# Patient Record
Sex: FEMALE | Race: WHITE | Employment: UNEMPLOYED | URBAN - METROPOLITAN AREA
[De-identification: names, ages, dates, MRNs, and addresses within clinical notes are randomized per-mention and may not be internally consistent; named-entity substitution may affect disease eponyms.]

---

## 2017-05-12 ENCOUNTER — TELEPHONE (OUTPATIENT)
Dept: CARDIOLOGY CLINIC | Age: 67
End: 2017-05-12

## 2017-05-22 RX ORDER — FUROSEMIDE 40 MG/1
TABLET ORAL
Qty: 90 TABLET | Refills: 0 | Status: SHIPPED | OUTPATIENT
Start: 2017-05-22 | End: 2017-10-02

## 2018-07-20 ENCOUNTER — HOSPITAL ENCOUNTER (OUTPATIENT)
Dept: CT IMAGING | Age: 68
Discharge: HOME | End: 2018-07-20
Payer: MEDICARE

## 2018-07-20 DIAGNOSIS — R51.9 INTRACTABLE HEADACHE, UNSPECIFIED CHRONICITY PATTERN, UNSPECIFIED HEADACHE TYPE: ICD-10-CM

## 2018-07-20 PROCEDURE — 70450 CT HEAD/BRAIN W/O DYE: CPT

## 2019-02-27 ENCOUNTER — APPOINTMENT (OUTPATIENT)
Dept: GENERAL RADIOLOGY | Age: 69
End: 2019-02-27
Payer: MEDICARE

## 2019-02-27 ENCOUNTER — HOSPITAL ENCOUNTER (EMERGENCY)
Age: 69
Discharge: NURSING, REHAB OR LONG-TERM CARE | End: 2019-02-27
Attending: EMERGENCY MEDICINE
Payer: MEDICARE

## 2019-02-27 VITALS
HEIGHT: 66 IN | SYSTOLIC BLOOD PRESSURE: 106 MMHG | RESPIRATION RATE: 17 BRPM | BODY MASS INDEX: 38.57 KG/M2 | TEMPERATURE: 98.1 F | DIASTOLIC BLOOD PRESSURE: 79 MMHG | OXYGEN SATURATION: 98 % | HEART RATE: 105 BPM | WEIGHT: 240 LBS

## 2019-02-27 DIAGNOSIS — E16.2 HYPOGLYCEMIA: ICD-10-CM

## 2019-02-27 DIAGNOSIS — N39.0 URINARY TRACT INFECTION WITHOUT HEMATURIA, SITE UNSPECIFIED: Primary | ICD-10-CM

## 2019-02-27 DIAGNOSIS — R40.4 TRANSIENT ALTERATION OF AWARENESS: ICD-10-CM

## 2019-02-27 LAB
A/G RATIO: 0.9 (ref 1.1–2.2)
ALBUMIN SERPL-MCNC: 3.5 G/DL (ref 3.4–5)
ALP BLD-CCNC: 63 U/L (ref 40–129)
ALT SERPL-CCNC: 11 U/L (ref 10–40)
AMORPHOUS: ABNORMAL /HPF
ANION GAP SERPL CALCULATED.3IONS-SCNC: 11 MMOL/L (ref 3–16)
AST SERPL-CCNC: 25 U/L (ref 15–37)
BACTERIA: ABNORMAL /HPF
BASOPHILS ABSOLUTE: 0.1 K/UL (ref 0–0.2)
BASOPHILS RELATIVE PERCENT: 0.6 %
BILIRUB SERPL-MCNC: 0.7 MG/DL (ref 0–1)
BILIRUBIN URINE: NEGATIVE
BLOOD, URINE: ABNORMAL
BUN BLDV-MCNC: 12 MG/DL (ref 7–20)
CALCIUM SERPL-MCNC: 10 MG/DL (ref 8.3–10.6)
CASTS: ABNORMAL /LPF
CHLORIDE BLD-SCNC: 96 MMOL/L (ref 99–110)
CHP ED QC CHECK: YES
CLARITY: ABNORMAL
CO2: 35 MMOL/L (ref 21–32)
COLOR: YELLOW
CREAT SERPL-MCNC: 0.6 MG/DL (ref 0.6–1.2)
EKG ATRIAL RATE: 131 BPM
EKG DIAGNOSIS: NORMAL
EKG Q-T INTERVAL: 424 MS
EKG QRS DURATION: 78 MS
EKG QTC CALCULATION (BAZETT): 566 MS
EKG R AXIS: 82 DEGREES
EKG T AXIS: -75 DEGREES
EKG VENTRICULAR RATE: 107 BPM
EOSINOPHILS ABSOLUTE: 0.1 K/UL (ref 0–0.6)
EOSINOPHILS RELATIVE PERCENT: 0.8 %
GFR AFRICAN AMERICAN: >60
GFR NON-AFRICAN AMERICAN: >60
GLOBULIN: 4.1 G/DL
GLUCOSE BLD-MCNC: 106 MG/DL (ref 70–99)
GLUCOSE BLD-MCNC: 31 MG/DL
GLUCOSE BLD-MCNC: 31 MG/DL (ref 70–99)
GLUCOSE BLD-MCNC: 35 MG/DL
GLUCOSE BLD-MCNC: 35 MG/DL
GLUCOSE BLD-MCNC: 35 MG/DL (ref 70–99)
GLUCOSE BLD-MCNC: 36 MG/DL (ref 70–99)
GLUCOSE BLD-MCNC: 36 MG/DL (ref 70–99)
GLUCOSE BLD-MCNC: 85 MG/DL (ref 70–99)
GLUCOSE BLD-MCNC: 89 MG/DL (ref 70–99)
GLUCOSE BLD-MCNC: 92 MG/DL
GLUCOSE BLD-MCNC: 92 MG/DL (ref 70–99)
GLUCOSE URINE: NEGATIVE MG/DL
HCT VFR BLD CALC: 46.8 % (ref 36–48)
HEMOGLOBIN: 15.7 G/DL (ref 12–16)
KETONES, URINE: NEGATIVE MG/DL
LEUKOCYTE ESTERASE, URINE: ABNORMAL
LYMPHOCYTES ABSOLUTE: 2.1 K/UL (ref 1–5.1)
LYMPHOCYTES RELATIVE PERCENT: 20.1 %
MCH RBC QN AUTO: 31.2 PG (ref 26–34)
MCHC RBC AUTO-ENTMCNC: 33.6 G/DL (ref 31–36)
MCV RBC AUTO: 92.7 FL (ref 80–100)
MICROSCOPIC EXAMINATION: YES
MONOCYTES ABSOLUTE: 0.8 K/UL (ref 0–1.3)
MONOCYTES RELATIVE PERCENT: 7.3 %
MUCUS: ABNORMAL /LPF
NEUTROPHILS ABSOLUTE: 7.4 K/UL (ref 1.7–7.7)
NEUTROPHILS RELATIVE PERCENT: 71.2 %
NITRITE, URINE: POSITIVE
PDW BLD-RTO: 14.4 % (ref 12.4–15.4)
PERFORMED ON: ABNORMAL
PERFORMED ON: NORMAL
PH UA: 5.5
PLATELET # BLD: 207 K/UL (ref 135–450)
PMV BLD AUTO: 9.5 FL (ref 5–10.5)
POTASSIUM REFLEX MAGNESIUM: 3.6 MMOL/L (ref 3.5–5.1)
PROTEIN UA: NEGATIVE MG/DL
RBC # BLD: 5.04 M/UL (ref 4–5.2)
RBC UA: ABNORMAL /HPF (ref 0–2)
RENAL EPITHELIAL, UA: ABNORMAL /HPF
SODIUM BLD-SCNC: 142 MMOL/L (ref 136–145)
SPECIFIC GRAVITY UA: 1.02
TOTAL PROTEIN: 7.6 G/DL (ref 6.4–8.2)
URINE TYPE: ABNORMAL
UROBILINOGEN, URINE: 0.2 E.U./DL
WBC # BLD: 10.3 K/UL (ref 4–11)
WBC UA: >100 /HPF (ref 0–5)

## 2019-02-27 PROCEDURE — 73600 X-RAY EXAM OF ANKLE: CPT

## 2019-02-27 PROCEDURE — 6370000000 HC RX 637 (ALT 250 FOR IP): Performed by: EMERGENCY MEDICINE

## 2019-02-27 PROCEDURE — 81001 URINALYSIS AUTO W/SCOPE: CPT

## 2019-02-27 PROCEDURE — 87186 SC STD MICRODIL/AGAR DIL: CPT

## 2019-02-27 PROCEDURE — 99285 EMERGENCY DEPT VISIT HI MDM: CPT

## 2019-02-27 PROCEDURE — 2580000003 HC RX 258: Performed by: EMERGENCY MEDICINE

## 2019-02-27 PROCEDURE — 73562 X-RAY EXAM OF KNEE 3: CPT

## 2019-02-27 PROCEDURE — 93005 ELECTROCARDIOGRAM TRACING: CPT | Performed by: EMERGENCY MEDICINE

## 2019-02-27 PROCEDURE — 51701 INSERT BLADDER CATHETER: CPT

## 2019-02-27 PROCEDURE — 71045 X-RAY EXAM CHEST 1 VIEW: CPT

## 2019-02-27 PROCEDURE — 87086 URINE CULTURE/COLONY COUNT: CPT

## 2019-02-27 PROCEDURE — 96374 THER/PROPH/DIAG INJ IV PUSH: CPT

## 2019-02-27 PROCEDURE — 96361 HYDRATE IV INFUSION ADD-ON: CPT

## 2019-02-27 PROCEDURE — 87077 CULTURE AEROBIC IDENTIFY: CPT

## 2019-02-27 PROCEDURE — 85025 COMPLETE CBC W/AUTO DIFF WBC: CPT

## 2019-02-27 PROCEDURE — 80053 COMPREHEN METABOLIC PANEL: CPT

## 2019-02-27 PROCEDURE — 93010 ELECTROCARDIOGRAM REPORT: CPT | Performed by: INTERNAL MEDICINE

## 2019-02-27 RX ORDER — RISPERIDONE 2 MG/1
1 TABLET, ORALLY DISINTEGRATING ORAL 2 TIMES DAILY
COMMUNITY
End: 2020-04-14

## 2019-02-27 RX ORDER — CEPHALEXIN 500 MG/1
500 CAPSULE ORAL ONCE
Status: COMPLETED | OUTPATIENT
Start: 2019-02-27 | End: 2019-02-27

## 2019-02-27 RX ORDER — DIVALPROEX SODIUM 500 MG/1
500 TABLET, DELAYED RELEASE ORAL ONCE
COMMUNITY
End: 2020-01-11

## 2019-02-27 RX ORDER — HYDROXYZINE HYDROCHLORIDE 25 MG/1
25 TABLET, FILM COATED ORAL EVERY 8 HOURS PRN
COMMUNITY
End: 2020-01-11

## 2019-02-27 RX ORDER — OXYCODONE HYDROCHLORIDE AND ACETAMINOPHEN 5; 325 MG/1; MG/1
1 TABLET ORAL EVERY 6 HOURS PRN
Status: ON HOLD | COMMUNITY
End: 2020-01-13 | Stop reason: SDUPTHER

## 2019-02-27 RX ORDER — DEXTROSE MONOHYDRATE 25 G/50ML
25 INJECTION, SOLUTION INTRAVENOUS ONCE
Status: COMPLETED | OUTPATIENT
Start: 2019-02-27 | End: 2019-02-27

## 2019-02-27 RX ORDER — DIVALPROEX SODIUM 250 MG/1
250 TABLET, DELAYED RELEASE ORAL ONCE
COMMUNITY
End: 2020-01-11

## 2019-02-27 RX ORDER — TORSEMIDE 20 MG/1
40 TABLET ORAL DAILY
Status: ON HOLD | COMMUNITY
End: 2020-04-18 | Stop reason: HOSPADM

## 2019-02-27 RX ORDER — BISACODYL 10 MG
10 SUPPOSITORY, RECTAL RECTAL DAILY PRN
Status: ON HOLD | COMMUNITY

## 2019-02-27 RX ORDER — POTASSIUM CITRATE 10 MEQ/1
10 TABLET, EXTENDED RELEASE ORAL ONCE
Status: ON HOLD | COMMUNITY
End: 2020-01-13 | Stop reason: HOSPADM

## 2019-02-27 RX ORDER — DEXTROSE MONOHYDRATE 25 G/50ML
25 INJECTION, SOLUTION INTRAVENOUS ONCE
Status: DISCONTINUED | OUTPATIENT
Start: 2019-02-27 | End: 2019-02-27 | Stop reason: HOSPADM

## 2019-02-27 RX ORDER — 0.9 % SODIUM CHLORIDE 0.9 %
1000 INTRAVENOUS SOLUTION INTRAVENOUS ONCE
Status: COMPLETED | OUTPATIENT
Start: 2019-02-27 | End: 2019-02-27

## 2019-02-27 RX ORDER — CEPHALEXIN 500 MG/1
500 CAPSULE ORAL 4 TIMES DAILY
Qty: 40 CAPSULE | Refills: 0 | Status: SHIPPED | OUTPATIENT
Start: 2019-02-27 | End: 2019-03-09

## 2019-02-27 RX ADMIN — DEXTROSE MONOHYDRATE 25 G: 25 INJECTION, SOLUTION INTRAVENOUS at 01:55

## 2019-02-27 RX ADMIN — SODIUM CHLORIDE 1000 ML: 9 INJECTION, SOLUTION INTRAVENOUS at 02:44

## 2019-02-27 RX ADMIN — CEPHALEXIN 500 MG: 500 CAPSULE ORAL at 04:35

## 2019-02-27 RX ADMIN — SODIUM CHLORIDE 1000 ML: 9 INJECTION, SOLUTION INTRAVENOUS at 02:42

## 2019-03-01 LAB
ORGANISM: ABNORMAL
URINE CULTURE, ROUTINE: ABNORMAL
URINE CULTURE, ROUTINE: ABNORMAL

## 2020-01-02 ENCOUNTER — HOSPITAL ENCOUNTER (OUTPATIENT)
Dept: MRI IMAGING | Age: 70
Discharge: HOME | End: 2020-01-02
Payer: MEDICARE

## 2020-01-02 PROCEDURE — 72148 MRI LUMBAR SPINE W/O DYE: CPT

## 2020-01-11 ENCOUNTER — HOSPITAL ENCOUNTER (INPATIENT)
Age: 70
LOS: 2 days | Discharge: HOME | DRG: 690 | End: 2020-01-13
Attending: EMERGENCY MEDICINE | Admitting: INTERNAL MEDICINE
Payer: MEDICARE

## 2020-01-11 ENCOUNTER — APPOINTMENT (OUTPATIENT)
Dept: GENERAL RADIOLOGY | Age: 70
DRG: 690 | End: 2020-01-11
Payer: MEDICARE

## 2020-01-11 PROBLEM — N39.0 UTI (URINARY TRACT INFECTION): Status: ACTIVE | Noted: 2020-01-11

## 2020-01-11 PROBLEM — N30.00 ACUTE CYSTITIS WITHOUT HEMATURIA: Status: ACTIVE | Noted: 2020-01-11

## 2020-01-11 LAB
A/G RATIO: 0.9 (ref 1.1–2.2)
ALBUMIN SERPL-MCNC: 3.4 G/DL (ref 3.4–5)
ALP BLD-CCNC: 68 U/L (ref 40–129)
ALT SERPL-CCNC: 7 U/L (ref 10–40)
ANION GAP SERPL CALCULATED.3IONS-SCNC: 14 MMOL/L (ref 3–16)
AST SERPL-CCNC: 13 U/L (ref 15–37)
BACTERIA: ABNORMAL /HPF
BASOPHILS ABSOLUTE: 0.1 K/UL (ref 0–0.2)
BASOPHILS RELATIVE PERCENT: 0.7 %
BILIRUB SERPL-MCNC: 0.6 MG/DL (ref 0–1)
BILIRUBIN URINE: NEGATIVE
BLOOD, URINE: ABNORMAL
BUN BLDV-MCNC: 9 MG/DL (ref 7–20)
CALCIUM SERPL-MCNC: 9.3 MG/DL (ref 8.3–10.6)
CHLORIDE BLD-SCNC: 92 MMOL/L (ref 99–110)
CLARITY: ABNORMAL
CO2: 29 MMOL/L (ref 21–32)
COLOR: YELLOW
CREAT SERPL-MCNC: <0.5 MG/DL (ref 0.6–1.2)
CRYSTALS, UA: ABNORMAL /HPF
EKG ATRIAL RATE: 91 BPM
EKG DIAGNOSIS: NORMAL
EKG Q-T INTERVAL: 338 MS
EKG QRS DURATION: 82 MS
EKG QTC CALCULATION (BAZETT): 442 MS
EKG R AXIS: 76 DEGREES
EKG T AXIS: 265 DEGREES
EKG VENTRICULAR RATE: 103 BPM
EOSINOPHILS ABSOLUTE: 0.1 K/UL (ref 0–0.6)
EOSINOPHILS RELATIVE PERCENT: 0.9 %
GFR AFRICAN AMERICAN: >60
GFR NON-AFRICAN AMERICAN: >60
GLOBULIN: 3.9 G/DL
GLUCOSE BLD-MCNC: 113 MG/DL (ref 70–99)
GLUCOSE BLD-MCNC: 133 MG/DL (ref 70–99)
GLUCOSE BLD-MCNC: 83 MG/DL (ref 70–99)
GLUCOSE BLD-MCNC: 84 MG/DL (ref 70–99)
GLUCOSE BLD-MCNC: 95 MG/DL (ref 70–99)
GLUCOSE URINE: NEGATIVE MG/DL
HCT VFR BLD CALC: 41.4 % (ref 36–48)
HEMOGLOBIN: 13.6 G/DL (ref 12–16)
KETONES, URINE: NEGATIVE MG/DL
LACTIC ACID: 1.8 MMOL/L (ref 0.4–2)
LEUKOCYTE ESTERASE, URINE: ABNORMAL
LYMPHOCYTES ABSOLUTE: 2.3 K/UL (ref 1–5.1)
LYMPHOCYTES RELATIVE PERCENT: 16.1 %
MAGNESIUM: 1.6 MG/DL (ref 1.8–2.4)
MCH RBC QN AUTO: 29.5 PG (ref 26–34)
MCHC RBC AUTO-ENTMCNC: 32.7 G/DL (ref 31–36)
MCV RBC AUTO: 90.2 FL (ref 80–100)
MICROSCOPIC EXAMINATION: YES
MONOCYTES ABSOLUTE: 1.1 K/UL (ref 0–1.3)
MONOCYTES RELATIVE PERCENT: 7.8 %
NEUTROPHILS ABSOLUTE: 10.5 K/UL (ref 1.7–7.7)
NEUTROPHILS RELATIVE PERCENT: 74.5 %
NITRITE, URINE: POSITIVE
PDW BLD-RTO: 13.8 % (ref 12.4–15.4)
PERFORMED ON: ABNORMAL
PERFORMED ON: ABNORMAL
PERFORMED ON: NORMAL
PERFORMED ON: NORMAL
PH UA: 6 (ref 5–8)
PLATELET # BLD: 252 K/UL (ref 135–450)
PMV BLD AUTO: 8.8 FL (ref 5–10.5)
POTASSIUM REFLEX MAGNESIUM: 3.1 MMOL/L (ref 3.5–5.1)
PRO-BNP: 2030 PG/ML (ref 0–124)
PROTEIN UA: ABNORMAL MG/DL
RBC # BLD: 4.6 M/UL (ref 4–5.2)
RBC UA: ABNORMAL /HPF (ref 0–2)
SODIUM BLD-SCNC: 135 MMOL/L (ref 136–145)
SPECIFIC GRAVITY UA: 1.01 (ref 1–1.03)
TOTAL CK: 57 U/L (ref 26–192)
TOTAL PROTEIN: 7.3 G/DL (ref 6.4–8.2)
TROPONIN: <0.01 NG/ML
TSH REFLEX: 1.12 UIU/ML (ref 0.27–4.2)
URINE REFLEX TO CULTURE: YES
URINE TYPE: ABNORMAL
UROBILINOGEN, URINE: 0.2 E.U./DL
WBC # BLD: 14.1 K/UL (ref 4–11)
WBC UA: >100 /HPF (ref 0–5)

## 2020-01-11 PROCEDURE — G0378 HOSPITAL OBSERVATION PER HR: HCPCS

## 2020-01-11 PROCEDURE — 6370000000 HC RX 637 (ALT 250 FOR IP): Performed by: INTERNAL MEDICINE

## 2020-01-11 PROCEDURE — 83880 ASSAY OF NATRIURETIC PEPTIDE: CPT

## 2020-01-11 PROCEDURE — 2580000003 HC RX 258: Performed by: INTERNAL MEDICINE

## 2020-01-11 PROCEDURE — 99284 EMERGENCY DEPT VISIT MOD MDM: CPT

## 2020-01-11 PROCEDURE — 96367 TX/PROPH/DG ADDL SEQ IV INF: CPT

## 2020-01-11 PROCEDURE — 73030 X-RAY EXAM OF SHOULDER: CPT

## 2020-01-11 PROCEDURE — 2700000000 HC OXYGEN THERAPY PER DAY

## 2020-01-11 PROCEDURE — 6360000002 HC RX W HCPCS: Performed by: EMERGENCY MEDICINE

## 2020-01-11 PROCEDURE — 71045 X-RAY EXAM CHEST 1 VIEW: CPT

## 2020-01-11 PROCEDURE — 2060000000 HC ICU INTERMEDIATE R&B

## 2020-01-11 PROCEDURE — 96376 TX/PRO/DX INJ SAME DRUG ADON: CPT

## 2020-01-11 PROCEDURE — 36415 COLL VENOUS BLD VENIPUNCTURE: CPT

## 2020-01-11 PROCEDURE — 87077 CULTURE AEROBIC IDENTIFY: CPT

## 2020-01-11 PROCEDURE — 84484 ASSAY OF TROPONIN QUANT: CPT

## 2020-01-11 PROCEDURE — 84443 ASSAY THYROID STIM HORMONE: CPT

## 2020-01-11 PROCEDURE — 6370000000 HC RX 637 (ALT 250 FOR IP): Performed by: EMERGENCY MEDICINE

## 2020-01-11 PROCEDURE — 83735 ASSAY OF MAGNESIUM: CPT

## 2020-01-11 PROCEDURE — 93010 ELECTROCARDIOGRAM REPORT: CPT | Performed by: INTERNAL MEDICINE

## 2020-01-11 PROCEDURE — 87086 URINE CULTURE/COLONY COUNT: CPT

## 2020-01-11 PROCEDURE — 6360000002 HC RX W HCPCS: Performed by: INTERNAL MEDICINE

## 2020-01-11 PROCEDURE — 83605 ASSAY OF LACTIC ACID: CPT

## 2020-01-11 PROCEDURE — 87040 BLOOD CULTURE FOR BACTERIA: CPT

## 2020-01-11 PROCEDURE — 94761 N-INVAS EAR/PLS OXIMETRY MLT: CPT

## 2020-01-11 PROCEDURE — 96365 THER/PROPH/DIAG IV INF INIT: CPT

## 2020-01-11 PROCEDURE — 87186 SC STD MICRODIL/AGAR DIL: CPT

## 2020-01-11 PROCEDURE — 85025 COMPLETE CBC W/AUTO DIFF WBC: CPT

## 2020-01-11 PROCEDURE — 96361 HYDRATE IV INFUSION ADD-ON: CPT

## 2020-01-11 PROCEDURE — 93005 ELECTROCARDIOGRAM TRACING: CPT | Performed by: EMERGENCY MEDICINE

## 2020-01-11 PROCEDURE — 81001 URINALYSIS AUTO W/SCOPE: CPT

## 2020-01-11 PROCEDURE — 94640 AIRWAY INHALATION TREATMENT: CPT

## 2020-01-11 PROCEDURE — 2580000003 HC RX 258: Performed by: EMERGENCY MEDICINE

## 2020-01-11 PROCEDURE — 80053 COMPREHEN METABOLIC PANEL: CPT

## 2020-01-11 PROCEDURE — 82550 ASSAY OF CK (CPK): CPT

## 2020-01-11 PROCEDURE — 96366 THER/PROPH/DIAG IV INF ADDON: CPT

## 2020-01-11 RX ORDER — OXYCODONE HYDROCHLORIDE AND ACETAMINOPHEN 5; 325 MG/1; MG/1
1 TABLET ORAL EVERY 6 HOURS PRN
Status: DISCONTINUED | OUTPATIENT
Start: 2020-01-11 | End: 2020-01-13 | Stop reason: HOSPADM

## 2020-01-11 RX ORDER — SODIUM PHOSPHATE, DIBASIC AND SODIUM PHOSPHATE, MONOBASIC 7; 19 G/133ML; G/133ML
1 ENEMA RECTAL DAILY PRN
Status: ON HOLD | COMMUNITY

## 2020-01-11 RX ORDER — 0.9 % SODIUM CHLORIDE 0.9 %
500 INTRAVENOUS SOLUTION INTRAVENOUS ONCE
Status: COMPLETED | OUTPATIENT
Start: 2020-01-11 | End: 2020-01-11

## 2020-01-11 RX ORDER — MAGNESIUM SULFATE 1 G/100ML
1 INJECTION INTRAVENOUS ONCE
Status: COMPLETED | OUTPATIENT
Start: 2020-01-11 | End: 2020-01-11

## 2020-01-11 RX ORDER — SOLIFENACIN SUCCINATE 5 MG/1
5 TABLET, FILM COATED ORAL DAILY
Status: ON HOLD | COMMUNITY
End: 2022-10-06

## 2020-01-11 RX ORDER — RISPERIDONE 0.5 MG/1
0.5 TABLET, ORALLY DISINTEGRATING ORAL 2 TIMES DAILY
Status: DISCONTINUED | OUTPATIENT
Start: 2020-01-11 | End: 2020-01-13 | Stop reason: HOSPADM

## 2020-01-11 RX ORDER — INSULIN GLARGINE 100 [IU]/ML
40 INJECTION, SOLUTION SUBCUTANEOUS EVERY MORNING
Status: DISCONTINUED | OUTPATIENT
Start: 2020-01-11 | End: 2020-01-13 | Stop reason: HOSPADM

## 2020-01-11 RX ORDER — ACETAMINOPHEN 325 MG/1
650 TABLET ORAL EVERY 6 HOURS PRN
COMMUNITY
End: 2020-04-14

## 2020-01-11 RX ORDER — TORSEMIDE 20 MG/1
40 TABLET ORAL DAILY
Status: DISCONTINUED | OUTPATIENT
Start: 2020-01-11 | End: 2020-01-13 | Stop reason: HOSPADM

## 2020-01-11 RX ORDER — POTASSIUM CHLORIDE 20 MEQ/1
40 TABLET, EXTENDED RELEASE ORAL ONCE
Status: COMPLETED | OUTPATIENT
Start: 2020-01-11 | End: 2020-01-11

## 2020-01-11 RX ORDER — GABAPENTIN 100 MG/1
100 CAPSULE ORAL DAILY
COMMUNITY
End: 2020-04-14

## 2020-01-11 RX ORDER — TIZANIDINE 4 MG/1
4 TABLET ORAL 3 TIMES DAILY
Status: ON HOLD | COMMUNITY
End: 2020-01-13 | Stop reason: HOSPADM

## 2020-01-11 RX ORDER — MAGNESIUM SULFATE 1 G/100ML
1 INJECTION INTRAVENOUS ONCE
Status: DISCONTINUED | OUTPATIENT
Start: 2020-01-11 | End: 2020-01-11

## 2020-01-11 RX ORDER — TIZANIDINE 4 MG/1
4 TABLET ORAL
Status: DISCONTINUED | OUTPATIENT
Start: 2020-01-11 | End: 2020-01-11

## 2020-01-11 RX ORDER — GABAPENTIN 300 MG/1
300 CAPSULE ORAL NIGHTLY
Status: ON HOLD | COMMUNITY
End: 2020-04-18 | Stop reason: SDUPTHER

## 2020-01-11 RX ORDER — OXYCODONE HYDROCHLORIDE AND ACETAMINOPHEN 5; 325 MG/1; MG/1
1 TABLET ORAL ONCE
Status: COMPLETED | OUTPATIENT
Start: 2020-01-11 | End: 2020-01-11

## 2020-01-11 RX ORDER — SENNA PLUS 8.6 MG/1
1 TABLET ORAL DAILY
Status: DISCONTINUED | OUTPATIENT
Start: 2020-01-11 | End: 2020-01-13 | Stop reason: HOSPADM

## 2020-01-11 RX ORDER — IPRATROPIUM BROMIDE AND ALBUTEROL SULFATE 2.5; .5 MG/3ML; MG/3ML
1 SOLUTION RESPIRATORY (INHALATION) EVERY 6 HOURS
Status: ON HOLD | COMMUNITY
End: 2022-10-06

## 2020-01-11 RX ORDER — BISACODYL 10 MG
10 SUPPOSITORY, RECTAL RECTAL DAILY PRN
Status: DISCONTINUED | OUTPATIENT
Start: 2020-01-11 | End: 2020-01-13 | Stop reason: HOSPADM

## 2020-01-11 RX ORDER — ONDANSETRON 2 MG/ML
4 INJECTION INTRAMUSCULAR; INTRAVENOUS EVERY 6 HOURS PRN
Status: DISCONTINUED | OUTPATIENT
Start: 2020-01-11 | End: 2020-01-13 | Stop reason: HOSPADM

## 2020-01-11 RX ORDER — SENNA PLUS 8.6 MG/1
1 TABLET ORAL DAILY
COMMUNITY
End: 2020-03-24

## 2020-01-11 RX ORDER — CITALOPRAM 20 MG/1
10 TABLET ORAL DAILY
Status: DISCONTINUED | OUTPATIENT
Start: 2020-01-11 | End: 2020-01-13 | Stop reason: HOSPADM

## 2020-01-11 RX ORDER — DILTIAZEM HYDROCHLORIDE 240 MG/1
240 CAPSULE, COATED, EXTENDED RELEASE ORAL DAILY
Status: DISCONTINUED | OUTPATIENT
Start: 2020-01-11 | End: 2020-01-13 | Stop reason: HOSPADM

## 2020-01-11 RX ORDER — IPRATROPIUM BROMIDE AND ALBUTEROL SULFATE 2.5; .5 MG/3ML; MG/3ML
1 SOLUTION RESPIRATORY (INHALATION) EVERY 6 HOURS
Status: DISCONTINUED | OUTPATIENT
Start: 2020-01-11 | End: 2020-01-13 | Stop reason: HOSPADM

## 2020-01-11 RX ORDER — SODIUM CHLORIDE 1000 MG
1 TABLET, SOLUBLE MISCELLANEOUS DAILY
Status: DISCONTINUED | OUTPATIENT
Start: 2020-01-11 | End: 2020-01-13 | Stop reason: HOSPADM

## 2020-01-11 RX ORDER — SODIUM CHLORIDE 0.9 % (FLUSH) 0.9 %
10 SYRINGE (ML) INJECTION PRN
Status: DISCONTINUED | OUTPATIENT
Start: 2020-01-11 | End: 2020-01-13 | Stop reason: HOSPADM

## 2020-01-11 RX ORDER — POTASSIUM CHLORIDE 750 MG/1
20 TABLET, EXTENDED RELEASE ORAL DAILY
Status: DISCONTINUED | OUTPATIENT
Start: 2020-01-11 | End: 2020-01-13 | Stop reason: HOSPADM

## 2020-01-11 RX ORDER — TIZANIDINE 4 MG/1
4 TABLET ORAL NIGHTLY
Status: DISCONTINUED | OUTPATIENT
Start: 2020-01-11 | End: 2020-01-13 | Stop reason: HOSPADM

## 2020-01-11 RX ORDER — CETIRIZINE HYDROCHLORIDE 10 MG/1
10 TABLET ORAL DAILY
Status: DISCONTINUED | OUTPATIENT
Start: 2020-01-11 | End: 2020-01-13 | Stop reason: HOSPADM

## 2020-01-11 RX ORDER — SODIUM CHLORIDE 9 MG/ML
INJECTION, SOLUTION INTRAVENOUS CONTINUOUS
Status: DISCONTINUED | OUTPATIENT
Start: 2020-01-11 | End: 2020-01-13 | Stop reason: HOSPADM

## 2020-01-11 RX ORDER — TRIAMCINOLONE ACETONIDE 1 MG/G
CREAM TOPICAL 2 TIMES DAILY
Status: DISCONTINUED | OUTPATIENT
Start: 2020-01-11 | End: 2020-01-13 | Stop reason: HOSPADM

## 2020-01-11 RX ORDER — AMMONIUM LACTATE 12 G/100G
LOTION TOPICAL PRN
COMMUNITY
End: 2020-04-14

## 2020-01-11 RX ORDER — SODIUM CHLORIDE 0.9 % (FLUSH) 0.9 %
10 SYRINGE (ML) INJECTION EVERY 12 HOURS SCHEDULED
Status: DISCONTINUED | OUTPATIENT
Start: 2020-01-11 | End: 2020-01-13 | Stop reason: HOSPADM

## 2020-01-11 RX ORDER — ONDANSETRON 4 MG/1
4 TABLET, FILM COATED ORAL EVERY 6 HOURS
COMMUNITY
End: 2020-04-14

## 2020-01-11 RX ORDER — GABAPENTIN 100 MG/1
100 CAPSULE ORAL DAILY
Status: DISCONTINUED | OUTPATIENT
Start: 2020-01-11 | End: 2020-01-13 | Stop reason: HOSPADM

## 2020-01-11 RX ORDER — GABAPENTIN 300 MG/1
300 CAPSULE ORAL NIGHTLY
Status: DISCONTINUED | OUTPATIENT
Start: 2020-01-11 | End: 2020-01-13 | Stop reason: HOSPADM

## 2020-01-11 RX ADMIN — IPRATROPIUM BROMIDE AND ALBUTEROL SULFATE 3 ML: .5; 3 SOLUTION RESPIRATORY (INHALATION) at 18:56

## 2020-01-11 RX ADMIN — TIZANIDINE 4 MG: 4 TABLET ORAL at 20:30

## 2020-01-11 RX ADMIN — TRIAMCINOLONE ACETONIDE: 1 CREAM TOPICAL at 20:30

## 2020-01-11 RX ADMIN — GABAPENTIN 300 MG: 300 CAPSULE ORAL at 20:30

## 2020-01-11 RX ADMIN — CITALOPRAM HYDROBROMIDE 10 MG: 20 TABLET ORAL at 10:48

## 2020-01-11 RX ADMIN — OXYCODONE HYDROCHLORIDE AND ACETAMINOPHEN 1 TABLET: 5; 325 TABLET ORAL at 13:49

## 2020-01-11 RX ADMIN — TRIAMCINOLONE ACETONIDE: 1 CREAM TOPICAL at 10:49

## 2020-01-11 RX ADMIN — RISPERIDONE 0.5 MG: 0.5 TABLET, ORALLY DISINTEGRATING ORAL at 10:49

## 2020-01-11 RX ADMIN — RISPERIDONE 0.5 MG: 0.5 TABLET, ORALLY DISINTEGRATING ORAL at 20:30

## 2020-01-11 RX ADMIN — DILTIAZEM HYDROCHLORIDE 240 MG: 240 CAPSULE, COATED, EXTENDED RELEASE ORAL at 10:48

## 2020-01-11 RX ADMIN — METOPROLOL TARTRATE 25 MG: 25 TABLET ORAL at 10:47

## 2020-01-11 RX ADMIN — SODIUM CHLORIDE 500 ML: 9 INJECTION, SOLUTION INTRAVENOUS at 07:01

## 2020-01-11 RX ADMIN — INSULIN GLARGINE 40 UNITS: 100 INJECTION, SOLUTION SUBCUTANEOUS at 11:04

## 2020-01-11 RX ADMIN — SODIUM CHLORIDE: 9 INJECTION, SOLUTION INTRAVENOUS at 12:13

## 2020-01-11 RX ADMIN — TORSEMIDE 40 MG: 20 TABLET ORAL at 10:48

## 2020-01-11 RX ADMIN — POTASSIUM CHLORIDE 20 MEQ: 750 TABLET, EXTENDED RELEASE ORAL at 10:48

## 2020-01-11 RX ADMIN — POTASSIUM CHLORIDE 40 MEQ: 1500 TABLET, EXTENDED RELEASE ORAL at 04:59

## 2020-01-11 RX ADMIN — RIVAROXABAN 20 MG: 20 TABLET, FILM COATED ORAL at 18:43

## 2020-01-11 RX ADMIN — SODIUM CHLORIDE 500 ML: 9 INJECTION, SOLUTION INTRAVENOUS at 04:59

## 2020-01-11 RX ADMIN — Medication 10 ML: at 20:30

## 2020-01-11 RX ADMIN — MAGNESIUM SULFATE HEPTAHYDRATE 1 G: 1 INJECTION, SOLUTION INTRAVENOUS at 07:55

## 2020-01-11 RX ADMIN — CETIRIZINE HYDROCHLORIDE 10 MG: 10 TABLET ORAL at 10:48

## 2020-01-11 RX ADMIN — OXYCODONE HYDROCHLORIDE AND ACETAMINOPHEN 1 TABLET: 5; 325 TABLET ORAL at 04:14

## 2020-01-11 RX ADMIN — SENNOSIDES 8.6 MG: 8.6 TABLET, FILM COATED ORAL at 10:48

## 2020-01-11 RX ADMIN — IPRATROPIUM BROMIDE AND ALBUTEROL SULFATE 3 ML: .5; 3 SOLUTION RESPIRATORY (INHALATION) at 13:15

## 2020-01-11 RX ADMIN — Medication 10 ML: at 10:48

## 2020-01-11 RX ADMIN — MAGNESIUM GLUCONATE 500 MG ORAL TABLET 400 MG: 500 TABLET ORAL at 04:59

## 2020-01-11 RX ADMIN — SODIUM CHLORIDE TAB 1 GM 1 G: 1 TAB at 10:47

## 2020-01-11 RX ADMIN — MAGNESIUM SULFATE HEPTAHYDRATE 1 G: 1 INJECTION, SOLUTION INTRAVENOUS at 08:59

## 2020-01-11 RX ADMIN — MEROPENEM 1 G: 1 INJECTION, POWDER, FOR SOLUTION INTRAVENOUS at 16:35

## 2020-01-11 RX ADMIN — GABAPENTIN 100 MG: 300 CAPSULE ORAL at 10:48

## 2020-01-11 RX ADMIN — MEROPENEM 1 G: 1 INJECTION, POWDER, FOR SOLUTION INTRAVENOUS at 08:24

## 2020-01-11 ASSESSMENT — PAIN SCALES - GENERAL
PAINLEVEL_OUTOF10: 9
PAINLEVEL_OUTOF10: 5
PAINLEVEL_OUTOF10: 8
PAINLEVEL_OUTOF10: 9

## 2020-01-11 ASSESSMENT — PAIN DESCRIPTION - LOCATION: LOCATION: SHOULDER

## 2020-01-11 ASSESSMENT — PAIN DESCRIPTION - ORIENTATION: ORIENTATION: LEFT

## 2020-01-11 NOTE — ED PROVIDER NOTES
[address] [facility]      CHIEF COMPLAINT  Shoulder Pain (left shoulder pain for 2 weeks, ems was called to [facility] for patient yelling out and being disruptive)       [address] [place] [address] Drive is a 68 y.o. female with a past medical history of CHF, dementia, atrial fibrillation on anticoagulation, COPD, diabetes, and hypertension who presents to the ED complaining of left shoulder pain. EMS initially was called for the patient being disruptive. However when they arrived, the patient was stating that she had left shoulder pain and wanted to be transported for this reason. She states that her shoulder has been hurting for the past 2 weeks. She denies any specific injury. She states that movement worsens the pain and she cannot extend the shoulder past a certain point. She denies numbness or weakness of her hand. She denies chest pain or shortness of breath. Denies fevers. Nursing home was concerned she may have a UTI due to her behavior. No other complaints, modifying factors or associated symptoms. I have reviewed the following from the nursing documentation.     Past Medical History:   Diagnosis Date    Acute diastolic heart failure (HCC)     Acute respiratory failure (HCC)     Adjustment disorder with mixed anxiety and depressed mood     Allergic rhinitis     Alzheimer's dementia ([address]     Atrial fibrillation (HCC)     Back pain     CHF (congestive heart failure) (HCC)     Chronic pain     Constipation     COPD (chronic obstructive pulmonary disease) (HCC)     Depression     Dermatitis     Diabetes mellitus ([address]     Disseminated superficial actinic porokeratosis     Disseminated superficial actinic porokeratosis (DSAP)     Edema     ESBL (extended spectrum beta-lactamase) producing bacteria infection 02/27/2019    urine    Hypertension     Hypo-osmolality and hyponatremia     Morbid obesity (HCC)     Overactive bladder     Schizoaffective disorder ([facility]     Seizures ([facility]     Xerosis cutis     Xerosis cutis      Past Surgical History:   Procedure Laterality Date    KNEE SURGERY      TONSILLECTOMY      TUBAL LIGATION       Family History   Problem Relation Age of Onset    Diabetes Mother     High Blood Pressure Mother     Cancer Mother     Heart Disease Mother     High Blood Pressure Father     Heart Disease Father      Social History     Socioeconomic History    Marital status:      Spouse name: Not on file    Number of children: Not on file    Years of education: Not on file    Highest education level: Not on file   Occupational History    Not on file   Social Needs    Financial resource strain: Not on file    Food insecurity:     Worry: Not on file     Inability: Not on file    Transportation needs:     Medical: Not on file     Non-medical: Not on file   Tobacco Use    Smoking status: Former Smoker     Last attempt to quit: 2004     Years since quittin.0    Smokeless tobacco: Never Used   Substance and Sexual Activity    Alcohol use: No     Alcohol/week: 0.0 standard drinks    Drug use: No    Sexual activity: Not on file   Lifestyle    Physical activity:     Days per week: Not on file     Minutes per session: Not on file    Stress: Not on file   Relationships    Social connections:     Talks on phone: Not on file     Gets together: Not on file     Attends Mu-ism service: Not on file     Active member of club or organization: Not on file     Attends meetings of clubs or organizations: Not on file     Relationship status: Not on file    Intimate partner violence:     Fear of current or ex partner: Not on file     Emotionally abused: Not on file     Physically abused: Not on file     Forced sexual activity: Not on file   Other Topics Concern    Not on file   Social History Narrative    Not on file     Current Facility-Administered Medications   Medication Dose Route Frequency Provider Last Rate Last Dose    MG per tablet Take 1 tablet by mouth every 6 hours as needed for Pain.  bisacodyl (DULCOLAX) 10 MG suppository Place 10 mg rectally daily as needed for Constipation      torsemide (DEMADEX) 20 MG tablet Take 40 mg by mouth daily       loratadine (CLARITIN) 10 MG tablet Take 10 mg by mouth daily      citalopram (CELEXA) 10 MG tablet Take 10 mg by mouth daily      insulin glargine (LANTUS SOLOSTAR) 100 UNIT/ML injection pen Inject 40 Units into the skin every morning      rivaroxaban (XARELTO) 20 MG TABS tablet Take 1 tablet by mouth daily (Patient taking differently: Take 20 mg by mouth nightly ) 30 tablet 0    metoprolol tartrate (LOPRESSOR) 25 MG tablet Take 1 tablet by mouth 2 times daily (Patient taking differently: Take 12.5 mg by mouth daily ) 60 tablet 0    INSULIN SYRINGE .5CC/29G ([employer] INS SYR .5CC/29G) 29G X 1/2\" 0.5 ML MISC 1 each by Does not apply route daily 100 each 3    potassium citrate (UROCIT-K) 10 MEQ (1080 MG) extended release tablet Take 10 mEq by mouth once      triamcinolone (KENALOG) 0.1 % cream Apply topically 2 times daily Apply topically 2 times daily.  diltiazem (CARDIZEM CD) 240 MG extended release capsule Take 1 capsule by mouth daily 30 capsule 0    Blood Glucose Monitoring Suppl (BLOOD GLUCOSE SYSTEM RICHARD) KIT Check fingerstick blood sugars before meals and at bedtime 1 kit 0     Allergies   Allergen Reactions    Fish-Derived Products      Food allergy    Iodine     Mushroom Extract Complex     Onion     Other      seafood       REVIEW OF SYSTEMS  10 systems reviewed, pertinent positives per HPI otherwise noted to be negative. PHYSICAL EXAM  /60   Pulse 122   Temp 98.1 °F (36.7 °C) (Oral)   Resp 22   Ht 5' 6\" (1.676 m)   Wt 260 lb (117.9 kg)   SpO2 96%   BMI 41.97 kg/m²    Physical exam:  General appearance: awake and cooperative. No distress. Non toxic appearing. HENT: Normocephalic. Atraumatic.  Mucus membranes are moist.   Neck: supple  Eyes: KYRA. EOM intact. Heart: tachycardic rate. Irregularly irregular rhythm. No murmurs. Lungs: Respirations unlabored. CTAB. No wheezes, rales, or rhonchi. Good air exchange  Abdomen: No tenderness. Soft. Non distended. No masses. No organomegaly. No peritoneal signs. Musculoskeletal: Left shoulder: Tenderness to palpation diffusely around left shoulder, left trapezius, and left upper extremity. No reproducible pain with passive range of motion. Patient able to actively range the shoulder with some discomfort. No joint swelling, erythema, or effusion. No extremity edema. Compartments soft. No deformity. All extremities neurovascularly intact. Radial, Dp, and PT pulses +2/4 bilaterally. Radial, median, and ulnar nerves intact bilaterally. Neurological: Alert and oriented. No facial droop. Strength 5/5 in UE and LE bilaterally. Sensation intact x 4. No aphasia or dysarthria. Psychiatric: Irritable mood and affect. LABS  I have reviewed all labs for this visit.    Results for orders placed or performed during the hospital encounter of 01/11/20   Urinalysis Reflex to Culture   Result Value Ref Range    Color, UA Yellow Straw/Yellow    Clarity, UA CLOUDY (A) Clear    Glucose, Ur Negative Negative mg/dL    Bilirubin Urine Negative Negative    Ketones, Urine Negative Negative mg/dL    Specific Gravity, UA 1.015 1.005 - 1.030    Blood, Urine MODERATE (A) Negative    pH, UA 6.0 5.0 - 8.0    Protein, UA TRACE (A) Negative mg/dL    Urobilinogen, Urine 0.2 <2.0 E.U./dL    Nitrite, Urine POSITIVE (A) Negative    Leukocyte Esterase, Urine LARGE (A) Negative    Microscopic Examination YES     Urine Type NotGiven     Urine Reflex to Culture Yes    CBC Auto Differential   Result Value Ref Range    WBC 14.1 (H) 4.0 - 11.0 K/uL    RBC 4.60 4.00 - 5.20 M/uL    Hemoglobin 13.6 12.0 - 16.0 g/dL    Hematocrit 41.4 36.0 - 48.0 %    MCV 90.2 80.0 - 100.0 fL    MCH 29.5 26.0 - 34.0 pg    MCHC 32.7 31.0 - 36.0 g/dL    RDW 13.8 12.4 - 15.4 %    Platelets 893 935 - 196 K/uL    MPV 8.8 5.0 - 10.5 fL    Neutrophils % 74.5 %    Lymphocytes % 16.1 %    Monocytes % 7.8 %    Eosinophils % 0.9 %    Basophils % 0.7 %    Neutrophils Absolute 10.5 (H) 1.7 - 7.7 K/uL    Lymphocytes Absolute 2.3 1.0 - 5.1 K/uL    Monocytes Absolute 1.1 0.0 - 1.3 K/uL    Eosinophils Absolute 0.1 0.0 - 0.6 K/uL    Basophils Absolute 0.1 0.0 - 0.2 K/uL   Comprehensive Metabolic Panel w/ Reflex to MG   Result Value Ref Range    Sodium 135 (L) 136 - 145 mmol/L    Potassium reflex Magnesium 3.1 (L) 3.5 - 5.1 mmol/L    Chloride 92 (L) 99 - 110 mmol/L    CO2 29 21 - 32 mmol/L    Anion Gap 14 3 - 16    Glucose 95 70 - 99 mg/dL    BUN 9 7 - 20 mg/dL    CREATININE <0.5 (L) 0.6 - 1.2 mg/dL    GFR Non-African American >60 >60    GFR African American >60 >60    Calcium 9.3 8.3 - 10.6 mg/dL    Total Protein 7.3 6.4 - 8.2 g/dL    Alb 3.4 3.4 - 5.0 g/dL    Albumin/Globulin Ratio 0.9 (L) 1.1 - 2.2    Total Bilirubin 0.6 0.0 - 1.0 mg/dL    Alkaline Phosphatase 68 40 - 129 U/L    ALT 7 (L) 10 - 40 U/L    AST 13 (L) 15 - 37 U/L    Globulin 3.9 g/dL   Troponin   Result Value Ref Range    Troponin <0.01 <0.01 ng/mL   Lactic Acid, Plasma   Result Value Ref Range    Lactic Acid 1.8 0.4 - 2.0 mmol/L   Magnesium   Result Value Ref Range    Magnesium 1.60 (L) 1.80 - 2.40 mg/dL   Microscopic Urinalysis   Result Value Ref Range    WBC, UA >100 (A) 0 - 5 /HPF    RBC, UA 5-10 (A) 0 - 2 /HPF    Bacteria, UA 3+ (A) /HPF    Crystals Few Ca. Oxalate (A) /HPF   Brain Natriuretic Peptide   Result Value Ref Range    Pro-BNP 2,030 (H) 0 - 124 pg/mL       ECG  The Ekg interpreted by me shows  atrial fibrillation with a rate of 103  Axis is   Normal  QTc is  within an acceptable range  Intervals and Durations are unremarkable.       ST Segments: no acute change  No significant change from prior EKG dated 2/27/19    RADIOLOGY  Mri Lumbar Spine Wo Contrast    Result Date: 1/2/2020  EXAMINATION: MRI OF THE LUMBAR SPINE WITHOUT CONTRAST, 1/2/2020 10:58 am TECHNIQUE: Multiplanar multisequence MRI of the lumbar spine was performed without the administration of intravenous contrast. COMPARISON: None. HISTORY: ORDERING SYSTEM PROVIDED HISTORY: Acute right-sided low back pain with sciatica, sciatica laterality unspecified TECHNOLOGIST PROVIDED HISTORY: Reason for Exam: Back pain x 3 years. Acuity: Chronic Type of Exam: Initial FINDINGS: BONES/ALIGNMENT: There appears to be subacute to chronic superior endplate depression involving the L5 vertebral body with approximately 45% loss of height. No bulging of the posterior cortex. The remaining vertebral body heights appear maintained. Straightening of the normal lumbar lordosis. No significant spondylolisthesis. Scattered Modic type degenerative endplate changes of the lumbar spine noted. Otherwise, the bone marrow signal demonstrates no gross abnormality. SPINAL CORD: The conus terminates normally. SOFT TISSUES: No paraspinal mass identified. Atrophy is seen involving the posterior paraspinal musculature as well as the right psoas muscle. L1-L2: There is a posterior disc osteophyte complex flattening the ventral thecal sac. No significant spinal canal stenosis or neural foraminal narrowing. L2-L3: There is a posterior disc osteophyte complex flattening the ventral thecal sac along with buckling of the ligamentum flavum. No significant spinal canal stenosis. No neural foraminal narrowing. L3-L4: There is a disc bulge along with buckling of the ligamentum flavum contribute to minimal spinal canal stenosis. Facet arthrosis without significant neural foraminal narrowing. L4-L5: There is a disc bulge with buckling of the ligamentum flavum contribute to mild spinal canal stenosis. Minimal right neural foraminal narrowing. The left neural foramen appears patent.  L5-S1: There is a disc bulge with bilateral facet arthrosis contributing to minimal right neural foraminal narrowing. No spinal canal stenosis or left neural foraminal narrowing. 1. Likely subacute to chronic superior endplate depression involving the L5 vertebral body with approximately 45% loss of height. No bulging of the posterior cortex. 2. Degenerative changes contribute to minimal spinal canal stenosis at L3-L4 with mild stenosis at L4-L5. 3. Scattered neural foraminal narrowing of the lumbar spine as above. 4. No significant spondylolisthesis. Xr Shoulder Left (min 2 Views)    Result Date: 1/11/2020  EXAMINATION: THREE XRAY VIEWS OF THE LEFT SHOULDER 1/11/2020 4:08 am COMPARISON: None. HISTORY: ORDERING SYSTEM PROVIDED HISTORY: shoulder pain TECHNOLOGIST PROVIDED HISTORY: Reason for exam:->shoulder pain Reason for Exam: left shoulder pain Acuity: Acute Type of Exam: Ongoing Additional signs and symptoms: pt c/o left shoulder pain for wks w/o inj, pt is rolled up to right side: limited range of motion, cannot remain supine at this time FINDINGS: No acute fracture or dislocation of the left shoulder. Visualized lungs appear clear. No acute radiographic abnormality. Xr Chest Portable    Result Date: 1/11/2020  EXAMINATION: ONE XRAY VIEW OF THE CHEST 1/11/2020 4:08 am COMPARISON: Chest radiograph 02/27/2019 HISTORY: ORDERING SYSTEM PROVIDED HISTORY: shoulder pain TECHNOLOGIST PROVIDED HISTORY: Reason for exam:->shoulder pain Reason for Exam: chest pain/left shoulder pain Acuity: Acute Type of Exam: Ongoing Additional signs and symptoms: pt c/o chest and left shoulder pain for weeks, no known inj Relevant Medical/Surgical History: pt unable to remain on back: rolls up to right side, obl images on left shoulder and chest FINDINGS: Stable cardiomegaly. Normal pulmonary vascularity. No focal consolidation. No pleural effusion or pneumothorax. No acute cardiopulmonary process. ED COURSE/MDM  Patient seen and evaluated. Old records reviewed.  Labs and imaging reviewed and results cystitis with hematuria    3. Hypokalemia    4. Hypomagnesemia    5. Septicemia ([facility]        Blood pressure 118/60, pulse 122, temperature 98.1 °F (36.7 °C), temperature source Oral, resp. rate 22, height 5' 6\" (1.676 m), weight 260 lb (117.9 kg), SpO2 96 %. Patient was given scripts for the following medications. I counseled patient how to take these medications. New Prescriptions    No medications on file       Follow-up with:  No follow-up provider specified. DISCLAIMER: This chart was created using Dragon dictation software. Efforts were made by me to ensure accuracy, however some errors may be present due to limitations of this technology and occasionally words are not transcribed correctly.        Sandhya AcuñaSt[facility]  01/11/20 1 Claudia Clemons DO  01/11/20 Summer Harley, DO  01/11/20 [id number]

## 2020-01-11 NOTE — H&P
History and Physical        HISTORY OF PRESENT ILLNESS:     Patient is a 49-year-old female nursing home resident of [address] [place] [address] with history of  chronic CHF, chronic atrial fibrillation on anticoagulation with Xarelto, history of dementia, COPD, diabetes mellitus and hypertension. Presented to the emergency department with complaints of arm pain. Patient actually had been yelling and disruptive in the nursing home hence was sent to the ED. currently she is very calm, cooperative and is pleasant and oriented to place and person. Patient states that she has been having bilateral shoulder pain and arm pain ongoing for 2 weeks. she did have some fevers but no chills. Work-up in the ER included x-rays of the shoulder which was negative, work-up did reveal patient to have a UTI. Patient now admitted. she is awake and alert, very pleasant and cooperative. complains of pains and aches in her arms. no pains in her legs. no abdominal pain, no nausea or vomiting, no fevers or chills now. .      Past Medical History:   Diagnosis Date    Acute diastolic heart failure (HCC)     Acute respiratory failure (HCC)     Adjustment disorder with mixed anxiety and depressed mood     Allergic rhinitis     [facility]'s [facility]     Atrial fibrillation (HCC)     Back pain     CHF (congestive heart failure) (HCC)     Chronic pain     Constipation     COPD (chronic obstructive pulmonary disease) (HCC)     Depression     Dermatitis     Diabetes mellitus ([facility]     Disseminated superficial actinic porokeratosis     Disseminated superficial actinic porokeratosis (DSAP)     Edema     ESBL (extended spectrum beta-lactamase) producing bacteria infection 02/27/2019    urine    Hypertension     Hypo-osmolality and hyponatremia     Morbid obesity (HCC)     Overactive bladder     Schizoaffective disorder (HCC)     Seizures (HCC)     Xerosis cutis     Xerosis cutis        Past Surgical History:   Procedure Laterality Date    KNEE SURGERY      TONSILLECTOMY      TUBAL LIGATION         Patient is allergic to fish-derived products; iodine; mushroom extract complex; onion; and other. Prior to Admission medications    Medication Sig Start Date End Date Taking? Authorizing Provider   CHOLECALCIFEROL PO Take 1,000 Units by mouth daily   Yes Historical Provider, MD   Sodium Phosphates (FLEET) 7-19 GM/118ML Place 1 enema rectally daily as needed   Yes Historical Provider, MD   gabapentin (NEURONTIN) 300 MG capsule Take 300 mg by mouth nightly. Yes Historical Provider, MD   gabapentin (NEURONTIN) 100 MG capsule Take 100 mg by mouth daily. Yes Historical Provider, MD   ammonium lactate (LAC-HYDRIN) 12 % lotion Apply topically as needed for Dry Skin Apply topically as needed.    Yes Historical Provider, MD   ipratropium-albuterol (DUONEB) 0.5-2.5 (3) MG/3ML SOLN nebulizer solution Inhale 1 vial into the lungs every 6 hours   Yes Historical Provider, MD   magnesium hydroxide (MILK OF MAGNESIA) 400 MG/5ML suspension Take 30 mLs by mouth daily as needed for Constipation   Yes Historical Provider, MD   ondansetron (ZOFRAN) 4 MG tablet Take 4 mg by mouth every 6 hours   Yes Historical Provider, MD   POTASSIUM CHLORIDE ER PO Take 20 mEq by mouth daily   Yes Historical Provider, MD   senna (SENOKOT) 8.6 MG tablet Take 1 tablet by mouth daily   Yes Historical Provider, MD   SODIUM CHLORIDE PO Take 1 tablet by mouth daily Every MWF   Yes Historical Provider, MD   tiZANidine (ZANAFLEX) 4 MG tablet Take 4 mg by mouth three times daily    Yes Historical Provider, MD   B Complex-C-E-Zn (ZINC-VITES) TABS Take 1 tablet by mouth daily   Yes Historical Provider, MD   Hypromellose (NATURES TEARS OP) Apply 1 drop to eye 2 times daily as needed Both eyes every   Yes Historical Provider, MD   acetaminophen (TYLENOL) 325 MG tablet Take 650 mg by mouth every 6 hours as needed for Pain   Yes Historical Provider, MD   solifenacin (VESICARE) 5 MG tablet Neurological: Negative for syncope   Hematological: Negative for adenopathy   Psychiatric/Behavorial: + for anxiety         On Physical Examination    Vitals:    01/11/20 0940   BP: 117/65   Pulse: 95   Resp: 20   Temp: 98 °F (36.7 °C)   SpO2: 91%       Gen: No distress. Alert. Obese,  awake and oriented  Eyes: PERRL. No sclera icterus. No conjunctival injection. ENT: No discharge. Pharynx clear. Neck: Trachea midline. Normal thyroid. Resp: No accessory muscle use. No crackles. No wheezes. No rhonchi. No dullness on percussion. CV: Irregularly irregular. No murmur or rub. No edema. GI: Non-tender. Non-distended. No masses. No organomegaly. Normal bowel sounds. No hernia. Skin: Warm and dry. No nodule on exposed extremities. No rash on exposed extremities. Lymph: No cervical LAD. No supraclavicular LAD. M/S: No cyanosis. No joint deformity. No clubbing. Intact peripheral pulses. Brisk cap refill, < 2 secs . she is tender on both shoulders arms  Neuro: Awake. Reflexes 2+ symmetric bilaterally   Psych: Oriented x 3. No anxiety or agitation. CBC:   Recent Labs     01/11/20  0358   WBC 14.1*   HGB 13.6   HCT 41.4   MCV 90.2        BMP:   Recent Labs     01/11/20  0358   *   K 3.1*   CL 92*   CO2 29   BUN 9   CREATININE <0.5*     LIVER PROFILE:   Recent Labs     01/11/20  0358   AST 13*   ALT 7*   BILITOT 0.6   ALKPHOS 68     PT/INR: No results for input(s): PROTIME, INR in the last 72 hours. APTT: No results for input(s): APTT in the last 72 hours. UA:  Recent Labs     01/11/20  0531   COLORU Yellow   PHUR 6.0   WBCUA >100*   RBCUA 5-10*   BACTERIA 3+*   CLARITYU CLOUDY*   SPECGRAV 1.015   LEUKOCYTESUR LARGE*   UROBILINOGEN 0.2   BILIRUBINUR Negative   BLOODU MODERATE*   GLUCOSEU Negative        imaging was reviewed by me and showed  XR SHOULDER LEFT (MIN 2 VIEWS)   Final Result   No acute radiographic abnormality.          XR CHEST PORTABLE   Final Result   No acute cardiopulmonary process. EKG   showed A. Fib, Mild RVR            ASSESSMENT:    Principal Problem:    Acute cystitis without hematuria  Active Problems:    Leukocytosis    DM (diabetes mellitus) (HCC)    Obesity    Atrial fibrillation with RVR (HCC)    Essential hypertension    UTI (urinary tract infection)  Resolved Problems:    * No resolved hospital problems. *      PLAN:    Urinary tract infection  -  She has previous history of ESBL UTI, started on IV Merrem. - continue gentle hydration.  -Sepsis ruled out. follow-up on blood cultures . Bilateral shoulder pain/ OA  - she has  aches and arm pain. Rule out rhabdo, check a CK level, check TSH level. - she has underlying chronic pain history  - Gentle hydration  - x-ray of shoulder was negative  - will use Percocet as needed for pain    Atrial fibrillation   -patient has chronic A. Fib, with mild RVR in ER,  heart rate is controlled now.  -continue anticoagulation with Xarelto. - Continue Cardizem    History of dementia   per chart . - currently she is oriented to place and person. - apparently did have some behavioral changes but she is calm now,.     Hypokalemia   -replete. History of COPD  - on IBD    History  chronic diastolic CHF  - echo EF 89%  -Monitor closely with IV hydration    Diabetes mellitus type 2  - on Lantus. Add low SSI    Hypertension  - BP stable     Chronic Back pain   -percocet prn continued          DIET CARB CONTROL;    Full Code        Chang Plascencia 1/11/2020 10:53 AM

## 2020-01-11 NOTE — PROGRESS NOTES
RESPIRATORY THERAPY ASSESSMENT    Name:  Josselin Beverly  Medical Record Number:  [id number]  Age: 68 y.o. Gender: female  : 1950  Today's Date:  2020  Room:  /0312-01    Assessment     Is the patient being admitted for a COPD or Asthma exacerbation? No   (If yes the patient will be seen every 4 hours for the first 24 hours and then reassessed)    Patient Admission Diagnosis      Allergies  Allergies   Allergen Reactions    Fish-Derived Products      Food allergy    Iodine     Mushroom Extract Complex     Onion     Other      seafood       Minimum Predicted Vital Capacity:     na          Actual Vital Capacity:      na              Pulmonary History:COPD  Home Oxygen Therapy:  room air  Home Respiratory Therapy:Albuterol/Ipratropium Bromide HHN   Current Respiratory Therapy:  duoneb q6  Treatment Type: HHN  Medications: Albuterol/Ipratropium    Respiratory Severity Index(RSI)   Patients with orders for inhalation medications, oxygen, or any therapeutic treatment modality will be placed on Respiratory Protocol. They will be assessed with the first treatment and at least every 72 hours thereafter. The following severity scale will be used to determine frequency of treatment intervention.     Smoking History: Smoking History Less than 1ppd or less than 15 pack year = 1    Social History  Social History     Tobacco Use    Smoking status: Former Smoker     Last attempt to quit: 2004     Years since quittin.0    Smokeless tobacco: Never Used   Substance Use Topics    Alcohol use: No     Alcohol/week: 0.0 standard drinks    Drug use: No       Recent Surgical History: None = 0  Past Surgical History  Past Surgical History:   Procedure Laterality Date    KNEE SURGERY      TONSILLECTOMY      TUBAL LIGATION         Level of Consciousness: Alert, Oriented, and Cooperative = 0    Level of Activity: Mostly sedentary, minimal walking = 2    Respiratory Pattern: Regular Pattern; RR 8-20 = 0    Breath Sounds: Diminished unilaterally = 1    Sputum   ,  ,    Cough: Strong, spontaneous, non-productive = 0    Vital Signs   /65   Pulse 95   Temp 98 °F (36.7 °C) (Oral)   Resp 20   Ht 5' 6\" (1.676 m)   Wt 260 lb (117.9 kg)   SpO2 91%   BMI 41.97 kg/m²   SPO2 (COPD values may differ): 90-91% on room air or greater than 92% on FiO2 24- 28% = 1    Peak Flow (asthma only): not applicable = 0    RSI: 0-4 = See once and convert to home regimen or discontinue        Plan       Goals: medication delivery, mobilize retained secretions, volume expansion and improve oxygenation    Patient/caregiver was educated on the proper method of use for Respiratory Care Devices:  Yes      Level of patient/caregiver understanding able to:   ? Verbalize understanding   ? Demonstrate understanding       ? Teach back        ? Needs reinforcement       ? No available caregiver               ? Other:     Response to education:  Very Good     Is patient being placed on Home Treatment Regimen? Yes     Does the patient have everything they need prior to discharge? Yes     Comments: patient assessed and chart reviewed    Plan of Care: Duoneb q6    Electronically signed by Tyler Tabor RCP on 1/11/2020 at 1:29 PM    Respiratory Protocol Guidelines     1. Assessment and treatment by Respiratory Therapy will be initiated for medication and therapeutic interventions upon initiation of aerosolized medication. 2. Physician will be contacted for respiratory rate (RR) greater than 35 breaths per minute. Therapy will be held for heart rate (HR) greater than 140 beats per minute, pending direction from physician. 3. Bronchodilators will be administered via Metered Dose Inhaler (MDI) with spacer when the following criteria are met:  a. Alert and cooperative     b. HR < 140 bpm  c. RR < 30 bpm                d. Can demonstrate a 2-3 second inspiratory hold  4.  Bronchodilators will be administered via Hand Held Nebulizer [facility]) to patients when ANY of the following criteria are met  a. Incognizant or uncooperative          b. Patients treated with HHN at Home        c. Unable to demonstrate proper use of MDI with spacer     d. RR > 30 bpm   5. Bronchodilators will be delivered via Metered Dose Inhaler (MDI), HHN, Aerogen to intubated patients on mechanical ventilation. 6. Inhalation medication orders will be delivered and/or substituted as outlined below. Aerosolized Medications Ordering and Administration Guidelines:    1. All Medications will be ordered by a physician, and their frequency and/or modality will be adjusted as defined by the patients Respiratory Severity Index (RSI) score. 2. If the patient does not have documented COPD, consider discontinuing anticholinergics when RSI is less than 9.  3. If the bronchospasm worsens (increased RSI), then the bronchodilator frequency can be increased to a maximum of every 4 hours. If greater than every 4 hours is required, the physician will be contacted. 4. If the bronchospasm improves, the frequency of the bronchodilator can be decreased, based on the patient's RSI, but not less than home treatment regimen frequency. 5. Bronchodilator(s) will be discontinued if patient has a RSI less than 9 and has received no scheduled or as needed treatment for 72  Hrs. Patients Ordered on a Mucolytic Agent:    1. Must always be administered with a bronchodilator. 2. Discontinue if patient experiences worsened bronchospasm, or secretions have lessened to the point that the patient is able to clear them with a cough. Anti-inflammatory and Combination Medications:    1. If the patient lacks prior history of lung disease, is not using inhaled anti-inflammatory medication at home, and lacks wheezing by examination or by history for at least 24 hours, contact physician for possible discontinuation.

## 2020-01-12 LAB
GLUCOSE BLD-MCNC: 183 MG/DL (ref 70–99)
GLUCOSE BLD-MCNC: 248 MG/DL (ref 70–99)
GLUCOSE BLD-MCNC: 71 MG/DL (ref 70–99)
GLUCOSE BLD-MCNC: 76 MG/DL (ref 70–99)
GLUCOSE BLD-MCNC: 84 MG/DL (ref 70–99)
PERFORMED ON: ABNORMAL
PERFORMED ON: ABNORMAL
PERFORMED ON: NORMAL

## 2020-01-12 PROCEDURE — G0378 HOSPITAL OBSERVATION PER HR: HCPCS

## 2020-01-12 PROCEDURE — 94640 AIRWAY INHALATION TREATMENT: CPT

## 2020-01-12 PROCEDURE — 6360000002 HC RX W HCPCS: Performed by: INTERNAL MEDICINE

## 2020-01-12 PROCEDURE — 6370000000 HC RX 637 (ALT 250 FOR IP): Performed by: INTERNAL MEDICINE

## 2020-01-12 PROCEDURE — 2580000003 HC RX 258: Performed by: INTERNAL MEDICINE

## 2020-01-12 PROCEDURE — 96366 THER/PROPH/DIAG IV INF ADDON: CPT

## 2020-01-12 PROCEDURE — 2060000000 HC ICU INTERMEDIATE R&B

## 2020-01-12 PROCEDURE — 94761 N-INVAS EAR/PLS OXIMETRY MLT: CPT

## 2020-01-12 PROCEDURE — 2700000000 HC OXYGEN THERAPY PER DAY

## 2020-01-12 RX ORDER — DEXTROSE MONOHYDRATE 50 MG/ML
100 INJECTION, SOLUTION INTRAVENOUS PRN
Status: DISCONTINUED | OUTPATIENT
Start: 2020-01-12 | End: 2020-01-13 | Stop reason: HOSPADM

## 2020-01-12 RX ORDER — NICOTINE POLACRILEX 4 MG
15 LOZENGE BUCCAL PRN
Status: DISCONTINUED | OUTPATIENT
Start: 2020-01-12 | End: 2020-01-13 | Stop reason: HOSPADM

## 2020-01-12 RX ORDER — DEXTROSE MONOHYDRATE 25 G/50ML
12.5 INJECTION, SOLUTION INTRAVENOUS PRN
Status: DISCONTINUED | OUTPATIENT
Start: 2020-01-12 | End: 2020-01-13 | Stop reason: HOSPADM

## 2020-01-12 RX ADMIN — METOPROLOL TARTRATE 25 MG: 25 TABLET ORAL at 22:26

## 2020-01-12 RX ADMIN — MEROPENEM 1 G: 1 INJECTION, POWDER, FOR SOLUTION INTRAVENOUS at 00:10

## 2020-01-12 RX ADMIN — IPRATROPIUM BROMIDE AND ALBUTEROL SULFATE 3 ML: .5; 3 SOLUTION RESPIRATORY (INHALATION) at 07:49

## 2020-01-12 RX ADMIN — RISPERIDONE 0.5 MG: 0.5 TABLET, ORALLY DISINTEGRATING ORAL at 10:11

## 2020-01-12 RX ADMIN — RIVAROXABAN 20 MG: 20 TABLET, FILM COATED ORAL at 18:09

## 2020-01-12 RX ADMIN — IPRATROPIUM BROMIDE AND ALBUTEROL SULFATE 3 ML: .5; 3 SOLUTION RESPIRATORY (INHALATION) at 13:18

## 2020-01-12 RX ADMIN — IPRATROPIUM BROMIDE AND ALBUTEROL SULFATE 3 ML: .5; 3 SOLUTION RESPIRATORY (INHALATION) at 19:35

## 2020-01-12 RX ADMIN — TRIAMCINOLONE ACETONIDE: 1 CREAM TOPICAL at 22:35

## 2020-01-12 RX ADMIN — SODIUM CHLORIDE: 9 INJECTION, SOLUTION INTRAVENOUS at 00:10

## 2020-01-12 RX ADMIN — DILTIAZEM HYDROCHLORIDE 240 MG: 240 CAPSULE, COATED, EXTENDED RELEASE ORAL at 10:04

## 2020-01-12 RX ADMIN — POTASSIUM CHLORIDE 20 MEQ: 750 TABLET, EXTENDED RELEASE ORAL at 10:05

## 2020-01-12 RX ADMIN — SODIUM CHLORIDE TAB 1 GM 1 G: 1 TAB at 10:04

## 2020-01-12 RX ADMIN — TORSEMIDE 40 MG: 20 TABLET ORAL at 10:04

## 2020-01-12 RX ADMIN — INSULIN GLARGINE 40 UNITS: 100 INJECTION, SOLUTION SUBCUTANEOUS at 10:13

## 2020-01-12 RX ADMIN — OXYCODONE HYDROCHLORIDE AND ACETAMINOPHEN 1 TABLET: 5; 325 TABLET ORAL at 11:26

## 2020-01-12 RX ADMIN — GABAPENTIN 100 MG: 300 CAPSULE ORAL at 10:05

## 2020-01-12 RX ADMIN — INSULIN LISPRO 1 UNITS: 100 INJECTION, SOLUTION INTRAVENOUS; SUBCUTANEOUS at 18:12

## 2020-01-12 RX ADMIN — METOPROLOL TARTRATE 25 MG: 25 TABLET ORAL at 10:05

## 2020-01-12 RX ADMIN — SENNOSIDES 8.6 MG: 8.6 TABLET, FILM COATED ORAL at 10:04

## 2020-01-12 RX ADMIN — MEROPENEM 1 G: 1 INJECTION, POWDER, FOR SOLUTION INTRAVENOUS at 08:11

## 2020-01-12 RX ADMIN — GABAPENTIN 300 MG: 300 CAPSULE ORAL at 22:25

## 2020-01-12 RX ADMIN — TIZANIDINE 4 MG: 4 TABLET ORAL at 22:26

## 2020-01-12 RX ADMIN — CETIRIZINE HYDROCHLORIDE 10 MG: 10 TABLET ORAL at 10:05

## 2020-01-12 RX ADMIN — TRIAMCINOLONE ACETONIDE: 1 CREAM TOPICAL at 11:26

## 2020-01-12 RX ADMIN — CITALOPRAM HYDROBROMIDE 10 MG: 20 TABLET ORAL at 10:05

## 2020-01-12 RX ADMIN — OXYCODONE HYDROCHLORIDE AND ACETAMINOPHEN 1 TABLET: 5; 325 TABLET ORAL at 03:46

## 2020-01-12 ASSESSMENT — PAIN SCALES - GENERAL
PAINLEVEL_OUTOF10: 0
PAINLEVEL_OUTOF10: 6
PAINLEVEL_OUTOF10: 7

## 2020-01-12 ASSESSMENT — PAIN DESCRIPTION - ORIENTATION: ORIENTATION: RIGHT

## 2020-01-12 ASSESSMENT — PAIN DESCRIPTION - PAIN TYPE: TYPE: ACUTE PAIN

## 2020-01-12 ASSESSMENT — PAIN DESCRIPTION - LOCATION: LOCATION: SHOULDER

## 2020-01-12 NOTE — PROGRESS NOTES
4 Eyes Skin Assessment     The patient is being assess for   Shift Handoff    I agree that 2 RN's have performed a thorough Head to Toe Skin Assessment on the patient. ALL assessment sites listed below have been assessed. Areas assessed by both nurses:   [x]   Head, Face, and Ears   [x]   Shoulders, Back, and Chest, Abdomen  [x]   Arms, Elbows, and Hands   [x]   Coccyx, Sacrum, and Ischium  [x]   Legs, Feet, and Heels        Heels soft with blanchable redness, abrasion to right buttocks, scattered bruises and abrasions, faint redness to groin area. .     **SHARE this note so that the co-signing nurse is able to place an eSignature**    Co-signer eSignature: Electronically signed by Donnie Dwyer RN on 1/11/20 at 9:49 PM    Does the Patient have Skin Breakdown?   No          Jarod Prevention initiated:  Yes   Wound Care Orders initiated:  NA      WO nurse consulted for Pressure Injury (Stage 3,4, Unstageable, DTI, NWPT, Complex wounds)and New or Established Ostomies:  NA      Primary Nurse eSignature: Electronically signed by Severiano Porter, RN on 1/11/20 at 7:56 PM

## 2020-01-12 NOTE — FLOWSHEET NOTE
01/11/20 2024   Vital Signs   Temp 98.7 °F (37.1 °C)   Temp Source Oral   Pulse 99   Heart Rate Source Monitor   Resp 16   /68   MAP (mmHg) 83   Level of Consciousness 0   MEWS Score 1   Oxygen Therapy   SpO2 92 %   O2 Device Nasal cannula   Vital signs stable. BP improved from previous. Pt is alert and oriented X2 and complains of generalized pain at the moment. Nothing new noted on head to toe assessment. Pt is Afib on the monitor. Evening medication administration completed. Normal saline infusing at 75 ml/hr. Family at bedside. Pt refusing repositioning at this time. Pt and family educated on the importance of regular repositioning in maintaining skin integrity Pt denies any further assistance at the moment. Will continue to monitor.

## 2020-01-12 NOTE — PROGRESS NOTES
Occupational Therapy/Physical Therapy Attempt    Unit: PCU   Date:  1/12/2020  Patient Name:    Irish Cruz  Admitting diagnosis:  UTI (urinary tract infection) [N39.0]  Admit Date:  1/11/2020    Attempt @ 740: Decline. Upon entry pt stated \"leave me alone\" repeatedly. Pt wishes respected. Will re-attempt as time allows and pt is agreeable/appropriate. Thank you.      Eliazar Davison, MOT, OTR/L   [id number]  [facility], [address] [facility]       No Charge

## 2020-01-13 VITALS
HEART RATE: 93 BPM | RESPIRATION RATE: 18 BRPM | TEMPERATURE: 97.6 F | OXYGEN SATURATION: 90 % | WEIGHT: 261.1 LBS | DIASTOLIC BLOOD PRESSURE: 73 MMHG | SYSTOLIC BLOOD PRESSURE: 102 MMHG | HEIGHT: 66 IN | BODY MASS INDEX: 41.96 KG/M2

## 2020-01-13 LAB
GLUCOSE BLD-MCNC: 149 MG/DL (ref 70–99)
GLUCOSE BLD-MCNC: 85 MG/DL (ref 70–99)
GLUCOSE BLD-MCNC: 91 MG/DL (ref 70–99)
ORGANISM: ABNORMAL
PERFORMED ON: ABNORMAL
PERFORMED ON: NORMAL
PERFORMED ON: NORMAL
URINE CULTURE, ROUTINE: ABNORMAL

## 2020-01-13 PROCEDURE — 99239 HOSP IP/OBS DSCHRG MGMT >30: CPT | Performed by: INTERNAL MEDICINE

## 2020-01-13 PROCEDURE — G0378 HOSPITAL OBSERVATION PER HR: HCPCS

## 2020-01-13 PROCEDURE — 6370000000 HC RX 637 (ALT 250 FOR IP): Performed by: INTERNAL MEDICINE

## 2020-01-13 PROCEDURE — 6360000002 HC RX W HCPCS: Performed by: INTERNAL MEDICINE

## 2020-01-13 PROCEDURE — 94640 AIRWAY INHALATION TREATMENT: CPT

## 2020-01-13 PROCEDURE — 96366 THER/PROPH/DIAG IV INF ADDON: CPT

## 2020-01-13 PROCEDURE — 2580000003 HC RX 258: Performed by: INTERNAL MEDICINE

## 2020-01-13 RX ORDER — OXYCODONE HYDROCHLORIDE AND ACETAMINOPHEN 5; 325 MG/1; MG/1
1 TABLET ORAL EVERY 6 HOURS PRN
Qty: 12 TABLET | Refills: 0 | Status: SHIPPED | OUTPATIENT
Start: 2020-01-13 | End: 2020-01-16

## 2020-01-13 RX ORDER — CIPROFLOXACIN 500 MG/1
500 TABLET, FILM COATED ORAL 2 TIMES DAILY
Qty: 14 TABLET | Refills: 0
Start: 2020-01-13 | End: 2020-01-20

## 2020-01-13 RX ADMIN — GABAPENTIN 100 MG: 300 CAPSULE ORAL at 10:16

## 2020-01-13 RX ADMIN — CITALOPRAM HYDROBROMIDE 10 MG: 20 TABLET ORAL at 10:16

## 2020-01-13 RX ADMIN — INSULIN LISPRO 1 UNITS: 100 INJECTION, SOLUTION INTRAVENOUS; SUBCUTANEOUS at 12:12

## 2020-01-13 RX ADMIN — SODIUM CHLORIDE: 9 INJECTION, SOLUTION INTRAVENOUS at 10:21

## 2020-01-13 RX ADMIN — CETIRIZINE HYDROCHLORIDE 10 MG: 10 TABLET ORAL at 10:16

## 2020-01-13 RX ADMIN — MEROPENEM 1 G: 1 INJECTION, POWDER, FOR SOLUTION INTRAVENOUS at 04:20

## 2020-01-13 RX ADMIN — SENNOSIDES 8.6 MG: 8.6 TABLET, FILM COATED ORAL at 10:16

## 2020-01-13 RX ADMIN — TORSEMIDE 40 MG: 20 TABLET ORAL at 10:15

## 2020-01-13 RX ADMIN — SODIUM CHLORIDE TAB 1 GM 1 G: 1 TAB at 10:16

## 2020-01-13 RX ADMIN — RISPERIDONE 0.5 MG: 0.5 TABLET, ORALLY DISINTEGRATING ORAL at 10:18

## 2020-01-13 RX ADMIN — OXYCODONE HYDROCHLORIDE AND ACETAMINOPHEN 1 TABLET: 5; 325 TABLET ORAL at 10:16

## 2020-01-13 RX ADMIN — INSULIN GLARGINE 40 UNITS: 100 INJECTION, SOLUTION SUBCUTANEOUS at 10:39

## 2020-01-13 RX ADMIN — POTASSIUM CHLORIDE 20 MEQ: 750 TABLET, EXTENDED RELEASE ORAL at 10:22

## 2020-01-13 RX ADMIN — MEROPENEM 1 G: 1 INJECTION, POWDER, FOR SOLUTION INTRAVENOUS at 12:11

## 2020-01-13 RX ADMIN — IPRATROPIUM BROMIDE AND ALBUTEROL SULFATE 3 ML: .5; 3 SOLUTION RESPIRATORY (INHALATION) at 06:54

## 2020-01-13 RX ADMIN — DILTIAZEM HYDROCHLORIDE 240 MG: 240 CAPSULE, COATED, EXTENDED RELEASE ORAL at 10:16

## 2020-01-13 ASSESSMENT — PAIN SCALES - GENERAL: PAINLEVEL_OUTOF10: 9

## 2020-01-13 NOTE — PROGRESS NOTES
Report called to [place] at [facility]. Transport to be here to pick patient up at 1430.

## 2020-01-13 NOTE — DISCHARGE SUMMARY
Name:  Arturo Bernardo  Room:  /5621-40  MRN:    [id number]    Discharge Summary      This discharge summary is in conjunction with a complete physical exam done on the day of discharge. Discharging Physician: Dr. Sinclair El: 1/11/2020  Discharge: 1/13/2020     HPI taken from admission H&P:    Patient is a 64-year-old female nursing home resident of [address] [place] [address] with history of  chronic CHF, chronic atrial fibrillation on anticoagulation with Xarelto, history of dementia, COPD, diabetes mellitus and hypertension. Presented to the emergency department with complaints of arm pain. Patient actually had been yelling and disruptive in the nursing home hence was sent to the ED. currently she is very calm, cooperative and is pleasant and oriented to place and person. Patient states that she has been having bilateral shoulder pain and arm pain ongoing for 2 weeks. she did have some fevers but no chills. Work-up in the ER included x-rays of the shoulder which was negative, work-up did reveal patient to have a UTI.     Diagnoses this Admission and Hospital Course   Urinary tract infection  -  She has previous history of ESBL UTI, started on IV Merrem. - continue gentle hydration.  -  Sepsis ruled out.   - Urine Cx: Pseudomonas-->d/c to SNF on Cipro    - Blood cultures: NGTD     Bilateral shoulder pain/ OA/ arm pain   - she had aches and arm pain on admit. No pain today . pain sensitivity appears to relate to mood fluctuation   - CK level,  TSH level. , WML  - she has underlying chronic pain history  - Gentle hydration  - x-ray of shoulder was negative  - will use Percocet as needed for pain     Atrial fibrillation   -patient has chronic A. Fib, with mild RVR in ER,  heart rate is controlled now.  -continue anticoagulation with Xarelto. - Continue Cardizem     History of dementia   per chart .  - currently she is oriented to place and person.   - apparently did have some behavioral changes but she is calm now,.     Hypokalemia   -replete.     History of COPD  - on IBD     History  chronic diastolic CHF  - echo EF 92%  -Monitor closely with IV hydration     Diabetes mellitus type 2  - on Lantus. Add low SSI     Hypertension  - BP is low normal today   - Continue to monitor closely      Chronic Back pain   -percocet prn continued  - PT, OT consult    Procedures (Please Review Full Report for Details)  None     Consults    None     Physical Exam at Discharge:    /73   Pulse 93   Temp 97.6 °F (36.4 °C) (Oral)   Resp 18   Ht 5' 6\" (1.676 m)   Wt 261 lb 1.6 oz (118.4 kg)   SpO2 90%   BMI 42.14 kg/m²     Gen: No distress. Alert. Obese, Awake and oriented  Eyes: PERRL. No sclera icterus. No conjunctival injection. ENT: No discharge. Pharynx clear. Neck: Trachea midline. Normal thyroid. Resp: No accessory muscle use. No crackles. No wheezes. No rhonchi. No dullness on percussion. CV: Irregularly irregular. No murmur or rub. No edema. GI: Non-tender. Non-distended. No masses. No organomegaly. Normal bowel sounds. No hernia. Skin: Warm and dry. No nodule on exposed extremities. No rash on exposed extremities. Lymph: No cervical LAD. No supraclavicular LAD. M/S: No cyanosis. No joint deformity. No clubbing. Intact peripheral pulses. Brisk cap refill, < 2 secs .   no arm tenderness today  Neuro: Awake. Reflexes 2+ symmetric bilaterally   Psych: Oriented x 2. No anxiety or agitation.      CBC:   Recent Labs     01/11/20 0358   WBC 14.1*   HGB 13.6   HCT 41.4   MCV 90.2        BMP:   Recent Labs     01/11/20  0358   *   K 3.1*   CL 92*   CO2 29   BUN 9   CREATININE <0.5*     LIVER PROFILE:   Recent Labs     01/11/20 0358   AST 13*   ALT 7*   BILITOT 0.6   ALKPHOS 68     UA:  Recent Labs     01/11/20  0531   COLORU Yellow   PHUR 6.0   WBCUA >100*   RBCUA 5-10*   BACTERIA 3+*   CLARITYU CLOUDY*   SPECGRAV 1.015   LEUKOCYTESUR LARGE*   UROBILINOGEN 0.2   BILIRUBINUR Negative   BLOODU MODERATE*   GLUCOSEU Negative     CULTURES  Blood Cx: NGTD  Urine Cx: Pseudomonas Aeruginosa     Pseudomonas aeruginosa (1)     Antibiotic Interpretation CLAYTON Status    cefepime Sensitive 4 mcg/mL     ciprofloxacin Sensitive <=1 mcg/mL     gentamicin Intermediate 8 mcg/mL     meropenem Sensitive <=1 mcg/mL     piperacillin-tazobactam Sensitive <=16 mcg/mL     tobramycin Sensitive <=4 mcg/mL         RADIOLOGY  XR SHOULDER LEFT (MIN 2 VIEWS)   Final Result   No acute radiographic abnormality. XR CHEST PORTABLE   Final Result   No acute cardiopulmonary process.              Discharge Medications     Medication List      START taking these medications    ciprofloxacin 500 MG tablet  Commonly known as:  CIPRO  Take 1 tablet by mouth 2 times daily for 7 days        CHANGE how you take these medications    metoprolol tartrate 25 MG tablet  Commonly known as:  LOPRESSOR  Take 1 tablet by mouth 2 times daily  What changed:    · how much to take  · when to take this     rivaroxaban 20 MG Tabs tablet  Commonly known as:  XARELTO  Take 1 tablet by mouth daily  What changed:  when to take this        CONTINUE taking these medications    acetaminophen 325 MG tablet  Commonly known as:  TYLENOL     ammonium lactate 12 % lotion  Commonly known as:  LAC-HYDRIN     bisacodyl 10 MG suppository  Commonly known as:  DULCOLAX     Blood Glucose System Bubba Kit  Check fingerstick blood sugars before meals and at bedtime     CHOLECALCIFEROL PO     citalopram 10 MG tablet  Commonly known as:  CELEXA     diclofenac sodium 1 % Gel     diltiazem 240 MG extended release capsule  Commonly known as:  CARDIZEM CD  Take 1 capsule by mouth daily     fleet 7-19 GM/118ML     * gabapentin 300 MG capsule  Commonly known as:  NEURONTIN     * gabapentin 100 MG capsule  Commonly known as:  NEURONTIN     INSULIN SYRINGE .5CC/29G 29G X 1/2\" 0.5 ML Misc  Commonly known as:  [employer] INS SYR .5CC/29G  1 each by Does not apply route daily

## 2020-01-13 NOTE — DISCHARGE INSTR - COC
transfer of Janina Orta  is necessary for the continuing treatment of the diagnosis listed and that she requires [place] for greater 30 days.      Update Admission H&P: No change in H&P    PHYSICIAN SIGNATURE:  Electronically signed by Alcide Aase, MD on 1/13/20 at 11:55 AM

## 2020-01-13 NOTE — PROGRESS NOTES
Occupational Therapy and Physical Therapy  8:30 Attempted treatment this morning. Patient declined therapy services. OT/PT will follow up later this date as schedule allows. Patricia Manrique, OTR/L #[id number]  Radha Quigley, PT, DPT #[id number]    9:00 RN requesting for therapists to assist patient with sitting up to eat breakfast safely. Patient refusing, stating \"I eat laying down all the time. I'm just not going to eat if you make me do that\". Therapist provided alternate solution by raising the head of the bed but patient continued to refuse. Patient requesting no therapy services while inpatient. OT/PT will discontinue orders at this time. Please re-order if new issues arise.        Patricia Manrique, OTR/L #[id number]  Radha Quigley, [facility], DPT #[id number]

## 2020-01-13 NOTE — PROGRESS NOTES
IV and purewick removed at this time. Transport here to take patient out per stretcher. Patient left in stable condition.

## 2020-01-15 LAB
BLOOD CULTURE, ROUTINE: NORMAL
CULTURE, BLOOD 2: NORMAL

## 2020-02-10 PROBLEM — N39.0 UTI (URINARY TRACT INFECTION): Status: RESOLVED | Noted: 2020-01-11 | Resolved: 2020-02-10

## 2020-02-24 ENCOUNTER — OFFICE VISIT (OUTPATIENT)
Dept: ORTHOPEDIC SURGERY | Age: 70
End: 2020-02-24
Payer: MEDICARE

## 2020-02-24 VITALS — HEIGHT: 66 IN | WEIGHT: 261.02 LBS | BODY MASS INDEX: 41.95 KG/M2

## 2020-02-24 PROBLEM — G89.29 CHRONIC PAIN OF BOTH KNEES: Status: ACTIVE | Noted: 2020-02-24

## 2020-02-24 PROBLEM — M17.0 BILATERAL PRIMARY OSTEOARTHRITIS OF KNEE: Status: ACTIVE | Noted: 2020-02-24

## 2020-02-24 PROBLEM — M25.562 CHRONIC PAIN OF BOTH KNEES: Status: ACTIVE | Noted: 2020-02-24

## 2020-02-24 PROBLEM — M25.561 CHRONIC PAIN OF BOTH KNEES: Status: ACTIVE | Noted: 2020-02-24

## 2020-02-24 PROCEDURE — 20610 DRAIN/INJ JOINT/BURSA W/O US: CPT | Performed by: FAMILY MEDICINE

## 2020-02-24 PROCEDURE — 99203 OFFICE O/P NEW LOW 30 MIN: CPT | Performed by: FAMILY MEDICINE

## 2020-02-24 RX ORDER — BETAMETHASONE SODIUM PHOSPHATE AND BETAMETHASONE ACETATE 3; 3 MG/ML; MG/ML
24 INJECTION, SUSPENSION INTRA-ARTICULAR; INTRALESIONAL; INTRAMUSCULAR; SOFT TISSUE ONCE
Status: COMPLETED | OUTPATIENT
Start: 2020-02-24 | End: 2020-02-24

## 2020-02-24 RX ORDER — BUPIVACAINE HYDROCHLORIDE 2.5 MG/ML
4 INJECTION, SOLUTION INFILTRATION; PERINEURAL ONCE
Status: COMPLETED | OUTPATIENT
Start: 2020-02-24 | End: 2020-02-24

## 2020-02-24 RX ORDER — LIDOCAINE HYDROCHLORIDE 10 MG/ML
2 INJECTION, SOLUTION INFILTRATION; PERINEURAL ONCE
Status: DISCONTINUED | OUTPATIENT
Start: 2020-02-24 | End: 2020-02-24

## 2020-02-24 RX ORDER — LIDOCAINE HYDROCHLORIDE 10 MG/ML
2 INJECTION, SOLUTION INFILTRATION; PERINEURAL ONCE
Status: COMPLETED | OUTPATIENT
Start: 2020-02-24 | End: 2020-02-24

## 2020-02-24 RX ADMIN — LIDOCAINE HYDROCHLORIDE 2 ML: 10 INJECTION, SOLUTION INFILTRATION; PERINEURAL at 11:58

## 2020-02-24 RX ADMIN — BETAMETHASONE SODIUM PHOSPHATE AND BETAMETHASONE ACETATE 24 MG: 3; 3 INJECTION, SUSPENSION INTRA-ARTICULAR; INTRALESIONAL; INTRAMUSCULAR; SOFT TISSUE at 11:57

## 2020-02-24 RX ADMIN — BUPIVACAINE HYDROCHLORIDE 4 MG: 2.5 INJECTION, SOLUTION INFILTRATION; PERINEURAL at 11:59

## 2020-02-24 NOTE — PROGRESS NOTES
Chief Complaint  Knee Pain (BIALTERAL KNEE PAIN:)      Initial consultation progressively worsening bilateral anterior medial knee pain with chronic weakness and motion loss in a nursing home patient who has been nonambulatory for the past 6 months    History of Present Illness:  Basia Otero is a 68 y.o. female who is a very pleasant white female with history of chronic CHF as well as atrial fibrillation on anticoagulation with Xarelto with a history of dementia, schizoaffective disorder COPD recently controlled diabetes and hypertension who also does have a history of chronic knee arthritis and chronic mechanical back pain and has been an established patient for many years with Dr. Jacinta Mixon who is being seen today in kind consultation from Dr. Darby Vo for worsening of left greater than right knee pain for the past month. There is no history of fall or trauma is once again she has been nonambulatory primarily in bed and wheelchair for the past 6-month secondary to disuse. There is no history of actual fall or trauma that she can recall and does not have a family member here today. She has noticed some mild swelling in the knee but does not really complain of active locking or catching. He does reside at [address] [place] [address]. She has not been undergoing any type of therapy at the nursing home given her comorbidities. She has never had Visco supplementation and is not interested in any type of surgical intervention given her comorbidities. She was recently evaluated by Dr. Darby Vo and Dr. Sammie Irizarry who recommended today's orthopedic consultation. Medical History     Patient's medications, allergies, past medical, surgical, social and family histories were reviewed and updated as appropriate. Review of Systems  Pertinent items are noted in HPI  Review of systems reviewed from Patient History Form dated on 2/24/2020 and available in the patient's chart under the Media tab.        Vital Signs  There Visco supplementation. They will contact us with questions or concerns. CC: Dr. Chrissy Tucker and Dr. Amos Pathak      This dictation was performed with a verbal recognition program [facility]) and it was checked for errors. It is possible that there are still dictated errors within this office note. If so, please bring any errors to my attention for an addendum. All efforts were made to ensure that this office note is accurate.

## 2020-03-24 ENCOUNTER — APPOINTMENT (OUTPATIENT)
Dept: GENERAL RADIOLOGY | Age: 70
End: 2020-03-24
Payer: MEDICARE

## 2020-03-24 ENCOUNTER — HOSPITAL ENCOUNTER (EMERGENCY)
Age: 70
Discharge: HOME | End: 2020-03-24
Attending: EMERGENCY MEDICINE
Payer: MEDICARE

## 2020-03-24 VITALS
BODY MASS INDEX: 42.15 KG/M2 | WEIGHT: 261 LBS | HEART RATE: 116 BPM | RESPIRATION RATE: 28 BRPM | TEMPERATURE: 98.6 F | OXYGEN SATURATION: 96 % | SYSTOLIC BLOOD PRESSURE: 108 MMHG | DIASTOLIC BLOOD PRESSURE: 85 MMHG

## 2020-03-24 LAB
A/G RATIO: 0.8 (ref 1.1–2.2)
ALBUMIN SERPL-MCNC: 3.2 G/DL (ref 3.4–5)
ALP BLD-CCNC: 61 U/L (ref 40–129)
ALT SERPL-CCNC: 22 U/L (ref 10–40)
ANION GAP SERPL CALCULATED.3IONS-SCNC: 13 MMOL/L (ref 3–16)
ANION GAP SERPL CALCULATED.3IONS-SCNC: 18 MMOL/L (ref 3–16)
AST SERPL-CCNC: 33 U/L (ref 15–37)
BACTERIA: ABNORMAL /HPF
BASOPHILS ABSOLUTE: 0.1 K/UL (ref 0–0.2)
BASOPHILS RELATIVE PERCENT: 0.7 %
BILIRUB SERPL-MCNC: 1.1 MG/DL (ref 0–1)
BILIRUBIN URINE: NEGATIVE
BLOOD, URINE: ABNORMAL
BUN BLDV-MCNC: 16 MG/DL (ref 7–20)
BUN BLDV-MCNC: 17 MG/DL (ref 7–20)
CALCIUM SERPL-MCNC: 8.6 MG/DL (ref 8.3–10.6)
CALCIUM SERPL-MCNC: 9.3 MG/DL (ref 8.3–10.6)
CHLORIDE BLD-SCNC: 86 MMOL/L (ref 99–110)
CHLORIDE BLD-SCNC: 86 MMOL/L (ref 99–110)
CLARITY: CLEAR
CO2: 32 MMOL/L (ref 21–32)
CO2: 38 MMOL/L (ref 21–32)
COLOR: YELLOW
CREAT SERPL-MCNC: 0.6 MG/DL (ref 0.6–1.2)
CREAT SERPL-MCNC: 0.6 MG/DL (ref 0.6–1.2)
EKG ATRIAL RATE: 119 BPM
EKG DIAGNOSIS: NORMAL
EKG Q-T INTERVAL: 278 MS
EKG QRS DURATION: 76 MS
EKG QTC CALCULATION (BAZETT): 389 MS
EKG R AXIS: 94 DEGREES
EKG T AXIS: 252 DEGREES
EKG VENTRICULAR RATE: 118 BPM
EOSINOPHILS ABSOLUTE: 0.1 K/UL (ref 0–0.6)
EOSINOPHILS RELATIVE PERCENT: 0.8 %
EPITHELIAL CELLS, UA: ABNORMAL /HPF (ref 0–5)
GFR AFRICAN AMERICAN: >60
GFR AFRICAN AMERICAN: >60
GFR NON-AFRICAN AMERICAN: >60
GFR NON-AFRICAN AMERICAN: >60
GLOBULIN: 4 G/DL
GLUCOSE BLD-MCNC: 127 MG/DL (ref 70–99)
GLUCOSE BLD-MCNC: 162 MG/DL (ref 70–99)
GLUCOSE URINE: NEGATIVE MG/DL
HCT VFR BLD CALC: 50.1 % (ref 36–48)
HEMOGLOBIN: 16.3 G/DL (ref 12–16)
HYALINE CASTS: ABNORMAL /LPF (ref 0–2)
KETONES, URINE: NEGATIVE MG/DL
LEUKOCYTE ESTERASE, URINE: NEGATIVE
LYMPHOCYTES ABSOLUTE: 2.7 K/UL (ref 1–5.1)
LYMPHOCYTES RELATIVE PERCENT: 23.5 %
MCH RBC QN AUTO: 28.3 PG (ref 26–34)
MCHC RBC AUTO-ENTMCNC: 32.5 G/DL (ref 31–36)
MCV RBC AUTO: 86.9 FL (ref 80–100)
MICROSCOPIC EXAMINATION: YES
MONOCYTES ABSOLUTE: 0.9 K/UL (ref 0–1.3)
MONOCYTES RELATIVE PERCENT: 8.3 %
NEUTROPHILS ABSOLUTE: 7.6 K/UL (ref 1.7–7.7)
NEUTROPHILS RELATIVE PERCENT: 66.7 %
NITRITE, URINE: NEGATIVE
PDW BLD-RTO: 15.9 % (ref 12.4–15.4)
PH UA: 6.5 (ref 5–8)
PLATELET # BLD: 257 K/UL (ref 135–450)
PMV BLD AUTO: 9.7 FL (ref 5–10.5)
POTASSIUM SERPL-SCNC: 2.5 MMOL/L (ref 3.5–5.1)
POTASSIUM SERPL-SCNC: 4.2 MMOL/L (ref 3.5–5.1)
PROTEIN UA: NEGATIVE MG/DL
RBC # BLD: 5.77 M/UL (ref 4–5.2)
RBC UA: ABNORMAL /HPF (ref 0–4)
SODIUM BLD-SCNC: 136 MMOL/L (ref 136–145)
SODIUM BLD-SCNC: 137 MMOL/L (ref 136–145)
SPECIFIC GRAVITY UA: 1.01 (ref 1–1.03)
TOTAL PROTEIN: 7.2 G/DL (ref 6.4–8.2)
TROPONIN: 0.01 NG/ML
URINE REFLEX TO CULTURE: ABNORMAL
URINE TYPE: ABNORMAL
UROBILINOGEN, URINE: 0.2 E.U./DL
WBC # BLD: 11.4 K/UL (ref 4–11)
WBC UA: ABNORMAL /HPF (ref 0–5)

## 2020-03-24 PROCEDURE — 71045 X-RAY EXAM CHEST 1 VIEW: CPT

## 2020-03-24 PROCEDURE — 93005 ELECTROCARDIOGRAM TRACING: CPT | Performed by: EMERGENCY MEDICINE

## 2020-03-24 PROCEDURE — 96365 THER/PROPH/DIAG IV INF INIT: CPT

## 2020-03-24 PROCEDURE — 6370000000 HC RX 637 (ALT 250 FOR IP): Performed by: EMERGENCY MEDICINE

## 2020-03-24 PROCEDURE — 81001 URINALYSIS AUTO W/SCOPE: CPT

## 2020-03-24 PROCEDURE — 84484 ASSAY OF TROPONIN QUANT: CPT

## 2020-03-24 PROCEDURE — 99284 EMERGENCY DEPT VISIT MOD MDM: CPT

## 2020-03-24 PROCEDURE — 6360000002 HC RX W HCPCS: Performed by: EMERGENCY MEDICINE

## 2020-03-24 PROCEDURE — 96366 THER/PROPH/DIAG IV INF ADDON: CPT

## 2020-03-24 PROCEDURE — 93010 ELECTROCARDIOGRAM REPORT: CPT | Performed by: INTERNAL MEDICINE

## 2020-03-24 PROCEDURE — 80053 COMPREHEN METABOLIC PANEL: CPT

## 2020-03-24 PROCEDURE — 85025 COMPLETE CBC W/AUTO DIFF WBC: CPT

## 2020-03-24 RX ORDER — POTASSIUM CHLORIDE 20 MEQ/1
20 TABLET, EXTENDED RELEASE ORAL DAILY
Qty: 3 TABLET | Refills: 0 | Status: ON HOLD | OUTPATIENT
Start: 2020-03-24 | End: 2020-04-18 | Stop reason: HOSPADM

## 2020-03-24 RX ORDER — POTASSIUM CHLORIDE 20 MEQ/1
TABLET, EXTENDED RELEASE ORAL
Status: DISCONTINUED
Start: 2020-03-24 | End: 2020-03-24 | Stop reason: HOSPADM

## 2020-03-24 RX ORDER — OXYCODONE HYDROCHLORIDE AND ACETAMINOPHEN 5; 325 MG/1; MG/1
1 TABLET ORAL EVERY 6 HOURS PRN
Status: ON HOLD | COMMUNITY
End: 2020-04-18 | Stop reason: HOSPADM

## 2020-03-24 RX ORDER — DILTIAZEM HYDROCHLORIDE 120 MG/1
240 CAPSULE, COATED, EXTENDED RELEASE ORAL ONCE
Status: COMPLETED | OUTPATIENT
Start: 2020-03-24 | End: 2020-03-24

## 2020-03-24 RX ORDER — LORAZEPAM 0.5 MG/1
0.5 TABLET ORAL DAILY
COMMUNITY
End: 2020-04-14

## 2020-03-24 RX ORDER — POTASSIUM CHLORIDE 20 MEQ/1
40 TABLET, EXTENDED RELEASE ORAL ONCE
Status: COMPLETED | OUTPATIENT
Start: 2020-03-24 | End: 2020-03-24

## 2020-03-24 RX ORDER — POTASSIUM CHLORIDE 7.45 MG/ML
10 INJECTION INTRAVENOUS
Status: COMPLETED | OUTPATIENT
Start: 2020-03-24 | End: 2020-03-24

## 2020-03-24 RX ADMIN — POTASSIUM CHLORIDE 10 MEQ: 7.46 INJECTION, SOLUTION INTRAVENOUS at 08:12

## 2020-03-24 RX ADMIN — POTASSIUM CHLORIDE 10 MEQ: 7.46 INJECTION, SOLUTION INTRAVENOUS at 09:16

## 2020-03-24 RX ADMIN — METOPROLOL TARTRATE 12.5 MG: 25 TABLET, FILM COATED ORAL at 10:32

## 2020-03-24 RX ADMIN — DILTIAZEM HYDROCHLORIDE 240 MG: 120 CAPSULE, COATED, EXTENDED RELEASE ORAL at 10:32

## 2020-03-24 RX ADMIN — POTASSIUM CHLORIDE 40 MEQ: 1500 TABLET, EXTENDED RELEASE ORAL at 08:12

## 2020-03-24 NOTE — ED NOTES
Lab called to report PANIC lab      Potassium 2.5  Dr. Gerson Bejarano made aware.       Aman Byrd RN  03/24/20 [id number]

## 2020-03-24 NOTE — ED PROVIDER NOTES
7:00 AM: I discussed the history, physical examination, laboratory and imaging studies, and treatment plan with Dr. Stevan Mack. Janina Orta was signed out to me in stable condition. Please see Dr. Thomas Draft documentation for details of their history, physical, and laboratory studies. Upon re-examination, a summary of Nikos Farris's history, physical examination, and studies are as follows: Patient had presented due to concern for unresponsive episode. Per report, EMS found the patient to be responsive upon their arrival and patient has been appropriate since arriving here. She did receive glucagon prior to arrival and initial blood sugar was in the 60s. She denies any complaints at this time other than feeling tired. No headache. No chest pain. No abdominal pain. No vomiting. No recent fever. She denies any needs at this time. On exam, patient was resting comfortably in bed. She was aroused very easily. Heart is noted to be borderline tachycardic and irregular. Lungs grossly clear without any respiratory distress or increased work of breathing. Abdomen soft and nontender. Patient is answering questions appropriately without slurred speech or any confusion noted.     Results for orders placed or performed during the hospital encounter of 03/24/20   CBC Auto Differential   Result Value Ref Range    WBC 11.4 (H) 4.0 - 11.0 K/uL    RBC 5.77 (H) 4.00 - 5.20 M/uL    Hemoglobin 16.3 (H) 12.0 - 16.0 g/dL    Hematocrit 50.1 (H) 36.0 - 48.0 %    MCV 86.9 80.0 - 100.0 fL    MCH 28.3 26.0 - 34.0 pg    MCHC 32.5 31.0 - 36.0 g/dL    RDW 15.9 (H) 12.4 - 15.4 %    Platelets 683 468 - 951 K/uL    MPV 9.7 5.0 - 10.5 fL    Neutrophils % 66.7 %    Lymphocytes % 23.5 %    Monocytes % 8.3 %    Eosinophils % 0.8 %    Basophils % 0.7 %    Neutrophils Absolute 7.6 1.7 - 7.7 K/uL    Lymphocytes Absolute 2.7 1.0 - 5.1 K/uL    Monocytes Absolute 0.9 0.0 - 1.3 K/uL    Eosinophils Absolute 0.1 0.0 - 0.6 K/uL Calcium 8.6 8.3 - 10.6 mg/dL   EKG 12 Lead   Result Value Ref Range    Ventricular Rate 118 BPM    Atrial Rate 119 BPM    QRS Duration 76 ms    Q-T Interval 278 ms    QTc Calculation (Bazett) 389 ms    R Axis 94 degrees    T Axis 252 degrees    Diagnosis       Atrial fibrillation with rapid ventricular responseRightward axisMarked ST abnormality, possible inferior subendocardial injuryMarked ST abnormality, possible anterolateral subendocardial injuryAbnormal ECGWhen compared with ECG of1.11.20 there is worsening of ST segment depression in inferolateral leadsConfirmed by Elisa Cruz MD, [address] ([id number]) on 3/24/2020 7:46:35 AM     Imaging:  Xr Chest Portable    Result Date: 3/24/2020  EXAMINATION: ONE XRAY VIEW OF THE CHEST 3/24/2020 5:45 am COMPARISON: 01/11/2020 HISTORY: ORDERING SYSTEM PROVIDED HISTORY: Altered level of consciousness TECHNOLOGIST PROVIDED HISTORY: Reason for exam:->Altered level of consciousness Reason for Exam: aAltered level of consciousness Acuity: Acute Type of Exam: Initial FINDINGS: There is mild pulmonary edema without focal infiltrate. The heart size is at the upper limits of normal.  There may be a tiny left pleural effusion. There is no discernible pneumothorax. Pulmonary edema. MDM:   Patient presenting due to concerns for episode of unresponsiveness. She has been awake and alert throughout my evaluation and has not had any altered mental status or focal neurologic symptoms. She was noted to be significantly hypokalemic and this was repleted both orally and intravenously. She does have a history of A. fib and is currently in atrial fibrillation with intermittent heart rates in the 1 teens to 120s but not had not yet had her daily medications. She was given diltiazem and metoprolol as she typically does take and on recheck HR now hovering right around 100 while I am in the room reassessing.   Patient will be discharged with a 3-day course of potassium as my repeat level was within normal limits but I suspect it may still be borderline hypokalemic as it was hemolyzed. Patient is anxious to be discharged and felt that this was reasonable with outpatient follow-up within the next 2 to 3 days and recheck of her chemistries at that time. All questions answered at time of discharge. I estimate there is LOW risk for ACUTE CORONARY SYNDROME, INTRACRANIAL HEMORRHAGE, MALIGNANT DYSRHYTHMIA or HYPERTENSION, PULMONARY EMBOLISM, SEPSIS, SUBARACHNOID HEMORRHAGE, SUBDURAL HEMATOMA, STROKE, or THORACIC AORTIC DISSECTION, thus I consider the discharge disposition reasonable. Shobha Montenegro and I have discussed the diagnosis and risks, and we agree with discharging home to follow-up with their primary doctor. We also discussed returning to the Emergency Department immediately if new or worsening symptoms occur. We have discussed the symptoms which are most concerning (e.g., bloody sputum, fever, worsening pain or shortness of breath, vomiting, weakness) that necessitate immediate return. Final Impression    1. Episode of unresponsiveness    2. Chronic atrial fibrillation    3. Hypokalemia        Blood pressure 110/76, pulse 114, temperature 98.6 °F (37 °C), temperature source Oral, resp. rate 25, weight 261 lb (118.4 kg), SpO2 94 %. Final Disposition:  Sujata Souza was discharged to her nursing facility in stable condition. The total Critical Care time is 25 minutes which excludes separately billable procedures. This included evaluation and treatment of critically low potassium.          Timothy Walker MD  03/24/20 119 [facility] Dayna Whitfield MD  03/24/20 [id number]

## 2020-03-24 NOTE — ED PROVIDER NOTES
Triage Chief Complaint:   Other (Per pt facility pt is unresponsive, upon arrival pt is responsive )      Dot LakeConrad Solitario is a 68 y.o. female that presents reportedly unresponsive. Patient was very much responsive on arrival in the emergency department. She is a nursing home patient with Alzheimer's disease diabetes hypertension hypercholesterolemia and atrial fibrillation. She is on Xarelto. Apparently on waking the patient this morning at 4 AM her blood sugar was 67 she was felt to be unresponsive. She was given glucagon at that time and medic was called. On medics arrival she was easily aroused. She wants to be left alone and go back to sleep. She denied headache chest pain shortness of breath or abdominal pain.   Patient was not in acute distress at time of exam    ROS:  · Unable to obtain an accurate review of systems because of the patient's dementia    Past Medical History:   Diagnosis Date    Acute diastolic heart failure ([address]     Acute respiratory failure (HCC)     Adjustment disorder with mixed anxiety and depressed mood     Allergic rhinitis     Alzheimer's dementia ([address]     Atrial fibrillation ([facility])     Back pain     CHF (congestive heart failure) ([facility])     Chronic pain     Constipation     COPD (chronic obstructive pulmonary disease) ([facility])     Depression     Dermatitis     Diabetes mellitus ([address]     Disseminated superficial actinic porokeratosis     Disseminated superficial actinic porokeratosis (DSAP)     Edema     ESBL (extended spectrum beta-lactamase) producing bacteria infection 02/27/2019    urine    Hypertension     Hypo-osmolality and hyponatremia     Major depressive disorder     Morbid obesity ([address]     Muscle weakness     Overactive bladder     Schizoaffective disorder (HCC)     Seizures ([facility])     Xerosis cutis     Xerosis cutis      Past Surgical History:   Procedure Laterality Date    KNEE SURGERY      TONSILLECTOMY      TUBAL LIGATION 0.5 mg by mouth daily.  potassium chloride (KLOR-CON M) 20 MEQ extended release tablet Take 1 tablet by mouth daily for 3 days 3 tablet 0    CHOLECALCIFEROL PO Take 1,000 Units by mouth daily      Sodium Phosphates (FLEET) 7-19 GM/118ML Place 1 enema rectally daily as needed      gabapentin (NEURONTIN) 300 MG capsule Take 300 mg by mouth nightly.  gabapentin (NEURONTIN) 100 MG capsule Take 100 mg by mouth daily.  ammonium lactate (LAC-HYDRIN) 12 % lotion Apply topically as needed for Dry Skin Apply topically as needed.       ipratropium-albuterol (DUONEB) 0.5-2.5 (3) MG/3ML SOLN nebulizer solution Inhale 1 vial into the lungs every 6 hours      magnesium hydroxide (MILK OF MAGNESIA) 400 MG/5ML suspension Take 30 mLs by mouth daily as needed for Constipation      ondansetron (ZOFRAN) 4 MG tablet Take 4 mg by mouth every 6 hours      SODIUM CHLORIDE PO Take 1 tablet by mouth daily Every MWF      B Complex-C-E-Zn (ZINC-VITES) TABS Take 1 tablet by mouth daily      Hypromellose (NATURES TEARS OP) Apply 1 drop to eye 2 times daily as needed Both eyes every      acetaminophen (TYLENOL) 325 MG tablet Take 650 mg by mouth every 6 hours as needed for Pain      solifenacin (VESICARE) 5 MG tablet Take 5 mg by mouth daily      risperiDONE (RISPERDAL M-TABS) 2 MG disintegrating tablet Take 1 mg by mouth 2 times daily       bisacodyl (DULCOLAX) 10 MG suppository Place 10 mg rectally daily as needed for Constipation      torsemide (DEMADEX) 20 MG tablet Take 40 mg by mouth daily       loratadine (CLARITIN) 10 MG tablet Take 10 mg by mouth daily      citalopram (CELEXA) 10 MG tablet Take 20 mg by mouth daily       insulin glargine (LANTUS SOLOSTAR) 100 UNIT/ML injection pen Inject 40 Units into the skin every morning      rivaroxaban (XARELTO) 20 MG TABS tablet Take 1 tablet by mouth daily (Patient taking differently: Take 20 mg by mouth nightly ) 30 tablet 0    metoprolol tartrate (LOPRESSOR) 25 MG BUN 16 7 - 20 mg/dL    CREATININE 0.6 0.6 - 1.2 mg/dL    GFR Non-African American >60 >60    GFR African American >60 >60    Calcium 9.3 8.3 - 10.6 mg/dL    Total Protein 7.2 6.4 - 8.2 g/dL    Alb 3.2 (L) 3.4 - 5.0 g/dL    Albumin/Globulin Ratio 0.8 (L) 1.1 - 2.2    Total Bilirubin 1.1 (H) 0.0 - 1.0 mg/dL    Alkaline Phosphatase 61 40 - 129 U/L    ALT 22 10 - 40 U/L    AST 33 15 - 37 U/L    Globulin 4.0 g/dL   Troponin   Result Value Ref Range    Troponin 0.01 <0.01 ng/mL   Basic metabolic panel   Result Value Ref Range    Sodium 136 136 - 145 mmol/L    Potassium 4.2 3.5 - 5.1 mmol/L    Chloride 86 (L) 99 - 110 mmol/L    CO2 32 21 - 32 mmol/L    Anion Gap 18 (H) 3 - 16    Glucose 127 (H) 70 - 99 mg/dL    BUN 17 7 - 20 mg/dL    CREATININE 0.6 0.6 - 1.2 mg/dL    GFR Non-African American >60 >60    GFR African American >60 >60    Calcium 8.6 8.3 - 10.6 mg/dL   EKG 12 Lead   Result Value Ref Range    Ventricular Rate 118 BPM    Atrial Rate 119 BPM    QRS Duration 76 ms    Q-T Interval 278 ms    QTc Calculation (Bazett) 389 ms    R Axis 94 degrees    T Axis 252 degrees    Diagnosis       Atrial fibrillation with rapid ventricular responseRightward axisMarked ST abnormality, possible inferior subendocardial injuryMarked ST abnormality, possible anterolateral subendocardial injuryAbnormal ECGWhen compared with ECG of1.11.20 there is worsening of ST segment depression in inferolateral leadsConfirmed by Nelia Rubio MD, [address] ([id number]) on 3/24/2020 7:46:35 AM        Radiographs (if obtained):  [] Radiologist's Report Reviewed:  XR CHEST PORTABLE   Final Result   Pulmonary edema. [] Discussed with Radiologist:     [] The following radiograph was interpreted by myself in the absence of a radiologist:     EKG (if obtained): (All EKG's are interpreted by myself in the absence of a cardiologist)  EKG demonstrated atrial fibrillation with a rapid ventricular response of 118.   She had a rightward axis with ST-T wave changes

## 2020-04-14 ENCOUNTER — APPOINTMENT (OUTPATIENT)
Dept: GENERAL RADIOLOGY | Age: 70
DRG: 871 | End: 2020-04-14
Payer: MEDICARE

## 2020-04-14 ENCOUNTER — HOSPITAL ENCOUNTER (INPATIENT)
Age: 70
LOS: 4 days | Discharge: NURSING, REHAB OR LONG-TERM CARE | DRG: 871 | End: 2020-04-18
Attending: EMERGENCY MEDICINE | Admitting: INTERNAL MEDICINE
Payer: MEDICARE

## 2020-04-14 PROBLEM — A41.9 SEPSIS (HCC): Status: ACTIVE | Noted: 2020-04-14

## 2020-04-14 LAB
A/G RATIO: 0.8 (ref 1.1–2.2)
ALBUMIN SERPL-MCNC: 2.7 G/DL (ref 3.4–5)
ALP BLD-CCNC: 64 U/L (ref 40–129)
ALT SERPL-CCNC: 10 U/L (ref 10–40)
ANION GAP SERPL CALCULATED.3IONS-SCNC: 14 MMOL/L (ref 3–16)
ANION GAP SERPL CALCULATED.3IONS-SCNC: 14 MMOL/L (ref 3–16)
AST SERPL-CCNC: 20 U/L (ref 15–37)
BACTERIA: ABNORMAL /HPF
BASOPHILS ABSOLUTE: 0 K/UL (ref 0–0.2)
BASOPHILS RELATIVE PERCENT: 0.1 %
BILIRUB SERPL-MCNC: 0.8 MG/DL (ref 0–1)
BILIRUBIN URINE: NEGATIVE
BLOOD, URINE: ABNORMAL
BUN BLDV-MCNC: 14 MG/DL (ref 7–20)
BUN BLDV-MCNC: 15 MG/DL (ref 7–20)
CALCIUM SERPL-MCNC: 8.7 MG/DL (ref 8.3–10.6)
CALCIUM SERPL-MCNC: 9.1 MG/DL (ref 8.3–10.6)
CHLORIDE BLD-SCNC: 85 MMOL/L (ref 99–110)
CHLORIDE BLD-SCNC: 86 MMOL/L (ref 99–110)
CLARITY: ABNORMAL
CO2: 31 MMOL/L (ref 21–32)
CO2: 34 MMOL/L (ref 21–32)
COLOR: ABNORMAL
CREAT SERPL-MCNC: <0.5 MG/DL (ref 0.6–1.2)
CREAT SERPL-MCNC: <0.5 MG/DL (ref 0.6–1.2)
EKG ATRIAL RATE: 107 BPM
EKG DIAGNOSIS: NORMAL
EKG Q-T INTERVAL: 290 MS
EKG QRS DURATION: 68 MS
EKG QTC CALCULATION (BAZETT): 426 MS
EKG R AXIS: 101 DEGREES
EKG T AXIS: 225 DEGREES
EKG VENTRICULAR RATE: 130 BPM
EOSINOPHILS ABSOLUTE: 0.1 K/UL (ref 0–0.6)
EOSINOPHILS RELATIVE PERCENT: 0.7 %
GFR AFRICAN AMERICAN: >60
GFR AFRICAN AMERICAN: >60
GFR NON-AFRICAN AMERICAN: >60
GFR NON-AFRICAN AMERICAN: >60
GLOBULIN: 3.6 G/DL
GLUCOSE BLD-MCNC: 111 MG/DL (ref 70–99)
GLUCOSE BLD-MCNC: 127 MG/DL (ref 70–99)
GLUCOSE BLD-MCNC: 134 MG/DL (ref 70–99)
GLUCOSE URINE: NEGATIVE MG/DL
HCT VFR BLD CALC: 44.6 % (ref 36–48)
HEMOGLOBIN: 14.7 G/DL (ref 12–16)
KETONES, URINE: ABNORMAL MG/DL
LACTIC ACID: 1.3 MMOL/L (ref 0.4–2)
LACTIC ACID: 1.5 MMOL/L (ref 0.4–2)
LEUKOCYTE ESTERASE, URINE: ABNORMAL
LYMPHOCYTES ABSOLUTE: 2.7 K/UL (ref 1–5.1)
LYMPHOCYTES RELATIVE PERCENT: 19.6 %
MAGNESIUM: 1.6 MG/DL (ref 1.8–2.4)
MCH RBC QN AUTO: 28.9 PG (ref 26–34)
MCHC RBC AUTO-ENTMCNC: 33 G/DL (ref 31–36)
MCV RBC AUTO: 87.6 FL (ref 80–100)
MICROSCOPIC EXAMINATION: YES
MONOCYTES ABSOLUTE: 1.3 K/UL (ref 0–1.3)
MONOCYTES RELATIVE PERCENT: 9.3 %
NEUTROPHILS ABSOLUTE: 9.6 K/UL (ref 1.7–7.7)
NEUTROPHILS RELATIVE PERCENT: 70.3 %
NITRITE, URINE: NEGATIVE
PDW BLD-RTO: 16.7 % (ref 12.4–15.4)
PERFORMED ON: ABNORMAL
PH UA: 7 (ref 5–8)
PLATELET # BLD: 268 K/UL (ref 135–450)
PMV BLD AUTO: 8.9 FL (ref 5–10.5)
POTASSIUM SERPL-SCNC: 2.7 MMOL/L (ref 3.5–5.1)
POTASSIUM SERPL-SCNC: 3.5 MMOL/L (ref 3.5–5.1)
PRO-BNP: 1152 PG/ML (ref 0–124)
PROCALCITONIN: 0.07 NG/ML (ref 0–0.15)
PROTEIN UA: 30 MG/DL
RBC # BLD: 5.1 M/UL (ref 4–5.2)
RBC UA: ABNORMAL /HPF (ref 0–4)
S PYO AG THROAT QL: NEGATIVE
SODIUM BLD-SCNC: 131 MMOL/L (ref 136–145)
SODIUM BLD-SCNC: 133 MMOL/L (ref 136–145)
SPECIFIC GRAVITY UA: 1.02 (ref 1–1.03)
TOTAL PROTEIN: 6.3 G/DL (ref 6.4–8.2)
TROPONIN: 0.02 NG/ML
TROPONIN: 0.04 NG/ML
URINE REFLEX TO CULTURE: YES
URINE TYPE: ABNORMAL
UROBILINOGEN, URINE: 0.2 E.U./DL
WBC # BLD: 13.7 K/UL (ref 4–11)
WBC UA: >100 /HPF (ref 0–5)

## 2020-04-14 PROCEDURE — 6370000000 HC RX 637 (ALT 250 FOR IP): Performed by: EMERGENCY MEDICINE

## 2020-04-14 PROCEDURE — 87086 URINE CULTURE/COLONY COUNT: CPT

## 2020-04-14 PROCEDURE — 87077 CULTURE AEROBIC IDENTIFY: CPT

## 2020-04-14 PROCEDURE — 83880 ASSAY OF NATRIURETIC PEPTIDE: CPT

## 2020-04-14 PROCEDURE — 2500000003 HC RX 250 WO HCPCS: Performed by: EMERGENCY MEDICINE

## 2020-04-14 PROCEDURE — 2700000000 HC OXYGEN THERAPY PER DAY

## 2020-04-14 PROCEDURE — 87081 CULTURE SCREEN ONLY: CPT

## 2020-04-14 PROCEDURE — 96375 TX/PRO/DX INJ NEW DRUG ADDON: CPT

## 2020-04-14 PROCEDURE — 87880 STREP A ASSAY W/OPTIC: CPT

## 2020-04-14 PROCEDURE — 84145 PROCALCITONIN (PCT): CPT

## 2020-04-14 PROCEDURE — 94761 N-INVAS EAR/PLS OXIMETRY MLT: CPT

## 2020-04-14 PROCEDURE — 99223 1ST HOSP IP/OBS HIGH 75: CPT | Performed by: INTERNAL MEDICINE

## 2020-04-14 PROCEDURE — 6360000002 HC RX W HCPCS: Performed by: EMERGENCY MEDICINE

## 2020-04-14 PROCEDURE — 84484 ASSAY OF TROPONIN QUANT: CPT

## 2020-04-14 PROCEDURE — 2580000003 HC RX 258: Performed by: EMERGENCY MEDICINE

## 2020-04-14 PROCEDURE — 85025 COMPLETE CBC W/AUTO DIFF WBC: CPT

## 2020-04-14 PROCEDURE — 6360000002 HC RX W HCPCS: Performed by: NURSE PRACTITIONER

## 2020-04-14 PROCEDURE — 36415 COLL VENOUS BLD VENIPUNCTURE: CPT

## 2020-04-14 PROCEDURE — 81001 URINALYSIS AUTO W/SCOPE: CPT

## 2020-04-14 PROCEDURE — 2580000003 HC RX 258: Performed by: INTERNAL MEDICINE

## 2020-04-14 PROCEDURE — 83605 ASSAY OF LACTIC ACID: CPT

## 2020-04-14 PROCEDURE — 93010 ELECTROCARDIOGRAM REPORT: CPT | Performed by: INTERNAL MEDICINE

## 2020-04-14 PROCEDURE — U0002 COVID-19 LAB TEST NON-CDC: HCPCS

## 2020-04-14 PROCEDURE — 6360000002 HC RX W HCPCS: Performed by: INTERNAL MEDICINE

## 2020-04-14 PROCEDURE — 83735 ASSAY OF MAGNESIUM: CPT

## 2020-04-14 PROCEDURE — 2000000000 HC ICU R&B

## 2020-04-14 PROCEDURE — 93005 ELECTROCARDIOGRAM TRACING: CPT | Performed by: EMERGENCY MEDICINE

## 2020-04-14 PROCEDURE — 96365 THER/PROPH/DIAG IV INF INIT: CPT

## 2020-04-14 PROCEDURE — 2580000003 HC RX 258: Performed by: NURSE PRACTITIONER

## 2020-04-14 PROCEDURE — 71045 X-RAY EXAM CHEST 1 VIEW: CPT

## 2020-04-14 PROCEDURE — 6370000000 HC RX 637 (ALT 250 FOR IP): Performed by: NURSE PRACTITIONER

## 2020-04-14 PROCEDURE — 80053 COMPREHEN METABOLIC PANEL: CPT

## 2020-04-14 PROCEDURE — 87040 BLOOD CULTURE FOR BACTERIA: CPT

## 2020-04-14 PROCEDURE — 87186 SC STD MICRODIL/AGAR DIL: CPT

## 2020-04-14 PROCEDURE — 99285 EMERGENCY DEPT VISIT HI MDM: CPT

## 2020-04-14 RX ORDER — POTASSIUM CHLORIDE 20 MEQ/1
40 TABLET, EXTENDED RELEASE ORAL ONCE
Status: COMPLETED | OUTPATIENT
Start: 2020-04-14 | End: 2020-04-14

## 2020-04-14 RX ORDER — 0.9 % SODIUM CHLORIDE 0.9 %
500 INTRAVENOUS SOLUTION INTRAVENOUS ONCE
Status: DISCONTINUED | OUTPATIENT
Start: 2020-04-14 | End: 2020-04-14

## 2020-04-14 RX ORDER — SODIUM CHLORIDE 9 MG/ML
INJECTION, SOLUTION INTRAVENOUS CONTINUOUS
Status: DISCONTINUED | OUTPATIENT
Start: 2020-04-14 | End: 2020-04-16

## 2020-04-14 RX ORDER — 0.9 % SODIUM CHLORIDE 0.9 %
500 INTRAVENOUS SOLUTION INTRAVENOUS ONCE
Status: COMPLETED | OUTPATIENT
Start: 2020-04-14 | End: 2020-04-14

## 2020-04-14 RX ORDER — 0.9 % SODIUM CHLORIDE 0.9 %
1000 INTRAVENOUS SOLUTION INTRAVENOUS ONCE
Status: COMPLETED | OUTPATIENT
Start: 2020-04-14 | End: 2020-04-14

## 2020-04-14 RX ORDER — AZITHROMYCIN 250 MG/1
500 TABLET, FILM COATED ORAL ONCE
Status: COMPLETED | OUTPATIENT
Start: 2020-04-14 | End: 2020-04-14

## 2020-04-14 RX ORDER — POTASSIUM CHLORIDE 750 MG/1
10 TABLET, EXTENDED RELEASE ORAL ONCE
Status: COMPLETED | OUTPATIENT
Start: 2020-04-14 | End: 2020-04-14

## 2020-04-14 RX ORDER — DILTIAZEM HYDROCHLORIDE 5 MG/ML
10 INJECTION INTRAVENOUS ONCE
Status: COMPLETED | OUTPATIENT
Start: 2020-04-14 | End: 2020-04-14

## 2020-04-14 RX ORDER — 0.9 % SODIUM CHLORIDE 0.9 %
500 INTRAVENOUS SOLUTION INTRAVENOUS
Status: DISCONTINUED | OUTPATIENT
Start: 2020-04-14 | End: 2020-04-18 | Stop reason: HOSPADM

## 2020-04-14 RX ORDER — ACETAMINOPHEN 325 MG/1
650 TABLET ORAL EVERY 6 HOURS PRN
Status: DISCONTINUED | OUTPATIENT
Start: 2020-04-14 | End: 2020-04-18 | Stop reason: HOSPADM

## 2020-04-14 RX ORDER — PROMETHAZINE HYDROCHLORIDE 25 MG/1
12.5 TABLET ORAL EVERY 6 HOURS PRN
Status: DISCONTINUED | OUTPATIENT
Start: 2020-04-14 | End: 2020-04-18 | Stop reason: HOSPADM

## 2020-04-14 RX ORDER — SODIUM CHLORIDE 0.9 % (FLUSH) 0.9 %
10 SYRINGE (ML) INJECTION EVERY 12 HOURS SCHEDULED
Status: DISCONTINUED | OUTPATIENT
Start: 2020-04-14 | End: 2020-04-18 | Stop reason: HOSPADM

## 2020-04-14 RX ORDER — DILTIAZEM HYDROCHLORIDE 240 MG/1
240 CAPSULE, COATED, EXTENDED RELEASE ORAL DAILY
Status: DISCONTINUED | OUTPATIENT
Start: 2020-04-14 | End: 2020-04-16

## 2020-04-14 RX ORDER — ONDANSETRON 2 MG/ML
4 INJECTION INTRAMUSCULAR; INTRAVENOUS EVERY 6 HOURS PRN
Status: DISCONTINUED | OUTPATIENT
Start: 2020-04-14 | End: 2020-04-18 | Stop reason: HOSPADM

## 2020-04-14 RX ORDER — POLYETHYLENE GLYCOL 3350 17 G/17G
17 POWDER, FOR SOLUTION ORAL DAILY PRN
Status: DISCONTINUED | OUTPATIENT
Start: 2020-04-14 | End: 2020-04-18 | Stop reason: HOSPADM

## 2020-04-14 RX ORDER — GABAPENTIN 300 MG/1
300 CAPSULE ORAL NIGHTLY
Status: DISCONTINUED | OUTPATIENT
Start: 2020-04-14 | End: 2020-04-18 | Stop reason: HOSPADM

## 2020-04-14 RX ORDER — SODIUM CHLORIDE 0.9 % (FLUSH) 0.9 %
10 SYRINGE (ML) INJECTION PRN
Status: DISCONTINUED | OUTPATIENT
Start: 2020-04-14 | End: 2020-04-18 | Stop reason: HOSPADM

## 2020-04-14 RX ORDER — ACETAMINOPHEN 650 MG/1
650 SUPPOSITORY RECTAL EVERY 6 HOURS PRN
Status: DISCONTINUED | OUTPATIENT
Start: 2020-04-14 | End: 2020-04-18 | Stop reason: HOSPADM

## 2020-04-14 RX ORDER — CEFUROXIME AXETIL 500 MG/1
500 TABLET ORAL 2 TIMES DAILY
Status: ON HOLD | COMMUNITY
End: 2020-04-18 | Stop reason: HOSPADM

## 2020-04-14 RX ORDER — INSULIN GLARGINE 100 [IU]/ML
40 INJECTION, SOLUTION SUBCUTANEOUS EVERY MORNING
Status: DISCONTINUED | OUTPATIENT
Start: 2020-04-15 | End: 2020-04-18

## 2020-04-14 RX ADMIN — SODIUM CHLORIDE 500 ML: 9 INJECTION, SOLUTION INTRAVENOUS at 11:27

## 2020-04-14 RX ADMIN — SODIUM CHLORIDE: 9 INJECTION, SOLUTION INTRAVENOUS at 20:53

## 2020-04-14 RX ADMIN — POTASSIUM CHLORIDE 40 MEQ: 1500 TABLET, EXTENDED RELEASE ORAL at 12:47

## 2020-04-14 RX ADMIN — Medication 10 ML: at 20:39

## 2020-04-14 RX ADMIN — CEFTRIAXONE SODIUM 1 G: 1 INJECTION, POWDER, FOR SOLUTION INTRAMUSCULAR; INTRAVENOUS at 12:58

## 2020-04-14 RX ADMIN — AZITHROMYCIN MONOHYDRATE 500 MG: 250 TABLET ORAL at 14:25

## 2020-04-14 RX ADMIN — POTASSIUM CHLORIDE 10 MEQ: 750 TABLET, EXTENDED RELEASE ORAL at 12:47

## 2020-04-14 RX ADMIN — SODIUM CHLORIDE 500 ML: 9 INJECTION, SOLUTION INTRAVENOUS at 23:24

## 2020-04-14 RX ADMIN — SODIUM CHLORIDE: 9 INJECTION, SOLUTION INTRAVENOUS at 18:35

## 2020-04-14 RX ADMIN — VANCOMYCIN HYDROCHLORIDE 1000 MG: 1 INJECTION, POWDER, LYOPHILIZED, FOR SOLUTION INTRAVENOUS at 20:33

## 2020-04-14 RX ADMIN — GABAPENTIN 300 MG: 300 CAPSULE ORAL at 20:38

## 2020-04-14 RX ADMIN — SODIUM CHLORIDE 1000 ML: 9 INJECTION, SOLUTION INTRAVENOUS at 14:58

## 2020-04-14 RX ADMIN — DILTIAZEM HYDROCHLORIDE 10 MG: 5 INJECTION INTRAVENOUS at 12:08

## 2020-04-14 RX ADMIN — CEFEPIME 2 G: 2 INJECTION, POWDER, FOR SOLUTION INTRAMUSCULAR; INTRAVENOUS at 20:37

## 2020-04-14 RX ADMIN — DILTIAZEM HYDROCHLORIDE 240 MG: 240 CAPSULE, COATED, EXTENDED RELEASE ORAL at 20:37

## 2020-04-14 ASSESSMENT — ENCOUNTER SYMPTOMS
DIARRHEA: 0
CHEST TIGHTNESS: 1
NAUSEA: 0
ABDOMINAL PAIN: 0
SHORTNESS OF BREATH: 1
COUGH: 1
VOMITING: 0

## 2020-04-14 ASSESSMENT — PAIN SCALES - GENERAL
PAINLEVEL_OUTOF10: 7
PAINLEVEL_OUTOF10: 0

## 2020-04-14 ASSESSMENT — PAIN DESCRIPTION - LOCATION: LOCATION: TOE (COMMENT WHICH ONE)

## 2020-04-14 ASSESSMENT — PAIN DESCRIPTION - PAIN TYPE: TYPE: ACUTE PAIN

## 2020-04-14 NOTE — H&P
bladder     Schizoaffective disorder (HCC)     Seizures (HCC)     Xerosis cutis     Xerosis cutis        Past Surgical History:        Procedure Laterality Date    KNEE SURGERY      TONSILLECTOMY      TUBAL LIGATION         Medications Prior to Admission:    Prior to Admission medications    Medication Sig Start Date End Date Taking? Authorizing Provider   cefUROXime (CEFTIN) 500 MG tablet Take 500 mg by mouth 2 times daily Indications: Urinary Tract Infection   Yes Historical Provider, MD   oxyCODONE-acetaminophen (PERCOCET) 5-325 MG per tablet Take 1 tablet by mouth every 6 hours as needed for Pain. Yes Historical Provider, MD   CHOLECALCIFEROL PO Take 1,000 Units by mouth daily   Yes Historical Provider, MD   Sodium Phosphates (FLEET) 7-19 GM/118ML Place 1 enema rectally daily as needed   Yes Historical Provider, MD   gabapentin (NEURONTIN) 300 MG capsule Take 300 mg by mouth nightly.    Yes Historical Provider, MD   ipratropium-albuterol (DUONEB) 0.5-2.5 (3) MG/3ML SOLN nebulizer solution Inhale 1 vial into the lungs every 6 hours   Yes Historical Provider, MD   bisacodyl (DULCOLAX) 10 MG suppository Place 10 mg rectally daily as needed for Constipation   Yes Historical Provider, MD   torsemide (DEMADEX) 20 MG tablet Take 40 mg by mouth daily    Yes Historical Provider, MD   loratadine (CLARITIN) 10 MG tablet Take 10 mg by mouth daily   Yes Historical Provider, MD   citalopram (CELEXA) 10 MG tablet Take 20 mg by mouth daily    Yes Historical Provider, MD   insulin glargine (LANTUS SOLOSTAR) 100 UNIT/ML injection pen Inject 40 Units into the skin every morning   Yes Historical Provider, MD   diltiazem (CARDIZEM CD) 240 MG extended release capsule Take 1 capsule by mouth daily 11/14/16 4/14/20 Yes Abimbola Ace MD   Blood Glucose Monitoring Suppl (BLOOD GLUCOSE SYSTEM RICHARD) KIT Check fingerstick blood sugars before meals and at bedtime 3/21/16  Yes Jessy Salazar MD   INSULIN SYRINGE .5CC/29G Pharynx clear. Neck: No JVD. No Carotid Bruit. Trachea midline. Resp: No accessory muscle use. Diminished breath sounds . no crackles. No wheezes. No rhonchi. CV: Irregular. No murmur. No rub. No edema. Capillary Refill: Brisk,< 3 seconds   Peripheral Pulses: +2 palpable, equal bilaterally   GI: Non-tender. Non-distended. No masses. No organomegaly. Normal bowel sounds. No hernia. Skin: Excoriations in lower abdomen groin and alexander-area  M/S: No cyanosis. No joint deformity. No clubbing. Neuro: Awake. Grossly nonfocal    Psych: No anxiety or agitation. Cannot assess orientation    CBC:   Recent Labs     04/14/20  1100   WBC 13.7*   HGB 14.7   HCT 44.6   MCV 87.6        BMP:   Recent Labs     04/14/20  1100   *   K 2.7*   CL 85*   CO2 34*   BUN 15   CREATININE <0.5*     LIVER PROFILE:   Recent Labs     04/14/20  1100   AST 20   ALT 10   BILITOT 0.8   ALKPHOS 64     UA:  Recent Labs     04/14/20  1220   COLORU DK YELLOW   PHUR 7.0   WBCUA >100*   RBCUA 21-50*   BACTERIA 4+*   CLARITYU CLOUDY*   SPECGRAV 1.020   LEUKOCYTESUR LARGE*   UROBILINOGEN 0.2   BILIRUBINUR Negative   BLOODU LARGE*   GLUCOSEU Negative     CARDIAC ENZYMES  Recent Labs     04/14/20  1100   TROPONINI 0.04*     Pro-BNP 1,152High        CULTURES  Rapid strep: Negative    EKG:  I have reviewed the EKG with the following interpretation:   Atrial fibrillation with RVR (130), Right axis, TY wave inversions in anterolateral leads, No acute ST elevation     Prior EKG on 3/24 wth similar T wave changes, but not present on EKG from 1/11/20       RADIOLOGY  XR CHEST PORTABLE   Final Result   Borderline cardiomegaly and mild central vascular congestion. Left basilar   opacity. Examination is limited by patient positioning. Repeat study suggested when   clinically feasible. Pertinent previous results reviewed   Echo 9/13/16   Conclusions      Summary   Technical difficult study due to body habitus. Atrial fibrillations

## 2020-04-14 NOTE — CONSULTS
Pharmacy to dose IV Vanco:  Please give 1g IV Vanco Q12hrs to provide Gram+ organism coverage to include MRSA. Trough 4/16 at 1930.   Contreras Jean PharmD 4/14/2020 7:03 PM

## 2020-04-14 NOTE — ED NOTES
1242-Perfect Serve sent to Dr. Titus Carranza  04/14/20 1242    PerfectServe completed with call back from NIKKY Ruffin at [facility]  04/14/20 1258    Consult sent to Pulmonology at [address]  04/14/20 3661    Consult completed with call back from Dr. Margarette Longoria at [address]  04/14/20 [phone]
IV attempt x4. IV to right wrist unsuccessful able to get 1st BC  IV to to right hand successful able to get 2nd [facility]  2 other IV attempts unsuccessful.    Straight stick to right finger basic labs and lactic drawn     Maria R Sanchez RN  04/14/20 6144
K+ critical result from lab of 2.7.      Paris Olvera RN  04/14/20 1200
no

## 2020-04-14 NOTE — ED PROVIDER NOTES
labs were within normal range ornot returned as of this dictation. EMERGENCY DEPARTMENT COURSE and DIFFERENTIAL DIAGNOSIS/MDM:   Vitals:    Vitals:    04/14/20 1417 04/14/20 1426 04/14/20 1427 04/14/20 1432   BP: (!) 77/55 108/89 108/89 (!) 116/98   Pulse: 105  114 111   Resp: 16 19 22   Temp:       TempSrc:       SpO2:   99% 100%   Weight:       Height:             [facility]    ED COURSE/MDM    -Jaxon Benito is a 68 y.o. female with a history of chronic A. fib, diabetes, COPD presents to ED for possible to mental status though when I called nursing facility they denied this. States the patient had complained of overall not feeling well, chest x-ray had been negative patient was afebrile and is currently being treated for UTI. -Arrival patient with blood pressure of 92/72 afebrile. Alert and oriented x3, initially told nurse that her toes hurt told myself that nothing hurt however when I asked specific review of systems, said yes to everything. Says she has had some chest pressure, cough and shortness of breath.  -Lab work-up, mildly hyponatremia 133, hypokalemia of 2.7, elevated proBNP of 1150 (improved from 2030 on 1/11/20). troponin elevated to 0.04 which is above patient's normal baseline of 0.01. Leukocytosis without bandemia. Negative strep. UA positive for infection, with negative nitrite, large cassette esterase greater than 100 WBC, 21-50 RBC. Chest x-ray shows borderline cardiomegaly and mild central vascular congestion. Left basilar opacity. Examination is limited by patient positioning. Repeat study suggested when clinically feasible.  -Patient was given 500 ml IV fluid bolus for hypotension and appearing clinically dry, potassium repletion with 40 mEq p.o. and 20 mEq IV. Diltiazem 10 mg IV was given for A. fib with RVR, with improvement of heart rate between 80s-110s, seems to have decrease her blood pressure as well with systolic in 15J.  Will give another fluid bolus of 500ml, suspect

## 2020-04-14 NOTE — CONSULTS
Intravenous Once    sodium chloride  1,000 mL Intravenous Once     Continuous Infusions:    PRN Meds:      ALLERGIES:  Patient is allergic to fish-derived products; iodine; mushroom extract complex; onion; and other. REVIEW OF SYSTEMS: Poor historian with altered mental status unable to obtain      PHYSICAL EXAM:  Blood pressure (!) 112/95, pulse 127, temperature 98.8 °F (37.1 °C), temperature source Axillary, resp. rate 18, height 5' 6\" (1.676 m), weight 280 lb (127 kg), SpO2 100 %.' on RA  Gen: + Distress. Eyes: PERRL. No sclera icterus. No conjunctival injection. ENT: No discharge. Pharynx clear. Neck: Trachea midline. No obvious mass. Resp: No accessory muscle use. Minimal crackles. No wheezes. No rhonchi. No dullness on percussion. CV: Tachycardia. irregular rhythm. No murmur or rub. No edema. Peripheral pulses are 2+. Capillary refill is less than 3 seconds. GI: Non-tender. Non-distended. No hernia. Skin: Warm and dry. No nodule on exposed extremities. Lymph: No cervical LAD. No supraclavicular LAD. M/S: No cyanosis. No joint deformity. No clubbing. Neuro: Awake. Alert. Followed commands  Psych: Disoriented. No anxiety. LABS:  CBC:   Recent Labs     04/14/20  1100   WBC 13.7*   HGB 14.7   HCT 44.6   MCV 87.6        BMP:   Recent Labs     04/14/20  1100   *   K 2.7*   CL 85*   CO2 34*   BUN 15   CREATININE <0.5*     LIVER PROFILE:   Recent Labs     04/14/20  1100   AST 20   ALT 10   BILITOT 0.8   ALKPHOS 64     PT/INR: No results for input(s): PROTIME, INR in the last 72 hours. APTT: No results for input(s): APTT in the last 72 hours. UA:  Recent Labs     04/14/20  1220   COLORU DK YELLOW   PHUR 7.0   WBCUA >100*   RBCUA 21-50*   BACTERIA 4+*   CLARITYU CLOUDY*   SPECGRAV 1.020   LEUKOCYTESUR LARGE*   UROBILINOGEN 0.2   BILIRUBINUR Negative   BLOODU LARGE*   GLUCOSEU Negative     No results for input(s): PHART, LXM3ENI, PO2ART in the last 72 hours.     Chest x-ray 4/14 imaging was reviewed by me and showed   Borderline cardiomegaly and mild central vascular congestion.    Left basilar opacity. Examination is limited by patient positioning.          ASSESSMENT:  · Severe sepsis  · Possible UTI  · Abnormal chest x-ray-favor atelectasis atelectasis  · COPD -does not appear to be in exacerbation  · Hypokalemia  · Chronic A. fib with RVR on Xarelto  · Diastolic CHF  · Dementia    PLAN:  Supplemental oxygen to maintain SaO2 >92%; wean as tolerated  Closely monitory airways, clinical status, cardiac rhythm, vital signs, urine output and NG output   Established venous access  IV fluid resuscitation with crystalloid targeting SBP > 90   Maintenance IVF NS at 100 cc/hr  Broad spectrum antibiotics to include cefepime for now  BC, UC and sputum GS&C  Procalcitonin level  Stool cdiff if diarrhea  Lactic acid every 6 hours to monitor clearance  Droplet plus isolation (surgical mask, eye protection, gown, glove)  Emergent Covid 19 testing  Moved to negative pressure room in case needs aerosolized procedure  Avoid NEBs as able-albuterol MDI strongly preferred   Avoid steroids if possible  Electrolytes replacement  Blood sugar control, with goal 140-180  GI prophylaxis: Pepcid  DVT prophylaxis: Lovenox  MRSA prophylaxis: Bactroban  Code status: Full code

## 2020-04-14 NOTE — FLOWSHEET NOTE
04/14/20 1825   Vital Signs   Temp 98.9 °F (37.2 °C)   Temp Source Axillary   Pulse 128   Heart Rate Source Monitor   Resp 26   BP 98/60   BP Location Right upper arm   BP Upper/Lower Upper   MAP (mmHg) 74   Patient Position Left side   Level of Consciousness 0   MEWS Score 5   Patient Currently in Pain No   Oxygen Therapy   SpO2 100 %   Pulse Oximeter Device Mode Continuous   Pulse Oximeter Device Location Finger   O2 Device Nasal cannula   Skin Protection for O2 Device No   O2 Flow Rate (L/min) 5 L/min   Patient resting quietly in bed. No s/s of distress noted. Admission assessment complete, see flow sheet. Denies needs at this time. Call light within reach. Will continue to monitor.

## 2020-04-14 NOTE — CARE COORDINATION
Case Management Assessment  Initial Evaluation    Date/Time of Evaluation: 4/14/2020 3:04 PM  Assessment Completed by: Rakel Camejoyal    Patient Name: Laura Lynne  YOB: 1950  Diagnosis: Sepsis [facility]) [A41.9]  Date / Time: 4/14/2020 10:35 AM  Admission status/Date: Inpatient 04/14/2020  Chart Reviewed: Yes      Patient Interviewed: Yes   Family Interviewed:  Noris Blackburn (daughter)    Hospitalization in the last 30 days:     Contacts  :     Relationship to Patient:   Phone Number:    Alternate Contact:     Relationship to Patient:     Phone Number:    Met with:     Current PCP: Daniel Barger MD    Financial  BC[employer] Medicare  Precert required for SNF :       3 night stay required -     ADLS  Support Systems:    Transportation: EMS transportation    Meal Preparation: per facility    Housing  Home Environment: LTC at [facility] Steps:   Plans to Return to Present Housing: Yes  Other Identified Issues: NA      Has Home O2 in place on admit:  No      Community Service Affiliation  Dialysis:  No    · Name:  · Location  · Dialysis Schedule:  · Phone:   · Fax: Outpatient PT/OT: No    Cancer Center: No     CHF Clinic: No     Pulmonary Rehab: No  Pain Clinic: No  Community Mental Health: No    Wound Clinic: No     Other: NA    The Plan for Transition of Care is related to the following treatment goals: Return to [facility]/LTC. Await return call from [facility] to confirm bed status. HIPPA appropriate VM left w/ Noris Blackburn (dtr) to confirm DCP, await call back. +CM following    15:35 Call back from Claudene Sallies (daughter) who has been in contact with [facility] for assistance with finding another dementia unit. She expressed ongoing care concerns. They are looking at [facility] dementia unit. She was told that [facility] does have a bed but cannot transfer until pt/family can tour the facility once COVID restrictions have lifted. Call placed to [facility] admitting to confirm and face sheet faxed.

## 2020-04-15 LAB
A/G RATIO: 0.7 (ref 1.1–2.2)
ALBUMIN SERPL-MCNC: 2.4 G/DL (ref 3.4–5)
ALP BLD-CCNC: 58 U/L (ref 40–129)
ALT SERPL-CCNC: 11 U/L (ref 10–40)
ANION GAP SERPL CALCULATED.3IONS-SCNC: 13 MMOL/L (ref 3–16)
AST SERPL-CCNC: 18 U/L (ref 15–37)
BASOPHILS ABSOLUTE: 0.1 K/UL (ref 0–0.2)
BASOPHILS RELATIVE PERCENT: 0.8 %
BILIRUB SERPL-MCNC: 0.7 MG/DL (ref 0–1)
BUN BLDV-MCNC: 10 MG/DL (ref 7–20)
CALCIUM SERPL-MCNC: 8.3 MG/DL (ref 8.3–10.6)
CHLORIDE BLD-SCNC: 89 MMOL/L (ref 99–110)
CO2: 27 MMOL/L (ref 21–32)
CREAT SERPL-MCNC: <0.5 MG/DL (ref 0.6–1.2)
EOSINOPHILS ABSOLUTE: 0.1 K/UL (ref 0–0.6)
EOSINOPHILS RELATIVE PERCENT: 0.7 %
GFR AFRICAN AMERICAN: >60
GFR NON-AFRICAN AMERICAN: >60
GLOBULIN: 3.5 G/DL
GLUCOSE BLD-MCNC: 103 MG/DL (ref 70–99)
GLUCOSE BLD-MCNC: 135 MG/DL (ref 70–99)
GLUCOSE BLD-MCNC: 150 MG/DL (ref 70–99)
GLUCOSE BLD-MCNC: 90 MG/DL (ref 70–99)
HCT VFR BLD CALC: 41.9 % (ref 36–48)
HEMOGLOBIN: 13.6 G/DL (ref 12–16)
LYMPHOCYTES ABSOLUTE: 1.9 K/UL (ref 1–5.1)
LYMPHOCYTES RELATIVE PERCENT: 16.7 %
MAGNESIUM: 1.5 MG/DL (ref 1.8–2.4)
MCH RBC QN AUTO: 28.8 PG (ref 26–34)
MCHC RBC AUTO-ENTMCNC: 32.5 G/DL (ref 31–36)
MCV RBC AUTO: 88.6 FL (ref 80–100)
MONOCYTES ABSOLUTE: 1 K/UL (ref 0–1.3)
MONOCYTES RELATIVE PERCENT: 8.6 %
NEUTROPHILS ABSOLUTE: 8.4 K/UL (ref 1.7–7.7)
NEUTROPHILS RELATIVE PERCENT: 73.2 %
PDW BLD-RTO: 16.3 % (ref 12.4–15.4)
PERFORMED ON: ABNORMAL
PERFORMED ON: ABNORMAL
PERFORMED ON: NORMAL
PHOSPHORUS: 2.3 MG/DL (ref 2.5–4.9)
PLATELET # BLD: 239 K/UL (ref 135–450)
PMV BLD AUTO: 8.7 FL (ref 5–10.5)
POTASSIUM REFLEX MAGNESIUM: 3.1 MMOL/L (ref 3.5–5.1)
POTASSIUM SERPL-SCNC: 3.1 MMOL/L (ref 3.5–5.1)
RBC # BLD: 4.73 M/UL (ref 4–5.2)
SARS-COV-2, PCR: NOT DETECTED
SODIUM BLD-SCNC: 129 MMOL/L (ref 136–145)
TOTAL PROTEIN: 5.9 G/DL (ref 6.4–8.2)
TROPONIN: 0.02 NG/ML
TSH SERPL DL<=0.05 MIU/L-ACNC: 1.24 UIU/ML (ref 0.27–4.2)
WBC # BLD: 11.5 K/UL (ref 4–11)

## 2020-04-15 PROCEDURE — 99291 CRITICAL CARE FIRST HOUR: CPT | Performed by: INTERNAL MEDICINE

## 2020-04-15 PROCEDURE — 84443 ASSAY THYROID STIM HORMONE: CPT

## 2020-04-15 PROCEDURE — 84484 ASSAY OF TROPONIN QUANT: CPT

## 2020-04-15 PROCEDURE — 6360000002 HC RX W HCPCS: Performed by: INTERNAL MEDICINE

## 2020-04-15 PROCEDURE — 2580000003 HC RX 258: Performed by: INTERNAL MEDICINE

## 2020-04-15 PROCEDURE — 2580000003 HC RX 258: Performed by: NURSE PRACTITIONER

## 2020-04-15 PROCEDURE — 6370000000 HC RX 637 (ALT 250 FOR IP): Performed by: NURSE PRACTITIONER

## 2020-04-15 PROCEDURE — 84100 ASSAY OF PHOSPHORUS: CPT

## 2020-04-15 PROCEDURE — 83735 ASSAY OF MAGNESIUM: CPT

## 2020-04-15 PROCEDURE — 99232 SBSQ HOSP IP/OBS MODERATE 35: CPT | Performed by: INTERNAL MEDICINE

## 2020-04-15 PROCEDURE — 2500000003 HC RX 250 WO HCPCS: Performed by: INTERNAL MEDICINE

## 2020-04-15 PROCEDURE — 6360000002 HC RX W HCPCS

## 2020-04-15 PROCEDURE — 6360000002 HC RX W HCPCS: Performed by: NURSE PRACTITIONER

## 2020-04-15 PROCEDURE — 51702 INSERT TEMP BLADDER CATH: CPT

## 2020-04-15 PROCEDURE — 6370000000 HC RX 637 (ALT 250 FOR IP): Performed by: INTERNAL MEDICINE

## 2020-04-15 PROCEDURE — 80053 COMPREHEN METABOLIC PANEL: CPT

## 2020-04-15 PROCEDURE — 99223 1ST HOSP IP/OBS HIGH 75: CPT | Performed by: INTERNAL MEDICINE

## 2020-04-15 PROCEDURE — 2060000000 HC ICU INTERMEDIATE R&B

## 2020-04-15 PROCEDURE — 85025 COMPLETE CBC W/AUTO DIFF WBC: CPT

## 2020-04-15 PROCEDURE — 92610 EVALUATE SWALLOWING FUNCTION: CPT

## 2020-04-15 RX ORDER — DEXTROSE MONOHYDRATE 25 G/50ML
12.5 INJECTION, SOLUTION INTRAVENOUS PRN
Status: DISCONTINUED | OUTPATIENT
Start: 2020-04-15 | End: 2020-04-18 | Stop reason: SDUPTHER

## 2020-04-15 RX ORDER — DEXTROSE MONOHYDRATE 50 MG/ML
100 INJECTION, SOLUTION INTRAVENOUS PRN
Status: DISCONTINUED | OUTPATIENT
Start: 2020-04-15 | End: 2020-04-18 | Stop reason: SDUPTHER

## 2020-04-15 RX ORDER — 0.9 % SODIUM CHLORIDE 0.9 %
1000 INTRAVENOUS SOLUTION INTRAVENOUS ONCE
Status: COMPLETED | OUTPATIENT
Start: 2020-04-15 | End: 2020-04-15

## 2020-04-15 RX ORDER — NICOTINE POLACRILEX 4 MG
15 LOZENGE BUCCAL PRN
Status: DISCONTINUED | OUTPATIENT
Start: 2020-04-15 | End: 2020-04-18 | Stop reason: SDUPTHER

## 2020-04-15 RX ORDER — DIGOXIN 0.25 MG/ML
INJECTION INTRAMUSCULAR; INTRAVENOUS
Status: COMPLETED
Start: 2020-04-15 | End: 2020-04-15

## 2020-04-15 RX ORDER — DIGOXIN 0.25 MG/ML
125 INJECTION INTRAMUSCULAR; INTRAVENOUS ONCE
Status: COMPLETED | OUTPATIENT
Start: 2020-04-15 | End: 2020-04-15

## 2020-04-15 RX ORDER — DIGOXIN 0.25 MG/ML
375 INJECTION INTRAMUSCULAR; INTRAVENOUS ONCE
Status: COMPLETED | OUTPATIENT
Start: 2020-04-15 | End: 2020-04-15

## 2020-04-15 RX ORDER — MAGNESIUM SULFATE IN WATER 40 MG/ML
2 INJECTION, SOLUTION INTRAVENOUS ONCE
Status: COMPLETED | OUTPATIENT
Start: 2020-04-15 | End: 2020-04-15

## 2020-04-15 RX ORDER — DIGOXIN 0.25 MG/ML
250 INJECTION INTRAMUSCULAR; INTRAVENOUS EVERY 6 HOURS
Status: COMPLETED | OUTPATIENT
Start: 2020-04-15 | End: 2020-04-16

## 2020-04-15 RX ORDER — POTASSIUM CHLORIDE 750 MG/1
40 TABLET, EXTENDED RELEASE ORAL ONCE
Status: COMPLETED | OUTPATIENT
Start: 2020-04-15 | End: 2020-04-15

## 2020-04-15 RX ORDER — POTASSIUM CHLORIDE 7.45 MG/ML
10 INJECTION INTRAVENOUS
Status: COMPLETED | OUTPATIENT
Start: 2020-04-15 | End: 2020-04-15

## 2020-04-15 RX ADMIN — Medication 10 ML: at 09:23

## 2020-04-15 RX ADMIN — SODIUM CHLORIDE 500 ML: 9 INJECTION, SOLUTION INTRAVENOUS at 02:45

## 2020-04-15 RX ADMIN — VANCOMYCIN HYDROCHLORIDE 1000 MG: 1 INJECTION, POWDER, LYOPHILIZED, FOR SOLUTION INTRAVENOUS at 09:20

## 2020-04-15 RX ADMIN — DIGOXIN 375 MCG: 0.25 INJECTION INTRAMUSCULAR; INTRAVENOUS at 12:28

## 2020-04-15 RX ADMIN — MUPIROCIN: 20 OINTMENT TOPICAL at 21:54

## 2020-04-15 RX ADMIN — SODIUM CHLORIDE: 9 INJECTION, SOLUTION INTRAVENOUS at 21:50

## 2020-04-15 RX ADMIN — SODIUM CHLORIDE: 9 INJECTION, SOLUTION INTRAVENOUS at 09:20

## 2020-04-15 RX ADMIN — DIGOXIN 250 MCG: 0.25 INJECTION INTRAMUSCULAR; INTRAVENOUS at 18:03

## 2020-04-15 RX ADMIN — POTASSIUM CHLORIDE 40 MEQ: 750 TABLET, EXTENDED RELEASE ORAL at 12:40

## 2020-04-15 RX ADMIN — MAGNESIUM SULFATE 2 G: 2 INJECTION INTRAVENOUS at 12:35

## 2020-04-15 RX ADMIN — POTASSIUM CHLORIDE 10 MEQ: 7.46 INJECTION, SOLUTION INTRAVENOUS at 12:52

## 2020-04-15 RX ADMIN — Medication 10 ML: at 21:54

## 2020-04-15 RX ADMIN — CEFEPIME 2 G: 2 INJECTION, POWDER, FOR SOLUTION INTRAMUSCULAR; INTRAVENOUS at 09:45

## 2020-04-15 RX ADMIN — POTASSIUM PHOSPHATE, MONOBASIC AND POTASSIUM PHOSPHATE, DIBASIC 15 MMOL: 224; 236 INJECTION, SOLUTION INTRAVENOUS at 12:55

## 2020-04-15 RX ADMIN — DILTIAZEM HYDROCHLORIDE 240 MG: 240 CAPSULE, COATED, EXTENDED RELEASE ORAL at 09:19

## 2020-04-15 RX ADMIN — POTASSIUM CHLORIDE 10 MEQ: 7.46 INJECTION, SOLUTION INTRAVENOUS at 13:48

## 2020-04-15 RX ADMIN — DIGOXIN 125 MCG: 0.25 INJECTION INTRAMUSCULAR; INTRAVENOUS at 06:25

## 2020-04-15 RX ADMIN — ENOXAPARIN SODIUM 40 MG: 40 INJECTION SUBCUTANEOUS at 09:21

## 2020-04-15 RX ADMIN — MICONAZOLE NITRATE: 20 POWDER TOPICAL at 21:53

## 2020-04-15 RX ADMIN — MUPIROCIN: 20 OINTMENT TOPICAL at 12:12

## 2020-04-15 RX ADMIN — CEFEPIME 2 G: 2 INJECTION, POWDER, FOR SOLUTION INTRAVENOUS at 21:49

## 2020-04-15 RX ADMIN — SODIUM CHLORIDE 1000 ML: 9 INJECTION, SOLUTION INTRAVENOUS at 06:28

## 2020-04-15 RX ADMIN — MICONAZOLE NITRATE: 20 POWDER TOPICAL at 09:23

## 2020-04-15 RX ADMIN — INSULIN GLARGINE 40 UNITS: 100 INJECTION, SOLUTION SUBCUTANEOUS at 09:46

## 2020-04-15 RX ADMIN — MICONAZOLE NITRATE: 20 POWDER TOPICAL at 02:27

## 2020-04-15 RX ADMIN — GABAPENTIN 300 MG: 300 CAPSULE ORAL at 21:49

## 2020-04-15 ASSESSMENT — PAIN SCALES - GENERAL
PAINLEVEL_OUTOF10: 0

## 2020-04-15 NOTE — CONSULTS
[facility]  [phone]        Reason for Consultation/Chief Complaint: \"I have shoulder pain. \"     Consulted for AFIB with RVR    History of Present Illness:  Kristy Smith is a 68 y.o. patient who presented to [facility] 4/14/20 with mental status changes and hypotension. Formerly saw cards Dr. Di Reyez 11/21/16. Currently resides at [facility]. She has PMH AFIB reportedly on xarelto (not on AC per med list from SNF?), diastolic CHF, HTN, COPD, DM, schizoaffective d/o, and Alzheimer's dementia. Most recent ECHO 09/13/16 showed EF 55%; mild MR; moderate LAE; mild LVH; impaired LV relaxation. Most recent Lexiscan stress test 04/18/16 with no myocardial ischemia or myocardial infarction; LVEF slightly below normal limits, calculated at 48%   Now presents with reported mental status change and hypotension from [facility]. She reports left shoulder pain, mild SOB, and vague intermittent chest pain but cannot characterize. She answers questions but not certain she understands totally. Admit EKG revealed afib with RVR 130bpm; right axis; ST change anterolateral leads consider ischemia (compared to 3/24/20 EKG ST change improved mildly). Note K+ 2.7; WBC=13.7; BNP=1,152; Palomo 0.04, 0.02 x 2. Note hypotensive in 60-54OWNL systolic range and +UTI with severe sepsis presumed. Received IV fluid boluses and IV abx. Given IV digoxin 0.125mg and 0.375mg. Patient with no c/o palpitations, dizziness, edema, or orthopnea/PND. have been asked to provide consultation regarding further management and testing.       Past Medical History:   has a past medical history of Acute diastolic heart failure ([address] Acute respiratory failure ([address] Adjustment disorder with mixed anxiety and depressed mood, Allergic rhinitis, Alzheimer's dementia ([address] Atrial fibrillation ([address] Back pain, CHF (congestive heart failure) ([address] Chronic pain, Constipation, COPD (chronic obstructive pulmonary disease) ([address] Depression, Dermatitis, tablet Take 40 mg by mouth daily    Yes Historical Provider, MD   loratadine (CLARITIN) 10 MG tablet Take 10 mg by mouth daily   Yes Historical Provider, MD   citalopram (CELEXA) 10 MG tablet Take 20 mg by mouth daily    Yes Historical Provider, MD   insulin glargine (LANTUS SOLOSTAR) 100 UNIT/ML injection pen Inject 40 Units into the skin every morning   Yes Historical Provider, MD   diltiazem (CARDIZEM CD) 240 MG extended release capsule Take 1 capsule by mouth daily 11/14/16 4/14/20 Yes Kuldeep Canales MD   Blood Glucose Monitoring Suppl (BLOOD GLUCOSE SYSTEM RICHARD) KIT Check fingerstick blood sugars before meals and at bedtime 3/21/16  Yes Yanni Daly MD   INSULIN SYRINGE .5CC/29G (Kary Seals INS SYR .5CC/29G) 29G X 1/2\" 0.5 ML MISC 1 each by Does not apply route daily 3/21/16  Yes Yanni Daly MD   potassium chloride (KLOR-CON M) 20 MEQ extended release tablet Take 1 tablet by mouth daily for 3 days 3/24/20 3/27/20  Chary Beyer MD   B Complex-C-E-Zn (ZINC-VITES) TABS Take 1 tablet by mouth daily    Historical Provider, MD   Hypromellose (NATURES TEARS OP) Apply 1 drop to eye 2 times daily as needed Both eyes every    Historical Provider, MD   solifenacin (VESICARE) 5 MG tablet Take 5 mg by mouth daily    Historical Provider, MD        Allergies:  Fish-derived products; Iodine; Mushroom extract complex; Onion; and Other     Review of Systems:   All 14 point review of symptoms completed. Pertinent positives identified in the HPI, all other review of symptoms negative as below.       Physical Examination:    Vitals:    04/15/20 1100   BP: 95/68   Pulse: 135   Resp: 22   Temp:    SpO2: 94%    Weight: 231 lb 4.2 oz (104.9 kg)         General Appearance:  Alert, cooperative, no distress, appears stated age   Head:  Normocephalic, without obvious abnormality, atraumatic   Eyes:  PERRL, conjunctiva/corneas clear       Nose: Nares normal, no drainage or sinus tenderness   Throat: Lips, mucosa, and tongue normal   Neck: Supple, symmetrical, trachea midline, no adenopathy, thyroid: not enlarged, symmetric, no tenderness/mass/nodules, no carotid bruit or JVD       Lungs:   Soft, clear breath sounds   Chest Wall:  No tenderness or deformity   Heart:  Regular rate and rhythm, S1, S2 normal, no murmur, rub or gallop   Abdomen:   Soft, non-tender, bowel sounds active all four quadrants,  no masses, no organomegaly           Extremities: Extremities normal, atraumatic, no cyanosis or edema   Pulses: 2+ and symmetric   Skin: Skin color, texture, turgor normal, no rashes or lesions   Pysch: Normal mood and affect   Neurologic: Normal gross motor and sensory exam.         Labs  CBC:   Lab Results   Component Value Date    WBC 11.5 04/15/2020    RBC 4.73 04/15/2020    HGB 13.6 04/15/2020    HCT 41.9 04/15/2020    MCV 88.6 04/15/2020    RDW 16.3 04/15/2020     04/15/2020     CMP:    Lab Results   Component Value Date     04/15/2020    K 3.1 04/15/2020    K 3.1 04/15/2020    CL 89 04/15/2020    CO2 27 04/15/2020    BUN 10 04/15/2020    CREATININE <0.5 04/15/2020    GFRAA >60 04/15/2020    GFRAA >60 2013    AGRATIO 0.7 04/15/2020    LABGLOM >60 04/15/2020    GLUCOSE 103 04/15/2020    PROT 5.9 04/15/2020    PROT 7.3 2012    CALCIUM 8.3 04/15/2020    BILITOT 0.7 04/15/2020    ALKPHOS 58 04/15/2020    AST 18 04/15/2020    ALT 11 04/15/2020     PT/INR:  No results found for: PTINR  Lab Results   Component Value Date    CKTOTAL 57 2020    TROPONINI 0.02 (H) 04/15/2020       EK20  I have reviewed EKG with the following interpretation:  Impression:  See HPI Atrial fibrillation with rapid ventricular responseBaseline artifactRightward axisST abnormality anterolateral leads consider ischemia or digitalis effectAbnormal ECGNo previous ECGs availableConfirmed by DEXTER MERRILL MD ([id number]) on 2020 12:08:41 PM    CXR 20  Borderline cardiomegaly and mild central vascular congestion.   Left

## 2020-04-15 NOTE — CARE COORDINATION
INTERDISCIPLINARY PLAN OF CARE CONFERENCE    Date/Time: 4/15/2020 3:21 PM  Completed by: Dani Oliveira, Case Management      Patient Name:  Mikey Salmeron  YOB: 1950  Admitting Diagnosis: Sepsis [facility]) [A41.9]     Admit Date/Time:  4/14/2020 10:35 AM    Chart reviewed. Interdisciplinary team met to discuss patient progress and discharge plans. Disciplines included Case Management, Nursing, and Dietitian. Current Status: ICU, stable     PT/OT recommendation: n/a at this time    Anticipated Discharge Date: TBD  Expected D/C Disposition:  Nursing Home, back to [facility]  Confirmed plan with patient and/or family Yes, previous CM spoke to daughter. Discharge Plan Comments: return to [address] The Plan for Transition of Care is related to the following treatment goals: LTC @ [facility]. This RN CM confirmed this plan with Pt's Daughter, Howie Baldwin via telephone.      Home O2 in place on admit: No  Pt informed of need to bring portable home O2 tank on day of discharge for nursing to connect prior to leaving:  Not Indicated  Verbalized agreement/Understanding:  Not Indicated

## 2020-04-15 NOTE — PROGRESS NOTES
Shift report given to Kristina Bajwa and Erick Tubbs. Patient care handed off in stable condition at this time.  Enoch Moses RN

## 2020-04-15 NOTE — PROGRESS NOTES
Speech Language Pathology  Facility/Department: [facility] ICU   CLINICAL BEDSIDE SWALLOW EVALUATION    NAME: Francisco Wilcox  : 1950  MRN: [id number]    Recommended Diet and Intervention  Diet Solids Recommendation: Dysphagia Soft and Bite-Sized (Dysphagia III)  Liquid Consistency Recommendation: Thin  Recommended Form of Meds: Whole with puree  *Check oral cavity for pocketing, both sides and palatal region    ADMISSION DATE: 2020  ADMITTING DIAGNOSIS: has Osteomyelitis of spine ([address] Diskitis; Seizure disorder, grand mal ([address] Leukocytosis; DM (diabetes mellitus) ([address] Lumbar facet arthropathy; Disc degeneration, lumbar; Thoracic back pain; Neck pain; Obesity; Hypokalemia; Atrial fibrillation with RVR ([address] Essential hypertension; Chronic obstructive pulmonary disease ([address] Shortness of breath; Precordial pain; Morbid obesity ([address] Acute diastolic congestive heart failure ([address] Acute respiratory failure with hypoxia ([address] Persistent atrial fibrillation; Acute cystitis without hematuria; Acute pain of left shoulder; Hypomagnesemia; Chronic pain of both knees; Bilateral primary osteoarthritis of knee; Sepsis ([address] Severe sepsis ([address] Urinary tract infection without hematuria; Abnormal CXR;  Hypoxia; Elevated troponin; and Hypotension on their problem list.  ONSET DATE: 20    Recent Chest Xray/CT of Chest: 20  FINDINGS:   Cardiac silhouette is mildly enlarged.  Central vascular congestion and   suspected left basilar opacity versus pleural effusion.  Evaluation is   limited by patient positioning.  Osseous structures and soft tissues are   grossly intact.           Impression   Borderline cardiomegaly and mild central vascular congestion.  Left basilar   opacity.       Examination is limited by patient positioning.  Repeat study suggested when   clinically feasible.             Date of Eval: 4/15/2020  Evaluating Therapist: Marisol Moore    Current Diet level:  Current Diet : Cough: Weak  Prior Dysphagia History: No hx of dysphagia serivces. Admitted from SNF on regular/thin liquids. Consistencies Administered: Reg solid; Dysphagia Soft and Bite-Sized (Dysphagia III); Dysphagia Pureed (Dysphagia I); Thin - straw           Vision/Hearing  Hearing  Hearing: Within functional limits    Oral Motor Deficits  Oral/Motor  Oral Motor: Exceptions to WFL(poss reduced d/t lack of participation/flat affect)  Labial ROM: Reduced left; Reduced right  Labial Strength: Reduced  Labial Coordination: Reduced  Lingual ROM: Reduced left; Reduced right  Lingual Strength: Reduced  Lingual Coordination: Reduced    Oral Phase Dysfunction  Oral Phase  Oral Phase: Exceptions  Oral Phase Dysfunction  Impaired Mastication: Reg Solid  Suspected Premature Bolus Loss: All(d/t unchangable positioning)  Lingual/Palatal Residue: Reg solid(Significant, mod palatal residue )  Oral Phase  Oral Phase - Comment: Patient oral stage limited by poor posture and moderate oral residue on palatal surface with regular braden cracker. During visual of oral cavity, residue began to fall posteriorly and required two liquid wash to clear completely. Indicators of Pharyngeal Phase Dysfunction   Pharyngeal Phase  Pharyngeal Phase: WNL  Pharyngeal Phase   Pharyngeal: Grossly functional pharyngeal swallow. Still impacted by posture. Timing 1-2 seconds. no overt s/s of penetration or aspiration this date with thin liquids via straw consecutive and singel sips.      Prognosis  Prognosis  Prognosis for safe diet advancement: good  Barriers to reach goals: other (comment)(Posture)  Individuals consulted  Consulted and agree with results and recommendations: Patient;RN    Education  Patient Education: Role of SLP, results of assessment, diet recommendations, compensatory strategies listed above, and poc  Patient Education Response: Verbalizes understanding;Needs reinforcement  Safety Devices in place: Yes  Type of devices: Bed alarm in place; Left in bed;Call light within reach;Nurse notified       Therapy Time  SLP Individual Minutes  Time In: 9825  Time Out: [address]  Minutes: Farida CALDWELL  CCC-SLP  Speech-Language Pathologist [id number]      SOLOMON Sheikh  4/15/2020 2:52 PM

## 2020-04-15 NOTE — PROGRESS NOTES
Patient taken out of precautions for a negative resulting COVID test.  Afternoon assessment complete, afebrile, patient's current BP 85/52. Replacement magnesium and potassium running in IV, patient denies pain.  Leonardo Muse RN

## 2020-04-15 NOTE — PROGRESS NOTES
Shift report given to Edith at bedside. Patient care handed off in stable condition at this time.  Eboni Gallego RN

## 2020-04-15 NOTE — PROGRESS NOTES
Pulmonary & Critical Care Medicine ICU Progress Note    CC: Hypotension    Events of Last 24 hours:    cc/hr   5LPM O2   Afib in 130s  Receiving IV boluses     Invasive Lines: IV: PIVs         / / /   No results for input(s): PHART, ZTJ1QOP, PO2ART in the last 72 hours. IV:   sodium chloride 125 mL/hr at 20       Vitals:  Blood pressure 97/60, pulse 141, temperature 98.8 °F (37.1 °C), temperature source Axillary, resp. rate 21, height 5' 7\" (1.702 m), weight 231 lb 4.2 oz (104.9 kg), SpO2 95 %. on 5 LPM   Temp  Av.7 °F (37.1 °C)  Min: 98.3 °F (36.8 °C)  Max: 98.9 °F (37.2 °C)    Intake/Output Summary (Last 24 hours) at 4/15/2020 0748  Last data filed at 4/15/2020 6635  Gross per 24 hour   Intake 2346 ml   Output 1225 ml   Net 1121 ml     EXAM:  Gen: + Distress. Eyes: PERRL. No sclera icterus. No conjunctival injection. ENT: No discharge. Pharynx clear. Neck: Trachea midline. No obvious mass. Resp: No accessory muscle use. Minimal crackles. No wheezes. No rhonchi. No dullness on percussion. CV: Tachycardia. irregular rhythm. No murmur or rub. No edema. Peripheral pulses are 2+. Capillary refill is less than 3 seconds. GI: Non-tender. Non-distended. No hernia. Skin: Warm and dry. No nodule on exposed extremities. Lymph: No cervical LAD. No supraclavicular LAD. M/S: No cyanosis. No joint deformity. No clubbing. Neuro: Awake. Alert. Followed commands  Psych: AAO. No anxiety.      Scheduled Meds:   miconazole   Topical BID    dilTIAZem  240 mg Oral Daily    gabapentin  300 mg Oral Nightly    insulin glargine  40 Units Subcutaneous QAM    sodium chloride flush  10 mL Intravenous 2 times per day    enoxaparin  40 mg Subcutaneous Daily    cefepime  2 g Intravenous Q12H    vancomycin  1,000 mg Intravenous Q12H     PRN Meds:  sodium chloride flush, acetaminophen **OR** acetaminophen, polyethylene glycol, promethazine **OR** ondansetron, sodium

## 2020-04-15 NOTE — PLAN OF CARE
Problem: Falls - Risk of:  Goal: Will remain free from falls  Description: Will remain free from falls  Outcome: Ongoing  Goal: Absence of physical injury  Description: Absence of physical injury  Outcome: Ongoing     Problem: Urinary Elimination:  Goal: Signs and symptoms of infection will decrease  Description: Signs and symptoms of infection will decrease  Outcome: Ongoing  Goal: Complications related to the disease process, condition or treatment will be avoided or minimized  Description: Complications related to the disease process, condition or treatment will be avoided or minimized  Outcome: Ongoing     Problem: OXYGENATION/RESPIRATORY FUNCTION  Goal: Patient will achieve/maintain normal respiratory rate/effort  Description: Respiratory rate and effort will be within normal limits for the patient  Outcome: Ongoing     Problem: Safety:  Goal: Free from accidental physical injury  Description: Free from accidental physical injury  Outcome: Ongoing  Goal: Free from intentional harm  Description: Free from intentional harm  Outcome: Ongoing     Problem: Daily Care:  Goal: Daily care needs are met  Description: Daily care needs are met  Outcome: Ongoing     Problem: Skin Integrity:  Goal: Skin integrity will stabilize  Description: Skin integrity will stabilize  Outcome: Ongoing

## 2020-04-16 LAB
ANION GAP SERPL CALCULATED.3IONS-SCNC: 11 MMOL/L (ref 3–16)
BASOPHILS ABSOLUTE: 0.1 K/UL (ref 0–0.2)
BASOPHILS RELATIVE PERCENT: 0.6 %
BUN BLDV-MCNC: 5 MG/DL (ref 7–20)
CALCIUM SERPL-MCNC: 8.4 MG/DL (ref 8.3–10.6)
CHLORIDE BLD-SCNC: 95 MMOL/L (ref 99–110)
CO2: 24 MMOL/L (ref 21–32)
CREAT SERPL-MCNC: <0.5 MG/DL (ref 0.6–1.2)
EOSINOPHILS ABSOLUTE: 0.1 K/UL (ref 0–0.6)
EOSINOPHILS RELATIVE PERCENT: 0.8 %
GFR AFRICAN AMERICAN: >60
GFR NON-AFRICAN AMERICAN: >60
GLUCOSE BLD-MCNC: 105 MG/DL (ref 70–99)
GLUCOSE BLD-MCNC: 115 MG/DL (ref 70–99)
GLUCOSE BLD-MCNC: 138 MG/DL (ref 70–99)
GLUCOSE BLD-MCNC: 95 MG/DL (ref 70–99)
GLUCOSE BLD-MCNC: 98 MG/DL (ref 70–99)
HCT VFR BLD CALC: 40.8 % (ref 36–48)
HEMOGLOBIN: 13.1 G/DL (ref 12–16)
LV EF: 55 %
LVEF MODALITY: NORMAL
LYMPHOCYTES ABSOLUTE: 1.3 K/UL (ref 1–5.1)
LYMPHOCYTES RELATIVE PERCENT: 11.7 %
MAGNESIUM: 1.9 MG/DL (ref 1.8–2.4)
MCH RBC QN AUTO: 28.7 PG (ref 26–34)
MCHC RBC AUTO-ENTMCNC: 32.2 G/DL (ref 31–36)
MCV RBC AUTO: 89.1 FL (ref 80–100)
MONOCYTES ABSOLUTE: 1 K/UL (ref 0–1.3)
MONOCYTES RELATIVE PERCENT: 9.3 %
NEUTROPHILS ABSOLUTE: 8.6 K/UL (ref 1.7–7.7)
NEUTROPHILS RELATIVE PERCENT: 77.6 %
PDW BLD-RTO: 17 % (ref 12.4–15.4)
PERFORMED ON: ABNORMAL
PERFORMED ON: ABNORMAL
PERFORMED ON: NORMAL
PERFORMED ON: NORMAL
PHOSPHORUS: 2 MG/DL (ref 2.5–4.9)
PLATELET # BLD: 252 K/UL (ref 135–450)
PMV BLD AUTO: 8.9 FL (ref 5–10.5)
POTASSIUM SERPL-SCNC: 4 MMOL/L (ref 3.5–5.1)
RBC # BLD: 4.57 M/UL (ref 4–5.2)
S PYO THROAT QL CULT: NORMAL
SODIUM BLD-SCNC: 130 MMOL/L (ref 136–145)
WBC # BLD: 11 K/UL (ref 4–11)

## 2020-04-16 PROCEDURE — 80048 BASIC METABOLIC PNL TOTAL CA: CPT

## 2020-04-16 PROCEDURE — 99291 CRITICAL CARE FIRST HOUR: CPT | Performed by: INTERNAL MEDICINE

## 2020-04-16 PROCEDURE — 2500000003 HC RX 250 WO HCPCS: Performed by: INTERNAL MEDICINE

## 2020-04-16 PROCEDURE — 85025 COMPLETE CBC W/AUTO DIFF WBC: CPT

## 2020-04-16 PROCEDURE — 6360000002 HC RX W HCPCS: Performed by: NURSE PRACTITIONER

## 2020-04-16 PROCEDURE — 6360000002 HC RX W HCPCS: Performed by: INTERNAL MEDICINE

## 2020-04-16 PROCEDURE — 94761 N-INVAS EAR/PLS OXIMETRY MLT: CPT

## 2020-04-16 PROCEDURE — 6370000000 HC RX 637 (ALT 250 FOR IP): Performed by: INTERNAL MEDICINE

## 2020-04-16 PROCEDURE — 99232 SBSQ HOSP IP/OBS MODERATE 35: CPT | Performed by: INTERNAL MEDICINE

## 2020-04-16 PROCEDURE — 99232 SBSQ HOSP IP/OBS MODERATE 35: CPT | Performed by: NURSE PRACTITIONER

## 2020-04-16 PROCEDURE — 36415 COLL VENOUS BLD VENIPUNCTURE: CPT

## 2020-04-16 PROCEDURE — 2580000003 HC RX 258: Performed by: NURSE PRACTITIONER

## 2020-04-16 PROCEDURE — 6370000000 HC RX 637 (ALT 250 FOR IP): Performed by: NURSE PRACTITIONER

## 2020-04-16 PROCEDURE — 84100 ASSAY OF PHOSPHORUS: CPT

## 2020-04-16 PROCEDURE — 2580000003 HC RX 258: Performed by: INTERNAL MEDICINE

## 2020-04-16 PROCEDURE — 83735 ASSAY OF MAGNESIUM: CPT

## 2020-04-16 PROCEDURE — 2060000000 HC ICU INTERMEDIATE R&B

## 2020-04-16 PROCEDURE — 2700000000 HC OXYGEN THERAPY PER DAY

## 2020-04-16 PROCEDURE — 93306 TTE W/DOPPLER COMPLETE: CPT

## 2020-04-16 RX ORDER — DIGOXIN 125 MCG
125 TABLET ORAL DAILY
Status: DISCONTINUED | OUTPATIENT
Start: 2020-04-16 | End: 2020-04-18 | Stop reason: HOSPADM

## 2020-04-16 RX ADMIN — ENOXAPARIN SODIUM 40 MG: 40 INJECTION SUBCUTANEOUS at 08:30

## 2020-04-16 RX ADMIN — DILTIAZEM HYDROCHLORIDE 240 MG: 240 CAPSULE, COATED, EXTENDED RELEASE ORAL at 08:30

## 2020-04-16 RX ADMIN — CEFEPIME 2 G: 2 INJECTION, POWDER, FOR SOLUTION INTRAVENOUS at 21:20

## 2020-04-16 RX ADMIN — GABAPENTIN 300 MG: 300 CAPSULE ORAL at 21:21

## 2020-04-16 RX ADMIN — DIGOXIN 250 MCG: 0.25 INJECTION INTRAMUSCULAR; INTRAVENOUS at 00:30

## 2020-04-16 RX ADMIN — ENOXAPARIN SODIUM 60 MG: 60 INJECTION SUBCUTANEOUS at 10:25

## 2020-04-16 RX ADMIN — MUPIROCIN: 20 OINTMENT TOPICAL at 08:53

## 2020-04-16 RX ADMIN — DIGOXIN 125 MCG: 125 TABLET ORAL at 10:25

## 2020-04-16 RX ADMIN — MUPIROCIN: 20 OINTMENT TOPICAL at 21:21

## 2020-04-16 RX ADMIN — SODIUM CHLORIDE: 9 INJECTION, SOLUTION INTRAVENOUS at 08:35

## 2020-04-16 RX ADMIN — MICONAZOLE NITRATE: 20 POWDER TOPICAL at 08:53

## 2020-04-16 RX ADMIN — Medication 10 ML: at 08:32

## 2020-04-16 RX ADMIN — ACETAMINOPHEN 650 MG: 325 TABLET ORAL at 10:26

## 2020-04-16 RX ADMIN — INSULIN GLARGINE 40 UNITS: 100 INJECTION, SOLUTION SUBCUTANEOUS at 08:31

## 2020-04-16 RX ADMIN — CEFEPIME 2 G: 2 INJECTION, POWDER, FOR SOLUTION INTRAVENOUS at 08:30

## 2020-04-16 RX ADMIN — SODIUM PHOSPHATE, MONOBASIC, MONOHYDRATE 15 MMOL: 276; 142 INJECTION, SOLUTION INTRAVENOUS at 12:25

## 2020-04-16 RX ADMIN — ENOXAPARIN SODIUM 100 MG: 100 INJECTION SUBCUTANEOUS at 21:21

## 2020-04-16 RX ADMIN — MICONAZOLE NITRATE: 20 POWDER TOPICAL at 21:21

## 2020-04-16 ASSESSMENT — PAIN SCALES - GENERAL
PAINLEVEL_OUTOF10: 0
PAINLEVEL_OUTOF10: 7

## 2020-04-16 NOTE — PROGRESS NOTES
4 Eyes Skin Assessment     The patient is being assess for   Shift Handoff    I agree that 2 RN's have performed a thorough Head to Toe Skin Assessment on the patient. ALL assessment sites listed below have been assessed. Areas assessed by both nurses:   [x]   Head, Face, and Ears   [x]   Shoulders, Back, and Chest, Abdomen  [x]   Arms, Elbows, and Hands   [x]   Coccyx, Sacrum, and Ischium  [x]   Legs, Feet, and Heels        DTI to Right outer heel, right buttocks, right ear. Stage II to coccyx and left hip. Abrasion to LLE. MASD to abdominal folds and groin. **SHARE this note so that the co-signing nurse is able to place an eSignature**    Co-signer eSignature: Electronically signed by Deena Alexandra RN on 4/16/20 at 7:23 PM EDT    Does the Patient have Skin Breakdown?   Yes LDA WOUND CARE was Initiated documentation include the Kassandra-wound, Wound Assessment, Measurements, Dressing Treatment, Drainage, and Color\",          Jarod Prevention initiated:  Yes   Wound Care Orders initiated:  Yes      [address]  nurse consulted for Pressure Injury (Stage 3,4, Unstageable, DTI, NWPT, Complex wounds)and New or Established Ostomies:  Yes      Primary Nurse eSignature: Electronically signed by Laurie Jacinto RN on 4/16/20 at 6:47 PM EDT

## 2020-04-16 NOTE — PROGRESS NOTES
Progress Note    Admit Date:  4/14/2020     Admitted with hypoxia, pneumonia and sepsis. COVID negative. Subjective:  Ms. Holden Martinez is alert and responding well today. BP improved. Has not needed pressors. Still hypoxic on supplemental oxygen 3 L. No fevers or chills. Objective:   Patient Vitals for the past 4 hrs:   BP Temp Temp src Pulse Resp SpO2   04/16/20 1400 104/64 -- -- 83 16 97 %   04/16/20 1300 (!) 99/58 -- -- 80 16 97 %   04/16/20 1202 102/66 97.7 °F (36.5 °C) Oral 81 16 94 %   04/16/20 1100 109/64 -- -- 90 16 96 %          Intake/Output Summary (Last 24 hours) at 4/16/2020 1426  Last data filed at 4/16/2020 1030  Gross per 24 hour   Intake 3575 ml   Output 1300 ml   Net 2275 ml       Physical Exam:  General:  Awake, alert, NAD. Well-oriented today. Obese  Skin:  Warm and dry  Neck:  JVD absent. Neck supple  Chest:  Clear to auscultation, respiration easy. No wheezes, rales or rhonchi. Cardiovascular:  RRR, S1S2 normal  Abdomen:  Soft, non tender, non distended, BS +  Extremities:  No edema. Intact peripheral pulses. Brisk cap refill, < 2 secs  Neuro: non focal    Data:  CBC:   Recent Labs     04/14/20  1100 04/15/20  0532 04/16/20  0513   WBC 13.7* 11.5* 11.0   HGB 14.7 13.6 13.1   HCT 44.6 41.9 40.8   MCV 87.6 88.6 89.1    239 252     BMP:   Recent Labs     04/14/20  1845 04/15/20  0404 04/16/20  0513   * 129* 130*   K 3.5 3.1*  3.1* 4.0   CL 86* 89* 95*   CO2 31 27 24   PHOS  --  2.3* 2.0*   BUN 14 10 5*   CREATININE <0.5* <0.5* <0.5*     LIVER PROFILE:   Recent Labs     04/14/20  1100 04/15/20  0404   AST 20 18   ALT 10 11   BILITOT 0.8 0.7   ALKPHOS 64 58     PT/INR: No results for input(s): PROTIME, INR in the last 72 hours. CULTURES  Covid- negative  Blood- NGTD  Urine- Proteus mirabilis, Klebsiella  Strep- pending  Rapid strep negative    RADIOLOGY  XR CHEST PORTABLE   Final Result   Borderline cardiomegaly and mild central vascular congestion.   Left basilar opacity. Examination is limited by patient positioning. Repeat study suggested when   clinically feasible. Pertinent previous results reviewed   Echo 9/13/16   Conclusions   Summary   Technical difficult study due to body habitus. Atrial fibrillations with   controlled ventricular response is present.   Normal left ventricle size with mild concentric left ventricular   hypertrophy.   Normal systolic function with EF 55%. No regional wall motion abnormality   present.   Compared to previous study from 1- ejection fraction remains   approximately the same.   Impaired left ventricular relaxation.   Mitral valve is structurally normal.   Mild mitral regurgitation or stenosis.   Moderately dilated left atrium with increased left atrial volume.   Individual aortic valve leaflets are not clearly visualized,but appears to   be grossly normal in structure.   No evidence of aortic valve stenosis or regurgitation.     Echo 4/16/2020  pending      Assessment/Plan:  Sepsis (POA-- tachycardia, hypotension, leukocytosis, source)   -suspect 2/2 UTI/PNA  - BP consistently low, IVF given in the ED  - Continue  IV ABx - > Vancomycin and cefepime D#3  - Blood Cx NGTD, Urine cx pending, Resp Cx pending   -Continue IV fluids, no need for pressors     Acute hypoxic respiratory failure   - Likely secondary to pneumonia  - Could NOT RULE OUT COVID-19 with cough, SOB and CXR changes, also resides in SNF -> COVID Testing is negative  - Droplet + precautions discontinued  - Wean supplemental O2 as tolerated   - Pulmonology consulted   - 3 L O2.      UTI  -UA with large leukocyte esterase, greater than 100 white blood cells  - Hx of ESBL, most recent admission in Jan 2020 with Pseudomonas and d/c'd on Cipro   -Patient altered mental status as well  -Continue Cefepime D#3 (last urine cx was sensitive to cefepime)   -Urine culture- Klebsiella, proteus     HCAP   - CXR with LLL opacity   - Concerning for gram negative organism and possible MRSA  - PCT pending , + leukocytosis   - Hypoxia on arrival, requiring supplemental O2   - Continue Cefepime/Vanc D#3  - Pulmonology consult   - SLP evaluation- diet per recommendations.      Hypokalemia  -Potassium on admission 2.7  -Sliding-scale replacement  -Continue to monitor  -4 today     Hypomagnesemia   - repleted    Hypophosphatemia  - replaced. Monitor.      Atrial Fibrillation with RVR   - HRs up to the 130's in the ER   - Given IV diltizem, but BP dropped   - HR still elevated on admission   - Continue Xarelto, Diltiazem; IV Digoxin every 6 hours for rate control.   - Replace electrolytes   - seen by cardiology  - Echo ordered. - cardiology recommends full dose Lovenox.      Elevated Troponin   - No prior hx of elevated troponin   - in the setting of sepsis with hypotension and atrial fibrillation with RVR   - EKG with T wave changes in anterolateral leads   - Continue to trend troponin, continue tele monitor      Possible acute on chronic dCHF   - Last echo with EF 55%, last Echo in Sept 2016    - Now with CXR showing vascular congestion   - BNP lower than baseline   - Hypotensive, no diuretics      Dementia   - Continue supportive care      Hypotension   Hx of HTN   - Hold all home HTN medications  - Suspect hypotension 2/2 sepsis   - Monitor to resume home medications when appropriate      DM2  - Continue home Lantus and SSI   - Carb control diet      Morbid Obesity  - Body mass index is 45.19 kg/m². - Complicating assessment and treatment. Placing patient at risk for multiple co-morbidities as well as early death and contributing to the patient's presentation.   - Counseled on weight loss.     DVT Prophylaxis: Lovenox  Diet: DIET CARB CONTROL;  Dysphagia Soft and Bite-Sized  Code Status: Full Code    Dispo: PCU    Plan of care discussed with Dr. Leana Rodriguez FNP-C  4/16/2020

## 2020-04-16 NOTE — PLAN OF CARE
Problem: Falls - Risk of:  Goal: Will remain free from falls  Description: Will remain free from falls  Outcome: Ongoing  Goal: Absence of physical injury  Description: Absence of physical injury  Outcome: Ongoing     Problem: Urinary Elimination:  Goal: Signs and symptoms of infection will decrease  Description: Signs and symptoms of infection will decrease  4/15/2020 2356 by Michel Pretty RN  Outcome: Ongoing  4/15/2020 1627 by Gasper Rod RN  Outcome: Ongoing  Goal: Complications related to the disease process, condition or treatment will be avoided or minimized  Description: Complications related to the disease process, condition or treatment will be avoided or minimized  Outcome: Ongoing     Problem: OXYGENATION/RESPIRATORY FUNCTION  Goal: Patient will achieve/maintain normal respiratory rate/effort  Description: Respiratory rate and effort will be within normal limits for the patient  Outcome: Ongoing     Problem: Safety:  Goal: Free from accidental physical injury  Description: Free from accidental physical injury  Outcome: Ongoing  Goal: Free from intentional harm  Description: Free from intentional harm  Outcome: Ongoing     Problem: Daily Care:  Goal: Daily care needs are met  Description: Daily care needs are met  Outcome: Ongoing     Problem: Skin Integrity:  Goal: Skin integrity will stabilize  Description: Skin integrity will stabilize  Outcome: Ongoing     Problem: Nutrition  Intervention: Swallowing evaluation  4/15/2020 1501 by SOLOMNO Smith  Note: SLP evaluation completed. All further notes may be found in EMR. Betty CALDWELL [address] [id number]    Intervention: Aspiration precautions  4/15/2020 1501 by SOLOMON Smith  Note: SLP evaluation completed. All further notes may be found in EMR. Betty RODRIGUES.  [id number] [facility]  Speech-Language Pathologist [id number]

## 2020-04-16 NOTE — PROGRESS NOTES
Alzheimer's dementia. Most recent ECHO 09/13/16 showed EF 55%; mild MR; moderate LAE; mild LVH; impaired LV relaxation. Most recent Lexiscan stress test 04/18/16 with no myocardial ischemia or myocardial infarction; LVEF slightly below normal limits, calculated at 48%              Now presents with reported mental status change and hypotension from [facility]. She reports left shoulder pain, mild SOB, and vague intermittent chest pain but cannot characterize. She answers questions but not certain she understands totally. Admit EKG revealed afib with RVR 130bpm; right axis; ST change anterolateral leads consider ischemia (compared to 3/24/20 EKG ST change improved mildly). Note K+ 2.7; WBC=13.7; BNP=1,152; Palomo 0.04, 0.02 x 2; UA with large LE and >100 WBC. Note hypotensive in 40-42QPNN systolic range and +UTI with severe sepsis presumed. Received IV fluid boluses and IV abx. Given IV digoxin 0.125mg and 0.375mg. Diagnosis of atrial fibrillation with RVR, elevated Palomo, and hypotension in elderly female with UTI and severe sepsis and hx chronic atrial fibrillation and diastolic CHF.     Recs:  1. Likely hypotension, tachycardia, and UTI have caused increased myocardial oxygen demand causing mildly elevated troponin. I have low suspicion for ACS. 2. Loaded with IV digoxin on 4/15/20. Continue digoxin 0.125mg po daily. 3. D/C diltiazem CD 240mg given hypotension issues. BP improved today  5. Tele currently 90's bpm and improved. 7. Abx azithromycin and cefepime per ICU team.  8. ECHO pending today  9. Increase lovenox 1mg/kg SQ BID for AC. ER note reports she takes xarelto but per nursing the [facility] med list did not include it. I do not see obvious contraindication to [facility] and CHADs-vasc=5.        Patient Active Problem List   Diagnosis    Osteomyelitis of spine ([address]    Diskitis    [facility]ure disorder, grand mal ([facility]    Leukocytosis    DM (diabetes mellitus) ([address]    Lumbar facet arthropathy    Disc degeneration, lumbar    Thoracic back pain    Neck pain    Obesity    Hypokalemia    Atrial fibrillation with RVR (HCC)    Essential hypertension    Chronic obstructive pulmonary disease (HCC)    Shortness of breath    Precordial pain    Morbid obesity (HCC)    Acute diastolic congestive heart failure (HCC)    Acute respiratory failure with hypoxia (HCC)    Persistent atrial fibrillation    Acute cystitis without hematuria    Acute pain of left shoulder    Hypomagnesemia    Chronic pain of both knees    Bilateral primary osteoarthritis of knee    Sepsis (HCC)    Severe sepsis (HCC)    Urinary tract infection without hematuria    Abnormal CXR    Hypoxia    Elevated troponin    Hypotension

## 2020-04-17 ENCOUNTER — APPOINTMENT (OUTPATIENT)
Dept: INTERVENTIONAL RADIOLOGY/VASCULAR | Age: 70
DRG: 871 | End: 2020-04-17
Payer: MEDICARE

## 2020-04-17 LAB
ANION GAP SERPL CALCULATED.3IONS-SCNC: 15 MMOL/L (ref 3–16)
BASE EXCESS ARTERIAL: 1.2 MMOL/L (ref -3–3)
BASOPHILS ABSOLUTE: 0.1 K/UL (ref 0–0.2)
BASOPHILS RELATIVE PERCENT: 0.5 %
BUN BLDV-MCNC: 6 MG/DL (ref 7–20)
CALCIUM SERPL-MCNC: 8.9 MG/DL (ref 8.3–10.6)
CARBOXYHEMOGLOBIN ARTERIAL: 0.3 % (ref 0–1.5)
CHLORIDE BLD-SCNC: 97 MMOL/L (ref 99–110)
CO2: 22 MMOL/L (ref 21–32)
CREAT SERPL-MCNC: <0.5 MG/DL (ref 0.6–1.2)
EOSINOPHILS ABSOLUTE: 0.2 K/UL (ref 0–0.6)
EOSINOPHILS RELATIVE PERCENT: 1.4 %
GFR AFRICAN AMERICAN: >60
GFR NON-AFRICAN AMERICAN: >60
GLUCOSE BLD-MCNC: 136 MG/DL (ref 70–99)
GLUCOSE BLD-MCNC: 46 MG/DL (ref 70–99)
GLUCOSE BLD-MCNC: 70 MG/DL (ref 70–99)
GLUCOSE BLD-MCNC: 94 MG/DL (ref 70–99)
GLUCOSE BLD-MCNC: 95 MG/DL (ref 70–99)
GLUCOSE BLD-MCNC: 98 MG/DL (ref 70–99)
HCO3 ARTERIAL: 25 MMOL/L (ref 21–29)
HCT VFR BLD CALC: 40.7 % (ref 36–48)
HEMOGLOBIN, ART, EXTENDED: 14.2 G/DL (ref 12–16)
HEMOGLOBIN: 13.1 G/DL (ref 12–16)
INR BLD: 1.32 (ref 0.86–1.14)
LYMPHOCYTES ABSOLUTE: 1.7 K/UL (ref 1–5.1)
LYMPHOCYTES RELATIVE PERCENT: 14.1 %
MAGNESIUM: 1.8 MG/DL (ref 1.8–2.4)
MCH RBC QN AUTO: 28.6 PG (ref 26–34)
MCHC RBC AUTO-ENTMCNC: 32.1 G/DL (ref 31–36)
MCV RBC AUTO: 89.1 FL (ref 80–100)
METHEMOGLOBIN ARTERIAL: 0.3 %
MONOCYTES ABSOLUTE: 1.1 K/UL (ref 0–1.3)
MONOCYTES RELATIVE PERCENT: 8.6 %
NEUTROPHILS ABSOLUTE: 9.3 K/UL (ref 1.7–7.7)
NEUTROPHILS RELATIVE PERCENT: 75.4 %
O2 CONTENT ARTERIAL: 18 ML/DL
O2 SAT, ARTERIAL: 93.3 %
O2 THERAPY: ABNORMAL
ORGANISM: ABNORMAL
ORGANISM: ABNORMAL
PCO2 ARTERIAL: 36.9 MMHG (ref 35–45)
PDW BLD-RTO: 16.6 % (ref 12.4–15.4)
PERFORMED ON: ABNORMAL
PERFORMED ON: ABNORMAL
PERFORMED ON: NORMAL
PH ARTERIAL: 7.45 (ref 7.35–7.45)
PHOSPHORUS: 2.5 MG/DL (ref 2.5–4.9)
PLATELET # BLD: 252 K/UL (ref 135–450)
PMV BLD AUTO: 9 FL (ref 5–10.5)
PO2 ARTERIAL: 63.6 MMHG (ref 75–108)
POTASSIUM SERPL-SCNC: 3.4 MMOL/L (ref 3.5–5.1)
PROTHROMBIN TIME: 15.4 SEC (ref 10–13.2)
RBC # BLD: 4.57 M/UL (ref 4–5.2)
SODIUM BLD-SCNC: 134 MMOL/L (ref 136–145)
TCO2 ARTERIAL: 26.1 MMOL/L
URINE CULTURE, ROUTINE: ABNORMAL
URINE CULTURE, ROUTINE: ABNORMAL
WBC # BLD: 12.4 K/UL (ref 4–11)

## 2020-04-17 PROCEDURE — 85610 PROTHROMBIN TIME: CPT

## 2020-04-17 PROCEDURE — 2580000003 HC RX 258: Performed by: NURSE PRACTITIONER

## 2020-04-17 PROCEDURE — 99232 SBSQ HOSP IP/OBS MODERATE 35: CPT | Performed by: INTERNAL MEDICINE

## 2020-04-17 PROCEDURE — 2580000003 HC RX 258

## 2020-04-17 PROCEDURE — 02HV33Z INSERTION OF INFUSION DEVICE INTO SUPERIOR VENA CAVA, PERCUTANEOUS APPROACH: ICD-10-PCS | Performed by: RADIOLOGY

## 2020-04-17 PROCEDURE — 6370000000 HC RX 637 (ALT 250 FOR IP): Performed by: NURSE PRACTITIONER

## 2020-04-17 PROCEDURE — 83735 ASSAY OF MAGNESIUM: CPT

## 2020-04-17 PROCEDURE — 36573 INSJ PICC RS&I 5 YR+: CPT

## 2020-04-17 PROCEDURE — 99232 SBSQ HOSP IP/OBS MODERATE 35: CPT | Performed by: NURSE PRACTITIONER

## 2020-04-17 PROCEDURE — C1751 CATH, INF, PER/CENT/MIDLINE: HCPCS

## 2020-04-17 PROCEDURE — 2060000000 HC ICU INTERMEDIATE R&B

## 2020-04-17 PROCEDURE — 2580000003 HC RX 258: Performed by: INTERNAL MEDICINE

## 2020-04-17 PROCEDURE — 80048 BASIC METABOLIC PNL TOTAL CA: CPT

## 2020-04-17 PROCEDURE — 99233 SBSQ HOSP IP/OBS HIGH 50: CPT | Performed by: INTERNAL MEDICINE

## 2020-04-17 PROCEDURE — 6360000002 HC RX W HCPCS: Performed by: NURSE PRACTITIONER

## 2020-04-17 PROCEDURE — 36415 COLL VENOUS BLD VENIPUNCTURE: CPT

## 2020-04-17 PROCEDURE — 6360000002 HC RX W HCPCS: Performed by: INTERNAL MEDICINE

## 2020-04-17 PROCEDURE — 84100 ASSAY OF PHOSPHORUS: CPT

## 2020-04-17 PROCEDURE — 85025 COMPLETE CBC W/AUTO DIFF WBC: CPT

## 2020-04-17 PROCEDURE — 82803 BLOOD GASES ANY COMBINATION: CPT

## 2020-04-17 RX ORDER — SODIUM CHLORIDE 9 MG/ML
INJECTION, SOLUTION INTRAVENOUS
Status: COMPLETED
Start: 2020-04-17 | End: 2020-04-17

## 2020-04-17 RX ORDER — LIDOCAINE HYDROCHLORIDE 10 MG/ML
5 INJECTION, SOLUTION INFILTRATION; PERINEURAL ONCE
Status: DISCONTINUED | OUTPATIENT
Start: 2020-04-17 | End: 2020-04-18 | Stop reason: SDUPTHER

## 2020-04-17 RX ORDER — SODIUM CHLORIDE 0.9 % (FLUSH) 0.9 %
10 SYRINGE (ML) INJECTION PRN
Status: DISCONTINUED | OUTPATIENT
Start: 2020-04-17 | End: 2020-04-17

## 2020-04-17 RX ORDER — SODIUM CHLORIDE 0.9 % (FLUSH) 0.9 %
10 SYRINGE (ML) INJECTION EVERY 12 HOURS SCHEDULED
Status: DISCONTINUED | OUTPATIENT
Start: 2020-04-17 | End: 2020-04-17

## 2020-04-17 RX ORDER — POTASSIUM CHLORIDE 20 MEQ/1
20 TABLET, EXTENDED RELEASE ORAL ONCE
Status: COMPLETED | OUTPATIENT
Start: 2020-04-17 | End: 2020-04-17

## 2020-04-17 RX ORDER — DILTIAZEM HYDROCHLORIDE 120 MG/1
120 CAPSULE, COATED, EXTENDED RELEASE ORAL DAILY
Status: DISCONTINUED | OUTPATIENT
Start: 2020-04-17 | End: 2020-04-18 | Stop reason: HOSPADM

## 2020-04-17 RX ADMIN — DILTIAZEM HYDROCHLORIDE 120 MG: 120 CAPSULE, COATED, EXTENDED RELEASE ORAL at 14:43

## 2020-04-17 RX ADMIN — DIGOXIN 125 MCG: 125 TABLET ORAL at 08:37

## 2020-04-17 RX ADMIN — DEXTROSE 50 % IN WATER (D50W) INTRAVENOUS SYRINGE 12.5 G: at 11:43

## 2020-04-17 RX ADMIN — RIVAROXABAN 20 MG: 20 TABLET, FILM COATED ORAL at 17:24

## 2020-04-17 RX ADMIN — Medication 10 ML: at 22:00

## 2020-04-17 RX ADMIN — INSULIN GLARGINE 40 UNITS: 100 INJECTION, SOLUTION SUBCUTANEOUS at 08:38

## 2020-04-17 RX ADMIN — Medication 10 ML: at 22:03

## 2020-04-17 RX ADMIN — MICONAZOLE NITRATE: 20 POWDER TOPICAL at 08:38

## 2020-04-17 RX ADMIN — ACETAMINOPHEN 650 MG: 325 TABLET ORAL at 17:24

## 2020-04-17 RX ADMIN — MUPIROCIN: 20 OINTMENT TOPICAL at 08:38

## 2020-04-17 RX ADMIN — SODIUM CHLORIDE 250 ML: 9 INJECTION, SOLUTION INTRAVENOUS at 17:27

## 2020-04-17 RX ADMIN — GABAPENTIN 300 MG: 300 CAPSULE ORAL at 22:00

## 2020-04-17 RX ADMIN — POTASSIUM CHLORIDE 20 MEQ: 1500 TABLET, EXTENDED RELEASE ORAL at 14:43

## 2020-04-17 RX ADMIN — ENOXAPARIN SODIUM 100 MG: 100 INJECTION SUBCUTANEOUS at 08:37

## 2020-04-17 RX ADMIN — MUPIROCIN: 20 OINTMENT TOPICAL at 22:00

## 2020-04-17 RX ADMIN — MICONAZOLE NITRATE: 20 POWDER TOPICAL at 22:02

## 2020-04-17 RX ADMIN — MEROPENEM 1 G: 1 INJECTION, POWDER, FOR SOLUTION INTRAVENOUS at 08:41

## 2020-04-17 RX ADMIN — MEROPENEM 1 G: 1 INJECTION, POWDER, FOR SOLUTION INTRAVENOUS at 17:25

## 2020-04-17 ASSESSMENT — PAIN SCALES - GENERAL
PAINLEVEL_OUTOF10: 6
PAINLEVEL_OUTOF10: 0

## 2020-04-17 NOTE — PROGRESS NOTES
Pt resting quietly in bed. PCA at bedside feeding pt. Pt noted to be pocketing food. Dr. Michelle Hernandez at bedside. ABG ordered. Pt lethargic but alert and oriented x4. Currently on 1L O2. Shift assessment completed. Will continue to monitor.

## 2020-04-17 NOTE — FLOWSHEET NOTE
04/16/20 2100   Vital Signs   Temp 96.7 °F (35.9 °C)   Temp Source Oral   Pulse 88   Heart Rate Source Monitor   Resp 18   /69   BP Location Left upper arm   BP Upper/Lower Upper   Patient Position Semi fowlers   Level of Consciousness 0   MEWS Score 1   Patient Currently in Pain Denies   Oxygen Therapy   SpO2 99 %   O2 Device Nasal cannula   O2 Flow Rate (L/min) 3 L/min       Shift assessment completed. Bilateral lung sounds clear. BS in all 4 quadrants. Skin assessment completed see flow chart. Pt A+OX4. Had loose BM, linens changed/alexander care given. Call light within reach, will continue to monitor.

## 2020-04-17 NOTE — PLAN OF CARE
Problem: Falls - Risk of:  Goal: Will remain free from falls  Description: Will remain free from falls  Outcome: Ongoing  Goal: Absence of physical injury  Description: Absence of physical injury  Outcome: Ongoing     Problem: Urinary Elimination:  Goal: Signs and symptoms of infection will decrease  Description: Signs and symptoms of infection will decrease  Outcome: Ongoing  Goal: Complications related to the disease process, condition or treatment will be avoided or minimized  Description: Complications related to the disease process, condition or treatment will be avoided or minimized  Outcome: Ongoing     Problem: OXYGENATION/RESPIRATORY FUNCTION  Goal: Patient will achieve/maintain normal respiratory rate/effort  Description: Respiratory rate and effort will be within normal limits for the patient  Outcome: Ongoing

## 2020-04-17 NOTE — PROGRESS NOTES
4 Eyes Skin Assessment     The patient is being assess for   Shift Handoff    I agree that 2 RN's have performed a thorough Head to Toe Skin Assessment on the patient. ALL assessment sites listed below have been assessed. Areas assessed by both nurses:   [x]   Head, Face, and Ears   [x]   Shoulders, Back, and Chest, Abdomen  [x]   Arms, Elbows, and Hands   [x]   Coccyx, Sacrum, and Ischium  [x]   Legs, Feet, and Heels        DTI to Right outer heel, right buttocks, right ear. Stage II to coccyx and left hip. Abrasion to LLE. MASD to abdominal folds and groin. **SHARE this note so that the co-signing nurse is able to place an eSignature**    Co-signer eSignature: {Esignature:[id number]}    Does the Patient have Skin Breakdown?   Yes LDA WOUND CARE was Initiated documentation include the Kassandra-wound, Wound Assessment, Measurements, Dressing Treatment, Drainage, and Color\",          Jarod Prevention initiated:  Yes   Wound Care Orders initiated:  No      WOC nurse consulted for Pressure Injury (Stage 3,4, Unstageable, DTI, NWPT, Complex wounds)and New or Established Ostomies:  No      Primary Nurse eSignature: Electronically signed by Jorge Araujo RN on 4/17/20 at 5:50 AM EDT

## 2020-04-17 NOTE — PROGRESS NOTES
by patient positioning. Repeat study suggested when   clinically feasible. Pertinent previous results reviewed   Echo 9/13/16   Conclusions   Summary   Technical difficult study due to body habitus. Atrial fibrillations with   controlled ventricular response is present.   Normal left ventricle size with mild concentric left ventricular   hypertrophy.   Normal systolic function with EF 55%. No regional wall motion abnormality   present.   Compared to previous study from 1- ejection fraction remains   approximately the same.   Impaired left ventricular relaxation.   Mitral valve is structurally normal.   Mild mitral regurgitation or stenosis.   Moderately dilated left atrium with increased left atrial volume.   Individual aortic valve leaflets are not clearly visualized,but appears to   be grossly normal in structure.   No evidence of aortic valve stenosis or regurgitation. Echo 4/16/2020   Summary   Technically difficult examination due to body habitus, patient immobility. Normal LV systolic function with a visually estimated EF of 55%. No obvious segmental wall motion abnormalities. Normal left ventricular diastolic filling pressure. No obvious valvular abnormalities noted. Systolic pulmonary artery pressure (SPAP) is normal and estimated at 30mmHg   (right atrial pressure 3mmHg).         Cultures  Urine cultures - Klebsiella  ESBL, proteus   Blood cx neg  Strep screen neg  COVID neg       Klebsiella pneumoniae (esbl) (1)     Antibiotic Interpretation CLAYTON Status    amoxicillin-clavulanate Intermediate 16/8 mcg/mL     ampicillin Resistant >16 mcg/mL     ceFAZolin Resistant >16 mcg/mL     cefepime Resistant >16 mcg/mL     cefotaxime Resistant >32 mcg/mL     cefTRIAXone Resistant >32 mcg/mL     cefuroxime Resistant >16 mcg/mL     ciprofloxacin Resistant >2 mcg/mL     ertapenem Sensitive <=0.5 mcg/mL     gentamicin Resistant >8 mcg/mL     meropenem Sensitive <=1 mcg/mL     nitrofurantoin

## 2020-04-17 NOTE — PROGRESS NOTES
 mupirocin (BACTROBAN) 2 % ointment   Nasal BID Ewelina Mojgan, APRN - CNP        glucose (GLUTOSE) 40 % oral gel 15 g  15 g Oral PRN Ewelina Mojgan, APRN - CNP        dextrose 50 % IV solution  12.5 g Intravenous PRN Ewelina Mojgan, APRN - CNP   12.5 g at 04/17/20 1143    glucagon (rDNA) injection 1 mg  1 mg Intramuscular PRN Ewelina Mojgan, APRN - CNP        dextrose 5 % solution  100 mL/hr Intravenous PRN Ewelina Mojgan, APRN - CNP        gabapentin (NEURONTIN) capsule 300 mg  300 mg Oral Nightly Ewelina Mojgan, APRN - CNP   300 mg at 04/16/20 2121    insulin glargine (LANTUS) injection vial 40 Units  40 Units Subcutaneous QAM Ewelina Mojgan, APRN - CNP   40 Units at 04/17/20 9375    sodium chloride flush 0.9 % injection 10 mL  10 mL Intravenous 2 times per day Ewelina Mojgan, APRN - CNP   10 mL at 04/16/20 6892    sodium chloride flush 0.9 % injection 10 mL  10 mL Intravenous PRN Ewelina Mojgan, APRN - CNP        acetaminophen (TYLENOL) tablet 650 mg  650 mg Oral Q6H PRN Ewelina Mojgan, APRN - CNP   650 mg at 04/16/20 1026    Or    acetaminophen (TYLENOL) suppository 650 mg  650 mg Rectal Q6H PRN Ewelina Mojgan, APRN - CNP        polyethylene glycol (GLYCOLAX) packet 17 g  17 g Oral Daily PRN Ewelina Mojgan, APRN - CNP        promethazine (PHENERGAN) tablet 12.5 mg  12.5 mg Oral Q6H PRN Ewelina Mojgan, APRN - CNP        Or    ondansetron (ZOFRAN) injection 4 mg  4 mg Intravenous Q6H PRN Ewelina Mojgan, APRN - CNP        0.9 % sodium chloride bolus  500 mL Intravenous Q1H PRN Ewelina Mojgan, APRN - CNP   Stopped at 04/15/20 5895           Objective:     Telemetry monitor:  Afib- 80-low 100's with occasional  burst up to 130 at times     Physical Exam:  Constitutional:  Comfortable and alert to self only , obese, NAD, appears stated age  Eyes: PERRL, sclera nonicteric  Neck:  Supple, no masses, no thyroidmegaly, no JVD  Skin:  Warm and dry; no rash or lesions  Heart: irregularly irregular, normal apex, S1 and S2 normal, no M/G/R  Lungs:  Normal respiratory effort; diminished bibasilar, no wheezes/rhonchi/rales  Abdomen: obese, soft, non tender, + bowel sounds  Extremities:  No edema or cyanosis; no clubbing  Neuro: alert and oriented to self, moves legs and arms equally, normal mood and affect      Data Reviewed:  EK2020:    Atrial fibrillation with rapid ventricular responseBaseline artifactRightward axisST abnormality anterolateral leads consider ischemia or digitalis effectAbnormal ECGNo previous ECGs availableConfirmed by DEXTER MERRILL MD ([id number]) on 2020 12:08:41 PM     CXR 2020:  Borderline cardiomegaly and mild central vascular congestion. Left basilar opacity. EKG 2020:  Atrial fibrillation with rapid ventricular responseRightward axisMarked ST abnormality, possible inferior subendocardial injuryMarked ST abnormality, possible anterolateral subendocardial injuryAbnormal ECGWhen compared with ECG of1.11.20 there is worsening of ST segment depression in inferolateral leadsConfirmed by Carolynn Mccray MD, [address] ([id number]) on 3/24/2020 7:46:35 AM     Echo 2020:  Technically difficult examination due to body habitus, patient immobility. Normal LV systolic function with a visually estimated EF of 55%. No obvious segmental wall motion abnormalities. Normal left ventricular diastolic filling pressure. No obvious valvular abnormalities noted. Systolic pulmonary artery pressure (SPAP) is normal and estimated at 30mmHg  (right atrial pressure 3mmHg). Echo 2016    Technical difficult study due to body habitus. Atrial fibrillations with   controlled ventricular response is present. Normal left ventricle size with mild concentric left ventricular   hypertrophy. Normal systolic function with EF 55%. No regional wall motion abnormality present. Compared to previous study from 2016 ejection fraction remains approximately the same.    Impaired left

## 2020-04-17 NOTE — PROGRESS NOTES
Pulmonary & Critical Care Medicine ICU Progress Note    CC: Hypotension    Events of Last 24 hours:   Somewhat lethargic today        Invasive Lines: IV: PIVs         / / /   Recent Labs     20  0845   PHART 7.448   KAS1VVE 36.9   PO2ART 63.6*       IV:   dextrose         Vitals:  Blood pressure 113/66, pulse 96, temperature 97.7 °F (36.5 °C), temperature source Oral, resp. rate 16, height 5' 7\" (1.702 m), weight 250 lb 4.8 oz (113.5 kg), SpO2 94 %. on 1 LPM   Temp  Av.9 °F (36.6 °C)  Min: 96.7 °F (35.9 °C)  Max: 99.3 °F (37.4 °C)    Intake/Output Summary (Last 24 hours) at 2020 1340  Last data filed at 2020 1235  Gross per 24 hour   Intake 677 ml   Output 950 ml   Net -273 ml     EXAM:  Gen: Mild distress. Eyes: PERRL. No sclera icterus. No conjunctival injection. ENT: No discharge. Pharynx clear. Neck: Trachea midline. No obvious mass. Resp:  No accessory muscle use. Few basilar l crackles. No wheezes. No rhonchi. No dullness on percussion. CV: Tachycardia. irregular rhythm. No murmur or rub. No edema. GI: Non-tender. Non-distended. No hernia. Skin: Warm and dry. No nodule on exposed extremities. Lymph: No cervical LAD. No supraclavicular LAD. M/S: No cyanosis. No joint deformity. No clubbing. Neuro:  Lethargic. Woke up follow commands. Psych:  No agitation or anxiety.     Scheduled Meds:   meropenem  1 g Intravenous Q8H    lidocaine 1 % injection  5 mL Intradermal Once    digoxin  125 mcg Oral Daily    enoxaparin  1 mg/kg Subcutaneous BID    miconazole   Topical BID    mupirocin   Nasal BID    gabapentin  300 mg Oral Nightly    insulin glargine  40 Units Subcutaneous QAM    sodium chloride flush  10 mL Intravenous 2 times per day     PRN Meds:  glucose, dextrose, glucagon (rDNA), dextrose, sodium chloride flush, acetaminophen **OR** acetaminophen, polyethylene glycol, promethazine **OR** ondansetron, sodium chloride    Results:  CBC:   Recent Labs

## 2020-04-17 NOTE — ADT AUTH CERT
(POA-- tachycardia, hypotension, leukocytosis, source)    -suspect 2/2 UTI/PNA   - BP consistently low, IVF given in the ED   - Continue  IV ABx - > Vancomycin and cefepime D#3   - Blood Cx NGTD, Urine cx pending, Resp Cx pending    -Continue IV fluids, no need for pressors       Acute hypoxic respiratory failure    - Likely secondary to pneumonia   - Could NOT RULE OUT COVID-19 with cough, SOB and CXR changes, also resides in SNF -> COVID Testing is negative   - Droplet + precautions discontinued   - Wean supplemental O2 as tolerated    - Pulmonology consulted    - 3 L O2.        UTI   -UA with large leukocyte esterase, greater than 100 white blood cells   - Hx of ESBL, most recent admission in Jan 2020 with Pseudomonas and d/c'd on Cipro    -Patient altered mental status as well   -Continue Cefepime D#3 (last urine cx was sensitive to cefepime)    -Urine culture- Klebsiella, proteus       HCAP    - CXR with LLL opacity    - Concerning for gram negative organism and possible MRSA   - PCT pending , + leukocytosis    - Hypoxia on arrival, requiring supplemental O2    - Continue Cefepime/Vanc D#3   - Pulmonology consult    - SLP evaluation- diet per recommendations.        Hypokalemia   -Potassium on admission 2.7   -Sliding-scale replacement   -Continue to monitor   -4 today        Hypomagnesemia    - repleted       Hypophosphatemia   - replaced. Monitor.        Atrial Fibrillation with RVR    - HRs up to the 130's in the ER    - Given IV diltizem, but BP dropped    - HR still elevated on admission    - Continue Xarelto, Diltiazem; IV Digoxin every 6 hours for rate control.    - Replace electrolytes    - seen by cardiology   - Echo ordered.    - cardiology recommends full dose Lovenox.        Elevated Troponin    - No prior hx of elevated troponin    - in the setting of sepsis with hypotension and atrial fibrillation with RVR    - EKG with T wave changes in anterolateral leads    - Continue to trend troponin, continue

## 2020-04-18 VITALS
DIASTOLIC BLOOD PRESSURE: 59 MMHG | TEMPERATURE: 96.7 F | RESPIRATION RATE: 16 BRPM | SYSTOLIC BLOOD PRESSURE: 129 MMHG | BODY MASS INDEX: 38.71 KG/M2 | WEIGHT: 246.6 LBS | HEART RATE: 95 BPM | OXYGEN SATURATION: 93 % | HEIGHT: 67 IN

## 2020-04-18 PROBLEM — A41.9 SEPSIS (HCC): Status: RESOLVED | Noted: 2020-04-14 | Resolved: 2020-04-18

## 2020-04-18 LAB
ANION GAP SERPL CALCULATED.3IONS-SCNC: 10 MMOL/L (ref 3–16)
BASOPHILS ABSOLUTE: 0.1 K/UL (ref 0–0.2)
BASOPHILS RELATIVE PERCENT: 1 %
BLOOD CULTURE, ROUTINE: NORMAL
BUN BLDV-MCNC: 5 MG/DL (ref 7–20)
CALCIUM SERPL-MCNC: 9.1 MG/DL (ref 8.3–10.6)
CHLORIDE BLD-SCNC: 102 MMOL/L (ref 99–110)
CO2: 26 MMOL/L (ref 21–32)
CREAT SERPL-MCNC: <0.5 MG/DL (ref 0.6–1.2)
CULTURE, BLOOD 2: NORMAL
EOSINOPHILS ABSOLUTE: 0.2 K/UL (ref 0–0.6)
EOSINOPHILS RELATIVE PERCENT: 1.7 %
GFR AFRICAN AMERICAN: >60
GFR NON-AFRICAN AMERICAN: >60
GLUCOSE BLD-MCNC: 109 MG/DL (ref 70–99)
GLUCOSE BLD-MCNC: 123 MG/DL (ref 70–99)
GLUCOSE BLD-MCNC: 131 MG/DL (ref 70–99)
GLUCOSE BLD-MCNC: 54 MG/DL (ref 70–99)
GLUCOSE BLD-MCNC: 54 MG/DL (ref 70–99)
HCT VFR BLD CALC: 38.9 % (ref 36–48)
HEMOGLOBIN: 12.7 G/DL (ref 12–16)
LYMPHOCYTES ABSOLUTE: 2 K/UL (ref 1–5.1)
LYMPHOCYTES RELATIVE PERCENT: 18.8 %
MAGNESIUM: 1.9 MG/DL (ref 1.8–2.4)
MCH RBC QN AUTO: 28.7 PG (ref 26–34)
MCHC RBC AUTO-ENTMCNC: 32.6 G/DL (ref 31–36)
MCV RBC AUTO: 87.9 FL (ref 80–100)
MONOCYTES ABSOLUTE: 1.1 K/UL (ref 0–1.3)
MONOCYTES RELATIVE PERCENT: 10.1 %
NEUTROPHILS ABSOLUTE: 7.4 K/UL (ref 1.7–7.7)
NEUTROPHILS RELATIVE PERCENT: 68.4 %
PDW BLD-RTO: 17 % (ref 12.4–15.4)
PERFORMED ON: ABNORMAL
PHOSPHORUS: 2.6 MG/DL (ref 2.5–4.9)
PLATELET # BLD: 230 K/UL (ref 135–450)
PMV BLD AUTO: 9.3 FL (ref 5–10.5)
POTASSIUM SERPL-SCNC: 3.5 MMOL/L (ref 3.5–5.1)
RBC # BLD: 4.43 M/UL (ref 4–5.2)
SODIUM BLD-SCNC: 138 MMOL/L (ref 136–145)
WBC # BLD: 10.9 K/UL (ref 4–11)

## 2020-04-18 PROCEDURE — 6370000000 HC RX 637 (ALT 250 FOR IP): Performed by: NURSE PRACTITIONER

## 2020-04-18 PROCEDURE — 2580000003 HC RX 258: Performed by: INTERNAL MEDICINE

## 2020-04-18 PROCEDURE — 84100 ASSAY OF PHOSPHORUS: CPT

## 2020-04-18 PROCEDURE — 6360000002 HC RX W HCPCS: Performed by: INTERNAL MEDICINE

## 2020-04-18 PROCEDURE — 83036 HEMOGLOBIN GLYCOSYLATED A1C: CPT

## 2020-04-18 PROCEDURE — 85025 COMPLETE CBC W/AUTO DIFF WBC: CPT

## 2020-04-18 PROCEDURE — 36415 COLL VENOUS BLD VENIPUNCTURE: CPT

## 2020-04-18 PROCEDURE — 99233 SBSQ HOSP IP/OBS HIGH 50: CPT | Performed by: INTERNAL MEDICINE

## 2020-04-18 PROCEDURE — 80048 BASIC METABOLIC PNL TOTAL CA: CPT

## 2020-04-18 PROCEDURE — 99239 HOSP IP/OBS DSCHRG MGMT >30: CPT | Performed by: INTERNAL MEDICINE

## 2020-04-18 PROCEDURE — 2580000003 HC RX 258: Performed by: NURSE PRACTITIONER

## 2020-04-18 PROCEDURE — 83735 ASSAY OF MAGNESIUM: CPT

## 2020-04-18 RX ORDER — INSULIN GLARGINE 100 [IU]/ML
30 INJECTION, SOLUTION SUBCUTANEOUS EVERY MORNING
Status: DISCONTINUED | OUTPATIENT
Start: 2020-04-19 | End: 2020-04-18 | Stop reason: HOSPADM

## 2020-04-18 RX ORDER — INSULIN GLARGINE 100 [IU]/ML
25 INJECTION, SOLUTION SUBCUTANEOUS EVERY MORNING
Status: ON HOLD | COMMUNITY
Start: 2020-04-18

## 2020-04-18 RX ORDER — DEXTROSE MONOHYDRATE 25 G/50ML
12.5 INJECTION, SOLUTION INTRAVENOUS PRN
Status: DISCONTINUED | OUTPATIENT
Start: 2020-04-18 | End: 2020-04-18 | Stop reason: HOSPADM

## 2020-04-18 RX ORDER — DIGOXIN 125 MCG
125 TABLET ORAL DAILY
Status: ON HOLD | DISCHARGE
Start: 2020-04-19 | End: 2022-10-12 | Stop reason: HOSPADM

## 2020-04-18 RX ORDER — DILTIAZEM HYDROCHLORIDE 120 MG/1
120 CAPSULE, COATED, EXTENDED RELEASE ORAL DAILY
Status: ON HOLD | DISCHARGE
Start: 2020-04-19 | End: 2022-10-06

## 2020-04-18 RX ORDER — NICOTINE POLACRILEX 4 MG
15 LOZENGE BUCCAL PRN
Status: DISCONTINUED | OUTPATIENT
Start: 2020-04-18 | End: 2020-04-18 | Stop reason: HOSPADM

## 2020-04-18 RX ORDER — DEXTROSE MONOHYDRATE 50 MG/ML
100 INJECTION, SOLUTION INTRAVENOUS PRN
Status: DISCONTINUED | OUTPATIENT
Start: 2020-04-18 | End: 2020-04-18 | Stop reason: HOSPADM

## 2020-04-18 RX ORDER — GABAPENTIN 100 MG/1
100 CAPSULE ORAL 3 TIMES DAILY
Status: ON HOLD | COMMUNITY
Start: 2020-04-18

## 2020-04-18 RX ADMIN — MEROPENEM 1 G: 1 INJECTION, POWDER, FOR SOLUTION INTRAVENOUS at 08:22

## 2020-04-18 RX ADMIN — DIGOXIN 125 MCG: 125 TABLET ORAL at 08:27

## 2020-04-18 RX ADMIN — MEROPENEM 1 G: 1 INJECTION, POWDER, FOR SOLUTION INTRAVENOUS at 16:13

## 2020-04-18 RX ADMIN — DILTIAZEM HYDROCHLORIDE 120 MG: 120 CAPSULE, COATED, EXTENDED RELEASE ORAL at 08:27

## 2020-04-18 RX ADMIN — MEROPENEM 1 G: 1 INJECTION, POWDER, FOR SOLUTION INTRAVENOUS at 01:54

## 2020-04-18 RX ADMIN — Medication 10 ML: at 08:22

## 2020-04-18 RX ADMIN — DEXTROSE 50 % IN WATER (D50W) INTRAVENOUS SYRINGE 12.5 G: at 08:14

## 2020-04-18 RX ADMIN — RIVAROXABAN 20 MG: 20 TABLET, FILM COATED ORAL at 16:13

## 2020-04-18 RX ADMIN — MUPIROCIN: 20 OINTMENT TOPICAL at 08:22

## 2020-04-18 RX ADMIN — MICONAZOLE NITRATE: 20 POWDER TOPICAL at 08:22

## 2020-04-18 ASSESSMENT — PAIN SCALES - GENERAL: PAINLEVEL_OUTOF10: 0

## 2020-04-18 NOTE — DISCHARGE INSTR - COC
Continuity of Care Form    Patient Name: Lucas Monte   :  1950  MRN:  [id number]    Admit date:  2020  Discharge date:  2020    Code Status Order: Full Code   Advance Directives:     Admitting Physician:  Daniel Thakur MD  PCP: Verónica Paige MD    Discharging Nurse: Vandana Chaney [facility] Unit/Room#: /4508-39  Discharging Unit Phone Number: [phone]    Emergency Contact:   Extended Emergency Contact Information  Primary Emergency Contact: PreethiArlette gore   [address] Phone: [phone]  Mobile Phone: [phone]  Relation: Child  Secondary Emergency Contact: Saeed Farris  Address: [address] [place] [address] Phone: [phone]  Work Phone: [phone]  Relation: Child    Past Surgical History:  Past Surgical History:   Procedure Laterality Date    KNEE SURGERY      TONSILLECTOMY      TUBAL LIGATION         Immunization History:   Immunization History   Administered Date(s) Administered    Influenza Virus Vaccine 2014, 10/20/2019    Influenza, Quadv, IM, PF (6 mo and older Fluzone, Flulaval, Fluarix, and 3 yrs and older Afluria) 2016    Pneumococcal Conjugate 13-valent (Katie Kasal) 2016       Active Problems:  Patient Active Problem List   Diagnosis Code    [facility] M46.20    Diskitis M46.40    Seizure disorder, grand mal ([facility] G40.409    Leukocytosis D72.829    DM (diabetes mellitus) ([facility] E11.9    Lumbar facet arthropathy M47.816    Disc degeneration, lumbar M51.36    Thoracic back pain M54.6    Neck pain M54.2    Obesity E66.9    Hypokalemia E87.6    Atrial fibrillation with RVR ([facility]) I48.91    Essential hypertension I10    Chronic obstructive pulmonary disease (HCC) J44.9    Shortness of breath R06.02    Precordial pain R07.2    Morbid obesity ([facility]) E66.01    Acute diastolic congestive heart failure (HCC) I50.31

## 2020-04-18 NOTE — PROGRESS NOTES
Pulmonary & Critical Care Medicine ICU Progress Note    CC: Hypotension    Events of Last 24 hours:   More alert and awake today  Leukocytosis improving  No fever  1 L O2        Invasive Lines: IV: PIVs         / / /   Recent Labs     20  0845   PHART 7.448   [id number] 36.9   PO2ART 63.6*       IV:   dextrose         Vitals:  Blood pressure 123/86, pulse 91, temperature 98.2 °F (36.8 °C), temperature source Oral, resp. rate 16, height 5' 7\" (1.702 m), weight 246 lb 9.6 oz (111.9 kg), SpO2 93 %. on 1 LPM   Temp  Av.9 °F (36.6 °C)  Min: 97.7 °F (36.5 °C)  Max: 98.2 °F (36.8 °C)    Intake/Output Summary (Last 24 hours) at 2020 0808  Last data filed at 2020 0337  Gross per 24 hour   Intake 330 ml   Output 675 ml   Net -345 ml     EXAM:  Gen: Mild distress. Eyes: PERRL. No sclera icterus. No conjunctival injection. ENT: No discharge. Pharynx clear. Neck: Trachea midline. No obvious mass. Resp:  No accessory muscle use. Few basilar l crackles. No wheezes. No rhonchi. No dullness on percussion. CV: Tachycardia. irregular rhythm. No murmur or rub. No edema. GI: Non-tender. Non-distended. No hernia. Skin: Warm and dry. No nodule on exposed extremities. Lymph: No cervical LAD. No supraclavicular LAD. M/S: No cyanosis. No joint deformity. No clubbing. Neuro:  Alert and awake. Oriented x3. Followed commands. Psych:  No agitation or anxiety.     Scheduled Meds:   meropenem  1 g Intravenous Q8H    lidocaine 1 % injection  5 mL Intradermal Once    rivaroxaban  20 mg Oral Daily    dilTIAZem  120 mg Oral Daily    digoxin  125 mcg Oral Daily    miconazole   Topical BID    mupirocin   Nasal BID    gabapentin  300 mg Oral Nightly    insulin glargine  40 Units Subcutaneous QAM    sodium chloride flush  10 mL Intravenous 2 times per day     PRN Meds:  glucose, dextrose, glucagon (rDNA), dextrose, sodium chloride flush, acetaminophen **OR** acetaminophen, polyethylene glycol,

## 2020-04-19 LAB
ESTIMATED AVERAGE GLUCOSE: 91.1 MG/DL
HBA1C MFR BLD: 4.8 %

## 2020-04-19 NOTE — DISCHARGE SUMMARY
pneumonia  - seen by pulmonology   - COVID tested  Due to symptoms of cough, SOB and CXR changes, also resides in SNF -> COVID Testing is negative, it has been ruled out . Droplet + precautions discontinued. - Wean supplemental O2 as tolerated ,  on supplemental oxygen 3 L-- > 1 L on DC      UTI   gram neg UTI  -UA with large leukocyte esterase, greater than 100 white blood cells  - Hx of ESBL, most recent admission in Jan 2020 with Pseudomonas and d/c'd on Cipro   -Patient altered mental status as well  -Continue Cefepime D#3 (last urine cx was sensitive to cefepime)   -Urine culture- positive for  ESBL  Klebsiella,  And Proteus. Not sensitive to cefepime. Stopped  cefepime. Stared on  merrem   -  PICC placed . DC on IV Merrem - total 10 days      HCAP   - CXR with LLL opacity   - Concerning for gram negative organism and possible MRSA  - + leukocytosis   - Hypoxia on arrival, requiring supplemental O2   - initially treated with Cefepime/Vanc, switched to merrem , vanc now . See above . On  merrem day 2, vanc  D# 5   - Pulmonology consulted   - SLP evaluation- diet per recommendations.      Hypokalemia  -Potassium on admission 2.7  -Sliding-scale replacement  -Continue to monitor      Hypomagnesemia   - repleted     Hypophosphatemia  - replaced. Monitor.      Atrial Fibrillation with RVR   - HRs up to the 130's in the ER   - Given IV diltizem, but BP dropped   - HR still elevated on admission   - seen by cardiology . Full dose anticoagulation recommended . - Continue Xarelto, Diltiazem dose decreased .  IV Digoxin every 6 hours for rate control.   - Replaced electrolytes   - Echo as below   - DC on digoxin, xarelto, decreased dose of cardizem        Elevated Troponin   - No prior hx of elevated troponin   - in the setting of sepsis with hypotension and atrial fibrillation with RVR   - EKG with T wave changes in anterolateral leads   - Continue to trend troponin, continue tele monitor      Possible acute on normal.   Mild mitral regurgitation or stenosis.   Moderately dilated left atrium with increased left atrial volume.   Individual aortic valve leaflets are not clearly visualized,but appears to   be grossly normal in structure.   No evidence of aortic valve stenosis or regurgitation. Echo 4/16/2020   Summary   Technically difficult examination due to body habitus, patient immobility.   Normal LV systolic function with a visually estimated EF of 55%.   No obvious segmental wall motion abnormalities.   Normal left ventricular diastolic filling pressure.   No obvious valvular abnormalities noted.   Systolic pulmonary artery pressure (SPAP) is normal and estimated at 30mmHg   (right atrial pressure 3mmHg).            Cultures  Urine cultures - Klebsiella  ESBL, proteus   Blood cx neg  Strep screen neg  COVID neg         Klebsiella pneumoniae (esbl) (1)              Antibiotic Interpretation CLAYTON Status     amoxicillin-clavulanate Intermediate 16/8 mcg/mL       ampicillin Resistant >16 mcg/mL       ceFAZolin Resistant >16 mcg/mL       cefepime Resistant >16 mcg/mL       cefotaxime Resistant >32 mcg/mL       cefTRIAXone Resistant >32 mcg/mL       cefuroxime Resistant >16 mcg/mL       ciprofloxacin Resistant >2 mcg/mL       ertapenem Sensitive <=0.5 mcg/mL       gentamicin Resistant >8 mcg/mL       meropenem Sensitive <=1 mcg/mL       nitrofurantoin Sensitive <=32 mcg/mL       tobramycin Resistant >8 mcg/mL       trimethoprim-sulfamethoxazole Resistant >2/38 mcg/mL       Proteus mirabilis (2)               Antibiotic Interpretation CLAYTON Status     amoxicillin-clavulanate Sensitive <=8/4 mcg/mL       ampicillin Resistant >16 mcg/mL       cefepime Sensitive <=2 mcg/mL       cefotaxime Sensitive <=2 mcg/mL       cefTRIAXone Sensitive <=1 mcg/mL       cefuroxime Sensitive <=4 mcg/mL       ciprofloxacin Resistant >2 mcg/mL       ertapenem Sensitive <=0.5 mcg/mL       gentamicin Sensitive <=4 mcg/mL       meropenem Sensitive <=1 by coordination of care.       Signed:  Arianna Bernstein MD   4/18/2020

## 2021-05-12 ENCOUNTER — OFFICE VISIT (OUTPATIENT)
Dept: ORTHOPEDIC SURGERY | Age: 71
End: 2021-05-12
Payer: MEDICARE

## 2021-05-12 VITALS — HEIGHT: 67 IN | BODY MASS INDEX: 38.61 KG/M2 | WEIGHT: 246 LBS

## 2021-05-12 DIAGNOSIS — S32.050A CLOSED COMPRESSION FRACTURE OF L5 LUMBAR VERTEBRA, INITIAL ENCOUNTER (HCC): Primary | ICD-10-CM

## 2021-05-12 DIAGNOSIS — S22.060A CLOSED WEDGE COMPRESSION FRACTURE OF T7 VERTEBRA, INITIAL ENCOUNTER (HCC): ICD-10-CM

## 2021-05-12 PROCEDURE — 99214 OFFICE O/P EST MOD 30 MIN: CPT | Performed by: PHYSICIAN ASSISTANT

## 2021-05-12 NOTE — PROGRESS NOTES
New Patient: Thoracic/lumbar SPINE    Referring Provider:  No ref. provider found    CHIEF COMPLAINT:    Chief Complaint   Patient presents with    Back Pain     lumbar for years, 6/10 pain       HISTORY OF PRESENT ILLNESS:       Ms. Ben Torres  is a pleasant 65 y.o. female with a history of Alzheimer who resides at [address] [place] [address] presents today for evaluation of thoracolumbar spine pain. She states her pain began with turning over in bed about several weeks ago. Her pain has steadily continued since then. She rates her back pain 8/10 and is complaining of knee and ankle pain. She describes the pain as constant. Pain is worse with movement and improved some with rest. Patient is a nonambulator and she is in the bed or sitting in bed the majority of the day. She states that she has not ambulated for several months. . She denies numbness and tingling in her right or left leg. She denies weakness of her right or left leg and has had no change in her bowel or bladder function. Patient had an x-ray at the end of April which did show an acute T7 compression fracture with an old L5 compression fracture. Patient denies any specific treatment or bracing for her recent compression fracture.   Pain Assessment  Location of Pain: Back  Severity of Pain: 6  Quality of Pain: Other (Comment)  Duration of Pain: Other (Comment)]       Current/Past Treatment:   · Physical Therapy: None  · Chiropractic: None  · Injection: None  · Medications: Neurontin and oxycodone    Past Medical History:   Past Medical History:   Diagnosis Date    Acute diastolic heart failure (HCC)     Acute respiratory failure (HCC)     Adjustment disorder with mixed anxiety and depressed mood     Allergic rhinitis     Alzheimer's dementia (HCC)     Atrial fibrillation (HCC)     Back pain     CHF (congestive heart failure) (HCC)     Chronic pain     Constipation     [facility]) [facility] [address]     Depression     Dermatitis     Diabetes mellitus (HCC)     Disseminated superficial actinic porokeratosis     Disseminated superficial actinic porokeratosis (DSAP)     Edema     ESBL (extended spectrum beta-lactamase) producing bacteria infection 02/27/2019    urine    ESBL (extended spectrum beta-lactamase) producing bacteria infection 04/17/2020    Urine    ESBL (extended spectrum beta-lactamase) producing bacteria infection 04/14/2020    Klebsiella pneumoniae ESBL    Hypertension     Hypo-osmolality and hyponatremia     Major depressive disorder     Morbid obesity ([address]     Muscle weakness     Overactive bladder     Schizoaffective disorder (HCC)     Seizures (HCC)     Xerosis cutis     Xerosis cutis         Past Surgical History:     Past Surgical History:   Procedure Laterality Date    KNEE SURGERY      TONSILLECTOMY      TUBAL LIGATION         Current Medications:     Current Outpatient Medications:     rivaroxaban (XARELTO) 20 MG TABS tablet, Take 1 tablet by mouth daily, Disp: , Rfl:     gabapentin (NEURONTIN) 100 MG capsule, Take 1 capsule by mouth nightly., Disp: , Rfl:     insulin glargine (LANTUS SOLOSTAR) 100 UNIT/ML injection pen, Inject 25 Units into the skin every morning, Disp: , Rfl:     insulin lispro (HUMALOG) 100 UNIT/ML injection vial, Inject 0-6 Units into the skin 3 times daily (with meals), Disp: , Rfl:     dilTIAZem (CARDIZEM CD) 120 MG extended release capsule, Take 1 capsule by mouth daily, Disp: , Rfl:     digoxin (LANOXIN) 125 MCG tablet, Take 1 tablet by mouth daily, Disp: , Rfl:     CHOLECALCIFEROL PO, Take 1,000 Units by mouth daily, Disp: , Rfl:     Sodium Phosphates (FLEET) 7-19 GM/118ML, Place 1 enema rectally daily as needed, Disp: , Rfl:     ipratropium-albuterol (DUONEB) 0.5-2.5 (3) MG/3ML SOLN nebulizer solution, Inhale 1 vial into the lungs every 6 hours, Disp: , Rfl:     B Complex-C-E-Zn (ZINC-VITES) TABS, Take 1 tablet by mouth daily, Disp: , Rfl:     Hypromellose (NATURES TEARS OP), Apply 1 drop to eye 2 times daily as needed Both eyes every, Disp: , Rfl:     solifenacin (VESICARE) 5 MG tablet, Take 5 mg by mouth daily, Disp: , Rfl:     bisacodyl (DULCOLAX) 10 MG suppository, Place 10 mg rectally daily as needed for Constipation, Disp: , Rfl:     citalopram (CELEXA) 10 MG tablet, Take 20 mg by mouth daily , Disp: , Rfl:     Blood Glucose Monitoring Suppl (BLOOD GLUCOSE SYSTEM RICHARD) KIT, Check fingerstick blood sugars before meals and at bedtime, Disp: 1 kit, Rfl: 0    INSULIN SYRINGE .5CC/29G ([employer] INS SYR .5CC/29G) 29G X 1/2\" 0.5 ML MISC, 1 each by Does not apply route daily, Disp: 100 each, Rfl: 3    Allergies:  Fish-derived products, Iodine, Mushroom extract complex, Onion, and Other    Social History:    reports that she quit smoking about 17 years ago. She has never used smokeless tobacco. She reports that she does not drink alcohol or use drugs. Family History:   Family History   Problem Relation Age of Onset    Diabetes Mother     High Blood Pressure Mother     Cancer Mother     Heart Disease Mother     High Blood Pressure Father     Heart Disease Father        REVIEW OF SYSTEMS: Full ROS noted & scanned   CONSTITUTIONAL: Denies unexplained weight loss, fevers, chills or fatigue  NEUROLOGICAL: Denies unsteady gait or progressive weakness  MUSCULOSKELETAL: Denies joint swelling or redness  PSYCHOLOGICAL: Denies anxiety, depression   SKIN: Denies skin changes, delayed healing, rash, itching   HEMATOLOGIC: Denies easy bleeding or bruising  ENDOCRINE: Denies excessive thirst, urination, heat/cold  RESPIRATORY: Denies current dyspnea, cough  GI: Denies nausea, vomiting, diarrhea   : Denies bowel or bladder issues      PHYSICAL EXAM:    Vitals: Height 5' 7.01\" (1.702 m), weight 246 lb (111.6 kg). GENERAL EXAM:  · General Apparence: Patient is adequately groomed with no evidence of malnutrition.   · Orientation: The patient is oriented to time, place and person. · Mood & Affect:The patient's mood and affect are appropriate. · Vascular: Examination reveals no swelling tenderness in upper or lower extremities. Good capillary refill. · Lymphatic: The lymphatic examination bilaterally reveals all areas to be without enlargement or induration  · Sensation: Sensation is intact without deficit  · Coordination/Balance: Patient examined on stretcher    LUMBAR/SACRAL EXAMINATION:  · Inspection: Local inspection shows no step-off or bruising. Lumbar alignment is normal.  Sagittal and Coronal balance is neutral.      · Palpation:   Patient is tender to palpation over the midline into the left from her thoracic spine to her lumbar spine. · Range of Motion: Unable to test since patient is on a stretcher and finds that any lumbar or thoracic motion increases her pain   · strength:   Strength testing in both distal extremities shows 4/5 strength. · Special Tests:   Straight leg raise and crossed SLR negative. Leg length and pelvis level. · Skin: There are no rashes, ulcerations or lesions. · Reflexes: Reflexes are symmetrically 2+ at the patellar and ankle tendons. Clonus absent bilaterally at the feet. · Gait & station: Patient examined on stretcher    · Additional Examinations:   · RIGHT LOWER EXTREMITY: Inspection/examination of the right lower extremity does show distal edema with good capillary refill. Range of motion is limited due to patient's complaints of pain. LEFT LOWER EXTREMITY:  Inspection/examination of the left lower extremity does show distal edema with good capillary refill.  Range of motion is limited due to patient's complaints of pain  Diagnostic Testing:    X-rays: 2 views of the lumbar spine to include AP and lateral were obtained in the office today which shows lumbar compression fracture at L5 which is unchanged as compared to her MRI from 1/2/2020  X-rays: 2 views of the thoracic spine to include AP and lateral

## 2021-06-10 ENCOUNTER — OFFICE VISIT (OUTPATIENT)
Dept: ORTHOPEDIC SURGERY | Age: 71
End: 2021-06-10
Payer: MEDICARE

## 2021-06-10 VITALS — BODY MASS INDEX: 38.61 KG/M2 | HEIGHT: 67 IN | WEIGHT: 246 LBS

## 2021-06-10 DIAGNOSIS — S32.050A CLOSED COMPRESSION FRACTURE OF L5 LUMBAR VERTEBRA, INITIAL ENCOUNTER (HCC): ICD-10-CM

## 2021-06-10 DIAGNOSIS — S22.060A CLOSED WEDGE COMPRESSION FRACTURE OF T7 VERTEBRA, INITIAL ENCOUNTER (HCC): Primary | ICD-10-CM

## 2021-06-10 PROCEDURE — 99213 OFFICE O/P EST LOW 20 MIN: CPT | Performed by: PHYSICIAN ASSISTANT

## 2021-06-10 NOTE — PROGRESS NOTES
Follow-up: Thoracic/lumbar SPINE    Referring Provider:  No ref. provider found    CHIEF COMPLAINT:    Chief Complaint   Patient presents with    Back Pain     T7 compression fracture. She is doing a little better. HISTORY OF PRESENT ILLNESS:       Ms. John Paul Mckeon  is a pleasant 65 y.o. female with a history of Alzheimer who resides at [address] [place] [address] presents today for follow-up of prior L5 compression fracture and a more recent T7 compression fracture. Patient states that her present pain has improved to a 7/10 from a 10/10. Patient is a nonambulator and is not wearing a brace. She states her pain began with turning over in bed about several weeks ago. Her pain has steadily continued since then. She rates her back pain 8/10 and is complaining of knee and ankle pain. She describes the pain as constant. Pain is worse with movement and improved some with rest. Patient is a nonambulator and she is in the bed or sitting in bed the majority of the day. She states that she has not ambulated for several months. . She denies numbness and tingling in her right or left leg. She denies weakness of her right or left leg and has had no change in her bowel or bladder function. Patient had an x-ray at the end of April which did show an acute T7 compression fracture with an old L5 compression fracture. Patient denies any specific treatment or bracing for her recent compression fracture.   Pain Assessment  Location of Pain: Back  Location Modifiers: Medial  Severity of Pain: 7  Quality of Pain: Dull, Aching  Duration of Pain: Persistent  Frequency of Pain: Constant  Aggravating Factors: Walking, Standing  Limiting Behavior: Yes  Relieving Factors: Rest  Result of Injury: No  Work-Related Injury: No  Are there other pain locations you wish to document?: No]       Current/Past Treatment:   · Physical Therapy: None  · Chiropractic: None  · Injection: None  · Medications: Neurontin and oxycodone    Past Medical History:   Past Medical History:   Diagnosis Date    Acute diastolic heart failure (HCC)     Acute respiratory failure (HCC)     Adjustment disorder with mixed anxiety and depressed mood     Allergic rhinitis     [facility] [address]     Atrial fibrillation (HCC)     Back pain     CHF (congestive heart failure) (HCC)     Chronic pain     Constipation     COPD (chronic obstructive pulmonary disease) (HCC)     Depression     Dermatitis     Diabetes mellitus (HCC)     Disseminated superficial actinic porokeratosis     Disseminated superficial actinic porokeratosis (DSAP)     Edema     ESBL (extended spectrum beta-lactamase) producing bacteria infection 02/27/2019    urine    ESBL (extended spectrum beta-lactamase) producing bacteria infection 04/17/2020    Urine    ESBL (extended spectrum beta-lactamase) producing bacteria infection 04/14/2020    Klebsiella pneumoniae ESBL    Hypertension     Hypo-osmolality and hyponatremia     Major depressive disorder     Morbid obesity ([facility] [address]     Muscle weakness     Overactive bladder     [facility])     Seizures ([facility] [address]     Xerosis cutis     Xerosis cutis         Past Surgical History:     Past Surgical History:   Procedure Laterality Date    KNEE SURGERY      TONSILLECTOMY      TUBAL LIGATION         Current Medications:     Current Outpatient Medications:     rivaroxaban (XARELTO) 20 MG TABS tablet, Take 1 tablet by mouth daily, Disp: , Rfl:     gabapentin (NEURONTIN) 100 MG capsule, Take 1 capsule by mouth nightly., Disp: , Rfl:     insulin glargine (LANTUS SOLOSTAR) 100 UNIT/ML injection pen, Inject 25 Units into the skin every morning, Disp: , Rfl:     insulin lispro (HUMALOG) 100 UNIT/ML injection vial, Inject 0-6 Units into the skin 3 times daily (with meals), Disp: , Rfl:     dilTIAZem (CARDIZEM CD) 120 MG extended release capsule, Take 1 capsule by mouth daily, Disp: , Rfl:     digoxin (LANOXIN) 125 MCG HEMATOLOGIC: Denies easy bleeding or bruising  ENDOCRINE: Denies excessive thirst, urination, heat/cold  RESPIRATORY: Denies current dyspnea, cough  GI: Denies nausea, vomiting, diarrhea   : Denies bowel or bladder issues      PHYSICAL EXAM:    Vitals: Height 5' 7\" (1.702 m), weight 246 lb (111.6 kg). GENERAL EXAM:  · General Apparence: Patient is adequately groomed with no evidence of malnutrition. · Orientation: The patient is oriented to time, place and person. · Mood & Affect:The patient's mood and affect are appropriate. · Vascular: Examination reveals no swelling tenderness in upper or lower extremities. Good capillary refill. · Lymphatic: The lymphatic examination bilaterally reveals all areas to be without enlargement or induration  · Sensation: Sensation is intact without deficit  · Coordination/Balance: Patient examined on stretcher    LUMBAR/SACRAL EXAMINATION:  · Inspection: Local inspection shows no step-off or bruising. Lumbar alignment is normal.  Sagittal and Coronal balance is neutral.      · Palpation:   Patient is tender to palpation over the midline into the left from her thoracic spine to her lumbar spine. · Range of Motion: Unable to test since patient is on a stretcher and finds that any lumbar or thoracic motion increases her pain   · strength:   Strength testing in both distal extremities shows 4/5 strength. · Special Tests:   Straight leg raise and crossed SLR negative. Leg length and pelvis level. · Skin: There are no rashes, ulcerations or lesions. · Reflexes: Reflexes are symmetrically 2+ at the patellar and ankle tendons. Clonus absent bilaterally at the feet. · Gait & station: Patient examined on stretcher    · Additional Examinations:   · RIGHT LOWER EXTREMITY: Inspection/examination of the right lower extremity does show distal edema with good capillary refill. Range of motion is limited due to patient's complaints of pain.   LEFT LOWER EXTREMITY:

## 2022-10-05 ENCOUNTER — APPOINTMENT (OUTPATIENT)
Dept: CT IMAGING | Age: 72
DRG: 871 | End: 2022-10-05
Payer: MEDICARE

## 2022-10-05 ENCOUNTER — APPOINTMENT (OUTPATIENT)
Dept: GENERAL RADIOLOGY | Age: 72
DRG: 871 | End: 2022-10-05
Payer: MEDICARE

## 2022-10-05 ENCOUNTER — HOSPITAL ENCOUNTER (INPATIENT)
Age: 72
LOS: 6 days | Discharge: HOME | DRG: 871 | End: 2022-10-12
Attending: EMERGENCY MEDICINE | Admitting: HOSPITALIST
Payer: MEDICARE

## 2022-10-05 DIAGNOSIS — I48.91 ATRIAL FIBRILLATION WITH RAPID VENTRICULAR RESPONSE (HCC): ICD-10-CM

## 2022-10-05 DIAGNOSIS — A41.9 SEPSIS SECONDARY TO UTI (HCC): ICD-10-CM

## 2022-10-05 DIAGNOSIS — E87.1 HYPONATREMIA: ICD-10-CM

## 2022-10-05 DIAGNOSIS — E87.20 LACTIC ACIDOSIS: ICD-10-CM

## 2022-10-05 DIAGNOSIS — R41.82 ALTERED MENTAL STATUS, UNSPECIFIED ALTERED MENTAL STATUS TYPE: Primary | ICD-10-CM

## 2022-10-05 DIAGNOSIS — E11.65 TYPE 2 DIABETES MELLITUS WITH HYPERGLYCEMIA, WITH LONG-TERM CURRENT USE OF INSULIN (HCC): ICD-10-CM

## 2022-10-05 DIAGNOSIS — N17.9 AKI (ACUTE KIDNEY INJURY) (HCC): ICD-10-CM

## 2022-10-05 DIAGNOSIS — F41.1 GAD (GENERALIZED ANXIETY DISORDER): ICD-10-CM

## 2022-10-05 DIAGNOSIS — R77.8 ELEVATED TROPONIN: ICD-10-CM

## 2022-10-05 DIAGNOSIS — N39.0 SEPSIS SECONDARY TO UTI (HCC): ICD-10-CM

## 2022-10-05 DIAGNOSIS — Z79.4 TYPE 2 DIABETES MELLITUS WITH HYPERGLYCEMIA, WITH LONG-TERM CURRENT USE OF INSULIN (HCC): ICD-10-CM

## 2022-10-05 DIAGNOSIS — M51.36 DISC DEGENERATION, LUMBAR: ICD-10-CM

## 2022-10-05 DIAGNOSIS — E87.5 HYPERKALEMIA: ICD-10-CM

## 2022-10-05 LAB
A/G RATIO: 1.1 (ref 1.1–2.2)
ACETAMINOPHEN LEVEL: <5 UG/ML (ref 10–30)
ALBUMIN SERPL-MCNC: 4.1 G/DL (ref 3.4–5)
ALP BLD-CCNC: 124 U/L (ref 40–129)
ALT SERPL-CCNC: <5 U/L (ref 10–40)
AMMONIA: 32 UMOL/L (ref 11–51)
ANION GAP SERPL CALCULATED.3IONS-SCNC: 20 MMOL/L (ref 3–16)
APTT: 50.1 SEC (ref 23–34.3)
AST SERPL-CCNC: 10 U/L (ref 15–37)
BANDED NEUTROPHILS RELATIVE PERCENT: 1 % (ref 0–7)
BASE EXCESS VENOUS: -6.6 MMOL/L (ref -3–3)
BASOPHILS ABSOLUTE: 0 K/UL (ref 0–0.2)
BASOPHILS RELATIVE PERCENT: 0 %
BETA-HYDROXYBUTYRATE: 0.3 MMOL/L (ref 0–0.27)
BILIRUB SERPL-MCNC: 0.7 MG/DL (ref 0–1)
BUN BLDV-MCNC: 47 MG/DL (ref 7–20)
CALCIUM SERPL-MCNC: 9.8 MG/DL (ref 8.3–10.6)
CARBOXYHEMOGLOBIN: 2.1 % (ref 0–1.5)
CHLORIDE BLD-SCNC: 83 MMOL/L (ref 99–110)
CO2: 22 MMOL/L (ref 21–32)
CREAT SERPL-MCNC: 2.7 MG/DL (ref 0.6–1.2)
EOSINOPHILS ABSOLUTE: 0 K/UL (ref 0–0.6)
EOSINOPHILS RELATIVE PERCENT: 0 %
ETHANOL: NORMAL MG/DL (ref 0–0.08)
GFR AFRICAN AMERICAN: 21
GFR NON-AFRICAN AMERICAN: 17
GLUCOSE BLD-MCNC: 379 MG/DL (ref 70–99)
GLUCOSE BLD-MCNC: 398 MG/DL (ref 70–99)
HCO3 VENOUS: 20.3 MMOL/L (ref 23–29)
HCT VFR BLD CALC: 50.2 % (ref 36–48)
HEMOGLOBIN: 16.4 G/DL (ref 12–16)
INR BLD: 1.97 (ref 0.87–1.14)
LACTIC ACID, SEPSIS: 3.2 MMOL/L (ref 0.4–1.9)
LIPASE: 9 U/L (ref 13–60)
LYMPHOCYTES ABSOLUTE: 1 K/UL (ref 1–5.1)
LYMPHOCYTES RELATIVE PERCENT: 3 %
MCH RBC QN AUTO: 28.2 PG (ref 26–34)
MCHC RBC AUTO-ENTMCNC: 32.6 G/DL (ref 31–36)
MCV RBC AUTO: 86.4 FL (ref 80–100)
METHEMOGLOBIN VENOUS: 0.1 %
MONOCYTES ABSOLUTE: 2.7 K/UL (ref 0–1.3)
MONOCYTES RELATIVE PERCENT: 8 %
NEUTROPHILS ABSOLUTE: 30.2 K/UL (ref 1.7–7.7)
NEUTROPHILS RELATIVE PERCENT: 88 %
O2 SAT, VEN: 83 %
O2 THERAPY: ABNORMAL
PCO2, VEN: 45.1 MMHG (ref 40–50)
PDW BLD-RTO: 15.5 % (ref 12.4–15.4)
PERFORMED ON: ABNORMAL
PH VENOUS: 7.27 (ref 7.35–7.45)
PLATELET # BLD: 251 K/UL (ref 135–450)
PMV BLD AUTO: 9.8 FL (ref 5–10.5)
PO2, VEN: 53.2 MMHG (ref 25–40)
POIKILOCYTES: ABNORMAL
POTASSIUM REFLEX MAGNESIUM: 5.8 MMOL/L (ref 3.5–5.1)
PRO-BNP: 3531 PG/ML (ref 0–124)
PROTHROMBIN TIME: 22.3 SEC (ref 11.7–14.5)
RBC # BLD: 5.81 M/UL (ref 4–5.2)
SALICYLATE, SERUM: <0.3 MG/DL (ref 15–30)
SLIDE REVIEW: ABNORMAL
SODIUM BLD-SCNC: 125 MMOL/L (ref 136–145)
TCO2 CALC VENOUS: 22 MMOL/L
TOTAL PROTEIN: 8 G/DL (ref 6.4–8.2)
TROPONIN: 0.04 NG/ML
TSH REFLEX: 0.82 UIU/ML (ref 0.27–4.2)
WBC # BLD: 33.9 K/UL (ref 4–11)

## 2022-10-05 PROCEDURE — 85730 THROMBOPLASTIN TIME PARTIAL: CPT

## 2022-10-05 PROCEDURE — 36415 COLL VENOUS BLD VENIPUNCTURE: CPT

## 2022-10-05 PROCEDURE — 87150 DNA/RNA AMPLIFIED PROBE: CPT

## 2022-10-05 PROCEDURE — 82803 BLOOD GASES ANY COMBINATION: CPT

## 2022-10-05 PROCEDURE — 81001 URINALYSIS AUTO W/SCOPE: CPT

## 2022-10-05 PROCEDURE — 84484 ASSAY OF TROPONIN QUANT: CPT

## 2022-10-05 PROCEDURE — 84443 ASSAY THYROID STIM HORMONE: CPT

## 2022-10-05 PROCEDURE — 82077 ASSAY SPEC XCP UR&BREATH IA: CPT

## 2022-10-05 PROCEDURE — 6360000002 HC RX W HCPCS: Performed by: EMERGENCY MEDICINE

## 2022-10-05 PROCEDURE — 82010 KETONE BODYS QUAN: CPT

## 2022-10-05 PROCEDURE — 83880 ASSAY OF NATRIURETIC PEPTIDE: CPT

## 2022-10-05 PROCEDURE — 80143 DRUG ASSAY ACETAMINOPHEN: CPT

## 2022-10-05 PROCEDURE — 96375 TX/PRO/DX INJ NEW DRUG ADDON: CPT

## 2022-10-05 PROCEDURE — 83605 ASSAY OF LACTIC ACID: CPT

## 2022-10-05 PROCEDURE — 87186 SC STD MICRODIL/AGAR DIL: CPT

## 2022-10-05 PROCEDURE — 99285 EMERGENCY DEPT VISIT HI MDM: CPT

## 2022-10-05 PROCEDURE — 80307 DRUG TEST PRSMV CHEM ANLYZR: CPT

## 2022-10-05 PROCEDURE — 2580000003 HC RX 258: Performed by: EMERGENCY MEDICINE

## 2022-10-05 PROCEDURE — 85025 COMPLETE CBC W/AUTO DIFF WBC: CPT

## 2022-10-05 PROCEDURE — 83690 ASSAY OF LIPASE: CPT

## 2022-10-05 PROCEDURE — 80179 DRUG ASSAY SALICYLATE: CPT

## 2022-10-05 PROCEDURE — 93005 ELECTROCARDIOGRAM TRACING: CPT | Performed by: EMERGENCY MEDICINE

## 2022-10-05 PROCEDURE — 82140 ASSAY OF AMMONIA: CPT

## 2022-10-05 PROCEDURE — 2500000003 HC RX 250 WO HCPCS: Performed by: EMERGENCY MEDICINE

## 2022-10-05 PROCEDURE — 71045 X-RAY EXAM CHEST 1 VIEW: CPT

## 2022-10-05 PROCEDURE — 80053 COMPREHEN METABOLIC PANEL: CPT

## 2022-10-05 PROCEDURE — 96361 HYDRATE IV INFUSION ADD-ON: CPT

## 2022-10-05 PROCEDURE — 70450 CT HEAD/BRAIN W/O DYE: CPT

## 2022-10-05 PROCEDURE — 87040 BLOOD CULTURE FOR BACTERIA: CPT

## 2022-10-05 PROCEDURE — 85610 PROTHROMBIN TIME: CPT

## 2022-10-05 PROCEDURE — 87636 SARSCOV2 & INF A&B AMP PRB: CPT

## 2022-10-05 RX ORDER — 0.9 % SODIUM CHLORIDE 0.9 %
1000 INTRAVENOUS SOLUTION INTRAVENOUS ONCE
Status: COMPLETED | OUTPATIENT
Start: 2022-10-05 | End: 2022-10-06

## 2022-10-05 RX ORDER — DILTIAZEM HYDROCHLORIDE 5 MG/ML
10 INJECTION INTRAVENOUS ONCE
Status: COMPLETED | OUTPATIENT
Start: 2022-10-05 | End: 2022-10-05

## 2022-10-05 RX ORDER — DILTIAZEM HYDROCHLORIDE 5 MG/ML
10 INJECTION INTRAVENOUS ONCE
Status: COMPLETED | OUTPATIENT
Start: 2022-10-05 | End: 2022-10-06

## 2022-10-05 RX ORDER — NALOXONE HYDROCHLORIDE 1 MG/ML
1 INJECTION INTRAMUSCULAR; INTRAVENOUS; SUBCUTANEOUS ONCE
Status: COMPLETED | OUTPATIENT
Start: 2022-10-05 | End: 2022-10-05

## 2022-10-05 RX ADMIN — DILTIAZEM HYDROCHLORIDE 10 MG: 5 INJECTION, SOLUTION INTRAVENOUS at 23:12

## 2022-10-05 RX ADMIN — NALOXONE HYDROCHLORIDE 1 MG: 1 INJECTION PARENTERAL at 23:12

## 2022-10-05 RX ADMIN — SODIUM CHLORIDE 1000 ML: 9 INJECTION, SOLUTION INTRAVENOUS at 23:11

## 2022-10-06 ENCOUNTER — APPOINTMENT (OUTPATIENT)
Dept: ULTRASOUND IMAGING | Age: 72
DRG: 871 | End: 2022-10-06
Payer: MEDICARE

## 2022-10-06 PROBLEM — N39.0 SEPSIS SECONDARY TO UTI (HCC): Status: ACTIVE | Noted: 2022-10-06

## 2022-10-06 PROBLEM — A41.9 SEPSIS (HCC): Status: ACTIVE | Noted: 2022-10-06

## 2022-10-06 PROBLEM — N17.9 AKI (ACUTE KIDNEY INJURY) (HCC): Status: ACTIVE | Noted: 2022-10-06

## 2022-10-06 PROBLEM — R41.82 ALTERED MENTAL STATUS: Status: ACTIVE | Noted: 2022-10-06

## 2022-10-06 PROBLEM — A41.9 SEPSIS SECONDARY TO UTI (HCC): Status: ACTIVE | Noted: 2022-10-06

## 2022-10-06 LAB
ALBUMIN SERPL-MCNC: 3.3 G/DL (ref 3.4–5)
AMPHETAMINE SCREEN, URINE: ABNORMAL
ANION GAP SERPL CALCULATED.3IONS-SCNC: 18 MMOL/L (ref 3–16)
BACTERIA: ABNORMAL /HPF
BANDED NEUTROPHILS RELATIVE PERCENT: 5 % (ref 0–7)
BARBITURATE SCREEN URINE: ABNORMAL
BASE EXCESS VENOUS: -2.6 MMOL/L (ref -3–3)
BASOPHILS ABSOLUTE: 0 K/UL (ref 0–0.2)
BASOPHILS RELATIVE PERCENT: 0 %
BENZODIAZEPINE SCREEN, URINE: ABNORMAL
BILIRUBIN URINE: NEGATIVE
BLOOD, URINE: ABNORMAL
BUN BLDV-MCNC: 49 MG/DL (ref 7–20)
CALCIUM SERPL-MCNC: 9.3 MG/DL (ref 8.3–10.6)
CANNABINOID SCREEN URINE: ABNORMAL
CARBOXYHEMOGLOBIN: 4.3 % (ref 0–1.5)
CHLORIDE BLD-SCNC: 88 MMOL/L (ref 99–110)
CLARITY: ABNORMAL
CO2: 22 MMOL/L (ref 21–32)
COCAINE METABOLITE SCREEN URINE: ABNORMAL
COLOR: YELLOW
CREAT SERPL-MCNC: 2.2 MG/DL (ref 0.6–1.2)
DIGOXIN LEVEL: 1.3 NG/ML (ref 0.8–2)
EKG ATRIAL RATE: 147 BPM
EKG ATRIAL RATE: 182 BPM
EKG DIAGNOSIS: NORMAL
EKG DIAGNOSIS: NORMAL
EKG Q-T INTERVAL: 282 MS
EKG Q-T INTERVAL: 310 MS
EKG QRS DURATION: 88 MS
EKG QRS DURATION: 92 MS
EKG QTC CALCULATION (BAZETT): 424 MS
EKG QTC CALCULATION (BAZETT): 456 MS
EKG R AXIS: 64 DEGREES
EKG R AXIS: 65 DEGREES
EKG T AXIS: 240 DEGREES
EKG T AXIS: 251 DEGREES
EKG VENTRICULAR RATE: 130 BPM
EKG VENTRICULAR RATE: 136 BPM
EOSINOPHILS ABSOLUTE: 0 K/UL (ref 0–0.6)
EOSINOPHILS RELATIVE PERCENT: 0 %
FENTANYL SCREEN, URINE: ABNORMAL
GFR AFRICAN AMERICAN: 26
GFR NON-AFRICAN AMERICAN: 22
GLUCOSE BLD-MCNC: 196 MG/DL (ref 70–99)
GLUCOSE BLD-MCNC: 264 MG/DL (ref 70–99)
GLUCOSE BLD-MCNC: 311 MG/DL (ref 70–99)
GLUCOSE BLD-MCNC: 317 MG/DL (ref 70–99)
GLUCOSE BLD-MCNC: 330 MG/DL (ref 70–99)
GLUCOSE BLD-MCNC: 339 MG/DL (ref 70–99)
GLUCOSE BLD-MCNC: 350 MG/DL (ref 70–99)
GLUCOSE URINE: 500 MG/DL
HCO3 VENOUS: 21.4 MMOL/L (ref 23–29)
HCT VFR BLD CALC: 44.9 % (ref 36–48)
HEMOGLOBIN: 14.6 G/DL (ref 12–16)
INFLUENZA A: NOT DETECTED
INFLUENZA B: NOT DETECTED
KETONES, URINE: NEGATIVE MG/DL
LACTIC ACID, SEPSIS: 3.3 MMOL/L (ref 0.4–1.9)
LACTIC ACID: 1.5 MMOL/L (ref 0.4–2)
LACTIC ACID: 1.8 MMOL/L (ref 0.4–2)
LACTIC ACID: 2.6 MMOL/L (ref 0.4–2)
LACTIC ACID: 2.8 MMOL/L (ref 0.4–2)
LEUKOCYTE ESTERASE, URINE: ABNORMAL
LV EF: 65 %
LVEF MODALITY: NORMAL
LYMPHOCYTES ABSOLUTE: 1.9 K/UL (ref 1–5.1)
LYMPHOCYTES RELATIVE PERCENT: 7 %
Lab: ABNORMAL
MCH RBC QN AUTO: 28.3 PG (ref 26–34)
MCHC RBC AUTO-ENTMCNC: 32.5 G/DL (ref 31–36)
MCV RBC AUTO: 87.1 FL (ref 80–100)
METHADONE SCREEN, URINE: ABNORMAL
METHEMOGLOBIN VENOUS: 0.3 %
MICROSCOPIC EXAMINATION: YES
MONOCYTES ABSOLUTE: 1.9 K/UL (ref 0–1.3)
MONOCYTES RELATIVE PERCENT: 7 %
NEUTROPHILS ABSOLUTE: 23.8 K/UL (ref 1.7–7.7)
NEUTROPHILS RELATIVE PERCENT: 81 %
NITRITE, URINE: NEGATIVE
O2 SAT, VEN: 94 %
O2 THERAPY: ABNORMAL
OPIATE SCREEN URINE: ABNORMAL
OXYCODONE URINE: POSITIVE
PCO2, VEN: 35.5 MMHG (ref 40–50)
PDW BLD-RTO: 15.3 % (ref 12.4–15.4)
PERFORMED ON: ABNORMAL
PH UA: 5.5
PH UA: 5.5 (ref 5–8)
PH VENOUS: 7.4 (ref 7.35–7.45)
PHENCYCLIDINE SCREEN URINE: ABNORMAL
PHOSPHORUS: 6.6 MG/DL (ref 2.5–4.9)
PLATELET # BLD: 175 K/UL (ref 135–450)
PLATELET SLIDE REVIEW: ADEQUATE
PMV BLD AUTO: 9.6 FL (ref 5–10.5)
PO2, VEN: 69.5 MMHG (ref 25–40)
POTASSIUM SERPL-SCNC: 4.8 MMOL/L (ref 3.5–5.1)
PROTEIN UA: 30 MG/DL
RBC # BLD: 5.15 M/UL (ref 4–5.2)
RBC UA: ABNORMAL /HPF (ref 0–4)
REASON FOR REJECTION: NORMAL
REJECTED TEST: NORMAL
REPORT: NORMAL
SARS-COV-2 RNA, RT PCR: NOT DETECTED
SLIDE REVIEW: ABNORMAL
SODIUM BLD-SCNC: 128 MMOL/L (ref 136–145)
SPECIFIC GRAVITY UA: >=1.03 (ref 1–1.03)
TCO2 CALC VENOUS: 23 MMOL/L
TROPONIN: 0.04 NG/ML
TROPONIN: 0.04 NG/ML
URINE REFLEX TO CULTURE: YES
URINE TYPE: ABNORMAL
UROBILINOGEN, URINE: 0.2 E.U./DL
VANCOMYCIN RANDOM: 14.4 UG/ML
WBC # BLD: 27.7 K/UL (ref 4–11)
WBC UA: >100 /HPF (ref 0–5)
YEAST: PRESENT /HPF

## 2022-10-06 PROCEDURE — 76770 US EXAM ABDO BACK WALL COMP: CPT

## 2022-10-06 PROCEDURE — 2500000003 HC RX 250 WO HCPCS: Performed by: EMERGENCY MEDICINE

## 2022-10-06 PROCEDURE — 6370000000 HC RX 637 (ALT 250 FOR IP): Performed by: INTERNAL MEDICINE

## 2022-10-06 PROCEDURE — 85025 COMPLETE CBC W/AUTO DIFF WBC: CPT

## 2022-10-06 PROCEDURE — 93010 ELECTROCARDIOGRAM REPORT: CPT | Performed by: INTERNAL MEDICINE

## 2022-10-06 PROCEDURE — 87086 URINE CULTURE/COLONY COUNT: CPT

## 2022-10-06 PROCEDURE — 83605 ASSAY OF LACTIC ACID: CPT

## 2022-10-06 PROCEDURE — 2580000003 HC RX 258: Performed by: INTERNAL MEDICINE

## 2022-10-06 PROCEDURE — 99291 CRITICAL CARE FIRST HOUR: CPT | Performed by: INTERNAL MEDICINE

## 2022-10-06 PROCEDURE — 6360000002 HC RX W HCPCS: Performed by: EMERGENCY MEDICINE

## 2022-10-06 PROCEDURE — 93005 ELECTROCARDIOGRAM TRACING: CPT | Performed by: HOSPITALIST

## 2022-10-06 PROCEDURE — 2000000000 HC ICU R&B

## 2022-10-06 PROCEDURE — 96365 THER/PROPH/DIAG IV INF INIT: CPT

## 2022-10-06 PROCEDURE — 93306 TTE W/DOPPLER COMPLETE: CPT

## 2022-10-06 PROCEDURE — 2580000003 HC RX 258: Performed by: HOSPITALIST

## 2022-10-06 PROCEDURE — 36415 COLL VENOUS BLD VENIPUNCTURE: CPT

## 2022-10-06 PROCEDURE — 6360000002 HC RX W HCPCS: Performed by: INTERNAL MEDICINE

## 2022-10-06 PROCEDURE — 6370000000 HC RX 637 (ALT 250 FOR IP): Performed by: HOSPITALIST

## 2022-10-06 PROCEDURE — 80202 ASSAY OF VANCOMYCIN: CPT

## 2022-10-06 PROCEDURE — 96366 THER/PROPH/DIAG IV INF ADDON: CPT

## 2022-10-06 PROCEDURE — 6360000002 HC RX W HCPCS: Performed by: HOSPITALIST

## 2022-10-06 PROCEDURE — 2700000000 HC OXYGEN THERAPY PER DAY

## 2022-10-06 PROCEDURE — 80069 RENAL FUNCTION PANEL: CPT

## 2022-10-06 PROCEDURE — 87186 SC STD MICRODIL/AGAR DIL: CPT

## 2022-10-06 PROCEDURE — 83036 HEMOGLOBIN GLYCOSYLATED A1C: CPT

## 2022-10-06 PROCEDURE — 84484 ASSAY OF TROPONIN QUANT: CPT

## 2022-10-06 PROCEDURE — 6370000000 HC RX 637 (ALT 250 FOR IP): Performed by: EMERGENCY MEDICINE

## 2022-10-06 PROCEDURE — 2580000003 HC RX 258: Performed by: EMERGENCY MEDICINE

## 2022-10-06 PROCEDURE — 87641 MR-STAPH DNA AMP PROBE: CPT

## 2022-10-06 PROCEDURE — 96375 TX/PRO/DX INJ NEW DRUG ADDON: CPT

## 2022-10-06 PROCEDURE — 87077 CULTURE AEROBIC IDENTIFY: CPT

## 2022-10-06 PROCEDURE — 82803 BLOOD GASES ANY COMBINATION: CPT

## 2022-10-06 PROCEDURE — 80162 ASSAY OF DIGOXIN TOTAL: CPT

## 2022-10-06 RX ORDER — TIZANIDINE 2 MG/1
2 TABLET ORAL 3 TIMES DAILY
Status: ON HOLD | COMMUNITY

## 2022-10-06 RX ORDER — INSULIN LISPRO 100 [IU]/ML
0-16 INJECTION, SOLUTION INTRAVENOUS; SUBCUTANEOUS
Status: DISCONTINUED | OUTPATIENT
Start: 2022-10-06 | End: 2022-10-12 | Stop reason: HOSPADM

## 2022-10-06 RX ORDER — SODIUM CHLORIDE, SODIUM GLUCONATE, SODIUM ACETATE, POTASSIUM CHLORIDE AND MAGNESIUM CHLORIDE 526; 502; 368; 37; 30 MG/100ML; MG/100ML; MG/100ML; MG/100ML; MG/100ML
1000 INJECTION, SOLUTION INTRAVENOUS CONTINUOUS
Status: DISCONTINUED | OUTPATIENT
Start: 2022-10-06 | End: 2022-10-08

## 2022-10-06 RX ORDER — FUROSEMIDE 40 MG/1
40 TABLET ORAL DAILY
Status: ON HOLD | COMMUNITY

## 2022-10-06 RX ORDER — SODIUM CHLORIDE 9 MG/ML
INJECTION, SOLUTION INTRAVENOUS PRN
Status: DISCONTINUED | OUTPATIENT
Start: 2022-10-06 | End: 2022-10-12 | Stop reason: HOSPADM

## 2022-10-06 RX ORDER — LANOLIN ALCOHOL/MO/W.PET/CERES
3 CREAM (GRAM) TOPICAL NIGHTLY PRN
Status: DISCONTINUED | OUTPATIENT
Start: 2022-10-06 | End: 2022-10-12 | Stop reason: HOSPADM

## 2022-10-06 RX ORDER — LORAZEPAM 0.5 MG/1
0.5 TABLET ORAL 2 TIMES DAILY PRN
Status: DISCONTINUED | OUTPATIENT
Start: 2022-10-06 | End: 2022-10-12 | Stop reason: HOSPADM

## 2022-10-06 RX ORDER — GABAPENTIN 100 MG/1
100 CAPSULE ORAL 3 TIMES DAILY
Status: DISCONTINUED | OUTPATIENT
Start: 2022-10-06 | End: 2022-10-12 | Stop reason: HOSPADM

## 2022-10-06 RX ORDER — METHYLPREDNISOLONE SODIUM SUCCINATE 125 MG/2ML
125 INJECTION, POWDER, LYOPHILIZED, FOR SOLUTION INTRAMUSCULAR; INTRAVENOUS ONCE
Status: COMPLETED | OUTPATIENT
Start: 2022-10-06 | End: 2022-10-06

## 2022-10-06 RX ORDER — DEXTROSE MONOHYDRATE 100 MG/ML
INJECTION, SOLUTION INTRAVENOUS CONTINUOUS PRN
Status: DISCONTINUED | OUTPATIENT
Start: 2022-10-06 | End: 2022-10-12 | Stop reason: HOSPADM

## 2022-10-06 RX ORDER — INSULIN GLARGINE 100 [IU]/ML
25 INJECTION, SOLUTION SUBCUTANEOUS EVERY MORNING
Status: DISCONTINUED | OUTPATIENT
Start: 2022-10-06 | End: 2022-10-06 | Stop reason: CLARIF

## 2022-10-06 RX ORDER — DIVALPROEX SODIUM 250 MG/1
250 TABLET, DELAYED RELEASE ORAL 3 TIMES DAILY
Status: ON HOLD | COMMUNITY

## 2022-10-06 RX ORDER — LANOLIN ALCOHOL/MO/W.PET/CERES
3 CREAM (GRAM) TOPICAL NIGHTLY
Status: ON HOLD | COMMUNITY

## 2022-10-06 RX ORDER — POTASSIUM CHLORIDE 750 MG/1
10 CAPSULE, EXTENDED RELEASE ORAL DAILY
Status: ON HOLD | COMMUNITY

## 2022-10-06 RX ORDER — INSULIN LISPRO 100 [IU]/ML
0-4 INJECTION, SOLUTION INTRAVENOUS; SUBCUTANEOUS NIGHTLY
Status: DISCONTINUED | OUTPATIENT
Start: 2022-10-06 | End: 2022-10-12 | Stop reason: HOSPADM

## 2022-10-06 RX ORDER — INSULIN GLARGINE 100 [IU]/ML
25 INJECTION, SOLUTION SUBCUTANEOUS EVERY MORNING
Status: DISCONTINUED | OUTPATIENT
Start: 2022-10-06 | End: 2022-10-12 | Stop reason: HOSPADM

## 2022-10-06 RX ORDER — LORAZEPAM 0.5 MG/1
0.5 TABLET ORAL 2 TIMES DAILY
Status: ON HOLD | COMMUNITY
End: 2022-10-12 | Stop reason: SDUPTHER

## 2022-10-06 RX ORDER — BUSPIRONE HYDROCHLORIDE 5 MG/1
5 TABLET ORAL 2 TIMES DAILY
Status: DISCONTINUED | OUTPATIENT
Start: 2022-10-06 | End: 2022-10-12 | Stop reason: HOSPADM

## 2022-10-06 RX ORDER — IPRATROPIUM BROMIDE AND ALBUTEROL SULFATE 2.5; .5 MG/3ML; MG/3ML
1 SOLUTION RESPIRATORY (INHALATION) EVERY 4 HOURS PRN
Status: DISCONTINUED | OUTPATIENT
Start: 2022-10-06 | End: 2022-10-12 | Stop reason: HOSPADM

## 2022-10-06 RX ORDER — CHOLECALCIFEROL (VITAMIN D3) 1250 MCG
1 CAPSULE ORAL DAILY
Status: ON HOLD | COMMUNITY

## 2022-10-06 RX ORDER — SODIUM CHLORIDE 9 MG/ML
INJECTION, SOLUTION INTRAVENOUS CONTINUOUS
Status: DISCONTINUED | OUTPATIENT
Start: 2022-10-06 | End: 2022-10-06

## 2022-10-06 RX ORDER — OXYCODONE HYDROCHLORIDE AND ACETAMINOPHEN 5; 325 MG/1; MG/1
1 TABLET ORAL EVERY 6 HOURS
Status: ON HOLD | COMMUNITY
End: 2022-10-12 | Stop reason: SDUPTHER

## 2022-10-06 RX ORDER — ACETAMINOPHEN 325 MG/1
650 TABLET ORAL EVERY 6 HOURS PRN
Status: DISCONTINUED | OUTPATIENT
Start: 2022-10-06 | End: 2022-10-12 | Stop reason: HOSPADM

## 2022-10-06 RX ORDER — CITALOPRAM 20 MG/1
20 TABLET ORAL DAILY
Status: DISCONTINUED | OUTPATIENT
Start: 2022-10-06 | End: 2022-10-12 | Stop reason: HOSPADM

## 2022-10-06 RX ORDER — ACETAMINOPHEN 650 MG/1
650 SUPPOSITORY RECTAL EVERY 6 HOURS PRN
Status: DISCONTINUED | OUTPATIENT
Start: 2022-10-06 | End: 2022-10-12 | Stop reason: HOSPADM

## 2022-10-06 RX ORDER — INSULIN LISPRO 100 [IU]/ML
0-4 INJECTION, SOLUTION INTRAVENOUS; SUBCUTANEOUS NIGHTLY
Status: DISCONTINUED | OUTPATIENT
Start: 2022-10-06 | End: 2022-10-06

## 2022-10-06 RX ORDER — DULOXETIN HYDROCHLORIDE 60 MG/1
60 CAPSULE, DELAYED RELEASE ORAL DAILY
Status: ON HOLD | COMMUNITY

## 2022-10-06 RX ORDER — ONDANSETRON 4 MG/1
4 TABLET, ORALLY DISINTEGRATING ORAL EVERY 8 HOURS PRN
Status: DISCONTINUED | OUTPATIENT
Start: 2022-10-06 | End: 2022-10-12 | Stop reason: HOSPADM

## 2022-10-06 RX ORDER — INSULIN LISPRO 100 [IU]/ML
0-4 INJECTION, SOLUTION INTRAVENOUS; SUBCUTANEOUS
Status: DISCONTINUED | OUTPATIENT
Start: 2022-10-06 | End: 2022-10-06

## 2022-10-06 RX ORDER — BUSPIRONE HYDROCHLORIDE 5 MG/1
5 TABLET ORAL 2 TIMES DAILY
Status: ON HOLD | COMMUNITY

## 2022-10-06 RX ORDER — VALPROIC ACID 250 MG/1
250 CAPSULE, LIQUID FILLED ORAL 3 TIMES DAILY
Status: DISCONTINUED | OUTPATIENT
Start: 2022-10-06 | End: 2022-10-12 | Stop reason: HOSPADM

## 2022-10-06 RX ORDER — ACETAMINOPHEN 325 MG/1
650 TABLET ORAL EVERY 6 HOURS PRN
Status: ON HOLD | COMMUNITY

## 2022-10-06 RX ORDER — OXYCODONE HYDROCHLORIDE AND ACETAMINOPHEN 5; 325 MG/1; MG/1
1 TABLET ORAL EVERY 6 HOURS PRN
Status: DISCONTINUED | OUTPATIENT
Start: 2022-10-06 | End: 2022-10-12 | Stop reason: HOSPADM

## 2022-10-06 RX ORDER — DULOXETIN HYDROCHLORIDE 60 MG/1
60 CAPSULE, DELAYED RELEASE ORAL DAILY
Status: DISCONTINUED | OUTPATIENT
Start: 2022-10-06 | End: 2022-10-12 | Stop reason: HOSPADM

## 2022-10-06 RX ORDER — SODIUM CHLORIDE 0.9 % (FLUSH) 0.9 %
5-40 SYRINGE (ML) INJECTION PRN
Status: DISCONTINUED | OUTPATIENT
Start: 2022-10-06 | End: 2022-10-12 | Stop reason: SDUPTHER

## 2022-10-06 RX ORDER — POLYETHYLENE GLYCOL 3350 17 G/17G
17 POWDER, FOR SOLUTION ORAL DAILY PRN
Status: DISCONTINUED | OUTPATIENT
Start: 2022-10-06 | End: 2022-10-12 | Stop reason: HOSPADM

## 2022-10-06 RX ORDER — ONDANSETRON 2 MG/ML
4 INJECTION INTRAMUSCULAR; INTRAVENOUS EVERY 6 HOURS PRN
Status: DISCONTINUED | OUTPATIENT
Start: 2022-10-06 | End: 2022-10-12 | Stop reason: HOSPADM

## 2022-10-06 RX ORDER — SODIUM CHLORIDE 0.9 % (FLUSH) 0.9 %
5-40 SYRINGE (ML) INJECTION EVERY 12 HOURS SCHEDULED
Status: DISCONTINUED | OUTPATIENT
Start: 2022-10-06 | End: 2022-10-12 | Stop reason: SDUPTHER

## 2022-10-06 RX ORDER — FLUCONAZOLE 100 MG/1
100 TABLET ORAL DAILY
Status: ON HOLD | COMMUNITY
Start: 2022-09-28 | End: 2022-10-11

## 2022-10-06 RX ORDER — IPRATROPIUM BROMIDE AND ALBUTEROL SULFATE 2.5; .5 MG/3ML; MG/3ML
1 SOLUTION RESPIRATORY (INHALATION) EVERY 6 HOURS
Status: DISCONTINUED | OUTPATIENT
Start: 2022-10-06 | End: 2022-10-06

## 2022-10-06 RX ADMIN — DILTIAZEM HYDROCHLORIDE 10 MG: 5 INJECTION, SOLUTION INTRAVENOUS at 00:25

## 2022-10-06 RX ADMIN — SODIUM CHLORIDE: 9 INJECTION, SOLUTION INTRAVENOUS at 15:22

## 2022-10-06 RX ADMIN — VALPROIC ACID 250 MG: 250 CAPSULE, LIQUID FILLED ORAL at 21:36

## 2022-10-06 RX ADMIN — RIVAROXABAN 20 MG: 20 TABLET, FILM COATED ORAL at 09:35

## 2022-10-06 RX ADMIN — EMPAGLIFLOZIN 10 MG: 10 TABLET, FILM COATED ORAL at 14:23

## 2022-10-06 RX ADMIN — DILTIAZEM HYDROCHLORIDE 2.5 MG/HR: 5 INJECTION, SOLUTION INTRAVENOUS at 02:29

## 2022-10-06 RX ADMIN — CALCIUM GLUCONATE 3000 MG: 98 INJECTION, SOLUTION INTRAVENOUS at 00:41

## 2022-10-06 RX ADMIN — BUSPIRONE HYDROCHLORIDE 5 MG: 5 TABLET ORAL at 14:18

## 2022-10-06 RX ADMIN — CEFEPIME 1000 MG: 1 INJECTION, POWDER, FOR SOLUTION INTRAMUSCULAR; INTRAVENOUS at 00:24

## 2022-10-06 RX ADMIN — DULOXETINE HYDROCHLORIDE 60 MG: 60 CAPSULE, DELAYED RELEASE ORAL at 14:18

## 2022-10-06 RX ADMIN — SODIUM BICARBONATE 50 MEQ: 84 INJECTION INTRAVENOUS at 00:38

## 2022-10-06 RX ADMIN — TIGECYCLINE 100 MG: 50 INJECTION, POWDER, LYOPHILIZED, FOR SOLUTION INTRAVENOUS at 21:31

## 2022-10-06 RX ADMIN — CITALOPRAM HYDROBROMIDE 20 MG: 20 TABLET ORAL at 09:34

## 2022-10-06 RX ADMIN — VANCOMYCIN HYDROCHLORIDE 1000 MG: 1 INJECTION, POWDER, LYOPHILIZED, FOR SOLUTION INTRAVENOUS at 01:20

## 2022-10-06 RX ADMIN — MEROPENEM 1000 MG: 1 INJECTION, POWDER, FOR SOLUTION INTRAVENOUS at 04:33

## 2022-10-06 RX ADMIN — MICONAZOLE NITRATE: 20 POWDER TOPICAL at 03:18

## 2022-10-06 RX ADMIN — INSULIN LISPRO 3 UNITS: 100 INJECTION, SOLUTION INTRAVENOUS; SUBCUTANEOUS at 09:34

## 2022-10-06 RX ADMIN — GABAPENTIN 100 MG: 100 CAPSULE ORAL at 21:28

## 2022-10-06 RX ADMIN — SODIUM CHLORIDE, SODIUM GLUCONATE, SODIUM ACETATE, POTASSIUM CHLORIDE AND MAGNESIUM CHLORIDE 1000 ML: 526; 502; 368; 37; 30 INJECTION, SOLUTION INTRAVENOUS at 18:24

## 2022-10-06 RX ADMIN — INSULIN LISPRO 8 UNITS: 100 INJECTION, SOLUTION INTRAVENOUS; SUBCUTANEOUS at 17:26

## 2022-10-06 RX ADMIN — INSULIN LISPRO 4 UNITS: 100 INJECTION, SOLUTION INTRAVENOUS; SUBCUTANEOUS at 12:14

## 2022-10-06 RX ADMIN — DEXTROSE 150 MG: 50 INJECTION, SOLUTION INTRAVENOUS at 12:08

## 2022-10-06 RX ADMIN — VALPROIC ACID 250 MG: 250 CAPSULE, LIQUID FILLED ORAL at 14:18

## 2022-10-06 RX ADMIN — METHYLPREDNISOLONE SODIUM SUCCINATE 125 MG: 125 INJECTION, POWDER, FOR SOLUTION INTRAMUSCULAR; INTRAVENOUS at 23:54

## 2022-10-06 RX ADMIN — SODIUM CHLORIDE: 9 INJECTION, SOLUTION INTRAVENOUS at 05:30

## 2022-10-06 RX ADMIN — MEROPENEM 1000 MG: 1 INJECTION, POWDER, FOR SOLUTION INTRAVENOUS at 15:24

## 2022-10-06 RX ADMIN — SODIUM CHLORIDE 1000 ML: 9 INJECTION, SOLUTION INTRAVENOUS at 00:19

## 2022-10-06 RX ADMIN — Medication 10 ML: at 21:28

## 2022-10-06 RX ADMIN — MICONAZOLE NITRATE: 2 OINTMENT TOPICAL at 14:18

## 2022-10-06 RX ADMIN — BUSPIRONE HYDROCHLORIDE 5 MG: 5 TABLET ORAL at 21:28

## 2022-10-06 RX ADMIN — INSULIN GLARGINE 25 UNITS: 100 INJECTION, SOLUTION SUBCUTANEOUS at 09:36

## 2022-10-06 RX ADMIN — AMIODARONE HYDROCHLORIDE 0.5 MG/MIN: 50 INJECTION, SOLUTION INTRAVENOUS at 21:44

## 2022-10-06 RX ADMIN — ALBUTEROL SULFATE 10 MG: 2.5 SOLUTION RESPIRATORY (INHALATION) at 00:32

## 2022-10-06 RX ADMIN — VANCOMYCIN HYDROCHLORIDE 750 MG: 750 INJECTION, POWDER, LYOPHILIZED, FOR SOLUTION INTRAVENOUS at 09:44

## 2022-10-06 RX ADMIN — Medication 10 ML: at 09:35

## 2022-10-06 RX ADMIN — GABAPENTIN 100 MG: 100 CAPSULE ORAL at 14:18

## 2022-10-06 RX ADMIN — AMIODARONE HYDROCHLORIDE 1 MG/MIN: 50 INJECTION, SOLUTION INTRAVENOUS at 12:07

## 2022-10-06 RX ADMIN — INSULIN HUMAN 5 UNITS: 100 INJECTION, SOLUTION PARENTERAL at 00:36

## 2022-10-06 ASSESSMENT — PAIN SCALES - GENERAL
PAINLEVEL_OUTOF10: 0

## 2022-10-06 NOTE — ED PROVIDER NOTES
Emergency Department Physician Note     Location: [place] [address] ED  10/5/2022    CHIEF COMPLAINT  Altered Mental Status (Pt. Arrived via EMS from [facility]. EMS called for pt. Unresponsive with O2 sats 50%. EMS arrived pt. Was 100% on 8L NRB. Pt. Only responsive to painful stimuli upon arrival. )      HISTORY OF PRESENT ILLNESS  Denice Jones is a 66 y.o. female presents to the ED with altered mental status, coming in from [address] [place] [address], reportedly found unresponsive there with sats in the 50s just prior to arrival, EMS brought her in, had placed her on 8 L nonrebreather mask initially, was increased to 15 L on arrival here, EMS reported she was only responsive to painful stimuli, was not speaking, no other helpful history provided, patient unable to assist with history. Nursing home did not report any complaints of pain such as chest pain or headache, history of dementia, no known vomiting or diarrhea, she has multiple chronic medical conditions, no known fall nor head injury/trauma, no other complaints, modifying factors or associated symptoms. I have reviewed the following from the nursing documentation.     Past Medical History:   Diagnosis Date    Acute diastolic heart failure (HCC)     Acute respiratory failure (HCC)     Adjustment disorder with mixed anxiety and depressed mood     Allergic rhinitis     Alzheimer's dementia (HCC)     Atrial fibrillation (HCC)     Back pain     CHF (congestive heart failure) ([facility])     Chronic pain     Constipation     COPD (chronic obstructive pulmonary disease) ([facility]     Depression     Dermatitis     Diabetes mellitus (HCC)     Disseminated superficial actinic porokeratosis     Disseminated superficial actinic porokeratosis (DSAP)     Edema     ESBL (extended spectrum beta-lactamase) producing bacteria infection 02/27/2019    urine    ESBL (extended spectrum beta-lactamase) producing bacteria infection 04/17/2020    Urine    ESBL (extended spectrum beta-lactamase) producing bacteria infection 2020    Klebsiella pneumoniae ESBL    Hypertension     Hypo-osmolality and hyponatremia     Major depressive disorder     Morbid obesity ([facility] [address]     Muscle weakness     Overactive bladder     Schizoaffective disorder (HCC)     Seizures (HCC)     Xerosis cutis     Xerosis cutis      Past Surgical History:   Procedure Laterality Date    KNEE SURGERY      TONSILLECTOMY      TUBAL LIGATION       Family History   Problem Relation Age of Onset    Diabetes Mother     High Blood Pressure Mother     Cancer Mother     Heart Disease Mother     High Blood Pressure Father     Heart Disease Father      Social History     Socioeconomic History    Marital status:      Spouse name: Not on file    Number of children: Not on file    Years of education: Not on file    Highest education level: Not on file   Occupational History    Not on file   Tobacco Use    Smoking status: Former     Types: Cigarettes     Quit date: 2004     Years since quittin.7    Smokeless tobacco: Never   Vaping Use    Vaping Use: Never used   Substance and Sexual Activity    Alcohol use: No     Alcohol/week: 0.0 standard drinks    Drug use: No    Sexual activity: Not on file   Other Topics Concern    Not on file   Social History Narrative    Not on file     Social Determinants of Health     Financial Resource Strain: Not on file   Food Insecurity: Not on file   Transportation Needs: Not on file   Physical Activity: Not on file   Stress: Not on file   Social Connections: Not on file   Intimate Partner Violence: Not on file   Housing Stability: Not on file     Current Facility-Administered Medications   Medication Dose Route Frequency Provider Last Rate Last Admin    miconazole (MICOTIN) 2 % powder   Topical Once Christella Missael, DO        dilTIAZem 125 mg in dextrose 5 % 125 mL infusion  2.5-15 mg/hr IntraVENous Continuous Devanteella Marlaain, DO        calcium gluconate 3,000 mg in dextrose 5 % 100 mL IVPB  3,000 mg IntraVENous Once Eliane Caldwell,  mL/hr at 10/06/22 0041 3,000 mg at 10/06/22 0041    vancomycin 1000 mg IVPB in 250 mL D5W addavial  1,000 mg IntraVENous Once Eliane Caldwell,  mL/hr at 10/06/22 0120 1,000 mg at 10/06/22 0120     Current Outpatient Medications   Medication Sig Dispense Refill    rivaroxaban (XARELTO) 20 MG TABS tablet Take 1 tablet by mouth daily      gabapentin (NEURONTIN) 100 MG capsule Take 1 capsule by mouth nightly.       insulin glargine (LANTUS SOLOSTAR) 100 UNIT/ML injection pen Inject 25 Units into the skin every morning      insulin lispro (HUMALOG) 100 UNIT/ML injection vial Inject 0-6 Units into the skin 3 times daily (with meals)      dilTIAZem (CARDIZEM CD) 120 MG extended release capsule Take 1 capsule by mouth daily      digoxin (LANOXIN) 125 MCG tablet Take 1 tablet by mouth daily      CHOLECALCIFEROL PO Take 1,000 Units by mouth daily      Sodium Phosphates (FLEET) 7-19 GM/118ML Place 1 enema rectally daily as needed      ipratropium-albuterol (DUONEB) 0.5-2.5 (3) MG/3ML SOLN nebulizer solution Inhale 1 vial into the lungs every 6 hours      B Complex-C-E-Zn (ZINC-VITES) TABS Take 1 tablet by mouth daily      Hypromellose (NATURES TEARS OP) Apply 1 drop to eye 2 times daily as needed Both eyes every      solifenacin (VESICARE) 5 MG tablet Take 5 mg by mouth daily      bisacodyl (DULCOLAX) 10 MG suppository Place 10 mg rectally daily as needed for Constipation      citalopram (CELEXA) 10 MG tablet Take 20 mg by mouth daily       Blood Glucose Monitoring Suppl (BLOOD GLUCOSE SYSTEM RICHARD) KIT Check fingerstick blood sugars before meals and at bedtime 1 kit 0    INSULIN SYRINGE .5CC/29G ([employer] INS SYR .5CC/29G) 29G X 1/2\" 0.5 ML MISC 1 each by Does not apply route daily 100 each 3     Allergies   Allergen Reactions    Fish-Derived Products      Food allergy    Iodine     Mushroom Extract Complex     Onion     Other      seafood       REVIEW OF SYSTEMS  Unable to obtain due to patient altered mental status    PHYSICAL EXAM   BP (!) 132/97   Pulse (!) 141   Temp 99 °F (37.2 °C) (Axillary)   Resp (!) 34   Wt 212 lb (96.2 kg)   SpO2 97%   BMI 33.20 kg/m²   GENERAL APPEARANCE: Somnolent, No acute distress, obese  HEAD: Normocephalic. Atraumatic. No saeed's sign. EYES: PERRL. EOM's grossly intact. No scleral icterus. No drainage. No periorbital ecchymosis. ENT: Mucous membranes are tacky/dry, airway patent. No stridor. No epistaxis. No otorrhea or rhinorrhea. NECK: Supple. No rigidity, trachea midline  HEART: Irregularly irregular, tachycardic in the 130s, no murmurs/gallups/rubs  LUNGS: Respirations without significant sensory muscle use, but a little tachypneic in the 20s, Lungs are with coarse breath sounds, poor inspiratory depth, trace crackles in the bases, no wheezes or rhonchi  ABDOMEN: Soft. Non-distended. Non-tender. No guarding, no rebound tenderness, no rigidity. Normal bowel sounds. No McBurney's point tenderness, negative Rovsing's sign, negative Painting's sign, obese/large panniculus  EXTREMITIES: Trace lower extremity peripheral edema bilaterally. Moves all extremities equally. No obvious deformities. SKIN: Warm and dry. Chronic appearing skin changes in the groin, erythematous, dry and scaly, no acute appearing rashes nor vesicular lesions  NEUROLOGICAL: Somnolent, awakened and opened eyes initially to sternal rub, then to loud voice later in the ED visit, and opened eyes to command, she would make purposeful movements, but not fully following commands ,no gross facial drooping. No obvious focal neurodeficits, she was able to move all 4 extremities, no truncal ataxia. Unable to assess gait, normal speech based on what she did say, but unable to fully assess      LABS  I have reviewed all labs for this visit.    Results for orders placed or performed during the hospital encounter of 10/05/22   COVID-19 & Influenza Combo    Specimen: Nasopharyngeal Swab   Result Value Ref Range    SARS-CoV-2 RNA, RT PCR NOT DETECTED NOT DETECTED    INFLUENZA A NOT DETECTED NOT DETECTED    INFLUENZA B NOT DETECTED NOT DETECTED   CBC with Auto Differential   Result Value Ref Range    WBC 33.9 (H) 4.0 - 11.0 K/uL    RBC 5.81 (H) 4.00 - 5.20 M/uL    Hemoglobin 16.4 (H) 12.0 - 16.0 g/dL    Hematocrit 50.2 (H) 36.0 - 48.0 %    MCV 86.4 80.0 - 100.0 fL    MCH 28.2 26.0 - 34.0 pg    MCHC 32.6 31.0 - 36.0 g/dL    RDW 15.5 (H) 12.4 - 15.4 %    Platelets 334 640 - 030 K/uL    MPV 9.8 5.0 - 10.5 fL    SLIDE REVIEW see below     Neutrophils % 88.0 %    Lymphocytes % 3.0 %    Monocytes % 8.0 %    Eosinophils % 0.0 %    Basophils % 0.0 %    Neutrophils Absolute 30.2 (H) 1.7 - 7.7 K/uL    Lymphocytes Absolute 1.0 1.0 - 5.1 K/uL    Monocytes Absolute 2.7 (H) 0.0 - 1.3 K/uL    Eosinophils Absolute 0.0 0.0 - 0.6 K/uL    Basophils Absolute 0.0 0.0 - 0.2 K/uL    Bands Relative 1 0 - 7 %    Poikilocytes Occasional (A)    Comprehensive Metabolic Panel w/ Reflex to MG   Result Value Ref Range    Sodium 125 (L) 136 - 145 mmol/L    Potassium reflex Magnesium 5.8 (H) 3.5 - 5.1 mmol/L    Chloride 83 (L) 99 - 110 mmol/L    CO2 22 21 - 32 mmol/L    Anion Gap 20 (H) 3 - 16    Glucose 379 (H) 70 - 99 mg/dL    BUN 47 (H) 7 - 20 mg/dL    Creatinine 2.7 (H) 0.6 - 1.2 mg/dL    GFR Non-African American 17 (A) >60    GFR  21 (A) >60    Calcium 9.8 8.3 - 10.6 mg/dL    Total Protein 8.0 6.4 - 8.2 g/dL    Albumin 4.1 3.4 - 5.0 g/dL    Albumin/Globulin Ratio 1.1 1.1 - 2.2    Total Bilirubin 0.7 0.0 - 1.0 mg/dL    Alkaline Phosphatase 124 40 - 129 U/L    ALT <5 (L) 10 - 40 U/L    AST 10 (L) 15 - 37 U/L   Lipase   Result Value Ref Range    Lipase 9.0 (L) 13.0 - 60.0 U/L   Troponin   Result Value Ref Range    Troponin 0.04 (H) <0.01 ng/mL   Brain Natriuretic Peptide   Result Value Ref Range    Pro-BNP 3,531 (H) 0 - 124 pg/mL   Protime-INR   Result Value Ref Range    Protime 22.3 (H) 11.7 - 14.5 sec    INR 1.97 (H) 0.87 - 1.14   APTT   Result Value Ref Range    aPTT 50.1 (H) 23.0 - 34.3 sec   Ammonia   Result Value Ref Range    Ammonia 32 11 - 51 umol/L   Blood Gas, Venous   Result Value Ref Range    pH, Jeffery 7.271 (L) 7.350 - 7.450    pCO2, Jeffery 45.1 40.0 - 50.0 mmHg    pO2, Jeffery 53.2 (H) 25.0 - 40.0 mmHg    HCO3, Venous 20.3 (L) 23.0 - 29.0 mmol/L    Base Excess, Jeffery -6.6 (L) -3.0 - 3.0 mmol/L    O2 Sat, Jeffery 83 Not Established %    Carboxyhemoglobin 2.1 (H) 0.0 - 1.5 %    MetHgb, Jeffery 0.1 <1.5 %    TC02 (Calc), Jeffery 22 Not Established mmol/L    O2 Therapy Unknown    TSH with Reflex   Result Value Ref Range    TSH 0.82 0.27 - 4.20 uIU/mL   Lactate, Sepsis   Result Value Ref Range    Lactic Acid, Sepsis 3.2 (H) 0.4 - 1.9 mmol/L   Urinalysis with Reflex to Culture    Specimen: Urine   Result Value Ref Range    Color, UA Yellow Straw/Yellow    Clarity, UA CLOUDY (A) Clear    Glucose, Ur 500 (A) Negative mg/dL    Bilirubin Urine Negative Negative    Ketones, Urine Negative Negative mg/dL    Specific Gravity, UA >=1.030 1.005 - 1.030    Blood, Urine LARGE (A) Negative    pH, UA 5.5 5.0 - 8.0    Protein, UA 30 (A) Negative mg/dL    Urobilinogen, Urine 0.2 <2.0 E.U./dL    Nitrite, Urine Negative Negative    Leukocyte Esterase, Urine MODERATE (A) Negative    Microscopic Examination YES     Urine Type NotGiven     Urine Reflex to Culture Yes    Ethanol   Result Value Ref Range    Ethanol Lvl None Detected mg/dL   Drug screen multi urine   Result Value Ref Range    Amphetamine Screen, Urine Neg Negative <1000ng/mL    Barbiturate Screen, Ur Neg Negative <200 ng/mL    Benzodiazepine Screen, Urine Neg Negative <200 ng/mL    Cannabinoid Scrn, Ur Neg Negative <50 ng/mL    Cocaine Metabolite Screen, Urine Neg Negative <300 ng/mL    Opiate Scrn, Ur Neg Negative <300 ng/mL    PCP Screen, Urine Neg Negative <25 ng/mL    Methadone Screen, Urine Neg Negative <300 ng/mL    Oxycodone Urine POSITIVE (A) Negative <100 ng/ml FENTANYL SCREEN, URINE Neg Negative <5 ng/mL    pH, UA 5.5     Drug Screen Comment: see below    Salicylate   Result Value Ref Range    Salicylate, Serum <4.5 (L) 15.0 - 30.0 mg/dL   Acetaminophen level   Result Value Ref Range    Acetaminophen Level <5 (L) 10 - 30 ug/mL   Beta-Hydroxybutyrate   Result Value Ref Range    Beta-Hydroxybutyrate 0.30 (H) 0.00 - 0.27 mmol/L   Microscopic Urinalysis   Result Value Ref Range    WBC, UA >100 (A) 0 - 5 /HPF    RBC, UA 5-10 (A) 0 - 4 /HPF    Bacteria, UA 3+ (A) None Seen /HPF    Yeast, UA Present (A) None Seen /HPF   Troponin   Result Value Ref Range    Troponin 0.04 (H) <0.01 ng/mL   Blood Gas, Venous   Result Value Ref Range    pH, Jeffeyr 7.398 7.350 - 7.450    pCO2, Jeffery 35.5 (L) 40.0 - 50.0 mmHg    pO2, Jeffery 69.5 (H) 25.0 - 40.0 mmHg    HCO3, Venous 21.4 (L) 23.0 - 29.0 mmol/L    Base Excess, Jeffery -2.6 -3.0 - 3.0 mmol/L    O2 Sat, Jeffery 94 Not Established %    Carboxyhemoglobin 4.3 (H) 0.0 - 1.5 %    MetHgb, Jeffery 0.3 <1.5 %    TC02 (Calc), Jeffery 23 Not Established mmol/L    O2 Therapy Unknown    Lactate, Sepsis   Result Value Ref Range    Lactic Acid, Sepsis 3.3 (H) 0.4 - 1.9 mmol/L   SPECIMEN REJECTION   Result Value Ref Range    Rejected Test LACDS     Reason for Rejection see below    POCT Glucose   Result Value Ref Range    POC Glucose 398 (H) 70 - 99 mg/dl    Performed on ACCU-CHEK    POCT Glucose   Result Value Ref Range    POC Glucose 330 (H) 70 - 99 mg/dl    Performed on ACCU-CHEK    EKG 12 Lead   Result Value Ref Range    Ventricular Rate 136 BPM    Atrial Rate 182 BPM    QRS Duration 92 ms    Q-T Interval 282 ms    QTc Calculation (Bazett) 424 ms    R Axis 65 degrees    T Axis 240 degrees    Diagnosis       Atrial fibrillation with rapid ventricular responseMarked ST abnormality, possible inferior subendocardial injuryMarked ST abnormality, possible anterolateral subendocardial injuryAbnormal ECGWhen compared with ECG of 14-APR-2020 10:44,Questionable change in QRS duration         EKG  EKG Interpretation by me:  Initial EKG at 2208 with A. fib RVR, ST changes, but baseline wander, rate 136, QTc 424, no acute ST elevations or depressions, inverted T waves  EKG repeated at 0107 with similar ST changes/T wave inversions, rate down to 123, QTc 440, normal axis, no ST elevations    RADIOLOGY  CT head without contrast    Result Date: 10/6/2022  EXAMINATION: CT OF THE HEAD WITHOUT CONTRAST  10/5/2022 11:47 pm TECHNIQUE: CT of the head was performed without the administration of intravenous contrast. Automated exposure control, iterative reconstruction, and/or weight based adjustment of the mA/kV was utilized to reduce the radiation dose to as low as reasonably achievable. COMPARISON: 07/20/2018 HISTORY: ORDERING SYSTEM PROVIDED HISTORY: AMS TECHNOLOGIST PROVIDED HISTORY: Reason for exam:->AMS Has a \"code stroke\" or \"stroke alert\" been called? ->No Release to patient - Note: Delayed release will only apply to this order. Orders that are changed or resulted outside of the EHR will not respect a delayed release to [facility]. ->Delay Immediate Release by 3 Days Reason for preventing immediate release->Likely risk of substantial harm Decision Support Exception - unselect if not a suspected or confirmed emergency medical condition->Emergency Medical Condition (MA) Reason for Exam: AMS, unresponsive FINDINGS: BRAIN/VENTRICLES: There is no acute intracranial hemorrhage, mass effect or midline shift. No abnormal extra-axial fluid collection. The gray-white differentiation is maintained without evidence of an acute infarct. Hypoattenuation of the periventricular and subcortical white matter is suggestive of chronic small vessel ischemic disease. Mild diffuse parenchymal volume loss is noted. There is no evidence of hydrocephalus. ORBITS: The visualized portion of the orbits demonstrate no acute abnormality. SINUSES: There is a small polyp versus retention cyst in the left sphenoid sinus. Other visualized paranasal sinuses and mastoid air cells are clear. SOFT TISSUES/SKULL:  No acute abnormality of the visualized skull or soft tissues. No acute intracranial abnormality. MRI may be obtained if clinically indicated. XR CHEST PORTABLE    Result Date: 10/5/2022  EXAMINATION: ONE XRAY VIEW OF THE CHEST 10/5/2022 10:52 pm COMPARISON: 04/14/2020 HISTORY: ORDERING SYSTEM PROVIDED HISTORY: AMS, hypoxia TECHNOLOGIST PROVIDED HISTORY: Reason for exam:->AMS, hypoxia Reason for Exam: AMS; hypoxia FINDINGS: Cardiac size and mediastinal structures appear stable. Patchy right perihilar and bibasilar airspace disease. No pneumothorax. No acute osseous abnormality. Diffuse osteopenia. Patient is rotated to the right. No free air seen in the upper abdomen. Patchy mild airspace disease which may represent atelectasis or pneumonia. I am the primary clinician of record. ED COURSE/MDM  Patient seen and evaluated. Old records reviewed. Labs and imaging reviewed and results discussed with patient.      66 y.o. female with altered mental status, considered intubation on multiple occasions, but she was protecting her airway, had a gag reflex/corneal reflex, she even opened her eyes to command after some time in the ER, seems to be improving a little, and she was able to tell me her name is Jerson, she was not hypotensive on arrival, but she did seem to drop her pressure when she was given the Cardizem, I suspect this is because of hypotension rather than septic shock/severe sepsis, because she was not hypotensive until her exam was given, which was ordered to treat A. fib RVR, given fluids as well, she did have 1 IV on the left arm infiltrate with fluids she is already on Xarelto for anticoagulation, I had initially started antibiotics with concern for pneumonia on chest x-ray, but I suspect the leukocytosis is more so related to the urine findings/UTI, I had ordered nystatin which was switched to miconazole powder per pharmacy, she did have notable erythema in the groin, but no significant sign of cellulitis/abscess, treated hyperkalemia, the insulin will likely help with her hyperglycemia as well, hyperkalemia may be attributable to the renal dysfunction, she also had mildly elevated troponin, which could be related to the A. fib RVR/demand, but may also be related to the renal dysfunction, was the same on repeat at 0.04, but I have low suspicion for ACS at this time, we were able to wean her down from 15 L oxygen mask on arrival to 4 L nasal cannula and she was maintaining in the mid to upper 90s O2 sat, he had multiple electrolyte disturbances, the sodium bicarb for hyperkalemia will likely help her hyponatremia as well, discussed with Dr. Saba Fay, hospitalist, he agreed to accept for admission to ICU, attempted to discuss plan with patient, but mentation was altered, she was somnolent.     Orders Placed This Encounter   Procedures    COVID-19 & Influenza Combo    Culture, Blood 2    Culture, Blood 1    Culture, Urine    XR CHEST PORTABLE    CT head without contrast    CBC with Auto Differential    Comprehensive Metabolic Panel w/ Reflex to MG    Lipase    Troponin    Brain Natriuretic Peptide    Protime-INR    APTT    Ammonia    Blood Gas, Venous    TSH with Reflex    Lactate, Sepsis    Urinalysis with Reflex to Culture    Ethanol    Drug screen multi urine    Salicylate    Acetaminophen level    Beta-Hydroxybutyrate    Microscopic Urinalysis    Troponin    Blood Gas, Venous    POCT Glucose    EKG 12 Lead    EKG 12 Lead     Orders Placed This Encounter   Medications    0.9 % sodium chloride bolus    dilTIAZem injection 10 mg    naloxone (NARCAN) injection 1 mg    calcium gluconate 3,000 mg in dextrose 5 % 100 mL IVPB    sodium bicarbonate 8.4 % injection 50 mEq    albuterol (PROVENTIL) nebulizer solution 10 mg     Order Specific Question:   Initiate RT Bronchodilator Protocol     Answer:   Yes - ED protocol    insulin regular (HUMULIN R;NOVOLIN R) injection 5 Units    0.9 % sodium chloride bolus    vancomycin 1000 mg IVPB in 250 mL D5W addavial     Order Specific Question:   Antimicrobial Indications     Answer:   Pneumonia (CAP)    dilTIAZem injection 10 mg    cefepime (MAXIPIME) 1000 mg IVPB minibag     Order Specific Question:   Antimicrobial Indications     Answer:   Pneumonia (CAP)    miconazole (MICOTIN) 2 % powder    dilTIAZem 125 mg in dextrose 5 % 125 mL infusion     Order Specific Question:   Titrate Infusion? Answer:   Yes     Order Specific Question:   Initial Infusion Dose: Answer:   2.5 mg/hr     Order Specific Question:   Goal of Therapy is: Answer:   HR less than 120 bpm     Order Specific Question:   Contact Provider if:     Answer:   SBP less than 90 mmHg     Order Specific Question:   Contact Provider if:     Answer:   HR less than 60 bpm     Order Specific Question:   HOLD for     Answer:   SBP less than 90 mmHg     Order Specific Question:   HOLD for     Answer:   HR less than 60 bpm     ED Course as of 10/06/22 0646   Wed Oct 05, 2022   2336 Troponin(!): 0.04  No known history of chest pain, baseline wander on EKG, she does have significant renal dysfunction, difficult to discern if significant ischemia on EKG, she is in A. fib RVR, this could also be contributing [SY]   Thu Oct 06, 2022   0118 Left AC IV appears to have infiltrated [SY]   0149 Had initially given Vanco and cefepime, but I did find that her last ESBL UTI was resistant to cefepime, added Merrem instead [SY]      ED Course User Index  [SY] Boubacar Square, DO       Is this patient to be included in the SEP-1 Core Measure due to severe sepsis or septic shock? No   Exclusion criteria - the patient is NOT to be included for SEP-1 Core Measure due to:   Alternative explanation for abnormal labs/vitals that do not relate to sepsis, see MDM for further explanation    The total critical care time spent while evaluating and treating this patient was 45 minutes. This excludes time spent doing separately billable procedures. This includes time at the bedside, data interpretation, medication management, obtaining critical history from collateral sources if the patient is unable to provide it directly, and physician consultation. Specifics of interventions taken and potentially life-threatening diagnostic considerations are listed in the medical decision making. CLINICAL IMPRESSION  1. Altered mental status, unspecified altered mental status type    2. Sepsis secondary to UTI ([facility]    3. [facility]AB[facility]    4. Atrial fibrillation with rapid ventricular response (HCC)    5. Elevated troponin    6. Lactic acidosis    7. Type 2 diabetes mellitus with hyperglycemia, with long-term current use of insulin (HCC)    8. Hyperkalemia    9. Hyponatremia        Blood pressure (!) 132/97, pulse (!) 141, temperature 99 °F (37.2 °C), temperature source Axillary, resp. rate (!) 34, weight 212 lb (96.2 kg), SpO2 97 %. Pop Najera was admitted in fair/critical condition.                    Boubacar Braun DO  10/06/22 4926

## 2022-10-06 NOTE — ACP (ADVANCE CARE PLANNING)
Advance Care Planning     General Advance Care Planning (ACP) Conversation    Unable to complete ACP conversation at this time as no next of kin contacts in [facility]. Pt declined conversation with CM and provided limited interaction.     Length of Voluntary ACP Conversation in minutes:  <16 minutes (Non-Billable)    Saida DUFF, FEMI

## 2022-10-06 NOTE — PROGRESS NOTES
Amiodarone 150 mg IV bolus started. Will follow with Amiodarone IV drip per protocol. Cardizem IV drip stopped.

## 2022-10-06 NOTE — CONSULTS
Candance Blare Wound Ostomy Continence Nurse  Consult Note       NAME:  Krishna Pickering RECORD NUMBER:  [id number]  AGE: 66 y.o. GENDER: female  : 1950  TODAY'S DATE:  10/6/2022    Subjective    Reason for WOCN Evaluation and Assessment: skin folds, buttocks medial thighs      Edel Jackson is a 66 y.o. female referred by:   [] Physician  [] Nursing  [] Other:     Wound Identification:  Wound Type:  MASD  Contributing Factors: incontinence of stool, incontinence of urine, and non-adherence obesity    Pt seen for MASD. Pt resides at [facility]. Admitted for AMS and hypoxia.               PAST MEDICAL HISTORY        Diagnosis Date    Acute diastolic heart failure (HCC)     Acute respiratory failure (HCC)     Adjustment disorder with mixed anxiety and depressed mood     Allergic rhinitis     Alzheimer's dementia (HCC)     Atrial fibrillation (HCC)     Back pain     CHF (congestive heart failure) ([facility])     Chronic pain     Constipation     COPD (chronic obstructive pulmonary disease) ([address]     Depression     Dermatitis     Diabetes mellitus ([address]     Disseminated superficial actinic porokeratosis     Disseminated superficial actinic porokeratosis (DSAP)     Edema     ESBL (extended spectrum beta-lactamase) producing bacteria infection 2019    urine    ESBL (extended spectrum beta-lactamase) producing bacteria infection 2020    Urine    ESBL (extended spectrum beta-lactamase) producing bacteria infection 2020    Klebsiella pneumoniae ESBL    Hypertension     Hypo-osmolality and hyponatremia     Major depressive disorder     Morbid obesity ([address]     Muscle weakness     Overactive bladder     Schizoaffective disorder ([address]     Seizures ([address]     Xerosis cutis     Xerosis cutis        PAST SURGICAL HISTORY    Past Surgical History:   Procedure Laterality Date    KNEE SURGERY      TONSILLECTOMY      TUBAL LIGATION         FAMILY HISTORY    Family History   Problem Relation Age of Onset Diabetes Mother     High Blood Pressure Mother     Cancer Mother     Heart Disease Mother     High Blood Pressure Father     Heart Disease Father        SOCIAL HISTORY    Social History     Tobacco Use    Smoking status: Former     Types: Cigarettes     Quit date: 2004     Years since quittin.7    Smokeless tobacco: Never   Vaping Use    Vaping Use: Never used   Substance Use Topics    Alcohol use: No     Alcohol/week: 0.0 standard drinks    Drug use: No       ALLERGIES    Allergies   Allergen Reactions    Fish-Derived Products      Food allergy    Iodine     Mushroom Extract Complex     Onion     Other      seafood       MEDICATIONS    No current facility-administered medications on file prior to encounter. Current Outpatient Medications on File Prior to Encounter   Medication Sig Dispense Refill    LORazepam (ATIVAN) 0.5 MG tablet Take 0.5 mg by mouth in the morning and at bedtime. busPIRone (BUSPAR) 5 MG tablet Take 5 mg by mouth 2 times daily      fluconazole (DIFLUCAN) 100 MG tablet Take 100 mg by mouth daily For 7 days      DULoxetine (CYMBALTA) 60 MG extended release capsule Take 60 mg by mouth daily      acetaminophen (TYLENOL) 325 MG tablet Take 650 mg by mouth every 6 hours as needed for Pain      oxyCODONE-acetaminophen (PERCOCET) 5-325 MG per tablet Take 1 tablet by mouth in the morning and 1 tablet at noon and 1 tablet in the evening and 1 tablet before bedtime.       divalproex (DEPAKOTE) 250 MG DR tablet Take 250 mg by mouth 3 times daily      furosemide (LASIX) 40 MG tablet Take 40 mg by mouth daily      melatonin 3 MG TABS tablet Take 3 mg by mouth daily      potassium chloride (MICRO-K) 10 MEQ extended release capsule Take 10 mEq by mouth daily      tiotropium (SPIRIVA) 18 MCG inhalation capsule Inhale 18 mcg into the lungs daily      Cholecalciferol (VITAMIN D3) 1.25 MG (85945 UT) CAPS Take 1 capsule by mouth daily      tiZANidine (ZANAFLEX) 2 MG tablet Take 2 mg by mouth in the morning, at noon, and at bedtime      empagliflozin (JARDIANCE) 10 MG tablet Take 10 mg by mouth daily      rivaroxaban (XARELTO) 20 MG TABS tablet Take 20 mg by mouth nightly      gabapentin (NEURONTIN) 100 MG capsule Take 100 mg by mouth 3 times daily.       insulin glargine (LANTUS SOLOSTAR) 100 UNIT/ML injection pen Inject 25 Units into the skin every morning      insulin lispro (HUMALOG) 100 UNIT/ML injection vial Inject 0-6 Units into the skin 3 times daily (with meals)      digoxin (LANOXIN) 125 MCG tablet Take 1 tablet by mouth daily      Sodium Phosphates (FLEET) 7-19 GM/118ML Place 1 enema rectally daily as needed      bisacodyl (DULCOLAX) 10 MG suppository Place 10 mg rectally daily as needed for Constipation      Blood Glucose Monitoring Suppl (BLOOD GLUCOSE SYSTEM RICHARD) KIT Check fingerstick blood sugars before meals and at bedtime 1 kit 0    INSULIN SYRINGE .5CC/29G ([employer] INS SYR .5CC/29G) 29G X 1/2\" 0.5 ML MISC 1 each by Does not apply route daily 100 each 3       Objective    BP 93/63   Pulse (!) 122   Temp 99.7 °F (37.6 °C)   Resp 24   Ht 5' 7\" (1.702 m)   Wt 212 lb (96.2 kg)   SpO2 96%   BMI 33.20 kg/m²     LABS:  WBC:    Lab Results   Component Value Date/Time    WBC 27.7 10/06/2022 04:41 AM     H/H:    Lab Results   Component Value Date/Time    HGB 14.6 10/06/2022 04:41 AM    HCT 44.9 10/06/2022 04:41 AM     PTT:    Lab Results   Component Value Date/Time    APTT 50.1 10/05/2022 10:30 PM   [APTT}  PT/INR:    Lab Results   Component Value Date/Time    PROTIME 22.3 10/05/2022 10:30 PM    INR 1.97 10/05/2022 10:30 PM     HgBA1c:    Lab Results   Component Value Date/Time    LABA1C 4.8 04/18/2020 04:00 AM       Assessment   Jarod Risk Score:      Patient Active Problem List   Diagnosis Code    Osteomyelitis of spine ([facility] M46.20    Diskitis M46.40    Seizure disorder, grand mal (HCC) G40.409    Leukocytosis D72.829    DM (diabetes mellitus) ([facility] E11.9    Lumbar facet arthropathy M47.816 Disc degeneration, lumbar M51.36    Thoracic back pain M54.6    Neck pain M54.2    Obesity E66.9    Hypokalemia E87.6    Atrial fibrillation with RVR ([facility]) I48.91    Essential hypertension I10    Chronic obstructive pulmonary disease ([facility]) J44.9    Shortness of breath R06.02    Precordial pain R07.2    Morbid obesity ([facility]) U76.88    Acute diastolic congestive heart failure ([facility]) I50.31    Acute respiratory failure with hypoxia ([facility]) J96.01    Persistent atrial fibrillation ([facility]) I48.19    Acute cystitis without hematuria N30.00    Acute pain of left shoulder M25.512    Hypomagnesemia E83.42    Chronic pain of both knees M25.561, M25.562, G89.29    Bilateral primary osteoarthritis of knee M17.0    Urinary tract infection without hematuria N39.0    Abnormal CXR R93.89    Hypoxia R09.02    Sepsis ([address] A41.9       Measurements:  Wound 04/15/20 Coccyx Stage II (Active)   Number of days: 903       Wound 04/15/20 Buttocks Left DTI (Active)   Number of days: 903       Wound 04/15/20 Ear Right DTI (Active)   Number of days: 903       Wound 04/16/20 Hip Left (Active)   Number of days: 903       Wound 10/06/22 Thigh Anterior;Right Red (Active)   Wound Image   10/06/22 0341   Dressing Status Clean;Dry 10/06/22 0341   Wound Cleansed Soap and water 10/06/22 0341   Dressing/Treatment Open to air 10/06/22 0341   Number of days: 0       Wound 10/06/22 Abdomen Lower;Medial Red Dieter (Active)   Wound Image   10/06/22 0341   Dressing Status Clean;Dry 10/06/22 0341   Wound Cleansed Soap and water 10/06/22 0341   Dressing/Treatment Open to air 10/06/22 0341   Number of days: 0       Wound 10/06/22 Back Left open dry crack (Active)   Wound Image   10/06/22 0341   Dressing Status Clean;Dry 10/06/22 0341   Wound Cleansed Soap and water 10/06/22 0341   Dressing/Treatment Open to air 10/06/22 0341   Number of days: 0       Wound 10/06/22 Buttocks red, open areas, (Active)   Wound Image   10/06/22 0341   Dressing Status Clean;Dry 10/06/22 0341   Wound Cleansed Cleansed with saline 10/06/22 0341   Dressing/Treatment Open to air 10/06/22 0341   Number of days: 0       Wound 10/06/22 Back Lateral;Lower;Right Red all down right side (Active)   Wound Image   10/06/22 0336   Dressing Status Clean;Dry 10/06/22 0336   Wound Cleansed Soap and water 10/06/22 0336   Dressing/Treatment Open to air 10/06/22 0336   Number of days: 0            Plan   Discontinue use of Micotin powder. Apply Aloe Vesta Antifungal ointment to rash on right side, buttocks, sacrum, perineum and medial thighs twice daily and prn. Skin Fold Management:  Cleanse folds with foam cleanser and dry thoroughly. Nora Miramontesa a single layer of fabric in the skin fold, placing one edge into the base of the fold. Gently smooth the rest  of the fabric over the skin, keeping it flat. Leave 2 inches hanging out to wick moisture away from fold. Each piece of InterDry may be used up to 5 days, depending on fabric soiling, odor, amount of moisture and general skin condition. Hang to dry. Replace InterDry if it becomes soiled with blood, urine or stool. Do not rinse as it deactivates the Ag. Date and label with Instamediapoint pen. Do not use creams, ointments or powders with InterDry Ag as it may interfere with product efficacy. Plan of Care: Wound 10/06/22 Thigh Anterior;Right Red-Dressing/Treatment: Open to air  Wound 10/06/22 Abdomen Lower;Medial Red Dieter-Dressing/Treatment: Open to air  Wound 10/06/22 Back Left open dry crack-Dressing/Treatment: Open to air  Wound 10/06/22 Buttocks red, open areas,-Dressing/Treatment: Open to air  Wound 10/06/22 Back Lateral;Lower;Right Red all down right side-Dressing/Treatment: Open to air    Specialty Bed Required : Yes   [x] Low Air Loss   [] Pressure Redistribution  [] Fluid Immersion  [] Bariatric  [] Total Pressure Relief  [] Other:     Current Diet: ADULT DIET;  Regular; 4 carb choices (60 gm/meal)  Dietician consult:  N/A    Discharge Plan:  Placement for patient upon discharge: skilled nursing    Patient appropriate for Outpatient [address]: No    Referrals:  []   [] [facility]  [x] Supplies-[employer]  [] Other           Electronically signed by Bina Ying RN, CWOCN on 10/6/2022 at 12:36 PM

## 2022-10-06 NOTE — CONSULTS
Patient is being seen at the request of Dr. Boris Song  for a consultation for hypoxemia    HISTORY OF PRESENT ILLNESS: This is a 68-year-old female who presented from [facility] with severe hypoxemia, reported saturation of 50%, required a nonrebreather but has been titrated down to 4 L. In the emergency department she was noted to be in A. fib RVR and was treated with diltiazem but has had low blood pressures with a diltiazem drip. She still has a rapid ventricular response    PAST MEDICAL HISTORY:  Past Medical History:   Diagnosis Date    Acute diastolic heart failure (HCC)     Acute respiratory failure (HCC)     Adjustment disorder with mixed anxiety and depressed mood     Allergic rhinitis     Alzheimer's dementia (HCC)     Atrial fibrillation (HCC)     Back pain     CHF (congestive heart failure) ([facility])     Chronic pain     Constipation     COPD (chronic obstructive pulmonary disease) ([facility])     Depression     Dermatitis     Diabetes mellitus (HCC)     Disseminated superficial actinic porokeratosis     Disseminated superficial actinic porokeratosis (DSAP)     Edema     ESBL (extended spectrum beta-lactamase) producing bacteria infection 02/27/2019    urine    ESBL (extended spectrum beta-lactamase) producing bacteria infection 04/17/2020    Urine    ESBL (extended spectrum beta-lactamase) producing bacteria infection 04/14/2020    Klebsiella pneumoniae ESBL    Hypertension     Hypo-osmolality and hyponatremia     Major depressive disorder     Morbid obesity ([address]     Muscle weakness     Overactive bladder     Schizoaffective disorder ([address]     Seizures ([facility])     Xerosis cutis     Xerosis cutis      PAST SURGICAL HISTORY:  Past Surgical History:   Procedure Laterality Date    KNEE SURGERY      TONSILLECTOMY      TUBAL LIGATION         FAMILY HISTORY:  family history includes Cancer in her mother; Diabetes in her mother; Heart Disease in her father and mother; High Blood Pressure in her father and mother.     SOCIAL HISTORY:   reports that she quit smoking about 18 years ago. She has never used smokeless tobacco.    Scheduled Meds:   citalopram  20 mg Oral Daily    ipratropium-albuterol  1 vial Inhalation Q6H    rivaroxaban  20 mg Oral Daily with breakfast    insulin lispro  0-4 Units SubCUTAneous TID WC    insulin lispro  0-4 Units SubCUTAneous Nightly    sodium chloride flush  5-40 mL IntraVENous 2 times per day    meropenem  1,000 mg IntraVENous Q12H    insulin glargine  25 Units SubCUTAneous QAM    vancomycin (VANCOCIN) intermittent dosing (placeholder)   Other RX Placeholder    vancomycin  750 mg IntraVENous Once     Continuous Infusions:   dilTIAZem (CARDIZEM) 125 mg in dextrose 5% 125 mL infusion 2.5 mg/hr (10/06/22 0229)    dextrose      sodium chloride      sodium chloride 150 mL/hr at 10/06/22 0530     PRN Meds:  glucose, dextrose bolus **OR** dextrose bolus, glucagon (rDNA), dextrose, sodium chloride flush, sodium chloride, ondansetron **OR** ondansetron, polyethylene glycol, acetaminophen **OR** acetaminophen, perflutren lipid microspheres    ALLERGIES:  Patient is allergic to fish-derived products, iodine, mushroom extract complex, onion, and other. REVIEW OF SYSTEMS:  Denies any symptoms     PHYSICAL EXAM:  Blood pressure 113/80, pulse (!) 110, temperature 99.9 °F (37.7 °C), temperature source Axillary, resp. rate 19, weight 212 lb (96.2 kg), SpO2 97 %.' on 4 L  General: ill appearing. Eyes: PERRL. No sclera icterus. No conjunctival injection. ENT: No discharge. Pharynx clear. Neck: Trachea midline. Normal thyroid. Resp: No accessory muscle use. No crackles. No wheezing. No rhonchi. No dullness on percussion. CV: Regular rate. Regular rhythm. No mumur or rub. + edema. Peripheral pulses are 2+. Capillary refill is less than 3 seconds. GI: Non-tender. Non-distended. No masses. No organomegaly. Normal bowel sounds. No hernia. Skin: Warm and dry. No nodule on exposed extremities.  No rash on exposed extremities. Lymph: No cervical LAD. No supraclavicular LAD. M/S: No cyanosis. No joint deformity. No clubbing. Neuro: Alert and oriented to person. Patellar reflexes are symmetric. Psych: No agitation, no anxiety, affect is full. LABS:  CBC:   Recent Labs     10/05/22  2230 10/06/22  0441   WBC 33.9* 27.7*   HGB 16.4* 14.6   HCT 50.2* 44.9   MCV 86.4 87.1    175     BMP:   Recent Labs     10/05/22  2230 10/06/22  0441   * 128*   K 5.8* 4.8   CL 83* 88*   CO2 22 22   PHOS  --  6.6*   BUN 47* 49*   CREATININE 2.7* 2.2*     LIVER PROFILE:   Recent Labs     10/05/22  2230   AST 10*   ALT <5*   LIPASE 9.0*   BILITOT 0.7   ALKPHOS 124     PT/INR:   Recent Labs     10/05/22  2230   PROTIME 22.3*   INR 1.97*     APTT:   Recent Labs     10/05/22  2230   APTT 50.1*     UA:  Recent Labs     10/06/22  0012   COLORU Yellow   PHUR 5.5  5.5   WBCUA >100*   RBCUA 5-10*   YEAST Present*   BACTERIA 3+*   CLARITYU CLOUDY*   SPECGRAV >=1.030   LEUKOCYTESUR MODERATE*   UROBILINOGEN 0.2   BILIRUBINUR Negative   BLOODU LARGE*   GLUCOSEU 500*     No results for input(s): PHART, UBL9DXF, PO2ART in the last 72 hours.     Cultures:      10/6/2022 SARS-CoV-2 negative, influenza negative    Chest imaging was reviewed by me and showed:   CXR 10/6/2022 patchy infiltrates without focal consolidation    Head CT 10/6/2022 chronic parenchymal volume loss and chronic small vessel ischemic change without acute finding  ASSESSMENT:  Acute hypoxemic respiratory failure   Acute metabolic encephalopathy  Sepsis syndrome  Acute cystitis    Chronic A. Fib,now with RVR; on Xarelto   Acute kidney injury    Chronic diastolic CHF  COPD  Schizoaffective disorder  Elevated troponin  H/O ESBL urine   H/O dementia   DM with hyperglycemia      PLAN:  Supplemental oxygen to maintain SaO2 >92%; wean as tolerated    IV fluid resuscitation with crystalloid, nephrology to see    Continue digoxin, d/c diltiazem and start amiodarone infusion for improved rate control  Broad spectrum antibiotics to include Merrem and vancomycin D#1  IV levophed if needed to maintain MAP of 65  Follow up on cultures  Follow electrolytes  Blood sugar control, with goal 140-180   Home Xarelto     ADDENDUM: blood culture with Enterobacterales. Due to risk for CRE, will add tygacil now pending sensitivities. Total critical care time caring for this patient with life threatening, unstable organ failure, including direct patient contact, management of life support systems, review of data including imaging and labs, discussions with other team members and physicians is 31 minutes so far today, excluding procedures.

## 2022-10-06 NOTE — CONSULTS
Pharmacy Note  Vancomycin Consult    Colin Aceves is a 66 y.o. female started on Vancomycin for CAP; consult received from Dr. Gianni Way to manage therapy. Also receiving the following antibiotics: Merrem. Allergies:  Fish-derived products, Iodine, Mushroom extract complex, Onion, and Other     Tmax:     Recent Labs     10/05/22  2230   CREATININE 2.7*       Recent Labs     10/05/22  2230   WBC 33.9*       CrCl cannot be calculated (Unknown ideal weight.). No intake or output data in the 24 hours ending 10/06/22 0352    Wt Readings from Last 1 Encounters:   10/06/22 212 lb (96.2 kg)         Body mass index is 33.2 kg/m². Culture Date      Source                       Results      Pulse dose: fluctuating renal function, FABIO, ESRD   Goal Vancomycin trough: 15-20 mcg/mL   Goal Vancomycin AUC: 400-600     Assessment/Plan:  Will initiate Vancomycin with a one time loading dose of 1000 mg x1. Based on patient age and renal function, will pulse dose for now to maintain vancomycin levels > 15 mcg/mL Vanc random for this AM (10/6 at 0600). Timing of trough level will be determined based on culture results, renal function, and clinical response. Thank you for the consult.   Satish Shin, PharmD  10/6/2022 3:53 AM

## 2022-10-06 NOTE — PROGRESS NOTES
Lab called with positive blood culture growing Enterobacterales. Patient is on [facility] [address] which should provide adequate coverage. No change in therapy is warranted.

## 2022-10-06 NOTE — CONSULTS
Thank you to requesting provider:  Dr. Elva Andrade , for asking us to see Elizabeth Pulido  Reason for consultation:  Acute Kidney Injury   Chief Complaint:   AMS and hypoxemia    History of Presenting Illness      67 y/o with history of dementia and schizoaffective disorder sent to the hospital from [facility] due to AMS and hypoxia. She was found to have UTI, FABIO, hypotension, Atrial fib with RVR, and respiratory failure. We have been asked to see her for acute kidney injury. Scr was 2.8 upon admission. Still with RVR and low blood pressure. She is making urine.       Past Medical/Surgical History      Active Ambulatory Problems     Diagnosis Date Noted    Osteomyelitis of spine ([address] 10/06/2011    Diskitis 10/06/2011    Seizure disorder, grand mal ([address] 01/12/2014    Leukocytosis 01/12/2014    DM (diabetes mellitus) ([address] 01/13/2014    Lumbar facet arthropathy 12/10/2014    Disc degeneration, lumbar 12/10/2014    Thoracic back pain 12/10/2014    Neck pain 12/10/2014    Obesity 01/27/2016    Hypokalemia 01/27/2016    Atrial fibrillation with rapid ventricular response (HCC)     Essential hypertension     Chronic obstructive pulmonary disease ([address]     Shortness of breath 03/16/2016    Precordial pain 03/16/2016    Morbid obesity ([address] 03/07/0537    Acute diastolic congestive heart failure ([address] 09/14/2016    Acute respiratory failure with hypoxia (HCC) 11/06/2016    Persistent atrial fibrillation ([address] 11/07/2016    Acute cystitis without hematuria 01/11/2020    Acute pain of left shoulder     Hypomagnesemia     Chronic pain of both knees 02/24/2020    Bilateral primary osteoarthritis of knee 02/24/2020    Urinary tract infection without hematuria     Abnormal CXR     Hypoxia      Resolved Ambulatory Problems     Diagnosis Date Noted    UTI (urinary tract infection) 01/11/2020    Sepsis ([address] 04/14/2020    Severe sepsis (HCC)     Elevated troponin     Hypotension     Disorder of electrolytes     Acute encephalopathy      Past Medical History:   Diagnosis Date    Acute diastolic heart failure (HCC)     Acute respiratory failure (HCC)     Adjustment disorder with mixed anxiety and depressed mood     Allergic rhinitis     Alzheimer's dementia (HCC)     Atrial fibrillation (HCC)     Back pain     CHF (congestive heart failure) (HCC)     Chronic pain     Constipation     COPD (chronic obstructive pulmonary disease) (HCC)     Depression     Dermatitis     Diabetes mellitus (HCC)     Disseminated superficial actinic porokeratosis     Disseminated superficial actinic porokeratosis (DSAP)     Edema     ESBL (extended spectrum beta-lactamase) producing bacteria infection 2019    ESBL (extended spectrum beta-lactamase) producing bacteria infection 2020    ESBL (extended spectrum beta-lactamase) producing bacteria infection 2020    Hypertension     Hypo-osmolality and hyponatremia     Major depressive disorder     Muscle weakness     Overactive bladder     Schizoaffective disorder ([address]     Seizures ([facility]     Xerosis cutis     Xerosis cutis          Review of Systems     Unable to obtain due to AMS       Medications      Reviewed in EMR     Allergies     Fish-derived products, Iodine, Mushroom extract complex, Onion, and Other      Family History       Negative for Kidney Disease    Social History      Social History     Socioeconomic History    Marital status:      Spouse name: None    Number of children: None    Years of education: None    Highest education level: None   Tobacco Use    Smoking status: Former     Types: Cigarettes     Quit date: 2004     Years since quittin.7    Smokeless tobacco: Never   Vaping Use    Vaping Use: Never used   Substance and Sexual Activity    Alcohol use: No     Alcohol/week: 0.0 standard drinks    Drug use: No       Physical Exam     Blood pressure 117/68, pulse (!) 130, temperature 98.9 °F (37.2 °C), temperature source Oral, resp.  rate 22, height 5' 7\" (1.702 m), weight 212 lb (96.2 kg), SpO2 98 %. General:  Critically ill   HEENT:  PERRL, dry MM  Neck:  Supple, no mass   Chest:  On vent, no wheezing   CV:  Irregular, tachycardia   Abdomen:  NTND, soft, +BS, no hepatosplenomegaly  Extremities:  No peripheral edema  Neurological:  Moving all four extremities, CN II-XII grossly intact  Lymphatics:  No palpable lymph nodes  Skin:  No rash, no jaundice  Psychiatric:  Sedated on vent, HERNAN  :  Moraes placed     Data     Recent Labs     10/05/22  2230 10/06/22  0441   WBC 33.9* 27.7*   HGB 16.4* 14.6   HCT 50.2* 44.9   MCV 86.4 87.1    175     Recent Labs     10/05/22  2230 10/06/22  0441   * 128*   K 5.8* 4.8   CL 83* 88*   CO2 22 22   GLUCOSE 379* 339*   PHOS  --  6.6*   BUN 47* 49*   CREATININE 2.7* 2.2*   LABGLOM 17* 22*   GFRAA 21* 26*       Assessment:    Acute Kidney Injury:  KDIGO stage 2  - Etiology:  Pre-renal spectrum with volume depletion and low BP with cardiac instability decreasing renal perfusion pressures  - Clinical:  Seems to be responding to volume     Hyperkalemia:  Due to FABIO / better with medical management      Hyponatremia:  - Improving with volume     Sepsis:  - Cultures pending.  + UTI and pneumonia     Respiratory Failure:  - HCAP     Atrial Fibrillation:  - Hypotensive.   Now on amiodarone     Plan:    IV fluids  Rate control and hemodynamic support  Antibiotics  Follow in/outs   Follow labs   Expand work up if not improving with volume as expected     Critical care time = 32 minutes       Thank you for asking us to participate in the management of your patient, please do not hesitate to contact me for any concerns regarding my recommendations as outlined above.    -----------------------------  Kevin Dougherty M.D.   [facility]

## 2022-10-06 NOTE — PROGRESS NOTES
RT Inhaler-Nebulizer Bronchodilator Protocol Note    There is a bronchodilator order in the chart from a provider indicating to follow the RT Bronchodilator Protocol and there is an Initiate RT Inhaler-Nebulizer Bronchodilator Protocol order as well (see protocol at bottom of note). CXR Findings:  XR CHEST PORTABLE    Result Date: 10/5/2022  Patchy mild airspace disease which may represent atelectasis or pneumonia. The findings from the last RT Protocol Assessment were as follows:   History Pulmonary Disease: (P) Chronic pulmonary disease  Respiratory Pattern: (P) Dyspnea on exertion or RR 21-25 bpm  Breath Sounds: (P) Clear breath sounds  Cough: (P) Strong, spontaneous, non-productive  Indication for Bronchodilator Therapy: (P) Decreased or absent breath sounds  Bronchodilator Assessment Score: (P) 4    Aerosolized bronchodilator medication orders have been revised according to the RT Inhaler-Nebulizer Bronchodilator Protocol below. Respiratory Therapist to perform RT Therapy Protocol Assessment initially then follow the protocol. Repeat RT Therapy Protocol Assessment PRN for score 0-3 or on second treatment, BID, and PRN for scores above 3. No Indications - adjust the frequency to every 6 hours PRN wheezing or bronchospasm, if no treatments needed after 48 hours then discontinue using Per Protocol order mode. If indication present, adjust the RT bronchodilator orders based on the Bronchodilator Assessment Score as indicated below. Use Inhaler orders unless patient has one or more of the following: on home nebulizer, not able to hold breath for 10 seconds, is not alert and oriented, cannot activate and use MDI correctly, or respiratory rate 25 breaths per minute or more, then use the equivalent nebulizer order(s) with same Frequency and PRN reasons based on the score. If a patient is on this medication at home then do not decrease Frequency below that used at home.     0-3 - enter or revise RT bronchodilator order(s) to equivalent RT Bronchodilator order with Frequency of every 4 hours PRN for wheezing or increased work of breathing using Per Protocol order mode. 4-6 - enter or revise RT Bronchodilator order(s) to two equivalent RT bronchodilator orders with one order with BID Frequency and one order with Frequency of every 4 hours PRN wheezing or increased work of breathing using Per Protocol order mode. 7-10 - enter or revise RT Bronchodilator order(s) to two equivalent RT bronchodilator orders with one order with TID Frequency and one order with Frequency of every 4 hours PRN wheezing or increased work of breathing using Per Protocol order mode. 11-13 - enter or revise RT Bronchodilator order(s) to one equivalent RT bronchodilator order with QID Frequency and an Albuterol order with Frequency of every 4 hours PRN wheezing or increased work of breathing using Per Protocol order mode. Greater than 13 - enter or revise RT Bronchodilator order(s) to one equivalent RT bronchodilator order with every 4 hours Frequency and an Albuterol order with Frequency of every 2 hours PRN wheezing or increased work of breathing using Per Protocol order mode. RT to enter RT Home Evaluation for COPD & MDI Assessment order using Per Protocol order mode.     Electronically signed by Perry Nichole RCP on 10/6/2022 at 8:31 AM

## 2022-10-06 NOTE — PROGRESS NOTES
10/6  Vanc random = 14.4 mcg/mL at 0441. Give vancomycin 750 mg x1. Recheck vanc random tomorrow in the AM (10/7 at 0600).   Millie Mark, PharmD  10/6/2022 5:55 AM

## 2022-10-06 NOTE — PROGRESS NOTES
4 Eyes Skin Assessment     The patient is being assess for   Admission    I agree that 2 RN's have performed a thorough Head to Toe Skin Assessment on the patient. ALL assessment sites listed below have been assessed. Areas assessed for pressure by both nurses:   [x]   Head, Face, and Ears   [x]   Shoulders, Back, and Chest, Abdomen  [x]   Arms, Elbows, and Hands   [x]   Coccyx, Sacrum, and Ischium  [x]   Legs, Feet, and Heels      Patient has red, dry areas down the right side of her from breast down to thighs, red folds, buttocks. Skin Assessed Under all Medical Devices by both nurses:  O2 device tubing              All Mepilex Borders were peeled back and area peeked at by both nurses:  Yes  Please list where Mepilex Borders are located:               **SHARE this note so that the co-signing nurse is able to place an eSignature**    Co-signer eSignature: Electronically signed by Donna Forrest RN on 10/6/22 at 3:53 AM EDT    Does the Patient have Skin Breakdown related to pressure? No     (Insert Photo here                  )         Jarod Prevention initiated:  Yes   Wound Care Orders initiated:  Yes      [id number]  nurse consulted for Pressure Injury (Stage 3,4, Unstageable, DTI, NWPT, Complex wounds)and New or Established Ostomies:  Yes      Patient is not able to demonstrate the ability to move from a reclining position to an upright position within the recliner due to weakness.       Primary Nurse eSignature: Electronically signed by Salome Zuniga RN on 10/6/22 at 7:56 AM EDT

## 2022-10-06 NOTE — PLAN OF CARE
Problem: Discharge Planning  Goal: Discharge to home or other facility with appropriate resources  Outcome: Progressing     Problem: Pain  Goal: Verbalizes/displays adequate comfort level or baseline comfort level  Outcome: Progressing     Problem: ABCDS Injury Assessment  Goal: Absence of physical injury  Outcome: Progressing     Problem: Skin/Tissue Integrity  Goal: Absence of new skin breakdown  Description: 1. Monitor for areas of redness and/or skin breakdown  2. Assess vascular access sites hourly  3. Every 4-6 hours minimum:  Change oxygen saturation probe site  4. Every 4-6 hours:  If on nasal continuous positive airway pressure, respiratory therapy assess nares and determine need for appliance change or resting period.   Outcome: Progressing

## 2022-10-06 NOTE — PROGRESS NOTES
Handoff report to Ruth'roberto Leigh for transfer of care. Updated with Micro result of positive blood culture for Enterobacterales. Pt receiving IV Merrem therapy.

## 2022-10-06 NOTE — H&P
Hospital Medicine History & Physical      PCP: Say Godoy MD    Date of Admission: 10/5/2022    Date of Service: Pt seen/examined on 10/6/22 and Admitted to Inpatient with expected LOS greater than two midnights due to medical therapy. Chief Complaint:  AMS      History Of Present Illness:       66 y.o. female presents from [facility] with AMS, hypoxemia. Patient is seen in the ICU, on NC O2, in no respiratory distress, unresponsive. She was initially requiring NRB for O2 sat initially noted in the 50's, is currently on 4 lpm. She was found to be in afib RVR, danielle, uti/hcap and has been admitted to the ICU.     Past Medical History:          Diagnosis Date    Acute diastolic heart failure (HCC)     Acute respiratory failure (HCC)     Adjustment disorder with mixed anxiety and depressed mood     Allergic rhinitis     Alzheimer's dementia (HCC)     Atrial fibrillation (HCC)     Back pain     CHF (congestive heart failure) (HCC)     Chronic pain     Constipation     COPD (chronic obstructive pulmonary disease) ([address]     Depression     Dermatitis     Diabetes mellitus ([address]     Disseminated superficial actinic porokeratosis     Disseminated superficial actinic porokeratosis (DSAP)     Edema     ESBL (extended spectrum beta-lactamase) producing bacteria infection 02/27/2019    urine    ESBL (extended spectrum beta-lactamase) producing bacteria infection 04/17/2020    Urine    ESBL (extended spectrum beta-lactamase) producing bacteria infection 04/14/2020    Klebsiella pneumoniae ESBL    Hypertension     Hypo-osmolality and hyponatremia     Major depressive disorder     Morbid obesity ([address]     Muscle weakness     Overactive bladder     Schizoaffective disorder ([address]     Seizures ([address]     Xerosis cutis     Xerosis cutis        Past Surgical History:          Procedure Laterality Date    KNEE SURGERY      TONSILLECTOMY      TUBAL LIGATION         Medications Prior to Admission:      Prior to Admission medications Medication Sig Start Date End Date Taking? Authorizing Provider   rivaroxaban (XARELTO) 20 MG TABS tablet Take 1 tablet by mouth daily 4/18/20   Nara Kwan MD   gabapentin (NEURONTIN) 100 MG capsule Take 1 capsule by mouth nightly.  4/18/20   Nara Kwan MD   insulin glargine (LANTUS SOLOSTAR) 100 UNIT/ML injection pen Inject 25 Units into the skin every morning 4/18/20   Nara Kwan MD   insulin lispro (HUMALOG) 100 UNIT/ML injection vial Inject 0-6 Units into the skin 3 times daily (with meals) 4/18/20   Nara Kwan MD   dilTIAZem (CARDIZEM CD) 120 MG extended release capsule Take 1 capsule by mouth daily 4/19/20   Nara Kwan MD   digoxin (LANOXIN) 125 MCG tablet Take 1 tablet by mouth daily 4/19/20   Nara Kwan MD   CHOLECALCIFEROL PO Take 1,000 Units by mouth daily    Historical Provider, MD   Sodium Phosphates (FLEET) 7-19 GM/118ML Place 1 enema rectally daily as needed    Historical Provider, MD   ipratropium-albuterol (DUONEB) 0.5-2.5 (3) MG/3ML SOLN nebulizer solution Inhale 1 vial into the lungs every 6 hours    Historical Provider, MD   B Complex-C-E-Zn (ZINC-VITES) TABS Take 1 tablet by mouth daily    Historical Provider, MD   Hypromellose (NATURES TEARS OP) Apply 1 drop to eye 2 times daily as needed Both eyes every    Historical Provider, MD   solifenacin (VESICARE) 5 MG tablet Take 5 mg by mouth daily    Historical Provider, MD   bisacodyl (DULCOLAX) 10 MG suppository Place 10 mg rectally daily as needed for Constipation    Historical Provider, MD   citalopram (CELEXA) 10 MG tablet Take 20 mg by mouth daily     Historical Provider, MD   Blood Glucose Monitoring Suppl (BLOOD GLUCOSE SYSTEM RICHARD) KIT Check fingerstick blood sugars before meals and at bedtime 3/21/16   Dileep Ruby MD   INSULIN SYRINGE .5CC/29G (Selestino Cocker INS SYR .5CC/29G) 29G X 1/2\" 0.5 ML MISC 1 each by Does not apply route daily 3/21/16   Dileep Ruby MD       Allergies:  Fish-derived products, Iodine, Mushroom extract complex, Onion, and Other    Social History:         TOBACCO:   reports that she quit smoking about 18 years ago. She has never used smokeless tobacco.  ETOH:   reports no history of alcohol use. Family History:             Problem Relation Age of Onset    Diabetes Mother     High Blood Pressure Mother     Cancer Mother     Heart Disease Mother     High Blood Pressure Father     Heart Disease Father        REVIEW OF SYSTEMS:   Pertinent positives as noted in the HPI. All other systems reviewed and negative. PHYSICAL EXAM PERFORMED:    BP (!) 132/97   Pulse (!) 108   Temp 99 °F (37.2 °C) (Axillary)   Resp (!) 34   Wt 212 lb (96.2 kg)   SpO2 97%   BMI 33.20 kg/m²     General appearance:  No apparent distress  HEENT:  Normal cephalic, atraumatic without obvious deformity. Pupils equal, round, and reactive to light. . Conjunctivae/corneas clear. Neck: Supple, with full range of motion. No jugular venous distention. Trachea midline. Respiratory:  Normal respiratory effort. Clear to auscultation, bilaterally without Rales/Wheezes/Rhonchi. Cardiovascular: tachy rate, irregularly irregular  Abdomen: Soft, non-tender, non-distended with normal bowel sounds, obese  Musculoskeletal:  No clubbing, cyanosis or edema bilaterally.     Skin: Skin color, texture, turgor normal.           Labs:     Recent Labs     10/05/22  2230   WBC 33.9*   HGB 16.4*   HCT 50.2*        Recent Labs     10/05/22  2230   *   K 5.8*   CL 83*   CO2 22   BUN 47*   CREATININE 2.7*   CALCIUM 9.8     Recent Labs     10/05/22  2230   AST 10*   ALT <5*   BILITOT 0.7   ALKPHOS 124     Recent Labs     10/05/22  2230   INR 1.97*     Recent Labs     10/05/22  2230 10/06/22  0123   TROPONINI 0.04* 0.04*       Urinalysis:      Lab Results   Component Value Date/Time    NITRU Negative 10/06/2022 12:12 AM    WBCUA >100 10/06/2022 12:12 AM    BACTERIA 3+ 10/06/2022 12:12 AM    RBCUA 5-10 10/06/2022 12:12 AM    BLOODU LARGE 10/06/2022 12:12 AM    SPECGRAV >=1.030 10/06/2022 12:12 AM    GLUCOSEU 500 10/06/2022 12:12 AM       Radiology:        CT head without contrast   Final Result   No acute intracranial abnormality. MRI may be obtained if clinically   indicated. XR CHEST PORTABLE   Final Result   Patchy mild airspace disease which may represent atelectasis or pneumonia. ASSESSMENT:    Active Hospital Problems    Diagnosis Date Noted    Sepsis ([facility] [A41.9] 10/06/2022     Priority: Medium         PLAN:      AMS  - neg head CT non contrast, ammonia 32  - tox enceph    Sepsis  - history of esbl UTI  - merrem/vanc  - follow up blood cultures, mrsa nasal probe    FABIO  - Nephrology consultation  - K 5.8  - IVF  - reanl ultrasound    Elev trop  - trend, flat x 2    Afib rvr  - cardizem gtts, continue anticoag       Dispo - icu  Tot crit care time > 35 min       Bandar Baron MD    Thank you Steven Wallis MD for the opportunity to be involved in this patient's care. If you have any questions or concerns please feel free to contact me at [phone].

## 2022-10-06 NOTE — PROGRESS NOTES
Pt to ICU form ER. On 4 liters on NC.,opening eyes on hearing voices  but not following commands  Cardizem infusing 2.5mg/hr.   call light in reach

## 2022-10-06 NOTE — PROGRESS NOTES
10/06/22 1523   Encounter Summary   Encounter Overview/Reason  Initial Encounter   Service Provided For: Patient   Referral/Consult From: [facility]   Support System Unknown   Last Encounter  10/06/22   Complexity of Encounter Moderate   Begin Time 1523   End Time  1528   Total Time Calculated 5 min   Spiritual/Emotional needs   Type Spiritual Support

## 2022-10-07 PROBLEM — M54.42 CHRONIC BILATERAL LOW BACK PAIN WITH BILATERAL SCIATICA: Status: ACTIVE | Noted: 2018-10-21

## 2022-10-07 PROBLEM — F03.918 DEMENTIA WITH BEHAVIORAL DISTURBANCE (HCC): Status: ACTIVE | Noted: 2018-10-21

## 2022-10-07 PROBLEM — I50.32 HYPERTENSIVE HEART AND KIDNEY DISEASE WITH CHRONIC DIASTOLIC CONGESTIVE HEART FAILURE AND STAGE 2 CHRONIC KIDNEY DISEASE (HCC): Status: ACTIVE | Noted: 2018-10-21

## 2022-10-07 PROBLEM — I48.21 PERMANENT ATRIAL FIBRILLATION WITH RAPID VENTRICULAR RESPONSE (HCC): Status: ACTIVE | Noted: 2022-10-07

## 2022-10-07 PROBLEM — R41.82 ALTERED MENTAL STATUS: Status: RESOLVED | Noted: 2022-10-06 | Resolved: 2022-10-07

## 2022-10-07 PROBLEM — G93.41 SEPSIS WITH ENCEPHALOPATHY WITHOUT SEPTIC SHOCK (HCC): Status: ACTIVE | Noted: 2022-10-06

## 2022-10-07 PROBLEM — G93.40 SEPSIS WITH ENCEPHALOPATHY WITHOUT SEPTIC SHOCK (HCC): Status: ACTIVE | Noted: 2022-10-06

## 2022-10-07 PROBLEM — J96.11 CHRONIC RESPIRATORY FAILURE WITH HYPOXIA (HCC): Status: ACTIVE | Noted: 2018-10-21

## 2022-10-07 PROBLEM — F42.3: Status: ACTIVE | Noted: 2018-10-21

## 2022-10-07 PROBLEM — M54.41 CHRONIC BILATERAL LOW BACK PAIN WITH BILATERAL SCIATICA: Status: ACTIVE | Noted: 2018-10-21

## 2022-10-07 PROBLEM — N18.2 HYPERTENSIVE HEART AND KIDNEY DISEASE WITH CHRONIC DIASTOLIC CONGESTIVE HEART FAILURE AND STAGE 2 CHRONIC KIDNEY DISEASE (HCC): Status: ACTIVE | Noted: 2018-10-21

## 2022-10-07 PROBLEM — R78.81 BACTEREMIA: Status: ACTIVE | Noted: 2022-10-07

## 2022-10-07 PROBLEM — N30.00 ACUTE CYSTITIS WITHOUT HEMATURIA: Status: RESOLVED | Noted: 2020-01-11 | Resolved: 2022-10-07

## 2022-10-07 PROBLEM — G89.29 CHRONIC BILATERAL LOW BACK PAIN WITH BILATERAL SCIATICA: Status: ACTIVE | Noted: 2018-10-21

## 2022-10-07 PROBLEM — R65.20 SEPSIS WITH ENCEPHALOPATHY WITHOUT SEPTIC SHOCK (HCC): Status: ACTIVE | Noted: 2022-10-06

## 2022-10-07 PROBLEM — F41.1 GAD (GENERALIZED ANXIETY DISORDER): Status: ACTIVE | Noted: 2018-10-21

## 2022-10-07 PROBLEM — F25.1 SCHIZOAFFECTIVE DISORDER, DEPRESSIVE TYPE (HCC): Status: ACTIVE | Noted: 2018-10-21

## 2022-10-07 PROBLEM — I13.0 HYPERTENSIVE HEART AND KIDNEY DISEASE WITH CHRONIC DIASTOLIC CONGESTIVE HEART FAILURE AND STAGE 2 CHRONIC KIDNEY DISEASE (HCC): Status: ACTIVE | Noted: 2018-10-21

## 2022-10-07 LAB
ALBUMIN SERPL-MCNC: 3.2 G/DL (ref 3.4–5)
ANION GAP SERPL CALCULATED.3IONS-SCNC: 19 MMOL/L (ref 3–16)
BUN BLDV-MCNC: 48 MG/DL (ref 7–20)
CALCIUM SERPL-MCNC: 9.3 MG/DL (ref 8.3–10.6)
CHLORIDE BLD-SCNC: 97 MMOL/L (ref 99–110)
CO2: 21 MMOL/L (ref 21–32)
CREAT SERPL-MCNC: 0.9 MG/DL (ref 0.6–1.2)
ESTIMATED AVERAGE GLUCOSE: 214.5 MG/DL
GFR AFRICAN AMERICAN: >60
GFR NON-AFRICAN AMERICAN: >60
GLUCOSE BLD-MCNC: 205 MG/DL (ref 70–99)
GLUCOSE BLD-MCNC: 230 MG/DL (ref 70–99)
GLUCOSE BLD-MCNC: 237 MG/DL (ref 70–99)
GLUCOSE BLD-MCNC: 249 MG/DL (ref 70–99)
GLUCOSE BLD-MCNC: 254 MG/DL (ref 70–99)
HBA1C MFR BLD: 9.1 %
MRSA SCREEN RT-PCR: ABNORMAL
ORGANISM: ABNORMAL
PERFORMED ON: ABNORMAL
PHOSPHORUS: 3.5 MG/DL (ref 2.5–4.9)
POTASSIUM SERPL-SCNC: 3.9 MMOL/L (ref 3.5–5.1)
SODIUM BLD-SCNC: 137 MMOL/L (ref 136–145)
URINE CULTURE, ROUTINE: NORMAL
VANCOMYCIN RANDOM: 8.7 UG/ML

## 2022-10-07 PROCEDURE — 99223 1ST HOSP IP/OBS HIGH 75: CPT | Performed by: INTERNAL MEDICINE

## 2022-10-07 PROCEDURE — 6360000002 HC RX W HCPCS: Performed by: INTERNAL MEDICINE

## 2022-10-07 PROCEDURE — 2580000003 HC RX 258: Performed by: HOSPITALIST

## 2022-10-07 PROCEDURE — 2580000003 HC RX 258: Performed by: INTERNAL MEDICINE

## 2022-10-07 PROCEDURE — 2700000000 HC OXYGEN THERAPY PER DAY

## 2022-10-07 PROCEDURE — 6370000000 HC RX 637 (ALT 250 FOR IP): Performed by: INTERNAL MEDICINE

## 2022-10-07 PROCEDURE — 80069 RENAL FUNCTION PANEL: CPT

## 2022-10-07 PROCEDURE — 80202 ASSAY OF VANCOMYCIN: CPT

## 2022-10-07 PROCEDURE — 2500000003 HC RX 250 WO HCPCS: Performed by: INTERNAL MEDICINE

## 2022-10-07 PROCEDURE — 99222 1ST HOSP IP/OBS MODERATE 55: CPT | Performed by: INTERNAL MEDICINE

## 2022-10-07 PROCEDURE — 99233 SBSQ HOSP IP/OBS HIGH 50: CPT | Performed by: INTERNAL MEDICINE

## 2022-10-07 PROCEDURE — 6370000000 HC RX 637 (ALT 250 FOR IP): Performed by: HOSPITALIST

## 2022-10-07 PROCEDURE — 6360000002 HC RX W HCPCS: Performed by: HOSPITALIST

## 2022-10-07 PROCEDURE — 94761 N-INVAS EAR/PLS OXIMETRY MLT: CPT

## 2022-10-07 PROCEDURE — 94640 AIRWAY INHALATION TREATMENT: CPT

## 2022-10-07 PROCEDURE — 2000000000 HC ICU R&B

## 2022-10-07 PROCEDURE — 36415 COLL VENOUS BLD VENIPUNCTURE: CPT

## 2022-10-07 PROCEDURE — 2500000003 HC RX 250 WO HCPCS

## 2022-10-07 RX ORDER — DIGOXIN 125 MCG
125 TABLET ORAL DAILY
Status: DISCONTINUED | OUTPATIENT
Start: 2022-10-07 | End: 2022-10-07

## 2022-10-07 RX ORDER — OLANZAPINE 10 MG/1
10 INJECTION, POWDER, LYOPHILIZED, FOR SOLUTION INTRAMUSCULAR ONCE
Status: COMPLETED | OUTPATIENT
Start: 2022-10-07 | End: 2022-10-07

## 2022-10-07 RX ORDER — DILTIAZEM HYDROCHLORIDE 5 MG/ML
INJECTION INTRAVENOUS
Status: COMPLETED
Start: 2022-10-07 | End: 2022-10-07

## 2022-10-07 RX ORDER — DILTIAZEM HYDROCHLORIDE 5 MG/ML
10 INJECTION INTRAVENOUS ONCE
Status: COMPLETED | OUTPATIENT
Start: 2022-10-07 | End: 2022-10-07

## 2022-10-07 RX ORDER — LORAZEPAM 2 MG/ML
1 INJECTION INTRAMUSCULAR EVERY 4 HOURS PRN
Status: DISCONTINUED | OUTPATIENT
Start: 2022-10-07 | End: 2022-10-12 | Stop reason: HOSPADM

## 2022-10-07 RX ADMIN — Medication 10 ML: at 21:12

## 2022-10-07 RX ADMIN — TIGECYCLINE 50 MG: 50 INJECTION, POWDER, LYOPHILIZED, FOR SOLUTION INTRAVENOUS at 09:46

## 2022-10-07 RX ADMIN — MEROPENEM 1000 MG: 1 INJECTION, POWDER, FOR SOLUTION INTRAVENOUS at 05:13

## 2022-10-07 RX ADMIN — DIGOXIN 125 MCG: 125 TABLET ORAL at 11:41

## 2022-10-07 RX ADMIN — LORAZEPAM 0.5 MG: 0.5 TABLET ORAL at 12:34

## 2022-10-07 RX ADMIN — MICONAZOLE NITRATE: 2 OINTMENT TOPICAL at 21:12

## 2022-10-07 RX ADMIN — EMPAGLIFLOZIN 10 MG: 10 TABLET, FILM COATED ORAL at 09:41

## 2022-10-07 RX ADMIN — BUSPIRONE HYDROCHLORIDE 5 MG: 5 TABLET ORAL at 09:42

## 2022-10-07 RX ADMIN — DILTIAZEM HYDROCHLORIDE 10 MG: 5 INJECTION INTRAVENOUS at 13:58

## 2022-10-07 RX ADMIN — SODIUM CHLORIDE, SODIUM GLUCONATE, SODIUM ACETATE, POTASSIUM CHLORIDE AND MAGNESIUM CHLORIDE 1000 ML: 526; 502; 368; 37; 30 INJECTION, SOLUTION INTRAVENOUS at 07:52

## 2022-10-07 RX ADMIN — CITALOPRAM HYDROBROMIDE 20 MG: 20 TABLET ORAL at 09:41

## 2022-10-07 RX ADMIN — RIVAROXABAN 20 MG: 20 TABLET, FILM COATED ORAL at 09:41

## 2022-10-07 RX ADMIN — SODIUM CHLORIDE, SODIUM GLUCONATE, SODIUM ACETATE, POTASSIUM CHLORIDE AND MAGNESIUM CHLORIDE 1000 ML: 526; 502; 368; 37; 30 INJECTION, SOLUTION INTRAVENOUS at 21:40

## 2022-10-07 RX ADMIN — INSULIN LISPRO 8 UNITS: 100 INJECTION, SOLUTION INTRAVENOUS; SUBCUTANEOUS at 16:44

## 2022-10-07 RX ADMIN — AMIODARONE HYDROCHLORIDE 0.5 MG/MIN: 50 INJECTION, SOLUTION INTRAVENOUS at 22:29

## 2022-10-07 RX ADMIN — Medication 10 ML: at 09:42

## 2022-10-07 RX ADMIN — INSULIN LISPRO 4 UNITS: 100 INJECTION, SOLUTION INTRAVENOUS; SUBCUTANEOUS at 09:58

## 2022-10-07 RX ADMIN — AMIODARONE HYDROCHLORIDE 0.5 MG/MIN: 50 INJECTION, SOLUTION INTRAVENOUS at 12:51

## 2022-10-07 RX ADMIN — BUSPIRONE HYDROCHLORIDE 5 MG: 5 TABLET ORAL at 21:11

## 2022-10-07 RX ADMIN — GABAPENTIN 100 MG: 100 CAPSULE ORAL at 09:41

## 2022-10-07 RX ADMIN — TIOTROPIUM BROMIDE INHALATION SPRAY 2 PUFF: 3.12 SPRAY, METERED RESPIRATORY (INHALATION) at 08:26

## 2022-10-07 RX ADMIN — OXYCODONE HYDROCHLORIDE AND ACETAMINOPHEN 1 TABLET: 5; 325 TABLET ORAL at 12:32

## 2022-10-07 RX ADMIN — LORAZEPAM 0.5 MG: 0.5 TABLET ORAL at 05:18

## 2022-10-07 RX ADMIN — INSULIN LISPRO 4 UNITS: 100 INJECTION, SOLUTION INTRAVENOUS; SUBCUTANEOUS at 12:35

## 2022-10-07 RX ADMIN — GABAPENTIN 100 MG: 100 CAPSULE ORAL at 12:34

## 2022-10-07 RX ADMIN — ACETAMINOPHEN 650 MG: 650 SUPPOSITORY RECTAL at 16:54

## 2022-10-07 RX ADMIN — MEROPENEM 1000 MG: 1 INJECTION, POWDER, FOR SOLUTION INTRAVENOUS at 21:11

## 2022-10-07 RX ADMIN — INSULIN GLARGINE 25 UNITS: 100 INJECTION, SOLUTION SUBCUTANEOUS at 09:57

## 2022-10-07 RX ADMIN — OXYCODONE HYDROCHLORIDE AND ACETAMINOPHEN 1 TABLET: 5; 325 TABLET ORAL at 05:19

## 2022-10-07 RX ADMIN — OLANZAPINE 10 MG: 10 INJECTION, POWDER, LYOPHILIZED, FOR SOLUTION INTRAMUSCULAR at 13:14

## 2022-10-07 RX ADMIN — DILTIAZEM HYDROCHLORIDE 10 MG/HR: 5 INJECTION, SOLUTION INTRAVENOUS at 14:00

## 2022-10-07 RX ADMIN — MICONAZOLE NITRATE: 2 OINTMENT TOPICAL at 09:41

## 2022-10-07 RX ADMIN — VALPROIC ACID 250 MG: 250 CAPSULE, LIQUID FILLED ORAL at 21:18

## 2022-10-07 RX ADMIN — VALPROIC ACID 250 MG: 250 CAPSULE, LIQUID FILLED ORAL at 12:34

## 2022-10-07 RX ADMIN — DULOXETINE HYDROCHLORIDE 60 MG: 60 CAPSULE, DELAYED RELEASE ORAL at 09:41

## 2022-10-07 RX ADMIN — MEROPENEM 1000 MG: 1 INJECTION, POWDER, FOR SOLUTION INTRAVENOUS at 12:47

## 2022-10-07 RX ADMIN — VALPROIC ACID 250 MG: 250 CAPSULE, LIQUID FILLED ORAL at 09:41

## 2022-10-07 RX ADMIN — GABAPENTIN 100 MG: 100 CAPSULE ORAL at 21:11

## 2022-10-07 RX ADMIN — VANCOMYCIN HYDROCHLORIDE 750 MG: 750 INJECTION, POWDER, LYOPHILIZED, FOR SOLUTION INTRAVENOUS at 11:41

## 2022-10-07 ASSESSMENT — PAIN SCALES - GENERAL
PAINLEVEL_OUTOF10: 7
PAINLEVEL_OUTOF10: 1
PAINLEVEL_OUTOF10: 3

## 2022-10-07 ASSESSMENT — PAIN DESCRIPTION - ORIENTATION: ORIENTATION: POSTERIOR;MID

## 2022-10-07 ASSESSMENT — PAIN DESCRIPTION - LOCATION: LOCATION: HEAD

## 2022-10-07 NOTE — PROGRESS NOTES
intracranial abnormality. MRI may be obtained if clinically   indicated. XR CHEST PORTABLE   Final Result   Patchy mild airspace disease which may represent atelectasis or pneumonia. Assessment & Plan:     Patient Active Problem List    Diagnosis Date Noted    Persistent atrial fibrillation ([address] 11/07/2016    Sepsis ([address] 10/06/2022    Altered mental status 10/06/2022    FABIO (acute kidney injury) ([address] 10/06/2022    Sepsis secondary to UTI ([address] 10/06/2022    Hypoxia     Urinary tract infection without hematuria     Abnormal CXR     Chronic pain of both knees 02/24/2020    Bilateral primary osteoarthritis of knee 02/24/2020    Acute pain of left shoulder     Hypomagnesemia     Acute cystitis without hematuria 01/11/2020    Acute respiratory failure with hypoxia ([address] 06/92/1918    Acute diastolic congestive heart failure ([address] 09/14/2016    Morbid obesity ([address] 03/24/2016    Shortness of breath 03/16/2016    Precordial pain 03/16/2016    Essential hypertension     Chronic obstructive pulmonary disease (HCC)     Atrial fibrillation with rapid ventricular response ([address]     Obesity 01/27/2016    Hypokalemia 01/27/2016    Lumbar facet arthropathy 12/10/2014    Disc degeneration, lumbar 12/10/2014    Thoracic back pain 12/10/2014    Neck pain 12/10/2014    DM (diabetes mellitus) ([address] 01/13/2014    Seizure disorder, grand mal ([address] 01/12/2014    Leukocytosis 01/12/2014    Osteomyelitis of spine ([address] 10/06/2011    Diskitis 10/06/2011       Acute hypoxic respiratory failure  Currently on 4 L of oxygen    Acute kidney injury  Has resolved with IV fluids. Gram-negative bacteremia  UTI  Continue broad-spectrum antibiotics for now. Infectious disease consult    Atrial fibrillation with rapid ventricular rate  Placed on amiodarone. Hypotensive with Cardizem. Continue Lanoxin and Xarelto    Type 2 diabetes  Stable.   Continue Lantus insulin and sliding scale insulin  Continue Jardiance    Schizoaffective disorder  Dementia  Continue BuSpar, Celexa, Cymbalta and valproic acid    Xarelto for DVT prophylaxis  Full code  Carb control diet      Beatrice Bailey MD

## 2022-10-07 NOTE — PROGRESS NOTES
Dr Aristeo Grace updated on status, continued agitation and yelling out, -140s. See orders for zyprexa, additional ativan if needed and cardizem IV bolus and IV infusion for continuing AFIBRVR. Cardiology consulted. Yelling out continuously-emotional support, redirection,repositioning, rest, bedside monitoring and RN support 1:1.  RR 30-40s. Zyprexa IM administered LVG site. See eMAR.

## 2022-10-07 NOTE — CONSULTS
[address] [place] [address]  [phone]        Reason for Consultation/Chief Complaint: Sedated and unable to give history now  Consulted atrial fibrillation with RVR and elevated HR  Last seen by Dr Guille Garcia 9/28/2016      History of Present Illness:  Elvis Paulson is a 66 y.o. patient who presented to [facility] 10/5/2022 with altered mental status from [facility]. She has PMH longstanding permanent afib on xarelto, DM, hx diastolic CHF, dementia, depression, schizoaffective d/o, ESBL,obesity, and seizures. Most recent Niya Myoview 4/18/2016 was negative for ischemia with an EF=48%. Note Echo 4/15/2020 EF=55%. She present from [facility] after being found unresponsive with sats in the 50's. EMS brought her in on NRB initially but weened down to [place]. She is sedated and unable to give history now. Admitted with pneumonia/resp failure, sepsis, and UTI. Noted rapid afib and dilt gtt started but got hypotensive and d/c'd. Amiodarone gtt started and HR still elevated so dilt added back given improved BP. Note CXR noted atelectasis or pneumonia. Head CT was unremarkable. EKG Afib with RVR 136bpm; marked ST abnormality inferior and anterolateral consider ischemia (ST change more prominent compared to 4/20). LABS: , K 3.9, Cl 97, CO2 21, BUN/Cr 48/0.9, GFR >60. Albumin 3.2, Palomo 0.04 x3, BNP 3,531. Note Echo 10/6/2022 EF=>65%; mild cLVH. She cannot give ROS. I have been asked to provide consultation regarding further management and testing.     Past Medical History:   has a past medical history of Acute diastolic congestive heart failure ([address] Acute diastolic heart failure ([address] Acute respiratory failure ([address] Adjustment disorder with mixed anxiety and depressed mood, Allergic rhinitis, Alzheimer's dementia ([address] Arachnoid cyst, Atrial fibrillation ([address] CHF (congestive heart failure) ([address] Chronic back pain, Constipation, COPD (chronic obstructive pulmonary disease) ([facility] Diabetes mellitus ([facility] Diskitis, Disseminated superficial actinic porokeratosis (DSAP), Edema, ESBL (extended spectrum beta-lactamase) producing bacteria infection, Hypertension, Hypo-osmolality and hyponatremia, Major depressive disorder, Morbid obesity ([address] Muscle weakness, Osteomyelitis of spine ([address] Overactive bladder, Schizoaffective disorder ([address] Seizures ([address] Urinary tract infection without hematuria, and Xerosis cutis. Surgical History:   has a past surgical history that includes Tubal ligation; knee surgery; and Tonsillectomy. Social History:   reports that she quit smoking about 18 years ago. She has never used smokeless tobacco. She reports that she does not drink alcohol and does not use drugs. Family History:  family history includes Cancer in her mother; Diabetes in her mother; Heart Disease in her father and mother; High Blood Pressure in her father and mother. Home Medications:  Were reviewed and are listed in nursing record. and/or listed below  Prior to Admission medications    Medication Sig Start Date End Date Taking? Authorizing Provider   LORazepam (ATIVAN) 0.5 MG tablet Take 0.5 mg by mouth in the morning and at bedtime. Yes Historical Provider, MD   busPIRone (BUSPAR) 5 MG tablet Take 5 mg by mouth 2 times daily   Yes Historical Provider, MD   fluconazole (DIFLUCAN) 100 MG tablet Take 100 mg by mouth daily For 7 days 9/28/22  Yes Historical Provider, MD   DULoxetine (CYMBALTA) 60 MG extended release capsule Take 60 mg by mouth daily   Yes Historical Provider, MD   acetaminophen (TYLENOL) 325 MG tablet Take 650 mg by mouth every 6 hours as needed for Pain   Yes Historical Provider, MD   oxyCODONE-acetaminophen (PERCOCET) 5-325 MG per tablet Take 1 tablet by mouth in the morning and 1 tablet at noon and 1 tablet in the evening and 1 tablet before bedtime.    Yes Historical Provider, MD   divalproex (DEPAKOTE) 250 MG DR tablet Take 250 mg by mouth 3 times daily   Yes Historical Provider, MD furosemide (LASIX) 40 MG tablet Take 40 mg by mouth daily   Yes Historical Provider, MD   melatonin 3 MG TABS tablet Take 3 mg by mouth daily   Yes Historical Provider, MD   potassium chloride (MICRO-K) 10 MEQ extended release capsule Take 10 mEq by mouth daily   Yes Historical Provider, MD   tiotropium (SPIRIVA) 18 MCG inhalation capsule Inhale 18 mcg into the lungs daily   Yes Historical Provider, MD   Cholecalciferol (VITAMIN D3) 1.25 MG (85109 UT) CAPS Take 1 capsule by mouth daily   Yes Historical Provider, MD   tiZANidine (ZANAFLEX) 2 MG tablet Take 2 mg by mouth in the morning, at noon, and at bedtime   Yes Historical Provider, MD   empagliflozin (JARDIANCE) 10 MG tablet Take 10 mg by mouth daily   Yes Historical Provider, MD   rivaroxaban (XARELTO) 20 MG TABS tablet Take 20 mg by mouth nightly 4/18/20   Stephon Valera MD   gabapentin (NEURONTIN) 100 MG capsule Take 100 mg by mouth 3 times daily.  4/18/20   Stephon Valera MD   insulin glargine (LANTUS SOLOSTAR) 100 UNIT/ML injection pen Inject 25 Units into the skin every morning 4/18/20   Stephon Valera MD   insulin lispro (HUMALOG) 100 UNIT/ML injection vial Inject 0-6 Units into the skin 3 times daily (with meals) 4/18/20   Stephon Valera MD   digoxin [place]) 125 MCG tablet Take 1 tablet by mouth daily 4/19/20   Stephon Valera MD   Sodium Phosphates (FLEET) 7-19 GM/118ML Place 1 enema rectally daily as needed    Historical Provider, MD   bisacodyl (DULCOLAX) 10 MG suppository Place 10 mg rectally daily as needed for Constipation    Historical Provider, MD   Blood Glucose Monitoring Suppl (BLOOD GLUCOSE SYSTEM RICHARD) KIT Check fingerstick blood sugars before meals and at bedtime 3/21/16   Lera Cowden, MD   INSULIN SYRINGE .5CC/29G ([place] INS SYR .5CC/29G) 29G X 1/2\" 0.5 ML MISC 1 each by Does not apply route daily 3/21/16   Lera Cowden, MD        Allergies:  Fish-derived products, Iodine, Mushroom extract complex, Onion, and Other Review of Systems:   12 point ROS negative in all areas as listed below except as in [place]  Constitutional, EENT, Cardiovascular, pulmonary, GI, , Musculoskeletal, skin, neurological, hematological, endocrine, Psychiatric    Physical Examination:    Vitals:    10/07/22 1232   BP:    Pulse:    Resp: 20   Temp:    SpO2:     Weight: 212 lb (96.2 kg)         General Appearance:  Sedated; no distress, appears stated age   Head:  Normocephalic, without obvious abnormality, atraumatic   Eyes:  PERRL, conjunctiva/corneas clear       Nose: Nares normal, no drainage or sinus tenderness   Throat: Lips, mucosa, and tongue normal   Neck: Supple, symmetrical, trachea midline, no adenopathy, thyroid: not enlarged, symmetric, no tenderness/mass/nodules, no carotid bruit or JVD       Lungs:   Clear to auscultation bilaterally, respirations unlabored   Chest Wall:  No tenderness or deformity   Heart:  +irregularly irregular and tachycardic, S1, S2 normal, no murmur, rub or gallop   Abdomen:   Soft, non-tender, bowel sounds active all four quadrants,  no masses, no organomegaly           Extremities: Extremities normal, atraumatic, no cyanosis or edema   Pulses: 2+ and symmetric   Skin: Skin color, texture, turgor normal, no rashes or lesions   Pysch: Unable to assess   Neurologic: Non-focal        Labs  CBC:   Lab Results   Component Value Date/Time    WBC 27.7 10/06/2022 04:41 AM    RBC 5.15 10/06/2022 04:41 AM    HGB 14.6 10/06/2022 04:41 AM    HCT 44.9 10/06/2022 04:41 AM    MCV 87.1 10/06/2022 04:41 AM    RDW 15.3 10/06/2022 04:41 AM     10/06/2022 04:41 AM     CMP:    Lab Results   Component Value Date/Time     10/07/2022 04:27 AM    K 3.9 10/07/2022 04:27 AM    K 5.8 10/05/2022 10:30 PM    CL 97 10/07/2022 04:27 AM    CO2 21 10/07/2022 04:27 AM    BUN 48 10/07/2022 04:27 AM    CREATININE 0.9 10/07/2022 04:27 AM    GFRAA >60 10/07/2022 04:27 AM    GFRAA >60 04/04/2013 12:33 PM    AGRATIO 1.1 10/05/2022 10:30 PM LABGLOM >60 10/07/2022 04:27 AM    GLUCOSE 237 10/07/2022 04:27 AM    PROT 8.0 10/05/2022 10:30 PM    PROT 7.3 08/07/2012 11:45 AM    CALCIUM 9.3 10/07/2022 04:27 AM    BILITOT 0.7 10/05/2022 10:30 PM    ALKPHOS 124 10/05/2022 10:30 PM    AST 10 10/05/2022 10:30 PM    ALT <5 10/05/2022 10:30 PM     PT/INR:  No results found for: PTINR  Lab Results   Component Value Date    CKTOTAL 57 01/11/2020    TROPONINI 0.04 (H) 10/06/2022       EKG:  I have reviewed EKG with the following interpretation:  Impression:  See HPI    ECHOSummary  Left ventricular systolic function is hyperdynamic with ejection fraction estimated at >65%. No regional wall motion abnormalities. There is mild concentric left ventricular hypertrophy. Elevated left ventricular filling pressure. Mild bi-atrial enlargement. Systolic pulmonary artery pressure (SPAP)    Assessment:  Susan Khan is a 66 y.o. patient who presented to [facility] 10/5/2022 with altered mental status from [facility]. She has PMH longstanding permanent afib on xarelto, DM, hx diastolic CHF, dementia, depression, schizoaffective d/o, ESBL,obesity, and seizures. Most recent Niya Myoview 4/18/2016 negative ischemia with EF=48%. Note Echo 4/15/2020 EF=55%. She present from [facility] after being found unresponsive with sats in the 50's. EMS brought her in on NRB initially but weened down to [place]. She is sedated and unable to give history now. Admitted with pneumonia/resp failure, sepsis, and UTI. Noted rapid afib and dilt gtt started but got hypotensive and d/c'd. Amiodarone gtt started and HR still elevated so dilt added back given improved BP. Note CXR noted atelectasis or pneumonia. Head CT was unremarkable. EKG Afib with RVR 136bpm; marked ST abnormality inferior and anterolateral consider ischemia (ST change more prominent compared to 4/20). LABS: , K 3.9, Cl 97, CO2 21, BUN/Cr 48/0.9, GFR >60. Albumin 3.2, Palomo 0.04 x3, BNP 3,531. Note Echo 10/6/2022 EF=>65%; mild cLVH. Diagnosis of afib with RVR in elderly female with multiple med issues including current sepsis with gram neg bacteremia,  PNA, and UTI. Recs:  Continue amiodarone and diltiazem gtts. Her HR is 110's currently and improved from prior. Would settle for goal HR < 120 given current sepsis/infxns  D/C digoxin as potential interaction with amio and also having FABIO. Continue xarelto 20mg daily for [facility]. Decrease to 15mg qd if CrCL < 50mL/mi  Treat infections as appropriate per ICU team.  ECHO 10/6 hyperdynamic LVEF; no concerning issues  Daily EKG's  I told nurse to have ICU pharmacist assess med list to look for any potential interactions with amiodarone. Patient Active Problem List   Diagnosis    Seizure disorder, grand mal ([address]    DM (diabetes mellitus) ([address]    Lumbar facet arthropathy    Disc degeneration, lumbar    Obesity    Atrial fibrillation with rapid ventricular response (HCC)    Essential hypertension    Chronic obstructive pulmonary disease (HCC)    Persistent atrial fibrillation (HCC)    Chronic pain of both knees    Bilateral primary osteoarthritis of knee    Sepsis ([address]    Altered mental status    FABIO (acute kidney injury) ([address]    Sepsis secondary to UTI ([address]    Cervical herniated disc    Chronic bilateral low back pain with bilateral sciatica    Chronic respiratory failure with hypoxia (HCC)    Dementia with behavioral disturbance    LORENZO (generalized anxiety disorder)    Hoarding disorder with poor insight    Hypertensive heart and kidney disease with chronic diastolic congestive heart failure and stage 2 chronic kidney disease ([address]    Schizoaffective disorder, depressive type ([address]    Bacteremia         Thank you for allowing to us to participate in the care or [facility]. Further evaluation will be based upon the patient's clinical course and testing results.

## 2022-10-07 NOTE — PROGRESS NOTES
Progress Note    HISTORY     CC:  AMS          We are following for acute kidney injury        Subjective/   HPI:  She is confused. Moaning in pain. Kidney function improved.   Very good urine output and BP is better     ROS:  Constitutional:  No fevers, confused   Cardiovascular:  No edema   Respiratory:  No wheezing, no cough  Skin:  No rash, no itching  :  No hematuria, no dysuria     Social Hx:  No Family at the bedside     Past Medical and Surgical History:  - Reviewed, no changes     EXAM       Objective/     Vitals:    10/07/22 0400 10/07/22 0519 10/07/22 0600 10/07/22 0700   BP: (!) 171/143  (!) 140/97 (!) 163/83   Pulse: (!) 123  (!) 132 (!) 122   Resp: 28 19 27 14   Temp: 99.5 °F (37.5 °C)      TempSrc: Oral      SpO2:    95%   Weight:       Height:         24HR INTAKE/OUTPUT:    Intake/Output Summary (Last 24 hours) at 10/7/2022 0924  Last data filed at 10/7/2022 0651  Gross per 24 hour   Intake 5200.61 ml   Output 3600 ml   Net 1600.61 ml     Constitutional:  Ill appearing, confused   Eyes:  Pupils reactive, sclera clear   Neck:  Normal thyroid, no masses   Cardiovascular:  irregular, tachycardic   Respiratory:  No distress, no wheezing  Psychiatry:  Alert, confused   Abdomen: +bs, soft, nt, no masses   Musculoskeletal: No LE edema, no clubbing   Lymphatics:  No LAD in neck, no supraclavicular nodes       MEDICAL DECISION MAKING       Data/  Recent Labs     10/05/22  2230 10/06/22  0441   WBC 33.9* 27.7*   HGB 16.4* 14.6   HCT 50.2* 44.9   MCV 86.4 87.1    175     Recent Labs     10/05/22  2230 10/06/22  0441 10/07/22  0427   * 128* 137   K 5.8* 4.8 3.9   CL 83* 88* 97*   CO2 22 22 21   GLUCOSE 379* 339* 237*   PHOS  --  6.6* 3.5   BUN 47* 49* 48*   CREATININE 2.7* 2.2* 0.9   LABGLOM 17* 22* >60   GFRAA 21* 26* >60       Assessment/     Acute Kidney Injury:  KDIGO stage 2  - Etiology:  Pre-renal spectrum with volume depletion and low BP with cardiac instability decreasing renal perfusion pressures  - Clinical:  Seems to be responding to volume, Scr is down to 0.9     Hyperkalemia:  Due to FABIO / better with medical management       Hyponatremia:  - Improving with volume      Sepsis:  - Cultures pending.  + UTI and pneumonia      Respiratory Failure:  - HCAP      Atrial Fibrillation:  - Hypotensive.   Now on amiodarone       Plan/     Gentle plasmalyte   Follow labs     Thank you for asking us to participate in the management of your patient, please do not hesitate to contact me for any concerns regarding my recommendations as outlined above.    -----------------------------  David Pak M.D.   [facility]

## 2022-10-07 NOTE — CONSULTS
[facility] Infectious Disease Consult Note      Ana Paula Benson     : 1950    DATE OF VISIT:  10/7/2022  DATE OF ADMISSION:  10/5/2022       Subjective:     Ana Paula Benson is a 66 y.o. female whom I've been asked to see by Dr. Scott Abad for Gram-negative bacteremia, concern for possible MDR infection. Chief Complaint   Patient presents with    Altered Mental Status     Pt. Arrived via EMS from [facility]. EMS called for pt. Unresponsive with O2 sats 50%. EMS arrived pt. Was 100% on 8L NRB. Pt. Only responsive to painful stimuli upon arrival.       HPI:  Most Hx was obtained by reviewing her chart and speaking with her ICU RN, as she's somewhat somnolent after recent medication (with prior agitation), and can't give any detailed Hx at all. Pretty extensive medical Hx as outlined below, currently staying at [facility], was brought here 2 days ago for what was described as an abrupt, severe mental status change with profound hypoxemia. Treated with supplemental O2 of course, did not require intubation. No signs of shock, but she did have rapid Afib, and has been managed in the ICU. Initial Hx and lab workup c/w severe sepsis, and she was treated with Vanco and meropenem pending cultures, then adding Tygacil when her BCx came back (+) for Enterobacteriaceae by PCR testing, given prior Abx exposure, high incidence of Diana at local nursing homes, and some prior MDRO of her own, including ESBL Gram-negatives. She's improved somewhat from yesterday, in terms of fever and labs, perhaps not so much in terms of mental status yet. Oxygenating better. From looking back through her chart it looks like she's been treated for UTI multiple times before, not sure the exact circumstances (retention, catheters, etc), but I don't see a Hx of urologic consult or procedures.     Ms. Jacob Peralta has a past medical history of Acute diastolic congestive heart failure ([address] Acute diastolic heart failure ([address] Acute respiratory failure ([address] Adjustment disorder with mixed anxiety and depressed mood, Allergic rhinitis, Alzheimer's dementia ([facility] Arachnoid cyst, Atrial fibrillation ([address] CHF (congestive heart failure) ([facility] Chronic back pain, Constipation, COPD (chronic obstructive pulmonary disease) ([address] Diabetes mellitus ([facility] Diskitis, Disseminated superficial actinic porokeratosis (DSAP), Edema, ESBL (extended spectrum beta-lactamase) producing bacteria infection, Hypertension, Hypo-osmolality and hyponatremia, Major depressive disorder, Morbid obesity ([address] Muscle weakness, Osteomyelitis of spine ([facility] Overactive bladder, Schizoaffective disorder ([facility] Seizures ([facility] Urinary tract infection without hematuria, and Xerosis cutis. She has a past surgical history that includes Tubal ligation; knee surgery; and Tonsillectomy. Her family history includes Cancer in her mother; Diabetes in her mother; Heart Disease in her father and mother; High Blood Pressure in her father and mother. Ms. Radha Sanchez reports that she quit smoking about 18 years ago. She has never used smokeless tobacco. She reports that she does not drink alcohol and does not use drugs.     Current Facility-Administered Medications: vancomycin (VANCOCIN) 750 mg in dextrose 5 % 250 mL IVPB, 750 mg, IntraVENous, Q12H  meropenem (MERREM) 1,000 mg in sodium chloride 0.9 % 100 mL IVPB (mini-bag), 1,000 mg, IntraVENous, Q8H  [COMPLETED] dilTIAZem injection 10 mg, 10 mg, IntraVENous, Once **FOLLOWED BY** dilTIAZem 125 mg in dextrose 5 % 125 mL infusion, 2.5-15 mg/hr, IntraVENous, Continuous  LORazepam (ATIVAN) injection 1 mg, 1 mg, IntraVENous, Q4H PRN  sterile water injection, , ,   citalopram (CELEXA) tablet 20 mg, 20 mg, Oral, Daily  rivaroxaban (XARELTO) tablet 20 mg, 20 mg, Oral, Daily with breakfast  glucose chewable tablet 16 g, 4 tablet, Oral, PRN  dextrose bolus 10% 125 mL, 125 mL, IntraVENous, PRN **OR** dextrose bolus 10% 250 mL, 250 mL, IntraVENous, PRN  glucagon (rDNA) injection 1 mg, 1 mg, SubCUTAneous, PRN  dextrose 10 % infusion, , IntraVENous, Continuous PRN  sodium chloride flush 0.9 % injection 5-40 mL, 5-40 mL, IntraVENous, 2 times per day  sodium chloride flush 0.9 % injection 5-40 mL, 5-40 mL, IntraVENous, PRN  0.9 % sodium chloride infusion, , IntraVENous, PRN  ondansetron (ZOFRAN-ODT) disintegrating tablet 4 mg, 4 mg, Oral, Q8H PRN **OR** ondansetron (ZOFRAN) injection 4 mg, 4 mg, IntraVENous, Q6H PRN  polyethylene glycol (GLYCOLAX) packet 17 g, 17 g, Oral, Daily PRN  acetaminophen (TYLENOL) tablet 650 mg, 650 mg, Oral, Q6H PRN **OR** acetaminophen (TYLENOL) suppository 650 mg, 650 mg, Rectal, Q6H PRN  perflutren lipid microspheres (DEFINITY) injection 1.65 mg, 1.5 mL, IntraVENous, ONCE PRN  insulin glargine (LANTUS) injection vial 25 Units, 25 Units, SubCUTAneous, QAM  vancomycin (VANCOCIN) intermittent dosing (placeholder), , Other, RX Placeholder  ipratropium-albuterol (DUONEB) nebulizer solution 3 mL, 1 vial, Inhalation, Q4H PRN  tiotropium (SPIRIVA RESPIMAT) 2.5 MCG/ACT inhaler 2 puff, 2 puff, Inhalation, Daily  [COMPLETED] amiodarone (CORDARONE) 150 mg in dextrose 5 % 100 mL bolus, 150 mg, IntraVENous, Once **FOLLOWED BY** [] amiodarone (CORDARONE) 450 mg in dextrose 5 % 250 mL infusion, 1 mg/min, IntraVENous, Continuous **FOLLOWED BY** amiodarone (CORDARONE) 450 mg in dextrose 5 % 250 mL infusion, 0.5 mg/min, IntraVENous, Continuous  miconazole nitrate 2 % ointment, , Topical, BID  insulin lispro (HUMALOG) injection vial 0-16 Units, 0-16 Units, SubCUTAneous, TID WC  insulin lispro (HUMALOG) injection vial 0-4 Units, 0-4 Units, SubCUTAneous, Nightly  empagliflozin (JARDIANCE) tablet 10 mg, 10 mg, Oral, Daily  LORazepam (ATIVAN) tablet 0.5 mg, 0.5 mg, Oral, BID PRN  busPIRone (BUSPAR) tablet 5 mg, 5 mg, Oral, BID  DULoxetine (CYMBALTA) extended release capsule 60 mg, 60 mg, Oral, Daily  oxyCODONE-acetaminophen (PERCOCET) 5-325 MG per tablet 1 tablet, 1 tablet, Oral, Q6H PRN  valproic acid (DEPAKENE) capsule 250 mg, 250 mg, Oral, TID  melatonin tablet 3 mg, 3 mg, Oral, Nightly PRN  gabapentin (NEURONTIN) capsule 100 mg, 100 mg, Oral, TID  electrolyte-A (PLASMALYTE-A) solution 1,000 mL, 1,000 mL, IntraVENous, Continuous  tigecycline (TYGACIL) 50 mg in sodium chloride 0.9 % 100 mL IVPB, 50 mg, IntraVENous, Q12H     This is day 2 of vanco / meropenem / tygacil. Allergies: Fish-derived products, Iodine, Mushroom extract complex, Onion, and Other    Pertinent items from the review of systems are discussed in the HPI; the remainder of the ROS was reviewed and is negative.      Objective:     Vital signs over the last 24 hours:  Temp  Av.9 °F (37.2 °C)  Min: 97.7 °F (36.5 °C)  Max: 99.5 °F (37.5 °C)  Pulse  Av.2  Min: 15  Max: 362  Systolic (73WHI), BNV:302 , Min:107 , ZCM:540   Diastolic (75OCT), PV, Min:60, Max:143  Resp  Av.8  Min: 14  Max: 28  SpO2  Av.4 %  Min: 90 %  Max: 97 %    Constitutional:  well-developed, well-nourished, overweight, looks uncomfortable but not toxic  Psychiatric:  reportedly agitated earlier, more somnolent now, calling out at times (seemingly in pain, but she can't localize for me, or just saying \"help\"); not able to answer any questions about orientation, and giving inconsistent answers to questions of pain, fever, chills, etc.   Eyes:  pupils equal, round and reactive to light; sclerae anicteric, conjunctivae not pale  ENT:  atraumatic; oral mucosa dry, no thrush or ulcers (limited exam); on nasal O2 now  Resp:  lungs decreased and a bit coarse to auscultation BL; no use of accessory muscles or other signs of resp distress  Cardiovascular:  heart regular, no gallop, no murmur; mild lower extremity edema; no IV phlebitis  GI:  abdomen soft, not clearly tender (inconsistent answers, no guarding), ND, normal bowel sounds, no palpable masses or organomegaly  : Moraes in place, yellow-orange urine, Assessment:     Patient Active Problem List   Diagnosis Code    Seizure disorder, grand mal ([address] G40.409    DM (diabetes mellitus) ([facility] E11.9    Lumbar facet arthropathy M47.816    Disc degeneration, lumbar M51.36    Obesity E66.9    Atrial fibrillation with rapid ventricular response (HCC) I48.91    Essential hypertension I10    Chronic obstructive pulmonary disease (HCC) J44.9    Persistent atrial fibrillation (HCC) I48.19    Chronic pain of both knees M25.561, M25.562, G89.29    Bilateral primary osteoarthritis of knee M17.0    Sepsis with encephalopathy without septic shock ([facility]) A41.9, R65.20, G93.40    FABIO (acute kidney injury) ([address] N17.9    UTI (urinary tract infection) N39.0    Cervical herniated disc M50.20    Chronic bilateral low back pain with bilateral sciatica M54.42, M54.41, G89.29    Chronic respiratory failure with hypoxia ([facility]) J96.11    Dementia with behavioral disturbance F03.918    LORENZO (generalized anxiety disorder) F41.1    Hoarding disorder with poor insight F42.3    Hypertensive heart and kidney disease with chronic diastolic congestive heart failure and stage 2 chronic kidney disease (HCC) I13.0, I50.32, N18.2    Schizoaffective disorder, depressive type ([facility]) F25.1    Providencia bacteremia R78.81    Permanent atrial fibrillation with rapid ventricular response (HCC) I48.21     Assessment of today's active condition(s):     -- Background of obesity, DM, Afib, COPD, OA, chronic pain, dementia, psychiatric illness. -- Multiple prior UCx (+) for resistant organisms including ESBL GNRs. True UTI Hx uncertain. -- Admission now with severe sepsis (fever, leukocytosis, lactic acidosis, FABIO, mental status change, hypoxemia), now with Providencia in two BCx. Seems most likely from a UTI; doubt pneumonia; no wounds for infection there; no cellulitis; abdominal exam seems relatively benign, but that would be another good source for a GNR bacteremia.  Higher temp this afternoon than overnight or this AM, but improving in some other ways (WBC count, lactate, oxygenation). Also seems likely she was rather hypovolemic on admission (dry mouth, low sodium, elevated BUN, Hgb level hemoconcentrated up to 16.4.). Treatment recs: For now would just continue the meropenem. Hopefully we can narrow that when final Cx results are back. Await final ABx (S) testing on the Providencia. Both UCx were finalized at > 50k mixed organisms, but clinically I think a bacteremic UTI makes the most sense. I'll call micro to see if they can at least isolate Providencia from either (or both) UCx. If it's in at least one, then I think we have a complete story for the infection. If they are certain that there isn't a Providencia in either one, then we should probably look elsewhere for a source of her bacteremia (CT of abdomen and pelvis probably the best next step). Can stop vancomycin and Tygacil. The MRSA is just nasal colonization, and I think the chances of a carbapenem resistant Providencia are very low. Continue topical antifungals for that dermatitis. Watch for thrush, allergic reactions, Cdiff, IV line phlebitis, etc.    If she's truly had a number of significant UTIs in recent years, and she's not seen Urology before, I would suggest an evaluation at some point, to look for reasons for recurrent UTI (incomplete bladder emptying, vesicoureteral reflux, etc). Probably not necessary to do that acutely, but once she's more stable, infection is resolved, Moraes is out, she's back at [facility], etc.    D/W her ICU RN.  ______________    I was not necessarily going to be in the hospital over the weekend to see Ms. Farris. Will keep an eye on Epic from home.  Please call or text if there are any urgent concerns over the weekend with her clinical course, test results, etc.    Electronically signed by Serjio Hardy MD on 10/7/2022 at 5:24 PM.

## 2022-10-07 NOTE — PROGRESS NOTES
1203-Dr Mirian Verduzco rounding for Cardiology service. Status, meds reviewed. See orders. Digoxin discontinued, daily EKG. Marianne,Pharmacy called - will review active medications for interraction with Amiodarone per Dr Mirian Verduzco request.    QT monitoring active due to concern re: Celexa/Amiodarone/Diltiazem    6928- Dr. Angelica Rosario reached regarding medication review by Pharmacy. No new orders. -110s AFIB RVR.  [phone]

## 2022-10-07 NOTE — PROGRESS NOTES
Spoke with Iris Portillo, patient's brother on phone. Updated on status, POC, medications. Iris Portillo agreed that patient needs to have hair managed/cut due to her complaints of head pain related to hair pulling where it is matted in the back of her hair. Requesting to talk with MD regarding patient code status, particularly [address]. Sticky note left for MD to call Iris Portillo in a.m. Cora Romero Patient stated \" I want Iris Portillo to make decisions for me. \"     Resting in bed after eating supper,drinking supplement. HOB 30 degrees. Yelling intermittently, unable to redirect. HR 110s, AFIB RVR.

## 2022-10-07 NOTE — PLAN OF CARE
Problem: Chronic Conditions and Co-morbidities  Goal: Patient's chronic conditions and co-morbidity symptoms are monitored and maintained or improved  Outcome: Not Progressing     Patient now on 2 IV drips for AFIB RVR, -140s  Agitated, exhibiting behavioral symptoms in line with psychiatric diagnoses. Agitated and Yelling, supportive efforts to alleviate agitation futile requiring PRN antipsychotic med administration.

## 2022-10-07 NOTE — PROGRESS NOTES
Yelling out \"help me\" continuously, pulling off ICU leads and gown, unable to verbally redirect. States pain in head, not neck or other part of body. Large amount of matted hair on back of head catching of bed with patient movement. HR 140s AFIB RVR. Amiodarone IV drip continuing at 05.mg/min. Ativan 0.5mg po, percocet 1 po given.

## 2022-10-07 NOTE — PROGRESS NOTES
RT Nebulizer Bronchodilator Protocol Note    There is a bronchodilator order in the chart from a provider indicating to follow the RT Bronchodilator Protocol and there is an Initiate RT Bronchodilator Protocol order as well (see protocol at bottom of note). CXR Findings:  XR CHEST PORTABLE    Result Date: 10/5/2022  Patchy mild airspace disease which may represent atelectasis or pneumonia. The findings from the last RT Protocol Assessment were as follows:  Smoking: Chronic pulmonary disease  Respiratory Pattern: Regular pattern and RR 12-20 bpm  Breath Sounds: Clear breath sounds  Cough: Strong, spontaneous, non-productive  Indication for Bronchodilator Therapy: (P) On home bronchodilators  Bronchodilator Assessment Score: 2    Aerosolized bronchodilator medication orders have been revised according to the RT Nebulizer Bronchodilator Protocol below. Respiratory Therapist to perform RT Therapy Protocol Assessment initially then follow the protocol. Repeat RT Therapy Protocol Assessment PRN for score 0-3 or on second treatment, BID, and PRN for scores above 3. No Indications - adjust the frequency to every 6 hours PRN wheezing or bronchospasm, if no treatments needed after 48 hours then discontinue using Per Protocol order mode. If indication present, adjust the RT bronchodilator orders based on the Bronchodilator Assessment Score as indicated below. If a patient is on this medication at home then do not decrease Frequency below that used at home. 0-3 - enter or revise RT bronchodilator order(s) to equivalent RT Bronchodilator order with Frequency of every 4 hours PRN for wheezing or increased work of breathing using Per Protocol order mode. 4-6 - enter or revise RT Bronchodilator order(s) to two equivalent RT bronchodilator orders with one order with BID Frequency and one order with Frequency of every 4 hours PRN wheezing or increased work of breathing using Per Protocol order mode. 7-10 - enter or revise RT Bronchodilator order(s) to two equivalent RT bronchodilator orders with one order with TID Frequency and one order with Frequency of every 4 hours PRN wheezing or increased work of breathing using Per Protocol order mode. 11-13 - enter or revise RT Bronchodilator order(s) to one equivalent RT bronchodilator order with QID Frequency and an Albuterol order with Frequency of every 4 hours PRN wheezing or increased work of breathing using Per Protocol order mode. Greater than 13 - enter or revise RT Bronchodilator order(s) to one equivalent RT bronchodilator order with every 4 hours Frequency and an Albuterol order with Frequency of every 2 hours PRN wheezing or increased work of breathing using Per Protocol order mode. RT to enter RT Home Evaluation for COPD & MDI Assessment order using Per Protocol order mode.     Electronically signed by Collette Altes, RCP on 10/7/2022 at 3:05 PM

## 2022-10-07 NOTE — PROGRESS NOTES
10/7  Vanc random = 8.7 mcg/mL at 0427. Renal function has greatly improved and returned to baseline. Will schedule vancomycin 750 mg q12. Recheck vanc trough tomorrow (10/8 at 0700).   Riaz Bhatt PharmD  10/7/2022 7:20 AM

## 2022-10-07 NOTE — PROGRESS NOTES
Pulmonary & Critical Care Medicine ICU Progress Note    CC: Severe hypoxemia, A. fib RVR, hypotension    Events of Last 24 hours:   Positive blood culture Enterobacterales  More alert but agitated today     Vascular lines: IV: peripheral     MV: None     / / /   No results for input(s): PHART, XLP7PGZ, PO2ART in the last 72 hours. IV:   dextrose      sodium chloride      amiodarone 0.5 mg/min (10/07/22 0651)    electrolyte-A 75 mL/hr at 10/07/22 0651       Vitals:  Blood pressure (!) 163/83, pulse (!) 122, temperature 99.5 °F (37.5 °C), temperature source Oral, resp. rate 14, height 5' 7\" (1.702 m), weight 212 lb (96.2 kg), SpO2 95 %. on 2 L  Temp  Av °F (37.2 °C)  Min: 97.7 °F (36.5 °C)  Max: 99.7 °F (37.6 °C)    Intake/Output Summary (Last 24 hours) at 10/7/2022 0739  Last data filed at 10/7/2022 0651  Gross per 24 hour   Intake 5841.05 ml   Output 3600 ml   Net 2241.05 ml     PE:  General: ill appearing    Eyes: PERRL. No sclera icterus. No conjunctival injection. ENT: No discharge. Pharynx clear. Neck: Trachea midline. Normal thyroid. Resp: No accessory muscle use. No crackles. No wheezing. No rhonchi. No dullness on percussion. CV: Regular rate. Regular rhythm. No mumur or rub. No edema. Peripheral pulses are 2+. Capillary refill is less than 3 seconds. GI: Non-tender. Non-distended. No masses. No organomegaly. Normal bowel sounds. No hernia. Skin: Warm and dry. No nodule on exposed extremities. No rash on exposed extremities. Lymph: No cervical LAD. No supraclavicular LAD. M/S: No cyanosis. No joint deformity. No clubbing. Neuro: Awake, speech clear, oriented to person.  Patellar reflexes are symmetric  Psych: + agitation, no anxiety, affect is full, endorses hallucination     Scheduled Meds:   vancomycin  750 mg IntraVENous Q12H    citalopram  20 mg Oral Daily    rivaroxaban  20 mg Oral Daily with breakfast    sodium chloride flush  5-40 mL IntraVENous 2 times per day    meropenem 1,000 mg IntraVENous Q12H    insulin glargine  25 Units SubCUTAneous QAM    vancomycin (VANCOCIN) intermittent dosing (placeholder)   Other RX Placeholder    tiotropium  2 puff Inhalation Daily    miconazole nitrate   Topical BID    insulin lispro  0-16 Units SubCUTAneous TID WC    insulin lispro  0-4 Units SubCUTAneous Nightly    empagliflozin  10 mg Oral Daily    busPIRone  5 mg Oral BID    DULoxetine  60 mg Oral Daily    valproic acid  250 mg Oral TID    gabapentin  100 mg Oral TID    tigecycline (TYGACIL) IVPB  50 mg IntraVENous Q12H       Data:  CBC:   Recent Labs     10/05/22  2230 10/06/22  0441   WBC 33.9* 27.7*   HGB 16.4* 14.6   HCT 50.2* 44.9   MCV 86.4 87.1    175     BMP:   Recent Labs     10/05/22  2230 10/06/22  0441 10/07/22  0427   * 128* 137   K 5.8* 4.8 3.9   CL 83* 88* 97*   CO2 22 22 21   PHOS  --  6.6* 3.5   BUN 47* 49* 48*   CREATININE 2.7* 2.2* 0.9     LIVER PROFILE:   Recent Labs     10/05/22  2230   AST 10*   ALT <5*   LIPASE 9.0*   BILITOT 0.7   ALKPHOS 124       Cultures:      10/6/2022 SARS-CoV-2 negative, influenza negative  10/6/2022 blood 2 of 2 cultures GNR, PCR Enterobacterales; tested resistance genes are negative    Chest imaging was reviewed by me and showed:   CXR 10/6/2022 patchy infiltrates without focal consolidation    Head CT 10/6/2022 chronic parenchymal volume loss and chronic small vessel ischemic change without acute finding    ASSESSMENT:  Acute hypoxemic respiratory failure   Acute metabolic encephalopathy  Sepsis syndrome  Gram-negative bacteremia  Acute cystitis    Chronic A. Fib,now with RVR; on Xarelto   Acute kidney injury    Chronic diastolic CHF  COPD  Schizoaffective disorder  Elevated troponin  H/O ESBL urine   H/O dementia   DM with hyperglycemia      PLAN:  Supplemental oxygen to maintain SaO2 >92%; wean as tolerated    IV fluid resuscitation with crystalloid, nephrology to see    Continue digoxin, amiodarone infusion  Broad spectrum antibiotics to include Merrem and vancomycin D#2; Tygacil D#2 for possible CRE (resistance genes tested are negative); will consult infectious disease for ABX recommendations  Monitoring of vancomycin levels to prevent toxicity. IV levophed if needed to maintain MAP of 65  Follow up on cultures  Follow electrolytes  Blood sugar control, with goal 140-180;  High SSI  On [id number] [facility]

## 2022-10-07 NOTE — PROGRESS NOTES
2000 shift assessment done  see flow sheets  Aminodaone drip at 0.5 mg /hr  rate 110 when pt calm  to 130 when see is screaming. Dr Saba Fay aware that  + blood cultures Enterobacterales.           0000 bath given  pt screaming out for family  repositioned  and attempts to reoriented    0600  Dr Saba Fay paged and informed  Nares + for MRSA

## 2022-10-08 PROBLEM — R78.81 BACTEREMIA: Status: ACTIVE | Noted: 2022-10-08

## 2022-10-08 PROBLEM — N39.0 SEPSIS SECONDARY TO UTI (HCC): Status: ACTIVE | Noted: 2022-10-06

## 2022-10-08 LAB
ALBUMIN SERPL-MCNC: 2.9 G/DL (ref 3.4–5)
ALBUMIN SERPL-MCNC: 3 G/DL (ref 3.4–5)
ANION GAP SERPL CALCULATED.3IONS-SCNC: 11 MMOL/L (ref 3–16)
ANION GAP SERPL CALCULATED.3IONS-SCNC: 13 MMOL/L (ref 3–16)
BLOOD CULTURE, ROUTINE: ABNORMAL
BUN BLDV-MCNC: 46 MG/DL (ref 7–20)
BUN BLDV-MCNC: 50 MG/DL (ref 7–20)
CALCIUM SERPL-MCNC: 9.5 MG/DL (ref 8.3–10.6)
CALCIUM SERPL-MCNC: 9.5 MG/DL (ref 8.3–10.6)
CHLORIDE BLD-SCNC: 106 MMOL/L (ref 99–110)
CHLORIDE BLD-SCNC: 109 MMOL/L (ref 99–110)
CO2: 27 MMOL/L (ref 21–32)
CO2: 30 MMOL/L (ref 21–32)
CREAT SERPL-MCNC: 0.6 MG/DL (ref 0.6–1.2)
CREAT SERPL-MCNC: 0.6 MG/DL (ref 0.6–1.2)
CULTURE, BLOOD 2: ABNORMAL
CULTURE, BLOOD 2: ABNORMAL
EKG ATRIAL RATE: 100 BPM
EKG DIAGNOSIS: NORMAL
EKG Q-T INTERVAL: 382 MS
EKG QRS DURATION: 90 MS
EKG QTC CALCULATION (BAZETT): 511 MS
EKG R AXIS: 59 DEGREES
EKG T AXIS: 258 DEGREES
EKG VENTRICULAR RATE: 108 BPM
GFR AFRICAN AMERICAN: >60
GFR AFRICAN AMERICAN: >60
GFR NON-AFRICAN AMERICAN: >60
GFR NON-AFRICAN AMERICAN: >60
GLUCOSE BLD-MCNC: 192 MG/DL (ref 70–99)
GLUCOSE BLD-MCNC: 198 MG/DL (ref 70–99)
GLUCOSE BLD-MCNC: 205 MG/DL (ref 70–99)
GLUCOSE BLD-MCNC: 235 MG/DL (ref 70–99)
GLUCOSE BLD-MCNC: 298 MG/DL (ref 70–99)
GLUCOSE BLD-MCNC: 342 MG/DL (ref 70–99)
HCT VFR BLD CALC: 47.6 % (ref 36–48)
HEMOGLOBIN: 14.2 G/DL (ref 12–16)
MCH RBC QN AUTO: 28.2 PG (ref 26–34)
MCHC RBC AUTO-ENTMCNC: 29.9 G/DL (ref 31–36)
MCV RBC AUTO: 94.1 FL (ref 80–100)
ORGANISM: ABNORMAL
PDW BLD-RTO: 16.8 % (ref 12.4–15.4)
PERFORMED ON: ABNORMAL
PHOSPHORUS: 2 MG/DL (ref 2.5–4.9)
PHOSPHORUS: 3.1 MG/DL (ref 2.5–4.9)
PLATELET # BLD: 142 K/UL (ref 135–450)
PMV BLD AUTO: 9.3 FL (ref 5–10.5)
POTASSIUM SERPL-SCNC: 3.2 MMOL/L (ref 3.5–5.1)
POTASSIUM SERPL-SCNC: 3.3 MMOL/L (ref 3.5–5.1)
RBC # BLD: 5.06 M/UL (ref 4–5.2)
SODIUM BLD-SCNC: 147 MMOL/L (ref 136–145)
SODIUM BLD-SCNC: 149 MMOL/L (ref 136–145)
WBC # BLD: 17.6 K/UL (ref 4–11)

## 2022-10-08 PROCEDURE — 2580000003 HC RX 258: Performed by: INTERNAL MEDICINE

## 2022-10-08 PROCEDURE — 36415 COLL VENOUS BLD VENIPUNCTURE: CPT

## 2022-10-08 PROCEDURE — 94761 N-INVAS EAR/PLS OXIMETRY MLT: CPT

## 2022-10-08 PROCEDURE — 2500000003 HC RX 250 WO HCPCS: Performed by: INTERNAL MEDICINE

## 2022-10-08 PROCEDURE — 2700000000 HC OXYGEN THERAPY PER DAY

## 2022-10-08 PROCEDURE — 6360000002 HC RX W HCPCS: Performed by: INTERNAL MEDICINE

## 2022-10-08 PROCEDURE — 99233 SBSQ HOSP IP/OBS HIGH 50: CPT | Performed by: INTERNAL MEDICINE

## 2022-10-08 PROCEDURE — 94640 AIRWAY INHALATION TREATMENT: CPT

## 2022-10-08 PROCEDURE — 2000000000 HC ICU R&B

## 2022-10-08 PROCEDURE — 93005 ELECTROCARDIOGRAM TRACING: CPT | Performed by: INTERNAL MEDICINE

## 2022-10-08 PROCEDURE — 6370000000 HC RX 637 (ALT 250 FOR IP): Performed by: HOSPITALIST

## 2022-10-08 PROCEDURE — 85027 COMPLETE CBC AUTOMATED: CPT

## 2022-10-08 PROCEDURE — 6370000000 HC RX 637 (ALT 250 FOR IP): Performed by: INTERNAL MEDICINE

## 2022-10-08 PROCEDURE — 2580000003 HC RX 258: Performed by: HOSPITALIST

## 2022-10-08 PROCEDURE — 80069 RENAL FUNCTION PANEL: CPT

## 2022-10-08 RX ORDER — DEXTROSE AND POTASSIUM CHLORIDE 5; .15 G/100ML; G/100ML
SOLUTION INTRAVENOUS CONTINUOUS
Status: DISCONTINUED | OUTPATIENT
Start: 2022-10-08 | End: 2022-10-09

## 2022-10-08 RX ORDER — DILTIAZEM HYDROCHLORIDE 60 MG/1
30 TABLET, FILM COATED ORAL EVERY 6 HOURS SCHEDULED
Status: DISCONTINUED | OUTPATIENT
Start: 2022-10-08 | End: 2022-10-09

## 2022-10-08 RX ORDER — POTASSIUM CHLORIDE 7.45 MG/ML
10 INJECTION INTRAVENOUS
Status: COMPLETED | OUTPATIENT
Start: 2022-10-08 | End: 2022-10-08

## 2022-10-08 RX ADMIN — DILTIAZEM HYDROCHLORIDE 30 MG: 60 TABLET, FILM COATED ORAL at 14:08

## 2022-10-08 RX ADMIN — DEXTROSE AND POTASSIUM CHLORIDE: 5; .15 SOLUTION INTRAVENOUS at 17:59

## 2022-10-08 RX ADMIN — INSULIN LISPRO 8 UNITS: 100 INJECTION, SOLUTION INTRAVENOUS; SUBCUTANEOUS at 12:22

## 2022-10-08 RX ADMIN — CITALOPRAM HYDROBROMIDE 20 MG: 20 TABLET ORAL at 08:54

## 2022-10-08 RX ADMIN — POTASSIUM CHLORIDE 10 MEQ: 7.46 INJECTION, SOLUTION INTRAVENOUS at 20:41

## 2022-10-08 RX ADMIN — LORAZEPAM 0.5 MG: 0.5 TABLET ORAL at 10:34

## 2022-10-08 RX ADMIN — MEROPENEM 1000 MG: 1 INJECTION, POWDER, FOR SOLUTION INTRAVENOUS at 20:42

## 2022-10-08 RX ADMIN — AMIODARONE HYDROCHLORIDE 0.5 MG/MIN: 50 INJECTION, SOLUTION INTRAVENOUS at 18:00

## 2022-10-08 RX ADMIN — Medication 3 MG: at 20:48

## 2022-10-08 RX ADMIN — MEROPENEM 1000 MG: 1 INJECTION, POWDER, FOR SOLUTION INTRAVENOUS at 14:08

## 2022-10-08 RX ADMIN — POTASSIUM CHLORIDE 10 MEQ: 7.46 INJECTION, SOLUTION INTRAVENOUS at 16:20

## 2022-10-08 RX ADMIN — ACETAMINOPHEN 650 MG: 325 TABLET ORAL at 20:48

## 2022-10-08 RX ADMIN — BUSPIRONE HYDROCHLORIDE 5 MG: 5 TABLET ORAL at 08:54

## 2022-10-08 RX ADMIN — POTASSIUM CHLORIDE 10 MEQ: 7.46 INJECTION, SOLUTION INTRAVENOUS at 19:35

## 2022-10-08 RX ADMIN — POTASSIUM CHLORIDE: 2 INJECTION, SOLUTION, CONCENTRATE INTRAVENOUS at 10:33

## 2022-10-08 RX ADMIN — VALPROIC ACID 250 MG: 250 CAPSULE, LIQUID FILLED ORAL at 14:00

## 2022-10-08 RX ADMIN — GABAPENTIN 100 MG: 100 CAPSULE ORAL at 14:08

## 2022-10-08 RX ADMIN — INSULIN GLARGINE 25 UNITS: 100 INJECTION, SOLUTION SUBCUTANEOUS at 10:34

## 2022-10-08 RX ADMIN — VALPROIC ACID 250 MG: 250 CAPSULE, LIQUID FILLED ORAL at 20:50

## 2022-10-08 RX ADMIN — DILTIAZEM HYDROCHLORIDE 5 MG/HR: 5 INJECTION, SOLUTION INTRAVENOUS at 02:15

## 2022-10-08 RX ADMIN — EMPAGLIFLOZIN 10 MG: 10 TABLET, FILM COATED ORAL at 08:54

## 2022-10-08 RX ADMIN — RIVAROXABAN 20 MG: 20 TABLET, FILM COATED ORAL at 08:54

## 2022-10-08 RX ADMIN — MEROPENEM 1000 MG: 1 INJECTION, POWDER, FOR SOLUTION INTRAVENOUS at 05:06

## 2022-10-08 RX ADMIN — Medication 10 ML: at 20:49

## 2022-10-08 RX ADMIN — BUSPIRONE HYDROCHLORIDE 5 MG: 5 TABLET ORAL at 20:48

## 2022-10-08 RX ADMIN — MICONAZOLE NITRATE: 2 OINTMENT TOPICAL at 10:40

## 2022-10-08 RX ADMIN — DILTIAZEM HYDROCHLORIDE 30 MG: 60 TABLET, FILM COATED ORAL at 17:58

## 2022-10-08 RX ADMIN — AMIODARONE HYDROCHLORIDE 0.5 MG/MIN: 50 INJECTION, SOLUTION INTRAVENOUS at 02:54

## 2022-10-08 RX ADMIN — DULOXETINE HYDROCHLORIDE 60 MG: 60 CAPSULE, DELAYED RELEASE ORAL at 08:54

## 2022-10-08 RX ADMIN — MICONAZOLE NITRATE: 2 OINTMENT TOPICAL at 20:50

## 2022-10-08 RX ADMIN — GABAPENTIN 100 MG: 100 CAPSULE ORAL at 20:49

## 2022-10-08 RX ADMIN — GABAPENTIN 100 MG: 100 CAPSULE ORAL at 08:54

## 2022-10-08 RX ADMIN — TIOTROPIUM BROMIDE INHALATION SPRAY 2 PUFF: 3.12 SPRAY, METERED RESPIRATORY (INHALATION) at 07:25

## 2022-10-08 RX ADMIN — VALPROIC ACID 250 MG: 250 CAPSULE, LIQUID FILLED ORAL at 08:54

## 2022-10-08 RX ADMIN — POTASSIUM CHLORIDE 10 MEQ: 7.46 INJECTION, SOLUTION INTRAVENOUS at 16:18

## 2022-10-08 ASSESSMENT — PAIN DESCRIPTION - LOCATION: LOCATION: BUTTOCKS

## 2022-10-08 ASSESSMENT — PAIN SCALES - GENERAL
PAINLEVEL_OUTOF10: 3
PAINLEVEL_OUTOF10: 0

## 2022-10-08 NOTE — PROGRESS NOTES
Admit: 10/5/2022    Name:  Marylee Sandifer  Room:  3006/3006-01  MRN:    [id number]    Critical Care Daily Progress Note for 10/8/2022   A 68-year-old female, nursing home resident presented with shortness of breath severe hypoxia. Initially required nonrebreather but he did not titrate down to 4 L  Also noted to have a go into A. fib with RVR treated with Cardizem, but it caused hypotension.     Interval History:       Fevers    Recd zyprexa for agitation    Scheduled Meds:   meropenem  1,000 mg IntraVENous Q8H    citalopram  20 mg Oral Daily    rivaroxaban  20 mg Oral Daily with breakfast    sodium chloride flush  5-40 mL IntraVENous 2 times per day    insulin glargine  25 Units SubCUTAneous QAM    tiotropium  2 puff Inhalation Daily    miconazole nitrate   Topical BID    insulin lispro  0-16 Units SubCUTAneous TID WC    insulin lispro  0-4 Units SubCUTAneous Nightly    empagliflozin  10 mg Oral Daily    busPIRone  5 mg Oral BID    DULoxetine  60 mg Oral Daily    valproic acid  250 mg Oral TID    gabapentin  100 mg Oral TID       Continuous Infusions:   IV infusion builder      dilTIAZem 2.5 mg/hr (10/08/22 0607)    dextrose      sodium chloride      amiodarone 0.5 mg/min (10/08/22 0607)       PRN Meds:  LORazepam, glucose, dextrose bolus **OR** dextrose bolus, glucagon (rDNA), dextrose, sodium chloride flush, sodium chloride, ondansetron **OR** ondansetron, polyethylene glycol, acetaminophen **OR** acetaminophen, perflutren lipid microspheres, ipratropium-albuterol, LORazepam, oxyCODONE-acetaminophen, melatonin                  Objective:     Temp  Av.4 °F (37.4 °C)  Min: 98.7 °F (37.1 °C)  Max: 101 °F (38.3 °C)  Pulse  Av.1  Min: 15  Max: 134  BP  Min: 112/95  Max: 170/73  SpO2  Av.4 %  Min: 85 %  Max: 97 %  Patient Vitals for the past 4 hrs:   BP Temp Temp src Pulse Resp SpO2   10/08/22 1000 122/79 -- -- (!) 119 23 --   10/08/22 0900 (!) 112/95 -- -- (!) 117 20 97 %   10/08/22 0800 131/72 -- -- (!) 105 30 --   10/08/22 0726 -- -- -- -- -- 95 %   10/08/22 0700 (!) 139/91 98.8 °F (37.1 °C) Axillary (!) 101 (!) 32 --           Intake/Output Summary (Last 24 hours) at 10/8/2022 1031  Last data filed at 10/8/2022 1745  Gross per 24 hour   Intake 5246.31 ml   Output 4050 ml   Net 1196.31 ml         Physical Exam:  General:  Awake, alert disoriented   mucous Membranes:  Pink , anicteric  Neck: No JVD, no carotid bruit, no thyromegaly  Chest:  Clear to auscultation bilaterally, no added sounds  Cardiovascular: Irregular S1S2 heard, no murmurs or gallops  Abdomen:  Soft, undistended, non tender, no organomegaly, BS present  Extremities: No edema or cyanosis. Distal pulses well felt  Neurological : no focal deficits    Lab Data:  CBC:   Recent Labs     10/05/22  2230 10/06/22  0441 10/08/22  0425   WBC 33.9* 27.7* 17.6*   RBC 5.81* 5.15 5.06   HGB 16.4* 14.6 14.2   HCT 50.2* 44.9 47.6   MCV 86.4 87.1 94.1   RDW 15.5* 15.3 16.8*    175 142       BMP:   Recent Labs     10/06/22  0441 10/07/22  0427 10/08/22  0425   * 137 149*   K 4.8 3.9 3.3*   CL 88* 97* 109   CO2 22 21 27   PHOS 6.6* 3.5 3.1   BUN 49* 48* 50*   CREATININE 2.2* 0.9 0.6       BNP: No results for input(s): BNP in the last 72 hours. PT/INR:   Recent Labs     10/05/22  2230   PROTIME 22.3*   INR 1.97*       APTT:  Recent Labs     10/05/22  2230   APTT 50.1*       CARDIAC ENZYMES:   Recent Labs     10/05/22  2230 10/06/22  0123 10/06/22  0441   TROPONINI 0.04* 0.04* 0.04*       FASTING LIPID PANEL:  Lab Results   Component Value Date/Time    CHOL 154 04/04/2013 12:33 PM    HDL 52 04/04/2013 12:33 PM    TRIG 166 04/04/2013 12:33 PM     LIVER PROFILE:   Recent Labs     10/05/22  2230   AST 10*   ALT <5*   BILITOT 0.7   ALKPHOS 124           US RENAL COMPLETE   Final Result   Unremarkable ultrasound of the kidneys and urinary bladder.       RECOMMENDATIONS:   Unavailable         CT head without contrast   Final Result   No acute intracranial abnormality. MRI may be obtained if clinically   indicated. XR CHEST PORTABLE   Final Result   Patchy mild airspace disease which may represent atelectasis or pneumonia. Assessment & Plan:     Patient Active Problem List    Diagnosis Date Noted    Persistent atrial fibrillation ([address] 11/07/2016    Providencia bacteremia 10/07/2022    Permanent atrial fibrillation with rapid ventricular response ([address] 10/07/2022    Sepsis secondary to UTI ([address] 10/06/2022    Altered mental status 10/06/2022    FABIO (acute kidney injury) ([address] 10/06/2022    UTI (urinary tract infection) 10/06/2022    Chronic bilateral low back pain with bilateral sciatica 10/21/2018    Chronic respiratory failure with hypoxia ([address] 10/21/2018    Dementia with behavioral disturbance 10/21/2018    LORENZO (generalized anxiety disorder) 10/21/2018    Hoarding disorder with poor insight 10/21/2018    Hypertensive heart and kidney disease with chronic diastolic congestive heart failure and stage 2 chronic kidney disease ([address] 10/21/2018    Schizoaffective disorder, depressive type ([address] 10/21/2018    Cervical herniated disc 05/23/2005    Chronic pain of both knees 02/24/2020    Bilateral primary osteoarthritis of knee 02/24/2020    Essential hypertension     Chronic obstructive pulmonary disease (HCC)     Atrial fibrillation with rapid ventricular response ([address]     Obesity 01/27/2016    Lumbar facet arthropathy 12/10/2014    Disc degeneration, lumbar 12/10/2014    DM (diabetes mellitus) ([address] 01/13/2014    Seizure disorder, grand mal ([address] 01/12/2014       Acute hypoxic respiratory failure  Currently on 4 L of oxygen    Acute kidney injury  Has resolved with IV fluids. Gram-negative bacteremia  UTI  Continue meropenem  Infectious disease consult    Hypernatremia  Nephrology consult  D5W started    Atrial fibrillation with rapid ventricular rate  Placed on amiodarone. Hypotensive with Cardizem.   Continue Lanoxin and Xarelto    Type 2 diabetes  Stable.   Continue Lantus insulin and sliding scale insulin  Continue Jardiance    Schizoaffective disorder  Dementia  Continue BuSpar, Celexa, Cymbalta and valproic acid  Zyprexa as needed    Xarelto for DVT prophylaxis  Full code  Carb control diet      Shani Reese MD

## 2022-10-08 NOTE — PROGRESS NOTES
Pulmonary & Critical Care Medicine ICU Progress Note    CC: Severe hypoxemia, A. fib RVR, hypotension    Events of Last 24 hours:   Fever  Agitation, received IM Zyprexa    Vascular lines: IV: peripheral     MV: None     / / /   No results for input(s): PHART, WRD2KGU, PO2ART in the last 72 hours. IV:   dilTIAZem 2.5 mg/hr (10/08/22 6818)    dextrose      sodium chloride      amiodarone 0.5 mg/min (10/08/22 0607)    electrolyte-A 75 mL/hr at 10/08/22 0607       Vitals:  Blood pressure (!) 133/118, pulse 97, temperature 98.7 °F (37.1 °C), temperature source Oral, resp. rate (!) 37, height 5' 7\" (1.702 m), weight 212 lb (96.2 kg), SpO2 96 %. on 2 L  Temp  Av.5 °F (37.5 °C)  Min: 98.7 °F (37.1 °C)  Max: 101 °F (38.3 °C)    Intake/Output Summary (Last 24 hours) at 10/8/2022 0631  Last data filed at 10/8/2022 5098  Gross per 24 hour   Intake 8358.92 ml   Output 4350 ml   Net 4008.92 ml     PE:  General: ill appearing    Eyes: PERRL. No sclera icterus. No conjunctival injection. ENT: No discharge. Pharynx clear. Neck: Trachea midline. Normal thyroid. Resp: No accessory muscle use. No crackles. No wheezing. No rhonchi. No dullness on percussion. CV: Regular rate. Regular rhythm. No mumur or rub. + edema. GI: Non-tender. Non-distended. No masses. No organomegaly. Normal bowel sounds. No hernia. Skin: Warm and dry. No nodule on exposed extremities. No rash on exposed extremities. Lymph: No cervical LAD. No supraclavicular LAD. M/S: No cyanosis. No joint deformity. No clubbing. Neuro: Awake, speech clear, oriented to person and events of yesterday.  Patellar reflexes are symmetric  Psych: + mild agitation but better than yesterday, no anxiety, affect is full,      Scheduled Meds:   meropenem  1,000 mg IntraVENous Q8H    citalopram  20 mg Oral Daily    rivaroxaban  20 mg Oral Daily with breakfast    sodium chloride flush  5-40 mL IntraVENous 2 times per day    insulin glargine  25 Units SubCUTAneous QAM tiotropium  2 puff Inhalation Daily    miconazole nitrate   Topical BID    insulin lispro  0-16 Units SubCUTAneous TID WC    insulin lispro  0-4 Units SubCUTAneous Nightly    empagliflozin  10 mg Oral Daily    busPIRone  5 mg Oral BID    DULoxetine  60 mg Oral Daily    valproic acid  250 mg Oral TID    gabapentin  100 mg Oral TID       Data:  CBC:   Recent Labs     10/05/22  2230 10/06/22  0441 10/08/22  0425   WBC 33.9* 27.7* 17.6*   HGB 16.4* 14.6 14.2   HCT 50.2* 44.9 47.6   MCV 86.4 87.1 94.1    175 142     BMP:   Recent Labs     10/06/22  0441 10/07/22  0427 10/08/22  0425   * 137 149*   K 4.8 3.9 3.3*   CL 88* 97* 109   CO2 22 21 27   PHOS 6.6* 3.5 3.1   BUN 49* 48* 50*   CREATININE 2.2* 0.9 0.6     LIVER PROFILE:   Recent Labs     10/05/22  2230   AST 10*   ALT <5*   LIPASE 9.0*   BILITOT 0.7   ALKPHOS 124       Cultures:      10/6/2022 SARS-CoV-2 negative, influenza negative  10/6/2022 blood 2 of 2 cultures [facility], 1 of 2 Enterobacterales    Chest imaging was reviewed by me and showed:   CXR 10/6/2022 patchy infiltrates without focal consolidation    Head CT 10/6/2022 chronic parenchymal volume loss and chronic small vessel ischemic change without acute finding    ASSESSMENT:  Acute hypoxemic respiratory failure   Acute metabolic encephalopathy  Sepsis syndrome  Gram-negative bacteremia  Acute cystitis    Chronic A. Fib,now with RVR; on Xarelto   Acute kidney injury    Chronic diastolic CHF  COPD  Schizoaffective disorder  Elevated troponin  H/O ESBL urine   H/O dementia   DM with hyperglycemia   Hypernatremia     PLAN:  Supplemental oxygen to maintain SaO2 >92%; wean as tolerated    IV fluid resuscitation with crystalloid, nephrology to see    Continue digoxin, amiodarone infusion  Merrem D#3 per infectious disease   IV levophed if needed to maintain MAP of 65  Follow up on cultures  Follow electrolytes  Blood sugar control, with goal 140-180;  High SSI  On Jardiance   Hold plasmalyte, repeat renal, if truly hypernatremic, will give free water - I d/w Nephrology   Home Xarelto  I discussed code status with patient and her NOK Francoise Federica [phone] (brother).   Decision for Limited - no intubation, no compressions, no cardioversion

## 2022-10-08 NOTE — PROGRESS NOTES
Progress Note    HISTORY     CC:  AMS          We are following for acute kidney injury        Subjective/   HPI:  She is confused but not crying out. Sodium is up and potassium is low.   Kidney function has improved     ROS:  Constitutional:  No fevers, confused   Cardiovascular:  No edema   Respiratory:  No wheezing, no cough  Skin:  No rash, no itching  :  No hematuria, no dysuria     Social Hx:  No Family at the bedside     Past Medical and Surgical History:  - Reviewed, no changes     EXAM       Objective/     Vitals:    10/08/22 0600 10/08/22 0700 10/08/22 0726 10/08/22 0800   BP: (!) 133/118 (!) 139/91  131/72   Pulse: 97 (!) 101  (!) 105   Resp: (!) 37 (!) 32  30   Temp:  98.8 °F (37.1 °C)     TempSrc:  Axillary     SpO2:   95%    Weight:       Height:         24HR INTAKE/OUTPUT:    Intake/Output Summary (Last 24 hours) at 10/8/2022 0846  Last data filed at 10/8/2022 0733  Gross per 24 hour   Intake 5246.31 ml   Output 4350 ml   Net 896.31 ml       Constitutional:  Ill appearing, confused   Eyes:  Pupils reactive, sclera clear   Neck:  Normal thyroid, no masses   Cardiovascular:  irregular, tachycardic   Respiratory:  No distress, no wheezing  Psychiatry:  Alert, confused   Abdomen: +bs, soft, nt, no masses   Musculoskeletal: No LE edema, no clubbing   Lymphatics:  No LAD in neck, no supraclavicular nodes       MEDICAL DECISION MAKING       Data/  Recent Labs     10/05/22  2230 10/06/22  0441 10/08/22  0425   WBC 33.9* 27.7* 17.6*   HGB 16.4* 14.6 14.2   HCT 50.2* 44.9 47.6   MCV 86.4 87.1 94.1    175 142       Recent Labs     10/06/22  0441 10/07/22  0427 10/08/22  0425   * 137 149*   K 4.8 3.9 3.3*   CL 88* 97* 109   CO2 22 21 27   GLUCOSE 339* 237* 235*   PHOS 6.6* 3.5 3.1   BUN 49* 48* 50*   CREATININE 2.2* 0.9 0.6   LABGLOM 22* >60 >60   GFRAA 26* >60 >60         Assessment/     Acute Kidney Injury:  KDIGO stage 2  - Etiology:  Pre-renal spectrum with volume depletion and low BP with cardiac instability decreasing renal perfusion pressures  - Clinical:  Seems to be responding to volume, kidney failure has resolved      Hyperkalemia:  Due to FABIO / better with medical management       Hyponatremia:  - Improving with volume      Sepsis:  - Cultures pending.  + UTI and pneumonia      Respiratory Failure:  - HCAP      Atrial Fibrillation:  - Hypotensive.   Now on amiodarone       Plan/     Change IV fluids to D5W - must have a bit of a solute diuresis with loss of free water > 50% of urine volume   Follow labs   K added to D5W    Thank you for asking us to participate in the management of your patient, please do not hesitate to contact me for any concerns regarding my recommendations as outlined above.    -----------------------------  Duane Quest, M.D.   [facility]

## 2022-10-08 NOTE — PROGRESS NOTES
CARDIOLOGY PROGRESS NOTE      Patient Name: Tobias Paulson  Date of admission: 10/5/2022 10:00 PM  Admission Dx: Sepsis ([facility] [address] [A41.9]  Reason for Consult:  Atrial fibrillation with RVR  Requesting Physician: Erasmo Leigh MD  Primary Care physician: Viktoria Bradford MD    Subjective:     Tobias Paulson is a 66 y.o. patient with a prior medical history notable for permanent atrial fibrillation on xarelto, diabetes mellitus, HFpEF, schizoaffective disorder, seizure history, and obesity, who presented to the hospital with pneumonia/respi failure, sepsis and UTI. Cardiology consulted for atrial fibrillation with RVR. Patient initially evaluated by my partner Dr. Ben Shaw. Noted rapid afib and dilt gtt started but got hypotensive and d/c'd. Amiodarone gtt started and HR still elevated so dilt added back given improved BP. Note CXR noted atelectasis or pneumonia. EKG Afib with RVR 136bpm; marked ST abnormality inferior and anterolateral consider ischemia (ST change more prominent compared to 4/20). LABS: , K 3.9, Cl 97, CO2 21, BUN/Cr 48/0.9. Palomo 0.04 x3, BNP 3,531. Note Echo 10/6/2022 EF=>65%; mild cLVH. Pt maintained on 4L. No fevers. On anti-microbial therapy. BP adequate. Continued atrial fibrillation with tachycardia, rates 100-125 bpm. On amio gtt now ~48 hrs. patient is evaluated this morning. She remains lethargic but arouses to questions with appropriate answers. Notes that she does feel palpitations at this time. No clarisa chest pain. She does feel dehydrated. No overt shortness of breath or air hunger. No lower extremity edema. States she does feel nauseous. Home Medications:  Were reviewed and are listed in nursing record and/or below  Prior to Admission medications    Medication Sig Start Date End Date Taking? Authorizing Provider   LORazepam (ATIVAN) 0.5 MG tablet Take 0.5 mg by mouth in the morning and at bedtime.    Yes Historical Provider, MD   busPIRone (BUSPAR) 5 MG tablet Take 5 mg by mouth 2 times daily   Yes Historical Provider, MD   fluconazole (DIFLUCAN) 100 MG tablet Take 100 mg by mouth daily For 7 days 9/28/22  Yes Historical Provider, MD   DULoxetine (CYMBALTA) 60 MG extended release capsule Take 60 mg by mouth daily   Yes Historical Provider, MD   acetaminophen (TYLENOL) 325 MG tablet Take 650 mg by mouth every 6 hours as needed for Pain   Yes Historical Provider, MD   oxyCODONE-acetaminophen (PERCOCET) 5-325 MG per tablet Take 1 tablet by mouth in the morning and 1 tablet at noon and 1 tablet in the evening and 1 tablet before bedtime. Yes Historical Provider, MD   divalproex (DEPAKOTE) 250 MG DR tablet Take 250 mg by mouth 3 times daily   Yes Historical Provider, MD   furosemide (LASIX) 40 MG tablet Take 40 mg by mouth daily   Yes Historical Provider, MD   melatonin 3 MG TABS tablet Take 3 mg by mouth daily   Yes Historical Provider, MD   potassium chloride (MICRO-K) 10 MEQ extended release capsule Take 10 mEq by mouth daily   Yes Historical Provider, MD   tiotropium (SPIRIVA) 18 MCG inhalation capsule Inhale 18 mcg into the lungs daily   Yes Historical Provider, MD   Cholecalciferol (VITAMIN D3) 1.25 MG (02903 UT) CAPS Take 1 capsule by mouth daily   Yes Historical Provider, MD   tiZANidine (ZANAFLEX) 2 MG tablet Take 2 mg by mouth in the morning, at noon, and at bedtime   Yes Historical Provider, MD   empagliflozin (JARDIANCE) 10 MG tablet Take 10 mg by mouth daily   Yes Historical Provider, MD   rivaroxaban (XARELTO) 20 MG TABS tablet Take 20 mg by mouth nightly 4/18/20   Elvie Hyde MD   gabapentin (NEURONTIN) 100 MG capsule Take 100 mg by mouth 3 times daily.  4/18/20   Elvie Hyde MD   insulin glargine (LANTUS SOLOSTAR) 100 UNIT/ML injection pen Inject 25 Units into the skin every morning 4/18/20   Elvie Hyde MD   insulin lispro (HUMALOG) 100 UNIT/ML injection vial Inject 0-6 Units into the skin 3 times daily (with meals) 4/18/20   Pankaj Sim Lela Bailey MD   digoxin [facility] TRANSPLANT [facility]) 125 MCG tablet Take 1 tablet by mouth daily 4/19/20   Lara Johnson MD   Sodium Phosphates (FLEET) 7-19 GM/118ML Place 1 enema rectally daily as needed    Historical Provider, MD   bisacodyl (DULCOLAX) 10 MG suppository Place 10 mg rectally daily as needed for Constipation    Historical Provider, MD   Blood Glucose Monitoring Suppl (BLOOD GLUCOSE SYSTEM RICHARD) KIT Check fingerstick blood sugars before meals and at bedtime 3/21/16   Yarely Whitaker MD   INSULIN SYRINGE .5CC/29G ([place] INS SYR .5CC/29G) 29G X 1/2\" 0.5 ML MISC 1 each by Does not apply route daily 3/21/16   Yarely Whitaker MD        CURRENT Medications:  potassium chloride 20 mEq in dextrose 5 % 1,000 mL infusion, Continuous  meropenem (MERREM) 1,000 mg in sodium chloride 0.9 % 100 mL IVPB (mini-bag), Q8H  dilTIAZem 125 mg in dextrose 5 % 125 mL infusion, Continuous  LORazepam (ATIVAN) injection 1 mg, Q4H PRN  citalopram (CELEXA) tablet 20 mg, Daily  rivaroxaban (XARELTO) tablet 20 mg, Daily with breakfast  glucose chewable tablet 16 g, PRN  dextrose bolus 10% 125 mL, PRN   Or  dextrose bolus 10% 250 mL, PRN  glucagon (rDNA) injection 1 mg, PRN  dextrose 10 % infusion, Continuous PRN  sodium chloride flush 0.9 % injection 5-40 mL, 2 times per day  sodium chloride flush 0.9 % injection 5-40 mL, PRN  0.9 % sodium chloride infusion, PRN  ondansetron (ZOFRAN-ODT) disintegrating tablet 4 mg, Q8H PRN   Or  ondansetron (ZOFRAN) injection 4 mg, Q6H PRN  polyethylene glycol (GLYCOLAX) packet 17 g, Daily PRN  acetaminophen (TYLENOL) tablet 650 mg, Q6H PRN   Or  acetaminophen (TYLENOL) suppository 650 mg, Q6H PRN  perflutren lipid microspheres (DEFINITY) injection 1.65 mg, ONCE PRN  insulin glargine (LANTUS) injection vial 25 Units, QAM  ipratropium-albuterol (DUONEB) nebulizer solution 3 mL, Q4H PRN  tiotropium (SPIRIVA RESPIMAT) 2.5 MCG/ACT inhaler 2 puff, Daily  amiodarone (CORDARONE) 450 mg in dextrose 5 % 250 mL infusion, Continuous  miconazole nitrate 2 % ointment, BID  insulin lispro (HUMALOG) injection vial 0-16 Units, TID WC  insulin lispro (HUMALOG) injection vial 0-4 Units, Nightly  empagliflozin (JARDIANCE) tablet 10 mg, Daily  LORazepam (ATIVAN) tablet 0.5 mg, BID PRN  busPIRone (BUSPAR) tablet 5 mg, BID  DULoxetine (CYMBALTA) extended release capsule 60 mg, Daily  oxyCODONE-acetaminophen (PERCOCET) 5-325 MG per tablet 1 tablet, Q6H PRN  valproic acid (DEPAKENE) capsule 250 mg, TID  melatonin tablet 3 mg, Nightly PRN  gabapentin (NEURONTIN) capsule 100 mg, TID        Allergies:  Fish-derived products, Iodine, Mushroom extract complex, Onion, and Other     Review of Systems:   A 14 point review of symptoms completed. Pertinent positives identified in the HPI, all other review of symptoms negative. Objective:     Vitals:    10/08/22 0800 10/08/22 0900 10/08/22 1000 10/08/22 1100   BP: 131/72 (!) 112/95 122/79 (!) 143/113   Pulse: (!) 105 (!) 117 (!) 119 (!) 116   Resp: 30 20 23 25   Temp:       TempSrc:       SpO2:  97% 99%    Weight:       Height:          Weight: 212 lb (96.2 kg)       PHYSICAL EXAM:    General:  Lethargic, arouses to questions, eyes closed throughout interview, fatigued and ill-appearing   Head:  Normocephalic, atraumatic   Eyes:  Conjunctiva/corneas clear, anicteric sclerae    Nose: Nares normal, no drainage or sinus tenderness   Throat: No abnormalities of the lips, oral mucosa or tongue. Neck: Trachea midline. Neck supple with no lymphadenopathy, thyroid not enlarged, symmetric, no tenderness/mass/nodules    Lungs:   Clear to auscultation bilaterally anterior lung fields, diminished breath sounds throughout, maintained on 4 L with no air hunger   Chest Wall:  No deformity or tenderness to palpation   Heart:  Irregularly irregular, tachycardic, variable S1, normal S2, no murmur, no rub, no S3/S4, PMI non-palpable. Abdomen:   Obese, soft, non-tender, with normoactive bowel sounds.  No masses, no hepatosplenomegaly   Extremities: No cyanosis, clubbing or pitting edema. Vascular: 2+ radial, 2+ dorsalis pedis and posterior tibial pulses bilaterally. Brisk carotid upstrokes without carotid bruit. Warm and well-perfused. Skin: Skin color, texture, turgor are normal with no rashes or ulceration. Pysch: Difficult to assess given patient condition   Neurologic: Difficult to assess given patient condition. Labs:   CBC:   Lab Results   Component Value Date/Time    WBC 17.6 10/08/2022 04:25 AM    RBC 5.06 10/08/2022 04:25 AM    HGB 14.2 10/08/2022 04:25 AM    HCT 47.6 10/08/2022 04:25 AM    MCV 94.1 10/08/2022 04:25 AM    RDW 16.8 10/08/2022 04:25 AM     10/08/2022 04:25 AM     CMP:  Lab Results   Component Value Date/Time     10/08/2022 04:25 AM    K 3.3 10/08/2022 04:25 AM    K 5.8 10/05/2022 10:30 PM     10/08/2022 04:25 AM    CO2 27 10/08/2022 04:25 AM    BUN 50 10/08/2022 04:25 AM    CREATININE 0.6 10/08/2022 04:25 AM    GFRAA >60 10/08/2022 04:25 AM    GFRAA >60 04/04/2013 12:33 PM    AGRATIO 1.1 10/05/2022 10:30 PM    LABGLOM >60 10/08/2022 04:25 AM    GLUCOSE 235 10/08/2022 04:25 AM    PROT 8.0 10/05/2022 10:30 PM    PROT 7.3 08/07/2012 11:45 AM    CALCIUM 9.5 10/08/2022 04:25 AM    BILITOT 0.7 10/05/2022 10:30 PM    ALKPHOS 124 10/05/2022 10:30 PM    AST 10 10/05/2022 10:30 PM    ALT <5 10/05/2022 10:30 PM     PT/INR:  No results found for: PTINR  HgBA1c:  Lab Results   Component Value Date    LABA1C 9.1 10/06/2022     Lab Results   Component Value Date    CKTOTAL 57 01/11/2020    TROPONINI 0.04 (H) 10/06/2022         Interval Testing/Data:     Telemetry personally reviewed: reviewed. Atrial fibrillation 100-125 bpm.     CXR 10/5  FINDINGS:   Cardiac size and mediastinal structures appear stable. Patchy right   perihilar and bibasilar airspace disease. No pneumothorax. No acute osseous   abnormality. Diffuse osteopenia. Patient is rotated to the right.   No free   air seen in the upper abdomen. Impression   Patchy mild airspace disease which may represent atelectasis or pneumonia. Echo 10/6      Summary   Left ventricular systolic function is hyperdynamic with ejection fraction   estimated at >65%. No regional wall motion abnormalities. There is mild concentric left ventricular hypertrophy. Elevated left ventricular filling pressure. Mild bi-atrial enlargement. Systolic pulmonary artery pressure (SPAP) is normal estimated at 19 mmHg   (Right atrial pressure of 3 mmHg). No significant valvular heart disease. Echo 4/16/20   Summary   Technically difficult examination due to body habitus, patient immobility. Normal LV systolic function with a visually estimated EF of 55%. No obvious segmental wall motion abnormalities. Normal left ventricular diastolic filling pressure. No obvious valvular abnormalities noted. Systolic pulmonary artery pressure (SPAP) is normal and estimated at 30mmHg   (right atrial pressure 3mmHg). Myoview 2016      IMPRESSION:           No evidence of stress-induced reversible myocardial ischemia or myocardial infarction. Left    ventricular ejection fraction is slightly below normal limits, calculated at 48 %. No definite    regional wall motion abnormality is visualized. Impression and Plan      Permanent atrial fibrillation  -rates 100-125 bpm  -diltiazem gtt; transition to low-dose diltiazem every 6 hours, monitor blood pressures but it appears her BP has recovered with now high diastolic. Wean diltiazem drip for heart rates under 100  -amiodarone gtt -continue another 24 hours, reassessment tomorrow for possible wean  -Home digoxin was discontinued as poor renal clearance/acute kidney injury and interaction with amiodarone  -continued on xarelto.     -Continue antimicrobial therapy and treatment of underlying sepsis    Uncontrolled hypertension    Pneumonia/respiratory failure, on 4L  Severe sepsis, gram-negative bacteremia, likely culprit UTI  -Maintained on meropenem, infectious disease service has evaluated    Acute kidney injury  - resolved    Chronic HFpEF  Obesity with BMI 33  Diabetes mellitus  Hyponatremia - rapid correction over 48 hr period, now hypernatremic.   -appears dehydrated. -Consider further IVF if poor p.o. intake    Patient Active Problem List   Diagnosis    Seizure disorder, grand mal ([address]    DM (diabetes mellitus) ([address]    Lumbar facet arthropathy    Disc degeneration, lumbar    Obesity    Atrial fibrillation with rapid ventricular response (HCC)    Essential hypertension    Chronic obstructive pulmonary disease (HCC)    Persistent atrial fibrillation (HCC)    Chronic pain of both knees    Bilateral primary osteoarthritis of knee    Sepsis secondary to UTI ([address]    Altered mental status    FABIO (acute kidney injury) ([address]    UTI (urinary tract infection)    Cervical herniated disc    Chronic bilateral low back pain with bilateral sciatica    Chronic respiratory failure with hypoxia (HCC)    Dementia with behavioral disturbance    LORENZO (generalized anxiety disorder)    Hoarding disorder with poor insight    Hypertensive heart and kidney disease with chronic diastolic congestive heart failure and stage 2 chronic kidney disease (HCC)    Schizoaffective disorder, depressive type (HCC)    Providencia bacteremia    Permanent atrial fibrillation with rapid ventricular response ([address]    Bacteremia           I will address the patient's cardiac risk factors and adjusted pharmacologic treatment as needed. In addition, I have reinforced the need for patient directed risk factor modification. All questions and concerns were addressed to the patient/family. Alternatives to my treatment were discussed. Thank you for allowing us to participate in the care of [facility] [employer] [facility]. Please call me with any questions [phone].     Suleiman Reddy MD, [facility]  Cardiovascular Disease  [facility]  [phone] [facility]  [phone] [address] [place] [address]  10/8/2022 11:34 AM

## 2022-10-08 NOTE — PROGRESS NOTES
Shift assessment  completed as documented on flowsheet. VSS IV infusing per MAR. Dimitry patent to bsd. Pt confused, does not redirect. Call light within reach.  Monitoring closely

## 2022-10-08 NOTE — PLAN OF CARE
Problem: Discharge Planning  Goal: Discharge to home or other facility with appropriate resources  10/7/2022 2023 by Debra Levy RN  Outcome: Progressing  10/7/2022 1728 by Kym Echeverria RN  Outcome: Progressing     Problem: Pain  Goal: Verbalizes/displays adequate comfort level or baseline comfort level  10/7/2022 2023 by Debra Levy RN  Outcome: Progressing  10/7/2022 1728 by Kym Echeverria RN  Outcome: Progressing     Problem: ABCDS Injury Assessment  Goal: Absence of physical injury  10/7/2022 2023 by Debra Levy RN  Outcome: Progressing  10/7/2022 1728 by Kym Echeverria RN  Outcome: Progressing     Problem: Skin/Tissue Integrity  Goal: Absence of new skin breakdown  Description: 1. Monitor for areas of redness and/or skin breakdown  2. Assess vascular access sites hourly  3. Every 4-6 hours minimum:  Change oxygen saturation probe site  4. Every 4-6 hours:  If on nasal continuous positive airway pressure, respiratory therapy assess nares and determine need for appliance change or resting period.   10/7/2022 2023 by Debra eLvy RN  Outcome: Progressing  10/7/2022 1728 by Kym Echeverria RN  Outcome: Progressing     Problem: Chronic Conditions and Co-morbidities  Goal: Patient's chronic conditions and co-morbidity symptoms are monitored and maintained or improved  10/7/2022 2023 by Debra Levy RN  Outcome: Progressing  10/7/2022 1728 by Kym Echeverria RN  Outcome: Not Progressing     Problem: Chronic Conditions and Co-morbidities  Goal: Patient's chronic conditions and co-morbidity symptoms are monitored and maintained or improved  10/7/2022 2023 by Debra Levy RN  Outcome: Progressing  10/7/2022 1728 by Kym Echeverria RN  Outcome: Not Progressing

## 2022-10-08 NOTE — PROGRESS NOTES
4 Eyes Skin Assessment     The patient is being assess for   Shift Handoff    I agree that 2 RN's have performed a thorough Head to Toe Skin Assessment on the patient. ALL assessment sites listed below have been assessed. Areas assessed for pressure by both nurses:   [x]   Head, Face, and Ears   [x]   Shoulders, Back, and Chest, Abdomen  [x]   Arms, Elbows, and Hands   [x]   Coccyx, Sacrum, and Ischium  [x]   Legs, Feet, and Heels        Skin Assessed Under all Medical Devices by both nurses:  shalini and MYLENE              All Mepilex Borders were peeled back and area peeked at by both nurses:  No  Please list where Mepilex Borders are located:               **SHARE this note so that the co-signing nurse is able to place an eSignature**    Co-signer eSignature: Electronically signed by Anurag Akins RN on 10/8/22 at 6:05 PM EDT    Does the Patient have Skin Breakdown related to pressure?   Pt has red, dry areas breast, thighs, and buttocks  (Insert Photo here)         Jarod Prevention initiated:  Yes   Wound Care Orders initiated:  Yes      [id number]  nurse consulted for Pressure Injury (Stage 3,4, Unstageable, DTI, NWPT, Complex wounds)and New or Established Ostomies:  No      Primary Nurse eSignature: Electronically signed by Alvin Sosa RN on 10/8/22 at 5:34 PM EDT

## 2022-10-09 LAB
ANION GAP SERPL CALCULATED.3IONS-SCNC: 12 MMOL/L (ref 3–16)
BANDED NEUTROPHILS RELATIVE PERCENT: 1 % (ref 0–7)
BASOPHILS ABSOLUTE: 0 K/UL (ref 0–0.2)
BASOPHILS RELATIVE PERCENT: 0 %
BUN BLDV-MCNC: 33 MG/DL (ref 7–20)
CALCIUM SERPL-MCNC: 9.7 MG/DL (ref 8.3–10.6)
CHLORIDE BLD-SCNC: 109 MMOL/L (ref 99–110)
CO2: 26 MMOL/L (ref 21–32)
CREAT SERPL-MCNC: 0.6 MG/DL (ref 0.6–1.2)
EKG ATRIAL RATE: 227 BPM
EKG DIAGNOSIS: NORMAL
EKG Q-T INTERVAL: 324 MS
EKG QRS DURATION: 88 MS
EKG QTC CALCULATION (BAZETT): 426 MS
EKG R AXIS: 72 DEGREES
EKG T AXIS: 261 DEGREES
EKG VENTRICULAR RATE: 104 BPM
EOSINOPHILS ABSOLUTE: 0 K/UL (ref 0–0.6)
EOSINOPHILS RELATIVE PERCENT: 0 %
GFR AFRICAN AMERICAN: >60
GFR NON-AFRICAN AMERICAN: >60
GLUCOSE BLD-MCNC: 185 MG/DL (ref 70–99)
GLUCOSE BLD-MCNC: 218 MG/DL (ref 70–99)
GLUCOSE BLD-MCNC: 248 MG/DL (ref 70–99)
GLUCOSE BLD-MCNC: 270 MG/DL (ref 70–99)
GLUCOSE BLD-MCNC: 279 MG/DL (ref 70–99)
HCT VFR BLD CALC: 45.2 % (ref 36–48)
HEMOGLOBIN: 14.3 G/DL (ref 12–16)
LYMPHOCYTES ABSOLUTE: 1.6 K/UL (ref 1–5.1)
LYMPHOCYTES RELATIVE PERCENT: 9 %
MAGNESIUM: 1.9 MG/DL (ref 1.8–2.4)
MCH RBC QN AUTO: 28 PG (ref 26–34)
MCHC RBC AUTO-ENTMCNC: 31.6 G/DL (ref 31–36)
MCV RBC AUTO: 88.7 FL (ref 80–100)
MONOCYTES ABSOLUTE: 1.3 K/UL (ref 0–1.3)
MONOCYTES RELATIVE PERCENT: 7 %
NEUTROPHILS ABSOLUTE: 15.2 K/UL (ref 1.7–7.7)
NEUTROPHILS RELATIVE PERCENT: 83 %
ORGANISM: ABNORMAL
PDW BLD-RTO: 16 % (ref 12.4–15.4)
PERFORMED ON: ABNORMAL
PLATELET # BLD: 128 K/UL (ref 135–450)
PLATELET SLIDE REVIEW: ABNORMAL
PMV BLD AUTO: 9.9 FL (ref 5–10.5)
POIKILOCYTES: ABNORMAL
POTASSIUM REFLEX MAGNESIUM: 4.1 MMOL/L (ref 3.5–5.1)
RBC # BLD: 5.09 M/UL (ref 4–5.2)
SLIDE REVIEW: ABNORMAL
SODIUM BLD-SCNC: 147 MMOL/L (ref 136–145)
URINE CULTURE, ROUTINE: ABNORMAL
WBC # BLD: 18.1 K/UL (ref 4–11)

## 2022-10-09 PROCEDURE — 6360000002 HC RX W HCPCS: Performed by: INTERNAL MEDICINE

## 2022-10-09 PROCEDURE — 94761 N-INVAS EAR/PLS OXIMETRY MLT: CPT

## 2022-10-09 PROCEDURE — 6370000000 HC RX 637 (ALT 250 FOR IP): Performed by: INTERNAL MEDICINE

## 2022-10-09 PROCEDURE — 2000000000 HC ICU R&B

## 2022-10-09 PROCEDURE — 2580000003 HC RX 258: Performed by: HOSPITALIST

## 2022-10-09 PROCEDURE — 36415 COLL VENOUS BLD VENIPUNCTURE: CPT

## 2022-10-09 PROCEDURE — 6370000000 HC RX 637 (ALT 250 FOR IP): Performed by: HOSPITALIST

## 2022-10-09 PROCEDURE — 93005 ELECTROCARDIOGRAM TRACING: CPT | Performed by: INTERNAL MEDICINE

## 2022-10-09 PROCEDURE — 80048 BASIC METABOLIC PNL TOTAL CA: CPT

## 2022-10-09 PROCEDURE — 85025 COMPLETE CBC W/AUTO DIFF WBC: CPT

## 2022-10-09 PROCEDURE — 83735 ASSAY OF MAGNESIUM: CPT

## 2022-10-09 PROCEDURE — 99233 SBSQ HOSP IP/OBS HIGH 50: CPT | Performed by: INTERNAL MEDICINE

## 2022-10-09 PROCEDURE — 94640 AIRWAY INHALATION TREATMENT: CPT

## 2022-10-09 PROCEDURE — 2580000003 HC RX 258: Performed by: INTERNAL MEDICINE

## 2022-10-09 PROCEDURE — 2700000000 HC OXYGEN THERAPY PER DAY

## 2022-10-09 RX ORDER — DILTIAZEM HYDROCHLORIDE 60 MG/1
60 TABLET, FILM COATED ORAL EVERY 6 HOURS SCHEDULED
Status: DISCONTINUED | OUTPATIENT
Start: 2022-10-09 | End: 2022-10-12 | Stop reason: HOSPADM

## 2022-10-09 RX ORDER — MAGNESIUM SULFATE 1 G/100ML
1000 INJECTION INTRAVENOUS ONCE
Status: COMPLETED | OUTPATIENT
Start: 2022-10-09 | End: 2022-10-09

## 2022-10-09 RX ORDER — DILTIAZEM HYDROCHLORIDE 60 MG/1
30 TABLET, FILM COATED ORAL ONCE
Status: COMPLETED | OUTPATIENT
Start: 2022-10-09 | End: 2022-10-09

## 2022-10-09 RX ORDER — DEXTROSE MONOHYDRATE 50 MG/ML
INJECTION, SOLUTION INTRAVENOUS CONTINUOUS
Status: DISCONTINUED | OUTPATIENT
Start: 2022-10-09 | End: 2022-10-12

## 2022-10-09 RX ADMIN — DEXTROSE AND POTASSIUM CHLORIDE: 5; .15 SOLUTION INTRAVENOUS at 01:37

## 2022-10-09 RX ADMIN — INSULIN LISPRO 4 UNITS: 100 INJECTION, SOLUTION INTRAVENOUS; SUBCUTANEOUS at 11:51

## 2022-10-09 RX ADMIN — DILTIAZEM HYDROCHLORIDE 30 MG: 60 TABLET, FILM COATED ORAL at 06:37

## 2022-10-09 RX ADMIN — MAGNESIUM SULFATE IN DEXTROSE 1000 MG: 10 INJECTION, SOLUTION INTRAVENOUS at 11:10

## 2022-10-09 RX ADMIN — VALPROIC ACID 250 MG: 250 CAPSULE, LIQUID FILLED ORAL at 08:12

## 2022-10-09 RX ADMIN — MEROPENEM 1000 MG: 1 INJECTION, POWDER, FOR SOLUTION INTRAVENOUS at 22:07

## 2022-10-09 RX ADMIN — INSULIN GLARGINE 25 UNITS: 100 INJECTION, SOLUTION SUBCUTANEOUS at 08:15

## 2022-10-09 RX ADMIN — Medication 3 MG: at 23:11

## 2022-10-09 RX ADMIN — GABAPENTIN 100 MG: 100 CAPSULE ORAL at 22:03

## 2022-10-09 RX ADMIN — Medication 10 ML: at 22:05

## 2022-10-09 RX ADMIN — BUSPIRONE HYDROCHLORIDE 5 MG: 5 TABLET ORAL at 22:04

## 2022-10-09 RX ADMIN — MICONAZOLE NITRATE: 2 OINTMENT TOPICAL at 08:13

## 2022-10-09 RX ADMIN — OXYCODONE HYDROCHLORIDE AND ACETAMINOPHEN 1 TABLET: 5; 325 TABLET ORAL at 08:23

## 2022-10-09 RX ADMIN — EMPAGLIFLOZIN 10 MG: 10 TABLET, FILM COATED ORAL at 08:12

## 2022-10-09 RX ADMIN — DEXTROSE AND POTASSIUM CHLORIDE: 5; .15 SOLUTION INTRAVENOUS at 11:07

## 2022-10-09 RX ADMIN — GABAPENTIN 100 MG: 100 CAPSULE ORAL at 14:14

## 2022-10-09 RX ADMIN — INSULIN LISPRO 8 UNITS: 100 INJECTION, SOLUTION INTRAVENOUS; SUBCUTANEOUS at 16:37

## 2022-10-09 RX ADMIN — SODIUM CHLORIDE 5 ML/HR: 9 INJECTION, SOLUTION INTRAVENOUS at 03:18

## 2022-10-09 RX ADMIN — TIOTROPIUM BROMIDE INHALATION SPRAY 2 PUFF: 3.12 SPRAY, METERED RESPIRATORY (INHALATION) at 07:38

## 2022-10-09 RX ADMIN — MEROPENEM 1000 MG: 1 INJECTION, POWDER, FOR SOLUTION INTRAVENOUS at 14:16

## 2022-10-09 RX ADMIN — VALPROIC ACID 250 MG: 250 CAPSULE, LIQUID FILLED ORAL at 14:14

## 2022-10-09 RX ADMIN — DILTIAZEM HYDROCHLORIDE 30 MG: 60 TABLET, FILM COATED ORAL at 08:26

## 2022-10-09 RX ADMIN — DEXTROSE MONOHYDRATE: 50 INJECTION, SOLUTION INTRAVENOUS at 16:36

## 2022-10-09 RX ADMIN — DULOXETINE HYDROCHLORIDE 60 MG: 60 CAPSULE, DELAYED RELEASE ORAL at 08:12

## 2022-10-09 RX ADMIN — MEROPENEM 1000 MG: 1 INJECTION, POWDER, FOR SOLUTION INTRAVENOUS at 06:35

## 2022-10-09 RX ADMIN — VALPROIC ACID 250 MG: 250 CAPSULE, LIQUID FILLED ORAL at 22:09

## 2022-10-09 RX ADMIN — GABAPENTIN 100 MG: 100 CAPSULE ORAL at 08:12

## 2022-10-09 RX ADMIN — DILTIAZEM HYDROCHLORIDE 60 MG: 60 TABLET, FILM COATED ORAL at 14:14

## 2022-10-09 RX ADMIN — ACETAMINOPHEN 650 MG: 325 TABLET ORAL at 23:11

## 2022-10-09 RX ADMIN — MICONAZOLE NITRATE: 2 OINTMENT TOPICAL at 22:09

## 2022-10-09 RX ADMIN — DILTIAZEM HYDROCHLORIDE 60 MG: 60 TABLET, FILM COATED ORAL at 23:11

## 2022-10-09 RX ADMIN — Medication 10 ML: at 08:12

## 2022-10-09 RX ADMIN — DILTIAZEM HYDROCHLORIDE 30 MG: 60 TABLET, FILM COATED ORAL at 01:11

## 2022-10-09 RX ADMIN — RIVAROXABAN 20 MG: 20 TABLET, FILM COATED ORAL at 08:12

## 2022-10-09 RX ADMIN — CITALOPRAM HYDROBROMIDE 20 MG: 20 TABLET ORAL at 08:23

## 2022-10-09 RX ADMIN — DILTIAZEM HYDROCHLORIDE 60 MG: 60 TABLET, FILM COATED ORAL at 17:59

## 2022-10-09 RX ADMIN — BUSPIRONE HYDROCHLORIDE 5 MG: 5 TABLET ORAL at 08:14

## 2022-10-09 ASSESSMENT — PAIN SCALES - GENERAL
PAINLEVEL_OUTOF10: 5
PAINLEVEL_OUTOF10: 3

## 2022-10-09 ASSESSMENT — PAIN DESCRIPTION - LOCATION: LOCATION: GROIN

## 2022-10-09 NOTE — PROGRESS NOTES
Pt still crying out in pain. Pt turned and repositioned and seems more comfortable. Pt falling asleep.

## 2022-10-09 NOTE — PROGRESS NOTES
4 Eyes Skin Assessment     The patient is being assess for   Shift Handoff    I agree that 2 RN's have performed a thorough Head to Toe Skin Assessment on the patient. ALL assessment sites listed below have been assessed. Areas assessed for pressure by both nurses:   [x]   Head, Face, and Ears   [x]   Shoulders, Back, and Chest, Abdomen  [x]   Arms, Elbows, and Hands   [x]   Coccyx, Sacrum, and Ischium  [x]   Legs, Feet, and Heels      Reddened area under R abd fold and R hip also between legs. Antifngal cream applied. Purple areas to bilateral buttocks  Skin Assessed Under all Medical Devices by both nurses:  O2 device tubing              All Mepilex Borders were peeled back and area peeked at by both nurses:  Yes  Please list where Mepilex Borders are located:  nonet             **SHARE this note so that the co-signing nurse is able to place an eSignature**    Co-signer eSignature: Electronically signed by Leonila Dunn RN on 10/9/22 at 7:35 AM EDT    Does the Patient have Skin Breakdown related to pressure?   Yes [facility] WOUND CARE was Initiated documentation include the Kassandra-wound, Wound Assessment, Measurements, Dressing Treatment, Drainage, and Color\",              Jarod Prevention initiated:  Yes   Wound Care Orders initiated:  Yes      [id number]  nurse consulted for Pressure Injury (Stage 3,4, Unstageable, DTI, NWPT, Complex wounds)and New or Established Ostomies:  Yes      Primary Nurse eSignature: Electronically signed by Wanda Cisneros RN on 10/9/22 at 6:54 AM EDT

## 2022-10-09 NOTE — PROGRESS NOTES
Pt slept well tonight after 12 mn. Cardizem gtt D/[place] at [place] [address]. Pt continues on Amioderone. HR . Continues in A Fib.

## 2022-10-09 NOTE — PROGRESS NOTES
Pt medicated with Tylenol 650 mg po for C/O her bottom hurting. Pt al;so medicated with Melatonin 3 mg po for sleep. Pt bathed. Antifungal cream applied to abd fold and groin folds. Moraes care done.

## 2022-10-09 NOTE — PROGRESS NOTES
Progress Note    HISTORY     CC:  AMS          We are following for acute kidney injury        Subjective/   HPI:  Remains in the ICU. Off Cardizem gtt. BP stable with improved rate. Kidney function is good with ongoing solute diuresis. On D5W. Remain confused but less agitated     ROS:  Constitutional:  No fevers, confused   Cardiovascular:  No edema   Respiratory:  No wheezing, no cough  Skin:  No rash, no itching  :  No hematuria, no dysuria     Social Hx:  No Family at the bedside     Past Medical and Surgical History:  - Reviewed, no changes     EXAM       Objective/     Vitals:    10/09/22 1300 10/09/22 1400 10/09/22 1500 10/09/22 1600   BP: 122/64 131/70 (!) 108/56 116/62   Pulse: 97 (!) 104 99 (!) 106   Resp: (!) 35 19 25 14   Temp:       TempSrc:       SpO2: 96% 97% 98% 97%   Weight:       Height:         24HR INTAKE/OUTPUT:    Intake/Output Summary (Last 24 hours) at 10/9/2022 1619  Last data filed at 10/9/2022 1400  Gross per 24 hour   Intake 4008. 25 ml   Output 4700 ml   Net -691.75 ml       Constitutional:  Ill appearing, confused   Eyes:  Pupils reactive, sclera clear   Neck:  Normal thyroid, no masses   Cardiovascular:  irregular, tachycardic   Respiratory:  No distress, no wheezing  Psychiatry:  Alert, confused   Abdomen: +bs, soft, nt, no masses   Musculoskeletal: No LE edema, no clubbing   Lymphatics:  No LAD in neck, no supraclavicular nodes       MEDICAL DECISION MAKING       Data/  Recent Labs     10/08/22  0425 10/09/22  0733   WBC 17.6* 18.1*   HGB 14.2 14.3   HCT 47.6 45.2   MCV 94.1 88.7    128*       Recent Labs     10/07/22  0427 10/08/22  0425 10/08/22  1302 10/09/22  0733    149* 147* 147*   K 3.9 3.3* 3.2* 4.1   CL 97* 109 106 109   CO2 21 27 30 26   GLUCOSE 237* 235* 342* 218*   PHOS 3.5 3.1 2.0*  --    MG  --   --   --  1.90   BUN 48* 50* 46* 33*   CREATININE 0.9 0.6 0.6 0.6   LABGLOM >60 >60 >60 >60   GFRAA >60 >60 >60 >60         Assessment/     Acute Kidney Injury:  KDIGO stage 2  - Etiology:  Pre-renal spectrum with volume depletion and low BP with cardiac instability decreasing renal perfusion pressures  - Clinical:  Seems to be responding to volume, kidney failure has resolved      Hyperkalemia:  Due to FABIO / better with medical management  then became low requiring supplementation     Hypernatremia :  - Due to solute diuresis post ATN  - On D5W / urine volume > 3 liters with over 50% free water      Sepsis:  + UTI and pneumonia      Respiratory Failure:  - HCAP      Atrial Fibrillation:  - Hypotensive. Now on amiodarone with improved BP and HR.   Off cardizem gtt       Plan/     Continue D5W  Follow labs    Thank you for asking us to participate in the management of your patient, please do not hesitate to contact me for any concerns regarding my recommendations as outlined above.    -----------------------------  Erica Salcedo M.D.   [facility]

## 2022-10-09 NOTE — PROGRESS NOTES
Pulmonary & Critical Care Medicine ICU Progress Note    CC: Severe hypoxemia, A. fib RVR, hypotension    Events of Last 24 hours:   Cardizem drip titrated to off  Temperature 100    Vascular lines: IV: peripheral     MV: None     / / /   No results for input(s): PHART, [id number], PO2ART in the last 72 hours. IV:   dextrose 5 % with KCl 20 mEq 125 mL/hr at 10/09/22 0600    dilTIAZem Stopped (10/09/22 0035)    dextrose      sodium chloride 5 mL/hr (10/09/22 0318)    amiodarone 0.5 mg/min (10/09/22 0600)       Vitals:  Blood pressure (!) 142/76, pulse 99, temperature 98.2 °F (36.8 °C), temperature source Axillary, resp. rate 25, height 5' 7\" (1.702 m), weight 212 lb (96.2 kg), SpO2 98 %. on 2 L  Temp  Av.9 °F (37.2 °C)  Min: 97.6 °F (36.4 °C)  Max: 100 °F (37.8 °C)    Intake/Output Summary (Last 24 hours) at 10/9/2022 8535  Last data filed at 10/9/2022 0600  Gross per 24 hour   Intake 4054.14 ml   Output 3400 ml   Net 654.14 ml     PE:  General: Looks much better today  Eyes: PERRL. No sclera icterus. No conjunctival injection. ENT: No discharge. Pharynx clear. Neck: Trachea midline. Normal thyroid. Resp: No accessory muscle use. No crackles. No wheezing. No rhonchi. No dullness on percussion. CV: Regular rate. Regular rhythm. No mumur or rub. + edema. GI: Non-tender. Non-distended. No masses. No organomegaly. Normal bowel sounds. No hernia. Skin: Warm and dry. No nodule on exposed extremities. No rash on exposed extremities. Lymph: No cervical LAD. No supraclavicular LAD. M/S: No cyanosis. No joint deformity. No clubbing. Neuro: Awake, speech clear, oriented to person and place.  Patellar reflexes are symmetric  Psych: No agitation today, no anxiety, affect is full    Scheduled Meds:   dilTIAZem  30 mg Oral 4 times per day    meropenem  1,000 mg IntraVENous Q8H    citalopram  20 mg Oral Daily    rivaroxaban  20 mg Oral Daily with breakfast    sodium chloride flush  5-40 mL IntraVENous 2 times per day    insulin glargine  25 Units SubCUTAneous QAM    tiotropium  2 puff Inhalation Daily    miconazole nitrate   Topical BID    insulin lispro  0-16 Units SubCUTAneous TID WC    insulin lispro  0-4 Units SubCUTAneous Nightly    empagliflozin  10 mg Oral Daily    busPIRone  5 mg Oral BID    DULoxetine  60 mg Oral Daily    valproic acid  250 mg Oral TID    gabapentin  100 mg Oral TID       Data:  CBC:   Recent Labs     10/08/22  0425   WBC 17.6*   HGB 14.2   HCT 47.6   MCV 94.1        BMP:   Recent Labs     10/07/22  0427 10/08/22  0425 10/08/22  1302    149* 147*   K 3.9 3.3* 3.2*   CL 97* 109 106   CO2 21 27 30   PHOS 3.5 3.1 2.0*   BUN 48* 50* 46*   CREATININE 0.9 0.6 0.6     LIVER PROFILE:   No results for input(s): AST, ALT, LIPASE, BILIDIR, BILITOT, ALKPHOS in the last 72 hours. Invalid input(s): AMYLASE,  ALB      Cultures:      10/6/2022 SARS-CoV-2 negative, influenza negative  10/6/2022 blood 2 of 2 cultures Providencia is merrem sensitive   10/6/2022 urine polymicrobial, gram-negative godfrey is being worked up    Chest imaging was reviewed by me and showed:   CXR 10/6/2022 patchy infiltrates without focal consolidation    Head CT 10/6/2022 chronic parenchymal volume loss and chronic small vessel ischemic change without acute finding    ASSESSMENT:  Acute hypoxemic respiratory failure   Acute metabolic encephalopathy, markedly improved  Sepsis syndrome  Gram-negative Providencia bacteremia  Acute cystitis    Chronic A. Fib,now with RVR; on Xarelto   Acute kidney injury    Chronic diastolic CHF  COPD  Schizoaffective disorder  Elevated troponin  H/O ESBL urine   H/O dementia   DM with hyperglycemia   Hypernatremia     PLAN:  Supplemental oxygen to maintain SaO2 >92%; wean as tolerated    IV fluid per nephrology   Amiodarone infusion, Cardizem PO - I d/w Dr. Tere Briones D#3 per infectious disease   Blood sugar control, with goal 140-180;  High SSI  On [id number] [facility]  I discussed code status with patient and her NOK Mari Clifton [phone] (brother).    LIMITED CODE - no intubation, no compressions, no cardioversion

## 2022-10-09 NOTE — PROGRESS NOTES
Admit: 10/5/2022    Name:  Mita Galvin  Room:  [facility]-  MRN:    [id number]    Critical Care Daily Progress Note for 10/9/2022   A 70-year-old female, nursing home resident presented with shortness of breath severe hypoxia. Initially required nonrebreather but he did not titrate down to 4 L  Also noted to have a go into A. fib with RVR treated with Cardizem, but it caused hypotension.     Interval History:     Low-grade fevers  Cardizem drip titrated down and turned off      Scheduled Meds:   dilTIAZem  60 mg Oral 4 times per day    magnesium sulfate  1,000 mg IntraVENous Once    meropenem  1,000 mg IntraVENous Q8H    citalopram  20 mg Oral Daily    rivaroxaban  20 mg Oral Daily with breakfast    sodium chloride flush  5-40 mL IntraVENous 2 times per day    insulin glargine  25 Units SubCUTAneous QAM    tiotropium  2 puff Inhalation Daily    miconazole nitrate   Topical BID    insulin lispro  0-16 Units SubCUTAneous TID WC    insulin lispro  0-4 Units SubCUTAneous Nightly    empagliflozin  10 mg Oral Daily    busPIRone  5 mg Oral BID    DULoxetine  60 mg Oral Daily    valproic acid  250 mg Oral TID    gabapentin  100 mg Oral TID       Continuous Infusions:   dextrose 5 % with KCl 20 mEq 125 mL/hr at 10/09/22 0600    dilTIAZem Stopped (10/09/22 0035)    dextrose      sodium chloride 5 mL/hr (10/09/22 0318)    amiodarone Stopped (10/09/22 1042)       PRN Meds:  LORazepam, glucose, dextrose bolus **OR** dextrose bolus, glucagon (rDNA), dextrose, sodium chloride flush, sodium chloride, ondansetron **OR** ondansetron, polyethylene glycol, acetaminophen **OR** acetaminophen, perflutren lipid microspheres, ipratropium-albuterol, LORazepam, oxyCODONE-acetaminophen, melatonin                  Objective:     Temp  Av.9 °F (37.2 °C)  Min: 97.6 °F (36.4 °C)  Max: 100 °F (37.8 °C)  Pulse  Av  Min: 84  Max: 121  BP  Min: 107/61  Max: 148/92  SpO2  Av.7 %  Min: 84 %  Max: 100 %  Patient Vitals for the past 4 hrs:   BP Temp Temp src Pulse Resp SpO2   10/09/22 1000 112/63 -- -- (!) 105 21 (!) 88 %   10/09/22 0900 124/77 -- -- (!) 118 18 98 %   10/09/22 0800 139/85 -- -- (!) 103 17 97 %   10/09/22 0700 (!) 134/93 98.9 °F (37.2 °C) Axillary 97 26 97 %           Intake/Output Summary (Last 24 hours) at 10/9/2022 1048  Last data filed at 10/9/2022 0600  Gross per 24 hour   Intake 3934.14 ml   Output 3400 ml   Net 534.14 ml         Physical Exam:  General:  Awake, alert disoriented   mucous Membranes:  Pink , anicteric  Neck: No JVD, no carotid bruit, no thyromegaly  Chest:  Clear to auscultation bilaterally, no added sounds  Cardiovascular: Irregular S1S2 heard, no murmurs or gallops  Abdomen:  Soft, undistended, non tender, no organomegaly, BS present  Extremities: No edema or cyanosis. Distal pulses well felt  Neurological : no focal deficits    Lab Data:  CBC:   Recent Labs     10/08/22  0425 10/09/22  0733   WBC 17.6* 18.1*   RBC 5.06 5.09   HGB 14.2 14.3   HCT 47.6 45.2   MCV 94.1 88.7   RDW 16.8* 16.0*    128*       BMP:   Recent Labs     10/07/22  0427 10/08/22  0425 10/08/22  1302 10/09/22  0733    149* 147* 147*   K 3.9 3.3* 3.2* 4.1   CL 97* 109 106 109   CO2 21 27 30 26   PHOS 3.5 3.1 2.0*  --    BUN 48* 50* 46* 33*   CREATININE 0.9 0.6 0.6 0.6       BNP: No results for input(s): BNP in the last 72 hours. PT/INR:   No results for input(s): PROTIME, INR in the last 72 hours. APTT:  No results for input(s): APTT in the last 72 hours. CARDIAC ENZYMES:   No results for input(s): CKMB, CKMBINDEX, TROPONINI in the last 72 hours. Invalid input(s): CKTOTAL;3    FASTING LIPID PANEL:  Lab Results   Component Value Date/Time    CHOL 154 04/04/2013 12:33 PM    HDL 52 04/04/2013 12:33 PM    TRIG 166 04/04/2013 12:33 PM     LIVER PROFILE:   No results for input(s): AST, ALT, ALB, BILIDIR, BILITOT, ALKPHOS in the last 72 hours.         US RENAL COMPLETE   Final Result   Unremarkable ultrasound of the kidneys and urinary bladder. RECOMMENDATIONS:   Unavailable         CT head without contrast   Final Result   No acute intracranial abnormality. MRI may be obtained if clinically   indicated. XR CHEST PORTABLE   Final Result   Patchy mild airspace disease which may represent atelectasis or pneumonia. Assessment & Plan:     Patient Active Problem List    Diagnosis Date Noted    Persistent atrial fibrillation ([address] 11/07/2016    Bacteremia 10/08/2022    Providencia bacteremia 10/07/2022    Permanent atrial fibrillation with rapid ventricular response ([address] 10/07/2022    Sepsis secondary to UTI ([address] 10/06/2022    Altered mental status 10/06/2022    FABIO (acute kidney injury) ([address] 10/06/2022    UTI (urinary tract infection) 10/06/2022    Chronic bilateral low back pain with bilateral sciatica 10/21/2018    Chronic respiratory failure with hypoxia ([address] 10/21/2018    Dementia with behavioral disturbance 10/21/2018    LORENZO (generalized anxiety disorder) 10/21/2018    Hoarding disorder with poor insight 10/21/2018    Hypertensive heart and kidney disease with chronic diastolic congestive heart failure and stage 2 chronic kidney disease ([address] 10/21/2018    Schizoaffective disorder, depressive type ([address] 10/21/2018    Cervical herniated disc 05/23/2005    Chronic pain of both knees 02/24/2020    Bilateral primary osteoarthritis of knee 02/24/2020    Essential hypertension     Chronic obstructive pulmonary disease (HCC)     Atrial fibrillation with rapid ventricular response ([address]     Obesity 01/27/2016    Lumbar facet arthropathy 12/10/2014    Disc degeneration, lumbar 12/10/2014    DM (diabetes mellitus) ([address] 01/13/2014    Seizure disorder, grand mal ([address] 01/12/2014       Acute hypoxic respiratory failure  Currently on 4 L of oxygen    Acute kidney injury  Has resolved with IV fluids.     Gram-negative bacteremia  UTI  Continue meropenem  Infectious disease consult    Hypernatremia  Nephrology consult  D5W started    Atrial fibrillation with rapid ventricular rate  Placed on amiodarone. Hypotensive with Cardizem. Continue  Xarelto  Now on oral Cardizem. Amiodarone discontinued    Type 2 diabetes  Stable. Continue Lantus insulin and sliding scale insulin  Continue Jardiance    Schizoaffective disorder  Dementia  Continue BuSpar, Celexa, Cymbalta and valproic acid  Zyprexa as needed  Her mental status is better today.   Agitation is considerably less    Xarelto for DVT prophylaxis  Full code  Carb control diet      Avery North MD

## 2022-10-09 NOTE — PROGRESS NOTES
CARDIOLOGY PROGRESS NOTE      Patient Name: Karina Navarrete  Date of admission: 10/5/2022 10:00 PM  Admission Dx: Sepsis ([facility] [A41.9]  Reason for Consult:  Atrial fibrillation with RVR  Requesting Physician: Yadira Newby MD  Primary Care physician: Jenny Lion MD    Subjective:     Karina Navarrete is a 66 y.o. patient with a prior medical history notable for permanent atrial fibrillation on xarelto, diabetes mellitus, HFpEF, schizoaffective disorder, seizure history, and obesity, who presented to the hospital with pneumonia/respi failure, sepsis and UTI. Cardiology consulted for atrial fibrillation with RVR. Patient initially evaluated by my partner Dr. Dena Hardy. Noted rapid afib and dilt gtt started but got hypotensive and d/c'd. Amiodarone gtt started and HR still elevated so dilt added back given improved BP. Note CXR noted atelectasis or pneumonia. EKG Afib with RVR 136bpm; marked ST abnormality inferior and anterolateral consider ischemia (ST change more prominent compared to 4/20). LABS: , K 3.9, Cl 97, CO2 21, BUN/Cr 48/0.9. Palomo 0.04 x3, BNP 3,531. Note Echo 10/6/2022 EF=>65%; mild cLVH. No acute events overnight. Heart rates have come down with diltiazem p.o., now averaging . Blood pressure stable. Remains on amiodarone drip. Remains on 4 L nasal cannula. Patient reports ongoing palpitations, pan positive review of systems this morning. Difficult to discern what is true following what is not. No worsening edema. Appears comfortable. No concerns from nursing. Home Medications:  Were reviewed and are listed in nursing record and/or below  Prior to Admission medications    Medication Sig Start Date End Date Taking? Authorizing Provider   LORazepam (ATIVAN) 0.5 MG tablet Take 0.5 mg by mouth in the morning and at bedtime.    Yes Historical Provider, MD   busPIRone (BUSPAR) 5 MG tablet Take 5 mg by mouth 2 times daily   Yes Historical Provider, MD fluconazole (DIFLUCAN) 100 MG tablet Take 100 mg by mouth daily For 7 days 9/28/22  Yes Historical Provider, MD   DULoxetine (CYMBALTA) 60 MG extended release capsule Take 60 mg by mouth daily   Yes Historical Provider, MD   acetaminophen (TYLENOL) 325 MG tablet Take 650 mg by mouth every 6 hours as needed for Pain   Yes Historical Provider, MD   oxyCODONE-acetaminophen (PERCOCET) 5-325 MG per tablet Take 1 tablet by mouth in the morning and 1 tablet at noon and 1 tablet in the evening and 1 tablet before bedtime. Yes Historical Provider, MD   divalproex (DEPAKOTE) 250 MG DR tablet Take 250 mg by mouth 3 times daily   Yes Historical Provider, MD   furosemide (LASIX) 40 MG tablet Take 40 mg by mouth daily   Yes Historical Provider, MD   melatonin 3 MG TABS tablet Take 3 mg by mouth daily   Yes Historical Provider, MD   potassium chloride (MICRO-K) 10 MEQ extended release capsule Take 10 mEq by mouth daily   Yes Historical Provider, MD   tiotropium (SPIRIVA) 18 MCG inhalation capsule Inhale 18 mcg into the lungs daily   Yes Historical Provider, MD   Cholecalciferol (VITAMIN D3) 1.25 MG (03978 UT) CAPS Take 1 capsule by mouth daily   Yes Historical Provider, MD   tiZANidine (ZANAFLEX) 2 MG tablet Take 2 mg by mouth in the morning, at noon, and at bedtime   Yes Historical Provider, MD   empagliflozin (JARDIANCE) 10 MG tablet Take 10 mg by mouth daily   Yes Historical Provider, MD   rivaroxaban (XARELTO) 20 MG TABS tablet Take 20 mg by mouth nightly 4/18/20   Mercedez Wolf MD   gabapentin (NEURONTIN) 100 MG capsule Take 100 mg by mouth 3 times daily.  4/18/20   Mercedez Wolf MD   insulin glargine (LANTUS SOLOSTAR) 100 UNIT/ML injection pen Inject 25 Units into the skin every morning 4/18/20   Mercedez Wolf MD   insulin lispro (HUMALOG) 100 UNIT/ML injection vial Inject 0-6 Units into the skin 3 times daily (with meals) 4/18/20   Mercedez Wolf MD   digoxin (LANOXIN) 125 MCG tablet Take 1 tablet by mouth daily 4/19/20   Alondra Valadez MD   Sodium Phosphates (FLEET) 7-19 GM/118ML Place 1 enema rectally daily as needed    Historical Provider, MD   bisacodyl (DULCOLAX) 10 MG suppository Place 10 mg rectally daily as needed for Constipation    Historical Provider, MD   Blood Glucose Monitoring Suppl (BLOOD GLUCOSE SYSTEM RICHARD) KIT Check fingerstick blood sugars before meals and at bedtime 3/21/16   Kerwin López MD   INSULIN SYRINGE .5CC/29G ([employer] INS SYR .5CC/29G) 29G X 1/2\" 0.5 ML MISC 1 each by Does not apply route daily 3/21/16   Kerwin López MD        CURRENT Medications:  dilTIAZem (CARDIZEM) tablet 30 mg, 4 times per day  dextrose 5 % with KCl 20 mEq infusion, Continuous  meropenem (MERREM) 1,000 mg in sodium chloride 0.9 % 100 mL IVPB (mini-bag), Q8H  dilTIAZem 125 mg in dextrose 5 % 125 mL infusion, Continuous  LORazepam (ATIVAN) injection 1 mg, Q4H PRN  citalopram (CELEXA) tablet 20 mg, Daily  rivaroxaban (XARELTO) tablet 20 mg, Daily with breakfast  glucose chewable tablet 16 g, PRN  dextrose bolus 10% 125 mL, PRN   Or  dextrose bolus 10% 250 mL, PRN  glucagon (rDNA) injection 1 mg, PRN  dextrose 10 % infusion, Continuous PRN  sodium chloride flush 0.9 % injection 5-40 mL, 2 times per day  sodium chloride flush 0.9 % injection 5-40 mL, PRN  0.9 % sodium chloride infusion, PRN  ondansetron (ZOFRAN-ODT) disintegrating tablet 4 mg, Q8H PRN   Or  ondansetron (ZOFRAN) injection 4 mg, Q6H PRN  polyethylene glycol (GLYCOLAX) packet 17 g, Daily PRN  acetaminophen (TYLENOL) tablet 650 mg, Q6H PRN   Or  acetaminophen (TYLENOL) suppository 650 mg, Q6H PRN  perflutren lipid microspheres (DEFINITY) injection 1.65 mg, ONCE PRN  insulin glargine (LANTUS) injection vial 25 Units, QAM  ipratropium-albuterol (DUONEB) nebulizer solution 3 mL, Q4H PRN  tiotropium (SPIRIVA RESPIMAT) 2.5 MCG/ACT inhaler 2 puff, Daily  amiodarone (CORDARONE) 450 mg in dextrose 5 % 250 mL infusion, Continuous  miconazole nitrate 2 % ointment, BID  insulin lispro (HUMALOG) injection vial 0-16 Units, TID WC  insulin lispro (HUMALOG) injection vial 0-4 Units, Nightly  empagliflozin (JARDIANCE) tablet 10 mg, Daily  LORazepam (ATIVAN) tablet 0.5 mg, BID PRN  busPIRone (BUSPAR) tablet 5 mg, BID  DULoxetine (CYMBALTA) extended release capsule 60 mg, Daily  oxyCODONE-acetaminophen (PERCOCET) 5-325 MG per tablet 1 tablet, Q6H PRN  valproic acid (DEPAKENE) capsule 250 mg, TID  melatonin tablet 3 mg, Nightly PRN  gabapentin (NEURONTIN) capsule 100 mg, TID      Allergies:  Fish-derived products, Iodine, Mushroom extract complex, Onion, and Other     Review of Systems:   A 14 point review of symptoms completed. Pertinent positives identified in the HPI, all other review of symptoms negative. Objective:     Vitals:    10/09/22 0500 10/09/22 0600 10/09/22 0637 10/09/22 0700   BP: 126/87 (!) 142/76 (!) 142/76 (!) 134/93   Pulse: 89 99  97   Resp: (!) 39 25  26   Temp:    98.9 °F (37.2 °C)   TempSrc:    Axillary   SpO2: 100% 98%  97%   Weight:       Height:          Weight: 212 lb (96.2 kg)       PHYSICAL EXAM:    General:  More awake, conversant, answers questions though there are concerns about whether she is understanding   Head:  Normocephalic, atraumatic   Eyes:  Conjunctiva/corneas clear, anicteric sclerae    Nose: Nares normal, no drainage or sinus tenderness   Throat: No abnormalities of the lips, oral mucosa or tongue. Neck: Trachea midline. Neck supple with no lymphadenopathy, thyroid not enlarged, symmetric, no tenderness/mass/nodules    Lungs:   Clear to auscultation bilaterally anterior lung fields, diminished breath sounds throughout, maintained on 4 L with no air hunger   Chest Wall:  No deformity or tenderness to palpation   Heart:  Irregularly irregular, rate controlled, variable S1, normal S2, no murmur, no rub, no S3/S4, PMI non-palpable. Abdomen:   Obese, soft, non-tender, with normoactive bowel sounds.  No masses, no hepatosplenomegaly Extremities: No cyanosis, clubbing or pitting edema. Vascular: 2+ radial, 2+ dorsalis pedis and posterior tibial pulses bilaterally. Brisk carotid upstrokes without carotid bruit. Warm and well-perfused. Skin: Skin color, texture, turgor are normal with no rashes or ulceration. Pysch: Difficult to assess given patient condition   Neurologic: Difficult to assess given patient condition. Labs:   CBC:   Lab Results   Component Value Date/Time    WBC 17.6 10/08/2022 04:25 AM    RBC 5.06 10/08/2022 04:25 AM    HGB 14.2 10/08/2022 04:25 AM    HCT 47.6 10/08/2022 04:25 AM    MCV 94.1 10/08/2022 04:25 AM    RDW 16.8 10/08/2022 04:25 AM     10/08/2022 04:25 AM     CMP:  Lab Results   Component Value Date/Time     10/08/2022 01:02 PM    K 3.2 10/08/2022 01:02 PM    K 5.8 10/05/2022 10:30 PM     10/08/2022 01:02 PM    CO2 30 10/08/2022 01:02 PM    BUN 46 10/08/2022 01:02 PM    CREATININE 0.6 10/08/2022 01:02 PM    GFRAA >60 10/08/2022 01:02 PM    GFRAA >60 04/04/2013 12:33 PM    AGRATIO 1.1 10/05/2022 10:30 PM    LABGLOM >60 10/08/2022 01:02 PM    GLUCOSE 342 10/08/2022 01:02 PM    PROT 8.0 10/05/2022 10:30 PM    PROT 7.3 08/07/2012 11:45 AM    CALCIUM 9.5 10/08/2022 01:02 PM    BILITOT 0.7 10/05/2022 10:30 PM    ALKPHOS 124 10/05/2022 10:30 PM    AST 10 10/05/2022 10:30 PM    ALT <5 10/05/2022 10:30 PM     PT/INR:  No results found for: PTINR  HgBA1c:  Lab Results   Component Value Date    LABA1C 9.1 10/06/2022     Lab Results   Component Value Date    CKTOTAL 57 01/11/2020    TROPONINI 0.04 (H) 10/06/2022         Interval Testing/Data:     Telemetry personally reviewed: reviewed. Atrial fibrillation  bpm    CXR 10/5  FINDINGS:   Cardiac size and mediastinal structures appear stable. Patchy right   perihilar and bibasilar airspace disease. No pneumothorax. No acute osseous   abnormality. Diffuse osteopenia. Patient is rotated to the right.   No free   air seen in the upper abdomen. Impression   Patchy mild airspace disease which may represent atelectasis or pneumonia. Echo 10/6      Summary   Left ventricular systolic function is hyperdynamic with ejection fraction   estimated at >65%. No regional wall motion abnormalities. There is mild concentric left ventricular hypertrophy. Elevated left ventricular filling pressure. Mild bi-atrial enlargement. Systolic pulmonary artery pressure (SPAP) is normal estimated at 19 mmHg   (Right atrial pressure of 3 mmHg). No significant valvular heart disease. Echo 4/16/20   Summary   Technically difficult examination due to body habitus, patient immobility. Normal LV systolic function with a visually estimated EF of 55%. No obvious segmental wall motion abnormalities. Normal left ventricular diastolic filling pressure. No obvious valvular abnormalities noted. Systolic pulmonary artery pressure (SPAP) is normal and estimated at 30mmHg   (right atrial pressure 3mmHg). Myoview 2016      IMPRESSION:           No evidence of stress-induced reversible myocardial ischemia or myocardial infarction. Left    ventricular ejection fraction is slightly below normal limits, calculated at 48 %. No definite    regional wall motion abnormality is visualized. Impression and Plan      Permanent atrial fibrillation  -Improving rate control with diltiazem every 6 hours, increased dose to 60 mg every 6 and wean amiodarone drip  -Home digoxin held. Continue to observe on diltiazem monotherapy, possibly consolidate to once daily dosing tomorrow. Reassess rates tomorrow.  -continued on xarelto.     -Continue antimicrobial therapy and treatment of underlying sepsis    Uncontrolled hypertension  -Improving control with diltiazem    Pneumonia/respiratory failure, on 4L  Severe sepsis, gram-negative bacteremia, likely culprit UTI  -Maintained on meropenem, infectious disease service has evaluated    Chronic HFpEF   -appears compensated if not dehydrated, at home oxygen requirement    Obesity with BMI 33  Diabetes mellitus  Hyponatremia   - rapid correction over 48 hr period, now hypernatremic. Stable today. We will follow      Patient Active Problem List   Diagnosis    Seizure disorder, grand mal ([address]    DM (diabetes mellitus) ([address]    Lumbar facet arthropathy    Disc degeneration, lumbar    Obesity    Atrial fibrillation with rapid ventricular response (HCC)    Essential hypertension    Chronic obstructive pulmonary disease (HCC)    Persistent atrial fibrillation (HCC)    Chronic pain of both knees    Bilateral primary osteoarthritis of knee    Sepsis secondary to UTI ([address]    Altered mental status    FABIO (acute kidney injury) ([address]    UTI (urinary tract infection)    Cervical herniated disc    Chronic bilateral low back pain with bilateral sciatica    Chronic respiratory failure with hypoxia (HCC)    Dementia with behavioral disturbance    LORENZO (generalized anxiety disorder)    Hoarding disorder with poor insight    Hypertensive heart and kidney disease with chronic diastolic congestive heart failure and stage 2 chronic kidney disease (HCC)    Schizoaffective disorder, depressive type (HCC)    Providencia bacteremia    Permanent atrial fibrillation with rapid ventricular response ([address]    Bacteremia           I will address the patient's cardiac risk factors and adjusted pharmacologic treatment as needed. In addition, I have reinforced the need for patient directed risk factor modification. All questions and concerns were addressed to the patient/family. Alternatives to my treatment were discussed. Thank you for allowing us to participate in the care of [facility]. Please call me with any questions [phone].     Parvez Lo MD, [facility]  Cardiovascular Disease  [address]  [phone] [facility]  [phone] [address] [place] [address]  10/9/2022 8:15 AM

## 2022-10-10 ENCOUNTER — APPOINTMENT (OUTPATIENT)
Dept: INTERVENTIONAL RADIOLOGY/VASCULAR | Age: 72
DRG: 871 | End: 2022-10-10
Payer: MEDICARE

## 2022-10-10 PROBLEM — E87.5 HYPERKALEMIA: Status: ACTIVE | Noted: 2022-10-10

## 2022-10-10 LAB
ALBUMIN SERPL-MCNC: 2.6 G/DL (ref 3.4–5)
ANION GAP SERPL CALCULATED.3IONS-SCNC: 7 MMOL/L (ref 3–16)
ATYPICAL LYMPHOCYTE RELATIVE PERCENT: 1 % (ref 0–6)
BANDED NEUTROPHILS RELATIVE PERCENT: 2 % (ref 0–7)
BASOPHILS ABSOLUTE: 0 K/UL (ref 0–0.2)
BASOPHILS RELATIVE PERCENT: 0 %
BUN BLDV-MCNC: 21 MG/DL (ref 7–20)
CALCIUM SERPL-MCNC: 9.2 MG/DL (ref 8.3–10.6)
CHLORIDE BLD-SCNC: 103 MMOL/L (ref 99–110)
CO2: 33 MMOL/L (ref 21–32)
CREAT SERPL-MCNC: <0.5 MG/DL (ref 0.6–1.2)
EKG ATRIAL RATE: 119 BPM
EKG DIAGNOSIS: NORMAL
EKG Q-T INTERVAL: 328 MS
EKG QRS DURATION: 90 MS
EKG QTC CALCULATION (BAZETT): 423 MS
EKG R AXIS: 77 DEGREES
EKG T AXIS: 267 DEGREES
EKG VENTRICULAR RATE: 100 BPM
EOSINOPHILS ABSOLUTE: 0.3 K/UL (ref 0–0.6)
EOSINOPHILS RELATIVE PERCENT: 2 %
GFR AFRICAN AMERICAN: >60
GFR NON-AFRICAN AMERICAN: >60
GLUCOSE BLD-MCNC: 154 MG/DL (ref 70–99)
GLUCOSE BLD-MCNC: 195 MG/DL (ref 70–99)
GLUCOSE BLD-MCNC: 203 MG/DL (ref 70–99)
GLUCOSE BLD-MCNC: 217 MG/DL (ref 70–99)
GLUCOSE BLD-MCNC: 248 MG/DL (ref 70–99)
HCT VFR BLD CALC: 42.5 % (ref 36–48)
HEMOGLOBIN: 13.6 G/DL (ref 12–16)
LYMPHOCYTES ABSOLUTE: 1.4 K/UL (ref 1–5.1)
LYMPHOCYTES RELATIVE PERCENT: 8 %
MAGNESIUM: 1.8 MG/DL (ref 1.8–2.4)
MCH RBC QN AUTO: 28.1 PG (ref 26–34)
MCHC RBC AUTO-ENTMCNC: 32 G/DL (ref 31–36)
MCV RBC AUTO: 88 FL (ref 80–100)
MONOCYTES ABSOLUTE: 0.8 K/UL (ref 0–1.3)
MONOCYTES RELATIVE PERCENT: 5 %
NEUTROPHILS ABSOLUTE: 12.6 K/UL (ref 1.7–7.7)
NEUTROPHILS RELATIVE PERCENT: 82 %
PDW BLD-RTO: 15.9 % (ref 12.4–15.4)
PERFORMED ON: ABNORMAL
PHOSPHORUS: 1.7 MG/DL (ref 2.5–4.9)
PLATELET # BLD: 109 K/UL (ref 135–450)
PMV BLD AUTO: 9.2 FL (ref 5–10.5)
POTASSIUM REFLEX MAGNESIUM: 3.5 MMOL/L (ref 3.5–5.1)
POTASSIUM SERPL-SCNC: 3.5 MMOL/L (ref 3.5–5.1)
RBC # BLD: 4.83 M/UL (ref 4–5.2)
SLIDE REVIEW: ABNORMAL
SODIUM BLD-SCNC: 143 MMOL/L (ref 136–145)
TOXIC GRANULATION: PRESENT
WBC # BLD: 15 K/UL (ref 4–11)

## 2022-10-10 PROCEDURE — 83735 ASSAY OF MAGNESIUM: CPT

## 2022-10-10 PROCEDURE — 6370000000 HC RX 637 (ALT 250 FOR IP): Performed by: INTERNAL MEDICINE

## 2022-10-10 PROCEDURE — 36415 COLL VENOUS BLD VENIPUNCTURE: CPT

## 2022-10-10 PROCEDURE — 6360000002 HC RX W HCPCS: Performed by: INTERNAL MEDICINE

## 2022-10-10 PROCEDURE — 99232 SBSQ HOSP IP/OBS MODERATE 35: CPT | Performed by: INTERNAL MEDICINE

## 2022-10-10 PROCEDURE — 99233 SBSQ HOSP IP/OBS HIGH 50: CPT | Performed by: INTERNAL MEDICINE

## 2022-10-10 PROCEDURE — 2000000000 HC ICU R&B

## 2022-10-10 PROCEDURE — 80069 RENAL FUNCTION PANEL: CPT

## 2022-10-10 PROCEDURE — 2580000003 HC RX 258: Performed by: INTERNAL MEDICINE

## 2022-10-10 PROCEDURE — 36410 VNPNXR 3YR/> PHY/QHP DX/THER: CPT

## 2022-10-10 PROCEDURE — 05HB33Z INSERTION OF INFUSION DEVICE INTO RIGHT BASILIC VEIN, PERCUTANEOUS APPROACH: ICD-10-PCS | Performed by: RADIOLOGY

## 2022-10-10 PROCEDURE — 2700000000 HC OXYGEN THERAPY PER DAY

## 2022-10-10 PROCEDURE — 76937 US GUIDE VASCULAR ACCESS: CPT

## 2022-10-10 PROCEDURE — 94761 N-INVAS EAR/PLS OXIMETRY MLT: CPT

## 2022-10-10 PROCEDURE — 2580000003 HC RX 258: Performed by: HOSPITALIST

## 2022-10-10 PROCEDURE — 2500000003 HC RX 250 WO HCPCS: Performed by: INTERNAL MEDICINE

## 2022-10-10 PROCEDURE — 85025 COMPLETE CBC W/AUTO DIFF WBC: CPT

## 2022-10-10 PROCEDURE — C1751 CATH, INF, PER/CENT/MIDLINE: HCPCS

## 2022-10-10 PROCEDURE — 94640 AIRWAY INHALATION TREATMENT: CPT

## 2022-10-10 PROCEDURE — 93010 ELECTROCARDIOGRAM REPORT: CPT | Performed by: INTERNAL MEDICINE

## 2022-10-10 PROCEDURE — 6370000000 HC RX 637 (ALT 250 FOR IP): Performed by: HOSPITALIST

## 2022-10-10 PROCEDURE — 93005 ELECTROCARDIOGRAM TRACING: CPT | Performed by: INTERNAL MEDICINE

## 2022-10-10 PROCEDURE — 6360000002 HC RX W HCPCS: Performed by: HOSPITALIST

## 2022-10-10 RX ORDER — MAGNESIUM SULFATE IN WATER 40 MG/ML
2000 INJECTION, SOLUTION INTRAVENOUS ONCE
Status: COMPLETED | OUTPATIENT
Start: 2022-10-10 | End: 2022-10-10

## 2022-10-10 RX ORDER — BISMUTH SUBSALICYLATE 262 MG/1
2 TABLET, CHEWABLE ORAL EVERY 6 HOURS PRN
Status: DISCONTINUED | OUTPATIENT
Start: 2022-10-10 | End: 2022-10-12 | Stop reason: HOSPADM

## 2022-10-10 RX ORDER — FUROSEMIDE 10 MG/ML
INJECTION INTRAMUSCULAR; INTRAVENOUS
Status: DISPENSED
Start: 2022-10-10 | End: 2022-10-11

## 2022-10-10 RX ADMIN — ACETAMINOPHEN 650 MG: 325 TABLET ORAL at 21:32

## 2022-10-10 RX ADMIN — BUSPIRONE HYDROCHLORIDE 5 MG: 5 TABLET ORAL at 21:32

## 2022-10-10 RX ADMIN — Medication 10 ML: at 08:30

## 2022-10-10 RX ADMIN — CEFEPIME 2000 MG: 2 INJECTION, POWDER, FOR SOLUTION INTRAVENOUS at 09:42

## 2022-10-10 RX ADMIN — INSULIN GLARGINE 25 UNITS: 100 INJECTION, SOLUTION SUBCUTANEOUS at 08:39

## 2022-10-10 RX ADMIN — CEFEPIME 2000 MG: 2 INJECTION, POWDER, FOR SOLUTION INTRAVENOUS at 16:44

## 2022-10-10 RX ADMIN — MAGNESIUM SULFATE HEPTAHYDRATE 2000 MG: 40 INJECTION, SOLUTION INTRAVENOUS at 09:45

## 2022-10-10 RX ADMIN — POTASSIUM PHOSPHATE, MONOBASIC AND POTASSIUM PHOSPHATE, DIBASIC 30 MMOL: 224; 236 INJECTION, SOLUTION, CONCENTRATE INTRAVENOUS at 11:53

## 2022-10-10 RX ADMIN — GABAPENTIN 100 MG: 100 CAPSULE ORAL at 21:31

## 2022-10-10 RX ADMIN — GABAPENTIN 100 MG: 100 CAPSULE ORAL at 08:30

## 2022-10-10 RX ADMIN — SODIUM CHLORIDE 5 ML/HR: 9 INJECTION, SOLUTION INTRAVENOUS at 06:33

## 2022-10-10 RX ADMIN — VALPROIC ACID 250 MG: 250 CAPSULE, LIQUID FILLED ORAL at 08:33

## 2022-10-10 RX ADMIN — EMPAGLIFLOZIN 10 MG: 10 TABLET, FILM COATED ORAL at 08:33

## 2022-10-10 RX ADMIN — INSULIN LISPRO 4 UNITS: 100 INJECTION, SOLUTION INTRAVENOUS; SUBCUTANEOUS at 08:40

## 2022-10-10 RX ADMIN — DILTIAZEM HYDROCHLORIDE 60 MG: 60 TABLET, FILM COATED ORAL at 17:24

## 2022-10-10 RX ADMIN — BUSPIRONE HYDROCHLORIDE 5 MG: 5 TABLET ORAL at 08:30

## 2022-10-10 RX ADMIN — Medication 10 ML: at 21:32

## 2022-10-10 RX ADMIN — MICONAZOLE NITRATE: 2 OINTMENT TOPICAL at 21:47

## 2022-10-10 RX ADMIN — TIOTROPIUM BROMIDE INHALATION SPRAY 2 PUFF: 3.12 SPRAY, METERED RESPIRATORY (INHALATION) at 07:23

## 2022-10-10 RX ADMIN — LORAZEPAM 0.5 MG: 0.5 TABLET ORAL at 09:40

## 2022-10-10 RX ADMIN — DILTIAZEM HYDROCHLORIDE 60 MG: 60 TABLET, FILM COATED ORAL at 12:00

## 2022-10-10 RX ADMIN — VALPROIC ACID 250 MG: 250 CAPSULE, LIQUID FILLED ORAL at 21:34

## 2022-10-10 RX ADMIN — MEROPENEM 1000 MG: 1 INJECTION, POWDER, FOR SOLUTION INTRAVENOUS at 06:37

## 2022-10-10 RX ADMIN — INSULIN LISPRO 4 UNITS: 100 INJECTION, SOLUTION INTRAVENOUS; SUBCUTANEOUS at 16:45

## 2022-10-10 RX ADMIN — BISMUTH SUBSALICYLATE 524 MG: 262 TABLET, CHEWABLE ORAL at 11:08

## 2022-10-10 RX ADMIN — GABAPENTIN 100 MG: 100 CAPSULE ORAL at 13:56

## 2022-10-10 RX ADMIN — VALPROIC ACID 250 MG: 250 CAPSULE, LIQUID FILLED ORAL at 13:56

## 2022-10-10 RX ADMIN — CITALOPRAM HYDROBROMIDE 20 MG: 20 TABLET ORAL at 08:30

## 2022-10-10 RX ADMIN — RIVAROXABAN 20 MG: 20 TABLET, FILM COATED ORAL at 08:30

## 2022-10-10 RX ADMIN — MICONAZOLE NITRATE: 2 OINTMENT TOPICAL at 09:44

## 2022-10-10 RX ADMIN — Medication 3 MG: at 21:31

## 2022-10-10 RX ADMIN — ONDANSETRON HYDROCHLORIDE 4 MG: 2 INJECTION, SOLUTION INTRAMUSCULAR; INTRAVENOUS at 10:04

## 2022-10-10 RX ADMIN — DILTIAZEM HYDROCHLORIDE 60 MG: 60 TABLET, FILM COATED ORAL at 06:38

## 2022-10-10 RX ADMIN — DULOXETINE HYDROCHLORIDE 60 MG: 60 CAPSULE, DELAYED RELEASE ORAL at 08:30

## 2022-10-10 RX ADMIN — DEXTROSE MONOHYDRATE: 50 INJECTION, SOLUTION INTRAVENOUS at 00:27

## 2022-10-10 NOTE — PROGRESS NOTES
4 Eyes Skin Assessment     The patient is being assess for   Shift Handoff    I agree that 2 RN's have performed a thorough Head to Toe Skin Assessment on the patient. ALL assessment sites listed below have been assessed. Areas assessed for pressure by both nurses:   [x]   Head, Face, and Ears   [x]   Shoulders, Back, and Chest, Abdomen  [x]   Arms, Elbows, and Hands   [x]   Coccyx, Sacrum, and Ischium  [x]   Legs, Feet, and Heels      Macerated between legs and on R side of body  Skin Assessed Under all Medical Devices by both nurses:  O2 device tubing              All Mepilex Borders were peeled back and area peeked at by both nurses:  Yes  Please list where Mepilex Borders are located:  R heel             **SHARE this note so that the co-signing nurse is able to place an eSignature**    Co-signer eSignature: Electronically signed by Cole Trevino RN on 10/10/22 at 7:28 AM EDT    Does the Patient have Skin Breakdown related to pressure?   Yes [facility] WOUND CARE was Initiated documentation include the Kassandra-wound, Wound Assessment, Measurements, Dressing Treatment, Drainage, and Color\",     (Insert Photo here)         Jarod Prevention initiated:  Yes   Wound Care Orders initiated:  Yes      [id number] nurse consulted for Pressure Injury (Stage 3,4, Unstageable, DTI, NWPT, Complex wounds)and New or Established Ostomies:  NA      Primary Nurse eSignature: Electronically signed by Devin Beltran RN on 10/10/22 at 7:27 AM EDT

## 2022-10-10 NOTE — PROGRESS NOTES
10/10/2022 0700  Gross per 24 hour   Intake 5126.67 ml   Output 4450 ml   Net 676.67 ml         Physical Exam:  General: awake and alert  mucous Membranes:  Pink , anicteric  Neck: No JVD, no carotid bruit, no thyromegaly  Chest:  Clear to auscultation bilaterally, no added sounds  Cardiovascular: Irregular S1S2 heard, no murmurs or gallops  Abdomen:  Soft, undistended, non tender, no organomegaly, BS present  Extremities: No edema or cyanosis. Distal pulses well felt  Neurological : no focal deficits    Lab Data:  CBC:   Recent Labs     10/08/22  0425 10/09/22  0733 10/10/22  0427   WBC 17.6* 18.1* 15.0*   RBC 5.06 5.09 4.83   HGB 14.2 14.3 13.6   HCT 47.6 45.2 42.5   MCV 94.1 88.7 88.0   RDW 16.8* 16.0* 15.9*    128* 109*       BMP:   Recent Labs     10/08/22  0425 10/08/22  1302 10/09/22  0733 10/10/22  0427   * 147* 147* 143   K 3.3* 3.2* 4.1 3.5  3.5    106 109 103   CO2 27 30 26 33*   PHOS 3.1 2.0*  --  1.7*   BUN 50* 46* 33* 21*   CREATININE 0.6 0.6 0.6 <0.5*       BNP: No results for input(s): BNP in the last 72 hours. PT/INR:   No results for input(s): PROTIME, INR in the last 72 hours. APTT:  No results for input(s): APTT in the last 72 hours. CARDIAC ENZYMES:   No results for input(s): CKMB, CKMBINDEX, TROPONINI in the last 72 hours. Invalid input(s): CKTOTAL;3    FASTING LIPID PANEL:  Lab Results   Component Value Date/Time    CHOL 154 04/04/2013 12:33 PM    HDL 52 04/04/2013 12:33 PM    TRIG 166 04/04/2013 12:33 PM     LIVER PROFILE:   No results for input(s): AST, ALT, ALB, BILIDIR, BILITOT, ALKPHOS in the last 72 hours. US RENAL COMPLETE   Final Result   Unremarkable ultrasound of the kidneys and urinary bladder. RECOMMENDATIONS:   Unavailable         CT head without contrast   Final Result   No acute intracranial abnormality. MRI may be obtained if clinically   indicated.          XR CHEST PORTABLE   Final Result   Patchy mild airspace disease which may represent atelectasis or pneumonia. IR MIDLINE CATH    (Results Pending)         Assessment & Plan:     Principal Problem:    Sepsis secondary to UTI [facility])  Active Problems:    Altered mental status    FABIO (acute kidney injury) ([facility]    UTI (urinary tract infection)    Providencia bacteremia    Permanent atrial fibrillation with rapid ventricular response (HCC)    Bacteremia    Atrial fibrillation with rapid ventricular response ([facility] [address]  Resolved Problems:    * No resolved hospital problems. *       Acute hypoxic respiratory failure  Currently on 4 L of oxygen    Acute kidney injury  Has resolved with IV fluids. Gram-negative bacteremia  UTI  Received Merrem. Changed to Cefepime by ID consult. Total of 2 weeks treatment (9 days of Cefepime)  Infectious disease consulted    Hypernatremia resolved. Nephrology consulted  D5W started    Atrial fibrillation with rapid ventricular rate. HR now controlled. Placed on amiodarone. Hypotensive with Cardizem. Continue  Xarelto  Now on oral Cardizem. Amiodarone discontinued    Type 2 diabetes  Stable. Continue Lantus insulin and sliding scale insulin  Continue Jardiance    Schizoaffective disorder  Dementia  Continue BuSpar, Celexa, Cymbalta and valproic acid  Zyprexa as needed  Her mental status is better today. Agitation is considerably less    Xarelto for DVT prophylaxis  Full code  Carb control diet    Transfer to PCU.       Brandan Moore MD 10/10/2022 12:46 PM

## 2022-10-10 NOTE — PROGRESS NOTES
Progress Note    HISTORY     CC:  AMS          We are following for Acute kidney injury          Subjective/   HPI:    Renal function better, non-oliguric, started on D5W with solute diuresis. Off Cardizem gtt. BP stable with improved rate.     Remains confused but less agitated     ROS:  Constitutional:  No fevers, confused, intake improving  Cardiovascular:  No edema   Respiratory:  No wheezing, no cough  Skin:  No rash, no itching  :  No hematuria, no dysuria       EXAM       Objective/     Vitals:    10/10/22 0400 10/10/22 0500 10/10/22 0600 10/10/22 0700   BP: 130/72 119/60 136/75 135/78   Pulse: 90 88 81 87   Resp: 20 16 21 21   Temp: 98.8 °F (37.1 °C)      TempSrc: Axillary      SpO2: 97% 97% 98% 95%   Weight:       Height:         24HR INTAKE/OUTPUT:    Intake/Output Summary (Last 24 hours) at 10/10/2022 1002  Last data filed at 10/10/2022 0700  Gross per 24 hour   Intake 5126.67 ml   Output 4450 ml   Net 676.67 ml       Constitutional:  Ill appearing, confused   Eyes:  Pupils reactive, sclera clear   Neck:  Normal thyroid, no masses   Cardiovascular:  irregular, tachycardic   Respiratory:  No distress, no wheezing  Psychiatry:  Alert, confused   Abdomen: +bs, soft, nt, no masses   Musculoskeletal: No LE edema, no clubbing   Lymphatics:  No LAD in neck, no supraclavicular nodes       MEDICAL DECISION MAKING       Data/  Recent Labs     10/08/22  0425 10/09/22  0733 10/10/22  0427   WBC 17.6* 18.1* 15.0*   HGB 14.2 14.3 13.6   HCT 47.6 45.2 42.5   MCV 94.1 88.7 88.0    128* 109*       Recent Labs     10/08/22  0425 10/08/22  1302 10/09/22  0733 10/10/22  0427   * 147* 147* 143   K 3.3* 3.2* 4.1 3.5  3.5    106 109 103   CO2 27 30 26 33*   GLUCOSE 235* 342* 218* 248*   PHOS 3.1 2.0*  --  1.7*   MG  --   --  1.90 1.80   BUN 50* 46* 33* 21*   CREATININE 0.6 0.6 0.6 <0.5*   LABGLOM >60 >60 >60 >60   GFRAA >60 >60 >60 >60         Assessment/     Acute Kidney Injury:  KDIGO stage 2  - Etiology:  Pre-renal spectrum with volume depletion and low BP with cardiac instability decreasing renal perfusion pressures  - Clinical:  Seems to be responding to volume, kidney failure has resolved      Hyperkalemia:  Due to FABIO / better with medical management  then became low requiring supplementation     Hypernatremia :  - Due to solute diuresis post ATN  - On D5W / urine volume > 3 liters with over 50% free water      Sepsis:  + UTI and PNA      Respiratory Failure:  - HCAP      Atrial Fibrillation:  - Hypotensive. Now on amiodarone with improved BP and HR.   Off cardizem gtt     DM:  - On jardiance    Hypophosphatemia/Hypokalemia:  - Prn replacement      Plan/     -Continue D5W - trial to reduce to 75 ml/hour with improved intake  -Trend labs, bp's, & urine output

## 2022-10-10 NOTE — PROGRESS NOTES
[facility] Pulmonary, Critical Care and Sleep Specialists                                 Pulmonary Consult /Progress Note :                                                                  CC: Severe hypoxemia, A. fib RVR, hypotension    Events of Last 24 hours:   Occasional confusion   Repeat seems words  Not in distress   HR controlled   No further fever   On 4 L    Vascular lines: IV: peripheral     MV: None     / / /   No results for input(s): PHART, UCB2UWT, PO2ART in the last 72 hours. IV:   dextrose 125 mL/hr at 10/10/22 0600    dilTIAZem Stopped (10/09/22 0035)    dextrose      sodium chloride 5 mL/hr (10/10/22 4952)    amiodarone Stopped (10/09/22 1042)       Vitals:  Blood pressure 135/78, pulse 87, temperature 98.8 °F (37.1 °C), temperature source Axillary, resp. rate 21, height 5' 7\" (1.702 m), weight 212 lb (96.2 kg), SpO2 95 %. on 2 L  Temp  Av.5 °F (36.9 °C)  Min: 97.9 °F (36.6 °C)  Max: 99.2 °F (37.3 °C)    Intake/Output Summary (Last 24 hours) at 10/10/2022 0837  Last data filed at 10/10/2022 0700  Gross per 24 hour   Intake 5126.67 ml   Output 4450 ml   Net 676.67 ml       PE:  General: Looks much better today  Eyes: PERRL. No sclera icterus. No conjunctival injection. ENT: No discharge. Pharynx clear. Neck: Trachea midline. Normal thyroid. Resp: No accessory muscle use. No crackles. No wheezing. No rhonchi. No dullness on percussion. CV: Regular rate. Regular rhythm. No mumur or rub. + edema. GI: Non-tender. Non-distended. No masses. No organomegaly. Normal bowel sounds. No hernia. Skin: Warm and dry. No nodule on exposed extremities. No rash on exposed extremities. Lymph: No cervical LAD. No supraclavicular LAD. M/S: No cyanosis. No joint deformity. No clubbing. Neuro: Awake, speech clear, oriented to person and place.  Patellar reflexes are symmetric  Psych: No agitation today, no anxiety, affect is full    Scheduled Meds: dilTIAZem  60 mg Oral 4 times per day    meropenem  1,000 mg IntraVENous Q8H    citalopram  20 mg Oral Daily    rivaroxaban  20 mg Oral Daily with breakfast    sodium chloride flush  5-40 mL IntraVENous 2 times per day    insulin glargine  25 Units SubCUTAneous QAM    tiotropium  2 puff Inhalation Daily    miconazole nitrate   Topical BID    insulin lispro  0-16 Units SubCUTAneous TID WC    insulin lispro  0-4 Units SubCUTAneous Nightly    empagliflozin  10 mg Oral Daily    busPIRone  5 mg Oral BID    DULoxetine  60 mg Oral Daily    valproic acid  250 mg Oral TID    gabapentin  100 mg Oral TID       Data:  CBC:   Recent Labs     10/08/22  0425 10/09/22  0733 10/10/22  0427   WBC 17.6* 18.1* 15.0*   HGB 14.2 14.3 13.6   HCT 47.6 45.2 42.5   MCV 94.1 88.7 88.0    128* 109*       BMP:   Recent Labs     10/08/22  0425 10/08/22  1302 10/09/22  0733 10/10/22  0427   * 147* 147* 143   K 3.3* 3.2* 4.1 3.5  3.5    106 109 103   CO2 27 30 26 33*   PHOS 3.1 2.0*  --  1.7*   BUN 50* 46* 33* 21*   CREATININE 0.6 0.6 0.6 <0.5*       LIVER PROFILE:   No results for input(s): AST, ALT, LIPASE, BILIDIR, BILITOT, ALKPHOS in the last 72 hours. Invalid input(s):   AMYLASE,  ALB      Cultures:      10/6/2022 SARS-CoV-2 negative, influenza negative  10/6/2022 blood 2 of 2 cultures Providencia is merrem sensitive   10/6/2022 urine polymicrobial, gram-negative godfrey is being worked up    Chest imaging was reviewed by me and showed:   CXR 10/6/2022 patchy infiltrates without focal consolidation    Head CT 10/6/2022 chronic parenchymal volume loss and chronic small vessel ischemic change without acute finding    ASSESSMENT:  Acute hypoxemic respiratory failure   Acute metabolic encephalopathy, markedly improved  Sepsis syndrome  Gram-negative Providencia bacteremia  Acute cystitis    Chronic A. Fib,now with RVR; on Xarelto   Acute kidney injury    Chronic diastolic CHF  COPD  Schizoaffective disorder  Elevated troponin  H/O ESBL urine   H/O dementia   DM with hyperglycemia   Hypernatremia     PLAN:  Supplemental oxygen to maintain SaO2 >92%; wean as tolerated    On 4 L   IV fluid per nephrology   Amiodarone ,Cardizem PO - as per Dr. Imelda Milligan D#5 per infectious disease   Blood sugar control, with goal 140-180;  High SSI  On Jardiance   Home Xarelto  LIMITED CODE - no intubation, no compressions, no cardioversion  Can be transfered   Ruthy Banks MD,FCCP  Pulmonary&Critical [address] [facility] ,and Critical care    [facility]

## 2022-10-10 NOTE — PROGRESS NOTES
AM assessment completed. See flowsheet. A/O x 2, disoriented to place and situation. Repetitive speech. Lungs sounds diminished throughout. Afib on bedside monitor. Bowel sounds active. +2 BLE edema noted. Urinary catheter in place draining hazy brownish urine. Labs reviewed. Cont to monitor.

## 2022-10-10 NOTE — PROGRESS NOTES
[address]   Progress Note  Cardiology      HPI: Ms. Miya Gonsalez is being seen today for f/u afib RVR. She is more alert but remains confused. She c/o SOB but denied other specific complaints. Tele reviewed afib  bpm. No nursing issues overnight. Physical Examination:    Vitals:    10/10/22 0700   BP: 135/78   Pulse: 87   Resp: 21   Temp:    SpO2: 95%          Constitutional and General Appearance: NAD   Respiratory:  Normal excursion and expansion without use of accessory muscles  Resp Auscultation: Soft breath sounds without dullness  Cardiovascular: The apical impulses not displaced  Heart tones are crisp and normal  Cervical veins are not engorged  The carotid upstroke is normal in amplitude and contour without delay or bruit  Normal S1S2, No S3, No Murmur; +irregularly irregular  Peripheral pulses are symmetrical and full  There is no clubbing, cyanosis of the extremities. No edema  Pedal Pulses: 2+ and equal   Abdomen:  No masses or tenderness  Liver/Spleen: No Abnormalities Noted  Neurological/Psychiatric:  Alert and oriented in all spheres  Moves all extremities well  No abnormalities of mood, affect, memory, mentation, or behavior are noted  Skin: warm and dry        Lab Results   Component Value Date    WBC 15.0 (H) 10/10/2022    HGB 13.6 10/10/2022    HCT 42.5 10/10/2022    MCV 88.0 10/10/2022     (L) 10/10/2022     Lab Results   Component Value Date    CREATININE <0.5 (L) 10/10/2022    BUN 21 (H) 10/10/2022     10/10/2022    K 3.5 10/10/2022     10/10/2022    CO2 33 (H) 10/10/2022     Lab Results   Component Value Date    INR 1.97 (H) 10/05/2022    PROTIME 22.3 (H) 10/05/2022       ECHO 10/6/22 Summary  Left ventricular systolic function is hyperdynamic with ejection fraction estimated at >65%. No regional wall motion abnormalities. There is mild concentric left ventricular hypertrophy. Elevated left ventricular filling pressure.   Mild bi-atrial enlargement. Systolic pulmonary artery pressure (SPAP)     Assessment:  Joshua Ortega is a 66 y.o. patient who presented to [facility] 10/5/2022 with altered mental status from [facility]. She has PMH longstanding permanent afib on xarelto, DM, hx diastolic CHF, dementia, depression, schizoaffective d/o, ESBL,obesity, and seizures. Most recent Niya Myoview 4/18/2016 negative ischemia with EF=48%. Note Echo 4/15/2020 EF=55%. She present from [facility] after being found unresponsive with sats in the 50's. EMS brought her in on NRB initially but weened down to NC. She is sedated and unable to give history now. Admitted with pneumonia/resp failure, sepsis, and UTI. Noted rapid afib and dilt gtt started but got hypotensive and d/c'd. Amiodarone gtt started and HR still elevated so dilt added back given improved BP. Note CXR noted atelectasis or pneumonia. Head CT was unremarkable. EKG Afib with RVR 136bpm; marked ST abnormality inferior and anterolateral consider ischemia (ST change more prominent compared to 4/20). LABS: , K 3.9, Cl 97, CO2 21, BUN/Cr 48/0.9, GFR >60. Albumin 3.2, Palomo 0.04 x3, BNP 3,531. Note Echo 10/6/2022 EF=>65%; mild cLVH. Diagnosis of afib with RVR in elderly female with multiple med issues including current sepsis with gram neg bacteremia,  PNA, and UTI. Recs:  Continue diltiazem 60mg po q 6 hours with goal HR <110 or less. Note dilt and amio gtts d/c'd over the weekend. D/C digoxin. If can control HR with CCB this is preferable over digoxin. Continue xarelto 20mg daily for [facility]. Decrease to 15mg qd if CrCL < 50mL/mi  Treat infections as appropriate per ICU team.  ECHO 10/6 hyperdynamic LVEF; no concerning issues  Replete lytes as appropriate. K+ 3.5 and PO4 1.7. Nurse to d/w ICU team.    If remains HR controlled off gtts and on po med regimen over next 24 hours cardiology will sign off. Thanks.     Patient Active Problem List   Diagnosis    Seizure disorder, grand mal ([facility]    DM (diabetes mellitus) ([facility]    Lumbar facet arthropathy    Disc degeneration, lumbar    Obesity    Atrial fibrillation with rapid ventricular response (HCC)    Essential hypertension    Chronic obstructive pulmonary disease (HCC)    Persistent atrial fibrillation (HCC)    Chronic pain of both knees    Bilateral primary osteoarthritis of knee    Sepsis secondary to UTI ([facility]    Altered mental status    FABIO (acute kidney injury) ([facility]    UTI (urinary tract infection)    Cervical herniated disc    Chronic bilateral low back pain with bilateral sciatica    Chronic respiratory failure with hypoxia (HCC)    Dementia with behavioral disturbance    LORENZO (generalized anxiety disorder)    Hoarding disorder with poor insight    Hypertensive heart and kidney disease with chronic diastolic congestive heart failure and stage 2 chronic kidney disease (HCC)    Schizoaffective disorder, depressive type (HCC)    Providencia bacteremia    Permanent atrial fibrillation with rapid ventricular response (HCC)    Bacteremia

## 2022-10-10 NOTE — PROGRESS NOTES
[facility] Infectious Disease Progress Note      Chad Carey     : 1950    DATE OF VISIT:  10/10/2022  DATE OF ADMISSION:  10/5/2022       Subjective:     Chad Carey is a 66 y.o. female whom I've been seeing for a Providencia bacteremia, presumably from UTI. Since I last saw her, she seems to be doing quite a bit better. Does complain of some lower / right sided abdominal pain, and also feeling a bit short of breath, but mentation is much better than last week. Awake, alert, partly oriented, has some personality back, able to have a conversation. Objectively did better over the weekend also, in terms of temp, vitals, labs, etc. Feeling thirsty. Ms. Juany Sy has a past medical history of Acute diastolic congestive heart failure ([address] Acute diastolic heart failure ([address] Acute respiratory failure ([address] Adjustment disorder with mixed anxiety and depressed mood, Allergic rhinitis, Alzheimer's dementia ([address] Arachnoid cyst, Atrial fibrillation ([address] CHF (congestive heart failure) ([address] Chronic back pain, Constipation, COPD (chronic obstructive pulmonary disease) ([address] Diabetes mellitus ([address] Diskitis, Disseminated superficial actinic porokeratosis (DSAP), Edema, ESBL (extended spectrum beta-lactamase) producing bacteria infection, Hypertension, Hypo-osmolality and hyponatremia, Major depressive disorder, Morbid obesity ([address] Muscle weakness, Osteomyelitis of spine ([address] Overactive bladder, Schizoaffective disorder ([address] Seizures ([address] Urinary tract infection without hematuria, and Xerosis cutis.     Current Facility-Administered Medications: dilTIAZem (CARDIZEM) tablet 60 mg, 60 mg, Oral, 4 times per day  dextrose 5 % solution, , IntraVENous, Continuous  meropenem (MERREM) 1,000 mg in sodium chloride 0.9 % 100 mL IVPB (mini-bag), 1,000 mg, IntraVENous, Q8H  [COMPLETED] dilTIAZem injection 10 mg, 10 mg, IntraVENous, Once **FOLLOWED BY** dilTIAZem 125 mg in dextrose 5 % 125 mL infusion, 2.5-15 mg/hr, IntraVENous, Continuous  LORazepam (ATIVAN) injection 1 mg, 1 mg, IntraVENous, Q4H PRN  citalopram (CELEXA) tablet 20 mg, 20 mg, Oral, Daily  rivaroxaban (XARELTO) tablet 20 mg, 20 mg, Oral, Daily with breakfast  glucose chewable tablet 16 g, 4 tablet, Oral, PRN  dextrose bolus 10% 125 mL, 125 mL, IntraVENous, PRN **OR** dextrose bolus 10% 250 mL, 250 mL, IntraVENous, PRN  glucagon (rDNA) injection 1 mg, 1 mg, SubCUTAneous, PRN  dextrose 10 % infusion, , IntraVENous, Continuous PRN  sodium chloride flush 0.9 % injection 5-40 mL, 5-40 mL, IntraVENous, 2 times per day  sodium chloride flush 0.9 % injection 5-40 mL, 5-40 mL, IntraVENous, PRN  0.9 % sodium chloride infusion, , IntraVENous, PRN  ondansetron (ZOFRAN-ODT) disintegrating tablet 4 mg, 4 mg, Oral, Q8H PRN **OR** ondansetron (ZOFRAN) injection 4 mg, 4 mg, IntraVENous, Q6H PRN  polyethylene glycol (GLYCOLAX) packet 17 g, 17 g, Oral, Daily PRN  acetaminophen (TYLENOL) tablet 650 mg, 650 mg, Oral, Q6H PRN **OR** acetaminophen (TYLENOL) suppository 650 mg, 650 mg, Rectal, Q6H PRN  perflutren lipid microspheres (DEFINITY) injection 1.65 mg, 1.5 mL, IntraVENous, ONCE PRN  insulin glargine (LANTUS) injection vial 25 Units, 25 Units, SubCUTAneous, QAM  ipratropium-albuterol (DUONEB) nebulizer solution 3 mL, 1 vial, Inhalation, Q4H PRN  tiotropium (SPIRIVA RESPIMAT) 2.5 MCG/ACT inhaler 2 puff, 2 puff, Inhalation, Daily  [COMPLETED] amiodarone (CORDARONE) 150 mg in dextrose 5 % 100 mL bolus, 150 mg, IntraVENous, Once **FOLLOWED BY** [] amiodarone (CORDARONE) 450 mg in dextrose 5 % 250 mL infusion, 1 mg/min, IntraVENous, Continuous **FOLLOWED BY** amiodarone (CORDARONE) 450 mg in dextrose 5 % 250 mL infusion, 0.5 mg/min, IntraVENous, Continuous  miconazole nitrate 2 % ointment, , Topical, BID  insulin lispro (HUMALOG) injection vial 0-16 Units, 0-16 Units, SubCUTAneous, TID WC  insulin lispro (HUMALOG) injection vial 0-4 Units, 0-4 Units, SubCUTAneous, Nightly  empagliflozin (JARDIANCE) tablet 10 mg, 10 mg, Oral, Daily  LORazepam (ATIVAN) tablet 0.5 mg, 0.5 mg, Oral, BID PRN  busPIRone (BUSPAR) tablet 5 mg, 5 mg, Oral, BID  DULoxetine (CYMBALTA) extended release capsule 60 mg, 60 mg, Oral, Daily  oxyCODONE-acetaminophen (PERCOCET) 5-325 MG per tablet 1 tablet, 1 tablet, Oral, Q6H PRN  valproic acid (DEPAKENE) capsule 250 mg, 250 mg, Oral, TID  melatonin tablet 3 mg, 3 mg, Oral, Nightly PRN  gabapentin (NEURONTIN) capsule 100 mg, 100 mg, Oral, TID     This is day 5 of meropenem. Allergies: Fish-derived products, Iodine, Mushroom extract complex, Onion, and Other    Pertinent items from the review of systems are discussed in the HPI; the remainder of the ROS was reviewed and is negative.      Objective:     Vital signs over the last 24 hours:  Temp  Av.5 °F (36.9 °C)  Min: 97.9 °F (36.6 °C)  Max: 99.2 °F (37.3 °C)  Pulse  Av.9  Min: 81  Max: 157  Systolic (58IRP), HIZ:812 , Min:105 , QAB:908   Diastolic (54BLH), BBL:11, Min:52, Max:78  Resp  Av.5  Min: 14  Max: 35  SpO2  Av.5 %  Min: 94 %  Max: 98 %    Constitutional:  well-developed, well-nourished, overweight, looks uncomfortable but not toxic  Psychiatric:  much more awake and alert today than Friday, perhaps just a bit anxious   Eyes:  pupils equal, round and reactive to light; sclerae anicteric, conjunctivae not pale  ENT:  atraumatic; oral mucosa dry, no thrush or ulcers; on nasal O2 now  Resp:  lungs decreased and a bit coarse to auscultation BL; looks in mild resp distress  Cardiovascular:  heart regular, no gallop, no murmur; mild lower extremity edema; no IV phlebitis (LUE peripheral)  GI:  abdomen soft, does seem tender in the RLQ and suprapubic areas today, but not severely so, ND, normal bowel sounds, no palpable masses or organomegaly  : Moraes in place, clearer yellow urine  Musculoskeletal:  no clubbing, cyanosis or petechiae; extremities with no gross effusions, joint misalignment or acute arthritis  Skin: warm, dry, normal turgor; no ulcers; buttock / inguinal / upper thigh rash c/w Candidiasis, improving.   ______________________________    Recent Labs     10/10/22  0427 10/09/22  0733 10/08/22  0425   WBC 15.0* 18.1* 17.6*   HGB 13.6 14.3 14.2   HCT 42.5 45.2 47.6   MCV 88.0 88.7 94.1   * 128* 142     Lab Results   Component Value Date    CREATININE <0.5 (L) 10/10/2022     Lab Results   Component Value Date    LABALBU 2.6 (L) 10/10/2022     Lab Results   Component Value Date    ALT <5 (L) 10/05/2022    AST 10 (L) 10/05/2022    ALKPHOS 124 10/05/2022    BILITOT 0.7 10/05/2022      Lab Results   Component Value Date    LABA1C 9.1 10/06/2022     Other recent pertinent labs: Anion gap 7. Glucoses 200s. ANC 12.6k.    ______________________________    Recent pertinent micro results:  Two of two admission BCx (+) for Providencia, in vitro (R) to amp, Ancef, aminoglycosides, quinolones, Bactrim. One UCx reported as mixed organisms. Repeat UCx worked up, and has 25k Providencia, > 100k Candida, > 100k Lactobacillus. MRSA swab (+). ______________________________    Recent imaging results (last 7 days):     CT head without contrast    Result Date: 10/6/2022  No acute intracranial abnormality. MRI may be obtained if clinically indicated. US RENAL COMPLETE    Result Date: 10/6/2022  Unremarkable ultrasound of the kidneys and urinary bladder. RECOMMENDATIONS: Unavailable     XR CHEST PORTABLE    Result Date: 10/5/2022  Patchy mild airspace disease which may represent atelectasis or pneumonia.       Assessment:     Patient Active Problem List   Diagnosis Code    Seizure disorder, grand mal ([address] G40.409    DM (diabetes mellitus) ([facility] E11.9    Lumbar facet arthropathy M47.816    Disc degeneration, lumbar M51.36    Obesity E66.9    Atrial fibrillation with rapid ventricular response (HCC) I48.91    Essential hypertension I10    Chronic obstructive pulmonary disease (HCC) J44.9    Persistent atrial fibrillation ([facility]) I48.19    Chronic pain of both knees M25.561, M25.562, G89.29    Bilateral primary osteoarthritis of knee M17.0    Sepsis secondary to UTI ([facility] A41.9, N39.0    Altered mental status R41.82    FABIO (acute kidney injury) ([facility] N17.9    UTI (urinary tract infection) N39.0    Cervical herniated disc M50.20    Chronic bilateral low back pain with bilateral sciatica M54.42, M54.41, G89.29    Chronic respiratory failure with hypoxia ([facility]) J96.11    Dementia with behavioral disturbance F03.918    LORENZO (generalized anxiety disorder) F41.1    Hoarding disorder with poor insight F42.3    Hypertensive heart and kidney disease with chronic diastolic congestive heart failure and stage 2 chronic kidney disease ([facility]) I13.0, I50.32, N18.2    Schizoaffective disorder, depressive type ([facility] F25.1    Providencia bacteremia R78.81    Permanent atrial fibrillation with rapid ventricular response ([facility]) I48.21    Bacteremia R78.81     Assessment of today's active condition(s):      --          Background of obesity, DM, Afib, COPD, OA, chronic pain, dementia, psychiatric illness. --          Multiple prior UCx (+) for resistant organisms including ESBL GNRs. True UTI Hx uncertain. --          Admission now with severe sepsis (fever, leukocytosis, lactic acidosis, FABIO, mental status change, hypoxemia), now with Providencia in two BCx. Seems most likely from a UTI, and the urine culture results are suggestive enough to make me comfortable with that diagnosis. Does have some abdominal tenderness, but not severe, and that could certainly be urinary tract related. Also seems likely she was rather hypovolemic on admission (dry mouth, low sodium, elevated BUN, Hgb level hemoconcentrated up to 16.4.). Improving overall, in terms of vitals, lactate, renal function, mental status. A degree of ongoing leukocytosis not necessarily too concerning right now. Treatment recs:     Can narrow to cefepime. There isn't really an oral therapy that I would trust to treat her for the remainder of her course, however, because of risk of emergence of Abx (R). Tentative plan another 9 days of IV Abx (2 weeks total). Continue topical treatment of cutaneous Candidiasis. Midline catheter placement. Watch for thrush, Cdiff, IV complications, etc.     After she's stronger, over the active infection, Moraes is out, etc, suggest a Urology eval for what seems to be recurrent UTIs.      Electronically signed by Nehal Guerrero MD on 10/10/2022 at 8:51 AM.

## 2022-10-11 LAB
ANION GAP SERPL CALCULATED.3IONS-SCNC: 11 MMOL/L (ref 3–16)
BASOPHILS ABSOLUTE: 0.1 K/UL (ref 0–0.2)
BASOPHILS RELATIVE PERCENT: 0.4 %
BUN BLDV-MCNC: 16 MG/DL (ref 7–20)
CALCIUM SERPL-MCNC: 8.9 MG/DL (ref 8.3–10.6)
CHLORIDE BLD-SCNC: 107 MMOL/L (ref 99–110)
CO2: 32 MMOL/L (ref 21–32)
CREAT SERPL-MCNC: <0.5 MG/DL (ref 0.6–1.2)
EKG ATRIAL RATE: 108 BPM
EKG DIAGNOSIS: NORMAL
EKG Q-T INTERVAL: 324 MS
EKG QRS DURATION: 90 MS
EKG QTC CALCULATION (BAZETT): 400 MS
EKG R AXIS: 70 DEGREES
EKG T AXIS: 266 DEGREES
EKG VENTRICULAR RATE: 92 BPM
EOSINOPHILS ABSOLUTE: 0.2 K/UL (ref 0–0.6)
EOSINOPHILS RELATIVE PERCENT: 1.1 %
GFR AFRICAN AMERICAN: >60
GFR NON-AFRICAN AMERICAN: >60
GLUCOSE BLD-MCNC: 171 MG/DL (ref 70–99)
GLUCOSE BLD-MCNC: 190 MG/DL (ref 70–99)
GLUCOSE BLD-MCNC: 190 MG/DL (ref 70–99)
GLUCOSE BLD-MCNC: 263 MG/DL (ref 70–99)
GLUCOSE BLD-MCNC: 286 MG/DL (ref 70–99)
GLUCOSE BLD-MCNC: 314 MG/DL (ref 70–99)
HCT VFR BLD CALC: 42 % (ref 36–48)
HEMOGLOBIN: 13.6 G/DL (ref 12–16)
LYMPHOCYTES ABSOLUTE: 1.6 K/UL (ref 1–5.1)
LYMPHOCYTES RELATIVE PERCENT: 10.4 %
MAGNESIUM: 2.1 MG/DL (ref 1.8–2.4)
MCH RBC QN AUTO: 28.5 PG (ref 26–34)
MCHC RBC AUTO-ENTMCNC: 32.3 G/DL (ref 31–36)
MCV RBC AUTO: 88.3 FL (ref 80–100)
MONOCYTES ABSOLUTE: 1.4 K/UL (ref 0–1.3)
MONOCYTES RELATIVE PERCENT: 9.3 %
NEUTROPHILS ABSOLUTE: 12.2 K/UL (ref 1.7–7.7)
NEUTROPHILS RELATIVE PERCENT: 78.8 %
PDW BLD-RTO: 15.3 % (ref 12.4–15.4)
PERFORMED ON: ABNORMAL
PLATELET # BLD: 114 K/UL (ref 135–450)
PMV BLD AUTO: 9.7 FL (ref 5–10.5)
POTASSIUM REFLEX MAGNESIUM: 3.6 MMOL/L (ref 3.5–5.1)
RBC # BLD: 4.76 M/UL (ref 4–5.2)
SODIUM BLD-SCNC: 150 MMOL/L (ref 136–145)
WBC # BLD: 15.5 K/UL (ref 4–11)

## 2022-10-11 PROCEDURE — 99233 SBSQ HOSP IP/OBS HIGH 50: CPT | Performed by: INTERNAL MEDICINE

## 2022-10-11 PROCEDURE — 2000000000 HC ICU R&B

## 2022-10-11 PROCEDURE — 2580000003 HC RX 258: Performed by: HOSPITALIST

## 2022-10-11 PROCEDURE — 94640 AIRWAY INHALATION TREATMENT: CPT

## 2022-10-11 PROCEDURE — 93010 ELECTROCARDIOGRAM REPORT: CPT | Performed by: INTERNAL MEDICINE

## 2022-10-11 PROCEDURE — 2580000003 HC RX 258: Performed by: INTERNAL MEDICINE

## 2022-10-11 PROCEDURE — 6370000000 HC RX 637 (ALT 250 FOR IP): Performed by: INTERNAL MEDICINE

## 2022-10-11 PROCEDURE — 83735 ASSAY OF MAGNESIUM: CPT

## 2022-10-11 PROCEDURE — 80048 BASIC METABOLIC PNL TOTAL CA: CPT

## 2022-10-11 PROCEDURE — 93005 ELECTROCARDIOGRAM TRACING: CPT | Performed by: INTERNAL MEDICINE

## 2022-10-11 PROCEDURE — 36415 COLL VENOUS BLD VENIPUNCTURE: CPT

## 2022-10-11 PROCEDURE — 2700000000 HC OXYGEN THERAPY PER DAY

## 2022-10-11 PROCEDURE — 94761 N-INVAS EAR/PLS OXIMETRY MLT: CPT

## 2022-10-11 PROCEDURE — 6360000002 HC RX W HCPCS: Performed by: INTERNAL MEDICINE

## 2022-10-11 PROCEDURE — 85025 COMPLETE CBC W/AUTO DIFF WBC: CPT

## 2022-10-11 PROCEDURE — 6370000000 HC RX 637 (ALT 250 FOR IP): Performed by: HOSPITALIST

## 2022-10-11 PROCEDURE — 99232 SBSQ HOSP IP/OBS MODERATE 35: CPT | Performed by: NURSE PRACTITIONER

## 2022-10-11 RX ADMIN — BUSPIRONE HYDROCHLORIDE 5 MG: 5 TABLET ORAL at 08:14

## 2022-10-11 RX ADMIN — Medication 10 ML: at 08:16

## 2022-10-11 RX ADMIN — DEXTROSE MONOHYDRATE: 50 INJECTION, SOLUTION INTRAVENOUS at 15:47

## 2022-10-11 RX ADMIN — DILTIAZEM HYDROCHLORIDE 60 MG: 60 TABLET, FILM COATED ORAL at 06:18

## 2022-10-11 RX ADMIN — CITALOPRAM HYDROBROMIDE 20 MG: 20 TABLET ORAL at 08:14

## 2022-10-11 RX ADMIN — GABAPENTIN 100 MG: 100 CAPSULE ORAL at 14:31

## 2022-10-11 RX ADMIN — DEXTROSE MONOHYDRATE: 50 INJECTION, SOLUTION INTRAVENOUS at 06:24

## 2022-10-11 RX ADMIN — VALPROIC ACID 250 MG: 250 CAPSULE, LIQUID FILLED ORAL at 08:16

## 2022-10-11 RX ADMIN — TIOTROPIUM BROMIDE INHALATION SPRAY 2 PUFF: 3.12 SPRAY, METERED RESPIRATORY (INHALATION) at 07:33

## 2022-10-11 RX ADMIN — CEFEPIME 2000 MG: 2 INJECTION, POWDER, FOR SOLUTION INTRAVENOUS at 18:04

## 2022-10-11 RX ADMIN — DILTIAZEM HYDROCHLORIDE 60 MG: 60 TABLET, FILM COATED ORAL at 18:03

## 2022-10-11 RX ADMIN — GABAPENTIN 100 MG: 100 CAPSULE ORAL at 21:12

## 2022-10-11 RX ADMIN — MICONAZOLE NITRATE: 2 OINTMENT TOPICAL at 08:16

## 2022-10-11 RX ADMIN — GABAPENTIN 100 MG: 100 CAPSULE ORAL at 08:15

## 2022-10-11 RX ADMIN — RIVAROXABAN 20 MG: 20 TABLET, FILM COATED ORAL at 08:15

## 2022-10-11 RX ADMIN — CEFEPIME 2000 MG: 2 INJECTION, POWDER, FOR SOLUTION INTRAVENOUS at 08:44

## 2022-10-11 RX ADMIN — DILTIAZEM HYDROCHLORIDE 60 MG: 60 TABLET, FILM COATED ORAL at 00:11

## 2022-10-11 RX ADMIN — INSULIN GLARGINE 25 UNITS: 100 INJECTION, SOLUTION SUBCUTANEOUS at 08:16

## 2022-10-11 RX ADMIN — INSULIN LISPRO 12 UNITS: 100 INJECTION, SOLUTION INTRAVENOUS; SUBCUTANEOUS at 12:07

## 2022-10-11 RX ADMIN — EMPAGLIFLOZIN 10 MG: 10 TABLET, FILM COATED ORAL at 08:16

## 2022-10-11 RX ADMIN — DILTIAZEM HYDROCHLORIDE 60 MG: 60 TABLET, FILM COATED ORAL at 12:07

## 2022-10-11 RX ADMIN — BUSPIRONE HYDROCHLORIDE 5 MG: 5 TABLET ORAL at 21:12

## 2022-10-11 RX ADMIN — Medication 3 MG: at 21:12

## 2022-10-11 RX ADMIN — DULOXETINE HYDROCHLORIDE 60 MG: 60 CAPSULE, DELAYED RELEASE ORAL at 08:15

## 2022-10-11 RX ADMIN — CEFEPIME 2000 MG: 2 INJECTION, POWDER, FOR SOLUTION INTRAVENOUS at 01:54

## 2022-10-11 RX ADMIN — ACETAMINOPHEN 650 MG: 325 TABLET ORAL at 21:12

## 2022-10-11 RX ADMIN — VALPROIC ACID 250 MG: 250 CAPSULE, LIQUID FILLED ORAL at 21:16

## 2022-10-11 RX ADMIN — VALPROIC ACID 250 MG: 250 CAPSULE, LIQUID FILLED ORAL at 14:31

## 2022-10-11 NOTE — PROGRESS NOTES
Admit: 10/5/2022    Name:  Vanessa Kaufman  Room:  3006/3006-01  MRN:    [id number]    Critical Care Daily Progress Note for 10/11/2022   A 66-year-old female, nursing home resident presented with shortness of breath severe hypoxia. Initially required nonrebreather but he did not titrate down to 4 L  Also noted to have a go into A. fib with RVR treated with Cardizem, but it caused hypotension. Interval History:     Her mentation is better. She knows she in at [facility]. Scheduled Meds:   cefepime  2,000 mg IntraVENous q8h    dilTIAZem  60 mg Oral 4 times per day    citalopram  20 mg Oral Daily    rivaroxaban  20 mg Oral Daily with breakfast    sodium chloride flush  5-40 mL IntraVENous 2 times per day    insulin glargine  25 Units SubCUTAneous QAM    tiotropium  2 puff Inhalation Daily    miconazole nitrate   Topical BID    insulin lispro  0-16 Units SubCUTAneous TID WC    insulin lispro  0-4 Units SubCUTAneous Nightly    empagliflozin  10 mg Oral Daily    busPIRone  5 mg Oral BID    DULoxetine  60 mg Oral Daily    valproic acid  250 mg Oral TID    gabapentin  100 mg Oral TID       Continuous Infusions:   dextrose 75 mL/hr at 10/11/22 0624    dextrose      sodium chloride Stopped (10/10/22 1153)       PRN Meds:  bismuth subsalicylate, LORazepam, glucose, dextrose bolus **OR** dextrose bolus, glucagon (rDNA), dextrose, sodium chloride flush, sodium chloride, ondansetron **OR** ondansetron, polyethylene glycol, acetaminophen **OR** acetaminophen, perflutren lipid microspheres, ipratropium-albuterol, LORazepam, oxyCODONE-acetaminophen, melatonin            Objective:     Temp  Av °F (37.2 °C)  Min: 98.9 °F (37.2 °C)  Max: 99.1 °F (37.3 °C)  Pulse  Av  Min: 100  Max: 123  BP  Min: 110/88  Max: 135/75  SpO2  Av %  Min: 94 %  Max: 94 %  No data found.         Intake/Output Summary (Last 24 hours) at 10/11/2022 0802  Last data filed at 10/10/2022 2110  Gross per 24 hour   Intake 1137.38 PORTABLE   Final Result   Patchy mild airspace disease which may represent atelectasis or pneumonia. IR MIDLINE CATH    (Results Pending)         Assessment & Plan:     Principal Problem:    Sepsis secondary to UTI [facility])  Active Problems:    Altered mental status    FABIO (acute kidney injury) ([address]    UTI (urinary tract infection)    Providencia bacteremia    Permanent atrial fibrillation with rapid ventricular response ([facility])    Bacteremia    Hyperkalemia    Atrial fibrillation with rapid ventricular response ([address]  Resolved Problems:    * No resolved hospital problems. *       Acute on chronic hypoxic respiratory failure  Currently on 4 L of oxygen    Acute kidney injury  Has resolved with IV fluids. Gram-negative bacteremia  UTI  Received Merrem. Changed to Cefepime by ID consult. Total of 2 weeks treatment (9 days of Cefepime)  Infectious disease consulted    Hypernatremia worse today. Nephrology consulted  D5W started. Increase IVF to 125 cc/hr. Atrial fibrillation with rapid ventricular rate. HR now controlled. Placed on amiodarone. Hypotensive with Cardizem. Continue  Xarelto  Now on oral Cardizem. Amiodarone discontinued    Type 2 diabetes  Stable. Continue Lantus insulin and sliding scale insulin  Continue Jardiance    Schizoaffective disorder  Dementia  Continue BuSpar, Celexa, Cymbalta and valproic acid  Zyprexa as needed  Her mental status is better today.   Agitation is considerably less    Xarelto for DVT prophylaxis  Full code  Carb control diet          Rodrick Stephenson MD 10/11/2022 8:02 AM

## 2022-10-11 NOTE — PROGRESS NOTES
Report to NEREIDA North RN and care of patient transferred.     Electronically signed by Antonio Devine RN on 10/11/2022 at 7:15 AM

## 2022-10-11 NOTE — PROGRESS NOTES
Progress Note    HISTORY     CC:  AMS          We are following for Acute kidney injury          Subjective/   HPI:    Renal function better, non-oliguric, started on D5W with solute diuresis. Off Cardizem gtt. BP stable with improved rate. Remains confused but less agitated     ROS:  No fevers, intake reduced.     EXAM       Objective/     Vitals:    10/11/22 1200 10/11/22 1300 10/11/22 1400 10/11/22 1500   BP: 104/67 94/70 111/70 89/77   Pulse: (!) 105 (!) 101 (!) 108 (!) 101   Resp: 23 20 19 23   Temp:       TempSrc:       SpO2: 96% 93% 96% 94%   Weight:       Height:         24HR INTAKE/OUTPUT:    Intake/Output Summary (Last 24 hours) at 10/11/2022 1538  Last data filed at 10/11/2022 1200  Gross per 24 hour   Intake 480 ml   Output 875 ml   Net -395 ml       Constitutional:  Ill appearing, confused   Eyes:  Pupils reactive, sclera clear   Neck:  Normal thyroid, no masses   Cardiovascular:  S1, S2, irregular, tachycardic   Respiratory:  No distress, no wheezing  Psychiatry:  Alert, confused   Abdomen: +bs, soft, nt, no masses   Musculoskeletal: No LE edema, no clubbing   Lymphatics:  No LAD in neck, no supraclavicular nodes       MEDICAL DECISION MAKING       Data/  Recent Labs     10/09/22  0733 10/10/22  0427 10/11/22  0443   WBC 18.1* 15.0* 15.5*   HGB 14.3 13.6 13.6   HCT 45.2 42.5 42.0   MCV 88.7 88.0 88.3   * 109* 114*       Recent Labs     10/09/22  0733 10/10/22  0427 10/11/22  0443   * 143 150*   K 4.1 3.5  3.5 3.6    103 107   CO2 26 33* 32   GLUCOSE 218* 248* 190*   PHOS  --  1.7*  --    MG 1.90 1.80 2.10   BUN 33* 21* 16   CREATININE 0.6 <0.5* <0.5*   LABGLOM >60 >60 >60   GFRAA >60 >60 >60         Assessment/     Acute Kidney Injury:  KDIGO stage 2  - Etiology:  Pre-renal spectrum with volume depletion and low BP with cardiac instability decreasing renal perfusion pressures  - Clinical:  Seems to be responding to volume, kidney failure has resolved Hyperkalemia:  Due to FABIO / better with medical management  then became low requiring supplementation     Hypernatremia :  - Due to solute diuresis post ATN  - On D5W / urine volume > 3 liters with over 50% free water      Sepsis:  + UTI and PNA      Respiratory Failure:  - HCAP      Atrial Fibrillation:  - Hypotensive. Now on amiodarone with improved BP and HR.   Off cardizem gtt     DM:  - On jardiance    Hypophosphatemia/Hypokalemia:  - Prn replacement      Plan/     -Continue D5W - increased back to 125 ml/hour with rising sodium levels  -Trend labs, bp's, & urine output

## 2022-10-11 NOTE — PROGRESS NOTES
[facility]                                 Pulmonary Consult /Progress Note :                                                                  CC: Severe hypoxemia, A. fib RVR, hypotension    Events of Last 24 hours:   Occasional confusion   Repeat seems words  Not in distress   HR controlled   No further fever   On 4 L    Vascular lines: IV: peripheral     MV: None     / / /   No results for input(s): PHART, YQB9OTI, PO2ART in the last 72 hours. IV:   dextrose 125 mL/hr at 10/11/22 0807    dextrose      sodium chloride Stopped (10/10/22 1153)       Vitals:  Blood pressure 118/64, pulse 97, temperature 98.8 °F (37.1 °C), temperature source Axillary, resp. rate 21, height 5' 7\" (1.702 m), weight 212 lb (96.2 kg), SpO2 95 %. on 2 L  Temp  Av °F (37.2 °C)  Min: 98.8 °F (37.1 °C)  Max: 99.1 °F (37.3 °C)    Intake/Output Summary (Last 24 hours) at 10/11/2022 7451  Last data filed at 10/10/2022 2110  Gross per 24 hour   Intake 1137.38 ml   Output 2275 ml   Net -1137.62 ml       PE:  General: Looks much better today  Eyes: PERRL. No sclera icterus. No conjunctival injection. ENT: No discharge. Pharynx clear. Neck: Trachea midline. Normal thyroid. Resp: No accessory muscle use. No crackles. No wheezing. No rhonchi. No dullness on percussion. CV: Regular rate. Regular rhythm. No mumur or rub. + edema. GI: Non-tender. Non-distended. No masses. No organomegaly. Normal bowel sounds. No hernia. Skin: Warm and dry. No nodule on exposed extremities. No rash on exposed extremities. Lymph: No cervical LAD. No supraclavicular LAD. M/S: No cyanosis. No joint deformity. No clubbing. Neuro: Awake, speech clear, oriented to person and place.  Patellar reflexes are symmetric  Psych: No agitation today, no anxiety, affect is full    Scheduled Meds:   cefepime  2,000 mg IntraVENous q8h    dilTIAZem  60 mg Oral 4 times per day citalopram  20 mg Oral Daily    rivaroxaban  20 mg Oral Daily with breakfast    sodium chloride flush  5-40 mL IntraVENous 2 times per day    insulin glargine  25 Units SubCUTAneous QAM    tiotropium  2 puff Inhalation Daily    miconazole nitrate   Topical BID    insulin lispro  0-16 Units SubCUTAneous TID WC    insulin lispro  0-4 Units SubCUTAneous Nightly    empagliflozin  10 mg Oral Daily    busPIRone  5 mg Oral BID    DULoxetine  60 mg Oral Daily    valproic acid  250 mg Oral TID    gabapentin  100 mg Oral TID       Data:  CBC:   Recent Labs     10/09/22  0733 10/10/22  0427 10/11/22  0443   WBC 18.1* 15.0* 15.5*   HGB 14.3 13.6 13.6   HCT 45.2 42.5 42.0   MCV 88.7 88.0 88.3   * 109* 114*       BMP:   Recent Labs     10/08/22  1302 10/08/22  1302 10/09/22  0733 10/10/22  0427 10/11/22  0443   *  --  147* 143 150*   K 3.2*   < > 4.1 3.5  3.5 3.6     --  109 103 107   CO2 30  --  26 33* 32   PHOS 2.0*  --   --  1.7*  --    BUN 46*  --  33* 21* 16   CREATININE 0.6  --  0.6 <0.5* <0.5*    < > = values in this interval not displayed. LIVER PROFILE:   No results for input(s): AST, ALT, LIPASE, BILIDIR, BILITOT, ALKPHOS in the last 72 hours. Invalid input(s):   AMYLASE,  ALB      Cultures:      10/6/2022 SARS-CoV-2 negative, influenza negative  10/6/2022 blood 2 of 2 cultures Providencia is merrem sensitive   10/6/2022 urine polymicrobial, gram-negative godfrey is being worked up    Chest imaging was reviewed by me and showed:   CXR 10/6/2022 patchy infiltrates without focal consolidation    Head CT 10/6/2022 chronic parenchymal volume loss and chronic small vessel ischemic change without acute finding    ASSESSMENT:  Acute hypoxemic respiratory failure   Acute metabolic encephalopathy, markedly improved  Sepsis syndrome  Gram-negative Providencia bacteremia  Acute cystitis    Chronic A. Fib,now with RVR; on Xarelto   Acute kidney injury    Chronic diastolic CHF  COPD  Schizoaffective disorder  Elevated troponin  H/O ESBL urine   H/O dementia   DM with hyperglycemia   Hypernatremia     PLAN:  No changes   To be transferred  Poor historian   Seems advanced detia   Supplemental oxygen to maintain SaO2 >92%; wean as tolerated    On 4 L   IV fluid per nephrology   Amiodarone ,Cardizem PO - as per Dr. Thad Thomas D#5 per infectious disease   Blood sugar control, with goal 140-180;  High SSI  On Jardiance   Home Xarelto  LIMITED CODE - no intubation, no compressions, no cardioversion  Lacy Suh MD,[facility]  Pulmonary&Critical Care Medicine   [facility] ,and Critical care    [facility]

## 2022-10-11 NOTE — PROGRESS NOTES
Assessment complete. Patient is awake in bed and alert. She is continuously repeating \"help me\". When asked what she needs help with she continues to repeat same or she then repeats what was said to her repetitively. BLE contracted with pillow placed between legs to prevent pressure injury. Patient unable to educate on use of call light for assistance and cannot return demonstration. Urinary catheter secured in place and draining pink tinged urine with sediment noted. Patient was repositioned up in bed with pillow support. Negative for stool incontinence. Will continue to monitor and adjust plan of care as needed.     Electronically signed by Martín Marcelino RN on 10/11/2022 at 3:21 AM

## 2022-10-11 NOTE — PROGRESS NOTES
[facility]  Cardiology  Progress Note    Admission date:  10/5/2022    Reason for follow up visit: AF    HPI/CC: Giana Almaguer is a 66 y.o. female who presented 10/5/2022 from [facility] for [address] and treated for respiratory failure, sepsis, pneumonia, UTI. Cardiology consulted for AF RVR. Echo showed EF >65%. Rhythm has been afib 90s. Subjective: Alert, answers yes to all questions. Vitals:  Blood pressure 118/64, pulse 97, temperature 98.8 °F (37.1 °C), temperature source Axillary, resp. rate 21, height 5' 7\" (1.702 m), weight 212 lb (96.2 kg), SpO2 95 %.   Temp  Av °F (37.2 °C)  Min: 98.8 °F (37.1 °C)  Max: 99.1 °F (37.3 °C)  Pulse  Av.9  Min: 96  Max: 123  BP  Min: 110/88  Max: 135/75  SpO2  Av.3 %  Min: 94 %  Max: 95 %    24 hour I/O    Intake/Output Summary (Last 24 hours) at 10/11/2022 1017  Last data filed at 10/10/2022 2110  Gross per 24 hour   Intake 1137.38 ml   Output 2275 ml   Net -1137.62 ml     Current Facility-Administered Medications   Medication Dose Route Frequency Provider Last Rate Last Admin    cefepime (MAXIPIME) 2000 mg IVPB minibag  2,000 mg IntraVENous q8h Con Tee Mckeon MD 12.5 mL/hr at 10/11/22 0844 2,000 mg at 10/11/22 0844    bismuth subsalicylate (PEPTO BISMOL) chewable tablet 524 mg  2 tablet Oral Q6H PRN Janene García MD   524 mg at 10/10/22 1108    dilTIAZem (CARDIZEM) tablet 60 mg  60 mg Oral 4 times per day Ian Shelton MD   60 mg at 10/11/22 0618    dextrose 5 % solution   IntraVENous Continuous Neptali Hagan  mL/hr at 10/11/22 0807 Rate Change at 10/11/22 0807    LORazepam (ATIVAN) injection 1 mg  1 mg IntraVENous Q4H PRN Tiffanie Martinez MD        citalopram (CELEXA) tablet 20 mg  20 mg Oral Daily Deyanira Pichardo MD   20 mg at 10/11/22 1522    rivaroxaban (XARELTO) tablet 20 mg  20 mg Oral Daily with breakfast Deyanira Pichardo MD   20 mg at 10/11/22 0815    glucose chewable tablet 16 g  4 tablet Oral PRN Deyanira Pichardo MD dextrose bolus 10% 125 mL  125 mL IntraVENous PRN Alberta Najera MD        Or    dextrose bolus 10% 250 mL  250 mL IntraVENous PRN Alberta Najera MD        glucagon (rDNA) injection 1 mg  1 mg SubCUTAneous PRN Alberta Najera MD        dextrose 10 % infusion   IntraVENous Continuous PRN Alberta Najera MD        sodium chloride flush 0.9 % injection 5-40 mL  5-40 mL IntraVENous 2 times per day Alberta Najera MD   10 mL at 10/11/22 0816    sodium chloride flush 0.9 % injection 5-40 mL  5-40 mL IntraVENous PRN Alberta Najera MD        0.9 % sodium chloride infusion   IntraVENous PRN Alberta Najera MD   Stopped at 10/10/22 1153    ondansetron (ZOFRAN-ODT) disintegrating tablet 4 mg  4 mg Oral Q8H PRN Alberta Najera MD        Or    ondansetron [facility]) injection 4 mg  4 mg IntraVENous Q6H PRN Alberta Najera MD   4 mg at 10/10/22 1004    polyethylene glycol (GLYCOLAX) packet 17 g  17 g Oral Daily PRN Alberta Najera MD        acetaminophen (TYLENOL) tablet 650 mg  650 mg Oral Q6H PRN Alberta Najera MD   650 mg at 10/10/22 2132    Or    acetaminophen (TYLENOL) suppository 650 mg  650 mg Rectal Q6H PRN Alberta Najera MD   650 mg at 10/07/22 1654    perflutren lipid microspheres (DEFINITY) injection 1.65 mg  1.5 mL IntraVENous ONCE PRN Alberta Najera MD        insulin glargine (LANTUS) injection vial 25 Units  25 Units SubCUTAneous QAM Alberta Najera MD   25 Units at 10/11/22 0816    ipratropium-albuterol (DUONEB) nebulizer solution 3 mL  1 vial Inhalation Q4H PRN Paulette Paz MD        tiotropium (SPIRIVA RESPIMAT) 2.5 MCG/ACT inhaler 2 puff  2 puff Inhalation Daily Paulette Paz MD   2 puff at 10/11/22 0733    miconazole nitrate 2 % ointment   Topical BID Coy Lennox, MD   Given at 10/11/22 0816    insulin lispro (HUMALOG) injection vial 0-16 Units  0-16 Units SubCUTAneous TID WC Paulette Paz MD   4 Units at 10/10/22 8774    insulin lispro (HUMALOG) injection vial 0-4 Units  0-4 Units SubCUTAneous Nightly Andres Mitchell MD        empagliflozin (JARDIANCE) tablet 10 mg  10 mg Oral Daily Andres Mitchell MD   10 mg at 10/11/22 0816    LORazepam (ATIVAN) tablet 0.5 mg  0.5 mg Oral BID PRN Andres Mitchell MD   0.5 mg at 10/10/22 0940    busPIRone (BUSPAR) tablet 5 mg  5 mg Oral BID Andres Mitchell MD   5 mg at 10/11/22 8618    DULoxetine (CYMBALTA) extended release capsule 60 mg  60 mg Oral Daily Andres Mitchell MD   60 mg at 10/11/22 0815    oxyCODONE-acetaminophen (PERCOCET) 5-325 MG per tablet 1 tablet  1 tablet Oral Q6H PRN Andres Mitchell MD   1 tablet at 10/09/22 5869    valproic acid (DEPAKENE) capsule 250 mg  250 mg Oral TID Andres Mitchell MD   250 mg at 10/11/22 5162    melatonin tablet 3 mg  3 mg Oral Nightly PRN Andres Mitchell MD   3 mg at 10/10/22 2131    gabapentin (NEURONTIN) capsule 100 mg  100 mg Oral TID Andres Mitchell MD   100 mg at 10/11/22 0815     Review of Systems   Unable to perform ROS: Dementia     Objective:     Telemetry monitor: AF 90s    Physical Exam:  Constitutional:  Alert, confused, NAD, appears older than stated age  Eyes: PERRL, sclera nonicteric  Neck:  Supple, no masses, no thyroidmegaly, no JVD  Skin:  Warm and dry; no rash or lesions  Heart: Irregular, normal apex, S1 and S2 normal, no M/G/R  Lungs:  Normal respiratory effort; clear; no wheezing/rhonchi/rales  Abdomen: soft, non tender, + bowel sounds  Extremities:  No edema or cyanosis; no clubbing  Neuro: alert, disoriented, moves legs and arms equally, normal mood and affect    Data Reviewed:    Echo 10/6/2022:  Left ventricular systolic function is hyperdynamic with ejection fraction   estimated at >65%. No regional wall motion abnormalities. There is mild concentric left ventricular hypertrophy. Elevated left ventricular filling pressure. Mild bi-atrial enlargement. Systolic pulmonary artery pressure (SPAP) is normal estimated at 19 mmHg   (Right atrial pressure of 3 mmHg). No significant valvular heart disease.     Lab Reviewed:     Renal Profile:  Lab Results   Component Value Date/Time    CREATININE <0.5 10/11/2022 04:43 AM    BUN 16 10/11/2022 04:43 AM     10/11/2022 04:43 AM    K 3.6 10/11/2022 04:43 AM     10/11/2022 04:43 AM    CO2 32 10/11/2022 04:43 AM     CBC:    Lab Results   Component Value Date/Time    WBC 15.5 10/11/2022 04:43 AM    RBC 4.76 10/11/2022 04:43 AM    HGB 13.6 10/11/2022 04:43 AM    HCT 42.0 10/11/2022 04:43 AM    MCV 88.3 10/11/2022 04:43 AM    RDW 15.3 10/11/2022 04:43 AM     10/11/2022 04:43 AM     BNP:    Lab Results   Component Value Date/Time    PROBNP 3,531 10/05/2022 10:30 PM    PROBNP 1,152 04/14/2020 11:00 AM    PROBNP 2,030 01/11/2020 03:38 AM    PROBNP 3,754 11/06/2016 02:19 PM    PROBNP 2,426 09/11/2016 10:40 PM     Fasting Lipid Panel:    Lab Results   Component Value Date/Time    CHOL 154 04/04/2013 12:33 PM    HDL 52 04/04/2013 12:33 PM    TRIG 166 04/04/2013 12:33 PM     Cardiac Enzymes:  CK/MbTroponin  Lab Results   Component Value Date/Time    CKTOTAL 57 01/11/2020 03:58 AM    TROPONINI 0.04 10/06/2022 04:41 AM     PT/ INR   Lab Results   Component Value Date/Time    INR 1.97 10/05/2022 10:30 PM    INR 1.32 04/17/2020 11:53 AM    INR 1.06 03/16/2016 03:37 PM    PROTIME 22.3 10/05/2022 10:30 PM    PROTIME 15.4 04/17/2020 11:53 AM    PROTIME 12.1 03/16/2016 03:37 PM     PTT No results found for: PTT   Lab Results   Component Value Date/Time    MG 2.10 10/11/2022 04:43 AM      Lab Results   Component Value Date/Time    TSH 1.24 04/15/2020 04:04 AM     All labs and imaging reviewed today    Assessment:  Permanent atrial fibrillation: rates improved   - [id number] score >2 on xarelto  Chronic diastolic CHF: compensated  Sepsis  Acute on chronic respiratory failure  Pneumonia  Dementia    Plan:   1. HR overall controlled given acute illness, change to long acting diltiazem 240 mg daily  2. Continue xarelto  3. Monitor BP  4. Continue treatment for sepsis/pneumonia/UTI  5. Cardiology will sign off, please call if needed.      Cecy Botello, APRN-CNP  [facility]  [phone]

## 2022-10-12 ENCOUNTER — APPOINTMENT (OUTPATIENT)
Dept: MRI IMAGING | Age: 72
DRG: 871 | End: 2022-10-12
Payer: MEDICARE

## 2022-10-12 VITALS
DIASTOLIC BLOOD PRESSURE: 81 MMHG | HEIGHT: 67 IN | RESPIRATION RATE: 28 BRPM | BODY MASS INDEX: 33.27 KG/M2 | OXYGEN SATURATION: 97 % | HEART RATE: 89 BPM | SYSTOLIC BLOOD PRESSURE: 121 MMHG | WEIGHT: 212 LBS | TEMPERATURE: 97.4 F

## 2022-10-12 LAB
ANION GAP SERPL CALCULATED.3IONS-SCNC: 11 MMOL/L (ref 3–16)
BASOPHILS ABSOLUTE: 0 K/UL (ref 0–0.2)
BASOPHILS RELATIVE PERCENT: 0 %
BUN BLDV-MCNC: 13 MG/DL (ref 7–20)
CALCIUM SERPL-MCNC: 8.7 MG/DL (ref 8.3–10.6)
CHLORIDE BLD-SCNC: 101 MMOL/L (ref 99–110)
CO2: 27 MMOL/L (ref 21–32)
CREAT SERPL-MCNC: <0.5 MG/DL (ref 0.6–1.2)
EKG ATRIAL RATE: 241 BPM
EKG DIAGNOSIS: NORMAL
EKG Q-T INTERVAL: 360 MS
EKG QRS DURATION: 88 MS
EKG QTC CALCULATION (BAZETT): 423 MS
EKG R AXIS: 73 DEGREES
EKG T AXIS: 257 DEGREES
EKG VENTRICULAR RATE: 83 BPM
EOSINOPHILS ABSOLUTE: 0.1 K/UL (ref 0–0.6)
EOSINOPHILS RELATIVE PERCENT: 1 %
GFR AFRICAN AMERICAN: >60
GFR NON-AFRICAN AMERICAN: >60
GLUCOSE BLD-MCNC: 208 MG/DL (ref 70–99)
GLUCOSE BLD-MCNC: 235 MG/DL (ref 70–99)
GLUCOSE BLD-MCNC: 306 MG/DL (ref 70–99)
HCT VFR BLD CALC: 44.2 % (ref 36–48)
HEMOGLOBIN: 14.2 G/DL (ref 12–16)
HYPERCHROMASIA: ABNORMAL
LYMPHOCYTES ABSOLUTE: 4.5 K/UL (ref 1–5.1)
LYMPHOCYTES RELATIVE PERCENT: 32 %
MCH RBC QN AUTO: 28.6 PG (ref 26–34)
MCHC RBC AUTO-ENTMCNC: 32 G/DL (ref 31–36)
MCV RBC AUTO: 89.1 FL (ref 80–100)
MONOCYTES ABSOLUTE: 1 K/UL (ref 0–1.3)
MONOCYTES RELATIVE PERCENT: 7 %
NEUTROPHILS ABSOLUTE: 8.4 K/UL (ref 1.7–7.7)
NEUTROPHILS RELATIVE PERCENT: 60 %
PDW BLD-RTO: 15.6 % (ref 12.4–15.4)
PERFORMED ON: ABNORMAL
PERFORMED ON: ABNORMAL
PLATELET # BLD: 88 K/UL (ref 135–450)
PLATELET SLIDE REVIEW: ABNORMAL
PMV BLD AUTO: 9.8 FL (ref 5–10.5)
POTASSIUM REFLEX MAGNESIUM: 4.6 MMOL/L (ref 3.5–5.1)
RBC # BLD: 4.96 M/UL (ref 4–5.2)
SARS-COV-2, NAAT: NOT DETECTED
SLIDE REVIEW: ABNORMAL
SODIUM BLD-SCNC: 139 MMOL/L (ref 136–145)
TARGET CELLS: ABNORMAL
WBC # BLD: 14 K/UL (ref 4–11)

## 2022-10-12 PROCEDURE — 6370000000 HC RX 637 (ALT 250 FOR IP): Performed by: INTERNAL MEDICINE

## 2022-10-12 PROCEDURE — 2580000003 HC RX 258: Performed by: INTERNAL MEDICINE

## 2022-10-12 PROCEDURE — 6360000002 HC RX W HCPCS: Performed by: INTERNAL MEDICINE

## 2022-10-12 PROCEDURE — 94761 N-INVAS EAR/PLS OXIMETRY MLT: CPT

## 2022-10-12 PROCEDURE — 94640 AIRWAY INHALATION TREATMENT: CPT

## 2022-10-12 PROCEDURE — 85025 COMPLETE CBC W/AUTO DIFF WBC: CPT

## 2022-10-12 PROCEDURE — 93010 ELECTROCARDIOGRAM REPORT: CPT | Performed by: INTERNAL MEDICINE

## 2022-10-12 PROCEDURE — 87635 SARS-COV-2 COVID-19 AMP PRB: CPT

## 2022-10-12 PROCEDURE — 99239 HOSP IP/OBS DSCHRG MGMT >30: CPT | Performed by: INTERNAL MEDICINE

## 2022-10-12 PROCEDURE — 2700000000 HC OXYGEN THERAPY PER DAY

## 2022-10-12 PROCEDURE — 99233 SBSQ HOSP IP/OBS HIGH 50: CPT | Performed by: INTERNAL MEDICINE

## 2022-10-12 PROCEDURE — 36415 COLL VENOUS BLD VENIPUNCTURE: CPT

## 2022-10-12 PROCEDURE — 93005 ELECTROCARDIOGRAM TRACING: CPT | Performed by: INTERNAL MEDICINE

## 2022-10-12 PROCEDURE — 99232 SBSQ HOSP IP/OBS MODERATE 35: CPT | Performed by: INTERNAL MEDICINE

## 2022-10-12 PROCEDURE — 70551 MRI BRAIN STEM W/O DYE: CPT

## 2022-10-12 PROCEDURE — 80048 BASIC METABOLIC PNL TOTAL CA: CPT

## 2022-10-12 RX ORDER — INSULIN LISPRO 100 [IU]/ML
10 INJECTION, SOLUTION INTRAVENOUS; SUBCUTANEOUS ONCE
Status: COMPLETED | OUTPATIENT
Start: 2022-10-12 | End: 2022-10-12

## 2022-10-12 RX ORDER — LORAZEPAM 0.5 MG/1
0.5 TABLET ORAL 2 TIMES DAILY PRN
Qty: 6 TABLET | Refills: 0 | Status: SHIPPED | OUTPATIENT
Start: 2022-10-12 | End: 2022-10-15

## 2022-10-12 RX ORDER — OXYCODONE HYDROCHLORIDE AND ACETAMINOPHEN 5; 325 MG/1; MG/1
1 TABLET ORAL EVERY 6 HOURS
Qty: 12 TABLET | Refills: 0 | Status: SHIPPED | OUTPATIENT
Start: 2022-10-12 | End: 2022-10-15

## 2022-10-12 RX ORDER — INSULIN LISPRO 100 [IU]/ML
10 INJECTION, SOLUTION INTRAVENOUS; SUBCUTANEOUS
Qty: 1 ADJUSTABLE DOSE PRE-FILLED PEN SYRINGE | Refills: 0 | Status: ON HOLD
Start: 2022-10-12

## 2022-10-12 RX ORDER — SODIUM CHLORIDE 9 MG/ML
25 INJECTION, SOLUTION INTRAVENOUS PRN
Status: DISCONTINUED | OUTPATIENT
Start: 2022-10-12 | End: 2022-10-12 | Stop reason: HOSPADM

## 2022-10-12 RX ORDER — SODIUM CHLORIDE 0.9 % (FLUSH) 0.9 %
5-40 SYRINGE (ML) INJECTION EVERY 12 HOURS SCHEDULED
Status: DISCONTINUED | OUTPATIENT
Start: 2022-10-12 | End: 2022-10-12 | Stop reason: HOSPADM

## 2022-10-12 RX ORDER — SODIUM CHLORIDE 0.9 % (FLUSH) 0.9 %
5-40 SYRINGE (ML) INJECTION PRN
Status: DISCONTINUED | OUTPATIENT
Start: 2022-10-12 | End: 2022-10-12 | Stop reason: HOSPADM

## 2022-10-12 RX ORDER — DILTIAZEM HYDROCHLORIDE 240 MG/1
240 CAPSULE, COATED, EXTENDED RELEASE ORAL DAILY
Qty: 30 CAPSULE | Refills: 0 | Status: ON HOLD
Start: 2022-10-12 | End: 2022-11-11

## 2022-10-12 RX ORDER — LIDOCAINE HYDROCHLORIDE 10 MG/ML
5 INJECTION, SOLUTION INFILTRATION; PERINEURAL ONCE
Status: DISCONTINUED | OUTPATIENT
Start: 2022-10-12 | End: 2022-10-12 | Stop reason: HOSPADM

## 2022-10-12 RX ADMIN — INSULIN GLARGINE 25 UNITS: 100 INJECTION, SOLUTION SUBCUTANEOUS at 09:27

## 2022-10-12 RX ADMIN — DEXTROSE MONOHYDRATE: 50 INJECTION, SOLUTION INTRAVENOUS at 00:03

## 2022-10-12 RX ADMIN — CEFEPIME 2000 MG: 2 INJECTION, POWDER, FOR SOLUTION INTRAVENOUS at 16:38

## 2022-10-12 RX ADMIN — EMPAGLIFLOZIN 10 MG: 10 TABLET, FILM COATED ORAL at 09:26

## 2022-10-12 RX ADMIN — VALPROIC ACID 250 MG: 250 CAPSULE, LIQUID FILLED ORAL at 09:26

## 2022-10-12 RX ADMIN — DILTIAZEM HYDROCHLORIDE 60 MG: 60 TABLET, FILM COATED ORAL at 01:22

## 2022-10-12 RX ADMIN — CITALOPRAM HYDROBROMIDE 20 MG: 20 TABLET ORAL at 09:26

## 2022-10-12 RX ADMIN — INSULIN LISPRO 12 UNITS: 100 INJECTION, SOLUTION INTRAVENOUS; SUBCUTANEOUS at 14:29

## 2022-10-12 RX ADMIN — INSULIN LISPRO 10 UNITS: 100 INJECTION, SOLUTION INTRAVENOUS; SUBCUTANEOUS at 15:56

## 2022-10-12 RX ADMIN — GABAPENTIN 100 MG: 100 CAPSULE ORAL at 09:26

## 2022-10-12 RX ADMIN — GABAPENTIN 100 MG: 100 CAPSULE ORAL at 14:28

## 2022-10-12 RX ADMIN — BUSPIRONE HYDROCHLORIDE 5 MG: 5 TABLET ORAL at 09:26

## 2022-10-12 RX ADMIN — CEFEPIME 2000 MG: 2 INJECTION, POWDER, FOR SOLUTION INTRAVENOUS at 09:34

## 2022-10-12 RX ADMIN — MICONAZOLE NITRATE: 2 OINTMENT TOPICAL at 01:24

## 2022-10-12 RX ADMIN — INSULIN LISPRO 4 UNITS: 100 INJECTION, SOLUTION INTRAVENOUS; SUBCUTANEOUS at 09:35

## 2022-10-12 RX ADMIN — RIVAROXABAN 20 MG: 20 TABLET, FILM COATED ORAL at 09:26

## 2022-10-12 RX ADMIN — ACETAMINOPHEN 650 MG: 325 TABLET ORAL at 16:40

## 2022-10-12 RX ADMIN — CEFEPIME 2000 MG: 2 INJECTION, POWDER, FOR SOLUTION INTRAVENOUS at 01:22

## 2022-10-12 RX ADMIN — DILTIAZEM HYDROCHLORIDE 60 MG: 60 TABLET, FILM COATED ORAL at 14:28

## 2022-10-12 RX ADMIN — Medication 10 ML: at 09:26

## 2022-10-12 RX ADMIN — MICONAZOLE NITRATE: 2 OINTMENT TOPICAL at 14:29

## 2022-10-12 RX ADMIN — TIOTROPIUM BROMIDE INHALATION SPRAY 2 PUFF: 3.12 SPRAY, METERED RESPIRATORY (INHALATION) at 08:24

## 2022-10-12 RX ADMIN — DILTIAZEM HYDROCHLORIDE 60 MG: 60 TABLET, FILM COATED ORAL at 06:47

## 2022-10-12 RX ADMIN — DULOXETINE HYDROCHLORIDE 60 MG: 60 CAPSULE, DELAYED RELEASE ORAL at 09:26

## 2022-10-12 RX ADMIN — VALPROIC ACID 250 MG: 250 CAPSULE, LIQUID FILLED ORAL at 14:28

## 2022-10-12 NOTE — PROGRESS NOTES
Patient awake and alert upon assessment. She is repeating \"oxygen\". Her Spo2 97% on 3 L nasal canula. PAtient does not appear to be in any acute distress. VSS. Urinary catheter draining clear yellow to standard commercial gravity collection bag. Repositioned patient to side with pillows support. Her BLE are contracted so placed a pillow between her legs and pressure reduction pad under her feet. Mepilex dressing intact to right heel with DTI noted. Proved patient with drinks of water and she tolerated well. She is unable to use the call light. Will monitor her closely for any needs and adjust POC as needed.      Electronically signed by Tennille Gonzalez RN on 10/12/2022 at 8:01 AM

## 2022-10-12 NOTE — THERAPY EVALUATION
Music Therapy  Individual Services/Consult & Assessment  Facility/Department: [address] [place] [address]        NAME: Michelle Farris  : 46  MRN: [id number]  ROOM: [place] [address]  ADMISSION DATE: 10/06/22       Referral Source: Nursing    Type of Session: Individual    Patient was received: In pt room    Others present: Nursing staff x2     Treatment Objectives: Increase: Mood, Autonomy, Normalization of treatment environment  Decrease: Self-reported Anxiety, Social isolation    Music Therapy Interventions/Techniques:  Live pt-preferred music  Collaborative Singing  Distraction  Counseling    Behavioral Observation:  Initial affect displayed: Neutral, Pained  Ending affect displayed: Brightened, Alert    Physiological Responses:  Heart Rate: N/A  Respiration: N/A  Oxygen Saturation: N/A    Self-Report:  Initial Pain Score: 6 Ending Pain Score: 4  Initial Anxiety Level: 10 Ending Anxiety Level: 6  Initial Relaxation Level: 0 Ending Relaxation Level: 5    Communication Responses: Active listening, Laughing, Positive non-verbal behaviors, Smiling  Patient Verbalized: coping statements, making choices, positive self-statements, anxiety about treatment  Motor Responses: Fine Motor Movement - UE (tapping hands/fingers), Head movement  Behavioral Responses: Active participation, cooperative behaviors, toleration of stimuli, successful completion  Musical Responses: Singing, Rhythmic movement     Notes: At greeting, Matilde reported feelings of isolation and \"hurt\". During initial rapport-building conversation, Matilde reflected on challenges in relationship with her  who she reports is being unfaithful. She reports that this circumstance has decreased her feelings of resiliency and healthy coping, also reporting decreased sleep (3 hrs overnight per pt). She verbalized desire to share music, made selections of music while verbalizing preferences. Behaviors and responses to live music stimuli as listed below. Matilde expressed desire to be allowed to rest, expressed gratitude for services rendered and desire for return session in future. MT-BC will follow up as appropriate.      Chandu Corrigan MM, MT-BC  Board-Certified Music Therapist  Psychoeducation Specialist

## 2022-10-12 NOTE — DISCHARGE INSTR - COC
Continuity of Care Form    Patient Name: Kyara Garner   :  1950  MRN:  [id number]    Admit date:  10/5/2022  Discharge date:  10/12/22     Code Status Order: Limited   Advance Directives:     Admitting Physician:  Jacki Quintero MD  PCP: Kim Carreon MD    Discharging Nurse: William Biswas Unit/Room#: 3006/3006-01  Discharging Unit Phone Number: [phone]    Emergency Contact:   Extended Emergency Contact Information  Primary Emergency Contact: PreethiSigrid  Address: [place]  Home Phone: [phone]  Mobile Phone: [phone]  Relation: Other  Secondary Emergency Contact: Saeed Magallanes  Mobile Phone: [phone]  Relation: Brother/Sister    Past Surgical History:  Past Surgical History:   Procedure Laterality Date    KNEE SURGERY      TONSILLECTOMY      TUBAL LIGATION         Immunization History:   Immunization History   Administered Date(s) Administered    COVID-19, PFIZER PURPLE top, DILUTE for use, (age 15 y+), 30mcg/0.3mL 2020, 2021, 2021    Influenza Virus Vaccine 2014, 10/20/2019    Influenza, FLUARIX, FLULAVAL, Marcille Fly (age 10 mo+) AND AFLURIA, (age 1 y+), PF, 0.5mL 2016    Pneumococcal Conjugate 13-valent Niko Dupree) 2016       Active Problems:  Patient Active Problem List   Diagnosis Code    Seizure disorder, grand mal ([facility] G40.409    DM (diabetes mellitus) ([facility] E11.9    Lumbar facet arthropathy M47.816    Disc degeneration, lumbar M51.36    Obesity E66.9    Atrial fibrillation with rapid ventricular response (HCC) I48.91    Essential hypertension I10    Chronic obstructive pulmonary disease (HCC) J44.9    Persistent atrial fibrillation (HCC) I48.19    Chronic pain of both knees M25.561, M25.562, G89.29    Bilateral primary osteoarthritis of knee M17.0    Elevated troponin R77.8    Sepsis secondary to UTI ([facility] A41.9, N39.0    Altered mental status R41.82    FABIO (acute kidney injury) ([facility] N17.9    UTI (urinary tract infection) N39.0    Cervical herniated disc M50.20    Chronic bilateral low back pain with bilateral sciatica M54.42, M54.41, G89.29    Chronic respiratory failure with hypoxia ([facility]) J96.11    Dementia with behavioral disturbance F03.918    LORENZO (generalized anxiety disorder) F41.1    Hoarding disorder with poor insight F42.3    Hypertensive heart and kidney disease with chronic diastolic congestive heart failure and stage 2 chronic kidney disease ([facility]) I13.0, I50.32, N18.2    Schizoaffective disorder, depressive type ([facility]) F25.1    Providencia bacteremia R78.81    Permanent atrial fibrillation with rapid ventricular response ([facility]) I48.21    Bacteremia R78.81    Hyperkalemia E87.5       Isolation/Infection:   Isolation            Contact          Patient Infection Status       Infection Onset Added Last Indicated Last Indicated By Review Planned Expiration Resolved Resolved By    MRSA 10/06/22 10/07/22 10/06/22 MRSA DNA Probe, Nasal        ESBL (Extended Spectrum Beta Lactamase) 04/14/20 04/17/20 04/14/20 Culture, Urine        MDRO (multi-drug resistant organism) 04/14/20 04/17/20 04/14/20 Culture, Urine        Resolved    COVID-19 (Rule Out) 10/05/22 10/05/22 10/05/22 COVID-19 & Influenza Combo (Ordered)   10/06/22 Rule-Out Test Resulted    COVID-19 (Rule Out) 04/14/20 04/14/20 04/14/20 COVID-19 (Ordered)   04/15/20 Rule-Out Test Resulted    ESBL (Extended Spectrum Beta Lactamase) 02/27/19 03/01/19 02/27/19 Urine Culture   01/12/20 Stefany Dumont RN    Last urine culture - for ESBL            Nurse Assessment:  Last Vital Signs: /77   Pulse (!) 101   Temp 96.8 °F (36 °C) (Axillary)   Resp 21   Ht 5' 7\" (1.702 m)   Wt 212 lb (96.2 kg)   SpO2 91%   BMI 33.20 kg/m²     Last documented pain score (0-10 scale): Pain Level: 5  Last Weight:   Wt Readings from Last 1 Encounters:   10/06/22 212 lb (96.2 kg)     Mental Status:  oriented, hx of Dementia    IV Access:  - PICC - site  R Basilic, insertion date: 10/10/22, midline    Nursing Mobility/ADLs:  Walking   Dependent  Transfer  Dependent  Bathing  [address]  Dependent  Med Delivery   whole    Wound Care Documentation and Therapy:  Wound 04/15/20 Coccyx Stage II (Active)   Number of days: 909       Wound 04/15/20 Buttocks Left DTI (Active)   Number of days: 909       Wound 04/15/20 Ear Right DTI (Active)   Number of days: 909       Wound 04/16/20 Hip Left (Active)   Number of days: 909       Wound 10/06/22 Thigh Anterior;Right Red (Active)   Wound Image   10/06/22 0341   Dressing Status Clean;Dry 10/11/22 1600   Wound Cleansed Soap and water 10/11/22 1600   Dressing/Treatment Open to air 10/11/22 1600   Wound Assessment Erythema 10/11/22 1600   Number of days: 6       Wound 10/06/22 Abdomen Lower;Medial Red Dieter (Active)   Wound Image   10/06/22 0341   Dressing Status Clean;Dry 10/11/22 1600   Wound Cleansed Soap and water 10/11/22 1600   Dressing/Treatment Open to air 10/11/22 1600   Wound Assessment Erythema 10/11/22 1600   Number of days: 6       Wound 10/06/22 Back Left open dry crack (Active)   Wound Image   10/06/22 0341   Dressing Status Clean;Dry 10/11/22 1600   Wound Cleansed Soap and water 10/11/22 1600   Dressing/Treatment Open to air 10/11/22 1600   Wound Assessment Erythema 10/11/22 1600   Number of days: 6       Wound 10/06/22 Buttocks red, open areas, (Active)   Wound Image   10/06/22 0341   Dressing Status Clean;Dry 10/11/22 1600   Wound Cleansed Soap and water 10/11/22 1600   Dressing/Treatment Open to air 10/11/22 1600   Number of days: 6       Wound 10/06/22 Back Lateral;Lower;Right Red all down right side (Active)   Wound Image   10/06/22 0336   Dressing Status Clean;Dry 10/11/22 1600   Wound Cleansed Soap and water 10/11/22 1600   Dressing/Treatment Open to air 10/11/22 1600   Number of days: 6       Wound 10/06/22 Foot Anterior;Right; Inner;Medial intact blister (Active) Dressing Status Clean;Dry; Intact 10/11/22 1600   Wound Cleansed Soap and water 10/11/22 1600   Dressing/Treatment Foam 10/11/22 1600   Offloading for Diabetic Foot Ulcers Felt or foam 10/11/22 1600   Number of days: 6        Elimination:  Continence: Bowel: No  Bladder: No  Urinary Catheter: Removal Date . 10/12/22    Colostomy/Ileostomy/Ileal Conduit: No       Date of Last BM: 10/12/22     Intake/Output Summary (Last 24 hours) at 10/12/2022 0949  Last data filed at 10/12/2022 0647  Gross per 24 hour   Intake 4837.45 ml   Output 4350 ml   Net 487.45 ml     I/O last 3 completed shifts: In: 4837.5 [P.O.:840; I.V.:3794.6; IV Piggyback:202.9]  Out: 5225 [Urine:5225]    Safety Concerns: At Risk for Falls    Impairments/Disabilities:      Speech    Nutrition Therapy:  Current Nutrition Therapy:   - Oral Diet:  Carb Control 4 carbs/meal (1800kcals/day), soft bite size    Routes of Feeding: Oral  Liquids: Thin Liquids  Daily Fluid Restriction: no  Last Modified Barium Swallow with Video (Video Swallowing Test): not done    Treatments at the Time of Hospital Discharge:   Respiratory Treatments:   Oxygen Therapy:  is on oxygen at 3 L/min per nasal cannula.   Ventilator:    - No ventilator support    Rehab Therapies: Physical Therapy  Weight Bearing Status/Restrictions: No weight bearing restrictions  Other Medical Equipment (for information only, NOT a DME order):  wheelchair  Other Treatments:     Patient's personal belongings (please select all that are sent with patient):  None    RN SIGNATURE:  Electronically signed by Sally Alvares RN on 10/12/22 at 4:19 PM EDT    CASE MANAGEMENT/SOCIAL WORK SECTION    Inpatient Status Date: 10/06/2022    Readmission Risk Assessment Score:  Readmission Risk              Risk of Unplanned Readmission:  19           Discharging to Facility/ Agency   Name: [facility]   Address:  Phone: [phone]  Fax: [phone]    / signature: Electronically signed by OMEGA Barboza LISW-S on 10/12/22 at 3:23 PM EDT    PHYSICIAN SECTION    Prognosis: Fair    Condition at Discharge: Stable    Rehab Potential (if transferring to Rehab): Fair    Recommended Labs or Other Treatments After Discharge: PT and OT. CBC and BMP on Monday. Finish IV Cefepime. Pull out mid line when Cefepime is finished. OP Urology consultation. Physician Certification: I certify the above information and transfer of Hitesh Leon  is necessary for the continuing treatment of the diagnosis listed and that she requires [facility] for greater 30 days.      Update Admission H&P: No change in H&P    PHYSICIAN SIGNATURE:  Electronically signed by Juliet Painting MD on 10/12/22 at 1:30 PM EDT

## 2022-10-12 NOTE — PROGRESS NOTES
[facility] Infectious Disease Progress Note      Susan Khan     : 1950    DATE OF VISIT:  10/12/2022  DATE OF ADMISSION:  10/5/2022       Subjective:     Susan Khan is a 66 y.o. female whom I've been seeing for a bacteremic Providencia UTI and sepsis. Since I last saw her, she's basically been stable; still some occasional confusion, stable vitals, seems to be tolerating Abx, no diarrhea, got her midline catheter. Ms. Marge Albright has a past medical history of Acute diastolic congestive heart failure ([address] Acute diastolic heart failure ([address] Acute respiratory failure ([address] Adjustment disorder with mixed anxiety and depressed mood, Allergic rhinitis, Alzheimer's dementia ([address] Arachnoid cyst, Atrial fibrillation ([address] CHF (congestive heart failure) ([address] Chronic back pain, Constipation, COPD (chronic obstructive pulmonary disease) ([address] Diabetes mellitus ([address] Diskitis, Disseminated superficial actinic porokeratosis (DSAP), Edema, ESBL (extended spectrum beta-lactamase) producing bacteria infection, Hypertension, Hypo-osmolality and hyponatremia, Major depressive disorder, Morbid obesity ([address] Muscle weakness, Osteomyelitis of spine ([address] Overactive bladder, Schizoaffective disorder ([address] Seizures ([address] Urinary tract infection without hematuria, and Xerosis cutis.     Current Facility-Administered Medications: cefepime (MAXIPIME) 2000 mg IVPB minibag, 2,000 mg, IntraVENous, H3G  bismuth subsalicylate (PEPTO BISMOL) chewable tablet 524 mg, 2 tablet, Oral, Q6H PRN  dilTIAZem (CARDIZEM) tablet 60 mg, 60 mg, Oral, 4 times per day  dextrose 5 % solution, , IntraVENous, Continuous  LORazepam (ATIVAN) injection 1 mg, 1 mg, IntraVENous, Q4H PRN  citalopram (CELEXA) tablet 20 mg, 20 mg, Oral, Daily  rivaroxaban (XARELTO) tablet 20 mg, 20 mg, Oral, Daily with breakfast  glucose chewable tablet 16 g, 4 tablet, Oral, PRN  dextrose bolus 10% 125 mL, 125 mL, IntraVENous, PRN **OR** dextrose bolus 10% 250 mL, 250 mL, IntraVENous, PRN  glucagon (rDNA) injection 1 mg, 1 mg, SubCUTAneous, PRN  dextrose 10 % infusion, , IntraVENous, Continuous PRN  sodium chloride flush 0.9 % injection 5-40 mL, 5-40 mL, IntraVENous, 2 times per day  sodium chloride flush 0.9 % injection 5-40 mL, 5-40 mL, IntraVENous, PRN  0.9 % sodium chloride infusion, , IntraVENous, PRN  ondansetron (ZOFRAN-ODT) disintegrating tablet 4 mg, 4 mg, Oral, Q8H PRN **OR** ondansetron (ZOFRAN) injection 4 mg, 4 mg, IntraVENous, Q6H PRN  polyethylene glycol (GLYCOLAX) packet 17 g, 17 g, Oral, Daily PRN  acetaminophen (TYLENOL) tablet 650 mg, 650 mg, Oral, Q6H PRN **OR** acetaminophen (TYLENOL) suppository 650 mg, 650 mg, Rectal, Q6H PRN  perflutren lipid microspheres (DEFINITY) injection 1.65 mg, 1.5 mL, IntraVENous, ONCE PRN  insulin glargine (LANTUS) injection vial 25 Units, 25 Units, SubCUTAneous, QAM  ipratropium-albuterol (DUONEB) nebulizer solution 3 mL, 1 vial, Inhalation, Q4H PRN  tiotropium (SPIRIVA RESPIMAT) 2.5 MCG/ACT inhaler 2 puff, 2 puff, Inhalation, Daily  miconazole nitrate 2 % ointment, , Topical, BID  insulin lispro (HUMALOG) injection vial 0-16 Units, 0-16 Units, SubCUTAneous, TID WC  insulin lispro (HUMALOG) injection vial 0-4 Units, 0-4 Units, SubCUTAneous, Nightly  empagliflozin (JARDIANCE) tablet 10 mg, 10 mg, Oral, Daily  LORazepam (ATIVAN) tablet 0.5 mg, 0.5 mg, Oral, BID PRN  busPIRone (BUSPAR) tablet 5 mg, 5 mg, Oral, BID  DULoxetine (CYMBALTA) extended release capsule 60 mg, 60 mg, Oral, Daily  oxyCODONE-acetaminophen (PERCOCET) 5-325 MG per tablet 1 tablet, 1 tablet, Oral, Q6H PRN  valproic acid (DEPAKENE) capsule 250 mg, 250 mg, Oral, TID  melatonin tablet 3 mg, 3 mg, Oral, Nightly PRN  gabapentin (NEURONTIN) capsule 100 mg, 100 mg, Oral, TID     This is day 3 of cefepime, day 7 of ABx in total.     Allergies: Fish-derived products, Iodine, Mushroom extract complex, Onion, and Other    Pertinent items from the review of systems are discussed in the HPI; the remainder of the ROS was reviewed and is negative.      Objective:     Vital signs over the last 24 hours:  Temp  Av.7 °F (37.1 °C)  Min: 98.1 °F (36.7 °C)  Max: 99.7 °F (37.6 °C)  Pulse  Av.8  Min: 81  Max: 703  Systolic (64VHU), JQF:620 , Min:89 , DKC:996   Diastolic (65AZM), ETY:13, Min:55, Max:93  Resp  Av.8  Min: 16  Max: 29  SpO2  Av.3 %  Min: 84 %  Max: 96 %    Constitutional:  well-developed, well-nourished, overweight, looks more comfortable  Psychiatric:  sleeping currently, arousable, not agitated   Eyes:  pupils equal, round and reactive to light; sclerae anicteric, conjunctivae not pale  ENT:  atraumatic; oral mucosa dry, no thrush or ulcers; on nasal O2   Resp:  lungs decreased and a bit coarse to auscultation BL; looks in less resp distress than Monday  Cardiovascular:  heart regular, no gallop, no murmur; mild lower extremity edema; no IV phlebitis (midline now in)  GI:  abdomen soft, perhaps  in the RLQ and suprapubic areas, but not severely so, ND, normal bowel sounds, no palpable masses or organomegaly  : Moraes in place, clearer yellow urine  Musculoskeletal:  no clubbing, cyanosis or petechiae; extremities with no gross effusions, joint misalignment or acute arthritis  Skin: warm, dry, normal turgor; no ulcers; buttock / inguinal / upper thigh rash c/w Candidiasis, improving.   ______________________________    Recent Labs     10/11/22  0443 10/10/22  0427 10/09/22  0733   WBC 15.5* 15.0* 18.1*   HGB 13.6 13.6 14.3   HCT 42.0 42.5 45.2   MCV 88.3 88.0 88.7   * 109* 128*     Lab Results   Component Value Date    CREATININE <0.5 (L) 10/11/2022     Lab Results   Component Value Date    LABALBU 2.6 (L) 10/10/2022     Lab Results   Component Value Date    ALT <5 (L) 10/05/2022    AST 10 (L) 10/05/2022    ALKPHOS 124 10/05/2022    BILITOT 0.7 10/05/2022      Lab Results   Component Value Date    LABA1C 9.1 10/06/2022     Other recent pertinent labs: Anion gap 11. Glucoses 200s. ANC down to 8400.  ______________________________    Recent pertinent micro results:  BCx (+) Providencia. UCx with Candida, Lactobacillus, Providencia. Nasal swab (+) MRSA.   ______________________________    Recent imaging results (last 7 days):     CT head without contrast    Result Date: 10/6/2022  No acute intracranial abnormality. MRI may be obtained if clinically indicated. US RENAL COMPLETE    Result Date: 10/6/2022  Unremarkable ultrasound of the kidneys and urinary bladder. RECOMMENDATIONS: Unavailable     XR CHEST PORTABLE    Result Date: 10/5/2022  Patchy mild airspace disease which may represent atelectasis or pneumonia. IR MIDLINE CATH    Result Date: 10/11/2022  Status post successful placement of right upper extremity midline venous catheter as described above.       Assessment:     Patient Active Problem List   Diagnosis Code    Seizure disorder, grand mal ([facility] G40.409    DM (diabetes mellitus) ([facility] E11.9    Lumbar facet arthropathy M47.816    Disc degeneration, lumbar M51.36    Obesity E66.9    Atrial fibrillation with rapid ventricular response ([facility]) I48.91    Essential hypertension I10    Chronic obstructive pulmonary disease (HCC) J44.9    Persistent atrial fibrillation ([facility]) I48.19    Chronic pain of both knees M25.561, M25.562, G89.29    Bilateral primary osteoarthritis of knee M17.0    Elevated troponin R77.8    Sepsis secondary to UTI ([facility]) A41.9, N39.0    Altered mental status R41.82    FABIO (acute kidney injury) ([facility] N17.9    UTI (urinary tract infection) N39.0    Cervical herniated disc M50.20    Chronic bilateral low back pain with bilateral sciatica M54.42, M54.41, G89.29    Chronic respiratory failure with hypoxia ([facility]) J96.11    Dementia with behavioral disturbance F03.918    LORENZO (generalized anxiety disorder) F41.1    Hoarding disorder with poor insight F42.3    Hypertensive heart and kidney disease with chronic diastolic congestive heart failure and stage 2 chronic kidney disease (HCC) I13.0, I50.32, N18.2    Schizoaffective disorder, depressive type [facility] F25.1    Providencia bacteremia R78.81    Permanent atrial fibrillation with rapid ventricular response (HCC) I48.21    Bacteremia R78.81    Hyperkalemia E87.5     Assessment of today's active condition(s):      --          Background of obesity, DM, Afib, COPD, OA, chronic pain, dementia, psychiatric illness. --          Multiple prior UCx (+) for resistant organisms including ESBL GNRs. True UTI Hx uncertain (she thinks she's had multiple UTIs before, but I'm not sure the clinical circumstances, how the diagnosis was made, etc). --          Admission now with severe sepsis (fever, leukocytosis, lactic acidosis, FABIO, mental status change, hypoxemia), now with Providencia in two BCx. Seems most likely from a UTI, and the urine culture results are suggestive enough to make me comfortable with that diagnosis. Does have some abdominal tenderness, but not severe, and that could certainly be urinary tract related. Also seems likely she was rather hypovolemic on admission (dry mouth, low sodium, elevated BUN, Hgb level hemoconcentrated up to 16.4.). Improving overall, in terms of vitals, lactate, renal function, mental status. A degree of ongoing leukocytosis not necessarily too concerning, and that's nearly resolved now too. Treatment recs:     Continue cefepime, planning about another week. Would repeat a CBC and CMP at least once more while on IV Abx (in a few days). Topical Rx of cutaneous Candidiasis. Watch for thrush, Cdiff, IV complications, etc.    When stable, infection therapy completed and Moraes is out, suggest urology eval for what seems to be recurrent (sometimes severe) UTIs.      Electronically signed by Jerry Vuong MD on 10/12/2022 at 5:24 AM.

## 2022-10-12 NOTE — PROGRESS NOTES
08:45 Critical care rounds completed @ the bedside w/ Dr. Reanna Avalos  09:00 Pt awake a&o to self, situation but appears to have some expressive Aphasia ; pt is repeating \"Water is cutie, water is cutie\" I asked her what does that mean, she states \" Water is cutie\" I then ask pt is she wants some water to drink? She states \" Yes\" water offered pt accepted, this RN is unaware of this is a new finding for pt, Dr. Luisa Bellamy updated and MRI ordered. Assessment as charted . Dr. Luisa Bellamy said ok to take pt down for MRI without monitor and without nurse   15:46 Pt back from MRI, MRI negative, pt also covid negative, preparing pt for d/c back to [facility]  17:30 Report called to nurse Danika Martinez @ [facility], pt awake a&o , pt stable @ time for d/c .  Pt left via stretcher d/c to [facility], brother Blanca Ramsey called and updated of MRI results and d/c to [facility]

## 2022-10-12 NOTE — DISCHARGE SUMMARY
Name:  Jay Jay Perez  Room:  3006/3006-01  MRN:    [id number]    Discharge Summary      This discharge summary is in conjunction with a complete physical exam done on the day of discharge. Discharging Physician: Sami Lainez MD      Admit: 10/5/2022  Discharge:  10/12/2022     Diagnoses this Admission    Principal Problem:    Sepsis secondary to UTI [facility])  Active Problems:    Altered mental status    DANIELLE (acute kidney injury) ([address]    UTI (urinary tract infection)    Providencia bacteremia    Permanent atrial fibrillation with rapid ventricular response (HCC)    Bacteremia    Hyperkalemia    Atrial fibrillation with rapid ventricular response (HCC)    Elevated troponin  Resolved Problems:    * No resolved hospital problems. *      Procedures (Please Review Full Report for Details)    Mid line. Consults    IP CONSULT TO HOSPITALIST  IP CONSULT TO PHARMACY  IP CONSULT TO NEPHROLOGY  IP CONSULT TO INFECTIOUS DISEASES  IP CONSULT TO PHARMACY  IP CONSULT TO CARDIOLOGY      HPI:    66 y.o. female presents from [facility] with AMS, hypoxemia. Patient is seen in the ICU, on NC O2, in no respiratory distress, unresponsive. She was initially requiring NRB for O2 sat initially noted in the 50's, is currently on 4 lpm. She was found to be in afib RVR, danielle, uti/hcap and has been admitted to the ICU. Physical Exam at Discharge:  /81   Pulse (!) 107   Temp 97.4 °F (36.3 °C) (Oral)   Resp 20   Ht 5' 7\" (1.702 m)   Wt 212 lb (96.2 kg)   SpO2 97%   BMI 33.20 kg/m²     General: awake and alert  mucous Membranes:  Pink , anicteric  Neck: No JVD, no carotid bruit, no thyromegaly  Chest:  Clear to auscultation bilaterally, no added sounds/diminished BS  Cardiovascular: Irregular S1S2 heard, no murmurs or gallops  Abdomen:  Soft, undistended, non tender, no organomegaly, BS present  Extremities: No edema or cyanosis.  Distal pulses well felt  Neurological : no focal deficits    Hospital Course    Acute on chronic hypoxic respiratory failure. Acute respiratory failure resolved. Currently on 4 L of oxygen. Discharge on 4 L. Acute kidney injury  Has resolved with IV fluids. Gram-negative bacteremia  UTI  Received Merrem. Changed to Cefepime by ID consult. Total of 2 weeks treatment (9 days of Cefepime). Discharge on 8 more day. Check labs on Monday. Infectious disease consulted     Hypernatremia resolved. Nephrology consulted  D5W started. Increase IVF to 125 cc/hr. IVF stopped. Atrial fibrillation with rapid ventricular rate. HR now controlled. Placed on amiodarone. Hypotensive with IV Cardizem. Continue  Xarelto  Now on oral Cardizem. Amiodarone discontinued     Type 2 diabetes  Stable. Continue Lantus insulin and sliding scale insulin  Continue Jardiance     Schizoaffective disorder  Dementia  Continue BuSpar, Celexa, Cymbalta and valproic acid  Zyprexa as needed  Her mental status is better today. Agitation is considerably less    CBC:   Recent Labs     10/10/22  0427 10/11/22  0443 10/12/22  0431   WBC 15.0* 15.5* 14.0*   HGB 13.6 13.6 14.2   HCT 42.5 42.0 44.2   MCV 88.0 88.3 89.1   * 114* 88*     BMP:   Recent Labs     10/10/22  0427 10/11/22  0443 10/12/22  0430    150* 139   K 3.5  3.5 3.6 4.6    107 101   CO2 33* 32 27   PHOS 1.7*  --   --    BUN 21* 16 13   CREATININE <0.5* <0.5* <0.5*     Cultures:      10/6/2022 SARS-CoV-2 negative, influenza negative  10/6/2022 blood 2 of 2 cultures [facility], 1 of 2 Enterobacterales    MRI BRAIN WO CONTRAST   Final Result   No acute infarct. IR MIDLINE CATH   Preliminary Result   Status post successful placement of right upper extremity midline venous   catheter as described above. US RENAL COMPLETE   Final Result   Unremarkable ultrasound of the kidneys and urinary bladder. RECOMMENDATIONS:   Unavailable         CT head without contrast   Final Result   No acute intracranial abnormality.   MRI may be obtained if clinically   indicated. XR CHEST PORTABLE   Final Result   Patchy mild airspace disease which may represent atelectasis or pneumonia. Discharge Medications     Medication List        START taking these medications      cefepime  infusion  Commonly known as: MAXIPIME  Infuse 2,000 mg intravenously in the morning and 2,000 mg at noon and 2,000 mg in the evening. Do all this for 8 days. Compound per protocol. dilTIAZem 240 MG extended release capsule  Commonly known as: CARDIZEM CD  Take 1 capsule by mouth daily     miconazole nitrate 2 % Oint  Apply topically 2 times daily            CHANGE how you take these medications      * insulin lispro 100 UNIT/ML injection vial  Commonly known as: HUMALOG  Inject 0-6 Units into the skin 3 times daily (with meals)  What changed: Another medication with the same name was added. Make sure you understand how and when to take each. * insulin lispro (1 Unit Dial) 100 UNIT/ML Sopn  Commonly known as: HumaLOG KwikPen  Inject 10 Units into the skin 3 times daily (before meals)  What changed: You were already taking a medication with the same name, and this prescription was added. Make sure you understand how and when to take each. LORazepam 0.5 MG tablet  Commonly known as: ATIVAN  Take 1 tablet by mouth 2 times daily as needed for Anxiety for up to 3 days. What changed:   when to take this  reasons to take this           * This list has 2 medication(s) that are the same as other medications prescribed for you. Read the directions carefully, and ask your doctor or other care provider to review them with you.                 CONTINUE taking these medications      acetaminophen 325 MG tablet  Commonly known as: TYLENOL     bisacodyl 10 MG suppository  Commonly known as: DULCOLAX     Blood Glucose System Bubba Kit  Check fingerstick blood sugars before meals and at bedtime     busPIRone 5 MG tablet  Commonly known as: BUSPAR     divalproex 250 MG DR tablet  Commonly known as: DEPAKOTE     DULoxetine 60 MG extended release capsule  Commonly known as: CYMBALTA     fleet 7-19 GM/118ML     furosemide 40 MG tablet  Commonly known as: LASIX     gabapentin 100 MG capsule  Commonly known as: NEURONTIN     INSULIN SYRINGE .5CC/29G 29G X 1/2\" 0.5 ML Misc  Commonly known as: KROGER INS SYR .5CC/29G  1 each by Does not apply route daily     Jardiance 10 MG tablet  Generic drug: empagliflozin     Lantus SoloStar 100 UNIT/ML injection pen  Generic drug: insulin glargine     melatonin 3 MG Tabs tablet     oxyCODONE-acetaminophen 5-325 MG per tablet  Commonly known as: PERCOCET  Take 1 tablet by mouth in the morning and 1 tablet at noon and 1 tablet in the evening and 1 tablet before bedtime. Do all this for 3 days. potassium chloride 10 MEQ extended release capsule  Commonly known as: MICRO-K     rivaroxaban 20 MG Tabs tablet  Commonly known as: XARELTO     tiotropium 18 MCG inhalation capsule  Commonly known as: SPIRIVA     tiZANidine 2 MG tablet  Commonly known as: ZANAFLEX     Vitamin D3 1.25 MG (92680 UT) Caps            STOP taking these medications      digoxin 125 MCG tablet  Commonly known as: LANOXIN               Where to Get Your Medications        You can get these medications from any pharmacy    Bring a paper prescription for each of these medications  LORazepam 0.5 MG tablet  oxyCODONE-acetaminophen 5-325 MG per tablet       Information about where to get these medications is not yet available    Ask your nurse or doctor about these medications  cefepime  infusion  dilTIAZem 240 MG extended release capsule  insulin lispro (1 Unit Dial) 100 UNIT/ML Sopn  miconazole nitrate 2 % Oint           Discharge Condition/Location: Stable    Follow Up: Follow up with NH doctor. More than 30 mts spent.      Parris Cummins MD 10/12/2022 4:44 PM

## 2022-10-12 NOTE — PROGRESS NOTES
[facility] Pulmonary, Critical Care and Sleep Specialists                                 Pulmonary Consult /Progress Note :                                                                  CC: Severe hypoxemia, A. fib RVR, hypotension    Events of Last 24 hours:   Occasional confusion   Repeat seems words  Not in distress   HR controlled   No further fever   On 3 L    Vascular lines: IV: peripheral     MV: None     / / /   No results for input(s): PHART, IEK6RNB, PO2ART in the last 72 hours. IV:   sodium chloride      dextrose 125 mL/hr at 10/12/22 0647    dextrose      sodium chloride 5 mL/hr at 10/12/22 6960       Vitals:  Blood pressure 113/77, pulse (!) 101, temperature 96.8 °F (36 °C), temperature source Axillary, resp. rate 21, height 5' 7\" (1.702 m), weight 212 lb (96.2 kg), SpO2 91 %. on 2 L  Temp  Av.3 °F (36.8 °C)  Min: 96.8 °F (36 °C)  Max: 99.7 °F (37.6 °C)    Intake/Output Summary (Last 24 hours) at 10/12/2022 0852  Last data filed at 10/12/2022 7027  Gross per 24 hour   Intake 4837.45 ml   Output 4350 ml   Net 487.45 ml       PE:  General: Looks much better today  Eyes: PERRL. No sclera icterus. No conjunctival injection. ENT: No discharge. Pharynx clear. Neck: Trachea midline. Normal thyroid. Resp: No accessory muscle use. No crackles. No wheezing. No rhonchi. No dullness on percussion. CV: Regular rate. Regular rhythm. No mumur or rub. + edema. GI: Non-tender. Non-distended. No masses. No organomegaly. Normal bowel sounds. No hernia. Skin: Warm and dry. No nodule on exposed extremities. No rash on exposed extremities. Lymph: No cervical LAD. No supraclavicular LAD. M/S: No cyanosis. No joint deformity. No clubbing. Neuro: Awake, speech clear, oriented to person and place.  Patellar reflexes are symmetric  Psych: No agitation today, no anxiety, affect is full    Scheduled Meds:   lidocaine 1 % injection  5 mL IntraDERmal Once    sodium chloride flush  5-40 mL IntraVENous 2 times per day    cefepime  2,000 mg IntraVENous q8h    dilTIAZem  60 mg Oral 4 times per day    citalopram  20 mg Oral Daily    rivaroxaban  20 mg Oral Daily with breakfast    insulin glargine  25 Units SubCUTAneous QAM    tiotropium  2 puff Inhalation Daily    miconazole nitrate   Topical BID    insulin lispro  0-16 Units SubCUTAneous TID WC    insulin lispro  0-4 Units SubCUTAneous Nightly    empagliflozin  10 mg Oral Daily    busPIRone  5 mg Oral BID    DULoxetine  60 mg Oral Daily    valproic acid  250 mg Oral TID    gabapentin  100 mg Oral TID       Data:  CBC:   Recent Labs     10/10/22  0427 10/11/22  0443 10/12/22  0431   WBC 15.0* 15.5* 14.0*   HGB 13.6 13.6 14.2   HCT 42.5 42.0 44.2   MCV 88.0 88.3 89.1   * 114* 88*       BMP:   Recent Labs     10/10/22  0427 10/11/22  0443 10/12/22  0430    150* 139   K 3.5  3.5 3.6 4.6    107 101   CO2 33* 32 27   PHOS 1.7*  --   --    BUN 21* 16 13   CREATININE <0.5* <0.5* <0.5*       LIVER PROFILE:   No results for input(s): AST, ALT, LIPASE, BILIDIR, BILITOT, ALKPHOS in the last 72 hours. Invalid input(s):   AMYLASE,  ALB      Cultures:      10/6/2022 SARS-CoV-2 negative, influenza negative  10/6/2022 blood 2 of 2 cultures Providencia is merrem sensitive   10/6/2022 urine polymicrobial, gram-negative godfrey is being worked up    Chest imaging was reviewed by me and showed:   CXR 10/6/2022 patchy infiltrates without focal consolidation    Head CT 10/6/2022 chronic parenchymal volume loss and chronic small vessel ischemic change without acute finding    ASSESSMENT:  Acute hypoxemic respiratory failure   Acute metabolic encephalopathy, markedly improved  Sepsis secondary to UTI   Gram-negative Providencia bacteremia  Acute cystitis    Chronic A. Fib,now with RVR; on Xarelto   Acute kidney injury    Chronic diastolic CHF  COPD  Schizoaffective disorder  Elevated troponin  H/O ESBL urine   H/O dementia   DM with hyperglycemia   Hypernatremia     PLAN:    To be transferred  Poor historian   Seems advanced detia   Supplemental oxygen to maintain SaO2 >92%; wean as tolerated    On 3 L   Cefepime as per ID ,had midline ,possible 1 week ,ID following   Stop D5   Cardizem PO - as per Dr. Nayana Novak D#5 per infectious disease   Blood sugar control, with goal 140-180;  High SSI  Home Xarelto  LIMITED CODE - no intubation, no compressions, no cardioversion    Lacy Suh MD,[facility]  Pulmonary&Critical Care Medicine   Andrés julio cesar ,and Critical care    [facility]

## 2022-10-12 NOTE — PROGRESS NOTES
Progress Note    HISTORY     CC:  AMS          We are following for Acute kidney injury          Subjective/   HPI:    Renal function better, non-oliguric, started on D5W with solute diuresis. Off Cardizem gtt. BP stable with improved rate. Remains confused but less agitated     ROS:  No fevers, intake improving. EXAM       Objective/     Vitals:    10/12/22 1100 10/12/22 1200 10/12/22 1300 10/12/22 1400   BP:  (!) 122/101 106/64 121/81   Pulse: 98 95 (!) 103 (!) 106   Resp: 19 20 19 15   Temp:  97.4 °F (36.3 °C) 97.4 °F (36.3 °C)    TempSrc:  Oral Oral    SpO2: 94% 90% 96% 97%   Weight:       Height:         24HR INTAKE/OUTPUT:    Intake/Output Summary (Last 24 hours) at 10/12/2022 1611  Last data filed at 10/12/2022 1507  Gross per 24 hour   Intake 4968. 31 ml   Output 3700 ml   Net 1268.31 ml       Constitutional:  Ill appearing, confused   Eyes:  Pupils reactive, sclera clear   Neck:  Normal thyroid, no masses   Cardiovascular:  S1, S2, irregular, tachycardic   Respiratory:  No distress, no wheezing  Psychiatry:  Alert, confused   Abdomen: +bs, soft, nt, no masses   Musculoskeletal: No LE edema, no clubbing   Lymphatics:  No LAD in neck, no supraclavicular nodes       MEDICAL DECISION MAKING       Data/  Recent Labs     10/10/22  0427 10/11/22  0443 10/12/22  0431   WBC 15.0* 15.5* 14.0*   HGB 13.6 13.6 14.2   HCT 42.5 42.0 44.2   MCV 88.0 88.3 89.1   * 114* 88*       Recent Labs     10/10/22  0427 10/11/22  0443 10/12/22  0430    150* 139   K 3.5  3.5 3.6 4.6    107 101   CO2 33* 32 27   GLUCOSE 248* 190* 235*   PHOS 1.7*  --   --    MG 1.80 2.10  --    BUN 21* 16 13   CREATININE <0.5* <0.5* <0.5*   LABGLOM >60 >60 >60   GFRAA >60 >60 >60         Assessment/     Acute Kidney Injury:  KDIGO stage 2  - Etiology:  Pre-renal spectrum with volume depletion and low BP with cardiac instability decreasing renal perfusion pressures  - Clinical:  Seems to be responding to volume, kidney failure has resolved      Hyperkalemia:  Due to FABIO / better with medical management  then became low requiring supplementation     Hypernatremia :  - Due to solute diuresis post ATN  - On D5W / urine volume > 3 liters with over 50% free water      Sepsis:  + UTI and PNA      Respiratory Failure:  - HCAP      Atrial Fibrillation:  - Hypotensive. Now on amiodarone with improved BP and HR.   Off cardizem gtt     DM:  - On jardiance    Hypophosphatemia/Hypokalemia:  - Prn replacement      Plan/     -Trial off D5W with improved sodium levels, monitor with oral intake  -Trend labs, bp's, & urine output    Recheck labs next week at [facility]

## 2022-10-12 NOTE — PROGRESS NOTES
Dr. Malaika Richardson at bedside. Updated on patient. New order for placement of midline for home antibiotic infusion. Report to oncoming shift RN and care of patient transferred in stable condition.     Electronically signed by Kristal Crain RN on 10/12/2022 at 8:04 AM

## 2022-10-13 NOTE — CARE COORDINATION
Case Management Assessment  Initial Evaluation      Patient Name: Olayinka Cedeño  YOB: 1950  [facility] [A41.9]  Date / Time: 10/5/2022 10:00 PM    Admission status/Date:10/05/2022 Inpatient   Chart Reviewed: Yes      Patient [place]: pt-limited interaction after speaking with Antoine Moe as RN stated she would respond to CM  Family Interviewed:  Yes - Pt's friend Deepali Urbina as numbers for Rosie Isaacs did not work. Hospitalization in the last 30 days:  No    Health Care Decision Maker : Antoine Payal stated that pt's daughter Beatrice Parker is passed away and pt's  Naun Costello is passed away too. Antoine Payal stated pt has a brother but she doesn't know the information for him. Antoine Payal stated that pt's grandchildren are with her and one of them is 23 (Ham) and the other is 15. Updated contacts to note that daughter/ is passed away    Met with: pt's friend Antoine Moe via phone call and pt at bedside    [facility]    Financial  [place] Medicare and [facility] required for SNF : Y          3 night stay required -  N    ADLS  Support Systems/Care Needs:    Transportation: EMS transportation    Meal Preparation: staff at [facility]: pt lives at [facility]  Steps: 0  Intent for return to present living arrangements: Yes  Identified Issues: [address] [facility] with [address] : No Agency:(Services)     Passport/Waiver : No  :                      Phone Number:    Passport/Waiver Services: n/a          Durable Medical Equiptment   DME Provider: provided by [facility]  Equipment:   Walker___Cane___RTS___ BSC___Shower Chair___Hospital Bed___W/C____Other________  02 at ____Liter(s)---wears(frequency)_______ [facility] ___ CPAP___ BiPap___   N/A____    Home O2 Use :  per facility if needed      Community Service Affiliation  Dialysis:  No    Agency:  Location:  Dialysis Schedule:  Phone:   Fax:     Other
INTERDISCIPLINARY PLAN OF CARE CONFERENCE    Date/Time: 10/10/2022 9:30 AM  Completed by: FEMI Alonso   Case Management      Patient Name:  Gertrude Sterling  YOB: 1950  Admitting Diagnosis: Sepsis [facility]) [A41.9]     Admit Date/Time:  10/5/2022 10:00 PM    Chart reviewed. Interdisciplinary team contacted or reviewed plan related to patient progress and discharge plans. Disciplines included Case Management, Nursing, and Dietitian. Current Status:ongoing   PT/OT recommendation for discharge plan of care: n/a    Expected D/C Disposition:  [facility])    Discharge Plan Comments: Chart review completed. Completed Interdisciplinary rounds with ICU staff. Pt is from [facility] and is a bed hold. Pt is planning on returning to [facility]. CM will continue to follow and assist with developing discharge plans pending medical progress. Please notify CM if needs or concerns arise.     Home O2 in place on admit: per facility if needed
INTERDISCIPLINARY PLAN OF CARE CONFERENCE    Date/Time: 10/7/2022 11:31 AM  Completed by: FEMI Morse  Case Management      Patient Name:  Tawnya Walker  YOB: 1950  Admitting Diagnosis: Sepsis [facility]) [A41.9]     Admit Date/Time:  10/5/2022 10:00 PM    Chart reviewed. Interdisciplinary team contacted or reviewed plan related to patient progress and discharge plans. Disciplines included Case Management, Nursing, and Dietitian. Current Status:ongoing   PT/OT recommendation for discharge plan of care: n/a    Expected D/C Disposition:  [facility])    Discharge Plan Comments: Chart review completed. Spoke with RN who is aware [facility] stated pt's contact is her brother and friend Mick Nguyen.      Pt is planning on returning to [facility] and is a bed hold    Home O2 in place on admit: per [facility] if needed
Pt discharged to [facility] 10/12/2022 after CM left for the day. Chula Si at [facility] and pt's brother was notified earlier in the day by CM.      Dione DUFF, JESSICA-S
able to leave. Addendum at 3:23pm: [address] signed by MD; Lashell Pressley at [address] [facility] aware and stated she will pull the information and start the [place] precert since she is returning on IV ABX. She stated pt can return under her Medicaid.

## 2022-11-03 ENCOUNTER — APPOINTMENT (OUTPATIENT)
Dept: GENERAL RADIOLOGY | Age: 72
DRG: 870 | End: 2022-11-03
Payer: COMMERCIAL

## 2022-11-03 ENCOUNTER — HOSPITAL ENCOUNTER (INPATIENT)
Age: 72
LOS: 9 days | Discharge: NURSING, REHAB OR LONG-TERM CARE | DRG: 870 | End: 2022-11-12
Attending: EMERGENCY MEDICINE | Admitting: HOSPITALIST
Payer: COMMERCIAL

## 2022-11-03 DIAGNOSIS — J96.00 ACUTE RESPIRATORY FAILURE, UNSPECIFIED WHETHER WITH HYPOXIA OR HYPERCAPNIA (HCC): ICD-10-CM

## 2022-11-03 DIAGNOSIS — R73.9 HYPERGLYCEMIA: ICD-10-CM

## 2022-11-03 DIAGNOSIS — A41.9 SEPTIC SHOCK (HCC): Primary | ICD-10-CM

## 2022-11-03 DIAGNOSIS — Z16.12 ESBL (EXTENDED SPECTRUM BETA-LACTAMASE) PRODUCING BACTERIA INFECTION: ICD-10-CM

## 2022-11-03 DIAGNOSIS — R65.21 SEPTIC SHOCK (HCC): Primary | ICD-10-CM

## 2022-11-03 DIAGNOSIS — A49.9 ESBL (EXTENDED SPECTRUM BETA-LACTAMASE) PRODUCING BACTERIA INFECTION: ICD-10-CM

## 2022-11-03 DIAGNOSIS — B37.2 YEAST DERMATITIS: ICD-10-CM

## 2022-11-03 DIAGNOSIS — E87.0 HYPERNATREMIA: ICD-10-CM

## 2022-11-03 DIAGNOSIS — I48.91 ATRIAL FIBRILLATION WITH RAPID VENTRICULAR RESPONSE (HCC): ICD-10-CM

## 2022-11-03 DIAGNOSIS — N39.0 URINARY TRACT INFECTION WITH HEMATURIA, SITE UNSPECIFIED: ICD-10-CM

## 2022-11-03 DIAGNOSIS — R31.9 URINARY TRACT INFECTION WITH HEMATURIA, SITE UNSPECIFIED: ICD-10-CM

## 2022-11-03 LAB
A/G RATIO: 0.6 (ref 1.1–2.2)
A/G RATIO: 0.7 (ref 1.1–2.2)
ALBUMIN SERPL-MCNC: 2.4 G/DL (ref 3.4–5)
ALBUMIN SERPL-MCNC: 2.8 G/DL (ref 3.4–5)
ALP BLD-CCNC: 80 U/L (ref 40–129)
ALP BLD-CCNC: 83 U/L (ref 40–129)
ALT SERPL-CCNC: <5 U/L (ref 10–40)
ALT SERPL-CCNC: <5 U/L (ref 10–40)
ANION GAP SERPL CALCULATED.3IONS-SCNC: 13 MMOL/L (ref 3–16)
ANION GAP SERPL CALCULATED.3IONS-SCNC: 15 MMOL/L (ref 3–16)
AST SERPL-CCNC: 8 U/L (ref 15–37)
AST SERPL-CCNC: 9 U/L (ref 15–37)
BACTERIA: ABNORMAL /HPF
BASE EXCESS VENOUS: 0.7 MMOL/L (ref -3–3)
BASOPHILS ABSOLUTE: 0.1 K/UL (ref 0–0.2)
BASOPHILS RELATIVE PERCENT: 0.6 %
BETA-HYDROXYBUTYRATE: 0.1 MMOL/L (ref 0–0.27)
BILIRUB SERPL-MCNC: 0.3 MG/DL (ref 0–1)
BILIRUB SERPL-MCNC: 0.3 MG/DL (ref 0–1)
BILIRUBIN URINE: NEGATIVE
BLOOD, URINE: ABNORMAL
BUN BLDV-MCNC: 71 MG/DL (ref 7–20)
BUN BLDV-MCNC: 77 MG/DL (ref 7–20)
CALCIUM SERPL-MCNC: 8 MG/DL (ref 8.3–10.6)
CALCIUM SERPL-MCNC: 8.2 MG/DL (ref 8.3–10.6)
CARBOXYHEMOGLOBIN: 3 % (ref 0–1.5)
CHLORIDE BLD-SCNC: 117 MMOL/L (ref 99–110)
CHLORIDE BLD-SCNC: 119 MMOL/L (ref 99–110)
CLARITY: ABNORMAL
CO2: 26 MMOL/L (ref 21–32)
CO2: 30 MMOL/L (ref 21–32)
COLOR: YELLOW
CREAT SERPL-MCNC: 1.9 MG/DL (ref 0.6–1.2)
CREAT SERPL-MCNC: 2 MG/DL (ref 0.6–1.2)
CREATININE URINE: 36.1 MG/DL (ref 28–259)
EOSINOPHILS ABSOLUTE: 0.3 K/UL (ref 0–0.6)
EOSINOPHILS RELATIVE PERCENT: 2.1 %
GFR SERPL CREATININE-BSD FRML MDRD: 26 ML/MIN/{1.73_M2}
GFR SERPL CREATININE-BSD FRML MDRD: 27 ML/MIN/{1.73_M2}
GLUCOSE BLD-MCNC: 250 MG/DL (ref 70–99)
GLUCOSE BLD-MCNC: 426 MG/DL (ref 70–99)
GLUCOSE BLD-MCNC: >600 MG/DL (ref 70–99)
GLUCOSE URINE: 500 MG/DL
HCO3 VENOUS: 26 MMOL/L (ref 23–29)
HCT VFR BLD CALC: 39 % (ref 36–48)
HEMOGLOBIN: 12 G/DL (ref 12–16)
INFLUENZA A: NOT DETECTED
INFLUENZA B: NOT DETECTED
KETONES, URINE: NEGATIVE MG/DL
LACTIC ACID, SEPSIS: 4.1 MMOL/L (ref 0.4–1.9)
LACTIC ACID, SEPSIS: 4.2 MMOL/L (ref 0.4–1.9)
LEUKOCYTE ESTERASE, URINE: ABNORMAL
LIPASE: 14 U/L (ref 13–60)
LYMPHOCYTES ABSOLUTE: 3.5 K/UL (ref 1–5.1)
LYMPHOCYTES RELATIVE PERCENT: 22.6 %
MAGNESIUM: 2.1 MG/DL (ref 1.8–2.4)
MCH RBC QN AUTO: 28.1 PG (ref 26–34)
MCHC RBC AUTO-ENTMCNC: 30.7 G/DL (ref 31–36)
MCV RBC AUTO: 91.7 FL (ref 80–100)
METHEMOGLOBIN VENOUS: 0.3 %
MICROSCOPIC EXAMINATION: YES
MONOCYTES ABSOLUTE: 0.8 K/UL (ref 0–1.3)
MONOCYTES RELATIVE PERCENT: 5.5 %
NEUTROPHILS ABSOLUTE: 10.7 K/UL (ref 1.7–7.7)
NEUTROPHILS RELATIVE PERCENT: 69.2 %
NITRITE, URINE: NEGATIVE
O2 SAT, VEN: 91 %
O2 THERAPY: ABNORMAL
OSMOLALITY URINE: 365 MOSM/KG (ref 390–1070)
OSMOLALITY: 377 MOSM/KG (ref 280–301)
PCO2, VEN: 44.4 MMHG (ref 40–50)
PDW BLD-RTO: 17.3 % (ref 12.4–15.4)
PERFORMED ON: ABNORMAL
PH UA: 6 (ref 5–8)
PH VENOUS: 7.39 (ref 7.35–7.45)
PLATELET # BLD: 241 K/UL (ref 135–450)
PMV BLD AUTO: 10 FL (ref 5–10.5)
PO2, VEN: 60.1 MMHG (ref 25–40)
POTASSIUM REFLEX MAGNESIUM: 3.4 MMOL/L (ref 3.5–5.1)
POTASSIUM REFLEX MAGNESIUM: 4 MMOL/L (ref 3.5–5.1)
PROTEIN UA: 30 MG/DL
RBC # BLD: 4.26 M/UL (ref 4–5.2)
RBC UA: ABNORMAL /HPF (ref 0–4)
SARS-COV-2 RNA, RT PCR: NOT DETECTED
SODIUM BLD-SCNC: 160 MMOL/L (ref 136–145)
SODIUM BLD-SCNC: 160 MMOL/L (ref 136–145)
SODIUM URINE: 58 MMOL/L
SPECIFIC GRAVITY UA: 1.01 (ref 1–1.03)
TCO2 CALC VENOUS: 27 MMOL/L
TOTAL PROTEIN: 6.4 G/DL (ref 6.4–8.2)
TOTAL PROTEIN: 6.8 G/DL (ref 6.4–8.2)
TROPONIN: 0.1 NG/ML
TROPONIN: 0.13 NG/ML
URINE REFLEX TO CULTURE: YES
URINE TYPE: ABNORMAL
UROBILINOGEN, URINE: 0.2 E.U./DL
WBC # BLD: 15.4 K/UL (ref 4–11)
WBC UA: >100 /HPF (ref 0–5)
YEAST: PRESENT /HPF

## 2022-11-03 PROCEDURE — 96366 THER/PROPH/DIAG IV INF ADDON: CPT

## 2022-11-03 PROCEDURE — 71045 X-RAY EXAM CHEST 1 VIEW: CPT

## 2022-11-03 PROCEDURE — 96361 HYDRATE IV INFUSION ADD-ON: CPT

## 2022-11-03 PROCEDURE — 93005 ELECTROCARDIOGRAM TRACING: CPT | Performed by: EMERGENCY MEDICINE

## 2022-11-03 PROCEDURE — 6360000002 HC RX W HCPCS: Performed by: EMERGENCY MEDICINE

## 2022-11-03 PROCEDURE — 05H533Z INSERTION OF INFUSION DEVICE INTO RIGHT SUBCLAVIAN VEIN, PERCUTANEOUS APPROACH: ICD-10-PCS | Performed by: INTERNAL MEDICINE

## 2022-11-03 PROCEDURE — 2000000000 HC ICU R&B

## 2022-11-03 PROCEDURE — 36556 INSERT NON-TUNNEL CV CATH: CPT

## 2022-11-03 PROCEDURE — 94002 VENT MGMT INPAT INIT DAY: CPT

## 2022-11-03 PROCEDURE — 31500 INSERT EMERGENCY AIRWAY: CPT

## 2022-11-03 PROCEDURE — 83690 ASSAY OF LIPASE: CPT

## 2022-11-03 PROCEDURE — 2580000003 HC RX 258: Performed by: EMERGENCY MEDICINE

## 2022-11-03 PROCEDURE — 0BH17EZ INSERTION OF ENDOTRACHEAL AIRWAY INTO TRACHEA, VIA NATURAL OR ARTIFICIAL OPENING: ICD-10-PCS | Performed by: INTERNAL MEDICINE

## 2022-11-03 PROCEDURE — 96367 TX/PROPH/DG ADDL SEQ IV INF: CPT

## 2022-11-03 PROCEDURE — 83605 ASSAY OF LACTIC ACID: CPT

## 2022-11-03 PROCEDURE — 84484 ASSAY OF TROPONIN QUANT: CPT

## 2022-11-03 PROCEDURE — 80053 COMPREHEN METABOLIC PANEL: CPT

## 2022-11-03 PROCEDURE — 96365 THER/PROPH/DIAG IV INF INIT: CPT

## 2022-11-03 PROCEDURE — 36415 COLL VENOUS BLD VENIPUNCTURE: CPT

## 2022-11-03 PROCEDURE — 6370000000 HC RX 637 (ALT 250 FOR IP)

## 2022-11-03 PROCEDURE — 83735 ASSAY OF MAGNESIUM: CPT

## 2022-11-03 PROCEDURE — 82570 ASSAY OF URINE CREATININE: CPT

## 2022-11-03 PROCEDURE — 83930 ASSAY OF BLOOD OSMOLALITY: CPT

## 2022-11-03 PROCEDURE — 83036 HEMOGLOBIN GLYCOSYLATED A1C: CPT

## 2022-11-03 PROCEDURE — 87086 URINE CULTURE/COLONY COUNT: CPT

## 2022-11-03 PROCEDURE — 81001 URINALYSIS AUTO W/SCOPE: CPT

## 2022-11-03 PROCEDURE — 85025 COMPLETE CBC W/AUTO DIFF WBC: CPT

## 2022-11-03 PROCEDURE — 87040 BLOOD CULTURE FOR BACTERIA: CPT

## 2022-11-03 PROCEDURE — 2500000003 HC RX 250 WO HCPCS: Performed by: EMERGENCY MEDICINE

## 2022-11-03 PROCEDURE — 82803 BLOOD GASES ANY COMBINATION: CPT

## 2022-11-03 PROCEDURE — 87636 SARSCOV2 & INF A&B AMP PRB: CPT

## 2022-11-03 PROCEDURE — 87150 DNA/RNA AMPLIFIED PROBE: CPT

## 2022-11-03 PROCEDURE — 99285 EMERGENCY DEPT VISIT HI MDM: CPT

## 2022-11-03 PROCEDURE — 6370000000 HC RX 637 (ALT 250 FOR IP): Performed by: EMERGENCY MEDICINE

## 2022-11-03 PROCEDURE — 84300 ASSAY OF URINE SODIUM: CPT

## 2022-11-03 PROCEDURE — 82010 KETONE BODYS QUAN: CPT

## 2022-11-03 PROCEDURE — 5A1955Z RESPIRATORY VENTILATION, GREATER THAN 96 CONSECUTIVE HOURS: ICD-10-PCS | Performed by: HOSPITALIST

## 2022-11-03 PROCEDURE — 83935 ASSAY OF URINE OSMOLALITY: CPT

## 2022-11-03 RX ORDER — SODIUM CHLORIDE 450 MG/100ML
INJECTION, SOLUTION INTRAVENOUS CONTINUOUS
Status: ON HOLD | COMMUNITY
End: 2023-04-05 | Stop reason: ALTCHOICE

## 2022-11-03 RX ORDER — LORAZEPAM 0.5 MG/1
0.5 TABLET ORAL EVERY 12 HOURS PRN
Status: ON HOLD | COMMUNITY
End: 2022-11-12 | Stop reason: HOSPADM

## 2022-11-03 RX ORDER — OXYCODONE HYDROCHLORIDE AND ACETAMINOPHEN 5; 325 MG/1; MG/1
1 TABLET ORAL 4 TIMES DAILY
Status: ON HOLD | COMMUNITY
End: 2022-11-12 | Stop reason: HOSPADM

## 2022-11-03 RX ORDER — PROPOFOL 10 MG/ML
5-50 INJECTION, EMULSION INTRAVENOUS CONTINUOUS
Status: DISCONTINUED | OUTPATIENT
Start: 2022-11-03 | End: 2022-11-04 | Stop reason: SDUPTHER

## 2022-11-03 RX ORDER — NYSTATIN 100000 [USP'U]/G
POWDER TOPICAL 2 TIMES DAILY
Status: ON HOLD | COMMUNITY
End: 2023-04-05 | Stop reason: ALTCHOICE

## 2022-11-03 RX ORDER — ACETAMINOPHEN 500 MG
1000 TABLET ORAL ONCE
Status: COMPLETED | OUTPATIENT
Start: 2022-11-03 | End: 2022-11-03

## 2022-11-03 RX ORDER — 0.9 % SODIUM CHLORIDE 0.9 %
1000 INTRAVENOUS SOLUTION INTRAVENOUS ONCE
Status: COMPLETED | OUTPATIENT
Start: 2022-11-03 | End: 2022-11-03

## 2022-11-03 RX ORDER — ACETAMINOPHEN 500 MG
TABLET ORAL
Status: COMPLETED
Start: 2022-11-03 | End: 2022-11-03

## 2022-11-03 RX ORDER — DEXTROSE AND SODIUM CHLORIDE 5; .45 G/100ML; G/100ML
1000 INJECTION, SOLUTION INTRAVENOUS CONTINUOUS
Status: DISCONTINUED | OUTPATIENT
Start: 2022-11-03 | End: 2022-11-04

## 2022-11-03 RX ORDER — ROCURONIUM BROMIDE 10 MG/ML
INJECTION, SOLUTION INTRAVENOUS DAILY PRN
Status: COMPLETED | OUTPATIENT
Start: 2022-11-03 | End: 2022-11-03

## 2022-11-03 RX ORDER — ETOMIDATE 2 MG/ML
INJECTION INTRAVENOUS DAILY PRN
Status: COMPLETED | OUTPATIENT
Start: 2022-11-03 | End: 2022-11-03

## 2022-11-03 RX ORDER — DIVALPROEX SODIUM 125 MG/1
125 CAPSULE, COATED PELLETS ORAL
COMMUNITY

## 2022-11-03 RX ORDER — CLINDAMYCIN HYDROCHLORIDE 300 MG/1
300 CAPSULE ORAL 2 TIMES DAILY
Status: ON HOLD | COMMUNITY
End: 2022-11-12 | Stop reason: HOSPADM

## 2022-11-03 RX ADMIN — Medication 1000 MG: at 18:47

## 2022-11-03 RX ADMIN — VANCOMYCIN HYDROCHLORIDE 1000 MG: 1 INJECTION, POWDER, LYOPHILIZED, FOR SOLUTION INTRAVENOUS at 19:36

## 2022-11-03 RX ADMIN — PROPOFOL 5 MCG/KG/MIN: 10 INJECTION, EMULSION INTRAVENOUS at 18:20

## 2022-11-03 RX ADMIN — MEROPENEM 1000 MG: 1 INJECTION, POWDER, FOR SOLUTION INTRAVENOUS at 18:56

## 2022-11-03 RX ADMIN — ACETAMINOPHEN 1000 MG: 500 TABLET ORAL at 18:47

## 2022-11-03 RX ADMIN — SODIUM CHLORIDE 1000 ML: 9 INJECTION, SOLUTION INTRAVENOUS at 18:14

## 2022-11-03 RX ADMIN — ROCURONIUM BROMIDE 100 MG: 10 INJECTION, SOLUTION INTRAVENOUS at 17:47

## 2022-11-03 RX ADMIN — NOREPINEPHRINE BITARTRATE 20 MCG/MIN: 1 INJECTION INTRAVENOUS at 18:13

## 2022-11-03 RX ADMIN — ETOMIDATE INJECTION 20 MG: 2 SOLUTION INTRAVENOUS at 17:46

## 2022-11-03 RX ADMIN — SODIUM CHLORIDE 1000 ML: 9 INJECTION, SOLUTION INTRAVENOUS at 20:39

## 2022-11-03 RX ADMIN — INSULIN HUMAN 10 UNITS: 100 INJECTION, SOLUTION PARENTERAL at 19:31

## 2022-11-03 RX ADMIN — DEXTROSE AND SODIUM CHLORIDE 1000 ML: 5; 450 INJECTION, SOLUTION INTRAVENOUS at 20:39

## 2022-11-03 ASSESSMENT — PULMONARY FUNCTION TESTS
PIF_VALUE: 19
PIF_VALUE: 18

## 2022-11-03 NOTE — PROGRESS NOTES
Patient intubated with a 7.5 @ 22cm lip. Bilateral breath sounds with good color change and chest rise.

## 2022-11-03 NOTE — ED NOTES
LAB CALLED WITH CRITICAL RESULTS OF LACTIC ACID 4.2. DR Rhys Rodríguez INFORMED      Amanda Reid, VISHAL  11/03/22 1910

## 2022-11-03 NOTE — ED NOTES
1921 Call placed to Nephrology      Daniel Tay  11/03/22 1924 2005 consult completed with call back from Dr. Renae Douglas speaking with Dr. Leon Canales  11/03/22 2008

## 2022-11-03 NOTE — ED PROVIDER NOTES
Emergency Department Provider Note  Location: [place] [address] ED  11/3/2022     Patient Identification  Annamaria Boggs is a 66 y.o. female    Chief Complaint  Abnormal Lab (Pt to ER abnormal labs. Upon triage pt hypotensive and became unresponsive moved from room 9 to 1. )      HPI    (History provided by patient)    Patient is a 66 y.o. female with a significant PMHx of diabetes, CHF, constipation, COPD, and multiple other comorbidities as listed below, that presents emergency department today next-door from the nursing home with concerns of abnormal labs; hyponatremic to 164, uremic to 60s, and with hyperglycemia. Point-of-care glucose in the rig was over 500, and between getting to the from the nursing home to here, patient becomes unresponsive. Initially, per staff in the room, she was talking, but on my evaluation very soon after patient arrives, patient only says 1 word answers, and eventually becomes intermittently apneic. Patient was moved to the resuscitation bay, where she was subsequently intubated; history is limited. Additionally, she arrives emergency department with blood pressures in the systolic of 29O. Patient arrives emergency department with full code paperwork. Chart review, patient was discharged home from this hospital after an ICU stay on 10/12/2022 with hypernatremia, gram-negative bacteremia had been on meropenem, A. fib with RVR, type 2 diabetes, only on sliding scale, and schizoaffective disorder. I have reviewed the following nursing documentation:  Allergies:    Allergies   Allergen Reactions    Fish-Derived Products      Food allergy    Iodine     Mushroom Extract Complex     Onion     Other      seafood       Past medical history:  has a past medical history of Acute diastolic congestive heart failure ([address] (7/37/0687), Acute diastolic heart failure ([address] Acute respiratory failure ([address] Adjustment disorder with mixed anxiety and depressed mood, Allergic rhinitis, Alzheimer's dementia ([facility] Arachnoid cyst (5/23/2005), Atrial fibrillation ([facility] CHF (congestive heart failure) ([facility] [address] [facility] Chronic back pain, Constipation, COPD (chronic obstructive pulmonary disease) ([facility] [address] Diabetes mellitus ([facility] [address] [facility] Diskitis (10/6/2011), Disseminated superficial actinic porokeratosis (DSAP), Edema, ESBL (extended spectrum beta-lactamase) producing bacteria infection (04/17/2020), Hypertension, Hypo-osmolality and hyponatremia, Major depressive disorder, Morbid obesity ([facility] [facility] Muscle weakness, Osteomyelitis of spine ([facility] [address] [facility] (10/6/2011), Overactive bladder, Schizoaffective disorder ([facility] [address] Seizures ([facility] [address] Urinary tract infection without hematuria, and Xerosis cutis. Past surgical history:  has a past surgical history that includes Tubal ligation; knee surgery; Tonsillectomy; and IR MIDLINE CATH (10/10/2022). Home medications:   Prior to Admission medications    Medication Sig Start Date End Date Taking?  Authorizing Provider   dilTIAZem (CARDIZEM CD) 240 MG extended release capsule Take 1 capsule by mouth daily 10/12/22 11/11/22  Juan Daniel Awan MD   miconazole nitrate 2 % OINT Apply topically 2 times daily 10/12/22   Juan Daniel Awan MD   insulin lispro, 1 Unit Dial, (HUMALOG KWIKPEN) 100 UNIT/ML SOPN Inject 10 Units into the skin 3 times daily (before meals) 10/12/22   Juan Daniel Awan MD   busPIRone (BUSPAR) 5 MG tablet Take 5 mg by mouth 2 times daily    Historical Provider, MD   DULoxetine (CYMBALTA) 60 MG extended release capsule Take 60 mg by mouth daily    Historical Provider, MD   acetaminophen (TYLENOL) 325 MG tablet Take 650 mg by mouth every 6 hours as needed for Pain    Historical Provider, MD   divalproex (DEPAKOTE) 250 MG DR tablet Take 250 mg by mouth 3 times daily    Historical Provider, MD   furosemide (LASIX) 40 MG tablet Take 40 mg by mouth daily    Historical Provider, MD   melatonin 3 MG TABS tablet Take 3 mg by mouth daily    Historical Provider, MD   potassium chloride (MICRO-K) 10 MEQ extended release capsule Take 10 mEq by mouth daily    Historical Provider, MD   tiotropium (SPIRIVA) 18 MCG inhalation capsule Inhale 18 mcg into the lungs daily    Historical Provider, MD   Cholecalciferol (VITAMIN D3) 1.25 MG (26673 UT) CAPS Take 1 capsule by mouth daily    Historical Provider, MD   tiZANidine (ZANAFLEX) 2 MG tablet Take 2 mg by mouth in the morning, at noon, and at bedtime    Historical Provider, MD   empagliflozin (JARDIANCE) 10 MG tablet Take 10 mg by mouth daily    Historical Provider, MD   rivaroxaban (XARELTO) 20 MG TABS tablet Take 20 mg by mouth nightly 4/18/20   Chrissy Alfredo MD   gabapentin (NEURONTIN) 100 MG capsule Take 100 mg by mouth 3 times daily. 4/18/20   Chrissy Alfredo MD   insulin glargine (LANTUS SOLOSTAR) 100 UNIT/ML injection pen Inject 25 Units into the skin every morning 4/18/20   Chrissy Alfredo MD   insulin lispro (HUMALOG) 100 UNIT/ML injection vial Inject 0-6 Units into the skin 3 times daily (with meals) 4/18/20   Chrissy Alfredo MD   Sodium Phosphates (FLEET) 7-19 GM/118ML Place 1 enema rectally daily as needed    Historical Provider, MD   bisacodyl (DULCOLAX) 10 MG suppository Place 10 mg rectally daily as needed for Constipation    Historical Provider, MD   Blood Glucose Monitoring Suppl (BLOOD GLUCOSE SYSTEM RICHARD) KIT Check fingerstick blood sugars before meals and at bedtime 3/21/16   Wanda Villalta MD   INSULIN SYRINGE .5CC/29G (Kary Gloria INS SYR .5CC/29G) 29G X 1/2\" 0.5 ML MISC 1 each by Does not apply route daily 3/21/16   Wanda Villalta MD       Social history:  reports that she quit smoking about 18 years ago. She has never used smokeless tobacco. She reports that she does not drink alcohol and does not use drugs.     Family history:    Family History   Problem Relation Age of Onset    Diabetes Mother     High Blood Pressure Mother     Cancer Mother     Heart Disease Mother     High Blood Pressure Father     Heart Disease Father          ROS  Review of Systems   Unable to perform ROS: Acuity of condition       Exam  Vitals:    11/03/22 1744 11/03/22 1757 11/03/22 1800 11/03/22 1801   BP: (!) 84/59 (!) 66/51 (!) 70/50    Pulse: (!) 120 (!) 123 (!) 122    Resp: 14 22 23    SpO2: 99% 99% 99% 99%         Physical Exam  Vitals reviewed. Constitutional:       General: She is in acute distress. Appearance: She is ill-appearing and toxic-appearing. HENT:      Head: Normocephalic and atraumatic. Right Ear: External ear normal.      Left Ear: External ear normal.      Nose: Nose normal. No congestion. Mouth/Throat:      Mouth: Mucous membranes are dry. Pharynx: Oropharynx is clear. Comments: Cracked dry tongue    Eyes:      General:         Right eye: No discharge. Left eye: No discharge. Conjunctiva/sclera: Conjunctivae normal.      Pupils: Pupils are equal, round, and reactive to light. Cardiovascular:      Rate and Rhythm: Normal rate and regular rhythm. Pulmonary:      Effort: Respiratory distress present. Abdominal:      General: Abdomen is flat. There is no distension. Palpations: Abdomen is soft. Tenderness: There is no abdominal tenderness. Musculoskeletal:         General: No swelling or tenderness. Cervical back: Normal range of motion and neck supple. Skin:     General: Skin is warm and dry. Findings: Erythema present. No rash. Comments: What appears to be candidal rash to the breast folds, pannus, chin fold   Neurological:      General: No focal deficit present. Mental Status: She is alert. She is disoriented.       Comments: Initially saying \"help me,\" and answering one-word answers, quickly becomes unresponsive   Psychiatric:         Mood and Affect: Mood normal.         Behavior: Behavior normal.     ED Course    ED Medication Orders (From admission, onward)      Start Ordered     Status Ordering Provider    11/03/22 1815 11/03/22 1810  0.9 % sodium chloride bolus  ONCE         Acknowledged TAJ COX    11/03/22 1815 11/03/22 1810  propofol injection  CONTINUOUS        Question Answer Comment   Titrate Infusion? Yes    Initial Infusion Dose: 20 mcg/kg/min    Goal of Therapy: RASS of -1 to 0    Contact Provider if: New onset HR less than 50 bpm    Contact Provider if: New onset SBP less than 90 mmHg    Contact Provider if: Patient is receiving maximum dose and is not achieving the goal of therapy    Contact Provider if: Triglycerides greater than 500 mg/dL        Acknowledged TAJ COX    11/03/22 1800 11/03/22 1755  norepinephrine (LEVOPHED) 16 mg in dextrose 5 % 250 mL infusion  CONTINUOUS        Question Answer Comment   Titrate Infusion? Yes    Initial Infusion Dose: 5 mcg/min    Goal of Therapy is: MAP greater than 65 mmHg    Goal of Therapy is: SBP greater than 90 mmHg    Contact Provider if: Patient is receiving the maximum dose and is not achieving the goal of therapy        Acknowledged TAJ COX    11/03/22 1747 11/03/22 1747  rocuronium (ZEMURON) injection  DAILY PRN         Last MAR action: Given - by Andrei Youngblood on 11/03/22 at 1747 TAJ COX    11/03/22 1746 11/03/22 1747  etomidate (AMIDATE) injection  DAILY PRN         Last MAR action: Given - by Andrei Youngblood on 11/03/22 at 1746 TAJ COX            EKG  EKG obtained today in the emergency department shows atrial fibrillation with RVR; ventricular rate 119. QTc 438. No ST segment lobation, ST segment pression, hyperacute T waves. Radiology  XR CHEST PORTABLE    Result Date: 11/3/2022  EXAMINATION: ONE XRAY VIEW OF THE CHEST 11/3/2022 6:17 pm COMPARISON: None HISTORY: ORDERING SYSTEM PROVIDED HISTORY: cvc og placement TECHNOLOGIST PROVIDED HISTORY: Reason for exam:->cvc og placement Reason for Exam: abnormal lab, cvc og ng placement FINDINGS: Endotracheal tube projects 4.0 cm above the cynthia. Right central venous catheter over the distal SVC. The cardiac silhouette is enlarged. Evaluation limited by patient rotation. Mild bibasilar infiltrates or atelectasis and mild interstitial prominence. Enteric tube at the expected location of the proximal stomach. Unremarkable support devices. Mild bibasilar infiltrates or atelectasis.       Labs  Results for orders placed or performed during the hospital encounter of 11/03/22   COVID-19 & Influenza Combo    Specimen: Nasopharyngeal Swab   Result Value Ref Range    SARS-CoV-2 RNA, RT PCR NOT DETECTED NOT DETECTED    INFLUENZA A NOT DETECTED NOT DETECTED    INFLUENZA B NOT DETECTED NOT DETECTED   CBC with Auto Differential   Result Value Ref Range    WBC 15.4 (H) 4.0 - 11.0 K/uL    RBC 4.26 4.00 - 5.20 M/uL    Hemoglobin 12.0 12.0 - 16.0 g/dL    Hematocrit 39.0 36.0 - 48.0 %    MCV 91.7 80.0 - 100.0 fL    MCH 28.1 26.0 - 34.0 pg    MCHC 30.7 (L) 31.0 - 36.0 g/dL    RDW 17.3 (H) 12.4 - 15.4 %    Platelets 096 904 - 236 K/uL    MPV 10.0 5.0 - 10.5 fL    Neutrophils % 69.2 %    Lymphocytes % 22.6 %    Monocytes % 5.5 %    Eosinophils % 2.1 %    Basophils % 0.6 %    Neutrophils Absolute 10.7 (H) 1.7 - 7.7 K/uL    Lymphocytes Absolute 3.5 1.0 - 5.1 K/uL    Monocytes Absolute 0.8 0.0 - 1.3 K/uL    Eosinophils Absolute 0.3 0.0 - 0.6 K/uL    Basophils Absolute 0.1 0.0 - 0.2 K/uL   CMP w/ Reflex to MG   Result Value Ref Range    Sodium 160 (H) 136 - 145 mmol/L    Potassium reflex Magnesium 4.0 3.5 - 5.1 mmol/L    Chloride 117 (H) 99 - 110 mmol/L    CO2 30 21 - 32 mmol/L    Anion Gap 13 3 - 16    Glucose 426 (H) 70 - 99 mg/dL    BUN 77 (H) 7 - 20 mg/dL    Creatinine 2.0 (H) 0.6 - 1.2 mg/dL    Est, Glom Filt Rate 26 (A) >60    Calcium 8.2 (L) 8.3 - 10.6 mg/dL    Total Protein 6.8 6.4 - 8.2 g/dL    Albumin 2.8 (L) 3.4 - 5.0 g/dL    Albumin/Globulin Ratio 0.7 (L) 1.1 - 2.2    Total Bilirubin 0.3 0.0 - 1.0 mg/dL    Alkaline Phosphatase 83 40 - 129 U/L    ALT <5 (L) 10 - 40 U/L    AST 8 (L) 15 - 37 U/L   Lipase   Result Value Ref Range    Lipase 14.0 13.0 - 60.0 U/L   Lactate, Sepsis   Result Value Ref Range    Lactic Acid, Sepsis 4.2 (HH) 0.4 - 1.9 mmol/L   Lactate, Sepsis   Result Value Ref Range    Lactic Acid, Sepsis 4.1 (HH) 0.4 - 1.9 mmol/L   Blood gas, venous   Result Value Ref Range    pH, Jeffery 7.386 7.350 - 7.450    pCO2, Jeffery 44.4 40.0 - 50.0 mmHg    pO2, Jeffery 60.1 (H) 25.0 - 40.0 mmHg    HCO3, Venous 26.0 23.0 - 29.0 mmol/L    Base Excess, Jeffery 0.7 -3.0 - 3.0 mmol/L    O2 Sat, Jeffery 91 Not Established %    Carboxyhemoglobin 3.0 (H) 0.0 - 1.5 %    MetHgb, Jeffery 0.3 <1.5 %    TC02 (Calc), Jeffery 27 Not Established mmol/L    O2 Therapy Unknown    Troponin   Result Value Ref Range    Troponin 0.13 (H) <0.01 ng/mL   Urinalysis with Reflex to Culture    Specimen: Urine   Result Value Ref Range    Color, UA Yellow Straw/Yellow    Clarity, UA SL CLOUDY (A) Clear    Glucose, Ur 500 (A) Negative mg/dL    Bilirubin Urine Negative Negative    Ketones, Urine Negative Negative mg/dL    Specific Gravity, UA 1.015 1.005 - 1.030    Blood, Urine LARGE (A) Negative    pH, UA 6.0 5.0 - 8.0    Protein, UA 30 (A) Negative mg/dL    Urobilinogen, Urine 0.2 <2.0 E.U./dL    Nitrite, Urine Negative Negative    Leukocyte Esterase, Urine LARGE (A) Negative    Microscopic Examination YES     Urine Type NotGiven     Urine Reflex to Culture Yes    Beta-Hydroxybutyrate   Result Value Ref Range    Beta-Hydroxybutyrate 0.10 0.00 - 0.27 mmol/L   Osmolality   Result Value Ref Range    Osmolality 377 (H) 280 - 301 mOsm/kg   Creatinine, urine, random   Result Value Ref Range    Creatinine, Ur 36.1 28.0 - 259.0 mg/dL   Sodium, urine, random   Result Value Ref Range    Sodium, Ur 58 Not Established mmol/L   Osmolality, urine   Result Value Ref Range    Osmolality, Ur 365 (L) 390 - 1070 mOsm/kg   Microscopic Urinalysis   Result Value Ref Range    WBC, UA >100 (A) 0 - 5 /HPF    RBC, UA 5-10 (A) 0 - 4 /HPF    Bacteria, UA 1+ (A) None Seen /HPF    Yeast, UA Present (A) None Seen /HPF   Comprehensive Metabolic Panel w/ Reflex to MG   Result Value Ref Range    Sodium 160 (H) 136 - 145 mmol/L    Potassium reflex Magnesium 3.4 (L) 3.5 - 5.1 mmol/L    Chloride 119 (H) 99 - 110 mmol/L    CO2 26 21 - 32 mmol/L    Anion Gap 15 3 - 16    Glucose 250 (H) 70 - 99 mg/dL    BUN 71 (H) 7 - 20 mg/dL    Creatinine 1.9 (H) 0.6 - 1.2 mg/dL    Est, Glom Filt Rate 27 (A) >60    Calcium 8.0 (L) 8.3 - 10.6 mg/dL    Total Protein 6.4 6.4 - 8.2 g/dL    Albumin 2.4 (L) 3.4 - 5.0 g/dL    Albumin/Globulin Ratio 0.6 (L) 1.1 - 2.2    Total Bilirubin 0.3 0.0 - 1.0 mg/dL    Alkaline Phosphatase 80 40 - 129 U/L    ALT <5 (L) 10 - 40 U/L    AST 9 (L) 15 - 37 U/L   Troponin   Result Value Ref Range    Troponin 0.10 (H) <0.01 ng/mL   Magnesium   Result Value Ref Range    Magnesium 2.10 1.80 - 2.40 mg/dL   POCT Glucose   Result Value Ref Range    POC Glucose >600 (AA) 70 - 99 mg/dl    Performed on ACCU-CHEK    EKG 12 Lead   Result Value Ref Range    Ventricular Rate 119 BPM    Atrial Rate 96 BPM    QRS Duration 82 ms    Q-T Interval 312 ms    QTc Calculation (Bazett) 438 ms    R Axis 74 degrees    T Axis 263 degrees    Diagnosis       Atrial fibrillation with rapid ventricular responseST & T wave abnormality, consider inferior ischemia or digitalis effectST & T wave abnormality, consider anterolateral ischemia or digitalis effectAbnormal ECGWhen compared with ECG of 12-OCT-2022 06:28,No significant change was found         Procedures  Intubation    Date/Time: 11/3/2022 11:30 PM  Performed by: [facility]  Authorized by: Ross Abel MD     Consent:     Consent obtained:  Written and emergent situation    Consent given by: full code paperwork. Alternatives discussed:  No treatment  Universal protocol:     Patient identity confirmed:   Anonymous protocol, patient vented/unresponsive  Pre-procedure details:     Indication: failure to oxygenate, failure to protect airway, failure to ventilate and predicted flow, no pneumothorax on x-ray and placement verified by x-ray    Procedure completion:  Tolerated  Comments:      TAJ COX DO      [facility]  Patient seen and evaluated. Relevant records reviewed. - Patient is a 66 y.o. female with concerns of hyperglycemia by EMS, but recent hypernatremia, altered mental status, recent discharge during the emergency department today hypotensive, altered, eventually nearly apneic and completely unresponsive within a matter of minutes of arriving in the ER. While we moved to the resuscitation bay, patient received bag-valve-masking. Patient is a full code. Initial management in the emergency department includes intubation, central line, gentle IV hydration. X-ray with adequate placement. No large obvious pneumonia. Urine from Loya catheter is grossly purulent. Placed on propofol for sedation, Levophed through central line.   Suspect septic shock versus altered mental status from DKA on initial arrival.    #acute respiratory failure  -Was not hypoxic, not hypercapnic on VBG, intubated for airway protection, altered mental status, GCS of 4 on initial evaluation  -Repeat VBG pending at time of admission     #septic shock--UTI ESBL  -Arrives hypotensive in the 57X systolic; central line and started on Levophed   -History of ESBL UTI, recent admission, started on chester in the ER as well as vanco  -Likely ID consult, as recently on chester  -Cultures pending  -Did not give 30 cc/kg bolus in the setting of hypernatremia; considered DDAVP but nephrology would like to trend; see below   -Deescalate vanco in setting of ARF  -Significant UOP; nearly 2L out within 1st hour of loya     #hypernatremia  -Back up to 160, had been discharged home on 10/12 at 139  -Discussed with nephrology; would like total of 2 L normal saline in the ER, Then D5 half-normal at 75 an hour  -Repeat CMP's every 6    #ARF  -Recent labs at discharge on 10/12 were normal   -BUN 77,  Cr 2.0, GFR 26 initially, all improving minimally on repeat CMP  -Again, no 30cc/kg fluid bolus; fluids as above  -Suspect renal injury, ATN, in the setting of organ dysfunction and septicemia, and hypoperfusion    #hyperglycemia  -Initially thought to be in DKA, however no anion gap, normal bicarb, no acidosis  -Given 10 units insulin IV in the ER  -Repeat CMP with a glucose of 250  -Consider HHS?; serum osm 365 though glucose measure on initial CMP is 426    #lactic acidosis  -non anion gap, normal Ph, normal bicarb    #elevated troponin  -In the setting of ARF, afb RVR, and septic shock, hypotension, likely demand  -No ischemic changes on EKG  -Downtrending from 0.13 to . 10 with ED management    #afib with RVR  -Appears to be chronic for patient  -No cardiogenic or vasoactive medication in the emergency department outside of Levophed    #yeast dermatitis   -I make note of this as in nearly every skin fold that I could have placed a central line, there some degree of red skin, uses what appears to be nystatin powder  -While I thoroughly cleanse the area on the right IJ, there is some degree of concern for not a completely sterile field  -Risks versus benefits considered of central line placement obviously outweigh possible contamination    As this patient requires further evaluation and management as above, this patient will be admitted to the hospital for subsequent care. I spoke with Dr. Nani Davey who accepts patient for admission. They are available to place admission orders. Medications   norepinephrine (LEVOPHED) 16 mg in dextrose 5 % 250 mL infusion (has no administration in time range)   0.9 % sodium chloride bolus (has no administration in time range)   propofol injection (has no administration in time range)   etomidate (AMIDATE) injection (20 mg IntraVENous Given 11/3/22 1746)   rocuronium (ZEMURON) injection (100 mg IntraVENous Given 11/3/22 1747)       Disposition:  Admit to CCU/ICU in critical condition.     Is this patient to (38 °C) (!) 121 20 100 % -- -- --   11/03/22 2040 108/66 100.4 °F (38 °C) (!) 129 22 100 % -- -- --   11/03/22 2117 -- -- (!) 129 -- -- -- -- --   11/03/22 2256 117/70 -- (!) 123 22 100 % -- -- --      Recent Labs     11/03/22  1735 11/03/22 2042   WBC 15.4*  --    CREATININE 2.0* 1.9*   BILITOT 0.3 0.3     --          Time Septic Shock Identified: [id number]    Fluid Resuscitation Rational: less than 30mL/kg because hypernatremia and cannot overcorrect NA, also ARF; IVF as MDM per nephro    Repeat lactate level: improving    Reassessment Exam:   I have reassessed tissue perfusion and hemodynamic status after fluid bolus at this time: minimally improving    Consult this encounter: nephrology, hospitalist    Clinical Impression:  1. Septic shock ([address]    2. Hypernatremia    3. Urinary tract infection with hematuria, site unspecified    4. ESBL (extended spectrum beta-lactamase) producing bacteria infection    5. Acute respiratory failure, unspecified whether with hypoxia or hypercapnia ([address]    6. Hyperglycemia    7. Atrial fibrillation with rapid ventricular response (HCC)        Blood pressure (!) 70/50, pulse (!) 122, resp. rate 23, SpO2 99 %. India Pete DO, am the primary attending of record and contributed the majority of evaluation and treatment of emergent care for this encounter. Total critical care time is 75 minutes, which excludes separately billable procedures and updating family. Time spent is specifically for management of the presenting complaint and symptoms initially, direct bedside care, reevaluation, review of records, and consultation. There was a high probability of clinically significant life-threatening deterioration in the patient's condition, which required my urgent intervention.      This chart was generated in part by using Dragon Dictation system and may contain errors related to that system including errors in grammar, punctuation, and spelling, as well as words and phrases that may be inappropriate. If there are any questions or concerns please feel free to contact the dictating provider for clarification.      TJA COX, DO  [facility]5 [facility], DO  11/03/22 2201 [place], DO  11/03/22 2201 [facility], DO  11/03/22 4751

## 2022-11-03 NOTE — CONSULTS
Pharmacy to dose vancomycin one time dose in the ED:    Dr Luigi Alcaraz requested pharmacy to dose vancomycin IV one time dose in the ED for UTI, sepsis to cover gram positive organisms, including MRSA on this 66year old female. HT 63 inches  WT 90.7 kg  IBW 52.4 kg Adjusted dosing wt 67.7 kg  BUN/SRCR pending    WBC 15.4                Tmax 101.3  Based on patient's age, weight, and renal function will give vancomycin 1000 mg IVPB X one dose in the ED. If patient is admitted and vancomycin is to be continued, please re-consult pharmacy to dose vancomycin as in-patient.

## 2022-11-04 PROBLEM — E87.0 HYPERNATREMIA: Status: ACTIVE | Noted: 2022-11-04

## 2022-11-04 PROBLEM — R65.21 SEPTIC SHOCK (HCC): Status: ACTIVE | Noted: 2022-11-03

## 2022-11-04 LAB
A/G RATIO: 0.6 (ref 1.1–2.2)
A/G RATIO: 0.7 (ref 1.1–2.2)
A/G RATIO: 0.7 (ref 1.1–2.2)
A/G RATIO: 0.8 (ref 1.1–2.2)
ALBUMIN SERPL-MCNC: 2.3 G/DL (ref 3.4–5)
ALBUMIN SERPL-MCNC: 2.3 G/DL (ref 3.4–5)
ALBUMIN SERPL-MCNC: 2.4 G/DL (ref 3.4–5)
ALBUMIN SERPL-MCNC: 2.5 G/DL (ref 3.4–5)
ALBUMIN SERPL-MCNC: 2.6 G/DL (ref 3.4–5)
ALBUMIN SERPL-MCNC: 2.6 G/DL (ref 3.4–5)
ALP BLD-CCNC: 63 U/L (ref 40–129)
ALP BLD-CCNC: 69 U/L (ref 40–129)
ALP BLD-CCNC: 74 U/L (ref 40–129)
ALP BLD-CCNC: 75 U/L (ref 40–129)
ALP BLD-CCNC: 77 U/L (ref 40–129)
ALT SERPL-CCNC: <5 U/L (ref 10–40)
ANION GAP SERPL CALCULATED.3IONS-SCNC: 11 MMOL/L (ref 3–16)
ANION GAP SERPL CALCULATED.3IONS-SCNC: 12 MMOL/L (ref 3–16)
ANION GAP SERPL CALCULATED.3IONS-SCNC: 12 MMOL/L (ref 3–16)
ANION GAP SERPL CALCULATED.3IONS-SCNC: 13 MMOL/L (ref 3–16)
ANION GAP SERPL CALCULATED.3IONS-SCNC: 7 MMOL/L (ref 3–16)
ANION GAP SERPL CALCULATED.3IONS-SCNC: 9 MMOL/L (ref 3–16)
AST SERPL-CCNC: 6 U/L (ref 15–37)
AST SERPL-CCNC: 6 U/L (ref 15–37)
AST SERPL-CCNC: 7 U/L (ref 15–37)
AST SERPL-CCNC: 7 U/L (ref 15–37)
AST SERPL-CCNC: 8 U/L (ref 15–37)
BASE EXCESS VENOUS: -1.1 MMOL/L (ref -3–3)
BASE EXCESS VENOUS: 3.6 MMOL/L (ref -3–3)
BASOPHILS ABSOLUTE: 0.1 K/UL (ref 0–0.2)
BASOPHILS RELATIVE PERCENT: 0.6 %
BILIRUB SERPL-MCNC: 0.3 MG/DL (ref 0–1)
BILIRUB SERPL-MCNC: 0.3 MG/DL (ref 0–1)
BILIRUB SERPL-MCNC: 0.4 MG/DL (ref 0–1)
BILIRUB SERPL-MCNC: 0.4 MG/DL (ref 0–1)
BILIRUB SERPL-MCNC: <0.2 MG/DL (ref 0–1)
BILIRUBIN DIRECT: <0.2 MG/DL (ref 0–0.3)
BILIRUBIN, INDIRECT: ABNORMAL MG/DL (ref 0–1)
BUN BLDV-MCNC: 38 MG/DL (ref 7–20)
BUN BLDV-MCNC: 43 MG/DL (ref 7–20)
BUN BLDV-MCNC: 48 MG/DL (ref 7–20)
BUN BLDV-MCNC: 57 MG/DL (ref 7–20)
BUN BLDV-MCNC: 64 MG/DL (ref 7–20)
BUN BLDV-MCNC: 71 MG/DL (ref 7–20)
CALCIUM SERPL-MCNC: 7.7 MG/DL (ref 8.3–10.6)
CALCIUM SERPL-MCNC: 7.7 MG/DL (ref 8.3–10.6)
CALCIUM SERPL-MCNC: 7.8 MG/DL (ref 8.3–10.6)
CALCIUM SERPL-MCNC: 7.9 MG/DL (ref 8.3–10.6)
CALCIUM SERPL-MCNC: 8.3 MG/DL (ref 8.3–10.6)
CALCIUM SERPL-MCNC: 8.4 MG/DL (ref 8.3–10.6)
CARBOXYHEMOGLOBIN: 0.9 % (ref 0–1.5)
CARBOXYHEMOGLOBIN: 0.9 % (ref 0–1.5)
CHLORIDE BLD-SCNC: 119 MMOL/L (ref 99–110)
CHLORIDE BLD-SCNC: 121 MMOL/L (ref 99–110)
CHLORIDE BLD-SCNC: 123 MMOL/L (ref 99–110)
CHLORIDE BLD-SCNC: 123 MMOL/L (ref 99–110)
CHLORIDE BLD-SCNC: 125 MMOL/L (ref 99–110)
CHLORIDE BLD-SCNC: 127 MMOL/L (ref 99–110)
CO2: 24 MMOL/L (ref 21–32)
CO2: 27 MMOL/L (ref 21–32)
CO2: 27 MMOL/L (ref 21–32)
CO2: 28 MMOL/L (ref 21–32)
CREAT SERPL-MCNC: 0.9 MG/DL (ref 0.6–1.2)
CREAT SERPL-MCNC: 0.9 MG/DL (ref 0.6–1.2)
CREAT SERPL-MCNC: 1.1 MG/DL (ref 0.6–1.2)
CREAT SERPL-MCNC: 1.4 MG/DL (ref 0.6–1.2)
CREAT SERPL-MCNC: 1.4 MG/DL (ref 0.6–1.2)
CREAT SERPL-MCNC: 1.7 MG/DL (ref 0.6–1.2)
EKG ATRIAL RATE: 96 BPM
EKG DIAGNOSIS: NORMAL
EKG Q-T INTERVAL: 312 MS
EKG QRS DURATION: 82 MS
EKG QTC CALCULATION (BAZETT): 438 MS
EKG R AXIS: 74 DEGREES
EKG T AXIS: 263 DEGREES
EKG VENTRICULAR RATE: 119 BPM
EOSINOPHILS ABSOLUTE: 0.2 K/UL (ref 0–0.6)
EOSINOPHILS RELATIVE PERCENT: 1.4 %
ESTIMATED AVERAGE GLUCOSE: 220.2 MG/DL
GFR SERPL CREATININE-BSD FRML MDRD: 31 ML/MIN/{1.73_M2}
GFR SERPL CREATININE-BSD FRML MDRD: 40 ML/MIN/{1.73_M2}
GFR SERPL CREATININE-BSD FRML MDRD: 40 ML/MIN/{1.73_M2}
GFR SERPL CREATININE-BSD FRML MDRD: 53 ML/MIN/{1.73_M2}
GFR SERPL CREATININE-BSD FRML MDRD: >60 ML/MIN/{1.73_M2}
GFR SERPL CREATININE-BSD FRML MDRD: >60 ML/MIN/{1.73_M2}
GLUCOSE BLD-MCNC: 233 MG/DL (ref 70–99)
GLUCOSE BLD-MCNC: 247 MG/DL (ref 70–99)
GLUCOSE BLD-MCNC: 257 MG/DL (ref 70–99)
GLUCOSE BLD-MCNC: 272 MG/DL (ref 70–99)
GLUCOSE BLD-MCNC: 276 MG/DL (ref 70–99)
GLUCOSE BLD-MCNC: 298 MG/DL (ref 70–99)
GLUCOSE BLD-MCNC: 331 MG/DL (ref 70–99)
GLUCOSE BLD-MCNC: 352 MG/DL (ref 70–99)
GLUCOSE BLD-MCNC: 361 MG/DL (ref 70–99)
GLUCOSE BLD-MCNC: 377 MG/DL (ref 70–99)
HBA1C MFR BLD: 9.3 %
HCO3 VENOUS: 24.9 MMOL/L (ref 23–29)
HCO3 VENOUS: 28.1 MMOL/L (ref 23–29)
HCT VFR BLD CALC: 36.1 % (ref 36–48)
HEMOGLOBIN: 11.1 G/DL (ref 12–16)
LACTIC ACID, SEPSIS: 1.5 MMOL/L (ref 0.4–1.9)
LYMPHOCYTES ABSOLUTE: 3.2 K/UL (ref 1–5.1)
LYMPHOCYTES RELATIVE PERCENT: 19.5 %
MAGNESIUM: 1.8 MG/DL (ref 1.8–2.4)
MAGNESIUM: 1.9 MG/DL (ref 1.8–2.4)
MAGNESIUM: 2 MG/DL (ref 1.8–2.4)
MAGNESIUM: 2 MG/DL (ref 1.8–2.4)
MCH RBC QN AUTO: 27.5 PG (ref 26–34)
MCHC RBC AUTO-ENTMCNC: 30.7 G/DL (ref 31–36)
MCV RBC AUTO: 89.6 FL (ref 80–100)
METHEMOGLOBIN VENOUS: 0.3 %
METHEMOGLOBIN VENOUS: 0.3 %
MONOCYTES ABSOLUTE: 1 K/UL (ref 0–1.3)
MONOCYTES RELATIVE PERCENT: 5.8 %
NEUTROPHILS ABSOLUTE: 12 K/UL (ref 1.7–7.7)
NEUTROPHILS RELATIVE PERCENT: 72.7 %
O2 CONTENT, VEN: 16 VOL %
O2 SAT, VEN: 90 %
O2 SAT, VEN: 92 %
O2 THERAPY: ABNORMAL
O2 THERAPY: ABNORMAL
PCO2, VEN: 41.9 MMHG (ref 40–50)
PCO2, VEN: 46.6 MMHG (ref 40–50)
PDW BLD-RTO: 16.9 % (ref 12.4–15.4)
PERFORMED ON: ABNORMAL
PH VENOUS: 7.35 (ref 7.35–7.45)
PH VENOUS: 7.44 (ref 7.35–7.45)
PHOSPHORUS: 2.1 MG/DL (ref 2.5–4.9)
PLATELET # BLD: 211 K/UL (ref 135–450)
PMV BLD AUTO: 9.8 FL (ref 5–10.5)
PO2, VEN: 60.2 MMHG (ref 25–40)
PO2, VEN: 60.9 MMHG (ref 25–40)
POTASSIUM REFLEX MAGNESIUM: 2.8 MMOL/L (ref 3.5–5.1)
POTASSIUM REFLEX MAGNESIUM: 3.4 MMOL/L (ref 3.5–5.1)
POTASSIUM REFLEX MAGNESIUM: 3.5 MMOL/L (ref 3.5–5.1)
POTASSIUM REFLEX MAGNESIUM: 3.5 MMOL/L (ref 3.5–5.1)
POTASSIUM REFLEX MAGNESIUM: 3.8 MMOL/L (ref 3.5–5.1)
POTASSIUM SERPL-SCNC: 3.8 MMOL/L (ref 3.5–5.1)
PROCALCITONIN: 0.22 NG/ML (ref 0–0.15)
RBC # BLD: 4.03 M/UL (ref 4–5.2)
REASON FOR REJECTION: NORMAL
REJECTED TEST: NORMAL
SODIUM BLD-SCNC: 158 MMOL/L (ref 136–145)
SODIUM BLD-SCNC: 160 MMOL/L (ref 136–145)
SODIUM BLD-SCNC: 160 MMOL/L (ref 136–145)
SODIUM BLD-SCNC: 161 MMOL/L (ref 136–145)
SODIUM BLD-SCNC: 162 MMOL/L (ref 136–145)
SODIUM BLD-SCNC: 163 MMOL/L (ref 136–145)
TCO2 CALC VENOUS: 26 MMOL/L
TCO2 CALC VENOUS: 29 MMOL/L
TOTAL PROTEIN: 5.5 G/DL (ref 6.4–8.2)
TOTAL PROTEIN: 5.5 G/DL (ref 6.4–8.2)
TOTAL PROTEIN: 5.7 G/DL (ref 6.4–8.2)
TOTAL PROTEIN: 6.2 G/DL (ref 6.4–8.2)
TOTAL PROTEIN: 6.3 G/DL (ref 6.4–8.2)
TROPONIN: 0.09 NG/ML
URINE CULTURE, ROUTINE: NORMAL
VANCOMYCIN RANDOM: 12 UG/ML
WBC # BLD: 16.5 K/UL (ref 4–11)

## 2022-11-04 PROCEDURE — 2580000003 HC RX 258: Performed by: INTERNAL MEDICINE

## 2022-11-04 PROCEDURE — 80053 COMPREHEN METABOLIC PANEL: CPT

## 2022-11-04 PROCEDURE — 99291 CRITICAL CARE FIRST HOUR: CPT | Performed by: INTERNAL MEDICINE

## 2022-11-04 PROCEDURE — 99233 SBSQ HOSP IP/OBS HIGH 50: CPT | Performed by: INTERNAL MEDICINE

## 2022-11-04 PROCEDURE — 2580000003 HC RX 258: Performed by: HOSPITALIST

## 2022-11-04 PROCEDURE — 87070 CULTURE OTHR SPECIMN AEROBIC: CPT

## 2022-11-04 PROCEDURE — 82803 BLOOD GASES ANY COMBINATION: CPT

## 2022-11-04 PROCEDURE — 84484 ASSAY OF TROPONIN QUANT: CPT

## 2022-11-04 PROCEDURE — 85025 COMPLETE CBC W/AUTO DIFF WBC: CPT

## 2022-11-04 PROCEDURE — 84145 PROCALCITONIN (PCT): CPT

## 2022-11-04 PROCEDURE — 83735 ASSAY OF MAGNESIUM: CPT

## 2022-11-04 PROCEDURE — 6370000000 HC RX 637 (ALT 250 FOR IP): Performed by: HOSPITALIST

## 2022-11-04 PROCEDURE — 2500000003 HC RX 250 WO HCPCS: Performed by: INTERNAL MEDICINE

## 2022-11-04 PROCEDURE — 83605 ASSAY OF LACTIC ACID: CPT

## 2022-11-04 PROCEDURE — 2500000003 HC RX 250 WO HCPCS: Performed by: HOSPITALIST

## 2022-11-04 PROCEDURE — 6360000002 HC RX W HCPCS: Performed by: HOSPITALIST

## 2022-11-04 PROCEDURE — 94003 VENT MGMT INPAT SUBQ DAY: CPT

## 2022-11-04 PROCEDURE — 2700000000 HC OXYGEN THERAPY PER DAY

## 2022-11-04 PROCEDURE — 87205 SMEAR GRAM STAIN: CPT

## 2022-11-04 PROCEDURE — 87641 MR-STAPH DNA AMP PROBE: CPT

## 2022-11-04 PROCEDURE — 6360000002 HC RX W HCPCS: Performed by: INTERNAL MEDICINE

## 2022-11-04 PROCEDURE — 2000000000 HC ICU R&B

## 2022-11-04 PROCEDURE — 36415 COLL VENOUS BLD VENIPUNCTURE: CPT

## 2022-11-04 PROCEDURE — 80202 ASSAY OF VANCOMYCIN: CPT

## 2022-11-04 PROCEDURE — 94761 N-INVAS EAR/PLS OXIMETRY MLT: CPT

## 2022-11-04 PROCEDURE — 93010 ELECTROCARDIOGRAM REPORT: CPT | Performed by: INTERNAL MEDICINE

## 2022-11-04 PROCEDURE — 6370000000 HC RX 637 (ALT 250 FOR IP): Performed by: INTERNAL MEDICINE

## 2022-11-04 RX ORDER — SODIUM CHLORIDE 9 MG/ML
INJECTION, SOLUTION INTRAVENOUS CONTINUOUS
Status: DISCONTINUED | OUTPATIENT
Start: 2022-11-04 | End: 2022-11-04

## 2022-11-04 RX ORDER — DIGOXIN 0.25 MG/ML
125 INJECTION INTRAMUSCULAR; INTRAVENOUS DAILY
Status: DISCONTINUED | OUTPATIENT
Start: 2022-11-04 | End: 2022-11-12 | Stop reason: HOSPADM

## 2022-11-04 RX ORDER — ONDANSETRON 2 MG/ML
4 INJECTION INTRAMUSCULAR; INTRAVENOUS EVERY 6 HOURS PRN
Status: DISCONTINUED | OUTPATIENT
Start: 2022-11-04 | End: 2022-11-12 | Stop reason: HOSPADM

## 2022-11-04 RX ORDER — PROPOFOL 10 MG/ML
5-50 INJECTION, EMULSION INTRAVENOUS CONTINUOUS
Status: DISCONTINUED | OUTPATIENT
Start: 2022-11-04 | End: 2022-11-08

## 2022-11-04 RX ORDER — POLYETHYLENE GLYCOL 3350 17 G/17G
17 POWDER, FOR SOLUTION ORAL DAILY PRN
Status: DISCONTINUED | OUTPATIENT
Start: 2022-11-04 | End: 2022-11-12 | Stop reason: HOSPADM

## 2022-11-04 RX ORDER — DEXTROSE MONOHYDRATE 100 MG/ML
INJECTION, SOLUTION INTRAVENOUS CONTINUOUS PRN
Status: DISCONTINUED | OUTPATIENT
Start: 2022-11-04 | End: 2022-11-12 | Stop reason: HOSPADM

## 2022-11-04 RX ORDER — INSULIN LISPRO 100 [IU]/ML
0-16 INJECTION, SOLUTION INTRAVENOUS; SUBCUTANEOUS EVERY 4 HOURS
Status: DISCONTINUED | OUTPATIENT
Start: 2022-11-04 | End: 2022-11-12 | Stop reason: HOSPADM

## 2022-11-04 RX ORDER — ONDANSETRON 4 MG/1
4 TABLET, ORALLY DISINTEGRATING ORAL EVERY 8 HOURS PRN
Status: DISCONTINUED | OUTPATIENT
Start: 2022-11-04 | End: 2022-11-12 | Stop reason: HOSPADM

## 2022-11-04 RX ORDER — SODIUM CHLORIDE 0.9 % (FLUSH) 0.9 %
5-40 SYRINGE (ML) INJECTION PRN
Status: DISCONTINUED | OUTPATIENT
Start: 2022-11-04 | End: 2022-11-12 | Stop reason: HOSPADM

## 2022-11-04 RX ORDER — POTASSIUM CHLORIDE 29.8 MG/ML
20 INJECTION INTRAVENOUS
Status: COMPLETED | OUTPATIENT
Start: 2022-11-04 | End: 2022-11-04

## 2022-11-04 RX ORDER — SODIUM CHLORIDE, SODIUM LACTATE, POTASSIUM CHLORIDE, CALCIUM CHLORIDE 600; 310; 30; 20 MG/100ML; MG/100ML; MG/100ML; MG/100ML
INJECTION, SOLUTION INTRAVENOUS ONCE
Status: COMPLETED | OUTPATIENT
Start: 2022-11-04 | End: 2022-11-04

## 2022-11-04 RX ORDER — ACETAMINOPHEN 650 MG/1
650 SUPPOSITORY RECTAL EVERY 6 HOURS PRN
Status: DISCONTINUED | OUTPATIENT
Start: 2022-11-04 | End: 2022-11-12 | Stop reason: HOSPADM

## 2022-11-04 RX ORDER — ACETAMINOPHEN 325 MG/1
650 TABLET ORAL EVERY 6 HOURS PRN
Status: DISCONTINUED | OUTPATIENT
Start: 2022-11-04 | End: 2022-11-12 | Stop reason: HOSPADM

## 2022-11-04 RX ORDER — DIVALPROEX SODIUM 125 MG/1
250 CAPSULE, COATED PELLETS ORAL EVERY 8 HOURS SCHEDULED
Status: DISCONTINUED | OUTPATIENT
Start: 2022-11-04 | End: 2022-11-12 | Stop reason: HOSPADM

## 2022-11-04 RX ORDER — SODIUM CHLORIDE 0.9 % (FLUSH) 0.9 %
5-40 SYRINGE (ML) INJECTION EVERY 12 HOURS SCHEDULED
Status: DISCONTINUED | OUTPATIENT
Start: 2022-11-04 | End: 2022-11-12 | Stop reason: HOSPADM

## 2022-11-04 RX ORDER — SODIUM CHLORIDE 9 MG/ML
INJECTION, SOLUTION INTRAVENOUS PRN
Status: DISCONTINUED | OUTPATIENT
Start: 2022-11-04 | End: 2022-11-12 | Stop reason: HOSPADM

## 2022-11-04 RX ORDER — INSULIN LISPRO 100 [IU]/ML
0-8 INJECTION, SOLUTION INTRAVENOUS; SUBCUTANEOUS
Status: DISCONTINUED | OUTPATIENT
Start: 2022-11-04 | End: 2022-11-04

## 2022-11-04 RX ORDER — DEXTROSE AND SODIUM CHLORIDE 5; .45 G/100ML; G/100ML
INJECTION, SOLUTION INTRAVENOUS CONTINUOUS
Status: DISCONTINUED | OUTPATIENT
Start: 2022-11-04 | End: 2022-11-05

## 2022-11-04 RX ORDER — INSULIN LISPRO 100 [IU]/ML
0-4 INJECTION, SOLUTION INTRAVENOUS; SUBCUTANEOUS NIGHTLY
Status: DISCONTINUED | OUTPATIENT
Start: 2022-11-04 | End: 2022-11-04

## 2022-11-04 RX ORDER — SODIUM CHLORIDE 9 MG/ML
INJECTION, SOLUTION INTRAVENOUS ONCE
Status: DISCONTINUED | OUTPATIENT
Start: 2022-11-04 | End: 2022-11-04

## 2022-11-04 RX ORDER — INSULIN GLARGINE 100 [IU]/ML
25 INJECTION, SOLUTION SUBCUTANEOUS EVERY MORNING
Status: DISCONTINUED | OUTPATIENT
Start: 2022-11-04 | End: 2022-11-12 | Stop reason: HOSPADM

## 2022-11-04 RX ADMIN — DIVALPROEX SODIUM 250 MG: 125 CAPSULE, COATED PELLETS ORAL at 21:03

## 2022-11-04 RX ADMIN — CEFEPIME 2000 MG: 2 INJECTION, POWDER, FOR SOLUTION INTRAVENOUS at 22:52

## 2022-11-04 RX ADMIN — CEFEPIME 2000 MG: 2 INJECTION, POWDER, FOR SOLUTION INTRAVENOUS at 11:18

## 2022-11-04 RX ADMIN — DEXTROSE AND SODIUM CHLORIDE: 5; 450 INJECTION, SOLUTION INTRAVENOUS at 13:13

## 2022-11-04 RX ADMIN — DIGOXIN 125 MCG: 250 INJECTION, SOLUTION INTRAMUSCULAR; INTRAVENOUS at 12:50

## 2022-11-04 RX ADMIN — INSULIN LISPRO 8 UNITS: 100 INJECTION, SOLUTION INTRAVENOUS; SUBCUTANEOUS at 21:01

## 2022-11-04 RX ADMIN — INSULIN LISPRO 4 UNITS: 100 INJECTION, SOLUTION INTRAVENOUS; SUBCUTANEOUS at 16:04

## 2022-11-04 RX ADMIN — MEROPENEM 1000 MG: 1 INJECTION, POWDER, FOR SOLUTION INTRAVENOUS at 03:22

## 2022-11-04 RX ADMIN — NOREPINEPHRINE BITARTRATE 4 MCG/MIN: 1 INJECTION INTRAVENOUS at 13:32

## 2022-11-04 RX ADMIN — INSULIN GLARGINE 25 UNITS: 100 INJECTION, SOLUTION SUBCUTANEOUS at 08:09

## 2022-11-04 RX ADMIN — NOREPINEPHRINE BITARTRATE 7 MCG/MIN: 1 INJECTION INTRAVENOUS at 03:39

## 2022-11-04 RX ADMIN — DEXTROSE AND SODIUM CHLORIDE: 5; 450 INJECTION, SOLUTION INTRAVENOUS at 23:15

## 2022-11-04 RX ADMIN — DEXTROSE AND SODIUM CHLORIDE: 5; 450 INJECTION, SOLUTION INTRAVENOUS at 18:10

## 2022-11-04 RX ADMIN — PROPOFOL 30 MCG/KG/MIN: 10 INJECTION, EMULSION INTRAVENOUS at 13:14

## 2022-11-04 RX ADMIN — SODIUM CHLORIDE, POTASSIUM CHLORIDE, SODIUM LACTATE AND CALCIUM CHLORIDE: 600; 310; 30; 20 INJECTION, SOLUTION INTRAVENOUS at 11:25

## 2022-11-04 RX ADMIN — INSULIN LISPRO 4 UNITS: 100 INJECTION, SOLUTION INTRAVENOUS; SUBCUTANEOUS at 08:09

## 2022-11-04 RX ADMIN — PROPOFOL 30 MCG/KG/MIN: 10 INJECTION, EMULSION INTRAVENOUS at 18:11

## 2022-11-04 RX ADMIN — RIVAROXABAN 15 MG: 15 TABLET, FILM COATED ORAL at 03:24

## 2022-11-04 RX ADMIN — SODIUM CHLORIDE: 9 INJECTION, SOLUTION INTRAVENOUS at 02:09

## 2022-11-04 RX ADMIN — MUPIROCIN: 20 OINTMENT TOPICAL at 07:53

## 2022-11-04 RX ADMIN — ACETAMINOPHEN 650 MG: 325 TABLET ORAL at 07:53

## 2022-11-04 RX ADMIN — VANCOMYCIN HYDROCHLORIDE 1000 MG: 1 INJECTION, POWDER, LYOPHILIZED, FOR SOLUTION INTRAVENOUS at 07:55

## 2022-11-04 RX ADMIN — INSULIN LISPRO 16 UNITS: 100 INJECTION, SOLUTION INTRAVENOUS; SUBCUTANEOUS at 11:29

## 2022-11-04 RX ADMIN — POTASSIUM CHLORIDE 20 MEQ: 400 INJECTION, SOLUTION INTRAVENOUS at 18:17

## 2022-11-04 RX ADMIN — MUPIROCIN: 20 OINTMENT TOPICAL at 20:59

## 2022-11-04 RX ADMIN — RIVAROXABAN 15 MG: 15 TABLET, FILM COATED ORAL at 18:11

## 2022-11-04 RX ADMIN — PROPOFOL 30 MCG/KG/MIN: 10 INJECTION, EMULSION INTRAVENOUS at 08:50

## 2022-11-04 RX ADMIN — DEXTROSE AND SODIUM CHLORIDE: 5; 450 INJECTION, SOLUTION INTRAVENOUS at 08:25

## 2022-11-04 RX ADMIN — POTASSIUM CHLORIDE 20 MEQ: 400 INJECTION, SOLUTION INTRAVENOUS at 17:13

## 2022-11-04 RX ADMIN — FAMOTIDINE 20 MG: 10 INJECTION INTRAVENOUS at 11:19

## 2022-11-04 RX ADMIN — Medication 10 ML: at 08:05

## 2022-11-04 RX ADMIN — DIVALPROEX SODIUM 250 MG: 125 CAPSULE, COATED PELLETS ORAL at 14:14

## 2022-11-04 RX ADMIN — POTASSIUM CHLORIDE 20 MEQ: 400 INJECTION, SOLUTION INTRAVENOUS at 16:03

## 2022-11-04 RX ADMIN — Medication 10 ML: at 21:00

## 2022-11-04 RX ADMIN — POTASSIUM CHLORIDE 20 MEQ: 400 INJECTION, SOLUTION INTRAVENOUS at 15:00

## 2022-11-04 ASSESSMENT — PULMONARY FUNCTION TESTS
PIF_VALUE: 19
PIF_VALUE: 20
PIF_VALUE: 19
PIF_VALUE: 18
PIF_VALUE: 21
PIF_VALUE: 20

## 2022-11-04 NOTE — PROGRESS NOTES
Reassessment complete. Patient continues intubated and sedated. RASS - 1, CPOT 0. Physical assessment unchanged from previous. Patient repositioned for comfort with no s/s of distress noted. Bilateral soft wrist restraints remain in place for patient safety.

## 2022-11-04 NOTE — ACP (ADVANCE CARE PLANNING)
Advance Care Planning     General Advance Care Planning (ACP) Conversation    Date of Conversation: 11/3/2022  Conducted with: Patient with Decision Making Capacity   Healthcare Decision Maker: Next of Kin by law (only applies in absence of above) (name) Jessenia Pereira, brother, who states he is healthcare POA, copies have been requested of documentation. Healthcare Decision Maker:    Primary Decision Maker: Madeleine Pritchard - Brother/Sister - [phone]  Click here to complete Healthcare Decision Makers including selection of the Healthcare Decision Maker Relationship (ie \"Primary\"). Today we documented Decision Maker(s) consistent with Legal Next of Kin hierarchy. Content/Action Overview:  ACP documents are not on file, however, copies have been requested from [place] at [facility]. CM discussed code status with patient's brother, Moni Najera, as pt is currently on a vent. Moni Najera stated that since previous hospital admission that he and the pt have briefly discussed code status and he has reviewing POA documentation to gain understanding. Moni Najera stated he didn't believe his sister would like to be on a vent long term and would likely not want chest compressions. Moni Najera requested further explanations from MD or pt's nurse on details of code status. MIROSLAVA transferred Moni Najera to pt's nurse, Tamia, who would further address code status while he continues to decide what are the best options for the pt.      Length of Voluntary ACP Conversation in minutes:  <16 minutes (Non-Billable)    OMEGA [employer]ines Student

## 2022-11-04 NOTE — PROGRESS NOTES
FiO2 decreased to 35%         11/04/22 1628   NICU Vent Information   Skin Assessment Clean, dry, & intact   Vent Type 980   Vent Mode AC/VC   Vt (Set, mL) 320 mL   Vt (Measured) 347 mL   Resp Rate (Set) 22 bmp   Rate Measured 22 br/min   Minute Volume (L/min) 7.7 Liters   Peak Flow 50 L/min   FiO2  35 %   Peak Inspiratory Pressure (cmH2O) 19 cmH2O   I:E Ratio 1:3   Sensitivity 3   PEEP/CPAP (cmH2O) 8   Mean Airway Pressure (cmH2O) 11 cmH20   Cough/Sputum   Sputum How Obtained Endotracheal   Cough Productive   Sputum Amount Small   Sputum Color Creamy   Breath Sounds   Right Upper Lobe Diminished   Right Middle Lobe Diminished   Right Lower Lobe Diminished   Left Upper Lobe Diminished   Left Lower Lobe Diminished   Additional Respiratory Assessments   Position Semi-Carter's   Humidification Source Heated wire   Humidification Temp 37   Vent Alarm Settings   High Pressure (cmH2O) 35 cmH2O   Low Minute Volume (lpm) 4 L/min   High Minute Volume (lpm) 20 L/min   Low Exhaled Vt (ml) 200 mL   RR High (bpm) 35 br/min   Apnea (secs) 20 secs   Non-Surgical Airway 11/03/22 Endotracheal tube   Placement Date/Time: 11/03/22 5458   Present on Admission/Arrival: No  Placed By: In ED  Placement Verified By: Colorimetric ETCO2 device  Insertion attempts: 1  Airway Device: Endotracheal tube  Size: 7.5   Secured At 22 cm   Measured From [address] By Commercial tube valdez   Site Assessment Dry

## 2022-11-04 NOTE — PROGRESS NOTES
11/04/22 1056   NICU Vent Information   Skin Assessment Redness (see comment/note)   Vent Type 980   Vent Mode AC/VC   Vt (Set, mL) 320 mL   Vt (Measured) 343 mL   Resp Rate (Set) 22 bmp   Rate Measured 22 br/min   Minute Volume (L/min) 7.5 Liters   Peak Flow 50 L/min   FiO2  40 %   Peak Inspiratory Pressure (cmH2O) 19 cmH2O   I:E Ratio 1:3   Sensitivity 3   PEEP/CPAP (cmH2O) 8   Mean Airway Pressure (cmH2O) 11 cmH20   Cough/Sputum   Sputum How Obtained Endotracheal   Cough Productive   Sputum Amount Small   Sputum Color Creamy   Breath Sounds   Right Upper Lobe Diminished   Right Middle Lobe Diminished   Right Lower Lobe Diminished   Left Upper Lobe Diminished   Left Lower Lobe Diminished   Vent Alarm Settings   High Pressure (cmH2O) 35 cmH2O   Low Minute Volume (lpm) 4 L/min   High Minute Volume (lpm) 25 L/min   Low Exhaled Vt (ml) 200 mL   Apnea (secs) 20 secs   Non-Surgical Airway 11/03/22 Endotracheal tube   Placement Date/Time: 11/03/22 5678   Present on Admission/Arrival: No  Placed By: In ED  Placement Verified By: Colorimetric ETCO2 device  Insertion attempts: 1  Airway Device: Endotracheal tube  Size: 7.5   Secured At 22 cm   Measured From Lips   Secured Location Left   Secured By Commercial tube valdez   Site Assessment Dry

## 2022-11-04 NOTE — PROGRESS NOTES
Pt admitted to ICU room 12 via stretcher, no belongings with pt. Pt on Vent 50% with PEEP 5, Levo and Propofol infusing with D5 1/2 NS. Moraes patent with milky white drainage. B/L wrist restraints assessed. Pictures taken of wound on right heel and right toes discoloration.

## 2022-11-04 NOTE — PROGRESS NOTES
11/03/22 2352   Patient Observation   Heart Rate (!) 140   Resp 21   Vent Information   Vent Mode AC/VC   Ventilator Settings   FiO2  50 %   Vt (Set, mL) 400 mL   Resp Rate (Set) 20 bmp   PEEP/CPAP (cmH2O) 5   Vent Patient Data (Readings)   Vt (Measured) 364 mL   Peak Inspiratory Pressure (cmH2O) 18 cmH2O   Rate Measured 20 br/min   Minute Volume (L/min) 7.32 Liters   Mean Airway Pressure (cmH2O) 8.6 cmH20   Plateau Pressure (cm H2O) 13 cm H2O   I:E Ratio 1:3.2   Flow Sensitivity 3 L/min   Static Compliance (L/cm H2O) 46   Dynamic Compliance (L/cm H2O) 28 L/cm H2O   Vent Alarm Settings   High Pressure (cmH2O) 35 cmH2O   Low Exhaled Vt (ml) 22 mL   RR High (bpm) 35 br/min   Apnea (secs) 20 secs   Additional Respiratoray Assessments   Humidification Source Heated wire   Humidification Temp 37   Circuit Condensation Drained   Ambu Bag With Mask At Bedside Yes   Airway Clearance   Suction ET Tube   Suction Device Inline suction catheter   Sputum Method Obtained Endotracheal   Sputum Amount Other (comment)  (none)   Non-Surgical Airway 11/03/22 Endotracheal tube   Placement Date/Time: 11/03/22 7538   Present on Admission/Arrival: No  Placed By: In ED  Placement Verified By: Colorimetric ETCO2 device  Insertion attempts: 1  Airway Device: Endotracheal tube  Size: 7.5   Secured At 22 cm   Measured From Lips   Secured Location Right   Secured By Commercial tube valdez   Site Assessment Dry

## 2022-11-04 NOTE — CONSULTS
Thank you to requesting provider:  Dr. Rosita Moore  , for asking us to see Des Aquino  Reason for consultation:  Hypernatremia and acute kidney injury   Chief Complaint:  AMS / respiratory failure     History of Presenting Illness      67 y/o with history of diabetes and CHF admitted to the ICU with septic shock picture. We have been asked to see her for hypernatremia and acute kidney injury. She is on IV fluids with a loya, she is polyuric. She was intubated in the ER due to being unresponsive and unable to protect her airway.         Past Medical/Surgical History      Active Ambulatory Problems     Diagnosis Date Noted    Seizure disorder, grand mal ([address] 01/12/2014    DM (diabetes mellitus) ([address] 01/13/2014    Lumbar facet arthropathy 12/10/2014    Disc degeneration, lumbar 12/10/2014    Obesity 01/27/2016    Atrial fibrillation with rapid ventricular response (HCC)     Essential hypertension     Chronic obstructive pulmonary disease (HCC)     Persistent atrial fibrillation ([address] 11/07/2016    Chronic pain of both knees 02/24/2020    Bilateral primary osteoarthritis of knee 02/24/2020    Elevated troponin     Sepsis secondary to UTI ([address] 10/06/2022    Altered mental status 10/06/2022    FABIO (acute kidney injury) ([address] 10/06/2022    UTI (urinary tract infection) 10/06/2022    Cervical herniated disc 05/23/2005    Chronic bilateral low back pain with bilateral sciatica 10/21/2018    Chronic respiratory failure with hypoxia ([address] 10/21/2018    Dementia with behavioral disturbance 10/21/2018    LORENZO (generalized anxiety disorder) 10/21/2018    Hoarding disorder with poor insight 10/21/2018    Hypertensive heart and kidney disease with chronic diastolic congestive heart failure and stage 2 chronic kidney disease ([address] 10/21/2018    Schizoaffective disorder, depressive type ([address] 10/21/2018    Providencia bacteremia 10/07/2022    Permanent atrial fibrillation with rapid ventricular response ([address] 10/07/2022 Bacteremia 10/08/2022    Hyperkalemia 10/10/2022     Resolved Ambulatory Problems     Diagnosis Date Noted    Osteomyelitis of spine ([address] 10/06/2011    Diskitis 10/06/2011    Leukocytosis 01/12/2014    Thoracic back pain 12/10/2014    Neck pain 12/10/2014    Hypokalemia 01/27/2016    Shortness of breath 03/16/2016    Precordial pain 03/16/2016    Morbid obesity ([address] 20/59/1243    Acute diastolic congestive heart failure ([address] 09/14/2016    Acute respiratory failure with hypoxia ([address] 11/06/2016    Acute cystitis without hematuria 01/11/2020    UTI (urinary tract infection) 01/11/2020    Acute pain of left shoulder     Hypomagnesemia     Sepsis ([address] 04/14/2020    Severe sepsis (HCC)     Urinary tract infection without hematuria     Abnormal CXR     Hypoxia     Hypotension     Disorder of electrolytes     Acute encephalopathy     Arachnoid cyst 05/23/2005     Past Medical History:   Diagnosis Date    Acute diastolic heart failure (HCC)     Acute respiratory failure (HCC)     Adjustment disorder with mixed anxiety and depressed mood     Allergic rhinitis     Alzheimer's dementia ([address]     Atrial fibrillation (HCC)     CHF (congestive heart failure) (HCC)     Chronic back pain     Constipation     COPD (chronic obstructive pulmonary disease) ([address]     Diabetes mellitus ([address]     Disseminated superficial actinic porokeratosis (DSAP)     Edema     ESBL (extended spectrum beta-lactamase) producing bacteria infection 04/17/2020    Hypertension     Hypo-osmolality and hyponatremia     Major depressive disorder     Muscle weakness     Overactive bladder     Schizoaffective disorder ([address]     Seizures ([address]     Xerosis cutis          Review of Systems     Unable to obtain       Medications      Reviewed in EMR     Allergies     Fish-derived products, Iodine, Mushroom extract complex, Onion, and Other      Family History       Negative for Kidney Disease    Social History      Social History     Socioeconomic History Marital status:      Spouse name: None    Number of children: None    Years of education: None    Highest education level: None   Tobacco Use    Smoking status: Former     Types: Cigarettes     Quit date: 2004     Years since quittin.8    Smokeless tobacco: Never   Vaping Use    Vaping Use: Never used   Substance and Sexual Activity    Alcohol use: No     Alcohol/week: 0.0 standard drinks    Drug use: No       Physical Exam     Blood pressure 91/70, pulse (!) 141, temperature (!) 101.2 °F (38.4 °C), temperature source Bladder, resp. rate 22, height 5' 3\" (1.6 m), weight 205 lb (93 kg), SpO2 99 %. General:  Critically ill   HEENT:  PERRL, orally intubated   Neck:  Supple, right IJ   Chest:  On vent, no wheezing   CV:  Tachycardic, no rub   Abdomen:  NTND, soft, +BS, no hepatosplenomegaly  Extremities:  No peripheral edema  Neurological:  Moving all four extremities, CN II-XII grossly intact  Lymphatics:  No palpable lymph nodes  Skin:  No rash, no jaundice  Psychiatric:  Sedated on vent, HERNAN  :  Moraes placed     Data     Recent Labs     22  1735 22  0606   WBC 15.4* 16.5*   HGB 12.0 11.1*   HCT 39.0 36.1   MCV 91.7 89.6    211     Recent Labs     22  0108 22  0607 22  0815   * 161* 163*   K 3.4* 3.5 3.5   * 121* 123*   CO2 27 28 27   GLUCOSE 377* 331* 361*   MG 2.00 2.00 1.90   BUN 71* 64* 57*   CREATININE 1.7* 1.4* 1.4*   LABGLOM 31* 40* 40*       Assessment:  Acute Kidney Injury:  KDIGO 1  Etiology:  Volume depletion   Hypernatremia:  Significant free water deficit  No prior history of DI, had issues with low sodium in early October.    Could have solute diuresis from FABIO recovery   Sepsis:  HCAP vs. UTI  On broad spectrum IV  antibiotics   Culture pending     Plan:     NS at 200 cc/hr  Follow labs every 6 hours to monitor electrolytes  Might need DDVAP   Expand work up pending response to above     Thank you for asking us to participate in the management of your patient, please do not hesitate to contact me for any concerns regarding my recommendations as outlined above.    -----------------------------  Corrie Montaño M.D.   [facility]

## 2022-11-04 NOTE — PROGRESS NOTES
Care rounds complete with Dr. Juan Manuel Barraza. Discussion with patient's brother regarding code status, wishes for patient to be DNR-CCA, new code status order entered by Dr. Juan Manuel Barraza. Patient to receive 1 L bolus of LR in addition to fluids being managed by nephrology.  New medication orders to be entered by pharmacist.

## 2022-11-04 NOTE — PROGRESS NOTES
4 Eyes Skin Assessment     The patient is being assess for   Shift Handoff    I agree that 2 RN's have performed a thorough Head to Toe Skin Assessment on the patient. ALL assessment sites listed below have been assessed. Areas assessed for pressure by both nurses:   [x]   Head, Face, and Ears   [x]   Shoulders, Back, and Chest, Abdomen  [x]   Arms, Elbows, and Hands   [x]   Coccyx, Sacrum, and Ischium  [x]   Legs, Feet, and Heels        Skin Assessed Under all Medical Devices by both nurses:  ETT, loya, OG, and heel boots              All Mepilex Borders were peeled back and area peeked at by both nurses:  Yes  Please list where Mepilex Borders are located:  cccyx, heels             **SHARE this note so that the co-signing nurse is able to place an eSignature**    Co-signer eSignature: Electronically signed by Nuvia Olvera RN on 11/4/22 at 4:29 PM EDT    Does the Patient have Skin Breakdown related to pressure?   Yes LDA WOUND CARE was Initiated documentation include the Kassandra-wound, Wound Assessment, Measurements, Dressing Treatment, Drainage, and Color\",     (Insert Photo here)         Jarod Prevention initiated:  Yes   Wound Care Orders initiated:  No      WOC nurse consulted for Pressure Injury (Stage 3,4, Unstageable, DTI, NWPT, Complex wounds)and New or Established Ostomies:  No      Primary Nurse eSignature: Electronically signed by Bolivar Levin RN on 11/4/22 at 4:29 PM EDT

## 2022-11-04 NOTE — PROGRESS NOTES
11/04/22 0328   Patient Observation   Heart Rate (!) 138   Resp 19   SpO2 100 %   Breath Sounds   Right Upper Lobe Diminished   Right Middle Lobe Diminished   Right Lower Lobe Diminished   Left Upper Lobe Diminished   Left Lower Lobe Diminished   Vent Information   Vent Mode AC/VC   $Ventilation $Subsequent Day   Ventilator Settings   FiO2  40 %   Vt (Set, mL) 400 mL   Resp Rate (Set) 20 bmp   PEEP/CPAP (cmH2O) 5   Vent Patient Data (Readings)   Vt (Measured) 428 mL   Peak Inspiratory Pressure (cmH2O) 21 cmH2O   Rate Measured 20 br/min   Minute Volume (L/min) 8.55 Liters   Mean Airway Pressure (cmH2O) 9.4 cmH20   I:E Ratio 1:3.2   Flow Sensitivity 3 L/min   Airway Clearance   Suction ET Tube   Subglottic Suction Done Yes   Suction Device Inline suction catheter;Rich   Sputum Amount Moderate   Sputum Color/Odor Clear   Sputum Consistency Thick   Non-Surgical Airway 11/03/22 Endotracheal tube   Placement Date/Time: 11/03/22 0768   Present on Admission/Arrival: No  Placed By: In ED  Placement Verified By: Colorimetric ETCO2 device  Insertion attempts: 1  Airway Device: Endotracheal tube  Size: 7.5   [employer]

## 2022-11-04 NOTE — PROGRESS NOTES
Morning assessment complete. Patient seen intubated and sedated. RASS +1, CPOT 0, patient appears agitated but tolerating ventilator at this time. Patient intubated with a # 7.5 ETT at the 22 LL. Ventilator settings are currently AC 22/320/40/8. Patient with no s/s of distress noted at this time. Physical assessment as charted in flow sheets. Scheduled medications given per orders. Patient medicated with PRN Tylenol for T 102. 2. Propofol infusing at 25 mcg/kg/min. LEVOPHED infusing at a rate of 5 mcg/min. D% 0.45% infusing at 200 mL/hr. Central line dressing is clean, dry and intact with no signs of drainage. All tubing is current and dated. IPA caps applied to all access hubs. Peripheral IV C/D/I and functioning properly. Moraes intact and secure, cloudy yellow urine draining. OG intact and secure, clamped at this time. Patient repositioned for comfort. Bilateral soft wrist restraints in place for patient safety and the safety of all lines and tubes.

## 2022-11-04 NOTE — PROGRESS NOTES
Internal Medicine ICU Progress Note      Events of Last 24 hours:     Patient is intubated and cannot give any history. She has a history of diabetes, congestive heart failure, recent admission for sepsis secondary UTI, history of FABIO, potential bacteremia, pulmonary A. fib, history of elevated troponin who presented to the emergency room for acute kidney injury, sepsis and UTI from the nursing home. She was severely hyponatremic and had FABIO at the time of admission. White cell count 16,000. In the ED she was disoriented and hypotensive. She became unresponsive and then was intubated. Invasive Lines: CVC catheter placed on 11/3/2022        MV: Intubated on 11/3/2022. No results for input(s): PHART, RBB9SVP, PO2ART in the last 72 hours. MV Settings:  Vent Mode: AC/VC Resp Rate (Set): 22 bmp/Vt (Set, mL): 320 mL/ /FiO2 : 40 %    IV:   propofol 30 mcg/kg/min (22 0850)    dextrose      sodium chloride      dextrose 5 % and 0.45 % NaCl 200 mL/hr at 22 1313    norepinephrine 4 mcg/min (22 1252)       Vitals:  Temp  Av.1 °F (38.4 °C)  Min: 100.4 °F (38 °C)  Max: 102.1 °F (38.9 °C)  Pulse  Av.9  Min: 104  Max: 161  BP  Min: 56/43  Max: 118/63  SpO2  Av.4 %  Min: 92 %  Max: 100 %  FiO2   Av.9 %  Min: 40 %  Max: 50 %  Patient Vitals for the past 4 hrs:   BP Temp Temp src Pulse Resp SpO2   22 1250 -- -- -- (!) 135 -- --   22 1200 80/61 -- -- (!) 119 18 99 %   22 1100 91/70 (!) 101.2 °F (38.4 °C) Bladder (!) 141 22 99 %   22 1000 86/62 -- -- (!) 161 23 98 %       CVP:        Intake/Output Summary (Last 24 hours) at 2022 1314  Last data filed at 2022 1311  Gross per 24 hour   Intake 3569.62 ml   Output 2875 ml   Net 694.62 ml       EXAM:  General: Ill-appearing. Intubated. Eyes: PERRL. No sclera icterus. No conjunctiva injected. ENT: No discharge. Intubated. Neck: Trachea midline. Normal thyroid. Resp: No accessory muscle use.  No crackles. No wheezing. No rhonchi. No dullness on percussion. CV: Tachycardia. Irregularly irregular rate and rhythm. No mumur or rub. No edema. No JVD. Palpable pedal pulses. GI: Non-tender. Non-distended. No masses. No organmegaly. Normal bowel sounds. No hernia. Skin: Warm and dry. No nodule on exposed extremities. No rash on exposed extremities. Lymph: No cervical LAD. No supraclavicular LAD. M/S: No cyanosis. No joint deformity. No clubbing. Neuro: HERNAN. Psych: HERNAN    Medications:  Scheduled Meds:   insulin glargine  25 Units SubCUTAneous QAM    rivaroxaban  15 mg Oral Daily    sodium chloride flush  5-40 mL IntraVENous 2 times per day    vancomycin (VANCOCIN) intermittent dosing (placeholder)   Other RX Placeholder    mupirocin   Nasal BID    cefepime  2,000 mg IntraVENous Q12H    famotidine (PEPCID) injection  20 mg IntraVENous Daily    insulin lispro  0-16 Units SubCUTAneous Q4H    divalproex  250 mg Oral 3 times per day    digoxin  125 mcg IntraVENous Daily       PRN Meds:  glucose, dextrose bolus **OR** dextrose bolus, glucagon (rDNA), dextrose, sodium chloride flush, sodium chloride, ondansetron **OR** ondansetron, polyethylene glycol, acetaminophen **OR** acetaminophen    Results:  CBC:   Recent Labs     11/03/22 1735 11/04/22  0606   WBC 15.4* 16.5*   HGB 12.0 11.1*   HCT 39.0 36.1   MCV 91.7 89.6    211     BMP:   Recent Labs     11/04/22 0108 11/04/22  0607 11/04/22  0815   * 161* 163*   K 3.4* 3.5 3.5   * 121* 123*   CO2 27 28 27   BUN 71* 64* 57*   CREATININE 1.7* 1.4* 1.4*     LIVER PROFILE:   Recent Labs     11/03/22  1735 11/03/22 2042 11/04/22 0108 11/04/22  0607 11/04/22  0815   AST 8*   < > 8* 6* 7*   ALT <5*   < > <5* <5* <5*   LIPASE 14.0  --   --   --   --    BILIDIR  --   --   --  <0.2  --    BILITOT 0.3   < > 0.3 0.4 0.4   ALKPHOS 83   < > 77 75 74    < > = values in this interval not displayed.       Latest Reference Range & Units 11/4/22 06:07 Procalcitonin 0.00 - 0.15 ng/mL 0.22 (H)   (H): Data is abnormally high     Latest Reference Range & Units 11/3/22 17:35 11/3/22 20:42 11/4/22 02:58   Lactic Acid, Sepsis 0.4 - 1.9 mmol/L 4.2 (HH) 4.1 (HH) 1.5   (HH): Data is critically high    UA:  Recent Labs     11/03/22  1819   COLORU Yellow   PHUR 6.0   WBCUA >100*   RBCUA 5-10*   YEAST Present*   BACTERIA 1+*   CLARITYU SL CLOUDY*   SPECGRAV 1.015   LEUKOCYTESUR LARGE*   UROBILINOGEN 0.2   BILIRUBINUR Negative   BLOODU LARGE*   GLUCOSEU 500*       Cultures:  COVID-19 not detected  Influenza a and B not detected  Blood cultures sent     ECG  Atrial fibrillation with rapid ventricular responseST abnormality consider inferior and anterolateral ischemiaAbnormal ECGWhen compared with ECG of 12-OCT-2022 06:28,HR increasedConfirmed by DEXTER Reeder MD ([id number]) on 11/4/2022 7:39:12 AM      Films:    XR CHEST PORTABLE   Final Result   Unremarkable support devices. Mild bibasilar infiltrates or atelectasis. Assessment:     Principal Problem:    Sepsis secondary to UTI [facility])  Active Problems:    Persistent atrial fibrillation (HCC)    Chronic respiratory failure with hypoxia (HCC)    Dementia with behavioral disturbance    Hypertensive heart and kidney disease with chronic diastolic congestive heart failure and stage 2 chronic kidney disease (HCC)    Schizoaffective disorder, depressive type ([facility])    DM (diabetes mellitus) [facility]    Essential hypertension    Chronic obstructive pulmonary disease ([facility]  Resolved Problems:    * No resolved hospital problems. *         Plan:    #Sepsis with septic shock. Patient presently on Levophed. Most likely source appears to be UTI. Chest x-ray unimpressive. Admitted to ICU. #Acute respiratory failure. Became unresponsive and was intubated. Continue vent support. Pulmonologist consulted. #Permanent atrial fibrillation. Monitor heart rate. Hold Cardizem due to low blood pressure.   Continue Xarelto. #Possible UTI. Continue broad-spectrum antibiotics for now. On vancomycin and cefepime. Cultures pending. #Severe hyponatremia. Likely significant free water deficit. Possible solute diuresis from recent FABIO. On half-normal saline. Nephrology consultation. #FABIO. On IV fluids. #Diabetes mellitus type 2. Sliding scale insulin.  NPO. #Dementia with behavioral disturbance. Presently intubated. #History of schizoaffective disorder. #COPD. Continue breathing treatments. On the vent. #DVT prophylaxis. On Xarelto. She is a DNR CCA. IMPORTANT: Please note that some portions of this note may have been created using Dragon voice recognition software. Some \"sound-alike\" and totally wrong word substitutions may have taken place due to known inherent limitations of any such software, including this voice recognition software. In spite of efforts to eliminate such errors, some may not have been corrected. So please read the note with this in mind and recognize such mistakes and understand the correct version using the  context. If there are still uncertainties in the mind of the medical provider reading this note about any aspect of the note, the provider can feel free to contact me. Thanks. All questions and concerns were addressed to the patient/family. Alternatives to my treatment were discussed. The note was completed using EMR. Every effort was made to ensure accuracy; however, inadvertent computerized transcription errors may be present.          Abigail Sky MD 1:14 PM 11/4/2022

## 2022-11-04 NOTE — PROGRESS NOTES
11/04/22 1947   Patient Observation   Heart Rate (!) 119   Resp 22   SpO2 97 %   Observations 7.5 ett 22 at lip   Breath Sounds   Right Upper Lobe Diminished   Right Middle Lobe Diminished   Right Lower Lobe Diminished   Left Upper Lobe Diminished   Left Lower Lobe Diminished   Vent Information   Vent Mode AC/VC   Ventilator Settings   FiO2  35 %   Vt (Set, mL) 320 mL   Resp Rate (Set) 22 bmp   PEEP/CPAP (cmH2O) 8   Vent Patient Data (Readings)   Vt (Measured) 336 mL   Peak Inspiratory Pressure (cmH2O) 20 cmH2O   Rate Measured 22 br/min   Minute Volume (L/min) 7.3 Liters   Mean Airway Pressure (cmH2O) 12 cmH20   Plateau Pressure (cm H2O) 15 cm H2O   I:E Ratio 1:2.9   Flow Sensitivity 3 L/min   Static Compliance (L/cm H2O) 48   Dynamic Compliance (L/cm H2O) 28 L/cm H2O   Vent Alarm Settings   High Pressure (cmH2O) 35 cmH2O   Low Minute Volume (lpm) 4 L/min   Low Exhaled Vt (ml) 200 mL   RR High (bpm) 35 br/min   Apnea (secs) 20 secs   Additional Respiratoray Assessments   Humidification Source Heated wire   Humidification Temp 37   Circuit Condensation Drained   Ambu Bag With Mask At Bedside Yes   Airway Clearance   Suction ET Tube   Suction Device Inline suction catheter   Sputum Method Obtained Endotracheal   Sputum Amount Moderate   Sputum Color/Odor Clear   Sputum Consistency Thick   Non-Surgical Airway 11/03/22 Endotracheal tube   Placement Date/Time: 11/03/22 1748   Present on Admission/Arrival: No  Placed By: In ED  Placement Verified By: Colorimetric ETCO2 device  Insertion attempts: 1  Airway Device: Endotracheal tube  Size: 7.5   Secured At 22 cm   Measured From Lips   Secured Location Left   Secured By Commercial tube valdez

## 2022-11-04 NOTE — ED NOTES
2029 Perfect Serve sent to Dr. Idolina Opitz  11/03/22 2050 2124 consult completed with admit orders placed on [facility]      Paty Trujillo  11/03/22 2124

## 2022-11-04 NOTE — PLAN OF CARE
Problem: Safety - Medical Restraint  Goal: Remains free of injury from restraints (Restraint for Interference with Medical Device)  Description: INTERVENTIONS:  1. Determine that other, less restrictive measures have been tried or would not be effective before applying the restraint  2. Evaluate the patient's condition at the time of restraint application  3. Inform patient/family regarding the reason for restraint  4.  Q2H: Monitor safety, psychosocial status, comfort, nutrition and hydration  Outcome: Progressing  Flowsheets (Taken 11/4/2022 0000)  Remains free of injury from restraints (restraint for interference with medical device):   Determine that other, less restrictive measures have been tried or would not be effective before applying the restraint   Evaluate the patient's condition at the time of restraint application   Inform patient/family regarding the reason for restraint   Every 2 hours: Monitor safety, psychosocial status, comfort, nutrition and hydration     Problem: Discharge Planning  Goal: Discharge to home or other facility with appropriate resources  Outcome: Progressing  Flowsheets (Taken 11/3/2022 2312)  Discharge to home or other facility with appropriate resources: Arrange for needed discharge resources and transportation as appropriate     Problem: Pain  Goal: Verbalizes/displays adequate comfort level or baseline comfort level  Outcome: Progressing  Flowsheets (Taken 11/3/2022 2312)  Verbalizes/displays adequate comfort level or baseline comfort level:   Encourage patient to monitor pain and request assistance   Assess pain using appropriate pain scale   Administer analgesics based on type and severity of pain and evaluate response   Implement non-pharmacological measures as appropriate and evaluate response     Problem: Neurosensory - Adult  Goal: Achieves stable or improved neurological status  Outcome: Progressing     Problem: Respiratory - Adult  Goal: Achieves optimal ventilation and oxygenation  Outcome: Progressing     Problem: Cardiovascular - Adult  Goal: Maintains optimal cardiac output and hemodynamic stability  Outcome: Progressing     Problem: Skin/Tissue Integrity - Adult  Goal: Skin integrity remains intact  Outcome: Progressing  Flowsheets (Taken 11/3/2022 2312)  Skin Integrity Remains Intact: Monitor for areas of redness and/or skin breakdown     Problem: Musculoskeletal - Adult  Goal: Return mobility to safest level of function  Outcome: Progressing     Problem: Gastrointestinal - Adult  Goal: Maintains or returns to baseline bowel function  Outcome: Progressing     Problem: Genitourinary - Adult  Goal: Absence of urinary retention  Outcome: Progressing     Problem: Infection - Adult  Goal: Absence of infection at discharge  Outcome: Progressing     Problem: Metabolic/Fluid and Electrolytes - Adult  Goal: Electrolytes maintained within normal limits  Outcome: Progressing     Pt resting in bed, tolerating vent settings, IV's infusing, loya patent, B/L soft wrist restraints assessed, no s/s of distress.

## 2022-11-04 NOTE — CONSULTS
Pharmacy to verify Depakote dosage:    Patient now taking Depakote Sprinkle capsules 250 mg po TID per [facility] paperwork. Wilner Shetty RN verified Depakote dose with [facility] RN. Home med Depakote Sprinkle capsule was updated in Epic by Wilner Shetty RN.

## 2022-11-04 NOTE — CARE COORDINATION
Case Management Assessment  Initial Evaluation      Patient Name: Ana Paula Benson  YOB: 1950  Diagnosis: Hypernatremia [E87.0]  Hyperglycemia [R73.9]  ESBL (extended spectrum beta-lactamase) producing bacteria infection [A49.9, Z16.12]  Atrial fibrillation with rapid ventricular response ([address] [I48.91]  Septic shock ([address] [A41.9, R65.21]  Sepsis ([address] [A41.9]  Yeast dermatitis [B37.2]  Acute respiratory failure, unspecified whether with hypoxia or hypercapnia ([address] [J96.00]  Urinary tract infection with hematuria, site unspecified [N39.0, R31.9]  Date / Time: 11/3/2022  5:21 PM    Admission status/Date:11/3/2022  Chart Reviewed: Yes      Patient Viktoria Punch: No, pt currently on vent  Family Interviewed:  Yes - pt's brother, Jeanne Aguayo, medical POA  via telephone    Hospitalization in the last 30 days:  Yes    Health Care Decision Maker :   Primary Decision Maker: Gokul Smith - Brother/Sister - [phone]    (CM - must 1st enter selection under Navigator - emergency contact- [phone] Relationship and pick relationship)   Who do you trust or have selected to make healthcare decisions for you    Met with: pt's brother, Jeanne Aguayo, medical POA via telephone    Current PCP: Soumya Coe MD    Financial  Medicare  Precert required for SNF : Y        3 night stay required -  N    ADLS  Support Systems/Care Needs:    Transportation: EMS transportation    Meal Preparation: n/a    Housing  Living Arrangements: pt is LTC at [facility], [address] per Mirlande Warner  Steps: n/a  Intent for return to present living arrangements: Yes  Identified Issues: [address] with [address] : No Agency:(Services)     Passport/Waiver : No  :                      Phone Number:    Passport/Waiver Services: n/a          Durable Medical Equiptment   DME Provider: n/a  Equipment: n/a  Walker___Cane___RTS___ BSC___Shower Chair___Hospital Bed___W/C____Other________  02 at ____Liter(s)---wears(frequency)_______ HHN ___ CPAP___ BiPap___   N/A____    Home O2 Use :  Yes    If No for home O2---if presently on O2 during hospitalization:  No  if yes CM to follow for potential DC O2 need    Community Service Affiliation  Dialysis:  No      Other Community Services: n/a    DISCHARGE PLAN: Explained Case Management role/services. CM spoke with pt's brother, Davonte Perez, who is pt's medical POA as the pt is currently on a vent. Davonte Chris confirmed that pt is LTC at [facility] and he plans for pt to return to [facility] upon d/c. Bandarsandra Chris stated he did not have a preference for transport, that first available was fine. Bandarsandra Chris stated he did not believe pt had been participating in pt/ot for quite some time now. Esperanza at [facility] confirmed that pt is a bed hold. CM will follow for needs.      Jerome Boyd, MSW Student

## 2022-11-04 NOTE — PROGRESS NOTES
11/4  Vanc random = 12 mcg/mL at 0607. Give vancomycin 1000 mg x1. Check vanc random tomorrow in the AM (11/5 at 0600).   Laurie Jimenez, PharmCLEMENCIA  11/4/2022 6:35 AM

## 2022-11-04 NOTE — ED NOTES
Medications verified with papers sent from [facility]. [facility] called and updated at this time. Verified Depakote order with [facility] RN at this time.      Lane Trevino RN  11/03/22 2050

## 2022-11-04 NOTE — PROGRESS NOTES
4 Eyes Skin Assessment     The patient is being assess for   Admission    I agree that 2 RN's have performed a thorough Head to Toe Skin Assessment on the patient. ALL assessment sites listed below have been assessed. Areas assessed for pressure by both nurses:   [x]   Head, Face, and Ears   [x]   Shoulders, Back, and Chest, Abdomen  [x]   Arms, Elbows, and Hands   [x]   Coccyx, Sacrum, and Ischium  [x]   Legs, Feet, and Heels        Skin Assessed Under all Medical Devices by both nurses:  ETT, loya, OG, and securement devices              All Mepilex Borders were peeled back and area peeked at by both nurses:  No: none on pt  Please list where Mepilex Borders are located:  none             **SHARE this note so that the co-signing nurse is able to place an eSignature**    Co-signer eSignature: Electronically signed by Blanca Henry RN on 11/4/22 at 1:27 AM EDT    Does the Patient have Skin Breakdown related to pressure? Yes [facility] WOUND CARE was Initiated documentation include the Kassandra-wound, Wound Assessment, Measurements, Dressing Treatment, Drainage, and Color\", pt has right heel DTI and discoloration of toes, pictures taken and placed in pt chart.     (Insert Photo here:       )         Jarod Prevention initiated:  Yes   Wound Care Orders initiated:  Yes      [id number]  nurse consulted for Pressure Injury (Stage 3,4, Unstageable, DTI, NWPT, Complex wounds)and New or Established Ostomies:  No      Primary Nurse eSignature: Electronically signed by Tamara Gonzalez RN on 11/4/22 at 1:27 AM EDT

## 2022-11-04 NOTE — PROGRESS NOTES
Not informed in transfer report by the ER nurse if pt's family was made aware that the pt has been intubated, placed in restraints, and to be admitted to the ICU. No charting or documentation found that family was notified. BrotherDavonte called and message left for him to call the hospital for update on pt care.

## 2022-11-04 NOTE — CONSULTS
[facility]                                 Pulmonary Consult /Progress Note :                                                                  CHIEF COMPLAINT: respiratory failure    Consulting provider: Dr Margarette Gomez    HPI:   This is a 61-year-old female who presented from [facility] with severe hyponatremia with changed in mental status and she was intubated in ER    Brought in from NH after labs there revealed Na 165, creatinine 1.7, BUN 67, wbc 16. She presented to the ER disoriented, toxic, hypotensive, becoming unresponsive requiring intubation for airway protection.  Currently she is seen in the ICU intubated, sedated with a fib      She is on pressors 5 levophed   CXR shows no significant changes  Known A fib   On xareelto        Past Medical History:   Diagnosis Date    Acute diastolic congestive heart failure ([address] 1/74/2090    Acute diastolic heart failure (HCC)     Acute respiratory failure (HCC)     Adjustment disorder with mixed anxiety and depressed mood     Allergic rhinitis     Alzheimer's dementia ([address]     Arachnoid cyst 5/23/2005    Atrial fibrillation ([facility])     CHF (congestive heart failure) ([facility])     Chronic back pain     Constipation     COPD (chronic obstructive pulmonary disease) (HCC)     Diabetes mellitus ([address]     Diskitis 10/6/2011    Disseminated superficial actinic porokeratosis (DSAP)     Edema     ESBL (extended spectrum beta-lactamase) producing bacteria infection 04/17/2020    Urine Klebsiella    Hypertension     Hypo-osmolality and hyponatremia     Major depressive disorder     Morbid obesity (HCC)     Muscle weakness     Osteomyelitis of spine ([address] 10/6/2011    Overactive bladder     Schizoaffective disorder ([address]     Seizures ([facility])     Urinary tract infection without hematuria     Xerosis cutis        Past Surgical History:        Procedure Laterality Date    IR MIDLINE CATH  10/10/2022    IR MIDLINE CATH 10/10/2022 [facility] disintegrating tablet 4 mg, 4 mg, Oral, Q8H PRN **OR** ondansetron (ZOFRAN) injection 4 mg, 4 mg, IntraVENous, Q6H PRN, Good Bridges MD    polyethylene glycol (GLYCOLAX) packet 17 g, 17 g, Oral, Daily PRN, Good Bridges MD    acetaminophen (TYLENOL) tablet 650 mg, 650 mg, Oral, Q6H PRN, 650 mg at 11/04/22 0753 **OR** acetaminophen (TYLENOL) suppository 650 mg, 650 mg, Rectal, Q6H PRN, Good Bridges MD    vancomycin (VANCOCIN) intermittent dosing (placeholder), , Other, RX Placeholder, Good Bridges MD    meropenem (MERREM) 1,000 mg in sodium chloride 0.9 % 100 mL IVPB (mini-bag), 1,000 mg, IntraVENous, Q12H, Good Bridges MD    mupirocin (BACTROBAN) 2 % ointment, , Nasal, BID, Good Bridges MD, Given at 11/04/22 0753    dextrose 5 % and 0.45 % sodium chloride infusion, , IntraVENous, Continuous, Justyn Sr MD, Last Rate: 200 mL/hr at 11/04/22 0825, New Bag at 11/04/22 0825    norepinephrine (LEVOPHED) 16 mg in dextrose 5 % 250 mL infusion, 1-100 mcg/min, IntraVENous, Continuous, Good Bridges MD, Last Rate: 4.7 mL/hr at 11/04/22 0643, 5 mcg/min at 11/04/22 0643      REVIEW OF SYSTEMS:  Can not obtain       Objective:   PHYSICAL EXAM:    Blood pressure 106/81, pulse (!) 145, temperature (!) 101.7 °F (38.7 °C), temperature source Bladder, resp. rate 25, height 5' 3\" (1.6 m), weight 205 lb (93 kg), SpO2 98 %.' on RA  Gen: No distress. Eyes: PERRL. No sclera icterus. No conjunctival injection. ENT: No discharge. Pharynx clear. Neck: Trachea midline. No obvious mass. Resp: No accessory muscle use. No crackles. No wheezes. No rhonchi. No dullness on percussion. Good air entry. CV: Regular rate. Regular rhythm. No murmur or rub. No edema. GI: Non-tender. Non-distended. No hernia. Skin: Warm and dry. No nodule on exposed extremities. Lymph: No cervical LAD. No supraclavicular LAD. M/S: No cyanosis. No joint deformity. No clubbing.    Neuro: intubated sedated          DATA reviewed by me:   CXR        LABS/IMAGING:    CBC:  Lab Results   Component Value Date    WBC 16.5 (H) 11/04/2022    HGB 11.1 (L) 11/04/2022    HCT 36.1 11/04/2022    MCV 89.6 11/04/2022     11/04/2022    LYMPHOPCT 19.5 11/04/2022    RBC 4.03 11/04/2022    MCH 27.5 11/04/2022    MCHC 30.7 (L) 11/04/2022    RDW 16.9 (H) 11/04/2022    NEUTOPHILPCT 72.7 11/04/2022    MONOPCT 5.8 11/04/2022    EOSPCT 2.8 01/03/2012    BASOPCT 0.6 11/04/2022    NEUTROABS 12.0 (H) 11/04/2022    LYMPHSABS 3.2 11/04/2022    MONOSABS 1.0 11/04/2022    EOSABS 0.2 11/04/2022    BASOSABS 0.1 11/04/2022       Recent Labs     11/04/22  0606 11/03/22  1735 10/12/22  0431   WBC 16.5* 15.4* 14.0*   HGB 11.1* 12.0 14.2   HCT 36.1 39.0 44.2   MCV 89.6 91.7 89.1    241 88*       BMP:   Recent Labs     11/04/22 0108 11/04/22  0607 11/04/22  0815   * 161* 163*   K 3.4* 3.5 3.5   * 121* 123*   CO2 27 28 27   BUN 71* 64* 57*   CREATININE 1.7* 1.4* 1.4*       MG:   Lab Results   Component Value Date/Time    MG 1.90 11/04/2022 08:15 AM     Ca/Phos:   Lab Results   Component Value Date    CALCIUM 8.4 11/04/2022    PHOS 1.7 (L) 10/10/2022     LIVER PROFILE:   Recent Labs     11/03/22  1735 11/03/22  2042 11/04/22  0108 11/04/22  0607 11/04/22  0815   AST 8*   < > 8* 6* 7*   ALT <5*   < > <5* <5* <5*   LIPASE 14.0  --   --   --   --    BILIDIR  --   --   --  <0.2  --    BILITOT 0.3   < > 0.3 0.4 0.4   ALKPHOS 83   < > 77 75 74    < > = values in this interval not displayed. PT/INR: No results for input(s): PROTIME, INR in the last 72 hours. APTT: No results for input(s): APTT in the last 72 hours.     Cardiac Enzymes:  Lab Results   Component Value Date    CKTOTAL 57 01/11/2020    TROPONINI 0.09 (H) 11/04/2022       Assessment:       -septic shock vs hypovolemic  -Acute respiratory failure  -A fib   -severe hypernatremia   -UTI  FABIO      Plan:      *-adjust vent by me,320/22/8/40  *-wean levophed  *-bolus of LR  *- adjust abx ,vancomycin and cefepime X 1   *_Pepcid  *- pan culture  *- d5/12 NS  -need rt line  - BS ,adjust scale  - home meds  *-hold Cardizem  *- may give dose of digoxin   Discuss her brother ,next of kin ,code status [address] reviewed with nursing staff, medical and surgical specialty care, primary care and the patient's family as available. Chart review/lab review/X-ray viewing/documentation and had long Conversation with patient/family re: prognosis, care options and any end of life issues:      Critical care time spent reviewing labs/films, examining patient, collaborating with other physicians more than 35  Minutes  excluding procedures . Cora Wright M.D.   11/4/2022  2:07 PM    Thank you very much for allowing me to participate in the care of this pleasant patient , should you have any questions ,please do not hesitate to contact me      Kaisere Friend  Pulmonary&Critical Care Medicine   Professor Timothy Laurent    NOTE: This report was transcribed using voice recognition software. Every effort was made to ensure accuracy; however, inadvertent computerized transcription errors may be present.

## 2022-11-04 NOTE — H&P
Hospital Medicine History & Physical      PCP: Fifi Davis MD    Date of Admission: 11/3/2022    Date of Service: Pt seen/examined on 11/3/22 and Admitted to Inpatient with expected LOS greater than two midnights due to medical therapy. Chief Complaint:  Abn Labs      History Of Present Illness:       66 y.o. female with COPD, CHF, obesity, DM, afibrecently admitted for danielle,sepsis, uti, brought in from NH after labs there revealed Na 165, creatinine 1.7, BUN 67, wbc 16. She presented to the ER disoriented, toxic, hypotensive, becoming unresponsive requiring intubation for airway protection.  Currently she is seen in the ICU intubated, sedated    Past Medical History:          Diagnosis Date    Acute diastolic congestive heart failure ([address] 9/89/4220    Acute diastolic heart failure (HCC)     Acute respiratory failure ([facility])     Adjustment disorder with mixed anxiety and depressed mood     Allergic rhinitis     Alzheimer's dementia ([address]     Arachnoid cyst 5/23/2005    Atrial fibrillation ([facility])     CHF (congestive heart failure) ([facility])     Chronic back pain     Constipation     COPD (chronic obstructive pulmonary disease) ([facility])     Diabetes mellitus ([address]     Diskitis 10/6/2011    Disseminated superficial actinic porokeratosis (DSAP)     Edema     ESBL (extended spectrum beta-lactamase) producing bacteria infection 04/17/2020    Urine Klebsiella    Hypertension     Hypo-osmolality and hyponatremia     Major depressive disorder     Morbid obesity ([facility])     Muscle weakness     Osteomyelitis of spine ([address] 10/6/2011    Overactive bladder     Schizoaffective disorder ([facility])     Seizures ([facility])     Urinary tract infection without hematuria     Xerosis cutis        Past Surgical History:          Procedure Laterality Date    IR MIDLINE CATH  10/10/2022    IR MIDLINE CATH 10/10/2022 [facility] SPECIAL PROCEDURES    KNEE SURGERY      TONSILLECTOMY      TUBAL LIGATION         Medications Prior to Admission:      Prior to Admission medications    Medication Sig Start Date End Date Taking? Authorizing Provider   LORazepam (ATIVAN) 0.5 MG tablet Take 0.5 mg by mouth every 12 hours as needed for Anxiety. Yes Historical Provider, MD   clindamycin (CLEOCIN) 300 MG capsule Take 300 mg by mouth in the morning and at bedtime   Yes Historical Provider, MD   Lactobacillus-Inulin (CULTURELLE DIGESTIVE DAILY PO) Take by mouth in the morning and at bedtime   Yes Historical Provider, MD   nystatin (MYCOSTATIN) 182457 UNIT/GM powder Apply topically 2 times daily Apply topically 2   times daily. Yes Historical Provider, MD   oxyCODONE-acetaminophen (PERCOCET) 5-325 MG per tablet Take 1 tablet by mouth 4 times daily.    Yes Historical Provider, MD   sodium chloride 0.45 % infusion Infuse intravenously continuous subQ 45 ml/hr per clysis   Yes Historical Provider, MD   divalproex (DEPAKOTE SPRINKLES) 125 MG capsule Take 250 mg by mouth three times daily   Yes Historical Provider, MD   dilTIAZem (CARDIZEM CD) 240 MG extended release capsule Take 1 capsule by mouth daily 10/12/22 11/11/22  Juan Daniel Awan MD   miconazole nitrate 2 % OINT Apply topically 2 times daily  Patient not taking: Reported on 11/3/2022 10/12/22   Juan Daniel Awan MD   insulin lispro, 1 Unit Dial, (HUMALOG KWIKPEN) 100 UNIT/ML SOPN Inject 10 Units into the skin 3 times daily (before meals) 10/12/22   Juan Daniel Awan MD   busPIRone (BUSPAR) 5 MG tablet Take 5 mg by mouth 2 times daily    Historical Provider, MD   DULoxetine (CYMBALTA) 60 MG extended release capsule Take 60 mg by mouth daily    Historical Provider, MD   acetaminophen (TYLENOL) 325 MG tablet Take 650 mg by mouth every 6 hours as needed for Pain    Historical Provider, MD   divalproex (DEPAKOTE) 250 MG DR tablet Take 250 mg by mouth 3 times daily  Patient not taking: Reported on 11/3/2022    Historical Provider, MD   furosemide (LASIX) 40 MG tablet Take 40 mg by mouth daily    Historical Provider, MD   melatonin 3 MG TABS tablet Take 3 mg by mouth nightly    Historical Provider, MD   potassium chloride (MICRO-K) 10 MEQ extended release capsule Take 10 mEq by mouth daily    Historical Provider, MD   tiotropium (SPIRIVA) 18 MCG inhalation capsule Inhale 18 mcg into the lungs daily  Patient not taking: Reported on 11/3/2022    Historical Provider, MD   Cholecalciferol (VITAMIN D3) 1.25 MG (91050 UT) CAPS Take 1 capsule by mouth daily    Historical Provider, MD   tiZANidine (ZANAFLEX) 2 MG tablet Take 2 mg by mouth in the morning, at noon, and at bedtime    Historical Provider, MD   empagliflozin (JARDIANCE) 10 MG tablet Take 10 mg by mouth daily    Historical Provider, MD   rivaroxaban (XARELTO) 20 MG TABS tablet Take 20 mg by mouth nightly 4/18/20   Naeem Yañez MD   gabapentin (NEURONTIN) 100 MG capsule Take 100 mg by mouth 3 times daily.  4/18/20   Naeem Yañez MD   insulin glargine (LANTUS SOLOSTAR) 100 UNIT/ML injection pen Inject 25 Units into the skin every morning  Patient not taking: Reported on 11/3/2022 4/18/20   Naeem Yañez MD   insulin lispro (HUMALOG) 100 UNIT/ML injection vial Inject 0-6 Units into the skin 3 times daily (with meals)  Patient not taking: Reported on 11/3/2022 4/18/20   Naeem Yañez MD   Sodium Phosphates (FLEET) 7-19 GM/118ML Place 1 enema rectally daily as needed  Patient not taking: Reported on 11/3/2022    Historical Provider, MD   bisacodyl (DULCOLAX) 10 MG suppository Place 10 mg rectally daily as needed for Constipation    Historical Provider, MD   Blood Glucose Monitoring Suppl (BLOOD GLUCOSE SYSTEM RICHARD) KIT Check fingerstick blood sugars before meals and at bedtime 3/21/16   Laly Hendrix MD   INSULIN SYRINGE .5CC/29G (Lizbeth Georgis INS SYR .5CC/29G) 29G X 1/2\" 0.5 ML MISC 1 each by Does not apply route daily 3/21/16   Laly Hendrix MD       Allergies:  Fish-derived products, Iodine, Mushroom extract complex, Onion, and Other    Social History: TOBACCO:   reports that she quit smoking about 18 years ago. She has never used smokeless tobacco.  ETOH:   reports no history of alcohol use. Family History:             Problem Relation Age of Onset    Diabetes Mother     High Blood Pressure Mother     Cancer Mother     Heart Disease Mother     High Blood Pressure Father     Heart Disease Father        REVIEW OF SYSTEMS:   Pertinent positives as noted in the HPI. All other systems reviewed and negative. PHYSICAL EXAM PERFORMED:    /66   Pulse (!) 129   Temp 100.4 °F (38 °C) (Core)   Resp 22   Ht 5' 3\" (1.6 m)   Wt 200 lb (90.7 kg)   SpO2 100%   BMI 35.43 kg/m²     General appearance:  intubated, sedated  HEENT:  Normal cephalic, atraumatic without obvious deformity. Pupils equal, round, and reactive to light. Extra ocular muscles intact. Conjunctivae/corneas clear. Neck: Supple, with full range of motion. No jugular venous distention. Trachea midline. Respiratory:  Normal respiratory effort. Clear to auscultation, bilaterally without Rales/Wheezes/Rhonchi. Cardiovascular:  tachy rate, irregularly irregular  Abdomen: Soft, non-tender, non-distended with normal bowel sounds. obese  Musculoskeletal:  No clubbing, cyanosis or edema bilaterally. Labs:     Recent Labs     11/03/22  1735   WBC 15.4*   HGB 12.0   HCT 39.0        Recent Labs     11/03/22  1735   *   K 4.0   *   CO2 30   BUN 77*   CREATININE 2.0*   CALCIUM 8.2*     Recent Labs     11/03/22  1735   AST 8*   ALT <5*   BILITOT 0.3   ALKPHOS 83     No results for input(s): INR in the last 72 hours.   Recent Labs     11/03/22  1735   TROPONINI 0.13*       Urinalysis:      Lab Results   Component Value Date/Time    NITRU Negative 11/03/2022 06:19 PM    WBCUA >100 11/03/2022 06:19 PM    BACTERIA 1+ 11/03/2022 06:19 PM    RBCUA 5-10 11/03/2022 06:19 PM    BLOODU LARGE 11/03/2022 06:19 PM    SPECGRAV 1.015 11/03/2022 06:19 PM    GLUCOSEU 500 11/03/2022 06:19 PM Radiology:        XR CHEST PORTABLE   Final Result   Unremarkable support devices. Mild bibasilar infiltrates or atelectasis. ASSESSMENT:    Active Hospital Problems    Diagnosis Date Noted    Sepsis ([facility] [A41.9] 11/03/2022     Priority: Medium         PLAN:    Sepsis  - hcap vs UTI  - follow up urine and blood cultures, started on merrem/vanc  - trend lactic acid    FABIO  - prerenal, IVFs    HHS  - IVFs, lantus, iss    Acute hypoxemic resp failure  - hcap, s/p intubation, mech vent    Elev trop  - likely demand, type II nstemi, tele, trend    Atrial fib  - continue anticoag     Diet: No diet orders on file  Code Status: Prior       Dispo - icu  Tot crit care time > 35 min       Edie Ayala MD    Thank you Nicky Acevedo MD for the opportunity to be involved in this patient's care. If you have any questions or concerns please feel free to contact me at [phone].

## 2022-11-04 NOTE — PROGRESS NOTES
Vt decreased to 320ml (6ml)  Rate incraesed to 22  Peep incraesed to 8cwp         11/04/22 0727   NICU Vent Information   Vent Type 980   Vent Mode AC/VC   Vt (Set, mL) 320 mL   Vt (Measured) 333 mL   Resp Rate (Set) 22 bmp   Rate Measured 24 br/min   Minute Volume (L/min) 8.2 Liters   Peak Flow 50 L/min   FiO2  40 %   Peak Inspiratory Pressure (cmH2O) 18 cmH2O   I:E Ratio 1:3   Sensitivity 3   PEEP/CPAP (cmH2O) 8   Mean Airway Pressure (cmH2O) 11 cmH20   Plateau Pressure 14 [id number]   Static Compliance 42 mL/cmH2O

## 2022-11-04 NOTE — CONSULTS
Pharmacy Note  Vancomycin Consult    Susan Khan is a 66 y.o. female started on Vancomycin for HAP/Sepsis; consult received from Dr. Dmitriy Mcbride to manage therapy. Also receiving the following antibiotics: Merrem. Allergies:  Fish-derived products, Iodine, Mushroom extract complex, Onion, and Other     Tmax:     Recent Labs     11/03/22  2042 11/04/22  0108   CREATININE 1.9* 1.7*       Recent Labs     11/03/22  1735   WBC 15.4*       Estimated Creatinine Clearance: 31 mL/min (A) (based on SCr of 1.7 mg/dL (H)). Intake/Output Summary (Last 24 hours) at 11/4/2022 0230  Last data filed at 11/3/2022 2312  Gross per 24 hour   Intake 1000 ml   Output 1025 ml   Net -25 ml       Wt Readings from Last 1 Encounters:   11/03/22 197 lb 3.2 oz (89.4 kg)         Body mass index is 34.93 kg/m². Culture Date      Source                       Results      Loading dose (critically ill or in ICU, require dialysis or renal replacement therapy): Vancomycin 25 mg/kg IVPB x 1 (maximum 2500 mg). Maintenance dose: 15 mg/kg (maximum: 2000 mg/dose and 4500 mg/day) starting at the next dosing interval determined by renal function  Goal Vancomycin trough: 15-20 mcg/mL   Goal Vancomycin AUC: 400-600     Assessment/Plan:  Will initiate Vancomycin with a one time loading dose of 1000 mg x1. Based on patient age and renal function, will pulse dose patient to maintain vancomycin levels >15 mcg/mL. Vanc random this AM (11/4 at 0600). Timing of trough level will be determined based on culture results, renal function, and clinical response. Thank you for the consult.   Roland Fernandez, PharmD  11/4/2022 2:32 AM

## 2022-11-05 ENCOUNTER — APPOINTMENT (OUTPATIENT)
Dept: GENERAL RADIOLOGY | Age: 72
DRG: 870 | End: 2022-11-05
Payer: COMMERCIAL

## 2022-11-05 LAB
A/G RATIO: 0.8 (ref 1.1–2.2)
A/G RATIO: 0.8 (ref 1.1–2.2)
ALBUMIN SERPL-MCNC: 2.1 G/DL (ref 3.4–5)
ALBUMIN SERPL-MCNC: 2.3 G/DL (ref 3.4–5)
ALBUMIN SERPL-MCNC: 2.4 G/DL (ref 3.4–5)
ALP BLD-CCNC: 63 U/L (ref 40–129)
ALP BLD-CCNC: 69 U/L (ref 40–129)
ALT SERPL-CCNC: <5 U/L (ref 10–40)
ALT SERPL-CCNC: <5 U/L (ref 10–40)
ANION GAP SERPL CALCULATED.3IONS-SCNC: 10 MMOL/L (ref 3–16)
ANION GAP SERPL CALCULATED.3IONS-SCNC: 8 MMOL/L (ref 3–16)
ANION GAP SERPL CALCULATED.3IONS-SCNC: 9 MMOL/L (ref 3–16)
AST SERPL-CCNC: 7 U/L (ref 15–37)
AST SERPL-CCNC: 7 U/L (ref 15–37)
BASE EXCESS VENOUS: 0.9 MMOL/L (ref -3–3)
BASOPHILS ABSOLUTE: 0.1 K/UL (ref 0–0.2)
BASOPHILS RELATIVE PERCENT: 0.7 %
BILIRUB SERPL-MCNC: 0.3 MG/DL (ref 0–1)
BILIRUB SERPL-MCNC: 0.4 MG/DL (ref 0–1)
BUN BLDV-MCNC: 18 MG/DL (ref 7–20)
BUN BLDV-MCNC: 20 MG/DL (ref 7–20)
BUN BLDV-MCNC: 21 MG/DL (ref 7–20)
BUN BLDV-MCNC: 28 MG/DL (ref 7–20)
BUN BLDV-MCNC: 31 MG/DL (ref 7–20)
CALCIUM SERPL-MCNC: 7.3 MG/DL (ref 8.3–10.6)
CALCIUM SERPL-MCNC: 7.6 MG/DL (ref 8.3–10.6)
CALCIUM SERPL-MCNC: 7.9 MG/DL (ref 8.3–10.6)
CALCIUM SERPL-MCNC: 8.3 MG/DL (ref 8.3–10.6)
CALCIUM SERPL-MCNC: 8.7 MG/DL (ref 8.3–10.6)
CARBOXYHEMOGLOBIN: 1.5 % (ref 0–1.5)
CHLORIDE BLD-SCNC: 113 MMOL/L (ref 99–110)
CHLORIDE BLD-SCNC: 118 MMOL/L (ref 99–110)
CHLORIDE BLD-SCNC: 121 MMOL/L (ref 99–110)
CHLORIDE BLD-SCNC: 123 MMOL/L (ref 99–110)
CHLORIDE BLD-SCNC: 123 MMOL/L (ref 99–110)
CO2: 25 MMOL/L (ref 21–32)
CO2: 26 MMOL/L (ref 21–32)
CO2: 27 MMOL/L (ref 21–32)
CO2: 27 MMOL/L (ref 21–32)
CO2: 28 MMOL/L (ref 21–32)
CREAT SERPL-MCNC: 0.6 MG/DL (ref 0.6–1.2)
CREAT SERPL-MCNC: 0.6 MG/DL (ref 0.6–1.2)
CREAT SERPL-MCNC: 0.7 MG/DL (ref 0.6–1.2)
CREAT SERPL-MCNC: <0.5 MG/DL (ref 0.6–1.2)
CREAT SERPL-MCNC: <0.5 MG/DL (ref 0.6–1.2)
EOSINOPHILS ABSOLUTE: 0.4 K/UL (ref 0–0.6)
EOSINOPHILS RELATIVE PERCENT: 2.8 %
GFR SERPL CREATININE-BSD FRML MDRD: >60 ML/MIN/{1.73_M2}
GLUCOSE BLD-MCNC: 186 MG/DL (ref 70–99)
GLUCOSE BLD-MCNC: 193 MG/DL (ref 70–99)
GLUCOSE BLD-MCNC: 198 MG/DL (ref 70–99)
GLUCOSE BLD-MCNC: 206 MG/DL (ref 70–99)
GLUCOSE BLD-MCNC: 208 MG/DL (ref 70–99)
GLUCOSE BLD-MCNC: 210 MG/DL (ref 70–99)
GLUCOSE BLD-MCNC: 219 MG/DL (ref 70–99)
GLUCOSE BLD-MCNC: 227 MG/DL (ref 70–99)
GLUCOSE BLD-MCNC: 231 MG/DL (ref 70–99)
GLUCOSE BLD-MCNC: 242 MG/DL (ref 70–99)
GLUCOSE BLD-MCNC: 284 MG/DL (ref 70–99)
HCO3 VENOUS: 26 MMOL/L (ref 23–29)
HCT VFR BLD CALC: 33.2 % (ref 36–48)
HEMOGLOBIN: 10.2 G/DL (ref 12–16)
LYMPHOCYTES ABSOLUTE: 2.1 K/UL (ref 1–5.1)
LYMPHOCYTES RELATIVE PERCENT: 14.9 %
MAGNESIUM: 1.4 MG/DL (ref 1.8–2.4)
MAGNESIUM: 1.7 MG/DL (ref 1.8–2.4)
MCH RBC QN AUTO: 27.8 PG (ref 26–34)
MCHC RBC AUTO-ENTMCNC: 30.8 G/DL (ref 31–36)
MCV RBC AUTO: 90.3 FL (ref 80–100)
METHEMOGLOBIN VENOUS: 0.3 %
MONOCYTES ABSOLUTE: 0.7 K/UL (ref 0–1.3)
MONOCYTES RELATIVE PERCENT: 4.7 %
MRSA SCREEN RT-PCR: NORMAL
NEUTROPHILS ABSOLUTE: 11.1 K/UL (ref 1.7–7.7)
NEUTROPHILS RELATIVE PERCENT: 76.9 %
O2 CONTENT, VEN: 15 VOL %
O2 SAT, VEN: 93 %
O2 THERAPY: ABNORMAL
PCO2, VEN: 43.1 MMHG (ref 40–50)
PDW BLD-RTO: 16.4 % (ref 12.4–15.4)
PERFORMED ON: ABNORMAL
PH VENOUS: 7.4 (ref 7.35–7.45)
PHOSPHORUS: 1.5 MG/DL (ref 2.5–4.9)
PHOSPHORUS: 1.7 MG/DL (ref 2.5–4.9)
PHOSPHORUS: 1.9 MG/DL (ref 2.5–4.9)
PHOSPHORUS: 2 MG/DL (ref 2.5–4.9)
PLATELET # BLD: 169 K/UL (ref 135–450)
PMV BLD AUTO: 10.3 FL (ref 5–10.5)
PO2, VEN: 64.3 MMHG (ref 25–40)
POTASSIUM REFLEX MAGNESIUM: 3.2 MMOL/L (ref 3.5–5.1)
POTASSIUM REFLEX MAGNESIUM: 3.5 MMOL/L (ref 3.5–5.1)
POTASSIUM SERPL-SCNC: 3.3 MMOL/L (ref 3.5–5.1)
POTASSIUM SERPL-SCNC: 3.3 MMOL/L (ref 3.5–5.1)
POTASSIUM SERPL-SCNC: 3.6 MMOL/L (ref 3.5–5.1)
POTASSIUM SERPL-SCNC: 3.8 MMOL/L (ref 3.5–5.1)
RBC # BLD: 3.68 M/UL (ref 4–5.2)
REPORT: NORMAL
SODIUM BLD-SCNC: 149 MMOL/L (ref 136–145)
SODIUM BLD-SCNC: 153 MMOL/L (ref 136–145)
SODIUM BLD-SCNC: 156 MMOL/L (ref 136–145)
SODIUM BLD-SCNC: 158 MMOL/L (ref 136–145)
SODIUM BLD-SCNC: 158 MMOL/L (ref 136–145)
TCO2 CALC VENOUS: 27 MMOL/L
TOTAL PROTEIN: 4.9 G/DL (ref 6.4–8.2)
TOTAL PROTEIN: 5.5 G/DL (ref 6.4–8.2)
VANCOMYCIN RANDOM: 11.3 UG/ML
WBC # BLD: 14.4 K/UL (ref 4–11)

## 2022-11-05 PROCEDURE — 99291 CRITICAL CARE FIRST HOUR: CPT | Performed by: INTERNAL MEDICINE

## 2022-11-05 PROCEDURE — 71045 X-RAY EXAM CHEST 1 VIEW: CPT

## 2022-11-05 PROCEDURE — 82803 BLOOD GASES ANY COMBINATION: CPT

## 2022-11-05 PROCEDURE — 2700000000 HC OXYGEN THERAPY PER DAY

## 2022-11-05 PROCEDURE — 6370000000 HC RX 637 (ALT 250 FOR IP): Performed by: HOSPITALIST

## 2022-11-05 PROCEDURE — A4216 STERILE WATER/SALINE, 10 ML: HCPCS | Performed by: INTERNAL MEDICINE

## 2022-11-05 PROCEDURE — 94640 AIRWAY INHALATION TREATMENT: CPT

## 2022-11-05 PROCEDURE — 99233 SBSQ HOSP IP/OBS HIGH 50: CPT | Performed by: INTERNAL MEDICINE

## 2022-11-05 PROCEDURE — 85025 COMPLETE CBC W/AUTO DIFF WBC: CPT

## 2022-11-05 PROCEDURE — 94003 VENT MGMT INPAT SUBQ DAY: CPT

## 2022-11-05 PROCEDURE — 2580000003 HC RX 258: Performed by: INTERNAL MEDICINE

## 2022-11-05 PROCEDURE — 2500000003 HC RX 250 WO HCPCS: Performed by: INTERNAL MEDICINE

## 2022-11-05 PROCEDURE — 2000000000 HC ICU R&B

## 2022-11-05 PROCEDURE — 6360000002 HC RX W HCPCS: Performed by: INTERNAL MEDICINE

## 2022-11-05 PROCEDURE — 6370000000 HC RX 637 (ALT 250 FOR IP): Performed by: INTERNAL MEDICINE

## 2022-11-05 PROCEDURE — 80053 COMPREHEN METABOLIC PANEL: CPT

## 2022-11-05 PROCEDURE — 80202 ASSAY OF VANCOMYCIN: CPT

## 2022-11-05 PROCEDURE — 94761 N-INVAS EAR/PLS OXIMETRY MLT: CPT

## 2022-11-05 PROCEDURE — 6360000002 HC RX W HCPCS: Performed by: HOSPITALIST

## 2022-11-05 PROCEDURE — 2580000003 HC RX 258: Performed by: HOSPITALIST

## 2022-11-05 PROCEDURE — 83735 ASSAY OF MAGNESIUM: CPT

## 2022-11-05 RX ORDER — POTASSIUM CHLORIDE 29.8 MG/ML
20 INJECTION INTRAVENOUS
Status: DISCONTINUED | OUTPATIENT
Start: 2022-11-05 | End: 2022-11-05

## 2022-11-05 RX ORDER — POTASSIUM CHLORIDE 29.8 MG/ML
20 INJECTION INTRAVENOUS
Status: COMPLETED | OUTPATIENT
Start: 2022-11-05 | End: 2022-11-05

## 2022-11-05 RX ORDER — POTASSIUM CHLORIDE 29.8 MG/ML
20 INJECTION INTRAVENOUS
Status: COMPLETED | OUTPATIENT
Start: 2022-11-05 | End: 2022-11-06

## 2022-11-05 RX ORDER — MAGNESIUM SULFATE IN WATER 40 MG/ML
2000 INJECTION, SOLUTION INTRAVENOUS ONCE
Status: DISCONTINUED | OUTPATIENT
Start: 2022-11-05 | End: 2022-11-05

## 2022-11-05 RX ORDER — MAGNESIUM SULFATE IN WATER 40 MG/ML
4000 INJECTION, SOLUTION INTRAVENOUS ONCE
Status: COMPLETED | OUTPATIENT
Start: 2022-11-05 | End: 2022-11-05

## 2022-11-05 RX ORDER — DEXTROSE AND SODIUM CHLORIDE 5; .45 G/100ML; G/100ML
INJECTION, SOLUTION INTRAVENOUS CONTINUOUS
Status: DISCONTINUED | OUTPATIENT
Start: 2022-11-05 | End: 2022-11-06

## 2022-11-05 RX ORDER — DEXTROSE MONOHYDRATE 50 MG/ML
INJECTION, SOLUTION INTRAVENOUS CONTINUOUS
Status: DISCONTINUED | OUTPATIENT
Start: 2022-11-05 | End: 2022-11-05

## 2022-11-05 RX ORDER — METOPROLOL TARTRATE 5 MG/5ML
2.5 INJECTION INTRAVENOUS EVERY 6 HOURS
Status: DISCONTINUED | OUTPATIENT
Start: 2022-11-05 | End: 2022-11-07

## 2022-11-05 RX ADMIN — DEXTROSE MONOHYDRATE: 50 INJECTION, SOLUTION INTRAVENOUS at 10:43

## 2022-11-05 RX ADMIN — DIVALPROEX SODIUM 250 MG: 125 CAPSULE, COATED PELLETS ORAL at 06:11

## 2022-11-05 RX ADMIN — Medication 10 ML: at 21:44

## 2022-11-05 RX ADMIN — PROPOFOL 30 MCG/KG/MIN: 10 INJECTION, EMULSION INTRAVENOUS at 00:47

## 2022-11-05 RX ADMIN — FAMOTIDINE 20 MG: 10 INJECTION INTRAVENOUS at 07:54

## 2022-11-05 RX ADMIN — CEFEPIME 2000 MG: 2 INJECTION, POWDER, FOR SOLUTION INTRAVENOUS at 10:44

## 2022-11-05 RX ADMIN — MUPIROCIN: 20 OINTMENT TOPICAL at 21:50

## 2022-11-05 RX ADMIN — Medication 1500 MG: at 07:53

## 2022-11-05 RX ADMIN — Medication 10 ML: at 07:55

## 2022-11-05 RX ADMIN — DEXTROSE AND SODIUM CHLORIDE: 5; 450 INJECTION, SOLUTION INTRAVENOUS at 04:14

## 2022-11-05 RX ADMIN — DEXTROSE AND SODIUM CHLORIDE: 5; 450 INJECTION, SOLUTION INTRAVENOUS at 09:33

## 2022-11-05 RX ADMIN — DIVALPROEX SODIUM 250 MG: 125 CAPSULE, COATED PELLETS ORAL at 14:27

## 2022-11-05 RX ADMIN — RIVAROXABAN 15 MG: 15 TABLET, FILM COATED ORAL at 17:59

## 2022-11-05 RX ADMIN — DIGOXIN 125 MCG: 250 INJECTION, SOLUTION INTRAMUSCULAR; INTRAVENOUS at 07:53

## 2022-11-05 RX ADMIN — CEFEPIME 2000 MG: 2 INJECTION, POWDER, FOR SOLUTION INTRAVENOUS at 22:45

## 2022-11-05 RX ADMIN — METOPROLOL TARTRATE 2.5 MG: 5 INJECTION INTRAVENOUS at 16:37

## 2022-11-05 RX ADMIN — METOPROLOL TARTRATE 2.5 MG: 5 INJECTION INTRAVENOUS at 22:48

## 2022-11-05 RX ADMIN — INSULIN GLARGINE 25 UNITS: 100 INJECTION, SOLUTION SUBCUTANEOUS at 08:06

## 2022-11-05 RX ADMIN — INSULIN LISPRO 4 UNITS: 100 INJECTION, SOLUTION INTRAVENOUS; SUBCUTANEOUS at 20:52

## 2022-11-05 RX ADMIN — PROPOFOL 30 MCG/KG/MIN: 10 INJECTION, EMULSION INTRAVENOUS at 06:23

## 2022-11-05 RX ADMIN — MUPIROCIN: 20 OINTMENT TOPICAL at 07:54

## 2022-11-05 RX ADMIN — DEXTROSE AND SODIUM CHLORIDE: 5; 450 INJECTION, SOLUTION INTRAVENOUS at 18:09

## 2022-11-05 RX ADMIN — INSULIN LISPRO 4 UNITS: 100 INJECTION, SOLUTION INTRAVENOUS; SUBCUTANEOUS at 05:45

## 2022-11-05 RX ADMIN — DIVALPROEX SODIUM 250 MG: 125 CAPSULE, COATED PELLETS ORAL at 21:44

## 2022-11-05 RX ADMIN — INSULIN LISPRO 4 UNITS: 100 INJECTION, SOLUTION INTRAVENOUS; SUBCUTANEOUS at 08:07

## 2022-11-05 RX ADMIN — POTASSIUM CHLORIDE 20 MEQ: 400 INJECTION, SOLUTION INTRAVENOUS at 21:49

## 2022-11-05 RX ADMIN — PROPOFOL 30 MCG/KG/MIN: 10 INJECTION, EMULSION INTRAVENOUS at 15:24

## 2022-11-05 RX ADMIN — MAGNESIUM SULFATE HEPTAHYDRATE 4000 MG: 40 INJECTION, SOLUTION INTRAVENOUS at 10:54

## 2022-11-05 RX ADMIN — POTASSIUM CHLORIDE 20 MEQ: 400 INJECTION, SOLUTION INTRAVENOUS at 12:07

## 2022-11-05 RX ADMIN — PROPOFOL 30 MCG/KG/MIN: 10 INJECTION, EMULSION INTRAVENOUS at 21:15

## 2022-11-05 RX ADMIN — METOPROLOL TARTRATE 2.5 MG: 5 INJECTION INTRAVENOUS at 10:43

## 2022-11-05 RX ADMIN — POTASSIUM CHLORIDE 20 MEQ: 400 INJECTION, SOLUTION INTRAVENOUS at 10:55

## 2022-11-05 RX ADMIN — POTASSIUM CHLORIDE 20 MEQ: 400 INJECTION, SOLUTION INTRAVENOUS at 22:55

## 2022-11-05 ASSESSMENT — PAIN SCALES - GENERAL
PAINLEVEL_OUTOF10: 0
PAINLEVEL_OUTOF10: 0

## 2022-11-05 ASSESSMENT — PULMONARY FUNCTION TESTS
PIF_VALUE: 17
PIF_VALUE: 22
PIF_VALUE: 20
PIF_VALUE: 19
PIF_VALUE: 14
PIF_VALUE: 20
PIF_VALUE: 21

## 2022-11-05 NOTE — PROGRESS NOTES
PS 8/5 trial started at this time.      11/05/22 0836   NICU Vent Information   Vent Mode PS   Vt (Measured) 483 mL   Rate Measured 22 br/min   Minute Volume (L/min) 9.43 Liters   Pressure Support 8 cmH20   FiO2  35 %   Peak Inspiratory Pressure (cmH2O) 14 cmH2O   PEEP/CPAP (cmH2O) 5

## 2022-11-05 NOTE — PLAN OF CARE
Problem: Safety - Medical Restraint  Goal: Remains free of injury from restraints (Restraint for Interference with Medical Device)  Description: INTERVENTIONS:  1. Determine that other, less restrictive measures have been tried or would not be effective before applying the restraint  2. Evaluate the patient's condition at the time of restraint application  3. Inform patient/family regarding the reason for restraint  4.  Q2H: Monitor safety, psychosocial status, comfort, nutrition and hydration  Outcome: Progressing  Flowsheets  Taken 11/4/2022 2200 by Myla Saldivar RN  Remains free of injury from restraints (restraint for interference with medical device):   Determine that other, less restrictive measures have been tried or would not be effective before applying the restraint   Evaluate the patient's condition at the time of restraint application   Inform patient/family regarding the reason for restraint   Every 2 hours: Monitor safety, psychosocial status, comfort, nutrition and hydration  Taken 11/4/2022 2000 by Myla Saldivar RN  Remains free of injury from restraints (restraint for interference with medical device):   Determine that other, less restrictive measures have been tried or would not be effective before applying the restraint   Evaluate the patient's condition at the time of restraint application   Inform patient/family regarding the reason for restraint   Every 2 hours: Monitor safety, psychosocial status, comfort, nutrition and hydration  Taken 11/4/2022 1800 by Sho Paige RN  Remains free of injury from restraints (restraint for interference with medical device): Every 2 hours: Monitor safety, psychosocial status, comfort, nutrition and hydration  Taken 11/4/2022 1600 by Sho Paige RN  Remains free of injury from restraints (restraint for interference with medical device): Every 2 hours: Monitor safety, psychosocial status, comfort, nutrition and hydration  Taken 11/4/2022 1400 by Marjory Olszewski, RN  Remains free of injury from restraints (restraint for interference with medical device): Every 2 hours: Monitor safety, psychosocial status, comfort, nutrition and hydration  Taken 11/4/2022 1200 by Marjory Olszewski, RN  Remains free of injury from restraints (restraint for interference with medical device): Every 2 hours: Monitor safety, psychosocial status, comfort, nutrition and hydration  Taken 11/4/2022 1000 by Marjory Olszewski, RN  Remains free of injury from restraints (restraint for interference with medical device): Every 2 hours: Monitor safety, psychosocial status, comfort, nutrition and hydration     Problem: Discharge Planning  Goal: Discharge to home or other facility with appropriate resources  Outcome: Progressing  Flowsheets (Taken 11/4/2022 2000)  Discharge to home or other facility with appropriate resources:   Identify barriers to discharge with patient and caregiver   Arrange for needed discharge resources and transportation as appropriate     Problem: Pain  Goal: Verbalizes/displays adequate comfort level or baseline comfort level  Outcome: Progressing  Flowsheets (Taken 11/4/2022 2000)  Verbalizes/displays adequate comfort level or baseline comfort level:   Encourage patient to monitor pain and request assistance   Assess pain using appropriate pain scale   Administer analgesics based on type and severity of pain and evaluate response   Implement non-pharmacological measures as appropriate and evaluate response     Problem: Neurosensory - Adult  Goal: Achieves stable or improved neurological status  Outcome: Progressing  Flowsheets (Taken 11/4/2022 2000)  Achieves stable or improved neurological status:   Assess for and report changes in neurological status   Initiate measures to prevent increased intracranial pressure     Problem: Respiratory - Adult  Goal: Achieves optimal ventilation and oxygenation  Outcome: Progressing  Flowsheets (Taken 11/4/2022 2000)  Achieves optimal ventilation and oxygenation:   Assess for changes in respiratory status   Assess for changes in mentation and behavior   Position to facilitate oxygenation and minimize respiratory effort     Problem: Cardiovascular - Adult  Goal: Maintains optimal cardiac output and hemodynamic stability  Outcome: Progressing  Flowsheets (Taken 11/4/2022 2000)  Maintains optimal cardiac output and hemodynamic stability:   Monitor blood pressure and heart rate   Monitor urine output and notify Licensed Independent Practitioner for values outside of normal range   Assess for signs of decreased cardiac output     Problem: Skin/Tissue Integrity - Adult  Goal: Skin integrity remains intact  Outcome: Progressing  Flowsheets (Taken 11/4/2022 2000)  Skin Integrity Remains Intact: Monitor for areas of redness and/or skin breakdown     Problem: Musculoskeletal - Adult  Goal: Return mobility to safest level of function  Outcome: Progressing     Problem: Gastrointestinal - Adult  Goal: Maintains or returns to baseline bowel function  Outcome: Progressing  Flowsheets (Taken 11/4/2022 2000)  Maintains or returns to baseline bowel function:   Assess bowel function   Encourage oral fluids to ensure adequate hydration   Administer IV fluids as ordered to ensure adequate hydration     Problem: Genitourinary - Adult  Goal: Absence of urinary retention  Outcome: Progressing  Flowsheets (Taken 11/4/2022 2000)  Absence of urinary retention:   Assess patients ability to void and empty bladder   Monitor intake/output and perform bladder scan as needed   Place urinary catheter per Licensed Independent Practitioner order if needed     Problem: Infection - Adult  Goal: Absence of infection at discharge  Outcome: Progressing  Flowsheets (Taken 11/4/2022 2000)  Absence of infection at discharge:   Assess and monitor for signs and symptoms of infection   Monitor lab/diagnostic results   Monitor all insertion sites i.e., indwelling lines, tubes and drains     Problem: Metabolic/Fluid and Electrolytes - Adult  Goal: Electrolytes maintained within normal limits  Outcome: Progressing  Flowsheets (Taken 11/4/2022 2000)  Electrolytes maintained within normal limits:   Monitor labs and assess patient for signs and symptoms of electrolyte imbalances   Administer electrolyte replacement as ordered   Monitor response to electrolyte replacements, including repeat lab results as appropriate     Pt resting in bed, tolerating vent settings, IV's infusing, loya patent, B/L soft wrist restraints assessed, no s/s of distress.

## 2022-11-05 NOTE — PROGRESS NOTES
Progress Note    HISTORY     CC:  Altered Mental Status / Respiratory Failure          We are following for acute kidney injury and hypernatremia        Subjective/   HPI:   Sodium is 158. Urine volume is slowing down. Kidney function is improving. She remains in the ICU in critical condition     ROS:  Constitutional:  No fevers, No Chills  Cardiovascular:  No palpations  Respiratory: On vent  Skin:  No rash, no itching  :  No hematuria, polyuric     Social Hx:  No Family at the bedside     Past Medical and Surgical History:  - Reviewed, no changes     EXAM       Objective/     Vitals:    11/05/22 0702 11/05/22 0800 11/05/22 0812 11/05/22 0900   BP: 123/76 110/79  (!) 130/92   Pulse: (!) 149 (!) 146 (!) 142 (!) 117   Resp: 26 24 23 20   Temp: (!) 100.5 °F (38.1 °C)      TempSrc: Bladder      SpO2: 97% 97% 96% 97%   Weight:       Height:         24HR INTAKE/OUTPUT:    Intake/Output Summary (Last 24 hours) at 11/5/2022 1012  Last data filed at 11/5/2022 4117  Gross per 24 hour   Intake 6799.82 ml   Output 3400 ml   Net 3399.82 ml     Constitutional:  Critically ill   Eyes:  Pupils reactive, sclera clear   Neck:  Normal thyroid, no masses   Cardiovascular:  Tachycardic, no rub  Respiratory:   On vent, no wheezing  Psychiatry:  Sedated, HERNAN   Abdomen: +bs, soft, nt, no masses   Musculoskeletal: No LE edema, no clubbing   Lymphatics:  No LAD in neck, no supraclavicular nodes   :  Moraes in place       MEDICAL DECISION MAKING       Data/  Recent Labs     11/03/22  1735 11/04/22  0606 11/05/22  0545   WBC 15.4* 16.5* 14.4*   HGB 12.0 11.1* 10.2*   HCT 39.0 36.1 33.2*   MCV 91.7 89.6 90.3    211 169     Recent Labs     11/04/22  1410 11/04/22  1410 11/04/22  1752 11/04/22  2136 11/05/22  0205 11/05/22  0545 11/05/22  0815   *  --  160* 162* 158* 156* 158*   K 2.8*   < > 3.8 3.8  3.8 3.5 3.3* 3.2*   *  --  125* 127* 123* 121* 123*   CO2 28  --  28 24 26 27 25   GLUCOSE 298*  --  233* 272* 231* 284* 219*   PHOS  --   --  2.1* 1.9*  --  2.0*  --    MG 1.80  --   --   --  1.70*  --  1.40*   BUN 48*  --  43* 38* 31* 28* 21*   CREATININE 1.1  --  0.9 0.9 0.7 0.6 0.6   LABGLOM 53*  --  >60 >60 >60 >60 >60    < > = values in this interval not displayed. Assessment/     Acute Kidney Injury:  KDIGO 1  Etiology:  Volume depletion   Clinical:  Improving nicely, BUN is now 21   Hypernatremia:  Significant free water deficit  No prior history of DI, had issues with low sodium in early October. Solute diuresis from FABIO recovery.   Urine volume is slowing appropriately   Sepsis:  HCAP vs. UTI  On broad spectrum IV  antibiotics   Culture pending     Plan/     IV fluids with D5 1/2 at 200 cc/hr, would expect that we start to make better progress with sodium correction now that solute diuresis has slowed down  IV potassium  Close follow up of labs and will adjust IV fluids as indicated     Thank you for asking us to participate in the management of your patient, please do not hesitate to contact me for any concerns regarding my recommendations as outlined above.    -----------------------------  Kat Edwards M.D.   [facility]

## 2022-11-05 NOTE — PROGRESS NOTES
11/04/22 2300   Patient Observation   SpO2 97 %   Breath Sounds   Right Upper Lobe Diminished   Right Middle Lobe Diminished   Right Lower Lobe Diminished   Left Upper Lobe Diminished   Left Lower Lobe Diminished   Vent Information   Vent Mode AC/VC   Ventilator Settings   FiO2  35 %   Vt (Set, mL) 320 mL   Resp Rate (Set) 22 bmp   PEEP/CPAP (cmH2O) 8   Vent Patient Data (Readings)   Vt (Measured) 351 mL   Peak Inspiratory Pressure (cmH2O) 20 cmH2O   Rate Measured 24 br/min   Minute Volume (L/min) 8.3 Liters   Mean Airway Pressure (cmH2O) 12 cmH20   Plateau Pressure (cm H2O) 17 cm H2O   I:E Ratio 1:2.6   Flow Sensitivity 3 L/min   Vent Alarm Settings   Low Minute Volume (lpm) 4 L/min   Low Exhaled Vt (ml) 200 mL   High Exhaled Vt (ml) 1000 mL   RR High (bpm) 35 br/min   Apnea (secs) 20 secs   Additional Respiratoray Assessments   Humidification Source Heated wire   Humidification Temp 37.1   Circuit Condensation Drained   Ambu Bag With Mask At Bedside Yes   Airway Clearance   Suction ET Tube   Subglottic Suction Done No   Suction Device Inline suction catheter   Sputum Method Obtained Endotracheal   Sputum Amount Other (comment)  (none)   Non-Surgical Airway 11/03/22 Endotracheal tube   Placement Date/Time: 11/03/22 1748   Present on Admission/Arrival: No  Placed By: In ED  Placement Verified By: Colorimetric ETCO2 device  Insertion attempts: 1  Airway Device: Endotracheal tube  Size: 7.5   Secured At 22 cm   Measured From Lips   Secured Location (S)  Center  (moved to center)   Secured By Commercial tube valdez   Site Assessment Dry

## 2022-11-05 NOTE — PROGRESS NOTES
Call back from Dr. Esau Banks with nephro. Told him about changes to fluids and he was okay with keeping the new order of D5 1/2 at 75 ml/hr.

## 2022-11-05 NOTE — PROGRESS NOTES
Still no call back from Nephro. Dr. Radha Coffman on the unit. Spoke to him about sodium. Verbal order for D51/2 NS at 75 ml/hr.

## 2022-11-05 NOTE — PROGRESS NOTES
11/05/22 1929   Patient Observation   Heart Rate 89   Resp 20   SpO2 98 %   Observations 7.5 ett 22 at lip   Breath Sounds   Right Upper Lobe Diminished   Right Middle Lobe Diminished   Right Lower Lobe Diminished   Left Upper Lobe Diminished   Left Lower Lobe Diminished   Vent Information   Vent Mode AC/VC   Ventilator Settings   FiO2  35 %   Vt (Set, mL) 320 mL   Resp Rate (Set) 22 bmp   PEEP/CPAP (cmH2O) 5   Vent Patient Data (Readings)   Vt (Measured) 341 mL   Peak Inspiratory Pressure (cmH2O) 19 cmH2O   Rate Measured 22 br/min   Minute Volume (L/min) 7.4 Liters   Mean Airway Pressure (cmH2O) 9.1 cmH20   Plateau Pressure (cm H2O) 13 cm H2O   I:E Ratio 1:2.9   Flow Sensitivity 3 L/min   Static Compliance (L/cm H2O) 42   Dynamic Compliance (L/cm H2O) 22 L/cm H2O   Vent Alarm Settings   High Pressure (cmH2O) 35 cmH2O   High Minute Volume (lpm) 4 L/min   Low Exhaled Vt (ml) 200 mL   RR High (bpm) 35 br/min   Apnea (secs) 20 secs   Additional Respiratoray Assessments   Humidification Source Heated wire   Humidification Temp 37   Circuit Condensation Drained   Ambu Bag With Mask At Bedside Yes   Airway Clearance   Suction ET Tube   Suction Device Inline suction catheter   Sputum Method Obtained Endotracheal   Sputum Amount Moderate   Sputum Color/Odor Clear   Sputum Consistency Thick   Non-Surgical Airway 11/03/22 Endotracheal tube   Placement Date/Time: 11/03/22 1748   Present on Admission/Arrival: No  Placed By: In ED  Placement Verified By: Colorimetric ETCO2 device  Insertion attempts: 1  Airway Device: Endotracheal tube  Size: 7.5   Secured At 22 cm   Measured From Lips   Secured Location Right   Secured By Commercial tube valdez Return for an appointment in 1 year for comprehensive exam. with Dr. Christofer Crews.

## 2022-11-05 NOTE — PROGRESS NOTES
Internal Medicine ICU Progress Note    Patient is intubated and cannot give any history. She has a history of diabetes, congestive heart failure, recent admission for sepsis secondary UTI, history of FABIO, potential bacteremia, pulmonary A. fib, history of elevated troponin who presented to the emergency room for acute kidney injury, sepsis and UTI from the nursing home. She was severely hyponatremic and had FABIO at the time of admission. White cell count 16,000. In the ED she was disoriented and hypotensive. She became unresponsive and was intubated. Subjective: Intubated, sedated, opens eyes, did well with SBT. Invasive Lines: CVC catheter placed on 11/3/2022        MV: Intubated on 11/3/2022. No results for input(s): PHART, MRS2PUY, PO2ART in the last 72 hours. MV Settings:  Vent Mode: AC/VC Resp Rate (Set): 22 bmp/Vt (Set, mL): 320 mL/ /FiO2 : 35 %    IV:   dextrose 125 mL/hr at 22 1043    propofol 30 mcg/kg/min (22 1738)    dextrose      sodium chloride      norepinephrine 3 mcg/min (22 0648)       Vitals:  Temp  Av.9 °F (37.7 °C)  Min: 99.3 °F (37.4 °C)  Max: 100.5 °F (38.1 °C)  Pulse  Av.1  Min: 97  Max: 149  BP  Min: 82/62  Max: 153/88  SpO2  Av.8 %  Min: 95 %  Max: 100 %  FiO2   Av.7 %  Min: 35 %  Max: 40 %  Patient Vitals for the past 4 hrs:   BP Temp Temp src Pulse Resp SpO2   22 1200 102/69 100.1 °F (37.8 °C) Bladder (!) 125 23 97 %   22 1100 132/74 100 °F (37.8 °C) Bladder (!) 113 16 95 %   22 1000 (!) 153/88 -- -- (!) 140 17 97 %         CVP:        Intake/Output Summary (Last 24 hours) at 2022 1300  Last data filed at 2022 7563  Gross per 24 hour   Intake 6799.82 ml   Output 3050 ml   Net 3749.82 ml         EXAM:  General: Ill-appearing. Intubated. Eyes: PERRL. No sclera icterus. No conjunctiva injected. ENT: No discharge. Intubated. Neck: Trachea midline. Normal thyroid.   Resp: No accessory muscle use. No crackles. No wheezing. No rhonchi. No dullness on percussion. CV: Tachycardia. Irregularly irregular rate and rhythm. No mumur or rub. No edema. No JVD. Palpable pedal pulses. GI: Non-tender. Non-distended. No masses. No organmegaly. Normal bowel sounds. No hernia. Skin: Warm and dry. No nodule on exposed extremities. No rash on exposed extremities. Lymph: No cervical LAD. No supraclavicular LAD. M/S: No cyanosis. No joint deformity. No clubbing. Neuro: HERNAN. Psych: HERNAN    Medications:  Scheduled Meds:   magnesium sulfate  4,000 mg IntraVENous Once    metoprolol  2.5 mg IntraVENous Q6H    insulin glargine  25 Units SubCUTAneous QAM    rivaroxaban  15 mg Oral Daily    sodium chloride flush  5-40 mL IntraVENous 2 times per day    vancomycin (VANCOCIN) intermittent dosing (placeholder)   Other RX Placeholder    mupirocin   Nasal BID    cefepime  2,000 mg IntraVENous Q12H    famotidine (PEPCID) injection  20 mg IntraVENous Daily    insulin lispro  0-16 Units SubCUTAneous Q4H    divalproex  250 mg Oral 3 times per day    digoxin  125 mcg IntraVENous Daily       PRN Meds:  glucose, dextrose bolus **OR** dextrose bolus, glucagon (rDNA), dextrose, sodium chloride flush, sodium chloride, ondansetron **OR** ondansetron, polyethylene glycol, acetaminophen **OR** acetaminophen    Results:  CBC:   Recent Labs     11/03/22  1735 11/04/22  0606 11/05/22  0545   WBC 15.4* 16.5* 14.4*   HGB 12.0 11.1* 10.2*   HCT 39.0 36.1 33.2*   MCV 91.7 89.6 90.3    211 169       BMP:   Recent Labs     11/04/22  2136 11/05/22  0205 11/05/22  0545 11/05/22  0815 11/05/22  1153   *   < > 156* 158* 153*   K 3.8  3.8   < > 3.3* 3.2* 3.6   *   < > 121* 123* 118*   CO2 24   < > 27 25 27   PHOS 1.9*  --  2.0*  --  1.5*   BUN 38*   < > 28* 21* 20   CREATININE 0.9   < > 0.6 0.6 <0.5*    < > = values in this interval not displayed.        LIVER PROFILE:   Recent Labs     11/03/22  1735 11/03/22  2042 11/04/22  0145 11/04/22  0815 11/04/22  2136 11/05/22  0205 11/05/22  0815   AST 8*   < > 6*   < > 6* 7* 7*   ALT <5*   < > <5*   < > <5* <5* <5*   LIPASE 14.0  --   --   --   --   --   --    BILIDIR  --   --  <0.2  --   --   --   --    BILITOT 0.3   < > 0.4   < > 0.3 0.4 0.3   ALKPHOS 83   < > 75   < > 69 69 63    < > = values in this interval not displayed. Latest Reference Range & Units 11/4/22 06:07   Procalcitonin 0.00 - 0.15 ng/mL 0.22 (H)   (H): Data is abnormally high     Latest Reference Range & Units 11/3/22 17:35 11/3/22 20:42 11/4/22 02:58   Lactic Acid, Sepsis 0.4 - 1.9 mmol/L 4.2 (HH) 4.1 (HH) 1.5   ([facility]): Data is critically high    UA:  Recent Labs     11/03/22  1819   COLORU Yellow   PHUR 6.0   WBCUA >100*   RBCUA 5-10*   YEAST Present*   BACTERIA 1+*   CLARITYU SL CLOUDY*   SPECGRAV 1.015   LEUKOCYTESUR LARGE*   UROBILINOGEN 0.2   BILIRUBINUR Negative   BLOODU LARGE*   GLUCOSEU 500*         Cultures:  COVID-19 not detected  Influenza a and B not detected  Blood cultures sent     ECG  Atrial fibrillation with rapid ventricular responseST abnormality consider inferior and anterolateral ischemiaAbnormal ECGWhen compared with ECG of 12-OCT-2022 06:28,HR increasedConfirmed by DEXTER Flores MD ([id number]) on 11/4/2022 7:39:12 AM      Films:    XR CHEST PORTABLE   Final Result   Stable chest.  Bibasilar opacification, likely atelectasis. Satisfactory position of endotracheal tube. XR CHEST PORTABLE   Final Result   Unremarkable support devices. Mild bibasilar infiltrates or atelectasis.                   Assessment:     Principal Problem:    Sepsis secondary to UTI [facility])  Active Problems:    Persistent atrial fibrillation (HCC)    Chronic respiratory failure with hypoxia (HCC)    Dementia with behavioral disturbance    Hypertensive heart and kidney disease with chronic diastolic congestive heart failure and stage 2 chronic kidney disease (HCC)    Schizoaffective disorder, depressive type [facility] Septic shock (HCC)    Hypernatremia    DM (diabetes mellitus) ([facility]    Essential hypertension    Chronic obstructive pulmonary disease (HCC)    Acute respiratory failure ([facility]  Resolved Problems:    * No resolved hospital problems. *         Plan:    #Sepsis with septic shock. Off levophed  Most likely source appears to be UTI. Chest x-ray stable. Admitted to ICU. #Acute respiratory failure. Became unresponsive and was intubated. Continue vent support. Pulmonologist consulted. #Permanent atrial fibrillation. Monitor heart rate. Hold Cardizem due to low blood pressure. Continue Xarelto. #Possible UTI. Continue broad-spectrum antibiotics for now. On vancomycin and cefepime. Cultures pending. #Severe hypernatremia. Significant free water deficit. Possible solute diuresis from recent FABIO. Nephrology managing IVF      #FABIO. As above      #Diabetes mellitus type 2. Sliding scale insulin.  NPO. #Dementia with behavioral disturbance. Presently intubated. #History of schizoaffective disorder. #COPD. Continue breathing treatments. On the vent. #DVT prophylaxis. On Xarelto. She is a DNR CCA.           Magali Pritchard MD 1:00 PM 11/5/2022

## 2022-11-05 NOTE — PROGRESS NOTES
11/5  Vanc random = 11.3 mcg/mL at 0545. Give vancomycin 1500 mg x1. Check vanc random tomorrow in the AM (11/6 at 0600).   Delmi Wright, PharmD  11/5/2022 6:50 AM

## 2022-11-05 NOTE — PROGRESS NOTES
[facility] Pulmonary, Critical Care and Sleep Specialists                                 Pulmonary Consult /Progress Note :                                                                  CHIEF COMPLAINT: respiratory failure        HPI:   On  great   On SBT   Still A fib 130   No events  Following commands ,  Base line dementia    off sedation     Objective:   PHYSICAL EXAM:    Blood pressure 123/76, pulse (!) 142, temperature (!) 100.5 °F (38.1 °C), temperature source Bladder, resp. rate 23, height 5' 3\" (1.6 m), weight 216 lb 4.8 oz (98.1 kg), SpO2 96 %.' on RA  Gen: No distress. Eyes: PERRL. No sclera icterus. No conjunctival injection. ENT: No discharge. Pharynx clear. Neck: Trachea midline. No obvious mass. Resp: No accessory muscle use. No crackles. No wheezes. No rhonchi. No dullness on percussion. Good air entry. CV: Regular rate. Regular rhythm. No murmur or rub. No edema. GI: Non-tender. Non-distended. No hernia. Skin: Warm and dry. No nodule on exposed extremities. Lymph: No cervical LAD. No supraclavicular LAD. M/S: No cyanosis. No joint deformity. No clubbing.    Neuro: intubated sedated          DATA reviewed by me:   CXR        LABS/IMAGING:    CBC:  Lab Results   Component Value Date    WBC 14.4 (H) 11/05/2022    HGB 10.2 (L) 11/05/2022    HCT 33.2 (L) 11/05/2022    MCV 90.3 11/05/2022     11/05/2022    LYMPHOPCT 14.9 11/05/2022    RBC 3.68 (L) 11/05/2022    MCH 27.8 11/05/2022    MCHC 30.8 (L) 11/05/2022    RDW 16.4 (H) 11/05/2022    NEUTOPHILPCT 76.9 11/05/2022    MONOPCT 4.7 11/05/2022    EOSPCT 2.8 01/03/2012    BASOPCT 0.7 11/05/2022    NEUTROABS 11.1 (H) 11/05/2022    LYMPHSABS 2.1 11/05/2022    MONOSABS 0.7 11/05/2022    EOSABS 0.4 11/05/2022    BASOSABS 0.1 11/05/2022       Recent Labs     11/05/22  0545 11/04/22  0606 11/03/22  1735   WBC 14.4* 16.5* 15.4*   HGB 10.2* 11.1* 12.0   HCT 33.2* 36.1 39.0   MCV 90.3 89.6 91.7    211 241 BMP:   Recent Labs     11/04/22  1752 11/04/22 2136 11/05/22  0205 11/05/22  0545 11/05/22  0815   * 162* 158* 156* 158*   K 3.8 3.8  3.8 3.5 3.3* 3.2*   * 127* 123* 121* 123*   CO2 28 24 26 27 25   PHOS 2.1* 1.9*  --  2.0*  --    BUN 43* 38* 31* 28* 21*   CREATININE 0.9 0.9 0.7 0.6 0.6         MG:   Lab Results   Component Value Date/Time    MG 1.70 11/05/2022 02:05 AM     Ca/Phos:   Lab Results   Component Value Date    CALCIUM 7.3 (L) 11/05/2022    PHOS 2.0 (L) 11/05/2022     LIVER PROFILE:   Recent Labs     11/03/22  1735 11/03/22 2042 11/04/22  0607 11/04/22  0815 11/04/22 2136 11/05/22  0205 11/05/22  0815   AST 8*   < > 6*   < > 6* 7* 7*   ALT <5*   < > <5*   < > <5* <5* <5*   LIPASE 14.0  --   --   --   --   --   --    BILIDIR  --   --  <0.2  --   --   --   --    BILITOT 0.3   < > 0.4   < > 0.3 0.4 0.3   ALKPHOS 83   < > 75   < > 69 69 63    < > = values in this interval not displayed. PT/INR: No results for input(s): PROTIME, INR in the last 72 hours. APTT: No results for input(s): APTT in the last 72 hours. Cardiac Enzymes:  Lab Results   Component Value Date    CKTOTAL 57 01/11/2020    TROPONINI 0.09 (H) 11/04/2022       Assessment:       -septic shock vs hypovolemic  -Acute respiratory failure  -A fib   -severe hypernatremia   -UTI  FABIO      Plan:      *-Adjust vent by me,320/22/8/40  *-off  levophed  Start Lopressor IV 5 mg q 8 h   Omn digoxin   *- adjust abx ,vancomycin and cefepime X day 2   *- pan culture  *- change IVF D5   - BS ,adjust scale,keep 180 around   - home meds  *- may give dose of digoxin   Discuss her brother ,next of kin ,code status [facility] reviewed with nursing staff, medical and surgical specialty care, primary care and the patient's family as available.      Chart review/lab review/X-ray viewing/documentation and had long Conversation with patient/family re: prognosis, care options and any end of life issues:      Critical care time spent reviewing labs/films, examining patient, collaborating with other physicians more than 35  Minutes  excluding procedures . Lavanda Candle Leopold Dupont , M.D.   11/5/2022  9:02 AM    Thank you very much for allowing me to participate in the care of this pleasant patient , should you have any questions ,please do not hesitate to contact me      Lesly Banks MD,[facility]  Pulmonary&Critical Care Medicine    Brit Villalobos    NOTE: This report was transcribed using voice recognition software. Every effort was made to ensure accuracy; however, inadvertent computerized transcription errors may be present.

## 2022-11-05 NOTE — PROGRESS NOTES
11/05/22 0300   Patient Observation   Heart Rate (!) 130   Resp 22   SpO2 96 %   Vent Information   Vent Mode AC/VC   Ventilator Settings   FiO2  35 %   Vt (Set, mL) 320 mL   Resp Rate (Set) 22 bmp   PEEP/CPAP (cmH2O) 8   Vent Patient Data (Readings)   Vt (Measured) 350 mL   Peak Inspiratory Pressure (cmH2O) 21 cmH2O   Rate Measured 22 br/min   Minute Volume (L/min) 7.8 Liters   Mean Airway Pressure (cmH2O) 12 cmH20   Plateau Pressure (cm H2O) 16 cm H2O   I:E Ratio 1:2.6   Flow Sensitivity 3 L/min   Vent Alarm Settings   Low Minute Volume (lpm) 4 L/min   Low Exhaled Vt (ml) 200 mL   High Exhaled Vt (ml) 1000 mL   RR High (bpm) 35 br/min   Apnea (secs) 20 secs   Additional Respiratoray Assessments   Humidification Source Heated wire   Humidification Temp 37   Circuit Condensation Drained   Ambu Bag With Mask At Bedside Yes

## 2022-11-06 PROBLEM — N39.0 UTI (URINARY TRACT INFECTION): Status: RESOLVED | Noted: 2022-10-06 | Resolved: 2022-11-06

## 2022-11-06 LAB
ALBUMIN SERPL-MCNC: 2.2 G/DL (ref 3.4–5)
ALBUMIN SERPL-MCNC: 2.4 G/DL (ref 3.4–5)
ANION GAP SERPL CALCULATED.3IONS-SCNC: 7 MMOL/L (ref 3–16)
ANION GAP SERPL CALCULATED.3IONS-SCNC: 7 MMOL/L (ref 3–16)
BASE EXCESS ARTERIAL: 2.4 MMOL/L (ref -3–3)
BASE EXCESS VENOUS: 2.1 MMOL/L (ref -3–3)
BASOPHILS ABSOLUTE: 0.1 K/UL (ref 0–0.2)
BASOPHILS RELATIVE PERCENT: 0.8 %
BUN BLDV-MCNC: 13 MG/DL (ref 7–20)
BUN BLDV-MCNC: 15 MG/DL (ref 7–20)
CALCIUM SERPL-MCNC: 7.8 MG/DL (ref 8.3–10.6)
CALCIUM SERPL-MCNC: 7.9 MG/DL (ref 8.3–10.6)
CARBOXYHEMOGLOBIN ARTERIAL: 0.5 % (ref 0–1.5)
CARBOXYHEMOGLOBIN: 1.5 % (ref 0–1.5)
CHLORIDE BLD-SCNC: 110 MMOL/L (ref 99–110)
CHLORIDE BLD-SCNC: 111 MMOL/L (ref 99–110)
CO2: 28 MMOL/L (ref 21–32)
CO2: 28 MMOL/L (ref 21–32)
CREAT SERPL-MCNC: <0.5 MG/DL (ref 0.6–1.2)
CREAT SERPL-MCNC: <0.5 MG/DL (ref 0.6–1.2)
CULTURE, RESPIRATORY: NORMAL
EOSINOPHILS ABSOLUTE: 0.3 K/UL (ref 0–0.6)
EOSINOPHILS RELATIVE PERCENT: 2.3 %
GFR SERPL CREATININE-BSD FRML MDRD: >60 ML/MIN/{1.73_M2}
GFR SERPL CREATININE-BSD FRML MDRD: >60 ML/MIN/{1.73_M2}
GLUCOSE BLD-MCNC: 121 MG/DL (ref 70–99)
GLUCOSE BLD-MCNC: 123 MG/DL (ref 70–99)
GLUCOSE BLD-MCNC: 124 MG/DL (ref 70–99)
GLUCOSE BLD-MCNC: 129 MG/DL (ref 70–99)
GLUCOSE BLD-MCNC: 136 MG/DL (ref 70–99)
GLUCOSE BLD-MCNC: 146 MG/DL (ref 70–99)
GLUCOSE BLD-MCNC: 148 MG/DL (ref 70–99)
GRAM STAIN RESULT: NORMAL
HCO3 ARTERIAL: 26 MMOL/L (ref 21–29)
HCO3 VENOUS: 27.1 MMOL/L (ref 23–29)
HCT VFR BLD CALC: 34.2 % (ref 36–48)
HEMOGLOBIN, ART, EXTENDED: 12.1 G/DL (ref 12–16)
HEMOGLOBIN: 10.5 G/DL (ref 12–16)
LYMPHOCYTES ABSOLUTE: 2.4 K/UL (ref 1–5.1)
LYMPHOCYTES RELATIVE PERCENT: 16.9 %
MCH RBC QN AUTO: 27.1 PG (ref 26–34)
MCHC RBC AUTO-ENTMCNC: 30.7 G/DL (ref 31–36)
MCV RBC AUTO: 88.3 FL (ref 80–100)
METHEMOGLOBIN ARTERIAL: 0.3 %
METHEMOGLOBIN VENOUS: 0.3 %
MONOCYTES ABSOLUTE: 0.6 K/UL (ref 0–1.3)
MONOCYTES RELATIVE PERCENT: 4.1 %
NEUTROPHILS ABSOLUTE: 10.9 K/UL (ref 1.7–7.7)
NEUTROPHILS RELATIVE PERCENT: 75.9 %
O2 CONTENT, VEN: 14 VOL %
O2 SAT, ARTERIAL: 96.1 %
O2 SAT, VEN: 86 %
O2 THERAPY: ABNORMAL
O2 THERAPY: ABNORMAL
PCO2 ARTERIAL: 36.5 MMHG (ref 35–45)
PCO2, VEN: 44 MMHG (ref 40–50)
PDW BLD-RTO: 16.3 % (ref 12.4–15.4)
PERFORMED ON: ABNORMAL
PH ARTERIAL: 7.47 (ref 7.35–7.45)
PH VENOUS: 7.41 (ref 7.35–7.45)
PHOSPHORUS: 2.2 MG/DL (ref 2.5–4.9)
PHOSPHORUS: 2.5 MG/DL (ref 2.5–4.9)
PLATELET # BLD: 165 K/UL (ref 135–450)
PMV BLD AUTO: 10.4 FL (ref 5–10.5)
PO2 ARTERIAL: 76.6 MMHG (ref 75–108)
PO2, VEN: 50.6 MMHG (ref 25–40)
POTASSIUM SERPL-SCNC: 3.3 MMOL/L (ref 3.5–5.1)
POTASSIUM SERPL-SCNC: 3.7 MMOL/L (ref 3.5–5.1)
RBC # BLD: 3.87 M/UL (ref 4–5.2)
SODIUM BLD-SCNC: 145 MMOL/L (ref 136–145)
SODIUM BLD-SCNC: 146 MMOL/L (ref 136–145)
TCO2 ARTERIAL: 27.1 MMOL/L
TCO2 CALC VENOUS: 29 MMOL/L
VANCOMYCIN RANDOM: 12.3 UG/ML
WBC # BLD: 14.4 K/UL (ref 4–11)

## 2022-11-06 PROCEDURE — 85025 COMPLETE CBC W/AUTO DIFF WBC: CPT

## 2022-11-06 PROCEDURE — 6360000002 HC RX W HCPCS: Performed by: INTERNAL MEDICINE

## 2022-11-06 PROCEDURE — 80202 ASSAY OF VANCOMYCIN: CPT

## 2022-11-06 PROCEDURE — 2580000003 HC RX 258: Performed by: INTERNAL MEDICINE

## 2022-11-06 PROCEDURE — A4216 STERILE WATER/SALINE, 10 ML: HCPCS | Performed by: INTERNAL MEDICINE

## 2022-11-06 PROCEDURE — 99291 CRITICAL CARE FIRST HOUR: CPT | Performed by: INTERNAL MEDICINE

## 2022-11-06 PROCEDURE — 99233 SBSQ HOSP IP/OBS HIGH 50: CPT | Performed by: INTERNAL MEDICINE

## 2022-11-06 PROCEDURE — 94003 VENT MGMT INPAT SUBQ DAY: CPT

## 2022-11-06 PROCEDURE — 6360000002 HC RX W HCPCS: Performed by: HOSPITALIST

## 2022-11-06 PROCEDURE — 2000000000 HC ICU R&B

## 2022-11-06 PROCEDURE — 6370000000 HC RX 637 (ALT 250 FOR IP): Performed by: INTERNAL MEDICINE

## 2022-11-06 PROCEDURE — 82803 BLOOD GASES ANY COMBINATION: CPT

## 2022-11-06 PROCEDURE — 2700000000 HC OXYGEN THERAPY PER DAY

## 2022-11-06 PROCEDURE — 80069 RENAL FUNCTION PANEL: CPT

## 2022-11-06 PROCEDURE — 2500000003 HC RX 250 WO HCPCS: Performed by: INTERNAL MEDICINE

## 2022-11-06 PROCEDURE — 94761 N-INVAS EAR/PLS OXIMETRY MLT: CPT

## 2022-11-06 PROCEDURE — 6370000000 HC RX 637 (ALT 250 FOR IP): Performed by: HOSPITALIST

## 2022-11-06 PROCEDURE — 2580000003 HC RX 258: Performed by: HOSPITALIST

## 2022-11-06 RX ORDER — SODIUM CHLORIDE AND POTASSIUM CHLORIDE 150; 900 MG/100ML; MG/100ML
INJECTION, SOLUTION INTRAVENOUS CONTINUOUS
Status: DISCONTINUED | OUTPATIENT
Start: 2022-11-06 | End: 2022-11-08

## 2022-11-06 RX ORDER — POTASSIUM CHLORIDE 29.8 MG/ML
20 INJECTION INTRAVENOUS
Status: COMPLETED | OUTPATIENT
Start: 2022-11-06 | End: 2022-11-06

## 2022-11-06 RX ADMIN — PROPOFOL 30 MCG/KG/MIN: 10 INJECTION, EMULSION INTRAVENOUS at 20:25

## 2022-11-06 RX ADMIN — POTASSIUM CHLORIDE 20 MEQ: 400 INJECTION, SOLUTION INTRAVENOUS at 14:29

## 2022-11-06 RX ADMIN — Medication 10 ML: at 20:27

## 2022-11-06 RX ADMIN — VANCOMYCIN HYDROCHLORIDE 1250 MG: 10 INJECTION, POWDER, LYOPHILIZED, FOR SOLUTION INTRAVENOUS at 07:44

## 2022-11-06 RX ADMIN — MEROPENEM 1000 MG: 1 INJECTION, POWDER, FOR SOLUTION INTRAVENOUS at 15:32

## 2022-11-06 RX ADMIN — Medication 10 ML: at 07:42

## 2022-11-06 RX ADMIN — FAMOTIDINE 20 MG: 10 INJECTION INTRAVENOUS at 07:42

## 2022-11-06 RX ADMIN — MEROPENEM 1000 MG: 1 INJECTION, POWDER, FOR SOLUTION INTRAVENOUS at 09:29

## 2022-11-06 RX ADMIN — POTASSIUM CHLORIDE AND SODIUM CHLORIDE: 900; 150 INJECTION, SOLUTION INTRAVENOUS at 20:26

## 2022-11-06 RX ADMIN — POTASSIUM CHLORIDE 20 MEQ: 400 INJECTION, SOLUTION INTRAVENOUS at 11:37

## 2022-11-06 RX ADMIN — METOPROLOL TARTRATE 2.5 MG: 5 INJECTION INTRAVENOUS at 05:48

## 2022-11-06 RX ADMIN — METOPROLOL TARTRATE 2.5 MG: 5 INJECTION INTRAVENOUS at 10:57

## 2022-11-06 RX ADMIN — DEXTROSE AND SODIUM CHLORIDE: 5; 450 INJECTION, SOLUTION INTRAVENOUS at 07:23

## 2022-11-06 RX ADMIN — DIGOXIN 125 MCG: 250 INJECTION, SOLUTION INTRAMUSCULAR; INTRAVENOUS at 07:42

## 2022-11-06 RX ADMIN — DIVALPROEX SODIUM 250 MG: 125 CAPSULE, COATED PELLETS ORAL at 22:45

## 2022-11-06 RX ADMIN — DIVALPROEX SODIUM 250 MG: 125 CAPSULE, COATED PELLETS ORAL at 13:27

## 2022-11-06 RX ADMIN — DIVALPROEX SODIUM 250 MG: 125 CAPSULE, COATED PELLETS ORAL at 05:48

## 2022-11-06 RX ADMIN — POTASSIUM CHLORIDE 20 MEQ: 400 INJECTION, SOLUTION INTRAVENOUS at 13:28

## 2022-11-06 RX ADMIN — VANCOMYCIN HYDROCHLORIDE 1250 MG: 10 INJECTION, POWDER, LYOPHILIZED, FOR SOLUTION INTRAVENOUS at 20:38

## 2022-11-06 RX ADMIN — MUPIROCIN: 20 OINTMENT TOPICAL at 07:42

## 2022-11-06 RX ADMIN — METOPROLOL TARTRATE 2.5 MG: 5 INJECTION INTRAVENOUS at 23:00

## 2022-11-06 RX ADMIN — PROPOFOL 30 MCG/KG/MIN: 10 INJECTION, EMULSION INTRAVENOUS at 15:41

## 2022-11-06 RX ADMIN — PROPOFOL 30 MCG/KG/MIN: 10 INJECTION, EMULSION INTRAVENOUS at 01:50

## 2022-11-06 RX ADMIN — METOPROLOL TARTRATE 2.5 MG: 5 INJECTION INTRAVENOUS at 17:34

## 2022-11-06 RX ADMIN — POTASSIUM CHLORIDE AND SODIUM CHLORIDE: 900; 150 INJECTION, SOLUTION INTRAVENOUS at 08:21

## 2022-11-06 RX ADMIN — POTASSIUM CHLORIDE 20 MEQ: 400 INJECTION, SOLUTION INTRAVENOUS at 12:16

## 2022-11-06 RX ADMIN — PROPOFOL 30 MCG/KG/MIN: 10 INJECTION, EMULSION INTRAVENOUS at 07:01

## 2022-11-06 RX ADMIN — RIVAROXABAN 15 MG: 15 TABLET, FILM COATED ORAL at 17:34

## 2022-11-06 RX ADMIN — MUPIROCIN: 20 OINTMENT TOPICAL at 20:27

## 2022-11-06 ASSESSMENT — PULMONARY FUNCTION TESTS
PIF_VALUE: 20
PIF_VALUE: 14
PIF_VALUE: 17
PIF_VALUE: 18
PIF_VALUE: 17
PIF_VALUE: 19
PIF_VALUE: 18

## 2022-11-06 ASSESSMENT — PAIN SCALES - GENERAL
PAINLEVEL_OUTOF10: 0

## 2022-11-06 NOTE — PROGRESS NOTES
11/6  Vanc random = 12.3 mcg/mL at 0435. Renal function has improved. Will scheduled vancomycin 1250 mg q12. Check vacn trough tomorrow (11/7 at 0700).   Margarette Trujillo, PharmD  11/6/2022 6:27 AM

## 2022-11-06 NOTE — PROGRESS NOTES
Progress Note    HISTORY     CC:  Altered Mental Status / Respiratory Failure          We are following for acute kidney injury and hypernatremia        Subjective/   HPI:   Sodium is 145. She is alert. BP stable. Kidney function is improving     ROS:  Constitutional:  No fevers, No Chills  Cardiovascular:  No palpations  Respiratory: On vent  Skin:  No rash, no itching  :  No hematuria, polyuric     Social Hx:  No Family at the bedside     Past Medical and Surgical History:  - Reviewed, no changes     EXAM       Objective/     Vitals:    11/06/22 0701 11/06/22 0800 11/06/22 0900 11/06/22 1000   BP: 84/64 90/71 108/70 125/76   Pulse: 99 96 98 96   Resp: 22 22 17 17   Temp: 100.2 °F (37.9 °C)      TempSrc: Bladder      SpO2: 97% 96% 97% 98%   Weight:       Height:         24HR INTAKE/OUTPUT:    Intake/Output Summary (Last 24 hours) at 11/6/2022 1119  Last data filed at 11/6/2022 0606  Gross per 24 hour   Intake 3929.95 ml   Output 1542 ml   Net 2387.95 ml       Constitutional:  Critically ill   Eyes:  Pupils reactive, sclera clear   Neck:  Normal thyroid, no masses   Cardiovascular:  Tachycardic, no rub  Respiratory:   On vent, no wheezing  Psychiatry:  Sedated, HERNAN   Abdomen: +bs, soft, nt, no masses   Musculoskeletal: No LE edema, no clubbing   Lymphatics:  No LAD in neck, no supraclavicular nodes   :  Moraes in place       MEDICAL DECISION MAKING       Data/  Recent Labs     11/04/22  0606 11/05/22  0545 11/06/22  0435   WBC 16.5* 14.4* 14.4*   HGB 11.1* 10.2* 10.5*   HCT 36.1 33.2* 34.2*   MCV 89.6 90.3 88.3    169 165       Recent Labs     11/04/22  1410 11/04/22  1752 11/05/22  0205 11/05/22  0545 11/05/22  0815 11/05/22  1153 11/05/22  1640 11/05/22  2330 11/06/22  0435   *   < > 158*   < > 158*   < > 149* 146* 145   K 2.8*   < > 3.5   < > 3.2*   < > 3.3* 3.7 3.3*   *   < > 123*   < > 123*   < > 113* 111* 110   CO2 28   < > 26   < > 25   < > 28 28 28   GLUCOSE 298*   < > 231*   < > 219*   < > 208* 148* 123*   PHOS  --    < >  --    < >  --    < > 1.7* 2.2* 2.5   MG 1.80  --  1.70*  --  1.40*  --   --   --   --    BUN 48*   < > 31*   < > 21*   < > 18 15 13   CREATININE 1.1   < > 0.7   < > 0.6   < > <0.5* <0.5* <0.5*   LABGLOM 53*   < > >60   < > >60   < > >60 >60 >60    < > = values in this interval not displayed. Assessment/     Acute Kidney Injury:  KDIGO 1  Etiology:  Volume depletion   Clinical:  Resolved   Hypernatremia:  Significant free water deficit  No prior history of DI, had issues with low sodium in early October. Solute diuresis from FABIO recovery.     Sodium improving   Sepsis:  HCAP vs. UTI  On broad spectrum IV  antibiotics   Culture pending     Plan/     IV fluids  IV potassium in addition for 80 mEq due to total body K deficit   Follow labs daily at this point     Thank you for asking us to participate in the management of your patient, please do not hesitate to contact me for any concerns regarding my recommendations as outlined above.    -----------------------------  Shefali Timmons M.D.   [facility]

## 2022-11-06 NOTE — PROGRESS NOTES
[facility] Pulmonary, Critical Care and Sleep Specialists                                 Pulmonary Consult /Progress Note :                                                                  CHIEF COMPLAINT: respiratory failure        HPI: doing great  On SBT   Tolerted   Still A fib rate controled   No events  Following commands ,  Base line dementia    off sedation     Objective:   PHYSICAL EXAM:    Blood pressure 90/71, pulse 96, temperature 100.2 °F (37.9 °C), temperature source Bladder, resp. rate 22, height 5' 3\" (1.6 m), weight 214 lb 6.4 oz (97.3 kg), SpO2 96 %.' on RA  Gen: No distress. Eyes: PERRL. No sclera icterus. No conjunctival injection. ENT: No discharge. Pharynx clear. Neck: Trachea midline. No obvious mass. Resp: No accessory muscle use. No crackles. No wheezes. No rhonchi. No dullness on percussion. Good air entry. CV: Regular rate. Regular rhythm. No murmur or rub. No edema. GI: Non-tender. Non-distended. No hernia. Skin: Warm and dry. No nodule on exposed extremities. Lymph: No cervical LAD. No supraclavicular LAD. M/S: No cyanosis. No joint deformity. No clubbing.    Neuro: intubated sedated          DATA reviewed by me:   CXR        LABS/IMAGING:    CBC:  Lab Results   Component Value Date    WBC 14.4 (H) 11/06/2022    HGB 10.5 (L) 11/06/2022    HCT 34.2 (L) 11/06/2022    MCV 88.3 11/06/2022     11/06/2022    LYMPHOPCT 16.9 11/06/2022    RBC 3.87 (L) 11/06/2022    MCH 27.1 11/06/2022    MCHC 30.7 (L) 11/06/2022    RDW 16.3 (H) 11/06/2022    NEUTOPHILPCT 75.9 11/06/2022    MONOPCT 4.1 11/06/2022    EOSPCT 2.8 01/03/2012    BASOPCT 0.8 11/06/2022    NEUTROABS 10.9 (H) 11/06/2022    LYMPHSABS 2.4 11/06/2022    MONOSABS 0.6 11/06/2022    EOSABS 0.3 11/06/2022    BASOSABS 0.1 11/06/2022       Recent Labs     11/06/22  0435 11/05/22  0545 11/04/22  0606   WBC 14.4* 14.4* 16.5*   HGB 10.5* 10.2* 11.1*   HCT 34.2* 33.2* 36.1   MCV 88.3 90.3 89.6    169 211         BMP:   Recent Labs     11/05/22  1640 11/05/22  2330 11/06/22  0435   * 146* 145   K 3.3* 3.7 3.3*   * 111* 110   CO2 28 28 28   PHOS 1.7* 2.2* 2.5   BUN 18 15 13   CREATININE <0.5* <0.5* <0.5*         MG:   Lab Results   Component Value Date/Time    MG 1.40 11/05/2022 08:15 AM     Ca/Phos:   Lab Results   Component Value Date    CALCIUM 7.8 (L) 11/06/2022    PHOS 2.5 11/06/2022     LIVER PROFILE:   Recent Labs     11/03/22  1735 11/03/22  2042 11/04/22  0607 11/04/22  0815 11/04/22  2136 11/05/22  0205 11/05/22  0815   AST 8*   < > 6*   < > 6* 7* 7*   ALT <5*   < > <5*   < > <5* <5* <5*   LIPASE 14.0  --   --   --   --   --   --    BILIDIR  --   --  <0.2  --   --   --   --    BILITOT 0.3   < > 0.4   < > 0.3 0.4 0.3   ALKPHOS 83   < > 75   < > 69 69 63    < > = values in this interval not displayed. PT/INR: No results for input(s): PROTIME, INR in the last 72 hours. APTT: No results for input(s): APTT in the last 72 hours. Cardiac Enzymes:  Lab Results   Component Value Date    CKTOTAL 57 01/11/2020    TROPONINI 0.09 (H) 11/04/2022       Assessment:       -septic shock vs hypovolemic  -Acute respiratory failure  -A fib   -severe hypernatremia   -UTI  FABIO      Plan:      *-ON SBT   *-off  levophed  Start Lopressor IV 2.5 mg q 8 h   On digoxin   *- adjust abx ,vancomycin and Merrem  X day 3,if cultures negative then PO ,sputum culture GPR  *- pan culture  *- change IVF D5   - BS ,adjust scale,keep 180 around   - home meds  *- may give dose of digoxin   Discuss her brother ,next of kin ,code status [address]  [place]  Will liberate from MV  and extubate,will address with brother plan of care in case she need intubation again     Care reviewed with nursing staff, medical and surgical specialty care, primary care and the patient's family as available.      Chart review/lab review/X-ray viewing/documentation and had long Conversation with patient/family re: prognosis, care options and any end of life issues:      Critical care time spent reviewing labs/films, examining patient, collaborating with other physicians more than 35  Minutes  excluding procedures . Leighann Mooney M.D.   11/6/2022  9:13 AM    Thank you very much for allowing me to participate in the care of this pleasant patient , should you have any questions ,please do not hesitate to contact me      Prasahnt Banks MD,[facility]  Pulmonary&Critical Care Medicine    Jazzy Blanca    NOTE: This report was transcribed using voice recognition software. Every effort was made to ensure accuracy; however, inadvertent computerized transcription errors may be present.

## 2022-11-06 NOTE — PLAN OF CARE
Problem: Safety - Medical Restraint  Goal: Remains free of injury from restraints (Restraint for Interference with Medical Device)  Description: INTERVENTIONS:  1. Determine that other, less restrictive measures have been tried or would not be effective before applying the restraint  2. Evaluate the patient's condition at the time of restraint application  3. Inform patient/family regarding the reason for restraint  4.  Q2H: Monitor safety, psychosocial status, comfort, nutrition and hydration  11/6/2022 0012 by Rochelle Noriega RN  Outcome: Progressing  11/6/2022 0011 by Rochelle Noriega RN  Outcome: Progressing  Flowsheets  Taken 11/6/2022 0000  Remains free of injury from restraints (restraint for interference with medical device):   Determine that other, less restrictive measures have been tried or would not be effective before applying the restraint   Evaluate the patient's condition at the time of restraint application   Every 2 hours: Monitor safety, psychosocial status, comfort, nutrition and hydration   Inform patient/family regarding the reason for restraint  Taken 11/5/2022 2229  Remains free of injury from restraints (restraint for interference with medical device):   Determine that other, less restrictive measures have been tried or would not be effective before applying the restraint   Evaluate the patient's condition at the time of restraint application   Inform patient/family regarding the reason for restraint   Every 2 hours: Monitor safety, psychosocial status, comfort, nutrition and hydration  Taken 11/5/2022 2200  Remains free of injury from restraints (restraint for interference with medical device):   Determine that other, less restrictive measures have been tried or would not be effective before applying the restraint   Evaluate the patient's condition at the time of restraint application   Inform patient/family regarding the reason for restraint   Every 2 hours: Monitor safety, psychosocial status, comfort, nutrition and hydration  Taken 11/5/2022 2000  Remains free of injury from restraints (restraint for interference with medical device):   Determine that other, less restrictive measures have been tried or would not be effective before applying the restraint   Evaluate the patient's condition at the time of restraint application   Inform patient/family regarding the reason for restraint   Every 2 hours: Monitor safety, psychosocial status, comfort, nutrition and hydration     Problem: Discharge Planning  Goal: Discharge to home or other facility with appropriate resources  11/6/2022 0012 by Rochelle Noriega RN  Outcome: Progressing  11/6/2022 0011 by Rochelle Noriega RN  Outcome: Progressing  Flowsheets (Taken 11/5/2022 2000)  Discharge to home or other facility with appropriate resources: Identify barriers to discharge with patient and caregiver     Problem: Pain  Goal: Verbalizes/displays adequate comfort level or baseline comfort level  11/6/2022 0012 by Rochelle Noriega RN  Outcome: Progressing  11/6/2022 0011 by Rochelle Noriega RN  Outcome: Progressing  Flowsheets (Taken 11/5/2022 2000 by Vahe Estrada RN)  Verbalizes/displays adequate comfort level or baseline comfort level:   Encourage patient to monitor pain and request assistance   Assess pain using appropriate pain scale   Administer analgesics based on type and severity of pain and evaluate response   Implement non-pharmacological measures as appropriate and evaluate response     Problem: Neurosensory - Adult  Goal: Achieves stable or improved neurological status  11/6/2022 0012 by Rochelle Noriega RN  Outcome: Progressing  11/6/2022 0011 by Rochelle Noriega RN  Outcome: Progressing  Flowsheets (Taken 11/5/2022 2000)  Achieves stable or improved neurological status: Assess for and report changes in neurological status     Problem: Respiratory - Adult  Goal: Achieves optimal ventilation and oxygenation  11/6/2022 0012 by Rolanda Melgoza Adriana Hu RN  Outcome: Progressing  11/6/2022 0011 by Gumaro Jerez RN  Outcome: Progressing  Flowsheets (Taken 11/5/2022 2000)  Achieves optimal ventilation and oxygenation: Assess for changes in respiratory status     Problem: Cardiovascular - Adult  Goal: Maintains optimal cardiac output and hemodynamic stability  11/6/2022 0012 by Gumaro Jerez RN  Outcome: Progressing  11/6/2022 0011 by Gumaro Jerez RN  Outcome: Progressing  Flowsheets (Taken 11/5/2022 2000)  Maintains optimal cardiac output and hemodynamic stability: Monitor blood pressure and heart rate     Problem: Skin/Tissue Integrity - Adult  Goal: Skin integrity remains intact  11/6/2022 0012 by Gumaro Jerez RN  Outcome: Progressing  11/6/2022 0011 by Gumaro Jerez RN  Outcome: Progressing  Flowsheets (Taken 11/5/2022 2000)  Skin Integrity Remains Intact: Monitor for areas of redness and/or skin breakdown     Problem: Musculoskeletal - Adult  Goal: Return mobility to safest level of function  11/6/2022 0012 by Gumaro Jerez RN  Outcome: Progressing  11/6/2022 0011 by Gumaro Jerez RN  Outcome: Progressing  Flowsheets (Taken 11/5/2022 2000)  Return Mobility to Safest Level of Function: Assess patient stability and activity tolerance for standing, transferring and ambulating with or without assistive devices     Problem: Gastrointestinal - Adult  Goal: Maintains or returns to baseline bowel function  11/6/2022 0012 by Gumaro Jerez RN  Outcome: Progressing  11/6/2022 0011 by Gumaro Jerez RN  Outcome: Progressing  Flowsheets (Taken 11/5/2022 2000)  Maintains or returns to baseline bowel function: Assess bowel function  Goal: Maintains adequate nutritional intake  Outcome: Progressing     Problem: Genitourinary - Adult  Goal: Absence of urinary retention  11/6/2022 0012 by Gumaro Jerez RN  Outcome: Progressing  11/6/2022 0011 by Gumaro Jerez RN  Outcome: Progressing  Flowsheets (Taken 11/5/2022 2000)  Absence of urinary retention: Assess patients ability to void and empty bladder   Monitor intake/output and perform bladder scan as needed     Problem: Infection - Adult  Goal: Absence of infection at discharge  11/6/2022 0012 by Viji Canales RN  Outcome: Progressing  11/6/2022 0011 by Viji Canales RN  Outcome: Progressing  Flowsheets (Taken 11/5/2022 2000)  Absence of infection at discharge: Assess and monitor for signs and symptoms of infection     Problem: Metabolic/Fluid and Electrolytes - Adult  Goal: Electrolytes maintained within normal limits  11/6/2022 0012 by Viji Canales RN  Outcome: Progressing  11/6/2022 0011 by Viji Cnaales RN  Outcome: Progressing  Flowsheets (Taken 11/5/2022 2000)  Electrolytes maintained within normal limits: Monitor labs and assess patient for signs and symptoms of electrolyte imbalances     Problem: Skin/Tissue Integrity  Goal: Absence of new skin breakdown  Description: 1. Monitor for areas of redness and/or skin breakdown  2. Assess vascular access sites hourly  3. Every 4-6 hours minimum:  Change oxygen saturation probe site  4. Every 4-6 hours:  If on nasal continuous positive airway pressure, respiratory therapy assess nares and determine need for appliance change or resting period.   11/6/2022 0012 by Viji Canales RN  Outcome: Progressing  11/6/2022 0011 by Viji Canales RN  Outcome: Progressing

## 2022-11-06 NOTE — PROGRESS NOTES
11/06/22 0320   Patient Observation   SpO2 97 %   Breath Sounds   Right Upper Lobe Diminished   Right Middle Lobe Diminished   Right Lower Lobe Diminished   Left Upper Lobe Diminished   Left Lower Lobe Diminished   Vent Information   Vent Mode AC/VC   Ventilator Settings   FiO2  35 %   Vt (Set, mL) 320 mL   Resp Rate (Set) 22 bmp   PEEP/CPAP (cmH2O) 5   Vent Patient Data (Readings)   Vt (Measured) 335 mL   Peak Inspiratory Pressure (cmH2O) 20 cmH2O   Rate Measured 22 br/min   Minute Volume (L/min) 7.4 Liters   Mean Airway Pressure (cmH2O) 9.1 cmH20   Plateau Pressure (cm H2O) 14 cm H2O   I:E Ratio 1:2.9   Flow Sensitivity 3 L/min   Vent Alarm Settings   Low Minute Volume (lpm) 4 L/min   High Minute Volume (lpm) 20 L/min   Low Exhaled Vt (ml) 200 mL   High Exhaled Vt (ml) 1000 mL   RR High (bpm) 35 br/min   Apnea (secs) 20 secs   Airway Clearance   Suction ET Tube   Subglottic Suction Done No   Suction Device Inline suction catheter   Sputum Method Obtained Endotracheal   Sputum Amount Moderate   Sputum Color/Odor Yellow; White   Sputum Consistency Thick   Non-Surgical Airway 11/03/22 Endotracheal tube   Placement Date/Time: 11/03/22 5948   Present on Admission/Arrival: No  Placed By: In ED  Placement Verified By: Colorimetric ETCO2 device  Insertion attempts: 1  Airway Device: Endotracheal tube  Size: 7.5   Secured At 22 cm   Measured From Lips   Secured Location Left   Secured By Commercial tube valdez   Site Assessment Dry

## 2022-11-06 NOTE — PROGRESS NOTES
11/06/22 1519   NICU Vent Information   Vent Type 980   Vent Mode AC/VC   Vt (Set, mL) 320 mL   Vt (Measured) 337 mL   Resp Rate (Set) 22 bmp   Rate Measured 26 br/min   Minute Volume (L/min) 9 Liters   Peak Flow 50 L/min   FiO2  35 %   Peak Inspiratory Pressure (cmH2O) 19 cmH2O   I:E Ratio 1:3   Sensitivity 3   PEEP/CPAP (cmH2O) 5   Mean Airway Pressure (cmH2O) 10 cmH20   Cough/Sputum   Sputum How Obtained Endotracheal;Suctioned   Cough Productive   Sputum Amount Large   Sputum Color White   Tenacity Thick   Breath Sounds   Right Upper Lobe Diminished   Right Middle Lobe Diminished   Right Lower Lobe Diminished   Left Upper Lobe Diminished   Left Lower Lobe Diminished   Additional Respiratory Assessments   Position Semi-Carter's   Humidification Source Heated wire   Humidification Temp 37   Vent Alarm Settings   Low Pressure (cmH2O) 35 cmH2O   High Pressure (cmH2O) 4 cmH2O   High Minute Volume (lpm) 20 L/min   Low Exhaled Vt (ml) 200 mL   RR High (bpm) 35 br/min   Apnea (secs) 20 secs   NICU Ventilator Associated Pneumonia Bundle   Elevation of Head of Bed Yes   Oral Care Completed Yes   Oral Care Performed Mouth suctioned   Non-Surgical Airway 11/03/22 Endotracheal tube   Placement Date/Time: 11/03/22 7968   Present on Admission/Arrival: No  Placed By: In ED  Placement Verified By: Colorimetric ETCO2 device  Insertion attempts: 1  Airway Device: Endotracheal tube  Size: 7.5   Secured At 22 cm   Measured From [address]   Secured By Commercial tube valdez   Site Assessment Dry

## 2022-11-06 NOTE — PROGRESS NOTES
Pt placed on SBT 5/5, FiO 35%       11/06/22 0756   NICU Vent Information   Vent Type 980   Vent Mode PS   Spontaneous  mL   Rate Measured 19 br/min   Minute Volume (L/min) 7 Liters   Pressure Support 5 cmH20   FiO2  35 %   PEEP/CPAP (cmH2O) 5   Mean Airway Pressure (cmH2O) 7 cmH20   Vent Alarm Settings   High Pressure (cmH2O) 35 cmH2O   Low Minute Volume (lpm) 4 L/min   High Minute Volume (lpm) 20 L/min   Low Exhaled Vt (ml) 200 mL   RR High (bpm) 35 br/min

## 2022-11-06 NOTE — PROGRESS NOTES
Pt returned to previous AC vent settings  Norma PS 5/5 well x 4hrs         11/06/22 1200   NICU Vent Information   Vent Type 980   Vent Mode AC/VC   Vt (Set, mL) 320 mL   Vt (Measured) 342 mL   Resp Rate (Set) 22 bmp   Rate Measured 8 br/min   Minute Volume (L/min) 10 Liters   Peak Flow 50 L/min   FiO2  35 %   Peak Inspiratory Pressure (cmH2O) 18 cmH2O   I:E Ratio 1:2   Sensitivity 3   PEEP/CPAP (cmH2O) 5   Mean Airway Pressure (cmH2O) 10 cmH20   Cough/Sputum   Sputum How Obtained Endotracheal   Cough Productive   Sputum Amount Small   Sputum Color White   Tenacity Thick   Breath Sounds   Right Upper Lobe Diminished   Right Middle Lobe Diminished   Right Lower Lobe Diminished   Left Upper Lobe Diminished   Left Lower Lobe Diminished   Additional Respiratory Assessments   Position Semi-Carter's   Humidification Source Heated wire   Humidification Temp 36   Vent Alarm Settings   Low Pressure (cmH2O) 35 cmH2O   High Pressure (cmH2O) 4 cmH2O   High Minute Volume (lpm) 20 L/min   Low Exhaled Vt (ml) 200 mL   RR High (bpm) 35 br/min   Non-Surgical Airway 11/03/22 Endotracheal tube   Placement Date/Time: 11/03/22 9748   Present on Admission/Arrival: No  Placed By: In ED  Placement Verified By: Colorimetric ETCO2 device  Insertion attempts: 1  Airway Device: Endotracheal tube  Size: 7.5   Secured At 22 cm   Measured From [address] By Commercial tube valdez   Site Assessment Dry

## 2022-11-06 NOTE — PROGRESS NOTES
11/05/22 2322   Patient Observation   Heart Rate 95   Resp 24   SpO2 95 %   Observations 7.5 ett 22 at lip   Breath Sounds   Right Upper Lobe Diminished   Right Middle Lobe Diminished   Right Lower Lobe Diminished   Left Upper Lobe Diminished   Left Lower Lobe Diminished   Vent Information   Vent Mode AC/VC   Ventilator Settings   FiO2  35 %   Vt (Set, mL) 320 mL   Resp Rate (Set) 22 bmp   PEEP/CPAP (cmH2O) 5   Vent Patient Data (Readings)   Vt (Measured) 353 mL   Peak Inspiratory Pressure (cmH2O) 20 cmH2O   Rate Measured 25 br/min   Minute Volume (L/min) 8.7 Liters   Mean Airway Pressure (cmH2O) 9.7 cmH20   I:E Ratio 1:2.9   Flow Sensitivity 3 L/min   Vent Alarm Settings   High Pressure (cmH2O) 35 cmH2O   Low Minute Volume (lpm) 4 L/min   Low Exhaled Vt (ml) 200 mL   RR High (bpm) 35 br/min   Apnea (secs) 20 secs   Additional Respiratoray Assessments   Humidification Source Heated wire   Humidification Temp 37   Circuit Condensation Drained   Ambu Bag With Mask At Bedside Yes   Airway Clearance   Suction ET Tube   Suction Device Inline suction catheter   Sputum Method Obtained Endotracheal   Sputum Amount Moderate   Sputum Color/Odor Yellow; White   Sputum Consistency Thick   Non-Surgical Airway 11/03/22 Endotracheal tube   Placement Date/Time: 11/03/22 1748   Present on Admission/Arrival: No  Placed By: In ED  Placement Verified By: Colorimetric ETCO2 device  Insertion attempts: 1  Airway Device: Endotracheal tube  Size: 7.5   Secured At 22 cm   Measured From Lips   Secured Location Left   Secured By Commercial tube valdez

## 2022-11-06 NOTE — PROGRESS NOTES
Shift assessment completed, see flow sheet. RASS 0. Following commands.   - Propofol infusing at 30 mcg/kg/min. Paused currently for SBT     Intubated and sedated on AC # 7.5 ETT, at 22 LL. 22 / 320 / 35 %/ +5. SpO2 97%. Respirations are easy, even, and unlabored. Bilateral lung sounds diminished. VSS  OG in place at 63. CVC/PIV, WNL. All lines and monitoring devices in place. Moraes is patent and secured. Bilateral soft wrist restraints in place for patient safety. Bed in lowest position with wheels locked.

## 2022-11-06 NOTE — PROGRESS NOTES
Internal Medicine ICU Progress Note    Patient is intubated and cannot give any history. She has a history of diabetes, congestive heart failure, recent admission for sepsis secondary UTI, history of FABIO, potential bacteremia, pulmonary A. fib, history of elevated troponin who presented to the emergency room for acute kidney injury, sepsis and UTI from the nursing home. She was severely hyponatremic and had FABIO at the time of admission. White cell count 16,000. In the ED she was disoriented and hypotensive. She became unresponsive and was intubated. Subjective: On SBT. Eyes open. Follows commands intermittently. Plan to trial extubation today. Mental status baseline unclear. Chronic LE contractures suggest she is bed bound at baseline. Invasive Lines: CVC catheter placed on 11/3/2022        MV: Intubated on 11/3/2022. Recent Labs     22  1124   PHART 7.470*   AKU2CFA 36.5   PO2ART 76.6       MV Settings:  Vent Mode: AC/VC Resp Rate (Set): 22 bmp/Vt (Set, mL): 320 mL/ /FiO2 : 35 %    IV:   0.9% NaCl with KCl 20 mEq 75 mL/hr at 22 0821    propofol 30 mcg/kg/min (22 0701)    dextrose      sodium chloride      norepinephrine 1 mcg/min (22 0442)       Vitals:  Temp  Av °F (37.8 °C)  Min: 99.5 °F (37.5 °C)  Max: 100.2 °F (37.9 °C)  Pulse  Av.5  Min: 82  Max: 125  BP  Min: 73/60  Max: 135/75  SpO2  Av.2 %  Min: 95 %  Max: 100 %  FiO2   Av %  Min: 35 %  Max: 35 %  Patient Vitals for the past 4 hrs:   BP Pulse Resp SpO2   22 1000 125/76 96 17 98 %   22 0900 108/70 98 17 97 %   22 0800 90/71 96 22 96 %         CVP:        Intake/Output Summary (Last 24 hours) at 2022 1149  Last data filed at 2022 0606  Gross per 24 hour   Intake 3929.95 ml   Output 1542 ml   Net 2387.95 ml         EXAM:  General: Ill-appearing. Intubated. Eyes: PERRL. No sclera icterus. No conjunctiva injected. ENT: No discharge. Intubated. Neck: Trachea midline. Normal thyroid. Resp: No accessory muscle use. No crackles. No wheezing. No rhonchi. No dullness on percussion. CV: Tachycardia. Irregularly irregular rate and rhythm. No mumur or rub. No edema. No JVD. Palpable pedal pulses. GI: Non-tender. Non-distended. No masses. No organmegaly. Normal bowel sounds. No hernia. Skin: Warm and dry. No nodule on exposed extremities. No rash on exposed extremities. Lymph: No cervical LAD. No supraclavicular LAD. M/S: No cyanosis. No joint deformity. No clubbing. Chronic b/l LE contractures. Neuro: HERNAN.   Psych: HERNAN    Medications:  Scheduled Meds:   vancomycin  1,250 mg IntraVENous Q12H    meropenem  1,000 mg IntraVENous Q8H    potassium chloride  20 mEq IntraVENous Q1H    metoprolol  2.5 mg IntraVENous Q6H    insulin glargine  25 Units SubCUTAneous QAM    rivaroxaban  15 mg Oral Daily    sodium chloride flush  5-40 mL IntraVENous 2 times per day    vancomycin (VANCOCIN) intermittent dosing (placeholder)   Other RX Placeholder    mupirocin   Nasal BID    famotidine (PEPCID) injection  20 mg IntraVENous Daily    insulin lispro  0-16 Units SubCUTAneous Q4H    divalproex  250 mg Oral 3 times per day    digoxin  125 mcg IntraVENous Daily       PRN Meds:  glucose, dextrose bolus **OR** dextrose bolus, glucagon (rDNA), dextrose, sodium chloride flush, sodium chloride, ondansetron **OR** ondansetron, polyethylene glycol, acetaminophen **OR** acetaminophen    Results:  CBC:   Recent Labs     11/04/22  0606 11/05/22  0545 11/06/22  0435   WBC 16.5* 14.4* 14.4*   HGB 11.1* 10.2* 10.5*   HCT 36.1 33.2* 34.2*   MCV 89.6 90.3 88.3    169 165       BMP:   Recent Labs     11/05/22  1640 11/05/22  2330 11/06/22  0435   * 146* 145   K 3.3* 3.7 3.3*   * 111* 110   CO2 28 28 28   PHOS 1.7* 2.2* 2.5   BUN 18 15 13   CREATININE <0.5* <0.5* <0.5*       LIVER PROFILE:   Recent Labs     11/03/22  1735 11/03/22  2042 11/04/22  7619 11/04/22  0815 11/04/22  2136 11/05/22  0205 11/05/22  0815   AST 8*   < > 6*   < > 6* 7* 7*   ALT <5*   < > <5*   < > <5* <5* <5*   LIPASE 14.0  --   --   --   --   --   --    BILIDIR  --   --  <0.2  --   --   --   --    BILITOT 0.3   < > 0.4   < > 0.3 0.4 0.3   ALKPHOS 83   < > 75   < > 69 69 63    < > = values in this interval not displayed. Latest Reference Range & Units 11/4/22 06:07   Procalcitonin 0.00 - 0.15 ng/mL 0.22 (H)   (H): Data is abnormally high     Latest Reference Range & Units 11/3/22 17:35 11/3/22 20:42 11/4/22 02:58   Lactic Acid, Sepsis 0.4 - 1.9 mmol/L 4.2 () 4.1 () 1.5   ([facility]): Data is critically high    UA:  Recent Labs     11/03/22  1819   COLORU Yellow   PHUR 6.0   WBCUA >100*   RBCUA 5-10*   YEAST Present*   BACTERIA 1+*   CLARITYU SL CLOUDY*   SPECGRAV 1.015   LEUKOCYTESUR LARGE*   UROBILINOGEN 0.2   BILIRUBINUR Negative   BLOODU LARGE*   GLUCOSEU 500*         Cultures:  COVID-19 not detected  Influenza a and B not detected  Blood cultures sent     ECG  Atrial fibrillation with rapid ventricular responseST abnormality consider inferior and anterolateral ischemiaAbnormal ECGWhen compared with ECG of 12-OCT-2022 06:28,HR increasedConfirmed by DEXTER Wong MD ([id number]) on 11/4/2022 7:39:12 AM      Films:    XR CHEST PORTABLE   Final Result   Stable chest.  Bibasilar opacification, likely atelectasis. Satisfactory position of endotracheal tube. XR CHEST PORTABLE   Final Result   Unremarkable support devices. Mild bibasilar infiltrates or atelectasis.          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

## 2022-11-07 ENCOUNTER — APPOINTMENT (OUTPATIENT)
Dept: GENERAL RADIOLOGY | Age: 72
DRG: 870 | End: 2022-11-07
Payer: COMMERCIAL

## 2022-11-07 PROBLEM — Z16.12 ESBL (EXTENDED SPECTRUM BETA-LACTAMASE) PRODUCING BACTERIA INFECTION: Status: ACTIVE | Noted: 2022-11-07

## 2022-11-07 PROBLEM — A49.9 ESBL (EXTENDED SPECTRUM BETA-LACTAMASE) PRODUCING BACTERIA INFECTION: Status: ACTIVE | Noted: 2022-11-07

## 2022-11-07 PROBLEM — R31.9 URINARY TRACT INFECTION WITH HEMATURIA: Status: ACTIVE | Noted: 2022-10-06

## 2022-11-07 LAB
ALBUMIN SERPL-MCNC: 2.3 G/DL (ref 3.4–5)
ANION GAP SERPL CALCULATED.3IONS-SCNC: 9 MMOL/L (ref 3–16)
BUN BLDV-MCNC: 10 MG/DL (ref 7–20)
CALCIUM SERPL-MCNC: 7.6 MG/DL (ref 8.3–10.6)
CHLORIDE BLD-SCNC: 110 MMOL/L (ref 99–110)
CO2: 26 MMOL/L (ref 21–32)
CREAT SERPL-MCNC: <0.5 MG/DL (ref 0.6–1.2)
CULTURE, BLOOD 2: ABNORMAL
CULTURE, BLOOD 2: ABNORMAL
GFR SERPL CREATININE-BSD FRML MDRD: >60 ML/MIN/{1.73_M2}
GLUCOSE BLD-MCNC: 100 MG/DL (ref 70–99)
GLUCOSE BLD-MCNC: 104 MG/DL (ref 70–99)
GLUCOSE BLD-MCNC: 104 MG/DL (ref 70–99)
GLUCOSE BLD-MCNC: 110 MG/DL (ref 70–99)
GLUCOSE BLD-MCNC: 118 MG/DL (ref 70–99)
GLUCOSE BLD-MCNC: 119 MG/DL (ref 70–99)
GLUCOSE BLD-MCNC: 139 MG/DL (ref 70–99)
ORGANISM: ABNORMAL
ORGANISM: ABNORMAL
PERFORMED ON: ABNORMAL
PHOSPHORUS: 2.7 MG/DL (ref 2.5–4.9)
POTASSIUM SERPL-SCNC: 3.6 MMOL/L (ref 3.5–5.1)
SODIUM BLD-SCNC: 145 MMOL/L (ref 136–145)
VANCOMYCIN TROUGH: 21.1 UG/ML (ref 10–20)

## 2022-11-07 PROCEDURE — 99291 CRITICAL CARE FIRST HOUR: CPT | Performed by: INTERNAL MEDICINE

## 2022-11-07 PROCEDURE — 6370000000 HC RX 637 (ALT 250 FOR IP): Performed by: INTERNAL MEDICINE

## 2022-11-07 PROCEDURE — 6360000002 HC RX W HCPCS: Performed by: HOSPITALIST

## 2022-11-07 PROCEDURE — 6360000002 HC RX W HCPCS: Performed by: INTERNAL MEDICINE

## 2022-11-07 PROCEDURE — 2700000000 HC OXYGEN THERAPY PER DAY

## 2022-11-07 PROCEDURE — 80202 ASSAY OF VANCOMYCIN: CPT

## 2022-11-07 PROCEDURE — 87040 BLOOD CULTURE FOR BACTERIA: CPT

## 2022-11-07 PROCEDURE — 2580000003 HC RX 258: Performed by: INTERNAL MEDICINE

## 2022-11-07 PROCEDURE — 2500000003 HC RX 250 WO HCPCS: Performed by: INTERNAL MEDICINE

## 2022-11-07 PROCEDURE — 71045 X-RAY EXAM CHEST 1 VIEW: CPT

## 2022-11-07 PROCEDURE — 94003 VENT MGMT INPAT SUBQ DAY: CPT

## 2022-11-07 PROCEDURE — A4216 STERILE WATER/SALINE, 10 ML: HCPCS | Performed by: INTERNAL MEDICINE

## 2022-11-07 PROCEDURE — 99233 SBSQ HOSP IP/OBS HIGH 50: CPT | Performed by: INTERNAL MEDICINE

## 2022-11-07 PROCEDURE — 2000000000 HC ICU R&B

## 2022-11-07 PROCEDURE — 36415 COLL VENOUS BLD VENIPUNCTURE: CPT

## 2022-11-07 PROCEDURE — 80069 RENAL FUNCTION PANEL: CPT

## 2022-11-07 PROCEDURE — 2580000003 HC RX 258: Performed by: HOSPITALIST

## 2022-11-07 PROCEDURE — 94761 N-INVAS EAR/PLS OXIMETRY MLT: CPT

## 2022-11-07 RX ORDER — DILTIAZEM HYDROCHLORIDE 60 MG/1
60 TABLET, FILM COATED ORAL EVERY 6 HOURS SCHEDULED
Status: DISCONTINUED | OUTPATIENT
Start: 2022-11-07 | End: 2022-11-08

## 2022-11-07 RX ORDER — METOPROLOL TARTRATE 5 MG/5ML
2.5 INJECTION INTRAVENOUS EVERY 4 HOURS PRN
Status: DISCONTINUED | OUTPATIENT
Start: 2022-11-07 | End: 2022-11-12 | Stop reason: HOSPADM

## 2022-11-07 RX ADMIN — Medication 10 ML: at 20:02

## 2022-11-07 RX ADMIN — POTASSIUM CHLORIDE AND SODIUM CHLORIDE: 900; 150 INJECTION, SOLUTION INTRAVENOUS at 20:14

## 2022-11-07 RX ADMIN — METOPROLOL TARTRATE 2.5 MG: 5 INJECTION, SOLUTION INTRAVENOUS at 12:19

## 2022-11-07 RX ADMIN — MEROPENEM 1000 MG: 1 INJECTION, POWDER, FOR SOLUTION INTRAVENOUS at 00:35

## 2022-11-07 RX ADMIN — MEROPENEM 1000 MG: 1 INJECTION, POWDER, FOR SOLUTION INTRAVENOUS at 08:02

## 2022-11-07 RX ADMIN — PROPOFOL 30 MCG/KG/MIN: 10 INJECTION, EMULSION INTRAVENOUS at 05:04

## 2022-11-07 RX ADMIN — PROPOFOL 30 MCG/KG/MIN: 10 INJECTION, EMULSION INTRAVENOUS at 22:34

## 2022-11-07 RX ADMIN — VANCOMYCIN HYDROCHLORIDE 1250 MG: 10 INJECTION, POWDER, LYOPHILIZED, FOR SOLUTION INTRAVENOUS at 20:04

## 2022-11-07 RX ADMIN — PROPOFOL 30 MCG/KG/MIN: 10 INJECTION, EMULSION INTRAVENOUS at 01:30

## 2022-11-07 RX ADMIN — DILTIAZEM HYDROCHLORIDE 60 MG: 60 TABLET, FILM COATED ORAL at 12:19

## 2022-11-07 RX ADMIN — RIVAROXABAN 20 MG: 20 TABLET, FILM COATED ORAL at 18:10

## 2022-11-07 RX ADMIN — FAMOTIDINE 20 MG: 10 INJECTION INTRAVENOUS at 08:03

## 2022-11-07 RX ADMIN — DIVALPROEX SODIUM 250 MG: 125 CAPSULE, COATED PELLETS ORAL at 05:03

## 2022-11-07 RX ADMIN — FAMOTIDINE 20 MG: 10 INJECTION INTRAVENOUS at 20:03

## 2022-11-07 RX ADMIN — MUPIROCIN: 20 OINTMENT TOPICAL at 20:03

## 2022-11-07 RX ADMIN — POTASSIUM CHLORIDE AND SODIUM CHLORIDE: 900; 150 INJECTION, SOLUTION INTRAVENOUS at 08:02

## 2022-11-07 RX ADMIN — METOPROLOL TARTRATE 2.5 MG: 5 INJECTION, SOLUTION INTRAVENOUS at 18:10

## 2022-11-07 RX ADMIN — DIVALPROEX SODIUM 250 MG: 125 CAPSULE, COATED PELLETS ORAL at 14:14

## 2022-11-07 RX ADMIN — DILTIAZEM HYDROCHLORIDE 60 MG: 60 TABLET, FILM COATED ORAL at 18:10

## 2022-11-07 RX ADMIN — PROPOFOL 30 MCG/KG/MIN: 10 INJECTION, EMULSION INTRAVENOUS at 20:13

## 2022-11-07 RX ADMIN — Medication 10 ML: at 08:03

## 2022-11-07 RX ADMIN — METOPROLOL TARTRATE 2.5 MG: 5 INJECTION INTRAVENOUS at 05:03

## 2022-11-07 RX ADMIN — PROPOFOL 30 MCG/KG/MIN: 10 INJECTION, EMULSION INTRAVENOUS at 15:06

## 2022-11-07 RX ADMIN — DIGOXIN 125 MCG: 250 INJECTION, SOLUTION INTRAMUSCULAR; INTRAVENOUS at 08:03

## 2022-11-07 RX ADMIN — MUPIROCIN: 20 OINTMENT TOPICAL at 08:04

## 2022-11-07 ASSESSMENT — PULMONARY FUNCTION TESTS
PIF_VALUE: 19
PIF_VALUE: 16
PIF_VALUE: 18
PIF_VALUE: 16
PIF_VALUE: 19

## 2022-11-07 ASSESSMENT — PAIN SCALES - GENERAL
PAINLEVEL_OUTOF10: 0
PAINLEVEL_OUTOF10: 0

## 2022-11-07 NOTE — PROGRESS NOTES
11/06/22 2307   Patient Observation   Heart Rate (!) 125   Resp 21   SpO2 97 %   Observations 7.5 ett 22 at lip   Breath Sounds   Right Upper Lobe Diminished   Right Middle Lobe Diminished   Right Lower Lobe Diminished   Left Upper Lobe Diminished   Left Lower Lobe Diminished   Vent Information   Vent Mode AC/VC   Ventilator Settings   FiO2  35 %   Vt (Set, mL) 320 mL   Resp Rate (Set) 22 bmp   PEEP/CPAP (cmH2O) 5   Vent Patient Data (Readings)   Vt (Measured) 348 mL   Peak Inspiratory Pressure (cmH2O) 18 cmH2O   Minute Volume (L/min) 27 Liters   Mean Airway Pressure (cmH2O) 9.8 cmH20   I:E Ratio 1:2   Flow Sensitivity 3 L/min   Vent Alarm Settings   High Pressure (cmH2O) 35 cmH2O   Low Minute Volume (lpm) 4 L/min   Low Exhaled Vt (ml) 200 mL   RR High (bpm) 35 br/min   Apnea (secs) 20 secs   Additional Respiratoray Assessments   Humidification Source Heated wire   Humidification Temp 37   Circuit Condensation Drained   Ambu Bag With Mask At Bedside Yes   Airway Clearance   Suction ET Tube   Suction Device Inline suction catheter   Sputum Method Obtained Endotracheal   Sputum Amount Small   Sputum Color/Odor Yellow   Sputum Consistency Thick   Non-Surgical Airway 11/03/22 Endotracheal tube   Placement Date/Time: 11/03/22 1748   Present on Admission/Arrival: No  Placed By: In ED  Placement Verified By: Colorimetric ETCO2 device  Insertion attempts: 1  Airway Device: Endotracheal tube  Size: 7.5   Secured At 22 cm   Measured From [address] [facility] By Commercial tube valdez

## 2022-11-07 NOTE — PROGRESS NOTES
[facility] Progress Note           Subjective/   66y.o. year old female who we are seeing in consultation for FABIO, Hypernatremia. HPI:  Renal function, sodium trending better with IVF's, non-oliguric. Mentation reduced. ROS:  Remains on ventilator, no fevers. Objective/   GEN:  Chronically ill, /78   Pulse (!) 113   Temp 99.2 °F (37.3 °C) (Bladder)   Resp 18   Ht 5' 3\" (1.6 m)   Wt 214 lb 6.4 oz (97.3 kg)   SpO2 96%   BMI 37.98 kg/m²   HEENT: non-icteric, no JVD  CV: S1, S2, tachycardic, irregular, without m/r/g; no LE edema  RESP: CTA B without w/r/r; breathing wnl  ABD: +bs, soft, nt, no hsm  SKIN: warm, no rashes    Data/  Recent Labs     11/05/22  0545 11/06/22  0435   WBC 14.4* 14.4*   HGB 10.2* 10.5*   HCT 33.2* 34.2*   MCV 90.3 88.3    165     Recent Labs     11/04/22  1410 11/04/22  1752 11/05/22  0205 11/05/22  0545 11/05/22  0815 11/05/22  1153 11/05/22  2330 11/06/22  0435 11/07/22  0500   *   < > 158*   < > 158*   < > 146* 145 145   K 2.8*   < > 3.5   < > 3.2*   < > 3.7 3.3* 3.6   *   < > 123*   < > 123*   < > 111* 110 110   CO2 28   < > 26   < > 25   < > 28 28 26   GLUCOSE 298*   < > 231*   < > 219*   < > 148* 123* 118*   PHOS  --    < >  --    < >  --    < > 2.2* 2.5 2.7   MG 1.80  --  1.70*  --  1.40*  --   --   --   --    BUN 48*   < > 31*   < > 21*   < > 15 13 10   CREATININE 1.1   < > 0.7   < > 0.6   < > <0.5* <0.5* <0.5*   LABGLOM 53*   < > >60   < > >60   < > >60 >60 >60    < > = values in this interval not displayed. Assessment/     Acute Kidney Injury:  KDIGO 1  Etiology:  Volume depletion   Clinical:  Resolved     Hypernatremia:  Significant free water deficit  No prior history of DI, had issues with low sodium in early October. Solute diuresis from FABIO recovery.       Sepsis:  HCAP vs. UTI  On broad spectrum IV  antibiotics     Plan/     - IVF's with NS with K replacement  - Start free water replacement 200 ml [id number] via OG  - Trend labs, bp's, & urine output    ____________________________________  Clare Mendez MD  [facility]  [email or web address]  Office: [phone]

## 2022-11-07 NOTE — PROGRESS NOTES
Rounding completed with Dr. Maryjane Keane and multidisciplinary team. POC, labs, lines and assessment reviewed.      -Start TF  - increase Xarelto  - d/c merrem  -New blood cultures  - start home cardizem 60 mg q6

## 2022-11-07 NOTE — PROGRESS NOTES
11/06/22 1944   Patient Observation   Heart Rate (!) 119   Resp 27   SpO2 97 %   Observations 7.5 ett 22 at lip   Breath Sounds   Right Upper Lobe Diminished   Right Middle Lobe Diminished   Right Lower Lobe Diminished   Left Upper Lobe Diminished   Left Lower Lobe Diminished   Vent Information   Vent Mode AC/VC   Ventilator Settings   FiO2  35 %   Vt (Set, mL) 320 mL   Resp Rate (Set) 22 bmp   PEEP/CPAP (cmH2O) 5   Vent Patient Data (Readings)   Vt (Measured) 368 mL   Peak Inspiratory Pressure (cmH2O) 17 cmH2O   Rate Measured 25 br/min   Minute Volume (L/min) 8.7 Liters   Mean Airway Pressure (cmH2O) 9.3 cmH20   Plateau Pressure (cm H2O) 14 cm H2O   I:E Ratio 1:2   Flow Sensitivity 3 L/min   Static Compliance (L/cm H2O) 37   Dynamic Compliance (L/cm H2O) 24 L/cm H2O   Vent Alarm Settings   High Pressure (cmH2O) 35 cmH2O   Low Minute Volume (lpm) 4 L/min   Low Exhaled Vt (ml) 200 mL   RR High (bpm) 35 br/min   Apnea (secs) 20 secs   Additional Respiratoray Assessments   Humidification Source Heated wire   Humidification Temp 37   Circuit Condensation Drained   Ambu Bag With Mask At Bedside Yes   Airway Clearance   Suction ET Tube   Suction Device Inline suction catheter   Sputum Method Obtained Endotracheal   Sputum Amount Moderate   Sputum Color/Odor White;Yellow   Sputum Consistency Thick   Non-Surgical Airway 11/03/22 Endotracheal tube   Placement Date/Time: 11/03/22 1748   Present on Admission/Arrival: No  Placed By: In ED  Placement Verified By: Colorimetric ETCO2 device  Insertion attempts: 1  Airway Device: Endotracheal tube  Size: 7.5   Secured At 22 cm   Measured From Lips   Secured Location Left   Secured By Commercial tube valdez

## 2022-11-07 NOTE — PROGRESS NOTES
11/07/22 0304   Patient Observation   SpO2 97 %   Breath Sounds   Right Upper Lobe Diminished   Right Middle Lobe Diminished   Right Lower Lobe Diminished   Left Upper Lobe Diminished   Left Lower Lobe Diminished   Vent Information   Vent Mode AC/VC   Ventilator Settings   FiO2  35 %   Vt (Set, mL) 320 mL   Resp Rate (Set) 22 bmp   PEEP/CPAP (cmH2O) 5   Vent Patient Data (Readings)   Vt (Measured) 407 mL   Peak Inspiratory Pressure (cmH2O) 19 cmH2O   Rate Measured 26 br/min   Minute Volume (L/min) 8.6 Liters   Mean Airway Pressure (cmH2O) 9.3 cmH20   Plateau Pressure (cm H2O) 14 cm H2O   I:E Ratio 1:2.9   Flow Sensitivity 3 L/min   Vent Alarm Settings   Low Minute Volume (lpm) 4 L/min   Low Exhaled Vt (ml) 200 mL   High Exhaled Vt (ml) 1000 mL   RR High (bpm) 35 br/min   Apnea (secs) 20 secs   Airway Clearance   Suction ET Tube   Suction Device Inline suction catheter   Sputum Method Obtained Endotracheal   Sputum Amount Small   Sputum Color/Odor Yellow; White   Sputum Consistency Thick   Non-Surgical Airway 11/03/22 Endotracheal tube   Placement Date/Time: 11/03/22 7688   Present on Admission/Arrival: No  Placed By: In ED  Placement Verified By: Colorimetric ETCO2 device  Insertion attempts: 1  Airway Device: Endotracheal tube  Size: 7.5   Secured At 22 cm   Measured From [address] By Commercial tube valdez   Site Assessment Dry

## 2022-11-07 NOTE — PROGRESS NOTES
SBT of 5/5 started. Pt is awake, but not following commands at this time. RR are upper 20s, VTs 466, Sp02 is 97% and F/V is 93 at this time. Pt tolerating PS well at this time, however WOB is slightly increased. Continuing to monitor pt.

## 2022-11-07 NOTE — PROGRESS NOTES
Shift assessment completed, see flow sheet. VSS. Intubated and sedated on  # 7.5 ETT 35 %/ +5. SpO2 98%. Bilateral lung sounds diminished. OG in place at 61. All lines and monitoring devices in place. Moraes is patent and secured. Bilateral soft wrist restraints in place for patient safety.   Bed in lowest position with wheels locked

## 2022-11-07 NOTE — PROGRESS NOTES
Pulmonary & Critical Care Medicine ICU Progress Note    CC: Acute respiratory failure    Events of Last 24 hours: Ongoing mechanical ventilation, SBT this morning    Invasive Lines: Right subclavian CVC 11/3/2022      MV: 11/3/2022  Vent Mode: AC/VC Resp Rate (Set): 22 bmp/Vt (Set, mL): 320 mL/ /FiO2 : 35 %  Recent Labs     22  1124   PHART 7.470*   EGQ2NZR 36.5   PO2ART 76.6       IV:   0.9% NaCl with KCl 20 mEq 75 mL/hr at 22 0802    propofol 30 mcg/kg/min (22 0504)    dextrose      sodium chloride      norepinephrine Stopped (22 1734)       Vitals:  Blood pressure 110/78, pulse (!) 113, temperature 99.2 °F (37.3 °C), temperature source Bladder, resp. rate 18, height 5' 3\" (1.6 m), weight 214 lb 6.4 oz (97.3 kg), SpO2 96 %. on 35%  Temp  Av.2 °F (37.3 °C)  Min: 98.8 °F (37.1 °C)  Max: 99.7 °F (37.6 °C)    Intake/Output Summary (Last 24 hours) at 2022 0834  Last data filed at 2022 0864  Gross per 24 hour   Intake 1618.67 ml   Output 2700 ml   Net -1081.33 ml     EXAM:  General: intubated, ill appearing    ENT: Pharynx with ETT. Resp: No crackles. No wheezing. CV: S1, S2. +edema  GI: NT, ND, +BS  Skin: Warm and dry. Neuro: PERRL. Awake, not following commands.  Patellar reflexes are symmetric     Scheduled Meds:   vancomycin  1,250 mg IntraVENous Q12H    meropenem  1,000 mg IntraVENous Q8H    metoprolol  2.5 mg IntraVENous Q6H    insulin glargine  25 Units SubCUTAneous QAM    rivaroxaban  15 mg Oral Daily    sodium chloride flush  5-40 mL IntraVENous 2 times per day    vancomycin (VANCOCIN) intermittent dosing (placeholder)   Other RX Placeholder    mupirocin   Nasal BID    famotidine (PEPCID) injection  20 mg IntraVENous Daily    insulin lispro  0-16 Units SubCUTAneous Q4H    divalproex  250 mg Oral 3 times per day    digoxin  125 mcg IntraVENous Daily     PRN Meds:  glucose, dextrose bolus **OR** dextrose bolus, glucagon (rDNA), dextrose, sodium chloride flush, sodium chloride, ondansetron **OR** ondansetron, polyethylene glycol, acetaminophen **OR** acetaminophen    Results:  CBC:   Recent Labs     11/05/22  0545 11/06/22  0435   WBC 14.4* 14.4*   HGB 10.2* 10.5*   HCT 33.2* 34.2*   MCV 90.3 88.3    165     BMP:   Recent Labs     11/05/22  2330 11/06/22  0435 11/07/22  0500   * 145 145   K 3.7 3.3* 3.6   * 110 110   CO2 28 28 26   PHOS 2.2* 2.5 2.7   BUN 15 13 10   CREATININE <0.5* <0.5* <0.5*     LIVER PROFILE:   Recent Labs     11/04/22  2136 11/05/22  0205 11/05/22  0815   AST 6* 7* 7*   ALT <5* <5* <5*   BILITOT 0.3 0.4 0.3   ALKPHOS 69 69 63       Cultures:  11/3/2022 blood GPC  11/3/2022 blood coag negative staph  11/3/2022 urine NG  11/3/2022 SARS-CoV-2 and influenza are negative  11/4/2022 tracheal aspirate NRF    Films:  CXR was reviewed by me and it showed   CXR 11/7/2022 bibasilar infiltrates, ET tube okay    ASSESSMENT:  Acute hypoxemic respiratory failure   Septic shock   Permanent A. fib on Xarelto   FABIO  COPD  Dementia with behavioral disturbance  H/O schizoaffective disorder    PLAN:  Mechanical ventilation as per my orders. The ventilator was adjusted by me at the bedside for unstable, life threatening respiratory failure  IV Propofol for sedation, target RASS -2, with daily SAT  Fentanyl and Versed PRN, gtt as needed  Daily SBT per protocol   Inhaled bronchodilators  Vanc D#5  Nutrition: Tube feeding    Nephrology is following  Repeat blood cultures    Resume diltiazem  Prophylaxis: Home Xarelto, Bactroban, Pepcid       Total critical care time caring for this patient with life threatening, unstable organ failure, including direct patient contact, management of life support systems, review of data including imaging and labs, discussions with other team members and physicians is 32 minutes so far today, excluding procedures.

## 2022-11-07 NOTE — PROGRESS NOTES
Internal Medicine ICU Progress Note    Patient is intubated and cannot give any history. She has a history of diabetes, congestive heart failure, recent admission for sepsis secondary UTI, history of FABIO, potential bacteremia, pulmonary A. fib, history of elevated troponin who presented to the emergency room for acute kidney injury, sepsis and UTI from the nursing home. She was severely hyponatremic and had FABIO at the time of admission. White cell count 16,000. In the ED she was disoriented and hypotensive. She became unresponsive and was intubated. Subjective:    Ms Anya Bautista remains on vent support   Ongoing weaning efforts  Na improved      Chronic LE contractures suggest she is bed bound at baseline. Invasive Lines: CVC catheter placed on 11/3/2022        MV: Intubated on 11/3/2022. Recent Labs     22  1124   PHART 7.470*   [id number] 36.5   PO2ART 76.6         MV Settings:  Vent Mode: AC/VC Resp Rate (Set): 22 bmp/Vt (Set, mL): 320 mL/ /FiO2 : 35 %    IV:   0.9% NaCl with KCl 20 mEq 75 mL/hr at 22 0802    propofol 30 mcg/kg/min (22 0504)    dextrose      sodium chloride      norepinephrine Stopped (22 173)       Vitals:  Temp  Av.2 °F (37.3 °C)  Min: 98.8 °F (37.1 °C)  Max: 99.7 °F (37.6 °C)  Pulse  Av.2  Min: 60  Max: 134  BP  Min: 93/62  Max: 172/115  SpO2  Av %  Min: 91 %  Max: 100 %  FiO2   Av %  Min: 35 %  Max: 35 %  Patient Vitals for the past 4 hrs:   BP Temp Temp src Pulse Resp SpO2   22 0500 110/78 -- -- (!) 113 18 96 %   22 0430 108/81 -- -- (!) 122 22 96 %   22 0421 -- 99.2 °F (37.3 °C) Bladder -- -- --         CVP:        Intake/Output Summary (Last 24 hours) at 2022 0803  Last data filed at 2022 0512  Gross per 24 hour   Intake 1618.67 ml   Output 2700 ml   Net -1081.33 ml         EXAM:  General: Ill-appearing. Intubated. Eyes: PERRL. No sclera icterus. No conjunctiva injected. ENT: No discharge. Intubated. Neck: Trachea midline. Normal thyroid. Resp: No accessory muscle use. No crackles. No wheezing. No rhonchi. No dullness on percussion. CV: . Irregularly irregular rate and rhythm. No mumur or rub. No edema. No JVD. Palpable pedal pulses. GI: Non-tender. Non-distended. No masses. No organmegaly. Normal bowel sounds. No hernia. Skin: Warm and dry. No nodule on exposed extremities. No rash on exposed extremities. Lymph: No cervical LAD. No supraclavicular LAD. M/S: No cyanosis. No joint deformity. No clubbing. Chronic b/l LE contractures. Neuro: HERNAN.   Psych: HERNAN    Medications:  Scheduled Meds:   vancomycin  1,250 mg IntraVENous Q12H    meropenem  1,000 mg IntraVENous Q8H    metoprolol  2.5 mg IntraVENous Q6H    insulin glargine  25 Units SubCUTAneous QAM    rivaroxaban  15 mg Oral Daily    sodium chloride flush  5-40 mL IntraVENous 2 times per day    vancomycin (VANCOCIN) intermittent dosing (placeholder)   Other RX Placeholder    mupirocin   Nasal BID    famotidine (PEPCID) injection  20 mg IntraVENous Daily    insulin lispro  0-16 Units SubCUTAneous Q4H    divalproex  250 mg Oral 3 times per day    digoxin  125 mcg IntraVENous Daily       PRN Meds:  glucose, dextrose bolus **OR** dextrose bolus, glucagon (rDNA), dextrose, sodium chloride flush, sodium chloride, ondansetron **OR** ondansetron, polyethylene glycol, acetaminophen **OR** acetaminophen    Results:  CBC:   Recent Labs     11/05/22  0545 11/06/22  0435   WBC 14.4* 14.4*   HGB 10.2* 10.5*   HCT 33.2* 34.2*   MCV 90.3 88.3    165       BMP:   Recent Labs     11/05/22  2330 11/06/22  0435 11/07/22  0500   * 145 145   K 3.7 3.3* 3.6   * 110 110   CO2 28 28 26   PHOS 2.2* 2.5 2.7   BUN 15 13 10   CREATININE <0.5* <0.5* <0.5*       LIVER PROFILE:   Recent Labs     11/04/22  2136 11/05/22  0205 11/05/22  0815   AST 6* 7* 7*   ALT <5* <5* <5*   BILITOT 0.3 0.4 0.3   ALKPHOS 69 69 63        Latest Reference Range & Units 11/4/22 06:07   Procalcitonin 0.00 - 0.15 ng/mL 0.22 (H)   (H): Data is abnormally high     Latest Reference Range & Units 11/3/22 17:35 11/3/22 20:42 11/4/22 02:58   Lactic Acid, Sepsis 0.4 - 1.9 mmol/L 4.2 (HH) 4.1 (HH) 1.5       Cultures:  COVID-19 not detected  Influenza a and B not detected  Blood cultures coag neg staph      ECG  Atrial fibrillation with rapid ventricular responseST abnormality consider inferior and anterolateral ischemiaAbnormal ECGWhen compared with ECG of 12-OCT-2022 06:28,HR increasedConfirmed by DEXTER Kidd MD ([id number]) on 11/4/2022 7:39:12 AM      Films:    XR CHEST PORTABLE   Final Result   The patient is rotated. Mild cardiomegaly. Bibasilar infiltrates and   atelectasis noted. Trace right pleural effusion. COPD changes. ET tube is 3 cm superior to the cynthia. Feeding tube terminates below the   diaphragm. Right-sided central line tip is in the superior vena cava. There   is no pneumothorax. Significant osteopenic changes and degenerative changes noted in the bony   structures. RECOMMENDATION:   Bibasilar infiltrate/atelectasis. No significant interval change from recent   priors. XR CHEST PORTABLE   Final Result   Stable chest.  Bibasilar opacification, likely atelectasis. Satisfactory position of endotracheal tube. XR CHEST PORTABLE   Final Result   Unremarkable support devices. Mild bibasilar infiltrates or atelectasis.          XR CHEST PORTABLE    (Results Pending)            Assessment:     Principal Problem:    Sepsis secondary to UTI [facility])  Active Problems:    Persistent atrial fibrillation (HCC)    Chronic respiratory failure with hypoxia (HCC)    Dementia with behavioral disturbance    Hypertensive heart and kidney disease with chronic diastolic congestive heart failure and stage 2 chronic kidney disease (HCC)    Schizoaffective disorder, depressive type (HCC)    Septic shock (HCC)    Hypernatremia    DM (diabetes mellitus) [facility])    Essential hypertension    Chronic obstructive pulmonary disease (HCC)    Acute respiratory failure ([facility] [address]  Resolved Problems:    * No resolved hospital problems. *         Plan:    #Sepsis with septic shock. Most likely source appears to be UTI.s/p aggressive IVF boluses  BP improved   Off levophed   Urine neg but blood with coag neg staph    #Possible UTI. Continue broad-spectrum antibiotics for now. On vancomycin and cefepime. Blood cx with coag neg staph . With ongoing fever spikes, switched to IV vanc     #Acute respiratory failure. Became unresponsive and was intubated. Likely with sepsis and hypernatremia Continue vent support. Pulmonologist consulted. ongoing weaning attempts       #Permanent atrial fibrillation. HR controlled   Hold Cardizem due to low blood pressure. Continue Xarelto. #Severe hypernatremia. Significant free water deficit. Possible solute diuresis from recent FABIO. IVF changed to d5 1/2NS  given and improved   Hypernatremia corrected. #FABIO. As above- resolved      #Diabetes mellitus type 2. Stable sugars  Sliding scale insulin. #Dementia with behavioral disturbance. #History of schizoaffective disorder. Presently intubated. #COPD. Continue breathing treatments. On the vent. #DVT prophylaxis. On Xarelto. She is a DNR CCA.           Jammie Stallworth MD 8:03 AM 11/7/2022

## 2022-11-07 NOTE — PROGRESS NOTES
Pt still not following any commands at this time, but remains awake. Pts HR continuing to increase into the 140s, RR 47, VTs remained well in the 450s, sp02 in the mid 90s. Pts WOB is increased and nasal flaring present throughout SBT. Pt placed back on AC support at this time.

## 2022-11-07 NOTE — PROGRESS NOTES
Comprehensive Nutrition Assessment    Type and Reason for Visit:  Initial (no + screen or consult but patient was intubated on 11/4/22 and MD wants TF to be started today)    Nutrition Recommendations/Plan:   Start TF regimen - ADULT DIET; Orogastric; Diabetic formula - Glucerna 1.5 with a goal rate of 30 ml/hr x 20 hours. Start with 20 ml/hr and increase by 10 ml every 4 hours, as tolerated by patient, until goal rate can be achieved and maintained. Water flushes, 200 ml every 6 hours, per nephrology. Monitor TF start, rate, intake, and tolerance + water flushes. Monitor respiratory/vent status, sedation type/amount (propofol is currently infusing at 30 mcg x 24 hours which = 461 kcals from lipids), TG checks, and plan of care. Monitor nutrition-related labs, bowel function, and weight trends. Malnutrition Assessment:  Malnutrition Status:   At risk for malnutrition (11/07/22 1233)    Context:  Acute Illness     Findings of the 6 clinical characteristics of malnutrition:  Energy Intake:  Mild decrease in energy intake (Comment) (x 4 days admission)  Weight Loss:  No significant weight loss     Body Fat Loss:  No significant body fat loss     Muscle Mass Loss:  No significant muscle mass loss    Fluid Accumulation:  No significant fluid accumulation     Strength:  Not Performed    Nutrition Assessment:    patient is nutritionally compromised AEB abnormal labs PTA and she became unresponsive in ER + required intubation on 11/4/22 and she is at risk for further compromise d/t TF has not been started x 4 days admission, altered nutrition-related labs, and multiple wounds noted (DTI on R heel and R toe); will start TF today    Nutrition Related Findings:    patient is intubated and sedated on 30 mcg propofol at this time; patient was intubated in ER after she went unresponsive; patient is on 25 units Lantus daily, high-dose SSI, levo in D5, and NaCl with KCl 20 mEq at 75 ml/hr at this time; + BM on 11/5/22; + multiple wounds on R foot; okaty to start TF today Wound Type: Multiple, Deep Tissue Injury, Pressure Injury (DTI on R heel and R toe)       Current Nutrition Intake & Therapies:    Average Meal Intake: NPO  Average Supplements Intake: NPO  Current Tube Feeding (TF) Orders:  Feeding Route: Orogastric  Formula: Diabetic  Schedule: Continuous  Feeding Regimen: Glucerna 1.5 with a goal rate of 30 ml/hr x 20 hours  Additives/Modulars: None  Water Flushes: 200 ml every 6 hours, per nephrology  Current TF & Flush Orders Provides: TF to be started today  Goal TF & Flush Orders Provides: Glucerna 1.5 with a goal rate of 30 ml/hr x 20 hours = 600 ml TV, 900 kcals, 50 g protein, and 455 ml free water + 200 ml water flushes every 6 hours, per nephrology    Anthropometric Measures:  Height: 5' 3\" (160 cm)  Ideal Body Weight (IBW): 115 lbs (52 kg)    Admission Body Weight: 197 lb 3.2 oz (89.4 kg) (obtained on 11/4/22; actual weight)  Current Body Weight: 214 lb 6.4 oz (97.3 kg) (obtained on 11/6/22; actual weight), 186.4 % IBW.  Weight Source: Bed Scale  Current BMI (kg/m2): 38  Usual Body Weight: 212 lb (96.2 kg) (obtained on 10/6/22; actual weight)  % Weight Change (Calculated): 1.1  Weight Adjustment For: No Adjustment                 BMI Categories: Obese Class 2 (BMI 35.0 -39.9)    Estimated Daily Nutrient Needs:  Energy Requirements Based On: Kcal/kg  Weight Used for Energy Requirements: Current  Energy (kcal/day): 1067 - 1358 kcals based on 11-14 kcals/kg/CBW  Weight Used for Protein Requirements: Ideal  Protein (g/day): 104 - 114 g protein based on 2.0-2.2 g/kg/IBW  Method Used for Fluid Requirements: 1 ml/kcal  Fluid (ml/day): 1067 - 1358 ml    Nutrition Diagnosis:   Inadequate oral intake related to inadequate protein-energy intake, impaired respiratory function as evidenced by NPO or clear liquid status due to medical condition, intubation    Nutrition Interventions:   Food and/or Nutrient Delivery: Continue NPO, Start Tube Feeding  Nutrition Education/Counseling: No recommendation at this time  Coordination of Nutrition Care: Continue to monitor while inpatient, Interdisciplinary Rounds  Plan of Care discussed with: ICU Team    Goals:     Goals: Initiate nutrition support, within 2 days       Nutrition Monitoring and Evaluation:   Behavioral-Environmental Outcomes: None Identified  Food/Nutrient Intake Outcomes: Enteral Nutrition Intake/Tolerance, IVF Intake  Physical Signs/Symptoms Outcomes: Biochemical Data, Hemodynamic Status, Nutrition Focused Physical Findings, Skin, Weight    Discharge Planning:     Too soon to determine     Malik Lala, [address] [place], [place]  Contact: [phone]

## 2022-11-07 NOTE — CARE COORDINATION
INTERDISCIPLINARY PLAN OF CARE CONFERENCE    Date/Time: 11/7/2022 10:40 AM  Completed by: OMEGA Dalal Student Case Management      Patient Name:  Tobias Paulson  YOB: 1950  Admitting Diagnosis: Hypernatremia [E87.0]  Hyperglycemia [R73.9]  ESBL (extended spectrum beta-lactamase) producing bacteria infection [A49.9, Z16.12]  Atrial fibrillation with rapid ventricular response ([address] [I48.91]  Septic shock ([address] [A41.9, R65.21]  Sepsis ([address] [A41.9]  Yeast dermatitis [B37.2]  Acute respiratory failure, unspecified whether with hypoxia or hypercapnia ([address] [J96.00]  Urinary tract infection with hematuria, site unspecified [N39.0, R31.9]     Admit Date/Time:  11/3/2022  5:21 PM    Chart reviewed. Interdisciplinary team contacted or reviewed plan related to patient progress and discharge plans. Disciplines included Case Management, Nursing, and Dietitian. Current Status:ongoing  PT/OT recommendation for discharge plan of care: TBD    Expected D/C Disposition:  Skilled nursing facility    Discharge Plan Comments: Completed Interdisciplinary rounds with ICU staff. Pt remains in the ICU on a vent. Pt's family has previously confirmed that pt will return to [address] [place] [address], pt is a bed hold. CM will continue to follow and assist. Please notify CM if needs or concerns arise.     Home O2 in place on admit: per facility

## 2022-11-07 NOTE — PROGRESS NOTES
11/7  Vanc trough = 21.1 mcg/mL at 0500. Calculated AUC of 593. Continue current dose and frequency of vancomycin. Will monitor and adjust dose accordingly.   Sandra Dangelo PharmD  11/7/2022 7:56 AM

## 2022-11-08 ENCOUNTER — APPOINTMENT (OUTPATIENT)
Dept: GENERAL RADIOLOGY | Age: 72
DRG: 870 | End: 2022-11-08
Payer: COMMERCIAL

## 2022-11-08 LAB
ANION GAP SERPL CALCULATED.3IONS-SCNC: 12 MMOL/L (ref 3–16)
BASE EXCESS VENOUS: -2.5 MMOL/L (ref -3–3)
BLOOD CULTURE, ROUTINE: ABNORMAL
BUN BLDV-MCNC: 9 MG/DL (ref 7–20)
CALCIUM SERPL-MCNC: 7.9 MG/DL (ref 8.3–10.6)
CARBOXYHEMOGLOBIN: 1 % (ref 0–1.5)
CHLORIDE BLD-SCNC: 114 MMOL/L (ref 99–110)
CO2: 21 MMOL/L (ref 21–32)
CREAT SERPL-MCNC: <0.5 MG/DL (ref 0.6–1.2)
GFR SERPL CREATININE-BSD FRML MDRD: >60 ML/MIN/{1.73_M2}
GLUCOSE BLD-MCNC: 105 MG/DL (ref 70–99)
GLUCOSE BLD-MCNC: 126 MG/DL (ref 70–99)
GLUCOSE BLD-MCNC: 139 MG/DL (ref 70–99)
GLUCOSE BLD-MCNC: 144 MG/DL (ref 70–99)
GLUCOSE BLD-MCNC: 153 MG/DL (ref 70–99)
GLUCOSE BLD-MCNC: 177 MG/DL (ref 70–99)
HCO3 VENOUS: 22.5 MMOL/L (ref 23–29)
HCT VFR BLD CALC: 33.9 % (ref 36–48)
HEMOGLOBIN: 10.6 G/DL (ref 12–16)
MAGNESIUM: 1.7 MG/DL (ref 1.8–2.4)
MCH RBC QN AUTO: 27.7 PG (ref 26–34)
MCHC RBC AUTO-ENTMCNC: 31.2 G/DL (ref 31–36)
MCV RBC AUTO: 88.8 FL (ref 80–100)
METHEMOGLOBIN VENOUS: 0.3 %
O2 SAT, VEN: 86 %
O2 THERAPY: ABNORMAL
ORGANISM: ABNORMAL
PCO2, VEN: 39.6 MMHG (ref 40–50)
PDW BLD-RTO: 16 % (ref 12.4–15.4)
PERFORMED ON: ABNORMAL
PH VENOUS: 7.37 (ref 7.35–7.45)
PLATELET # BLD: 188 K/UL (ref 135–450)
PMV BLD AUTO: 10.9 FL (ref 5–10.5)
PO2, VEN: 51.9 MMHG (ref 25–40)
POTASSIUM REFLEX MAGNESIUM: 3.4 MMOL/L (ref 3.5–5.1)
RBC # BLD: 3.82 M/UL (ref 4–5.2)
SODIUM BLD-SCNC: 147 MMOL/L (ref 136–145)
TCO2 CALC VENOUS: 24 MMOL/L
VANCOMYCIN TROUGH: 20.7 UG/ML (ref 10–20)
WBC # BLD: 12.5 K/UL (ref 4–11)

## 2022-11-08 PROCEDURE — 2580000003 HC RX 258: Performed by: INTERNAL MEDICINE

## 2022-11-08 PROCEDURE — 94761 N-INVAS EAR/PLS OXIMETRY MLT: CPT

## 2022-11-08 PROCEDURE — 2580000003 HC RX 258: Performed by: HOSPITALIST

## 2022-11-08 PROCEDURE — 92610 EVALUATE SWALLOWING FUNCTION: CPT

## 2022-11-08 PROCEDURE — 92526 ORAL FUNCTION THERAPY: CPT

## 2022-11-08 PROCEDURE — 2000000000 HC ICU R&B

## 2022-11-08 PROCEDURE — 99233 SBSQ HOSP IP/OBS HIGH 50: CPT | Performed by: INTERNAL MEDICINE

## 2022-11-08 PROCEDURE — 6360000002 HC RX W HCPCS: Performed by: INTERNAL MEDICINE

## 2022-11-08 PROCEDURE — A4216 STERILE WATER/SALINE, 10 ML: HCPCS | Performed by: INTERNAL MEDICINE

## 2022-11-08 PROCEDURE — 80202 ASSAY OF VANCOMYCIN: CPT

## 2022-11-08 PROCEDURE — 99291 CRITICAL CARE FIRST HOUR: CPT | Performed by: INTERNAL MEDICINE

## 2022-11-08 PROCEDURE — 6370000000 HC RX 637 (ALT 250 FOR IP): Performed by: INTERNAL MEDICINE

## 2022-11-08 PROCEDURE — 82803 BLOOD GASES ANY COMBINATION: CPT

## 2022-11-08 PROCEDURE — 71045 X-RAY EXAM CHEST 1 VIEW: CPT

## 2022-11-08 PROCEDURE — 83735 ASSAY OF MAGNESIUM: CPT

## 2022-11-08 PROCEDURE — 2500000003 HC RX 250 WO HCPCS: Performed by: INTERNAL MEDICINE

## 2022-11-08 PROCEDURE — 2700000000 HC OXYGEN THERAPY PER DAY

## 2022-11-08 PROCEDURE — 94003 VENT MGMT INPAT SUBQ DAY: CPT

## 2022-11-08 PROCEDURE — 85027 COMPLETE CBC AUTOMATED: CPT

## 2022-11-08 PROCEDURE — 80048 BASIC METABOLIC PNL TOTAL CA: CPT

## 2022-11-08 PROCEDURE — 6360000002 HC RX W HCPCS: Performed by: HOSPITALIST

## 2022-11-08 RX ORDER — SODIUM CHLORIDE AND POTASSIUM CHLORIDE 150; 450 MG/100ML; MG/100ML
INJECTION, SOLUTION INTRAVENOUS CONTINUOUS
Status: DISCONTINUED | OUTPATIENT
Start: 2022-11-08 | End: 2022-11-10

## 2022-11-08 RX ADMIN — FAMOTIDINE 20 MG: 10 INJECTION INTRAVENOUS at 08:10

## 2022-11-08 RX ADMIN — DILTIAZEM HYDROCHLORIDE 60 MG: 60 TABLET, FILM COATED ORAL at 05:47

## 2022-11-08 RX ADMIN — POTASSIUM CHLORIDE AND SODIUM CHLORIDE: 450; 150 INJECTION, SOLUTION INTRAVENOUS at 22:27

## 2022-11-08 RX ADMIN — FAMOTIDINE 20 MG: 10 INJECTION INTRAVENOUS at 20:12

## 2022-11-08 RX ADMIN — DIVALPROEX SODIUM 250 MG: 125 CAPSULE, COATED PELLETS ORAL at 00:43

## 2022-11-08 RX ADMIN — MUPIROCIN: 20 OINTMENT TOPICAL at 08:10

## 2022-11-08 RX ADMIN — DIVALPROEX SODIUM 250 MG: 125 CAPSULE, COATED PELLETS ORAL at 05:47

## 2022-11-08 RX ADMIN — DILTIAZEM HYDROCHLORIDE 10 MG/HR: 5 INJECTION, SOLUTION INTRAVENOUS at 11:32

## 2022-11-08 RX ADMIN — PROPOFOL 30 MCG/KG/MIN: 10 INJECTION, EMULSION INTRAVENOUS at 04:00

## 2022-11-08 RX ADMIN — DIGOXIN 125 MCG: 250 INJECTION, SOLUTION INTRAMUSCULAR; INTRAVENOUS at 08:10

## 2022-11-08 RX ADMIN — POTASSIUM CHLORIDE AND SODIUM CHLORIDE: 900; 150 INJECTION, SOLUTION INTRAVENOUS at 11:39

## 2022-11-08 RX ADMIN — VANCOMYCIN HYDROCHLORIDE 1000 MG: 1 INJECTION, POWDER, LYOPHILIZED, FOR SOLUTION INTRAVENOUS at 08:27

## 2022-11-08 RX ADMIN — POTASSIUM CHLORIDE: 2 INJECTION, SOLUTION, CONCENTRATE INTRAVENOUS at 18:54

## 2022-11-08 RX ADMIN — DILTIAZEM HYDROCHLORIDE 60 MG: 60 TABLET, FILM COATED ORAL at 00:43

## 2022-11-08 RX ADMIN — DILTIAZEM HYDROCHLORIDE 10 MG/HR: 5 INJECTION, SOLUTION INTRAVENOUS at 22:36

## 2022-11-08 RX ADMIN — Medication 10 ML: at 08:10

## 2022-11-08 RX ADMIN — Medication 10 ML: at 20:12

## 2022-11-08 RX ADMIN — MUPIROCIN: 20 OINTMENT TOPICAL at 20:11

## 2022-11-08 ASSESSMENT — PAIN SCALES - GENERAL
PAINLEVEL_OUTOF10: 0

## 2022-11-08 ASSESSMENT — PULMONARY FUNCTION TESTS
PIF_VALUE: 20
PIF_VALUE: 15

## 2022-11-08 NOTE — PROGRESS NOTES
[facility] Progress Note           Subjective/   66y.o. year old female who we are seeing in consultation for FABIO, Hypernatremia. HPI:  Renal function, sodium trending better with IVF's, non-oliguric. Mentation reduced. ROS:  Extubated, +weak, no fevers. Objective/   GEN:  Chronically ill, /71   Pulse (!) 111   Temp 98.2 °F (36.8 °C) (Bladder)   Resp 25   Ht 5' 3\" (1.6 m)   Wt 214 lb 6.4 oz (97.3 kg)   SpO2 97%   BMI 37.98 kg/m²   HEENT: non-icteric, no JVD  CV: S1, S2, tachycardic, irregular, without m/r/g; no LE edema  RESP: CTA B without w/r/r; breathing wnl  ABD: +bs, soft, nt, no hsm  SKIN: warm, no rashes    Data/  Recent Labs     11/06/22  0435   WBC 14.4*   HGB 10.5*   HCT 34.2*   MCV 88.3          Recent Labs     11/05/22  2330 11/06/22  0435 11/07/22  0500   * 145 145   K 3.7 3.3* 3.6   * 110 110   CO2 28 28 26   GLUCOSE 148* 123* 118*   PHOS 2.2* 2.5 2.7   BUN 15 13 10   CREATININE <0.5* <0.5* <0.5*   LABGLOM >60 >60 >60         Assessment/     Acute Kidney Injury:  KDIGO 1  Etiology:  Volume depletion   Clinical:  Resolved     Hypernatremia:  Significant free water deficit  No prior history of DI, had issues with low sodium in early October. Solute diuresis from FABIO recovery. Sepsis:  HCAP vs. UTI  On broad spectrum IV  antibiotics     Plan/     - Change IVF's to 1/2NS with 20 meq KCL/liter at 75 ml/hour  - Trend labs, bp's, & urine output    ____________________________________  Jose Fernandez MD  [facility]  [email or web address]  Office: [phone]

## 2022-11-08 NOTE — PROGRESS NOTES
Patient extubated to 3L NC per  verbal orders.      11/08/22 0939   Oxygen Therapy/Pulse Ox   O2 Therapy Oxygen   O2 Device Nasal cannula   O2 Flow Rate (L/min) 3 L/min   Heart Rate (!) 107   Resp 17   SpO2 99 %

## 2022-11-08 NOTE — PROGRESS NOTES
11/8  Vanc level = 20.7 mcg/mL at 0355. Vanc level, like yesterday, was drawn quite early. Will change vancomycin to 1000 mg q12. Recheck vanc level tomorrow in the AM (11/9 at 0700).   Olivia Isaacs, PharmD  11/8/2022 6:06 AM

## 2022-11-08 NOTE — PROGRESS NOTES
Internal Medicine ICU Progress Note    Patient is intubated and cannot give any history. She has a history of diabetes, congestive heart failure, recent admission for sepsis secondary UTI, history of FABIO, potential bacteremia, pulmonary A. fib, history of elevated troponin who presented to the emergency room for acute kidney injury, sepsis and UTI from the nursing home. She was severely hyponatremic and had FABIO at the time of admission. White cell count 16,000. In the ED she was disoriented and hypotensive. She became unresponsive and was intubated. Extubated to [place] today   Subjective:    Ms Jose E Aquino seen awake, drowsy post extubation today   Mentation remains poor. Staring and tracking people        Chronic LE contractures suggest she is bed bound at baseline. Invasive Lines: CVC catheter placed on 11/3/2022        MV: Intubated on 11/3/2022.     Recent Labs     22  1124   PHART 7.470*   QWV9ESQ 36.5   PO2ART 76.6         MV Settings:  Vent Mode: AC/VC Resp Rate (Set): 22 bmp/Vt (Set, mL): 320 mL/ /FiO2 : 35 %    IV:   0.9% NaCl with KCl 20 mEq 75 mL/hr at 22    propofol 30 mcg/kg/min (22)    dextrose      sodium chloride      norepinephrine Stopped (22)       Vitals:  Temp  Av.7 °F (37.1 °C)  Min: 98.2 °F (36.8 °C)  Max: 99 °F (37.2 °C)  Pulse  Av.7  Min: 94  Max: 133  BP  Min: 86/70  Max: 149/88  SpO2  Av.9 %  Min: 93 %  Max: 100 %  FiO2   Av %  Min: 35 %  Max: 35 %  Patient Vitals for the past 4 hrs:   BP Temp Temp src Pulse Resp SpO2   22 0547 113/70 -- -- -- -- --   22 0530 113/70 -- -- (!) 120 18 97 %   22 0500 97/69 -- -- (!) 103 (!) 31 98 %   22 0430 110/75 -- -- 97 17 98 %   22 0400 105/69 98.2 °F (36.8 °C) Bladder 94 18 98 %   22 0330 93/71 -- -- 100 23 98 %   22 0300 88/74 -- -- (!) 109 22 98 %         CVP:        Intake/Output Summary (Last 24 hours) at 2022 0655  Last data filed at 11/8/2022 0547  Gross per 24 hour   Intake 549.51 ml   Output 1650 ml   Net -1100.49 ml         EXAM:  General: elderly female awake but poorly responsive  Eyes: PERRL. No sclera icterus. No conjunctiva injected. ENT: No discharge. Off vent   Neck: Trachea midline. Normal thyroid. Resp: No accessory muscle use. No crackles. No wheezing. No rhonchi. No dullness on percussion. CV: . Irregularly irregular rate and rhythm. No mumur or rub. No edema. No JVD. Palpable pedal pulses. GI: Non-tender. Non-distended. No masses. No organmegaly. Normal bowel sounds. No hernia. Skin: Warm and dry. No nodule on exposed extremities. No rash on exposed extremities. Lymph: No cervical LAD. No supraclavicular LAD. M/S: resolving cyanosis of both feet   Neuro - awake, staring, tracks people. Mild deviation of angle of mouth to right side.  Following commands and squeezes right hand with command  Contractures of left UE and mamie LE noted      Medications:  Scheduled Meds:   vancomycin  1,000 mg IntraVENous Q12H    dilTIAZem  60 mg Oral 4 times per day    rivaroxaban  20 mg Oral Daily    famotidine (PEPCID) injection  20 mg IntraVENous BID    insulin glargine  25 Units SubCUTAneous QAM    sodium chloride flush  5-40 mL IntraVENous 2 times per day    vancomycin (VANCOCIN) intermittent dosing (placeholder)   Other RX Placeholder    mupirocin   Nasal BID    insulin lispro  0-16 Units SubCUTAneous Q4H    divalproex  250 mg Oral 3 times per day    digoxin  125 mcg IntraVENous Daily       PRN Meds:  metoprolol, glucose, dextrose bolus **OR** dextrose bolus, glucagon (rDNA), dextrose, sodium chloride flush, sodium chloride, ondansetron **OR** ondansetron, polyethylene glycol, acetaminophen **OR** acetaminophen    Results:  CBC:   Recent Labs     11/06/22  0435   WBC 14.4*   HGB 10.5*   HCT 34.2*   MCV 88.3          BMP:   Recent Labs     11/05/22  2330 11/06/22  0435 11/07/22  0500   * 145 145   K 3.7 3.3* 3.6 * 110 110   CO2 28 28 26   PHOS 2.2* 2.5 2.7   BUN 15 13 10   CREATININE <0.5* <0.5* <0.5*       LIVER PROFILE:   Recent Labs     11/05/22  0815   AST 7*   ALT <5*   BILITOT 0.3   ALKPHOS 63        Latest Reference Range & Units 11/4/22 06:07   Procalcitonin 0.00 - 0.15 ng/mL 0.22 (H)   (H): Data is abnormally high     Latest Reference Range & Units 11/3/22 17:35 11/3/22 20:42 11/4/22 02:58   Lactic Acid, Sepsis 0.4 - 1.9 mmol/L 4.2 (HH) 4.1 (HH) 1.5       Cultures:  COVID-19 not detected  Influenza a and B not detected  Blood cultures coag neg staph      ECG  Atrial fibrillation with rapid ventricular responseST abnormality consider inferior and anterolateral ischemiaAbnormal ECGWhen compared with ECG of 12-OCT-2022 06:28,HR increasedConfirmed by DEXTER Lucio MD ([id number]) on 11/4/2022 7:39:12 AM      Films:    XR CHEST PORTABLE   Final Result   Patient is rotated to the right with cardiac/soft tissue summation across the   right base. However I still suspect increasing opacification/atelectasis in   the right middle and lower lobe. The linear opacities at the left base are stable. XR CHEST PORTABLE   Final Result   The patient is rotated. Mild cardiomegaly. Bibasilar infiltrates and   atelectasis noted. Trace right pleural effusion. COPD changes. ET tube is 3 cm superior to the cynthia. Feeding tube terminates below the   diaphragm. Right-sided central line tip is in the superior vena cava. There   is no pneumothorax. Significant osteopenic changes and degenerative changes noted in the bony   structures. RECOMMENDATION:   Bibasilar infiltrate/atelectasis. No significant interval change from recent   priors. XR CHEST PORTABLE   Final Result   Stable chest.  Bibasilar opacification, likely atelectasis. Satisfactory position of endotracheal tube. XR CHEST PORTABLE   Final Result   Unremarkable support devices.       Mild bibasilar infiltrates or atelectasis. XR CHEST PORTABLE    (Results Pending)            Assessment:     Principal Problem:    Sepsis secondary to UTI [facility])  Active Problems:    Persistent atrial fibrillation (HCC)    Urinary tract infection with hematuria    Chronic respiratory failure with hypoxia (HCC)    Dementia with behavioral disturbance    Hypertensive heart and kidney disease with chronic diastolic congestive heart failure and stage 2 chronic kidney disease (HCC)    Schizoaffective disorder, depressive type (HCC)    Septic shock ([facility])    Hypernatremia    ESBL (extended spectrum beta-lactamase) producing bacteria infection    DM (diabetes mellitus) ([facility]    Essential hypertension    Chronic obstructive pulmonary disease ([facility]    Acute respiratory failure ([facility]  Resolved Problems:    * No resolved hospital problems. *         Plan:    #Sepsis with septic shock. Most likely source appears to be UTI.s/p aggressive IVF boluses  BP improved   Off levophed   Urine neg but blood with coag neg staph  On IV vanc     #Possible UTI. Continue broad-spectrum antibiotics for now. On vancomycin and cefepime. Blood cx with coag neg staph . With ongoing fever spikes, switched to IV vanc     #Acute respiratory failure. Became unresponsive and was intubated. Likely with sepsis and hypernatremia Continue vent support. Pulmonologist consulted  Weaned off and extubated today   Wean o2 as able     #Permanent atrial fibrillation. HR controlled   Hold Cardizem due to low blood pressure. Continue Xarelto. #Severe hypernatremia. Significant free water deficit. Possible solute diuresis from recent FABIO. IVF changed to d5 1/2NS  given and improved   Hypernatremia corrected. #FABIO. As above- resolved      #Diabetes mellitus type 2. Stable sugars  Sliding scale insulin. #Dementia with behavioral disturbance. #History of schizoaffective disorder. Presently intubated. #COPD. Continue breathing treatments.   On the vent. #DVT prophylaxis. On Xarelto. She is a DNR CCA.           Ericka Troncoso MD 6:55 AM 11/8/2022

## 2022-11-08 NOTE — PROGRESS NOTES
Speech Language Pathology    Speech Language Pathology  Clinical Bedside Swallow Assessment  Facility/Department: [id number] ICU        Recommendations:  Diet recommendation: NPO; NPO; Meds via alt means of nutrition  Instrumentation: will continue to monitor for need  Risk management: oral care q4 hrs to reduce adverse affects in the event of aspiration and general aspiration precautions      NAME:Leslie Harris  : 1950 (66 y.o.)   MRN: [id number]  ROOM: [address] [place] [address]  ADMISSION DATE: 11/3/2022  PATIENT DIAGNOSIS(ES): Hypernatremia [E87.0]  Hyperglycemia [R73.9]  ESBL (extended spectrum beta-lactamase) producing bacteria infection [A49.9, Z16.12]  Atrial fibrillation with rapid ventricular response (HCC) [I48.91]  Septic shock ([address] [A41.9, R65.21]  Sepsis ([address] [A41.9]  Yeast dermatitis [B37.2]  Acute respiratory failure, unspecified whether with hypoxia or hypercapnia ([address] [J96.00]  Urinary tract infection with hematuria, site unspecified [N39.0, R31.9]  Chief Complaint   Patient presents with    Abnormal Lab     Pt to ER abnormal labs. Upon triage pt hypotensive and became unresponsive moved from room 9 to 1.       Patient Active Problem List    Diagnosis Date Noted    Persistent atrial fibrillation ([address] 2016    ESBL (extended spectrum beta-lactamase) producing bacteria infection 2022    Hypernatremia 2022    Septic shock ([address] 2022    Hyperkalemia 10/10/2022    Bacteremia 10/08/2022    Providencia bacteremia 10/07/2022    Permanent atrial fibrillation with rapid ventricular response ([address] 10/07/2022    Sepsis secondary to UTI ([address] 10/06/2022    Altered mental status 10/06/2022    FABIO (acute kidney injury) ([address] 10/06/2022    Urinary tract infection with hematuria 10/06/2022    Chronic bilateral low back pain with bilateral sciatica 10/21/2018    Chronic respiratory failure with hypoxia ([address] 10/21/2018    Dementia with behavioral disturbance 10/21/2018    LORENZO (generalized anxiety disorder) 10/21/2018    Hoarding disorder with poor insight 10/21/2018    Hypertensive heart and kidney disease with chronic diastolic congestive heart failure and stage 2 chronic kidney disease ([address] 10/21/2018    Schizoaffective disorder, depressive type ([address] 10/21/2018    Cervical herniated disc 05/23/2005    Elevated troponin     Chronic pain of both knees 02/24/2020    Bilateral primary osteoarthritis of knee 02/24/2020    Acute hypoxemic respiratory failure ([address] 11/06/2016    Essential hypertension     Chronic obstructive pulmonary disease (HCC)     Atrial fibrillation with rapid ventricular response ([address]     Obesity 01/27/2016    Lumbar facet arthropathy 12/10/2014    Disc degeneration, lumbar 12/10/2014    DM (diabetes mellitus) ([address] 01/13/2014    Seizure disorder, grand mal ([address] 01/12/2014     Past Medical History:   Diagnosis Date    Acute diastolic congestive heart failure ([address] 1/78/5313    Acute diastolic heart failure (HCC)     Acute respiratory failure (HCC)     Adjustment disorder with mixed anxiety and depressed mood     Allergic rhinitis     Alzheimer's dementia ([address]     Arachnoid cyst 5/23/2005    Atrial fibrillation (HCC)     CHF (congestive heart failure) ([facility])     Chronic back pain     Constipation     COPD (chronic obstructive pulmonary disease) ([address]     Diabetes mellitus ([address]     Diskitis 10/6/2011    Disseminated superficial actinic porokeratosis (DSAP)     Edema     ESBL (extended spectrum beta-lactamase) producing bacteria infection 04/17/2020    Urine Klebsiella    Hypertension     Hypo-osmolality and hyponatremia     Major depressive disorder     Morbid obesity (HCC)     Muscle weakness     Osteomyelitis of spine ([address] 10/6/2011    Overactive bladder     Schizoaffective disorder (HCC)     Seizures (HCC)     Urinary tract infection without hematuria     Xerosis cutis      Past Surgical History:   Procedure Laterality Date    IR MIDLINE CATH  10/10/2022    IR MIDLINE CATH 10/10/2022 [facility] [place] [place] SPECIAL PROCEDURES    KNEE SURGERY      TONSILLECTOMY      TUBAL LIGATION       Allergies   Allergen Reactions    Fish-Derived Products      Food allergy    Iodine     Mushroom Extract Complex     Onion     Other      seafood       DATE ONSET: Pt admitted to [facility] ICU on 11/3/22    Date of Evaluation: 11/8/2022   Evaluating Therapist: Rocio Franks, SOLOMON    Chart Reviewed: : [x] Yes [] No    Current Diet: Diet NPO  ADULT TUBE FEEDING; Orogastric; Diabetic; Continuous; 20; Yes; 10; Q 4 hours; 30; 200; Q 6 hours    Recent Chest Radiography: [x] Chest XR   [] CT of Chest  Date: 11/8/22  Impressions  Impression   Patient is rotated to the right with cardiac/soft tissue summation across the   right base. However I still suspect increasing opacification/atelectasis in   the right middle and lower lobe. The linear opacities at the left base are stable. Pain: no indicators of pain; pt unable to report pain when asked by SLP    Reason for Referral  Chad Carey was referred for a bedside swallow evaluation to assess the efficiency of their swallow function, identify signs and symptoms of aspiration and make recommendations regarding safe dietary consistencies, effective compensatory strategies, and safe eating environment. Assessment    Medical record review/interview: Per MD H&P: \" 66 y.o. female with COPD, CHF, obesity, DM, afibrecently admitted for danielle,sepsis, uti, brought in from NH after labs there revealed Na 165, creatinine 1.7, BUN 67, wbc 16. She presented to the ER disoriented, toxic, hypotensive, becoming unresponsive requiring intubation for airway protection. Currently she is seen in the ICU intubated, sedated\".     Predisposing dysphagia risk factors: Hx of neurological disease , COPD, and Cognitive Deficit  Clinical signs of possible chronic dysphagia: N/A  Precipitating dysphagia risk factors: reduced physical mobility, increased O2 demands, AMS, and recent intubation    Patient Complaints: pt is an unreliable historian, has Alzheimers dementia per chart. Pt intermittently moans during assessment, unable to answer questions for SLP regarding swallow function  Odynophagia: [] Yes [] No  Globus Sensation: [] Yes [] No  SOB with PO intake: [] Yes [] No  Increased WOB with PO intake: [] Yes [] No  Reflux Sx's: [] Yes [] No  Weight loss: [] Yes [] No  Coughing/Choking with PO intake: [] Yes [] No  Reduced Appetite: [] Yes [] No    Additional Reported Symptoms/Complaints/Hospital Course: Pt admitted d/t septic shock, UTI, acute respiratory failure. Pt became unresponsive on admission and required intubation 11/3 per chart. Pt was extubated 11/8. Pt has PMHx of CHF, COPD, seizures, schizophrenia disorder, and Alzheimer's dementia per chart. Pt seen by  in April 2020 with recommendations for a soft and bite sized diet with thin liquids and meds in puree, check for possible pocketing with PO intake. Vitals/labs:   Temp: n/a  SpO2: 99%  RR: 20-24/min  BP: 137/80  HR: 116  O2 device: 3 lpm O2 NC      CBC:   Recent Labs     11/08/22  0835   WBC 12.5*   HGB 10.6*         BMP:  Recent Labs     11/08/22  0835   *   K 3.4*   *   CO2 21   BUN 9   CREATININE <0.5*   GLUCOSE 153*          Cranial nerve exam:   CN V (trigeminal): ophthalmic, maxillary, and mandibular facial sensation- HERNAN  CN VII (facial):  Impaired R and Reduced  CN IX/X (glossopharyngeal/vagus): MPT: DNT; pitch range: DNT; vocal quality: weak; cough: Weak- perceptually, Non-Productive, and Dry  CN XII (hypoglossal): Reduced    Laryngeal function exam:   Secretions: n/a  Vocal quality: See CN exam above  MPT: See CN exam above  S/Z ratio: DNT  Pitch range: See CN exam above  Cough: See CN exam above    Oral Care Status:    [] Oral Care [facility]  [x] Poor oral care status  [] Edentulous  [] Upper Dentures  [] Lower Dentures  [x] Missing/Broken Teeth  [] Evidence of dental cavities/carries    PO trials:   IDDSI 0 (thin): ice chip x2    IDDSI 2 (mildly thick): x1 tsp    IDDSI 4 (puree): x1 tsp    3 oz water: HERNAN    Impressions:  Pt unable to follow majority of commands during BSE despite cues -- she attempted lingual protrusion (tongue appears full/swollen, reduced lingual protrusion), cough, and voiced \"ah\" when cued. She intermittently \"moans\" during evaluation, although does not verbalize otherwise despite cues and encouragement. Pt demonstrates with R-sided lean in bed, right-sided oral/facial weakness at rest.    Minimal PO trials observed this date. Pt with weak lingual manipulation and reduced bolus control with observed PO, pooling noted at anterior sulcus and R lateral sulci. Pt held entirety of puree bolus in anterior sulcus/labial surfaces and did not attempt to manipulate PO despite cues. All PO trials were cleared from oral cavity with use of moistened swab and oral suction. PO trials discontinued at this time. RN notified of recs. ST to continue to follow.       Recommendations:  Diet recommendation: NPO; NPO; Meds via alt means of nutrition  Instrumentation: will continue to monitor for need  Risk management: oral care q4 hrs to reduce adverse affects in the event of aspiration and general aspiration precautions    Prognosis: Fair    Recommended Intervention:   [x] Dysphagia tx  [] Videostroboscopy                      [x] NPO   [] MBS       [] Speech/Cog Eval    [x] Therapeutic PO Trials     [] Ice Chips   [] Other:  [] FEES                                                 Dysphagia Therapeutic Intervention:   []  Bolus control Exercises  []  Oral Motor Exercises  []  [employer] Protocol  []  Thermal Stimulation  [x]  Oral Care    []  Vital Stim/NMES  []  Laryngeal Exercises  [x]  Patient/Family Education  []  Pharyngeal Exercises  [x]  Therapeutic PO trials with SLP  [x]  Diet tolerance monitoring  []  Other:     Referrals:  [] ENT    [] PT  [] Pulmonology [] GI  [] Neurology  [] RD  [] OT   []     Goals:  Short Term Goals:  Timeframe for Short Term Goals: (5 days, 11/13/22)  Goal 1: The patient will tolerate recommended diet with no clinical s/s of aspiration 5/5  Goal 2: The patient/caregiver will demonstrate understanding of compensatory swallow strategies, for improved swallow safety  Goal 3: The patient will tolerate repeat BSE when able for ongoing assessment  Goal 4: The patient will tolerate instrumental assessment when able     Long Term Goals:   Timeframe for Long Term Goals: (7 days, 11/15/22)  Goal 1: The patient will tolerate least restrictive diet with no clinical s/s of aspiration or worsening respiratory/pulmonary status    Treatment:  Skilled instruction completed with patient re: evidenced based practice regarding recommendations and POC, importance of oral care to reduce adverse affects in the event of aspiration, and instruction of recommended compensatory strategies developed based upon clinical exam. Pt able to recall/demonstrate compensatory strategies with max cues. Pt Education: SLP educated the patient re: Role of SLP, rationale for completion of assessment, recommendations, and POC  Pt Education Response: no evidence of learning, would benefit from ongoing education, and RN aware    Duration/Frequency of Tx: 3-5x/week for LOS    Individuals Consulted:   [x]  Patient     []  NP         [x]  RN   []  RD                   []  MD      []  Family Member                        []  PA    []  Other:      Safety Devices / Report:  [x]  All fall risk precautions in place [x]  Safety handoff completed with RN  [x]  Bed alarm in place  [x]  Left in bed     []  Chair alarm in place  []  Left in chair   [x]  Call light in reach   []  Other:                      Total Treatment Time / Charges       Time in Time out Total Time / units   Swallow Eval/Tx Time  7995 0218 23 min / 2 units     Signature:  Rosemary Candelaria M.S. [id number] [facility]  Speech-language pathologist  [phone]

## 2022-11-08 NOTE — PROGRESS NOTES
Pulmonary & Critical Care Medicine ICU Progress Note    CC: Acute respiratory failure    Events of Last 24 hours:   Started TF  On trial this am     Invasive Lines: Right subclavian CVC 11/3/2022      MV: 11/3/2022  Vent Mode: AC/VC Resp Rate (Set): 22 bmp/Vt (Set, mL): 320 mL/ /FiO2 : 35 %  Recent Labs     22  1124   PHART 7.470*   INP3CMK 36.5   PO2ART 76.6       IV:   0.9% NaCl with KCl 20 mEq 75 mL/hr at 22    propofol 30 mcg/kg/min (22 0400)    dextrose      sodium chloride      norepinephrine Stopped (22 1734)       Vitals:  Blood pressure 113/70, pulse (!) 120, temperature 98.2 °F (36.8 °C), temperature source Bladder, resp. rate 18, height 5' 3\" (1.6 m), weight 214 lb 6.4 oz (97.3 kg), SpO2 97 %. on 35%  Temp  Av.7 °F (37.1 °C)  Min: 98.2 °F (36.8 °C)  Max: 99 °F (37.2 °C)    Intake/Output Summary (Last 24 hours) at 2022 0659  Last data filed at 2022 0547  Gross per 24 hour   Intake 549.51 ml   Output 1650 ml   Net -1100.49 ml     EXAM:  General: intubated, ill appearing    ENT: Pharynx with ETT. Resp: No crackles. No wheezing. CV: S1, S2. +edema  GI: NT, ND, +BS  Skin: Warm and dry. Neuro: PERRL. Awake, not following commands.  Patellar reflexes are symmetric     Scheduled Meds:   vancomycin  1,000 mg IntraVENous Q12H    dilTIAZem  60 mg Oral 4 times per day    rivaroxaban  20 mg Oral Daily    famotidine (PEPCID) injection  20 mg IntraVENous BID    insulin glargine  25 Units SubCUTAneous QAM    sodium chloride flush  5-40 mL IntraVENous 2 times per day    vancomycin (VANCOCIN) intermittent dosing (placeholder)   Other RX Placeholder    mupirocin   Nasal BID    insulin lispro  0-16 Units SubCUTAneous Q4H    divalproex  250 mg Oral 3 times per day    digoxin  125 mcg IntraVENous Daily     PRN Meds:  metoprolol, glucose, dextrose bolus **OR** dextrose bolus, glucagon (rDNA), dextrose, sodium chloride flush, sodium chloride, ondansetron **OR** ondansetron, polyethylene glycol, acetaminophen **OR** acetaminophen    Results:  CBC:   Recent Labs     11/06/22  0435   WBC 14.4*   HGB 10.5*   HCT 34.2*   MCV 88.3        BMP:   Recent Labs     11/05/22  2330 11/06/22  0435 11/07/22  0500   * 145 145   K 3.7 3.3* 3.6   * 110 110   CO2 28 28 26   PHOS 2.2* 2.5 2.7   BUN 15 13 10   CREATININE <0.5* <0.5* <0.5*     LIVER PROFILE:   Recent Labs     11/05/22  0815   AST 7*   ALT <5*   BILITOT 0.3   ALKPHOS 63       Cultures:  11/3/2022 blood GPC  11/3/2022 blood coag negative staph  11/3/2022 urine NG  11/3/2022 SARS-CoV-2 and influenza are negative  11/4/2022 tracheal aspirate NRF  11/7/2022 blood sent     CXR 11/8/2022 increasing right-sided opacities    ASSESSMENT:  Acute hypoxemic respiratory failure   Septic shock   Permanent A. fib on Xarelto   FABIO  COPD  Dementia with behavioral disturbance  H/O schizoaffective disorder    PLAN:  Mechanical ventilation as per my orders. The ventilator was adjusted by me at the bedside for unstable, life threatening respiratory failure  IV Propofol for sedation, target RASS -2, with daily SAT  Fentanyl and Versed PRN, gtt as needed  Daily SBT per protocol   Inhaled bronchodilators  Vanc D#6, f/u cultures  Nutrition: Tube feeding    Nephrology is following  Repeat blood cultures    Resume diltiazem  Prophylaxis: Home Xarelto, Bactroban, Pepcid  Follow-up labs  Limited code - I discussed with her brother on the phone today. Intubation is okay short term if needed. Total critical care time caring for this patient with life threatening, unstable organ failure, including direct patient contact, management of life support systems, review of data including imaging and labs, discussions with other team members and physicians is 41 minutes so far today, excluding procedures.

## 2022-11-08 NOTE — PROGRESS NOTES
Vanc times changed to 11/2300.  Trough changed to 10am on 11/9  Rosa Trimont Pharm D 11/8/202211:34 AM  .

## 2022-11-08 NOTE — PROGRESS NOTES
Pt has slight right sided facial/lip drooping. Right hand  is strong. Left hand and BLE are contracted. Pt not nodding appropriately and having expressive aphasia. Pt has bilateral reactive pupils and is tracking us around the room. She lifts her head and turns her neck as well. Dr. Cody Christie on the unit. He came in to assess patient. No imaging needed at this time according to Dr. Cody Christie.

## 2022-11-08 NOTE — PLAN OF CARE
Bedside swallow evaluation completed this date.     Refugio Neri M.S. Mayelin Primrose  Speech-language pathologist  [id number]

## 2022-11-08 NOTE — PROGRESS NOTES
Spoke with Dr. Albino Stone about PO cardizem now that pt is extubated. SLP eval placed. Order received for cardizem drip at 10 mg/hr. From Dr. Albino Stone.

## 2022-11-08 NOTE — PROGRESS NOTES
Shift assessment completed, see flow sheet. VSS. Intubated and sedated on  # 7.5 ETT 35 %/ +5. SpO2 99%. Bilateral lung sounds diminished. OG in place at 63 with tube feed running. All lines and monitoring devices in place. Moraes is patent and secured. Bilateral soft wrist restraints in place for patient safety.   Bed in lowest position with wheels locked

## 2022-11-09 ENCOUNTER — APPOINTMENT (OUTPATIENT)
Dept: GENERAL RADIOLOGY | Age: 72
DRG: 870 | End: 2022-11-09
Payer: COMMERCIAL

## 2022-11-09 LAB
ANION GAP SERPL CALCULATED.3IONS-SCNC: 14 MMOL/L (ref 3–16)
BUN BLDV-MCNC: 5 MG/DL (ref 7–20)
CALCIUM SERPL-MCNC: 9 MG/DL (ref 8.3–10.6)
CHLORIDE BLD-SCNC: 111 MMOL/L (ref 99–110)
CO2: 21 MMOL/L (ref 21–32)
CREAT SERPL-MCNC: <0.5 MG/DL (ref 0.6–1.2)
GFR SERPL CREATININE-BSD FRML MDRD: >60 ML/MIN/{1.73_M2}
GLUCOSE BLD-MCNC: 117 MG/DL (ref 70–99)
GLUCOSE BLD-MCNC: 117 MG/DL (ref 70–99)
GLUCOSE BLD-MCNC: 133 MG/DL (ref 70–99)
GLUCOSE BLD-MCNC: 143 MG/DL (ref 70–99)
GLUCOSE BLD-MCNC: 152 MG/DL (ref 70–99)
GLUCOSE BLD-MCNC: 157 MG/DL (ref 70–99)
HCT VFR BLD CALC: 33.6 % (ref 36–48)
HEMOGLOBIN: 10.6 G/DL (ref 12–16)
MAGNESIUM: 1.6 MG/DL (ref 1.8–2.4)
MCH RBC QN AUTO: 27.9 PG (ref 26–34)
MCHC RBC AUTO-ENTMCNC: 31.7 G/DL (ref 31–36)
MCV RBC AUTO: 88.1 FL (ref 80–100)
PDW BLD-RTO: 16.6 % (ref 12.4–15.4)
PERFORMED ON: ABNORMAL
PLATELET # BLD: 209 K/UL (ref 135–450)
PMV BLD AUTO: 10.7 FL (ref 5–10.5)
POTASSIUM REFLEX MAGNESIUM: 3.3 MMOL/L (ref 3.5–5.1)
RBC # BLD: 3.81 M/UL (ref 4–5.2)
SODIUM BLD-SCNC: 146 MMOL/L (ref 136–145)
VANCOMYCIN TROUGH: 20.1 UG/ML (ref 10–20)
WBC # BLD: 10.5 K/UL (ref 4–11)

## 2022-11-09 PROCEDURE — 2500000003 HC RX 250 WO HCPCS: Performed by: INTERNAL MEDICINE

## 2022-11-09 PROCEDURE — 99233 SBSQ HOSP IP/OBS HIGH 50: CPT | Performed by: INTERNAL MEDICINE

## 2022-11-09 PROCEDURE — 6360000002 HC RX W HCPCS: Performed by: INTERNAL MEDICINE

## 2022-11-09 PROCEDURE — 92526 ORAL FUNCTION THERAPY: CPT

## 2022-11-09 PROCEDURE — 80048 BASIC METABOLIC PNL TOTAL CA: CPT

## 2022-11-09 PROCEDURE — 2580000003 HC RX 258: Performed by: INTERNAL MEDICINE

## 2022-11-09 PROCEDURE — 85027 COMPLETE CBC AUTOMATED: CPT

## 2022-11-09 PROCEDURE — A4216 STERILE WATER/SALINE, 10 ML: HCPCS | Performed by: INTERNAL MEDICINE

## 2022-11-09 PROCEDURE — 80202 ASSAY OF VANCOMYCIN: CPT

## 2022-11-09 PROCEDURE — 2580000003 HC RX 258: Performed by: HOSPITALIST

## 2022-11-09 PROCEDURE — 83735 ASSAY OF MAGNESIUM: CPT

## 2022-11-09 PROCEDURE — 6360000002 HC RX W HCPCS: Performed by: HOSPITALIST

## 2022-11-09 PROCEDURE — 94761 N-INVAS EAR/PLS OXIMETRY MLT: CPT

## 2022-11-09 PROCEDURE — 2000000000 HC ICU R&B

## 2022-11-09 PROCEDURE — 2700000000 HC OXYGEN THERAPY PER DAY

## 2022-11-09 RX ORDER — MAGNESIUM SULFATE 1 G/100ML
1000 INJECTION INTRAVENOUS ONCE
Status: COMPLETED | OUTPATIENT
Start: 2022-11-09 | End: 2022-11-09

## 2022-11-09 RX ORDER — POTASSIUM CHLORIDE 29.8 MG/ML
20 INJECTION INTRAVENOUS PRN
Status: DISCONTINUED | OUTPATIENT
Start: 2022-11-09 | End: 2022-11-12 | Stop reason: HOSPADM

## 2022-11-09 RX ORDER — MAGNESIUM SULFATE 1 G/100ML
1000 INJECTION INTRAVENOUS PRN
Status: DISCONTINUED | OUTPATIENT
Start: 2022-11-09 | End: 2022-11-12 | Stop reason: HOSPADM

## 2022-11-09 RX ADMIN — DILTIAZEM HYDROCHLORIDE 15 MG/HR: 5 INJECTION, SOLUTION INTRAVENOUS at 11:24

## 2022-11-09 RX ADMIN — VANCOMYCIN HYDROCHLORIDE 1000 MG: 1 INJECTION, POWDER, LYOPHILIZED, FOR SOLUTION INTRAVENOUS at 00:02

## 2022-11-09 RX ADMIN — FAMOTIDINE 20 MG: 10 INJECTION INTRAVENOUS at 21:50

## 2022-11-09 RX ADMIN — MAGNESIUM SULFATE HEPTAHYDRATE 1000 MG: 1 INJECTION, SOLUTION INTRAVENOUS at 09:08

## 2022-11-09 RX ADMIN — POTASSIUM CHLORIDE 20 MEQ: 400 INJECTION, SOLUTION INTRAVENOUS at 13:14

## 2022-11-09 RX ADMIN — DIGOXIN 125 MCG: 250 INJECTION, SOLUTION INTRAMUSCULAR; INTRAVENOUS at 09:01

## 2022-11-09 RX ADMIN — MAGNESIUM SULFATE HEPTAHYDRATE 1000 MG: 1 INJECTION, SOLUTION INTRAVENOUS at 11:57

## 2022-11-09 RX ADMIN — MAGNESIUM SULFATE HEPTAHYDRATE 1000 MG: 1 INJECTION, SOLUTION INTRAVENOUS at 10:53

## 2022-11-09 RX ADMIN — POTASSIUM CHLORIDE 20 MEQ: 400 INJECTION, SOLUTION INTRAVENOUS at 06:57

## 2022-11-09 RX ADMIN — VANCOMYCIN HYDROCHLORIDE 750 MG: 750 INJECTION, POWDER, LYOPHILIZED, FOR SOLUTION INTRAVENOUS at 14:14

## 2022-11-09 RX ADMIN — Medication 10 ML: at 21:50

## 2022-11-09 RX ADMIN — Medication 10 ML: at 09:01

## 2022-11-09 RX ADMIN — FAMOTIDINE 20 MG: 10 INJECTION INTRAVENOUS at 09:01

## 2022-11-09 RX ADMIN — DILTIAZEM HYDROCHLORIDE 15 MG/HR: 5 INJECTION, SOLUTION INTRAVENOUS at 20:20

## 2022-11-09 RX ADMIN — POTASSIUM CHLORIDE AND SODIUM CHLORIDE: 450; 150 INJECTION, SOLUTION INTRAVENOUS at 11:57

## 2022-11-09 ASSESSMENT — PAIN SCALES - GENERAL: PAINLEVEL_OUTOF10: 0

## 2022-11-09 NOTE — CARE COORDINATION
INTERDISCIPLINARY PLAN OF CARE CONFERENCE    Date/Time: 11/9/2022 10:08 AM  Completed by: FEMI Amaya   Case Management      Patient Name:  Gasper Thorne  YOB: 1950  Admitting Diagnosis: Hypernatremia [E87.0]  Hyperglycemia [R73.9]  ESBL (extended spectrum beta-lactamase) producing bacteria infection [A49.9, Z16.12]  Atrial fibrillation with rapid ventricular response ([address] [I48.91]  Septic shock ([address] [A41.9, R65.21]  Sepsis ([address] [A41.9]  Yeast dermatitis [B37.2]  Acute respiratory failure, unspecified whether with hypoxia or hypercapnia ([address] [J96.00]  Urinary tract infection with hematuria, site unspecified [N39.0, R31.9]     Admit Date/Time:  11/3/2022  5:21 PM    Chart reviewed. Interdisciplinary team contacted or reviewed plan related to patient progress and discharge plans. Disciplines included Case Management, Nursing, and Dietitian. Current Status:ongoing   PT/OT recommendation for discharge plan of care: TBD    Expected D/C Disposition:  [facility])    Discharge Plan Comments: Chart review completed. Completed Interdisciplinary rounds with ICU staff. RN states pt remains NPO after being seen by Speech this AM and Dr. Roro Rodriguez requested RN to call [facility] to obtain pt's baseline with feeding etc as he stated there will need to determine a feeding plan such as peg, etc.    CM will continue to follow and assist. Please notify CM if needs or concerns arise.     Home O2 in place on admit: per facility if needed

## 2022-11-09 NOTE — PROGRESS NOTES
Pulmonary & Critical Care Medicine ICU Progress Note    CC: Acute respiratory failure    Events of Last 24 hours:   Extubated  I spoke with brother yesterday  Speech recommending n.p.o. Invasive Lines: Right subclavian CVC 11/3/2022      MV: 11/3/2022-2022  Vent Mode: AC/VC Resp Rate (Set): 22 bmp/Vt (Set, mL): 320 mL/ /FiO2 : 35 %  Recent Labs     22  1124   PHART 7.470*   TUR2JMZ 36.5   PO2ART 76.6       IV:   dilTIAZem (CARDIZEM) 125 mg in dextrose 5% 125 mL infusion 10 mg/hr (22 0150)    0.45 % NaCl with KCl 20 mEq 75 mL/hr at 22 0150    dextrose      sodium chloride         Vitals:  Blood pressure (!) 153/75, pulse (!) 110, temperature 97.9 °F (36.6 °C), temperature source Bladder, resp. rate 16, height 5' 3\" (1.6 m), weight 214 lb 6.4 oz (97.3 kg), SpO2 94 %. on room air  Temp  Av.8 °F (36.6 °C)  Min: 97.7 °F (36.5 °C)  Max: 97.9 °F (36.6 °C)    Intake/Output Summary (Last 24 hours) at 2022 0710  Last data filed at 2022 0444  Gross per 24 hour   Intake 1466.24 ml   Output 2500 ml   Net -1033.76 ml     EXAM:  General: ill appearing. Eyes: PERRL. No sclera icterus. No conjunctival injection. ENT: No discharge. Pharynx clear. Neck: Trachea midline. Normal thyroid. Resp: No accessory muscle use. No crackles. No wheezing. No rhonchi. No dullness on percussion. CV: Regular rate. Regular rhythm. No mumur or rub. No edema. Peripheral pulses are 2+. Capillary refill is less than 3 seconds. GI: Non-tender. Non-distended. No masses. No organomegaly. Normal bowel sounds. No hernia. Skin: Warm and dry. No nodule on exposed extremities. No rash on exposed extremities. Lymph: No cervical LAD. No supraclavicular LAD. M/S: No cyanosis. No joint deformity. No clubbing. Neuro: Awake, not FC. Patellar reflexes are symmetric.   Psych: Unable to obtain because the patient is non-communicative     Scheduled Meds:   vancomycin  1,000 mg IntraVENous Q12H    rivaroxaban  20 mg Oral Daily    famotidine (PEPCID) injection  20 mg IntraVENous BID    insulin glargine  25 Units SubCUTAneous QAM    sodium chloride flush  5-40 mL IntraVENous 2 times per day    vancomycin (VANCOCIN) intermittent dosing (placeholder)   Other RX Placeholder    insulin lispro  0-16 Units SubCUTAneous Q4H    divalproex  250 mg Oral 3 times per day    digoxin  125 mcg IntraVENous Daily     PRN Meds:  magnesium sulfate, potassium chloride, metoprolol, glucose, dextrose bolus **OR** dextrose bolus, glucagon (rDNA), dextrose, sodium chloride flush, sodium chloride, ondansetron **OR** ondansetron, polyethylene glycol, acetaminophen **OR** acetaminophen    Results:  CBC:   Recent Labs     11/08/22  0835 11/09/22  0500   WBC 12.5* 10.5   HGB 10.6* 10.6*   HCT 33.9* 33.6*   MCV 88.8 88.1    209     BMP:   Recent Labs     11/07/22  0500 11/08/22  0835 11/09/22  0500    147* 146*   K 3.6 3.4* 3.3*    114* 111*   CO2 26 21 21   PHOS 2.7  --   --    BUN 10 9 5*   CREATININE <0.5* <0.5* <0.5*     LIVER PROFILE:   No results for input(s): AST, ALT, LIPASE, BILIDIR, BILITOT, ALKPHOS in the last 72 hours. Invalid input(s): AMYLASE,  ALB      Cultures:  11/3/2022 blood GPC  11/3/2022 blood coag negative staph  11/3/2022 urine NG  11/3/2022 SARS-CoV-2 and influenza are negative  11/4/2022 tracheal aspirate NRF  11/7/2022 blood sent     CXR 11/8/2022 increasing right-sided opacities    ASSESSMENT:  Acute hypoxemic respiratory failure   Septic shock   Permanent A. fib on Xarelto   FABIO  COPD  Dementia with behavioral disturbance  H/O schizoaffective disorder    PLAN:  Supplemental oxygen to maintain SaO2 >92%; wean as tolerated    Inhaled bronchodilators  Vanc D#7/10, f/u cultures  Nutrition: Currently n.p.o. Nephrology is following, needs free water  Diltiazem infusion while NPO    Prophylaxis: Home Xarelto, Bactroban, Pepcid  Limited code - I discussed with her brother on the phone today.   Intubation is okay short term if needed.    PCU

## 2022-11-09 NOTE — PROGRESS NOTES
Speech Language Pathology  Facility/Department: [facility] ICU  Dysphagia Daily Treatment Note      Recommendations:  Solid Consistency: NPO  Liquid Consistency: NPO  Medication: via alternative means  Pt unable to follow majority of basic commands despite cues-- cognition appears to negatively impact overall swallow function. Recommend ongoing discussion with pt/pt's family regarding goals of care.   Oral care q4 hrs to reduce adverse affects in the event of aspiration and general aspiration precautions      NAME: Roderick Underwood  : 1950  MRN: [id number]    Patient Diagnosis(es):   Patient Active Problem List    Diagnosis Date Noted    Persistent atrial fibrillation ([address] 2016    ESBL (extended spectrum beta-lactamase) producing bacteria infection 2022    Hypernatremia 2022    Septic shock ([address] 2022    Hyperkalemia 10/10/2022    Bacteremia 10/08/2022    Providencia bacteremia 10/07/2022    Permanent atrial fibrillation with rapid ventricular response ([address] 10/07/2022    Sepsis secondary to UTI ([address] 10/06/2022    Altered mental status 10/06/2022    FABIO (acute kidney injury) ([address] 10/06/2022    Urinary tract infection with hematuria 10/06/2022    Chronic bilateral low back pain with bilateral sciatica 10/21/2018    Chronic respiratory failure with hypoxia ([address] 10/21/2018    Dementia with behavioral disturbance 10/21/2018    LORENZO (generalized anxiety disorder) 10/21/2018    Hoarding disorder with poor insight 10/21/2018    Hypertensive heart and kidney disease with chronic diastolic congestive heart failure and stage 2 chronic kidney disease ([address] 10/21/2018    Schizoaffective disorder, depressive type ([address] 10/21/2018    Cervical herniated disc 2005    Elevated troponin     Chronic pain of both knees 2020    Bilateral primary osteoarthritis of knee 2020    Acute respiratory failure ([address] 2016    Essential hypertension     Chronic obstructive pulmonary disease [facility])     Atrial fibrillation with rapid ventricular response [address]     Obesity 01/27/2016    Lumbar facet arthropathy 12/10/2014    Disc degeneration, lumbar 12/10/2014    DM (diabetes mellitus) ([facility] 01/13/2014    Seizure disorder, grand mal ([facility] 01/12/2014     Allergies: Allergies   Allergen Reactions    Fish-Derived Products      Food allergy    Iodine     Mushroom Extract Complex     Onion     Other      seafood       Subjective: Pt seen upright in bed, alert but largely nonverbal.  Pt able to follow some basic commands given max cues. RN OK'd SLP entry and therapy. Pain: no indicators of pain    Current Diet: Diet NPO  ADULT TUBE FEEDING; Orogastric; Diabetic; Continuous; 20; Yes; 10; Q 4 hours; 30; 200; Q 6 hours    Diet Tolerance:  Pt currently NPO    P.O. Trials: Thin       Nectar / Mildly Thick   x Tsp x3   Honey / Moderately Thick   x Tsp x1   Pudding / Extremely Thick       Puree   x Tsp x4   Solid         Dysphagia Treatment and Impressions:  Pt on RA, O2 sats 94% and RR 20-24/min throughout session. Pt unable to follow majority of commands despite cues; suspect cognition negatively impacting pt's oropharyngeal swallow function. Pt unable to form adequate labial seal on tsp despite max cues, bolus passively spills into anterior sulci. Pt attempts manipulation of bolus in ~50% of trials given max cues. Anterior loss from R in ~50% of trials. Consistent minimal residue noted along R lateral sulci, successfully cleared with use of oral suction. Difficult to fully assess hyolaryngeal excursion upon palpation of the anterior neck d/t pt's body habitus. Pt demonstrates significantly delayed swallow initiation (in ~50% of trials that are not required to be cleared from oral cavity by SLP) despite max cues. No instances of wet vocal quality, cough, throat clear (*of note, pt requires max cues to voice when cued with ~50% accuracy).    Pt is considered at an increased risk of aspiration with all PO intake. Recommend ongoing discussion with pt/pt's family regarding [address]. Continue NPO, meds/nutrition via alternative means. RN notified of recs. ST to continue to follow. Dysphagia Goals:  Timeframe for Long-term Goals: 7 days, 11/15/22  Goal 1: The pt will tolerate safest and least restrictive diet without clinical s/s of aspiration or change in respiratory status. 11/09: ongoing; continue NPO     Short-term Goals  Timeframe for Short-term Goals: 5 days, 11/13/22  1) The patient will tolerate recommended diet without observed clinical signs of aspiration. 11/09: ongoing, see above  2) The patient will tolerate repeat bedside swallowing evaluation when able. 11/09: ongoing, not progressing  3) The patient/caregiver will demonstrate understanding of compensatory strategies for improved swallowing safety. 11/09: ongoing, unable to demonstrate evidence of learning  4) The patient will tolerate instrumental swallowing procedure. 11/09: not appropriate at this time    Speech/Language/Cog Goals: n/a     Recommendations:  Solid Consistency: NPO  Liquid Consistency: NPO  Medication: via alternative means  Pt unable to follow majority of basic commands despite cues-- cognition appears to negatively impact overall swallow function. Recommend ongoing discussion with pt/pt's family regarding goals of care. Oral care q4 hrs to reduce adverse affects in the event of aspiration and general aspiration precautions    Patient/Family/Caregiver Education:  SLP re: role of ST, rationale for PO trials, aspiration precautions. Pt unable to demonstrate evidence of learning. RN notified of recs. Compensatory Strategies:   NPO, oral care q4 hours with simultaneous suction    Plan:    Continued Dysphagia treatment with goals per plan of care. Discharge Recommendations: TBD    If pt discharges from hospital prior to Speech/Swallowing discharge, this note serves as tx and discharge summary.      Total Treatment Time / Charges     Time in Time out Total Time / units   Cognitive Tx         Speech Tx      Dysphagia Tx 0915 0935 20 min / 1 unit     Signature:  Bruna Enrique M.S. Maribel  Speech-language pathologist  [id number]

## 2022-11-09 NOTE — FLOWSHEET NOTE
11/09/22 0800   Vitals   Temp 97.8 °F (36.6 °C)   Heart Rate (!) 114   Resp 21   BP (!) 145/119   MAP (Calculated) 127.67   Level of Consciousness 0   MEWS Score 4   Cardiac Rhythm Atrial fib   Oxygen Therapy   SpO2 94 %   O2 Device None (Room air)   ICP/Pressure   MAP (mmHg) 129   Vital signs stable. Remains intermittently hypertensive. Pt is alert and able to follow commands. Remains non-verbal per baseline dementia. Nothing new noted on head to toe assessment. Continues to maintain SPO2 saturations well on RA. Moraes remains secure in place and draining clear, yellow urine. Pt is afib on the monitor. HR remains elevated but controlled. Morning medication administration completed. PO medications held due to NPO status. Pt denies any further assistance at the moment. Will continue to monitor.

## 2022-11-09 NOTE — PROGRESS NOTES
Pharmacy Vancomycin Consult     Vancomycin Day: 7/10  Current Dosinmg q12h  Current indication: HAP    Temp max:      Recent Labs     22  0835 22  0500   BUN 9 5*   CREATININE <0.5* <0.5*   WBC 12.5* 10.5       Intake/Output Summary (Last 24 hours) at 2022 1157  Last data filed at 2022 0800  Gross per 24 hour   Intake 646.24 ml   Output 2750 ml   Net -2103.76 ml     Culture Date      Source                       Results                   Blood                         Staph coag negative     Ht Readings from Last 1 Encounters:   22 5' 3\" (1.6 m)        Wt Readings from Last 1 Encounters:   22 214 lb 6.4 oz (97.3 kg)       Body mass index is 37.98 kg/m². Estimated Creatinine Clearance: 111 mL/min (based on SCr of 0.5 mg/dL). Trough: 20.1    Assessment/Plan:  Will change dose to 750mg q12h, approaching 14% toxicity. Will continue for 10 days total per Dr Jose Luis Leary.    Tim KHANNA :59 AM  .

## 2022-11-09 NOTE — PROGRESS NOTES
Attempted to contact [facility] via TC to inquire about Pt's baseline mentation and diet. Attempt unsuccessful. VM left. Will attempt to make contact again at at later time.

## 2022-11-09 NOTE — PROGRESS NOTES
[facility] Progress Note           Subjective/   66y.o. year old female who we are seeing in consultation for FABIO, Hypernatremia. HPI:  Renal function, sodium trending better with IVF's, non-oliguric. Mentation reduced. ROS:  Extubated, +weak, no fevers. Objective/   GEN:  Chronically ill, /76   Pulse (!) 102   Temp 97.9 °F (36.6 °C) (Bladder)   Resp 16   Ht 5' 3\" (1.6 m)   Wt 214 lb 6.4 oz (97.3 kg)   SpO2 93%   BMI 37.98 kg/m²   HEENT: non-icteric, no JVD  CV: S1, S2, tachycardic, irregular, without m/r/g; no LE edema  RESP: CTA B without w/r/r; breathing wnl  ABD: +bs, soft, nt, no hsm  SKIN: warm, no rashes    Data/  Recent Labs     11/08/22  0835 11/09/22  0500   WBC 12.5* 10.5   HGB 10.6* 10.6*   HCT 33.9* 33.6*   MCV 88.8 88.1    209       Recent Labs     11/07/22  0500 11/08/22  0835 11/09/22  0500    147* 146*   K 3.6 3.4* 3.3*    114* 111*   CO2 26 21 21   GLUCOSE 118* 153* 133*   PHOS 2.7  --   --    MG  --  1.70* 1.60*   BUN 10 9 5*   CREATININE <0.5* <0.5* <0.5*   LABGLOM >60 >60 >60         Assessment/     Acute Kidney Injury:  KDIGO 1  Etiology:  Volume depletion   Clinical:  Resolved     Hypernatremia:  Significant free water deficit  No prior history of DI, had issues with low sodium in early October. Solute diuresis from FABIO recovery. Sepsis:  HCAP vs. UTI  On broad spectrum IV  antibiotics     Plan/     - Changed IVF's to 1/2NS with 20 meq KCL/liter at 75 ml/hour  - Trend labs, bp's, & urine output    ____________________________________  Tyree Khalil MD  [facility]  [email or web address]  Office: [phone]

## 2022-11-09 NOTE — PROGRESS NOTES
Shift assessment completed. VSS see flowchart. Pt non- interactive but does not seem in pain. PM medications administered. Call light wihin reach. Bed in the lowest position.

## 2022-11-09 NOTE — PROGRESS NOTES
Internal Medicine ICU Progress Note    Patient is intubated and cannot give any history. She has a history of diabetes, congestive heart failure, recent admission for sepsis secondary UTI, history of FABIO, potential bacteremia, pulmonary A. fib, history of elevated troponin who presented to the emergency room for acute kidney injury, sepsis and UTI from the nursing home. She was severely hyponatremic and had FABIO at the time of admission. White cell count 16,000. In the ED she was disoriented and hypotensive. She became unresponsive and was intubated. Extubated to [place]    Subjective:    Ms Radha Sanchez seen awake, trying to communicate but unable to speak out  remains on RA   Able to follow commands     Chronic LE contractures suggest she is bed bound at baseline. Invasive Lines: CVC catheter placed on 11/3/2022        MV: Intubated on 11/3/2022. Recent Labs     22  1124   PHART 7.470*   RZE2BDQ 36.5   PO2ART 76.6         MV Settings:  Vent Mode: AC/VC Resp Rate (Set): 22 bmp/Vt (Set, mL): 320 mL/ /FiO2 : 35 %    IV:   dilTIAZem (CARDIZEM) 125 mg in dextrose 5% 125 mL infusion 10 mg/hr (22 0150)    0.45 % NaCl with KCl 20 mEq 75 mL/hr at 22 0150    dextrose      sodium chloride         Vitals:  Temp  Av.8 °F (36.6 °C)  Min: 97.7 °F (36.5 °C)  Max: 97.9 °F (36.6 °C)  Pulse  Av.1  Min: 96  Max: 143  BP  Min: 109/92  Max: 153/75  SpO2  Av.4 %  Min: 91 %  Max: 100 %  Patient Vitals for the past 4 hrs:   BP Pulse Resp SpO2   22 0430 (!) 153/75 (!) 110 16 94 %   22 0400 (!) 143/72 96 14 93 %   22 0330 127/77 (!) 110 16 92 %   22 0300 (!) 135/90 (!) 117 21 93 %         CVP:        Intake/Output Summary (Last 24 hours) at 2022 3527  Last data filed at 2022 0444  Gross per 24 hour   Intake 1466.24 ml   Output 2500 ml   Net -1033.76 ml         EXAM:  General: elderly female awake but dysarthria noted   Eyes: PERRL. No sclera icterus.  No conjunctiva injected. ENT: No discharge. Off vent   Neck: Trachea midline. Normal thyroid. Resp: No accessory muscle use. No crackles. No wheezing. No rhonchi. No dullness on percussion. CV: . Irregularly irregular rate and rhythm. No mumur or rub. No edema. No JVD. Palpable pedal pulses. GI: Non-tender. Non-distended. No masses. No organmegaly. Normal bowel sounds. No hernia. Skin: Warm and dry. No nodule on exposed extremities. No rash on exposed extremities. Lymph: No cervical LAD. No supraclavicular LAD. M/S: resolving cyanosis of both feet   Neuro - awake, staring, tracks people. Mild deviation of angle of mouth to right side.  Following commands and squeezes right hand with command  Contractures of left UE and mamie LE noted      Medications:  Scheduled Meds:   vancomycin  1,000 mg IntraVENous Q12H    rivaroxaban  20 mg Oral Daily    famotidine (PEPCID) injection  20 mg IntraVENous BID    insulin glargine  25 Units SubCUTAneous QAM    sodium chloride flush  5-40 mL IntraVENous 2 times per day    vancomycin (VANCOCIN) intermittent dosing (placeholder)   Other RX Placeholder    insulin lispro  0-16 Units SubCUTAneous Q4H    divalproex  250 mg Oral 3 times per day    digoxin  125 mcg IntraVENous Daily       PRN Meds:  magnesium sulfate, potassium chloride, metoprolol, glucose, dextrose bolus **OR** dextrose bolus, glucagon (rDNA), dextrose, sodium chloride flush, sodium chloride, ondansetron **OR** ondansetron, polyethylene glycol, acetaminophen **OR** acetaminophen    Results:  CBC:   Recent Labs     11/08/22  0835 11/09/22  0500   WBC 12.5* 10.5   HGB 10.6* 10.6*   HCT 33.9* 33.6*   MCV 88.8 88.1    209       BMP:   Recent Labs     11/07/22  0500 11/08/22  0835 11/09/22  0500    147* 146*   K 3.6 3.4* 3.3*    114* 111*   CO2 26 21 21   PHOS 2.7  --   --    BUN 10 9 5*   CREATININE <0.5* <0.5* <0.5*       LIVER PROFILE:   No results for input(s): AST, ALT, LIPASE, BILIDIR, BILITOT, ALKPHOS in the last 72 hours. Invalid input(s): AMYLASE,  ALB     Latest Reference Range & Units 11/4/22 06:07   Procalcitonin 0.00 - 0.15 ng/mL 0.22 (H)   (H): Data is abnormally high     Latest Reference Range & Units 11/3/22 17:35 11/3/22 20:42 11/4/22 02:58   Lactic Acid, Sepsis 0.4 - 1.9 mmol/L 4.2 (HH) 4.1 (HH) 1.5       Cultures:  COVID-19 not detected  Influenza a and B not detected  Blood cultures coag neg staph      ECG  Atrial fibrillation with rapid ventricular responseST abnormality consider inferior and anterolateral ischemiaAbnormal ECGWhen compared with ECG of 12-OCT-2022 06:28,HR increasedConfirmed by MARISELA Romero MDHEN ([id number]) on 11/4/2022 7:39:12 AM      Films:    XR CHEST PORTABLE   Final Result   Patient is rotated to the right with cardiac/soft tissue summation across the   right base. However I still suspect increasing opacification/atelectasis in   the right middle and lower lobe. The linear opacities at the left base are stable. XR CHEST PORTABLE   Final Result   The patient is rotated. Mild cardiomegaly. Bibasilar infiltrates and   atelectasis noted. Trace right pleural effusion. COPD changes. ET tube is 3 cm superior to the cynthia. Feeding tube terminates below the   diaphragm. Right-sided central line tip is in the superior vena cava. There   is no pneumothorax. Significant osteopenic changes and degenerative changes noted in the bony   structures. RECOMMENDATION:   Bibasilar infiltrate/atelectasis. No significant interval change from recent   priors. XR CHEST PORTABLE   Final Result   Stable chest.  Bibasilar opacification, likely atelectasis. Satisfactory position of endotracheal tube. XR CHEST PORTABLE   Final Result   Unremarkable support devices. Mild bibasilar infiltrates or atelectasis.          XR CHEST PORTABLE    (Results Pending)            Assessment:     Principal Problem:    Sepsis secondary to UTI [facility])  Active Problems:    Persistent atrial fibrillation (HCC)    Urinary tract infection with hematuria    Chronic respiratory failure with hypoxia (HCC)    Dementia with behavioral disturbance    Hypertensive heart and kidney disease with chronic diastolic congestive heart failure and stage 2 chronic kidney disease (HCC)    Schizoaffective disorder, depressive type (HCC)    Septic shock (HCC)    Hypernatremia    ESBL (extended spectrum beta-lactamase) producing bacteria infection    DM (diabetes mellitus) [address] [address]    Essential hypertension    Chronic obstructive pulmonary disease ([facility] [address]    Acute respiratory failure ([facility] [address]  Resolved Problems:    * No resolved hospital problems. *         Plan:    #Sepsis with septic shock. Most likely source appears to be UTI.s/p aggressive IVF boluses  BP improved   Off levophed   Urine neg but blood with coag neg staph  On IV vanc     #Possible UTI. Continue broad-spectrum antibiotics for now. On vancomycin and cefepime. Blood cx with coag neg staph . With ongoing fever spikes, switched to IV vanc     #Acute respiratory failure. Became unresponsive and was intubated. Likely with sepsis and hypernatremia Continue vent support. Pulmonologist consulted  Weaned off and extubated today   Wean o2 as able     #Permanent atrial fibrillation. HR controlled   Hold Cardizem due to low blood pressure. Continue Xarelto. #Severe hypernatremia. Significant free water deficit. Possible solute diuresis from recent FABIO. IVF changed to d5 1/2NS  given and improved   Hypernatremia corrected. #FABIO. As above- resolved      #Diabetes mellitus type 2. Stable sugars  Sliding scale insulin. #Dementia with behavioral disturbance. #History of schizoaffective disorder. Off vent , mentation remains poor with dysarthria   Consider ct head , recent MRI with no acute issues     #COPD. Continue breathing treatments. As needed    #DVT prophylaxis. On Xarelto. should consider lovenox       She is a DNR CCA.           Cristy Her MD 6:52 AM 11/9/2022

## 2022-11-10 LAB
ANION GAP SERPL CALCULATED.3IONS-SCNC: 15 MMOL/L (ref 3–16)
BUN BLDV-MCNC: 3 MG/DL (ref 7–20)
CALCIUM SERPL-MCNC: 8.2 MG/DL (ref 8.3–10.6)
CHLORIDE BLD-SCNC: 107 MMOL/L (ref 99–110)
CO2: 21 MMOL/L (ref 21–32)
CREAT SERPL-MCNC: <0.5 MG/DL (ref 0.6–1.2)
GFR SERPL CREATININE-BSD FRML MDRD: >60 ML/MIN/{1.73_M2}
GLUCOSE BLD-MCNC: 103 MG/DL (ref 70–99)
GLUCOSE BLD-MCNC: 111 MG/DL (ref 70–99)
GLUCOSE BLD-MCNC: 118 MG/DL (ref 70–99)
GLUCOSE BLD-MCNC: 129 MG/DL (ref 70–99)
GLUCOSE BLD-MCNC: 129 MG/DL (ref 70–99)
GLUCOSE BLD-MCNC: 144 MG/DL (ref 70–99)
GLUCOSE BLD-MCNC: 144 MG/DL (ref 70–99)
GLUCOSE BLD-MCNC: 165 MG/DL (ref 70–99)
HCT VFR BLD CALC: 33 % (ref 36–48)
HEMOGLOBIN: 10.7 G/DL (ref 12–16)
MAGNESIUM: 1.9 MG/DL (ref 1.8–2.4)
MCH RBC QN AUTO: 28.8 PG (ref 26–34)
MCHC RBC AUTO-ENTMCNC: 32.3 G/DL (ref 31–36)
MCV RBC AUTO: 89 FL (ref 80–100)
PDW BLD-RTO: 16.8 % (ref 12.4–15.4)
PERFORMED ON: ABNORMAL
PLATELET # BLD: 231 K/UL (ref 135–450)
PMV BLD AUTO: 9.7 FL (ref 5–10.5)
POTASSIUM REFLEX MAGNESIUM: 3.5 MMOL/L (ref 3.5–5.1)
RBC # BLD: 3.71 M/UL (ref 4–5.2)
SODIUM BLD-SCNC: 143 MMOL/L (ref 136–145)
VANCOMYCIN TROUGH: 16.1 UG/ML (ref 10–20)
WBC # BLD: 9.5 K/UL (ref 4–11)

## 2022-11-10 PROCEDURE — 2700000000 HC OXYGEN THERAPY PER DAY

## 2022-11-10 PROCEDURE — 99233 SBSQ HOSP IP/OBS HIGH 50: CPT | Performed by: INTERNAL MEDICINE

## 2022-11-10 PROCEDURE — 2500000003 HC RX 250 WO HCPCS: Performed by: INTERNAL MEDICINE

## 2022-11-10 PROCEDURE — 2580000003 HC RX 258: Performed by: HOSPITALIST

## 2022-11-10 PROCEDURE — 80202 ASSAY OF VANCOMYCIN: CPT

## 2022-11-10 PROCEDURE — 6370000000 HC RX 637 (ALT 250 FOR IP): Performed by: INTERNAL MEDICINE

## 2022-11-10 PROCEDURE — 6360000002 HC RX W HCPCS: Performed by: INTERNAL MEDICINE

## 2022-11-10 PROCEDURE — 80048 BASIC METABOLIC PNL TOTAL CA: CPT

## 2022-11-10 PROCEDURE — 6360000002 HC RX W HCPCS: Performed by: HOSPITALIST

## 2022-11-10 PROCEDURE — 2580000003 HC RX 258: Performed by: INTERNAL MEDICINE

## 2022-11-10 PROCEDURE — 85027 COMPLETE CBC AUTOMATED: CPT

## 2022-11-10 PROCEDURE — 94761 N-INVAS EAR/PLS OXIMETRY MLT: CPT

## 2022-11-10 PROCEDURE — 2060000000 HC ICU INTERMEDIATE R&B

## 2022-11-10 PROCEDURE — A4216 STERILE WATER/SALINE, 10 ML: HCPCS | Performed by: INTERNAL MEDICINE

## 2022-11-10 PROCEDURE — 83735 ASSAY OF MAGNESIUM: CPT

## 2022-11-10 RX ORDER — DILTIAZEM HYDROCHLORIDE 60 MG/1
60 TABLET, FILM COATED ORAL EVERY 6 HOURS SCHEDULED
Status: DISCONTINUED | OUTPATIENT
Start: 2022-11-10 | End: 2022-11-12 | Stop reason: HOSPADM

## 2022-11-10 RX ORDER — DEXTROSE MONOHYDRATE, SODIUM CHLORIDE, AND POTASSIUM CHLORIDE 50; 1.49; 4.5 G/1000ML; G/1000ML; G/1000ML
INJECTION, SOLUTION INTRAVENOUS CONTINUOUS
Status: DISCONTINUED | OUTPATIENT
Start: 2022-11-10 | End: 2022-11-12

## 2022-11-10 RX ADMIN — DIGOXIN 125 MCG: 250 INJECTION, SOLUTION INTRAMUSCULAR; INTRAVENOUS at 09:05

## 2022-11-10 RX ADMIN — DILTIAZEM HYDROCHLORIDE 15 MG/HR: 5 INJECTION, SOLUTION INTRAVENOUS at 05:30

## 2022-11-10 RX ADMIN — VANCOMYCIN HYDROCHLORIDE 750 MG: 750 INJECTION, POWDER, LYOPHILIZED, FOR SOLUTION INTRAVENOUS at 13:01

## 2022-11-10 RX ADMIN — POTASSIUM CHLORIDE AND SODIUM CHLORIDE: 450; 150 INJECTION, SOLUTION INTRAVENOUS at 01:39

## 2022-11-10 RX ADMIN — POTASSIUM CHLORIDE, DEXTROSE MONOHYDRATE AND SODIUM CHLORIDE: 150; 5; 450 INJECTION, SOLUTION INTRAVENOUS at 15:08

## 2022-11-10 RX ADMIN — FAMOTIDINE 20 MG: 10 INJECTION INTRAVENOUS at 09:05

## 2022-11-10 RX ADMIN — Medication 10 ML: at 09:07

## 2022-11-10 RX ADMIN — DILTIAZEM HYDROCHLORIDE 60 MG: 60 TABLET, FILM COATED ORAL at 17:01

## 2022-11-10 RX ADMIN — Medication 10 ML: at 21:23

## 2022-11-10 RX ADMIN — VANCOMYCIN HYDROCHLORIDE 750 MG: 750 INJECTION, POWDER, LYOPHILIZED, FOR SOLUTION INTRAVENOUS at 00:49

## 2022-11-10 RX ADMIN — FAMOTIDINE 20 MG: 10 INJECTION INTRAVENOUS at 21:23

## 2022-11-10 RX ADMIN — DIVALPROEX SODIUM 250 MG: 125 CAPSULE, COATED PELLETS ORAL at 21:22

## 2022-11-10 RX ADMIN — DIVALPROEX SODIUM 250 MG: 125 CAPSULE, COATED PELLETS ORAL at 14:14

## 2022-11-10 RX ADMIN — RIVAROXABAN 20 MG: 20 TABLET, FILM COATED ORAL at 17:01

## 2022-11-10 RX ADMIN — DILTIAZEM HYDROCHLORIDE 60 MG: 60 TABLET, FILM COATED ORAL at 12:01

## 2022-11-10 ASSESSMENT — PAIN SCALES - GENERAL
PAINLEVEL_OUTOF10: 0
PAINLEVEL_OUTOF10: 0

## 2022-11-10 NOTE — PROGRESS NOTES
Interdisciplinary rounds completed. Dr Allison Waterman spoke with Pt's POA and brother Robert Grullon. After discussion, Robert Grullon hs decided to pursue comfort measures only. Orders received to make Pt [facility]. Consult palliative care. Start pleasure feeds. Pt also initiated on PO cardizem. Will wean cardizem gtt per MD as tolerated for goal of HR less than 110.

## 2022-11-10 NOTE — PROGRESS NOTES
Speech Language Pathology    SLP reviewed pt's chart, spoke with RN. RN reported pt may have MRI of the Brain completed this date and possible discussion re: PEG placement with pt's family (vs palliative). ST to hold f/u at this time, will continue to monitor.     Kristi Sharpe M.S. Sophie Villanueva  Speech-language pathologist  [phone]

## 2022-11-10 NOTE — PROGRESS NOTES
Pharmacy Vancomycin Consult     Vancomycin Day: 8/10  Current Dosinmg q12h  Current indication: HAP    Temp max:      Recent Labs     22  0500 11/10/22  0550   BUN 5* 3*   CREATININE <0.5* <0.5*   WBC 10.5 9.5       Intake/Output Summary (Last 24 hours) at 11/10/2022 1234  Last data filed at 11/10/2022 1150  Gross per 24 hour   Intake 3279.1 ml   Output 2675 ml   Net 604.1 ml     Culture Date      Source                       Results                     Blood                           NGTD    Ht Readings from Last 1 Encounters:   22 5' 3\" (1.6 m)        Wt Readings from Last 1 Encounters:   22 214 lb 6.4 oz (97.3 kg)       Body mass index is 37.98 kg/m². Estimated Creatinine Clearance: 111 mL/min (based on SCr of 0.5 mg/dL).     Trough: 16.1  (AUC) 417    Assessment/Plan:  Continue with 750mg q12h  Tim KHANNA 11/10/831599:36 PM  .

## 2022-11-10 NOTE — PROGRESS NOTES
Patient transferred to [facility] from ICU with all personal belongings. Patient with no s/s of distress at time of transfer.

## 2022-11-10 NOTE — CONSULTS
Palliative Care Initial Note  Palliative Care Admit date:11/10/2022      10:00 AM per MIROSLAVA Kelly pt's family is interested in more information r/t hospice options. Writer spoke with pt's brother,Saeed, and he states not available to talk r/t caring for his spouse with stage IV cancer, and will call writer back when able later today. PC following. 11:30 AM Writer spoke with pt's brother,Saeed, and he is requesting informational meeting with [address] [place] [address]. Referral to [facility] with [address] [place] [address]. Awaiting meeting time. Following.     Slava Funk RN Palliative Care

## 2022-11-10 NOTE — PROGRESS NOTES
Bedside report and Pt care transferred to [facility]. Pt denies any assistance at this time.

## 2022-11-10 NOTE — PROGRESS NOTES
Internal Medicine ICU Progress Note    Patient is intubated and cannot give any history. She has a history of diabetes, congestive heart failure, recent admission for sepsis secondary UTI, history of FABIO, potential bacteremia, pulmonary A. fib, history of elevated troponin who presented to the emergency room for acute kidney injury, sepsis and UTI from the nursing home. She was severely hyponatremic and had FABIO at the time of admission. White cell count 16,000. In the ED she was disoriented and hypotensive. She became unresponsive and was intubated. Extubated to [facility]  , now on RA    Subjective:    Ms Jacob Peralta seen awake, trying to communicate but unable to speak out  remains on RA        Chronic LE contractures suggest she is bed bound at baseline. Family made decision for comfort care /palliation      Invasive Lines: CVC catheter placed on 11/3/2022        MV: Intubated on 11/3/2022. No results for input(s): PHART, YUH7BSC, PO2ART in the last 72 hours.       MV Settings:  Vent Mode: AC/VC Resp Rate (Set): 22 bmp/Vt (Set, mL): 320 mL/ /FiO2 : 35 %    IV:   dilTIAZem (CARDIZEM) 125 mg in dextrose 5% 125 mL infusion 15 mg/hr (11/10/22 0530)    0.45 % NaCl with KCl 20 mEq 75 mL/hr at 11/10/22 0500    dextrose      sodium chloride         Vitals:  Temp  Av.6 °F (36.4 °C)  Min: 97.3 °F (36.3 °C)  Max: 97.8 °F (36.6 °C)  Pulse  Av.6  Min: 58  Max: 115  BP  Min: 104/48  Max: 158/85  SpO2  Av %  Min: 91 %  Max: 94 %  Patient Vitals for the past 4 hrs:   BP Temp Temp src Pulse Resp SpO2   11/10/22 0600 (!) 126/59 -- -- 85 20 91 %   11/10/22 0500 (!) 133/57 -- -- 76 18 93 %   11/10/22 0400 123/63 97.3 °F (36.3 °C) Bladder 76 17 93 %   11/10/22 0300 (!) 121/46 -- -- 77 17 94 %         CVP:        Intake/Output Summary (Last 24 hours) at 11/10/2022 0656  Last data filed at 11/10/2022 0500  Gross per 24 hour   Intake 3279.1 ml   Output 2575 ml   Net 704.1 ml         EXAM:  General: elderly female awake but dysarthria noted   Eyes: PERRL. No sclera icterus. No conjunctiva injected. ENT: No discharge. Off vent   Neck: Trachea midline. Normal thyroid. Resp: No accessory muscle use. No crackles. No wheezing. No rhonchi. No dullness on percussion. CV: . Irregularly irregular rate and rhythm. No mumur or rub. No edema. No JVD. Palpable pedal pulses. GI: Non-tender. Non-distended. No masses. No organmegaly. Normal bowel sounds. No hernia. Skin: Warm and dry. No nodule on exposed extremities. No rash on exposed extremities. Lymph: No cervical LAD. No supraclavicular LAD. M/S: resolving cyanosis of both feet   Neuro - awake, staring, tracks people. Mild deviation of angle of mouth to right side.  Following commands and squeezes right hand with command  Contractures of left UE and mamie LE noted      Medications:  Scheduled Meds:   vancomycin  750 mg IntraVENous Q12H    rivaroxaban  20 mg Oral Daily    famotidine (PEPCID) injection  20 mg IntraVENous BID    [Held by provider] insulin glargine  25 Units SubCUTAneous QAM    sodium chloride flush  5-40 mL IntraVENous 2 times per day    insulin lispro  0-16 Units SubCUTAneous Q4H    divalproex  250 mg Oral 3 times per day    digoxin  125 mcg IntraVENous Daily       PRN Meds:  magnesium sulfate, potassium chloride, metoprolol, glucose, dextrose bolus **OR** dextrose bolus, glucagon (rDNA), dextrose, sodium chloride flush, sodium chloride, ondansetron **OR** ondansetron, polyethylene glycol, acetaminophen **OR** acetaminophen    Results:  CBC:   Recent Labs     11/08/22  0835 11/09/22  0500 11/10/22  0550   WBC 12.5* 10.5 9.5   HGB 10.6* 10.6* 10.7*   HCT 33.9* 33.6* 33.0*   MCV 88.8 88.1 89.0    209 231       BMP:   Recent Labs     11/08/22  0835 11/09/22  0500 11/10/22  0550   * 146* 143   K 3.4* 3.3* 3.5   * 111* 107   CO2 21 21 21   BUN 9 5* 3*   CREATININE <0.5* <0.5* <0.5*       LIVER PROFILE:   No results for input(s): AST, ALT, LIPASE, BILIDIR, BILITOT, ALKPHOS in the last 72 hours. Invalid input(s): AMYLASE,  ALB     Latest Reference Range & Units 11/4/22 06:07   Procalcitonin 0.00 - 0.15 ng/mL 0.22 (H)   (H): Data is abnormally high     Latest Reference Range & Units 11/3/22 17:35 11/3/22 20:42 11/4/22 02:58   Lactic Acid, Sepsis 0.4 - 1.9 mmol/L 4.2 (HH) 4.1 (HH) 1.5       Cultures:  COVID-19 not detected  Influenza a and B not detected  Blood cultures coag neg staph      ECG  Atrial fibrillation with rapid ventricular responseST abnormality consider inferior and anterolateral ischemiaAbnormal ECGWhen compared with ECG of 12-OCT-2022 06:28,HR increasedConfirmed by DEXTER Galvin MD ([id number]) on 11/4/2022 7:39:12 AM      Films:    XR CHEST PORTABLE   Final Result   Patient is rotated to the right with cardiac/soft tissue summation across the   right base. However I still suspect increasing opacification/atelectasis in   the right middle and lower lobe. The linear opacities at the left base are stable. XR CHEST PORTABLE   Final Result   The patient is rotated. Mild cardiomegaly. Bibasilar infiltrates and   atelectasis noted. Trace right pleural effusion. COPD changes. ET tube is 3 cm superior to the cynthia. Feeding tube terminates below the   diaphragm. Right-sided central line tip is in the superior vena cava. There   is no pneumothorax. Significant osteopenic changes and degenerative changes noted in the bony   structures. RECOMMENDATION:   Bibasilar infiltrate/atelectasis. No significant interval change from recent   priors. XR CHEST PORTABLE   Final Result   Stable chest.  Bibasilar opacification, likely atelectasis. Satisfactory position of endotracheal tube. XR CHEST PORTABLE   Final Result   Unremarkable support devices. Mild bibasilar infiltrates or atelectasis.                   Assessment:     Principal Problem:    Sepsis secondary to UTI [facility])  Active Problems: Persistent atrial fibrillation (HCC)    Urinary tract infection with hematuria    Chronic respiratory failure with hypoxia (HCC)    Dementia with behavioral disturbance    Hypertensive heart and kidney disease with chronic diastolic congestive heart failure and stage 2 chronic kidney disease (HCC)    Schizoaffective disorder, depressive type (HCC)    Septic shock (HCC)    Hypernatremia    ESBL (extended spectrum beta-lactamase) producing bacteria infection    DM (diabetes mellitus) ([facility] [address]    Essential hypertension    Chronic obstructive pulmonary disease ([facility] [address]    Acute respiratory failure ([facility] [address]  Resolved Problems:    * No resolved hospital problems. *         Plan:    #Sepsis with septic shock. Most likely source appears to be UTI.s/p aggressive IVF boluses  BP improved   Off levophed   Urine neg but blood with coag neg staph  On IV vanc 8/10    #Possible UTI. Continue broad-spectrum antibiotics for now. On vancomycin and cefepime. Blood cx with coag neg staph . With ongoing fever spikes, switched to IV vanc   Fevers resolved     #Acute respiratory failure. Became unresponsive and was intubated. Likely with sepsis and hypernatremia Continue vent support. Pulmonologist consulted  Weaned off and extubated   Weaned to RA       #Dementia with behavioral disturbance. #History of schizoaffective disorder. Off vent , mentation remains poor with dysarthria   Consider ct head , recent MRI with no acute issues   Family decided for hospice care     #Permanent atrial fibrillation. HR controlled   Hold Cardizem due to low blood pressure. Continue Xarelto if able . #Severe hypernatremia. Significant free water deficit. Possible solute diuresis from recent FABIO. IVF changed to d5 1/2NS  given and improved   Hypernatremia corrected. #FABIO. As above- resolved      #Diabetes mellitus type 2. Stable sugars  Sliding scale insulin. #COPD. Continue breathing treatments. As needed    #DVT prophylaxis. On Xarelto. should consider lovenox       She is a DNR CCA.   Await hospice consult          Catalina Gomez MD 6:56 AM 11/10/2022

## 2022-11-10 NOTE — PROGRESS NOTES
[facility] Progress Note           Subjective/   66y.o. year old female who we are seeing in consultation for FABIO, Hypernatremia. HPI:  Renal function, sodium trending better with IVF's, non-oliguric. Mentation reduced. ROS:  Extubated, +weak, no fevers. Objective/   GEN:  Chronically ill, /63   Pulse 80   Temp 97.7 °F (36.5 °C) (Bladder)   Resp 20   Ht 5' 3\" (1.6 m)   Wt 214 lb 6.4 oz (97.3 kg)   SpO2 94%   BMI 37.98 kg/m²   HEENT: non-icteric, no JVD  CV: S1, S2, irregular, without m/r/g; no LE edema  RESP: CTA B without w/r/r; breathing wnl  ABD: +bs, soft, nt, no hsm  SKIN: warm, no rashes    Data/  Recent Labs     11/08/22  0835 11/09/22  0500 11/10/22  0550   WBC 12.5* 10.5 9.5   HGB 10.6* 10.6* 10.7*   HCT 33.9* 33.6* 33.0*   MCV 88.8 88.1 89.0    209 231       Recent Labs     11/08/22  0835 11/09/22  0500 11/10/22  0550   * 146* 143   K 3.4* 3.3* 3.5   * 111* 107   CO2 21 21 21   GLUCOSE 153* 133* 129*   MG 1.70* 1.60* 1.90   BUN 9 5* 3*   CREATININE <0.5* <0.5* <0.5*   LABGLOM >60 >60 >60         Assessment/     Acute Kidney Injury:  KDIGO 1  Etiology:  Volume depletion   Clinical:  Resolved     Hypernatremia:  Significant free water deficit  No prior history of DI, had issues with low sodium in early October. Solute diuresis from FABIO recovery. Sepsis:  HCAP vs. UTI  On broad spectrum IV  antibiotics     Plan/     - Changed IVF's to 1/2NS with 20 meq KCL/liter at 75 ml/hour  - Trend labs, bp's, & urine output    Noted plans for palliative care measures in process    ____________________________________  Jt Martin MD  [facility]  [email or web address]  Office: [phone]

## 2022-11-10 NOTE — PROGRESS NOTES
4 Eyes Skin Assessment     The patient is being assess for   Shift Handoff    I agree that 2 RN's have performed a thorough Head to Toe Skin Assessment on the patient. ALL assessment sites listed below have been assessed. Areas assessed for pressure by both nurses:   [x]   Head, Face, and Ears   [x]   Shoulders, Back, and Chest, Abdomen  [x]   Arms, Elbows, and Hands   [x]   Coccyx, Sacrum, and Ischium  [x]   Legs, Feet, and Heels  Mepilex to heels and sacrum. Pressure unjury to heels. Toes are pink with good cap refill         Skin Assessed Under all Medical Devices by both nurses:  securement devices              All Mepilex Borders were peeled back and area peeked at by both nurses:  Yes  Please list where Mepilex Borders are located:  heels and sacrum             **SHARE this note so that the co-signing nurse is able to place an eSignature**    Co-signer eSignature: Electronically signed by Marc Chavez RN on 11/10/22 at 6:17 AM EST    Does the Patient have Skin Breakdown related to pressure?   Yes [facility] WOUND CARE was Initiated documentation include the Kassandra-wound, Wound Assessment, Measurements, Dressing Treatment, Drainage, and Color\",     (Insert Photo here)         Jarod Prevention initiated:  Yes   Wound Care Orders initiated:  Yes      [id number]  nurse consulted for Pressure Injury (Stage 3,4, Unstageable, DTI, NWPT, Complex wounds)and New or Established Ostomies:  Yes      Primary Nurse eSignature: Electronically signed by Min Fajardo RN on 11/10/22 at 6:02 AM EST

## 2022-11-10 NOTE — PROGRESS NOTES
Pt opens eyes to any stimulus but appears frightenend. Mouth care done severa times but pt never attempts to close mouth around swab and just lets tohe liquid drool out. Pt has no purposeful movement.

## 2022-11-10 NOTE — PROGRESS NOTES
Bedside report and Pt care transferred to [facility]. Pt denies any assistance at this time.

## 2022-11-10 NOTE — PROGRESS NOTES
Pulmonary & Critical Care Medicine ICU Progress Note    CC: Acute respiratory failure    Events of Last 24 hours:   Extubated  I spoke with brother yesterday & again this am during rounds   Speech recommending n.p.o. Invasive Lines: Right subclavian CVC 11/3/2022      MV: 11/3/2022-2022  Vent Mode: AC/VC Resp Rate (Set): 22 bmp/Vt (Set, mL): 320 mL/ /FiO2 : 35 %  No results for input(s): PHART, BYR2DDM, PO2ART in the last 72 hours. IV:   dilTIAZem (CARDIZEM) 125 mg in dextrose 5% 125 mL infusion 15 mg/hr (11/10/22 0530)    0.45 % NaCl with KCl 20 mEq 75 mL/hr at 11/10/22 0500    dextrose      sodium chloride         Vitals:  Blood pressure (!) 130/57, pulse 73, temperature 97.7 °F (36.5 °C), temperature source Bladder, resp. rate 19, height 5' 3\" (1.6 m), weight 214 lb 6.4 oz (97.3 kg), SpO2 93 %. on room air  Temp  Av.6 °F (36.4 °C)  Min: 97.3 °F (36.3 °C)  Max: 97.8 °F (36.6 °C)    Intake/Output Summary (Last 24 hours) at 11/10/2022 0817  Last data filed at 11/10/2022 0500  Gross per 24 hour   Intake 3279.1 ml   Output 2325 ml   Net 954.1 ml     EXAM:  General: ill appearing. Eyes: PERRL. No sclera icterus. No conjunctival injection. ENT: No discharge. Pharynx clear. Neck: Trachea midline. Normal thyroid. Resp: No accessory muscle use. No crackles. No wheezing. No rhonchi. No dullness on percussion. CV: Regular rate. Regular rhythm. No mumur or rub. No edema. Peripheral pulses are 2+. Capillary refill is less than 3 seconds. GI: Non-tender. Non-distended. No masses. No organomegaly. Normal bowel sounds. No hernia. Skin: Warm and dry. No nodule on exposed extremities. No rash on exposed extremities. Lymph: No cervical LAD. No supraclavicular LAD. M/S: No cyanosis. No joint deformity. No clubbing. Neuro: Awake, not FC. Patellar reflexes are symmetric.   Psych: Unable to obtain because the patient is non-communicative     Scheduled Meds:   vancomycin  750 mg IntraVENous Q12H rivaroxaban  20 mg Oral Daily    famotidine (PEPCID) injection  20 mg IntraVENous BID    [Held by provider] insulin glargine  25 Units SubCUTAneous QAM    sodium chloride flush  5-40 mL IntraVENous 2 times per day    insulin lispro  0-16 Units SubCUTAneous Q4H    divalproex  250 mg Oral 3 times per day    digoxin  125 mcg IntraVENous Daily     PRN Meds:  magnesium sulfate, potassium chloride, metoprolol, glucose, dextrose bolus **OR** dextrose bolus, glucagon (rDNA), dextrose, sodium chloride flush, sodium chloride, ondansetron **OR** ondansetron, polyethylene glycol, acetaminophen **OR** acetaminophen    Results:  CBC:   Recent Labs     11/08/22  0835 11/09/22  0500 11/10/22  0550   WBC 12.5* 10.5 9.5   HGB 10.6* 10.6* 10.7*   HCT 33.9* 33.6* 33.0*   MCV 88.8 88.1 89.0    209 231     BMP:   Recent Labs     11/08/22  0835 11/09/22  0500 11/10/22  0550   * 146* 143   K 3.4* 3.3* 3.5   * 111* 107   CO2 21 21 21   BUN 9 5* 3*   CREATININE <0.5* <0.5* <0.5*     LIVER PROFILE:   No results for input(s): AST, ALT, LIPASE, BILIDIR, BILITOT, ALKPHOS in the last 72 hours. Invalid input(s): AMYLASE,  ALB      Cultures:  11/3/2022 blood CNS  11/3/2022 blood coag negative staph  11/3/2022 urine NG  11/3/2022 SARS-CoV-2 and influenza are negative  11/4/2022 tracheal aspirate NRF  11/7/2022 blood NGTD    CXR 11/8/2022 increasing right-sided opacities    ASSESSMENT:  Acute hypoxemic respiratory failure   Septic shock   Permanent A. fib on Xarelto   FABIO  COPD  Dementia with behavioral disturbance  H/O schizoaffective disorder    PLAN:  Supplemental oxygen to maintain SaO2 >92%; wean as tolerated    Inhaled bronchodilators  Vanc D#8/10, f/u cultures  Nutrition: Currently n.p.o.   Nephrology is following   Diltiazem infusion while NPO    Prophylaxis: Home Xarelto, Bactroban, Pepcid  Limited code - I reachedout to her brother re: PEG v palliation -- decision for no PEG, DNR-CC; primary focus will be comfort but will continue routine medical care as well, no extraordinary measures.   PCU

## 2022-11-10 NOTE — CARE COORDINATION
INTERDISCIPLINARY PLAN OF CARE CONFERENCE    Date/Time: 11/10/2022 9:40 AM  Completed by: FEMI Merino   Case Management      Patient Name:  Jessie Rodriguez  YOB: 1950  Admitting Diagnosis: Hypernatremia [E87.0]  Hyperglycemia [R73.9]  ESBL (extended spectrum beta-lactamase) producing bacteria infection [A49.9, Z16.12]  Atrial fibrillation with rapid ventricular response ([address] [I48.91]  Septic shock ([address] [A41.9, R65.21]  Sepsis ([address] [A41.9]  Yeast dermatitis [B37.2]  Acute respiratory failure, unspecified whether with hypoxia or hypercapnia ([address] [J96.00]  Urinary tract infection with hematuria, site unspecified [N39.0, R31.9]     Admit Date/Time:  11/3/2022  5:21 PM    Chart reviewed. Interdisciplinary team contacted or reviewed plan related to patient progress and discharge plans. Disciplines included Case Management, Nursing, and Dietitian. Current Status:ongoing   PT/OT recommendation for discharge plan of care: n/a    Expected D/C Disposition:  [facility])  Confirmed plan with patient and/or family Yes confirmed with: (name) pt's brother Lorn Shone via phone call     Discharge Plan Comments: Chart review completed. Received notification from [facility] that pt's brother has decided to make pt comfortable and not do the peg. Called and spoke with Pt's brother Lorn Shone who stated they have declined a feeding tube. Lorn Shone inquiring if another facility would have more staffing as he was inquiring about pt being feed. Explained often times if pt's family decline a feeding tube, pt returns to their long term care facility under hospice or palliative care. Lorn Shone stated he would like more information on hospice as this can be done at [facility] and aware there are inpatient units as well if pt qualifies. Lorn Shone aware palliative Care RN will call him to discuss further.  Lorn Shone also requesting to speak with RN to understand the feeding plan while at the hospital as pt has been NPO. Palliative Care RN made aware.  Melba Adams RN aware    Home O2 in place on admit: per facility

## 2022-11-10 NOTE — PROGRESS NOTES
Hospice [facility]    Call placed to brother Kaufman Hard to discuss Hospice services. I have assured Him we will remain available and I will check in with him tomorrow.       Will follow  Any questions please call [phone]    Thank you for the referral  Shefali CARDONA, [facility]  [phone]

## 2022-11-10 NOTE — FLOWSHEET NOTE
11/10/22 0700   Vitals   Temp 97.7 °F (36.5 °C)   Temp Source Bladder   Heart Rate 73   Heart Rate Source Monitor   Resp 19   BP (!) 130/57   MAP (Calculated) 81   MAP (mmHg) 76   BP Method Automatic   Oxygen Therapy   SpO2 93 %   O2 Device None (Room air)     Vital signs stable. Remains intermittently hypertensive. Pt is alert nut unable to follow commands. Remains non-verbal per baseline dementia. Nothing new noted on head to toe assessment. Continues to maintain SPO2 saturations well on RA. Moraes remains secure in place and draining clear, yellow urine. Pt is afib on the monitor. Morning medication administration completed. Cardizem gtt continues at 15 ml/hr. IVF continue @ 75 ml/hr. PO medications held due to NPO status. Pt denies any further assistance at the moment. Will continue to monitor.

## 2022-11-11 LAB
BLOOD CULTURE, ROUTINE: NORMAL
CULTURE, BLOOD 2: NORMAL
GLUCOSE BLD-MCNC: 155 MG/DL (ref 70–99)
GLUCOSE BLD-MCNC: 165 MG/DL (ref 70–99)
GLUCOSE BLD-MCNC: 166 MG/DL (ref 70–99)
GLUCOSE BLD-MCNC: 171 MG/DL (ref 70–99)
GLUCOSE BLD-MCNC: 186 MG/DL (ref 70–99)
GLUCOSE BLD-MCNC: 219 MG/DL (ref 70–99)
GLUCOSE BLD-MCNC: 279 MG/DL (ref 70–99)
PERFORMED ON: ABNORMAL

## 2022-11-11 PROCEDURE — A4216 STERILE WATER/SALINE, 10 ML: HCPCS | Performed by: INTERNAL MEDICINE

## 2022-11-11 PROCEDURE — 6370000000 HC RX 637 (ALT 250 FOR IP): Performed by: INTERNAL MEDICINE

## 2022-11-11 PROCEDURE — 99232 SBSQ HOSP IP/OBS MODERATE 35: CPT | Performed by: INTERNAL MEDICINE

## 2022-11-11 PROCEDURE — 2580000003 HC RX 258: Performed by: INTERNAL MEDICINE

## 2022-11-11 PROCEDURE — 2500000003 HC RX 250 WO HCPCS: Performed by: INTERNAL MEDICINE

## 2022-11-11 PROCEDURE — 99233 SBSQ HOSP IP/OBS HIGH 50: CPT | Performed by: INTERNAL MEDICINE

## 2022-11-11 PROCEDURE — 92526 ORAL FUNCTION THERAPY: CPT

## 2022-11-11 PROCEDURE — 2580000003 HC RX 258: Performed by: HOSPITALIST

## 2022-11-11 PROCEDURE — 2060000000 HC ICU INTERMEDIATE R&B

## 2022-11-11 PROCEDURE — 6370000000 HC RX 637 (ALT 250 FOR IP): Performed by: HOSPITALIST

## 2022-11-11 PROCEDURE — 6360000002 HC RX W HCPCS: Performed by: HOSPITALIST

## 2022-11-11 PROCEDURE — 6360000002 HC RX W HCPCS: Performed by: INTERNAL MEDICINE

## 2022-11-11 RX ADMIN — FAMOTIDINE 20 MG: 10 INJECTION INTRAVENOUS at 09:01

## 2022-11-11 RX ADMIN — POTASSIUM CHLORIDE, DEXTROSE MONOHYDRATE AND SODIUM CHLORIDE: 150; 5; 450 INJECTION, SOLUTION INTRAVENOUS at 03:47

## 2022-11-11 RX ADMIN — VANCOMYCIN HYDROCHLORIDE 750 MG: 750 INJECTION, POWDER, LYOPHILIZED, FOR SOLUTION INTRAVENOUS at 01:27

## 2022-11-11 RX ADMIN — DIGOXIN 125 MCG: 250 INJECTION, SOLUTION INTRAMUSCULAR; INTRAVENOUS at 09:02

## 2022-11-11 RX ADMIN — ACETAMINOPHEN 650 MG: 325 TABLET ORAL at 18:09

## 2022-11-11 RX ADMIN — INSULIN LISPRO 8 UNITS: 100 INJECTION, SOLUTION INTRAVENOUS; SUBCUTANEOUS at 21:36

## 2022-11-11 RX ADMIN — DILTIAZEM HYDROCHLORIDE 60 MG: 60 TABLET, FILM COATED ORAL at 01:27

## 2022-11-11 RX ADMIN — ACETAMINOPHEN 650 MG: 325 TABLET ORAL at 09:03

## 2022-11-11 RX ADMIN — DIVALPROEX SODIUM 250 MG: 125 CAPSULE, COATED PELLETS ORAL at 21:43

## 2022-11-11 RX ADMIN — DIVALPROEX SODIUM 250 MG: 125 CAPSULE, COATED PELLETS ORAL at 06:22

## 2022-11-11 RX ADMIN — DILTIAZEM HYDROCHLORIDE 60 MG: 60 TABLET, FILM COATED ORAL at 13:17

## 2022-11-11 RX ADMIN — DILTIAZEM HYDROCHLORIDE 60 MG: 60 TABLET, FILM COATED ORAL at 06:22

## 2022-11-11 RX ADMIN — DIVALPROEX SODIUM 250 MG: 125 CAPSULE, COATED PELLETS ORAL at 15:30

## 2022-11-11 RX ADMIN — RIVAROXABAN 20 MG: 20 TABLET, FILM COATED ORAL at 17:53

## 2022-11-11 RX ADMIN — FAMOTIDINE 20 MG: 10 INJECTION INTRAVENOUS at 21:44

## 2022-11-11 RX ADMIN — VANCOMYCIN HYDROCHLORIDE 750 MG: 750 INJECTION, POWDER, LYOPHILIZED, FOR SOLUTION INTRAVENOUS at 13:09

## 2022-11-11 RX ADMIN — POTASSIUM CHLORIDE, DEXTROSE MONOHYDRATE AND SODIUM CHLORIDE: 150; 5; 450 INJECTION, SOLUTION INTRAVENOUS at 21:00

## 2022-11-11 RX ADMIN — DILTIAZEM HYDROCHLORIDE 60 MG: 60 TABLET, FILM COATED ORAL at 17:53

## 2022-11-11 ASSESSMENT — PAIN SCALES - GENERAL
PAINLEVEL_OUTOF10: 0
PAINLEVEL_OUTOF10: 0
PAINLEVEL_OUTOF10: 10
PAINLEVEL_OUTOF10: 0
PAINLEVEL_OUTOF10: 3

## 2022-11-11 NOTE — PROGRESS NOTES
[facility] Progress Note           Subjective/   66y.o. year old female who we are seeing in consultation for FABIO, Hypernatremia. HPI:  Renal function, sodium trending better with IVF's, non-oliguric. Mentation reduced. ROS:  Intake reduced, +weak. Objective/   GEN:  Chronically ill, /76   Pulse 92   Temp 97.1 °F (36.2 °C) (Oral)   Resp 22   Ht 5' 3\" (1.6 m)   Wt 196 lb 9.6 oz (89.2 kg)   SpO2 91%   BMI 34.83 kg/m²   HEENT: non-icteric, no JVD  CV: S1, S2, irregular, without m/r/g; no LE edema  RESP: CTA B without w/r/r; breathing wnl  ABD: +bs, soft, nt, no hsm  SKIN: warm, no rashes    Data/  Recent Labs     11/09/22  0500 11/10/22  0550   WBC 10.5 9.5   HGB 10.6* 10.7*   HCT 33.6* 33.0*   MCV 88.1 89.0    231       Recent Labs     11/09/22  0500 11/10/22  0550   * 143   K 3.3* 3.5   * 107   CO2 21 21   GLUCOSE 133* 129*   MG 1.60* 1.90   BUN 5* 3*   CREATININE <0.5* <0.5*   LABGLOM >60 >60         Assessment/     Acute Kidney Injury:  KDIGO 1  Etiology:  Volume depletion   Clinical:  Resolved     Hypernatremia:  Significant free water deficit  No prior history of DI, had issues with low sodium in early October. Solute diuresis from FABIO recovery. Sepsis:  HCAP vs. UTI  On broad spectrum IV  antibiotics     Plan/     - Trial off IVF's  - Trend labs, bp's, & urine output    Noted plans for palliative care measures in process    ____________________________________  Jt Martin MD  [facility]  [email or web address]  Office: [phone]

## 2022-11-11 NOTE — FLOWSHEET NOTE
11/11/22 0830   Vital Signs   Temp 97.2 °F (36.2 °C)   Temp Source Oral   Heart Rate 93   Heart Rate Source Monitor   Resp 18   BP (!) 154/86   MAP (Calculated) 109   BP Location Right lower arm   BP Method Automatic   Patient Position Semi fowlers   Level of Consciousness 0   MEWS Score 1   Pain Assessment   Pain Assessment Adult Nonverbal Pain Scale (NPVS)   Oxygen Therapy   SpO2 91 %   O2 Device None (Room air)       Shift assessment complete. See flow sheet. Scheduled meds given. See MAR. Patients head-toe complete, VS are logged, and active bowel sound noted in all four quadrants. Patient yelling \"help me\", when asked if she was in pain she said clearly, yes. PRN tylenol given. All PO medications crushed and given in applesauce. Patient repositioned, stat lock replaced on loya, and loya wipes completed. No further needs  noted at this time. Call light and bedside table are within reach. The bed is locked and is in the lowest position. Leonardo Huertas RN

## 2022-11-11 NOTE — PROGRESS NOTES
Vancomycin Day: 9/10  Current Regimen: 750 mg IV every 12 hours    Patient's labs, cultures, vitals, and vancomycin regimen reviewed.    SCr stable  U/O not measured/well documented  [employer] updated    Plan:   No change today  Mitzi Chávez Pharm D 11/11/202212:14 PM  .

## 2022-11-11 NOTE — PROGRESS NOTES
Internal Medicine ICU Progress Note    Patient is intubated and cannot give any history. She has a history of diabetes, congestive heart failure, recent admission for sepsis secondary UTI, history of FABIO, potential bacteremia, pulmonary A. fib, history of elevated troponin who presented to the emergency room for acute kidney injury, sepsis and UTI from the nursing home. She was severely hyponatremic and had FABIO at the time of admission. White cell count 16,000. In the ED she was disoriented and hypotensive. She became unresponsive and was intubated. Extubated to [facility]  , now on RA    Subjective:    Ms Raine Clayton seen awake, trying to communicate but unable to speak out  remains on RA        Chronic LE contractures suggest she is bed bound at baseline. Family made decision for comfort care /palliation    She cannot give me any history. Invasive Lines: CVC catheter placed on 11/3/2022        MV: Intubated on 11/3/2022. No results for input(s): PHART, XGC6LGH, PO2ART in the last 72 hours. MV Settings:  Vent Mode: AC/VC Resp Rate (Set): 22 bmp/Vt (Set, mL): 320 mL/ /FiO2 : 35 %    IV:   dilTIAZem (CARDIZEM) 125 mg in dextrose 5% 125 mL infusion Stopped (11/10/22 0959)    dextrose 5% and 0.45% NaCl with KCl 20 mEq 75 mL/hr at 22 0347    dextrose      sodium chloride         Vitals:  Temp  Av.7 °F (36.5 °C)  Min: 97.2 °F (36.2 °C)  Max: 98.6 °F (37 °C)  Pulse  Av.8  Min: 67  Max: 115  BP  Min: 109/53  Max: 154/86  SpO2  Av %  Min: 91 %  Max: 93 %  Patient Vitals for the past 4 hrs:   BP Temp Temp src Pulse Resp SpO2   22 0902 -- -- -- 93 -- --   22 0830 (!) 154/86 97.2 °F (36.2 °C) Oral 93 18 91 %         CVP:        Intake/Output Summary (Last 24 hours) at 2022 1223  Last data filed at 2022 8652  Gross per 24 hour   Intake 2250.24 ml   Output 2150 ml   Net 100.24 ml         EXAM:  General: elderly female awake but dysarthria noted.  Chronically ill appearing. Eyes: PERRL. No sclera icterus. No conjunctiva injected. ENT: No discharge. Off vent   Neck: Trachea midline. Normal thyroid. Resp: No accessory muscle use. No crackles. No wheezing. No rhonchi. No dullness on percussion. CV: . Irregularly irregular rate and rhythm. No mumur or rub. No edema. No JVD. Palpable pedal pulses. GI: Non-tender. Non-distended. No masses. No organmegaly. Normal bowel sounds. No hernia. Skin: Warm and dry. No nodule on exposed extremities. No rash on exposed extremities. Lymph: No cervical LAD. No supraclavicular LAD. M/S: resolving cyanosis of both feet   Neuro - awake, staring, tracks people. Mild deviation of angle of mouth to right side. Following commands and squeezes right hand with command  Contractures of left UE and mamie LE noted      Medications:  Scheduled Meds:   dilTIAZem  60 mg Oral 4 times per day    vancomycin  750 mg IntraVENous Q12H    rivaroxaban  20 mg Oral Daily    famotidine (PEPCID) injection  20 mg IntraVENous BID    [Held by provider] insulin glargine  25 Units SubCUTAneous QAM    sodium chloride flush  5-40 mL IntraVENous 2 times per day    insulin lispro  0-16 Units SubCUTAneous Q4H    divalproex  250 mg Oral 3 times per day    digoxin  125 mcg IntraVENous Daily       PRN Meds:  magnesium sulfate, potassium chloride, metoprolol, glucose, dextrose bolus **OR** dextrose bolus, glucagon (rDNA), dextrose, sodium chloride flush, sodium chloride, ondansetron **OR** ondansetron, polyethylene glycol, acetaminophen **OR** acetaminophen    Results:  CBC:   Recent Labs     11/09/22  0500 11/10/22  0550   WBC 10.5 9.5   HGB 10.6* 10.7*   HCT 33.6* 33.0*   MCV 88.1 89.0    231       BMP:   Recent Labs     11/09/22  0500 11/10/22  0550   * 143   K 3.3* 3.5   * 107   CO2 21 21   BUN 5* 3*   CREATININE <0.5* <0.5*       LIVER PROFILE:   No results for input(s): AST, ALT, LIPASE, BILIDIR, BILITOT, ALKPHOS in the last 72 hours.     Invalid input(s): AMYLASE,  ALB     Latest Reference Range & Units 11/4/22 06:07   Procalcitonin 0.00 - 0.15 ng/mL 0.22 (H)   (H): Data is abnormally high     Latest Reference Range & Units 11/3/22 17:35 11/3/22 20:42 11/4/22 02:58   Lactic Acid, Sepsis 0.4 - 1.9 mmol/L 4.2 (HH) 4.1 (HH) 1.5       Cultures:  COVID-19 not detected  Influenza a and B not detected  Blood cultures coag neg staph      ECG  Atrial fibrillation with rapid ventricular responseST abnormality consider inferior and anterolateral ischemiaAbnormal ECGWhen compared with ECG of 12-OCT-2022 06:28,HR increasedConfirmed by DEXTER Kwok MD ([id number]) on 11/4/2022 7:39:12 AM      Films:    XR CHEST PORTABLE   Final Result   Patient is rotated to the right with cardiac/soft tissue summation across the   right base. However I still suspect increasing opacification/atelectasis in   the right middle and lower lobe. The linear opacities at the left base are stable. XR CHEST PORTABLE   Final Result   The patient is rotated. Mild cardiomegaly. Bibasilar infiltrates and   atelectasis noted. Trace right pleural effusion. COPD changes. ET tube is 3 cm superior to the cynthia. Feeding tube terminates below the   diaphragm. Right-sided central line tip is in the superior vena cava. There   is no pneumothorax. Significant osteopenic changes and degenerative changes noted in the bony   structures. RECOMMENDATION:   Bibasilar infiltrate/atelectasis. No significant interval change from recent   priors. XR CHEST PORTABLE   Final Result   Stable chest.  Bibasilar opacification, likely atelectasis. Satisfactory position of endotracheal tube. XR CHEST PORTABLE   Final Result   Unremarkable support devices. Mild bibasilar infiltrates or atelectasis.                   Assessment:     Principal Problem:    Sepsis secondary to UTI [facility])  Active Problems:    Persistent atrial fibrillation (HCC)    Urinary tract infection with hematuria    Chronic respiratory failure with hypoxia (HCC)    Dementia with behavioral disturbance    Hypertensive heart and kidney disease with chronic diastolic congestive heart failure and stage 2 chronic kidney disease (HCC)    Schizoaffective disorder, depressive type (HCC)    Septic shock (HCC)    Hypernatremia    ESBL (extended spectrum beta-lactamase) producing bacteria infection    DM (diabetes mellitus) ([facility]    Essential hypertension    Chronic obstructive pulmonary disease ([facility]    Acute respiratory failure ([facility]  Resolved Problems:    * No resolved hospital problems. *         Plan:    #Sepsis with septic shock. Most likely source appears to be UTI.s/p aggressive IVF boluses  BP improved   Off levophed   Urine neg but blood with coag neg staph  On IV vanc 8/10    #Possible UTI. Continue broad-spectrum antibiotics for now. On vancomycin and cefepime. Blood cx with coag neg staph . With ongoing fever spikes, switched to IV vanc   Fevers resolved     #Acute respiratory failure. Became unresponsive and was intubated. Likely with sepsis and hypernatremia Continue vent support. Pulmonologist consulted  Weaned off and extubated   Weaned to RA       #Dementia with behavioral disturbance. #History of schizoaffective disorder. Off vent , mentation remains poor with dysarthria   Consider ct head , recent MRI with no acute issues   Family decided for hospice care     #Permanent atrial fibrillation. HR controlled   Hold Cardizem due to low blood pressure. Continue Xarelto if able . #Severe hypernatremia. Significant free water deficit. Possible solute diuresis from recent FABIO. IVF changed to d5 1/2NS  given and improved   Hypernatremia corrected. #FABIO. As above- resolved      #Diabetes mellitus type 2. Stable sugars  Sliding scale insulin. #COPD. Continue breathing treatments. As needed    #DVT prophylaxis. On Xarelto. should consider lovenox       She is a DNR CCA.  Await hospice consult          Sonya Joyce MD 12:23 PM 11/11/2022

## 2022-11-11 NOTE — PROGRESS NOTES
PRN Tylenol given for patient yelling help me, when asked she states her left arm/shoulder area hurts.

## 2022-11-11 NOTE — PLAN OF CARE
Problem: Safety - Medical Restraint  Goal: Remains free of injury from restraints (Restraint for Interference with Medical Device)  Description: INTERVENTIONS:  1. Determine that other, less restrictive measures have been tried or would not be effective before applying the restraint  2. Evaluate the patient's condition at the time of restraint application  3. Inform patient/family regarding the reason for restraint  4.  Q2H: Monitor safety, psychosocial status, comfort, nutrition and hydration  Outcome: Progressing     Problem: Discharge Planning  Goal: Discharge to home or other facility with appropriate resources  Outcome: Progressing     Problem: Pain  Goal: Verbalizes/displays adequate comfort level or baseline comfort level  Outcome: Progressing     Problem: Neurosensory - Adult  Goal: Achieves stable or improved neurological status  Outcome: Progressing     Problem: Respiratory - Adult  Goal: Achieves optimal ventilation and oxygenation  Outcome: Progressing     Problem: Cardiovascular - Adult  Goal: Maintains optimal cardiac output and hemodynamic stability  Outcome: Progressing     Problem: Skin/Tissue Integrity - Adult  Goal: Skin integrity remains intact  Outcome: Progressing  Flowsheets (Taken 11/10/2022 9144)  Skin Integrity Remains Intact:   Monitor for areas of redness and/or skin breakdown   Assess vascular access sites hourly   Every 4-6 hours minimum: Change oxygen saturation probe site   Every 4-6 hours: If on nasal continuous positive airway pressure, respiratory therapy assesses nares and determine need for appliance change or resting period     Problem: Musculoskeletal - Adult  Goal: Return mobility to safest level of function  Outcome: Progressing     Problem: Gastrointestinal - Adult  Goal: Maintains or returns to baseline bowel function  Outcome: Progressing  Goal: Maintains adequate nutritional intake  Outcome: Progressing     Problem: Genitourinary - Adult  Goal: Absence of urinary retention  Outcome: Progressing     Problem: Infection - Adult  Goal: Absence of infection at discharge  Outcome: Progressing     Problem: Metabolic/Fluid and Electrolytes - Adult  Goal: Electrolytes maintained within normal limits  Outcome: Progressing     Problem: Skin/Tissue Integrity  Goal: Absence of new skin breakdown  Description: 1. Monitor for areas of redness and/or skin breakdown  2. Assess vascular access sites hourly  3. Every 4-6 hours minimum:  Change oxygen saturation probe site  4. Every 4-6 hours:  If on nasal continuous positive airway pressure, respiratory therapy assess nares and determine need for appliance change or resting period.   Outcome: Progressing     Problem: Nutrition Deficit:  Goal: Optimize nutritional status  Outcome: Progressing     Problem: Chronic Conditions and Co-morbidities  Goal: Patient's chronic conditions and co-morbidity symptoms are monitored and maintained or improved  Outcome: Progressing

## 2022-11-11 NOTE — PLAN OF CARE
Problem: Pain  Goal: Verbalizes/displays adequate comfort level or baseline comfort level  76/[id number]/[id number] by Richard Dinero RN  Outcome: Progressing  11/11/2022 0234 by Savi Crystal RN  Outcome: Progressing

## 2022-11-11 NOTE — CARE COORDINATION
INTERDISCIPLINARY PLAN OF CARE CONFERENCE    Date/Time: 11/11/2022 12:30 PM  Completed by: Leilani Barakat RN, Case Management      Patient Name:  Karina Navarrete  YOB: 1950  Admitting Diagnosis: Hypernatremia [E87.0]  Hyperglycemia [R73.9]  ESBL (extended spectrum beta-lactamase) producing bacteria infection [A49.9, Z16.12]  Atrial fibrillation with rapid ventricular response ([address] [I48.91]  Septic shock ([address] [A41.9, R65.21]  Sepsis ([address] [A41.9]  Yeast dermatitis [B37.2]  Acute respiratory failure, unspecified whether with hypoxia or hypercapnia ([address] [J96.00]  Urinary tract infection with hematuria, site unspecified [N39.0, R31.9]     Admit Date/Time:  11/3/2022  5:21 PM    Chart reviewed. Interdisciplinary team contacted or reviewed plan related to patient progress and discharge plans. Disciplines included Case Management, Nursing, and Dietitian. Current Status:Stable  PT/OT recommendation for discharge plan of care: N/A    Expected D/C Disposition:  Nursing Home  Confirmed plan with patient and/or family Yes confirmed with: (name) brother  Met with:patient  Discharge Plan Comments: Reviewed chart and met with pt who is moaning. Cont plan to return to [facility]. Spoke with Paz Ro at [facility] who states has talked with pt brother today and he is unable to decide at this time. Paz Ro to send info for brother to review. MD aware pt will return to [facility] without hospice. Will cont to follow. Spoke with Nimisha Arita at [facility] who states pt can return over weekend.     Home O2 in place on admit: per facility if needed  Pt informed of need to bring portable home O2 tank on day of discharge for nursing to connect prior to leaving:  Not Indicated  Verbalized agreement/Understanding:  Not Indicated

## 2022-11-11 NOTE — PROGRESS NOTES
Comprehensive Nutrition Assessment    Type and Reason for Visit:  Reassess    Nutrition Recommendations/Plan:   Continue the Pureed diet  Added Magic Cups and Ensure compact with each meal      Malnutrition Assessment:  Malnutrition Status: At risk for malnutrition (Comment) (11/11/22 1831)    Context:  Acute Illness     Findings of the 6 clinical characteristics of malnutrition:  Energy Intake:  Mild decrease in energy intake (Comment)  Weight Loss:  No significant weight loss     Body Fat Loss:  Unable to assess     Muscle Mass Loss:  Unable to assess    Fluid Accumulation:  Unable to assess     Strength:  Not Performed    Nutrition Assessment:    patient is remains nutritionally compromised AEB she was extubated and no longer receiving enteral  nutrition and provided a pureed diet per SLP with minimal intakes at this time at risk for further compromise d/t inadequate intakes x 3 days since extubation,  admission, altered nutrition-related labs, and multiple wounds noted (DTI on R heel and R toe); will stat magic cups and ensure compact TID   Nutrition Related Findings:    pt was extubated on 11/8/22 and currently provided a pureed diet Per SLP recs and accepting small amounts; family declined PEG placement; Hospice care is being discussed; pt was awake and calling out; h/o of cva Wound Type: Multiple, Deep Tissue Injury, Pressure Injury (DTI on R heel and R toe)       Current Nutrition Intake & Therapies:    Average Meal Intake: 26-50%  Average Supplements Intake: None Ordered  ADULT DIET; Dysphagia - Pureed    Anthropometric Measures:  Height: 5' 3\" (160 cm)  Ideal Body Weight (IBW): 115 lbs (52 kg)    Admission Body Weight: 197 lb 3.2 oz (89.4 kg) (obtained on 11/4/22; actual weight)  Current Body Weight: 196 lb (88.9 kg), 186.4 % IBW.  Weight Source: Bed Scale  Current BMI (kg/m2): 34.7  Usual Body Weight: 212 lb (96.2 kg) (obtained on 10/6/22; actual weight)  % Weight Change (Calculated): 1.1  Weight Adjustment For: No Adjustment                 BMI Categories: Obese Class 2 (BMI 35.0 -39.9)    Estimated Daily Nutrient Needs:  Energy Requirements Based On: Kcal/kg  Weight Used for Energy Requirements: Current  Energy (kcal/day): 1067 - 1358 kcals based on 11-14 kcals/kg/CBW  Weight Used for Protein Requirements: Ideal  Protein (g/day): 104 - 114 g protein based on 2.0-2.2 g/kg/IBW  Method Used for Fluid Requirements: 1 ml/kcal  Fluid (ml/day): 1067 - 1358 ml    Nutrition Diagnosis:   Inadequate oral intake related to inadequate protein-energy intake, impaired respiratory function as evidenced by NPO or clear liquid status due to medical condition, intubation    Nutrition Interventions:   Food and/or Nutrient Delivery: Continue Current Diet, Start Oral Nutrition Supplement  Nutrition Education/Counseling: No recommendation at this time  Coordination of Nutrition Care: Continue to monitor while inpatient  Plan of Care discussed with: ICU Team    Goals:  Previous Goal Met: Goal(s) Achieved  Goals: PO intake 50% or greater, by next RD assessment       Nutrition Monitoring and Evaluation:   Behavioral-Environmental Outcomes: None Identified  Food/Nutrient Intake Outcomes: Diet Advancement/Tolerance, Food and Nutrient Intake  Physical Signs/Symptoms Outcomes: Biochemical Data, Weight, Nutrition Focused Physical Findings    Discharge Planning:     Too soon to determine     She Godwin, [address], [place]  Contact: [phone]

## 2022-11-11 NOTE — PROGRESS NOTES
Speech Language Pathology  Facility/Department: [facility]  Dysphagia Daily Treatment Note      Recommendations:  Solid Consistency: Pureed (IDDSI 4), pleasure feeds  Liquid Consistency: Thin liquids (IDDSI 0), pleasure feeds   Medication: crushable meds crushed in puree  Pt successfully follows <50% of basic commands despite cues  Pt continues to be considered at an increased risk of aspiration with all PO intake d/t cognitive deficit & requiring total feed assistance. Pt now DNR-CC per chart.   Oral care q4 hrs to reduce adverse affects in the event of aspiration and general aspiration precautions      NAME: Elvis Paulson  : 1950  MRN: [id number]    Patient Diagnosis(es):   Patient Active Problem List    Diagnosis Date Noted    Persistent atrial fibrillation ([address] 2016    ESBL (extended spectrum beta-lactamase) producing bacteria infection 2022    Hypernatremia 2022    Septic shock ([address] 2022    Hyperkalemia 10/10/2022    Bacteremia 10/08/2022    Providencia bacteremia 10/07/2022    Permanent atrial fibrillation with rapid ventricular response ([address] 10/07/2022    Sepsis secondary to UTI ([address] 10/06/2022    Altered mental status 10/06/2022    FABIO (acute kidney injury) ([address] 10/06/2022    Urinary tract infection with hematuria 10/06/2022    Chronic bilateral low back pain with bilateral sciatica 10/21/2018    Chronic respiratory failure with hypoxia ([address] 10/21/2018    Dementia with behavioral disturbance 10/21/2018    LORENZO (generalized anxiety disorder) 10/21/2018    Hoarding disorder with poor insight 10/21/2018    Hypertensive heart and kidney disease with chronic diastolic congestive heart failure and stage 2 chronic kidney disease ([address] 10/21/2018    Schizoaffective disorder, depressive type ([address] 10/21/2018    Cervical herniated disc 2005    Chronic pain of both knees 2020    Bilateral primary osteoarthritis of knee 2020    Acute respiratory failure [facility] 11/06/2016    Essential hypertension     Chronic obstructive pulmonary disease (HCC)     Atrial fibrillation with rapid ventricular response ([facility]     Obesity 01/27/2016    Lumbar facet arthropathy 12/10/2014    Disc degeneration, lumbar 12/10/2014    DM (diabetes mellitus) ([facility] 01/13/2014    Seizure disorder, grand mal ([facility] 01/12/2014     Allergies: Allergies   Allergen Reactions    Fish-Derived Products      Food allergy    Iodine     Mushroom Extract Complex     Onion     Other      seafood       Subjective: Pt seen upright in bed, alert but largely nonverbal.  Pt leaning to R side in bed despite multiple attempts at repositioning. Pt able to follow some basic commands given max cues. RN OK'd SLP entry and therapy. Pain: no indicators of pain    Current Diet: ADULT TUBE FEEDING; Orogastric; Diabetic; Continuous; 20; Yes; 10; Q 4 hours; 30; 200; Q 6 hours  ADULT DIET; Dysphagia - Pureed    Diet Tolerance:  Pt tolerating current diet without clinical s/s of aspiration per chart, RN. P.O. Trials: Thin   x Straw sips   Nectar / Mildly Thick       Honey / Moderately Thick       Pudding / Extremely Thick       Puree   x Tsp x7   Solid         Dysphagia Treatment and Impressions:  Pt was transferred from ICU to [facility] yesterday (10/11) per chart and code status was changed to [facility]. Pleasure feeds of puree/thin liquids initiated at that time per MD.    SLP spoke with RN via phone who reported that pt is tolerating some puree and thin liquids via straw. Pt on RA, O2 sats 90-91% and RR 30/min (*although pt with apparent rapid breathing at times when SLP assessing RR, difficult to fully assess) throughout session. Pt successfully followed <50% of basic commands despite cues (I.e. open oral cavity, voiced when cued to say \"ah\" by SLP). Pt observed with thin liquids via straw and puree.   Pt able to successfully form labial seal on tsp/straw edge (*significant change from when last seen by this SLP on 11/9). Adequate A-P bolus transit and oral clearance of puree. Noted audible swallow initiation at times with TL via straw with SLP providing cues for small, single sips. No instances of wet vocal quality (*although pt with limited command following for voicing), coughing, throat clearing, or change in O2 sats/RR. Pt continues to be considered at an increased risk of aspiration d/t cognitive deficits & requiring total feed assistance. Continue current pleasure feeds of puree (IDDSI 4) and thin liquids (IDDSI 0), crushable meds crushed in puree. ST to continue to follow. Dysphagia Goals:  Timeframe for Long-term Goals: 7 days, 11/15/22  Goal 1: The pt will tolerate safest and least restrictive diet without clinical s/s of aspiration or change in respiratory status. 11/11: ongoing, progressing. Continue PO pleasure feeds. Short-term Goals  Timeframe for Short-term Goals: 5 days, 11/13/22  1) The patient will tolerate recommended diet without observed clinical signs of aspiration. 11/11: ongoing, see above   Goal met 11/11  3) The patient/caregiver will demonstrate understanding of compensatory strategies for improved swallowing safety. 11/11: ongoing, unable to demonstrate evidence of learning  4) The patient will tolerate instrumental swallowing procedure. 11/11: not clinically indicated at this time    Speech/Language/Cog Goals: n/a     Recommendations:  Solid Consistency: Pureed diet (IDDSI 4)  Liquid Consistency: Thin liquids (IDDSI 0)  Medication: crushable meds crushed in puree  Pt successfully follows <50% of basic commands despite cues  Pt continues to be considered at an increased risk of aspiration with all PO intake d/t cognitive deficit & requiring total feed assistance. Pt now DNR-CC per chart.   Oral care q4 hrs to reduce adverse affects in the event of aspiration and general aspiration precautions    Patient/Family/Caregiver Education:  SLP re: role of ST, rationale for PO trials, aspiration precautions. Pt unable to demonstrate evidence of learning. Compensatory Strategies:   HOB 90* and 30\" after meals; small bites/sips; alternate solids/liquids every 3-5 bites; oral care after every meal; total assist feed    Plan:    Continued Dysphagia treatment with goals per plan of care. Discharge Recommendations: TBD    If pt discharges from hospital prior to Speech/Swallowing discharge, this note serves as tx and discharge summary.      Total Treatment Time / Charges     Time in Time out Total Time / units   Cognitive Tx         Speech Tx      Dysphagia Tx 1345 1358 13 min / 1 unit     Signature:  Pablo Fernandez M.S. [id number] [facility]  Speech-language pathologist  [id number]

## 2022-11-11 NOTE — PROGRESS NOTES
[facility], Critical Care [facility] Sleep Special[facility]                                 Pulmonary Consult /Progress Note :                                                                  CHIEF COMPLAINT: respiratory failure        HPI: doing great  History limited  On RA   Transferred from ICU   Tolerted   A fib ,rate controlled  Base line dementia     Objective:   PHYSICAL EXAM:    Blood pressure (!) 154/86, pulse 93, temperature 97.2 °F (36.2 °C), temperature source Oral, resp. rate 18, height 5' 3\" (1.6 m), weight 196 lb 9.6 oz (89.2 kg), SpO2 91 %.' on RA  Gen: No distress. Eyes: PERRL. No sclera icterus. No conjunctival injection. ENT: No discharge. Pharynx clear. Neck: Trachea midline. No obvious mass. Resp: No accessory muscle use. No crackles. No wheezes. No rhonchi. No dullness on percussion. Good air entry. CV: Regular rate. Regular rhythm. No murmur or rub. No edema. GI: Non-tender. Non-distended. No hernia. Skin: Warm and dry. No nodule on exposed extremities. Lymph: No cervical LAD. No supraclavicular LAD. M/S: No cyanosis. No joint deformity. No clubbing.    Neuro: awake and alert               LABS/IMAGING:    CBC:  Lab Results   Component Value Date    WBC 9.5 11/10/2022    HGB 10.7 (L) 11/10/2022    HCT 33.0 (L) 11/10/2022    MCV 89.0 11/10/2022     11/10/2022    LYMPHOPCT 16.9 11/06/2022    RBC 3.71 (L) 11/10/2022    MCH 28.8 11/10/2022    MCHC 32.3 11/10/2022    RDW 16.8 (H) 11/10/2022    NEUTOPHILPCT 75.9 11/06/2022    MONOPCT 4.1 11/06/2022    EOSPCT 2.8 01/03/2012    BASOPCT 0.8 11/06/2022    NEUTROABS 10.9 (H) 11/06/2022    LYMPHSABS 2.4 11/06/2022    MONOSABS 0.6 11/06/2022    EOSABS 0.3 11/06/2022    BASOSABS 0.1 11/06/2022       Recent Labs     11/10/22  0550 11/09/22  0500 11/08/22  0835   WBC 9.5 10.5 12.5*   HGB 10.7* 10.6* 10.6*   HCT 33.0* 33.6* 33.9*   MCV 89.0 88.1 88.8    209 188         BMP:   Recent Labs     11/09/22  0500 11/10/22  0550   * 143   K 3.3* 3.5   * 107   CO2 21 21   BUN 5* 3*   CREATININE <0.5* <0.5*         MG:   Lab Results   Component Value Date/Time    MG 1.90 11/10/2022 05:50 AM     Ca/Phos:   Lab Results   Component Value Date    CALCIUM 8.2 (L) 11/10/2022    PHOS 2.7 11/07/2022     LIVER PROFILE:   No results for input(s): AST, ALT, LIPASE, BILIDIR, BILITOT, ALKPHOS in the last 72 hours. Invalid input(s): AMYLASE,  ALB      PT/INR: No results for input(s): PROTIME, INR in the last 72 hours. APTT: No results for input(s): APTT in the last 72 hours. Cardiac Enzymes:  Lab Results   Component Value Date    CKTOTAL 57 01/11/2020    TROPONINI 0.09 (H) 11/04/2022       Assessment:       -septic shock vs hypovolemic  -Acute respiratory failure  -A fib   -severe hypernatremia   -UTI  FABIO      Plan:      *-get follow up CXR  *- aggressive pulmonary toilet   *- A fib as per primary   *- abx as per primary ,  *- BD as needed  - BS ,adjust scale,keep 180 around   *-on Xarelleto  *-PT and OT  Doing great post ext ,  Limited code  Will follow along    Tennille Kothari M.D.   11/11/2022  9:28 AM    Thank you very much for allowing me to participate in the care of this pleasant patient , should you have any questions ,please do not hesitate to contact me      Aida Banks MD,[facility]  Pulmonary&Critical Care Medicine    Mireya Mahoney    NOTE: This report was transcribed using voice recognition software. Every effort was made to ensure accuracy; however, inadvertent computerized transcription errors may be present.

## 2022-11-12 VITALS
SYSTOLIC BLOOD PRESSURE: 137 MMHG | BODY MASS INDEX: 37.99 KG/M2 | HEART RATE: 94 BPM | OXYGEN SATURATION: 97 % | DIASTOLIC BLOOD PRESSURE: 76 MMHG | TEMPERATURE: 98.4 F | RESPIRATION RATE: 22 BRPM | WEIGHT: 214.4 LBS | HEIGHT: 63 IN

## 2022-11-12 LAB
ALBUMIN SERPL-MCNC: 2.2 G/DL (ref 3.4–5)
ANION GAP SERPL CALCULATED.3IONS-SCNC: 13 MMOL/L (ref 3–16)
BUN BLDV-MCNC: <2 MG/DL (ref 7–20)
CALCIUM SERPL-MCNC: 8.2 MG/DL (ref 8.3–10.6)
CHLORIDE BLD-SCNC: 104 MMOL/L (ref 99–110)
CO2: 19 MMOL/L (ref 21–32)
CREAT SERPL-MCNC: <0.5 MG/DL (ref 0.6–1.2)
GFR SERPL CREATININE-BSD FRML MDRD: >60 ML/MIN/{1.73_M2}
GLUCOSE BLD-MCNC: 134 MG/DL (ref 70–99)
GLUCOSE BLD-MCNC: 135 MG/DL (ref 70–99)
GLUCOSE BLD-MCNC: 160 MG/DL (ref 70–99)
GLUCOSE BLD-MCNC: 179 MG/DL (ref 70–99)
GLUCOSE BLD-MCNC: 182 MG/DL (ref 70–99)
PERFORMED ON: ABNORMAL
PHOSPHORUS: 2.8 MG/DL (ref 2.5–4.9)
POTASSIUM SERPL-SCNC: 4 MMOL/L (ref 3.5–5.1)
SARS-COV-2, NAAT: NOT DETECTED
SODIUM BLD-SCNC: 136 MMOL/L (ref 136–145)

## 2022-11-12 PROCEDURE — 99239 HOSP IP/OBS DSCHRG MGMT >30: CPT | Performed by: INTERNAL MEDICINE

## 2022-11-12 PROCEDURE — 36415 COLL VENOUS BLD VENIPUNCTURE: CPT

## 2022-11-12 PROCEDURE — 92526 ORAL FUNCTION THERAPY: CPT

## 2022-11-12 PROCEDURE — 6370000000 HC RX 637 (ALT 250 FOR IP): Performed by: HOSPITALIST

## 2022-11-12 PROCEDURE — 6370000000 HC RX 637 (ALT 250 FOR IP): Performed by: INTERNAL MEDICINE

## 2022-11-12 PROCEDURE — 80069 RENAL FUNCTION PANEL: CPT

## 2022-11-12 PROCEDURE — 6360000002 HC RX W HCPCS: Performed by: INTERNAL MEDICINE

## 2022-11-12 PROCEDURE — 87635 SARS-COV-2 COVID-19 AMP PRB: CPT

## 2022-11-12 PROCEDURE — 2580000003 HC RX 258: Performed by: HOSPITALIST

## 2022-11-12 PROCEDURE — 99233 SBSQ HOSP IP/OBS HIGH 50: CPT | Performed by: INTERNAL MEDICINE

## 2022-11-12 PROCEDURE — 2500000003 HC RX 250 WO HCPCS: Performed by: INTERNAL MEDICINE

## 2022-11-12 PROCEDURE — 2580000003 HC RX 258: Performed by: INTERNAL MEDICINE

## 2022-11-12 PROCEDURE — 6360000002 HC RX W HCPCS: Performed by: HOSPITALIST

## 2022-11-12 PROCEDURE — 6370000000 HC RX 637 (ALT 250 FOR IP)

## 2022-11-12 RX ORDER — POLYETHYLENE GLYCOL 3350 17 G/17G
17 POWDER, FOR SOLUTION ORAL DAILY PRN
Qty: 30 EACH | Refills: 0
Start: 2022-11-12 | End: 2022-12-12

## 2022-11-12 RX ORDER — DIMETHICONE, OXYBENZONE, AND PADIMATE O 2; 2.5; 6.6 G/100G; G/100G; G/100G
STICK TOPICAL
Status: COMPLETED
Start: 2022-11-12 | End: 2022-11-12

## 2022-11-12 RX ORDER — INSULIN GLARGINE 100 [IU]/ML
25 INJECTION, SOLUTION SUBCUTANEOUS EVERY MORNING
Qty: 5 ADJUSTABLE DOSE PRE-FILLED PEN SYRINGE | Refills: 3 | Status: ON HOLD
Start: 2022-11-12 | End: 2023-04-05 | Stop reason: ALTCHOICE

## 2022-11-12 RX ORDER — DILTIAZEM HYDROCHLORIDE 60 MG/1
60 TABLET, FILM COATED ORAL 4 TIMES DAILY
Qty: 120 TABLET | Refills: 0 | Status: ON HOLD
Start: 2022-11-12 | End: 2023-04-16 | Stop reason: HOSPADM

## 2022-11-12 RX ADMIN — FAMOTIDINE 20 MG: 10 INJECTION INTRAVENOUS at 08:36

## 2022-11-12 RX ADMIN — VANCOMYCIN HYDROCHLORIDE 750 MG: 750 INJECTION, POWDER, LYOPHILIZED, FOR SOLUTION INTRAVENOUS at 01:53

## 2022-11-12 RX ADMIN — DIGOXIN 125 MCG: 250 INJECTION, SOLUTION INTRAMUSCULAR; INTRAVENOUS at 08:37

## 2022-11-12 RX ADMIN — DILTIAZEM HYDROCHLORIDE 60 MG: 60 TABLET, FILM COATED ORAL at 01:54

## 2022-11-12 RX ADMIN — ACETAMINOPHEN 650 MG: 325 TABLET ORAL at 01:54

## 2022-11-12 RX ADMIN — Medication: at 02:30

## 2022-11-12 ASSESSMENT — PAIN SCALES - GENERAL: PAINLEVEL_OUTOF10: 0

## 2022-11-12 NOTE — DISCHARGE SUMMARY
Name:  Marylee Sandifer  Room:  0211/0211-02  MRN:    [id number]    Discharge Summary      This discharge summary is in conjunction with a complete physical exam done on the day of discharge. Discharging Physician: Giovanni Fuller MD      Admit: 11/3/2022  Discharge:  11/12/2022     Diagnoses this Admission    Principal Problem:    Sepsis secondary to UTI [facility])  Active Problems:    Persistent atrial fibrillation (HCC)    Urinary tract infection with hematuria    Chronic respiratory failure with hypoxia (HCC)    Dementia with behavioral disturbance    Hypertensive heart and kidney disease with chronic diastolic congestive heart failure and stage 2 chronic kidney disease (HCC)    Schizoaffective disorder, depressive type (HCC)    Septic shock ([facility])    Hypernatremia    ESBL (extended spectrum beta-lactamase) producing bacteria infection    DM (diabetes mellitus) ([facility]    Essential hypertension    Chronic obstructive pulmonary disease ([facility]    Acute respiratory failure ([facility]  Resolved Problems:    * No resolved hospital problems. *      Procedures (Please Review Full Report for Details)  Endotracheal intubation  Mechanical ventilation    Consults    PHARMACY TO DOSE VANCOMYCIN  IP CONSULT TO NEPHROLOGY  IP CONSULT TO PHARMACY  IP CONSULT TO HOSPITALIST  IP CONSULT TO PHARMACY  IP CONSULT TO PALLIATIVE CARE  IP CONSULT TO PALLIATIVE CARE      HPI:       66 y.o. female with COPD, CHF, obesity, DM, afibrecently admitted for danielle,sepsis, uti, brought in from NH after labs there revealed Na 165, creatinine 1.7, BUN 67, wbc 16. She presented to the ER disoriented, toxic, hypotensive, becoming unresponsive requiring intubation for airway protection.  Currently she is seen in the ICU intubated, sedated    Physical Exam at Discharge:  /76   Pulse 94   Temp 98.4 °F (36.9 °C) (Axillary)   Resp 22   Ht 5' 3\" (1.6 m)   Wt 214 lb 6.4 oz (97.3 kg)   SpO2 97%   BMI 37.98 kg/m²     General: elderly female awake but dysarthria noted. Chronically ill appearing. Eyes: PERRL. No sclera icterus. No conjunctiva injected. ENT: No discharge. Off vent   Neck: Trachea midline. Normal thyroid. Resp: No accessory muscle use. No crackles. No wheezing. No rhonchi. No dullness on percussion. CV: . Irregularly irregular rate and rhythm. No mumur or rub. No edema. No JVD. Palpable pedal pulses. GI: Non-tender. Non-distended. No masses. No organmegaly. Normal bowel sounds. No hernia. Skin: Warm and dry. No nodule on exposed extremities. No rash on exposed extremities. Lymph: No cervical LAD. No supraclavicular LAD. M/S: resolving cyanosis of both feet   Neuro - awake, staring, tracks people. Mild deviation of angle of mouth to right side. Following commands and squeezes right hand with command  Contractures of left UE and mamie LE noted    Hospital Course    #Sepsis with septic shock. Most likely source appears to be UTI.s/p aggressive IVF boluses  BP improved   Off levophed   Urine neg but blood with coag neg staph  On IV vanc 10/10     #Possible UTI. Continue broad-spectrum antibiotics for now. On vancomycin and cefepime. Blood cx with coag neg staph . With ongoing fever spikes, switched to IV vanc. Finished antibiotics. Fevers resolved      #Acute respiratory failure. Became unresponsive and was intubated. Likely with sepsis and hypernatremia Continue vent support. Pulmonologist consulted  Weaned off and extubated   Weaned to RA         #Dementia with behavioral disturbance. #History of schizoaffective disorder. Off vent , mentation remains poor with dysarthria   Consider ct head , recent MRI with no acute issues   Family will consider hospice care in the future. #Permanent atrial fibrillation. HR controlled   Hold Cardizem due to low blood pressure. Continue Xarelto if able . #Severe hypernatremia. Significant free water deficit. Possible solute diuresis from recent FABIO.     IVF changed to d5 1/2NS  given and improved   Hypernatremia corrected. #FABIO. As above- resolved        #Diabetes mellitus type 2. Stable sugars  Sliding scale insulin. #COPD. Continue breathing treatments. As needed     #DVT prophylaxis. On Xarelto. CBC:   Recent Labs     11/10/22  0550   WBC 9.5   HGB 10.7*   HCT 33.0*   MCV 89.0        BMP:   Recent Labs     11/10/22  0550 11/12/22  0505    136   K 3.5 4.0    104   CO2 21 19*   PHOS  --  2.8   BUN 3* <2*   CREATININE <0.5* <0.5*      Latest Reference Range & Units 11/4/22 06:07   Procalcitonin 0.00 - 0.15 ng/mL 0.22 (H)   (H): Data is abnormally high       Latest Reference Range & Units 11/3/22 17:35 11/3/22 20:42 11/4/22 02:58   Lactic Acid, Sepsis 0.4 - 1.9 mmol/L 4.2 (HH) 4.1 (HH) 1.5      Cultures:    Cultures:  11/3/2022 blood CNS  11/3/2022 blood coag negative staph  11/3/2022 urine NG  11/3/2022 SARS-CoV-2 and influenza are negative  11/4/2022 tracheal aspirate NRF  11/7/2022 blood NG     ECG  Atrial fibrillation with rapid ventricular responseST abnormality consider inferior and anterolateral ischemiaAbnormal ECGWhen compared with ECG of 12-OCT-2022 06:28,HR increasedConfirmed by DEXTER Mathew MD ([id number]) on 11/4/2022 7:39:12 AM       XR CHEST PORTABLE   Final Result   Patient is rotated to the right with cardiac/soft tissue summation across the   right base. However I still suspect increasing opacification/atelectasis in   the right middle and lower lobe. The linear opacities at the left base are stable. XR CHEST PORTABLE   Final Result   The patient is rotated. Mild cardiomegaly. Bibasilar infiltrates and   atelectasis noted. Trace right pleural effusion. COPD changes. ET tube is 3 cm superior to the cynthia. Feeding tube terminates below the   diaphragm. Right-sided central line tip is in the superior vena cava. There   is no pneumothorax.       Significant osteopenic changes and degenerative changes noted in the bony   structures. RECOMMENDATION:   Bibasilar infiltrate/atelectasis. No significant interval change from recent   priors. XR CHEST PORTABLE   Final Result   Stable chest.  Bibasilar opacification, likely atelectasis. Satisfactory position of endotracheal tube. XR CHEST PORTABLE   Final Result   Unremarkable support devices. Mild bibasilar infiltrates or atelectasis. Discharge Medications     Medication List        START taking these medications      dilTIAZem 60 MG tablet  Commonly known as: CARDIZEM  Take 1 tablet by mouth 4 times daily     polyethylene glycol 17 g packet  Commonly known as: GLYCOLAX  Take 17 g by mouth daily as needed for Constipation            CHANGE how you take these medications      Depakote Sprinkles 125 MG capsule  Generic drug: divalproex  What changed: Another medication with the same name was removed. Continue taking this medication, and follow the directions you see here. insulin lispro (1 Unit Dial) 100 UNIT/ML Sopn  Commonly known as: HumaLOG KwikPen  Inject 10 Units into the skin 3 times daily (before meals)  What changed: Another medication with the same name was removed. Continue taking this medication, and follow the directions you see here.             CONTINUE taking these medications      acetaminophen 325 MG tablet  Commonly known as: TYLENOL     bisacodyl 10 MG suppository  Commonly known as: DULCOLAX     Blood Glucose System Bubba Kit  Check fingerstick blood sugars before meals and at bedtime     busPIRone 5 MG tablet  Commonly known as: BUSPAR     CULTURELLE DIGESTIVE DAILY PO     DULoxetine 60 MG extended release capsule  Commonly known as: CYMBALTA     empagliflozin 10 MG tablet  Commonly known as: JARDIANCE     gabapentin 100 MG capsule  Commonly known as: NEURONTIN     INSULIN SYRINGE .5CC/29G 29G X 1/2\" 0.5 ML Misc  Commonly known as: KROGER INS SYR .5CC/29G  1 each by Does not apply route daily     Lantus SoloStar 100 UNIT/ML injection pen  Generic drug: insulin glargine  Inject 25 Units into the skin every morning     melatonin 3 MG Tabs tablet     nystatin 402390 UNIT/GM powder  Commonly known as: MYCOSTATIN     potassium chloride 10 MEQ extended release capsule  Commonly known as: MICRO-K     rivaroxaban 20 MG Tabs tablet  Commonly known as: XARELTO     sodium chloride 0.45 % infusion     tiZANidine 2 MG tablet  Commonly known as: ZANAFLEX     Vitamin D3 1.25 MG (11756 UT) Caps            STOP taking these medications      clindamycin 300 MG capsule  Commonly known as: CLEOCIN     dilTIAZem 240 MG extended release capsule  Commonly known as: CARDIZEM CD     furosemide 40 MG tablet  Commonly known as: LASIX     LORazepam 0.5 MG tablet  Commonly known as: ATIVAN     miconazole nitrate 2 % Oint     oxyCODONE-acetaminophen 5-325 MG per tablet  Commonly known as: PERCOCET     sodium phosphate 7-19 GM/118ML     tiotropium 18 MCG inhalation capsule  Commonly known as: SPIRIVA               Where to Get Your Medications        Information about where to get these medications is not yet available    Ask your nurse or doctor about these medications  dilTIAZem 60 MG tablet  Lantus SoloStar 100 UNIT/ML injection pen  polyethylene glycol 17 g packet           Discharge Condition/Location: Stable. Poor long term prognosis. Follow Up: Follow up with NH doctor.     More than 30 mts spent    [facility], MD 11/12/2022 11:35 AM

## 2022-11-12 NOTE — PLAN OF CARE
Problem: Safety - Medical Restraint  Goal: Remains free of injury from restraints (Restraint for Interference with Medical Device)  Description: INTERVENTIONS:  1. Determine that other, less restrictive measures have been tried or would not be effective before applying the restraint  2. Evaluate the patient's condition at the time of restraint application  3. Inform patient/family regarding the reason for restraint  4. Q2H: Monitor safety, psychosocial status, comfort, nutrition and hydration  Outcome: Progressing     Problem: Discharge Planning  Goal: Discharge to home or other facility with appropriate resources  Outcome: Progressing     Problem: Pain  Goal: Verbalizes/displays adequate comfort level or baseline comfort level  Outcome: Progressing     Problem: Neurosensory - Adult  Goal: Achieves stable or improved neurological status  Outcome: Progressing     Problem: Respiratory - Adult  Goal: Achieves optimal ventilation and oxygenation  Outcome: Progressing     Problem: Cardiovascular - Adult  Goal: Maintains optimal cardiac output and hemodynamic stability  Outcome: Progressing     Problem: Skin/Tissue Integrity - Adult  Goal: Skin integrity remains intact  Outcome: Progressing     Problem: Musculoskeletal - Adult  Goal: Return mobility to safest level of function  Outcome: Progressing     Problem: Gastrointestinal - Adult  Goal: Maintains or returns to baseline bowel function  Outcome: Progressing  Goal: Maintains adequate nutritional intake  Outcome: Progressing     Problem: Genitourinary - Adult  Goal: Absence of urinary retention  Outcome: Progressing     Problem: Infection - Adult  Goal: Absence of infection at discharge  Outcome: Progressing     Problem: Metabolic/Fluid and Electrolytes - Adult  Goal: Electrolytes maintained within normal limits  Outcome: Progressing     Problem: Skin/Tissue Integrity  Goal: Absence of new skin breakdown  Description: 1.   Monitor for areas of redness and/or skin breakdown  2. Assess vascular access sites hourly  3. Every 4-6 hours minimum:  Change oxygen saturation probe site  4. Every 4-6 hours:  If on nasal continuous positive airway pressure, respiratory therapy assess nares and determine need for appliance change or resting period.   Outcome: Progressing     Problem: Nutrition Deficit:  Goal: Optimize nutritional status  Outcome: Progressing     Problem: Chronic Conditions and Co-morbidities  Goal: Patient's chronic conditions and co-morbidity symptoms are monitored and maintained or improved  Outcome: Progressing

## 2022-11-12 NOTE — DISCHARGE INSTR - COC
Continuity of Care Form    Patient Name: Kyara Garner   :  1950  MRN:  [id number]    Admit date:  11/3/2022  Discharge date:  2022    Code Status Order: DNR-CC   Advance Directives:     Admitting Physician:  Jacki Quintero MD  PCP: Kim Carreon MD    Discharging Nurse: [facility] Unit/Room#: 0211/0211-02  Discharging Unit Phone Number: [phone]    Emergency Contact:   Extended Emergency Contact Information  Primary Emergency Contact: Sigrid Oro  Address: [place]  Home Phone: [phone]  Mobile Phone: [phone]  Relation: Other  Secondary Emergency Contact: Derek Gordon Phone: [phone]  Mobile Phone: [phone]  Relation: Brother/Sister    Past Surgical History:  Past Surgical History:   Procedure Laterality Date    IR MIDLINE CATH  10/10/2022    IR MIDLINE CATH 10/10/2022 [facility] SPECIAL PROCEDURES    KNEE SURGERY      TONSILLECTOMY      TUBAL LIGATION         Immunization History:   Immunization History   Administered Date(s) Administered    COVID-19, PFIZER PURPLE top, DILUTE for use, (age 15 y+), 30mcg/0.3mL 2020, 2021, 2021    Influenza Virus Vaccine 2014, 10/20/2019    Influenza, FLUARIX, FLULAVAL, Marcille Fly (age 10 mo+) AND AFLURIA, (age 1 y+), PF, 0.5mL 2016    Pneumococcal Conjugate 13-valent (Oneda Mote) 2016       Active Problems:  Patient Active Problem List   Diagnosis Code    Seizure disorder, grand mal ([facility] G40.409    DM (diabetes mellitus) ([facility] E11.9    Lumbar facet arthropathy M47.816    Disc degeneration, lumbar M51.36    Obesity E66.9    Atrial fibrillation with rapid ventricular response (HCC) I48.91    Essential hypertension I10    Chronic obstructive pulmonary disease ([address] J44.9    Acute respiratory failure ([facility] [address] J96.00    Persistent atrial fibrillation (HCC) I48.19    Chronic pain of both knees M25.561, M25.562, G89.29    Bilateral primary osteoarthritis of knee M17.0    Sepsis secondary to UTI ([facility] A41.9, N39.0    Altered mental status R41.82    FABIO (acute kidney injury) ([facility] N17.9    Urinary tract infection with hematuria N39.0, R31.9    Cervical herniated disc M50.20    Chronic bilateral low back pain with bilateral sciatica M54.42, M54.41, G89.29    Chronic respiratory failure with hypoxia ([facility] J96.11    Dementia with behavioral disturbance F03.918    LORENZO (generalized anxiety disorder) F41.1    Hoarding disorder with poor insight F42.3    Hypertensive heart and kidney disease with chronic diastolic congestive heart failure and stage 2 chronic kidney disease (HCC) I13.0, I50.32, N18.2    Schizoaffective disorder, depressive type ([facility] F25.1    Providencia bacteremia R78.81    Permanent atrial fibrillation with rapid ventricular response ([facility]) I48.21    Bacteremia R78.81    Hyperkalemia E87.5    Septic shock ([facility]) A41.9, R65.21    Hypernatremia E87.0    ESBL (extended spectrum beta-lactamase) producing bacteria infection A49.9, Z16.12       Isolation/Infection:   Isolation            Contact          Patient Infection Status       Infection Onset Added Last Indicated Last Indicated By Review Planned Expiration Resolved Resolved By    MRSA 10/06/22 10/07/22 10/06/22 MRSA DNA Probe, Nasal        ESBL (Extended Spectrum Beta Lactamase) 04/14/20 04/17/20 04/14/20 Culture, Urine        MDRO (multi-drug resistant organism) 04/14/20 04/17/20 04/14/20 Culture, Urine        Resolved    COVID-19 (Rule Out) 11/03/22 11/03/22 11/03/22 COVID-19 & Influenza Combo (Ordered)   11/03/22 Rule-Out Test Resulted    COVID-19 (Rule Out) 10/05/22 10/05/22 10/05/22 COVID-19 & Influenza Combo (Ordered)   10/06/22 Rule-Out Test Resulted    COVID-19 (Rule Out) 04/14/20 04/14/20 04/14/20 COVID-19 (Ordered)   04/15/20 Rule-Out Test Resulted    ESBL (Extended Spectrum Beta Lactamase) 02/27/19 03/01/19 02/27/19 Urine Culture   01/12/20 Dandy Fernando RN    Last urine culture - for ESBL            Nurse Assessment:  Last Vital Signs: /76   Pulse 94   Temp 98.4 °F (36.9 °C) (Axillary)   Resp 22   Ht 5' 3\" (1.6 m)   Wt 214 lb 6.4 oz (97.3 kg)   SpO2 97%   BMI 37.98 kg/m²     Last documented pain score (0-10 scale): Pain Level: 0  Last Weight:   Wt Readings from Last 1 Encounters:   11/12/22 214 lb 6.4 oz (97.3 kg)     Mental Status:  disoriented    IV Access:  - None    Nursing Mobility/ADLs:  Walking   Independent  Transfer  Independent  Bathing  Independent  Dressing  Independent  Toileting  Independent  Feeding  Independent  Med Admin  Independent  Med Delivery   crushed and Applesauce    Wound Care Documentation and Therapy:  Wound 11/03/22 Heel Right (Active)   Wound Etiology Deep tissue/Injury 11/10/22 1954   Dressing Status Clean; Intact;Dry 11/12/22 0830   Wound Cleansed Soap and water 11/04/22 1604   Dressing/Treatment Foam 11/10/22 1954   Wound Length (cm) 3.5 cm 11/03/22 2312   Wound Width (cm) 2.5 cm 11/03/22 2312   Wound Surface Area (cm^2) 8.75 cm^2 11/03/22 2312   Wound Assessment Dry;Purple/maroon 11/06/22 1600   Drainage Amount None 11/06/22 1600   Odor None 11/06/22 1600   Number of days: 8       Wound 11/03/22 Toe (Comment  which one) Right (Active)   Wound Image   11/03/22 2312   Wound Etiology Deep tissue/Injury 11/06/22 1600   Dressing Status Other (Comment) 11/12/22 0830   Wound Cleansed Soap and water 11/04/22 1604   Dressing/Treatment Open to air 11/05/22 1600   Wound Assessment Dry;Purple/maroon;Dusky 11/06/22 1600   Drainage Amount None 11/06/22 1600   Odor None 11/06/22 1600   Number of days: 8        Elimination:  Continence: Bowel: No  Bladder: No  Urinary Catheter: Insertion Date: 11/3/2022    Colostomy/Ileostomy/Ileal Conduit: No       Date of Last BM: 11/11/2022    Intake/Output Summary (Last 24 hours) at 11/12/2022 1139  Last data filed at 11/12/2022 0750  Gross per 24 hour   Intake 2910.12 ml   Output 1575 ml   Net 1335.12 ml     I/O last 3 completed shifts:   In: 5040.4 [P.O.:720; I.V.:3321.5; IV Piggyback:998.8]  Out: 8441 [Urine:2725]    Safety Concerns:     Aspiration Risk    Impairments/Disabilities:      Speech, Vision, Hearing, and Contractures - BLE    Nutrition Therapy:  Current Nutrition Therapy:   - Oral Diet:  Dysphagia 1 pureed    Routes of Feeding: Oral  Liquids: use straw  Daily Fluid Restriction: no  Last Modified Barium Swallow with Video (Video Swallowing Test): not done    Treatments at the Time of Hospital Discharge:   Respiratory Treatments: NA  Oxygen Therapy:  is not on home oxygen therapy. Ventilator:    - No ventilator support    Rehab Therapies: Physical Therapy and Occupational Therapy  Weight Bearing Status/Restrictions: No weight bearing restrictions  Other Medical Equipment (for information only, NOT a DME order):  wheelchair and hospital bed  Other Treatments: [facility]    Patient's personal belongings (please select all that are sent with patient):  None    RN SIGNATURE:  Electronically signed by Lavonne Shaver RN on 39/43/43 at 12:10 PM EST    CASE MANAGEMENT/SOCIAL WORK SECTION    Inpatient Status Date: 11/03/2022    Readmission Risk Assessment Score:  Readmission Risk              Risk of Unplanned Readmission:  30           Discharging to Facility/ Agency     Name: [facility])  Address: [place] [address], [place], [place] [address]  Fax: [phone]     Dialysis Facility (if applicable)   Name:  Address:  Dialysis Schedule:  Phone:  Fax:    / signature: Electronically signed by Edilia Dawkins RN on 11/12/22 at 11:58 AM EST    PHYSICIAN SECTION    Prognosis: Guarded    Condition at Discharge: Stable    Rehab Potential (if transferring to Rehab): Poor    Recommended Labs or Other Treatments After Discharge: Long term care.  Consider Hospice    Physician Certification: I certify the above information and transfer of Shawnee Higuera  is necessary for the continuing treatment of the diagnosis listed and that she requires [place] for greater 30 days.      Update Admission H&P: No change in H&P    PHYSICIAN SIGNATURE:  Electronically signed by Sherilyn Canavan, MD on 11/12/22 at 11:39 AM EST

## 2022-11-12 NOTE — FLOWSHEET NOTE
11/12/22 0830   Vital Signs   Temp 98.4 °F (36.9 °C)   Temp Source Axillary   Heart Rate 94   Heart Rate Source Monitor   Resp 22   /76   MAP (Calculated) 96   BP Location Left lower arm   BP Method Automatic   Patient Position Semi fowlers   Level of Consciousness 0   MEWS Score 2   Pain Assessment   Pain Assessment Adult Nonverbal Pain Scale (NPVS)   Oxygen Therapy   SpO2 97 %   Pulse Oximeter Device Mode Continuous   O2 Device None (Room air)       Shift assessment complete. See flow sheet. Scheduled meds given. See MAR. Patients head-toe complete, VS are logged, and active bowel sound noted in all four quadrants. Patient continues to yell out \"help me\". IVF stopped per order. Moraes in place and draining well. No further needs  noted at this time. Call light and bedside table are within reach. The bed is locked and is in the lowest position. Guillermo Ramirez RN

## 2022-11-12 NOTE — PLAN OF CARE
Problem: Pain  Goal: Verbalizes/displays adequate comfort level or baseline comfort level  66/61/0097 [id number] by Alonso Menjivar, RN  Outcome: Progressing  11/12/2022 0410 by Sunny Shah RN  Outcome: Progressing     Problem: Respiratory - Adult  Goal: Achieves optimal ventilation and oxygenation  31/84/0076 0952 by Alonso Menjivar, RN  Outcome: Progressing

## 2022-11-12 NOTE — PROGRESS NOTES
[facility] Progress Note           Subjective/   66y.o. year old female who we are seeing in consultation for FABIO, Hypernatremia. HPI:  Renal function, sodium trending better with IVF's, non-oliguric. Mentation reduced. ROS:  Intake better, +weak. Objective/   GEN:  Chronically ill, /76   Pulse 94   Temp 98.4 °F (36.9 °C) (Axillary)   Resp 22   Ht 5' 3\" (1.6 m)   Wt 214 lb 6.4 oz (97.3 kg)   SpO2 97%   BMI 37.98 kg/m²   HEENT: non-icteric, no JVD  CV: S1, S2, irregular, without m/r/g; no LE edema  RESP: CTA B without w/r/r; breathing wnl  ABD: +bs, soft, nt, no hsm  SKIN: warm, no rashes    Data/  Recent Labs     11/10/22  0550   WBC 9.5   HGB 10.7*   HCT 33.0*   MCV 89.0          Recent Labs     11/10/22  0550 11/12/22  0505    136   K 3.5 4.0    104   CO2 21 19*   GLUCOSE 129* 179*   PHOS  --  2.8   MG 1.90  --    BUN 3* <2*   CREATININE <0.5* <0.5*   LABGLOM >60 >60         Assessment/     Acute Kidney Injury:  KDIGO 1  Etiology:  Volume depletion   Clinical:  Resolved     Hypernatremia:  Significant free water deficit  No prior history of DI, had issues with low sodium in early October. Solute diuresis from FABIO recovery. Sepsis:  HCAP vs. UTI  On broad spectrum IV  antibiotics     Plan/     - Trial off IVF's  - Trend labs, bp's, & urine output    Noted plans for palliative care measures in process    ____________________________________  Natalia Sy MD  [facility]  [email or web address]  Office: [phone]

## 2022-11-12 NOTE — PROGRESS NOTES
Report called into [facility] and given to [facility]), patient expected to be picked up at 1400.

## 2022-11-12 NOTE — DISCHARGE INSTRUCTIONS
Your information:  Name: Tess Talley  : 1950    Your instructions: Follow up with PCP    What to do after you leave the hospital:    Recommended diet: regular diet    Recommended activity: activity as tolerated        The following personal items were collected during your admission and were returned to you:    Belongings  Dental Appliances: None  Vision - Corrective Lenses: None  Hearing Aid: None  Clothing: Other (Comment) (none)  Jewelry: None  Body Piercings Removed: No  Electronic Devices: None  Weapons (Notify Protective Services/Security): None  Other Valuables: Other (Comment) (none)  Home Medications: None  Valuables Given To: Other (Comment) (none)    Information obtained by:  By signing below, I understand that if any problems occur once I leave the hospital I am to contact MD.  I understand and acknowledge receipt of the instructions indicated above.

## 2022-11-12 NOTE — PROGRESS NOTES
Pulmonary & Critical Care Medicine Progress Note  CC: Acute respiratory failure    Subjective:  Refused AM meds. Unable to provide hx. MV: 11/3/2022-11/8/2022    EXAM:  Blood pressure 137/76, pulse 94, temperature 98.4 °F (36.9 °C), temperature source Axillary, resp. rate 22, height 5' 3\" (1.6 m), weight 214 lb 6.4 oz (97.3 kg), SpO2 97 %. on room air    Intake/Output Summary (Last 24 hours) at 11/12/2022 1020  Last data filed at 11/12/2022 0750  Gross per 24 hour   Intake 2910.12 ml   Output 1575 ml   Net 1335.12 ml   General: ill appearing. Eyes: PERRL. No sclera icterus. No conjunctival injection. ENT: No discharge. Pharynx clear. Neck: Trachea midline. Normal thyroid. Resp: No accessory muscle use. No crackles. No wheezing. No rhonchi. No dullness on percussion. CV: Regular rate. Regular rhythm. No mumur or rub. No edema. Peripheral pulses are 2+. Capillary refill is less than 3 seconds. GI: Non-tender. Non-distended. No masses. No organomegaly. Normal bowel sounds. No hernia. Skin: Warm and dry. No nodule on exposed extremities. No rash on exposed extremities. Lymph: No cervical LAD. No supraclavicular LAD. M/S: No cyanosis. No joint deformity. No clubbing. Neuro: Awake, not FC. Patellar reflexes are symmetric.   Psych: Unable to obtain because the patient is non-communicative     Scheduled Meds:   dilTIAZem  60 mg Oral 4 times per day    rivaroxaban  20 mg Oral Daily    famotidine (PEPCID) injection  20 mg IntraVENous BID    [Held by provider] insulin glargine  25 Units SubCUTAneous QAM    sodium chloride flush  5-40 mL IntraVENous 2 times per day    insulin lispro  0-16 Units SubCUTAneous Q4H    divalproex  250 mg Oral 3 times per day    digoxin  125 mcg IntraVENous Daily     PRN Meds:  magnesium sulfate, potassium chloride, metoprolol, glucose, dextrose bolus **OR** dextrose bolus, glucagon (rDNA), dextrose, sodium chloride flush, sodium chloride, ondansetron **OR** ondansetron, polyethylene glycol, acetaminophen **OR** acetaminophen    Results:  CBC:   Recent Labs     11/10/22  0550   WBC 9.5   HGB 10.7*   HCT 33.0*   MCV 89.0        BMP:   Recent Labs     11/10/22  0550 11/12/22  0505    136   K 3.5 4.0    104   CO2 21 19*   PHOS  --  2.8   BUN 3* <2*   CREATININE <0.5* <0.5*     LIVER PROFILE:   No results for input(s): AST, ALT, LIPASE, BILIDIR, BILITOT, ALKPHOS in the last 72 hours. Invalid input(s): AMYLASE,  ALB    Cultures:  11/3/2022 blood CNS  11/3/2022 blood coag negative staph  11/3/2022 urine NG  11/3/2022 SARS-CoV-2 and influenza are negative  11/4/2022 tracheal aspirate NRF  11/7/2022 blood NG    CXR 11/8/2022 increasing right-sided opacities    ASSESSMENT:  Acute hypoxemic respiratory failure   Septic shock   Permanent A. fib on Xarelto   FABIO  COPD  Dementia with behavioral disturbance  H/O schizoaffective disorder    PLAN:  Supplemental oxygen to maintain SaO2 >92%; wean as tolerated    Inhaled bronchodilators  Vanc D#10/10. Monitoring of vancomycin levels to prevent toxicity.    Nephrology is following   Home Xarelto  DNR-CC; primary focus will be comfort but will continue routine medical care as well, no extraordinary measures  D/W Bobby

## 2022-11-12 NOTE — FLOWSHEET NOTE
11/11/22 2127   Vital Signs   Temp 97.4 °F (36.3 °C)   Temp Source Axillary   Heart Rate 81   Heart Rate Source Monitor   Resp 21   /82   MAP (Calculated) 94   BP Location Left lower arm   BP Method Automatic   Patient Position Semi fowlers   Level of Consciousness 0   MEWS Score 2   Pain Assessment   Pain Assessment 0-10   Pain Level 3   HS assessment completed. complaints of pain voiced. No signs of symptoms of distress noted. Patient tolerated night medications well. Respirations easy and even. Pt does have some significant pitting edema to BUE. Bed in lowest position, bed alarm in place and functioning properly, bed rails x2 up,  Call light within reach. Bedside Mobility Assessment Tool (BMAT):     Assessment Level 1- Sit and Shake    1. From a semi-reclined position, ask patient to sit up and rotate to a seated position at the side of the bed. Can use the bedrail. 2. Ask patient to reach out and grab your hand and shake making sure patient reaches across his/her midline. Fail- Patient is unable to perform tasks, patient is MOBILITY LEVEL 1. Assessment Level 2- Stretch and Point   1. With patient in seated position at the side of the bed, have patient place both feet on the floor (or stool) with knees no higher than hips. 2. Ask patient to stretch one leg and straighten the knee, then bend the ankle/flex and point the toes. If appropriate, repeat with the other leg. Fail- Patient is unable to complete task. Patient is MOBILITY LEVEL 2. Assessment Level 3- Stand   1. Ask patient to elevate off the bed or chair (seated to standing) using an assistive device (cane, bedrail). 2. Patient should be able to raise buttocks off be and hold for a count of five. May repeat once. Fail- Patient unable to demonstrate standing stability. Patient is MOBILITY LEVEL 3. Assessment Level 4- Walk   1. Ask patient to march in place at bedside.     2. Then ask patient to advance step and return

## 2022-11-12 NOTE — PROGRESS NOTES
Speech Language Pathology  Facility/Department: [facility]  Dysphagia Daily Treatment Note    Recommendations:  Solid Consistency: Pureed diet (IDDSI 4)  Liquid Consistency: Thin liquids (IDDSI 0)  Medication: crushable meds crushed in puree  Pt successfully follows <50% of basic commands despite cues  Pt continues to be considered at an increased risk of aspiration with all PO intake d/t cognitive deficit & requiring total feed assistance. Pt now DNR-CC per chart.   Oral care q4 hrs to reduce adverse affects in the event of aspiration and general aspiration precautions    NAME: Annamaria Boggs  : 1950  MRN: [id number]    Patient Diagnosis(es):   Patient Active Problem List    Diagnosis Date Noted    Persistent atrial fibrillation ([address] 2016    ESBL (extended spectrum beta-lactamase) producing bacteria infection 2022    Hypernatremia 2022    Septic shock ([address] 2022    Hyperkalemia 10/10/2022    Bacteremia 10/08/2022    Providencia bacteremia 10/07/2022    Permanent atrial fibrillation with rapid ventricular response ([address] 10/07/2022    Sepsis secondary to UTI ([address] 10/06/2022    Altered mental status 10/06/2022    FABIO (acute kidney injury) ([address] 10/06/2022    Urinary tract infection with hematuria 10/06/2022    Chronic bilateral low back pain with bilateral sciatica 10/21/2018    Chronic respiratory failure with hypoxia ([address] 10/21/2018    Dementia with behavioral disturbance 10/21/2018    LORENZO (generalized anxiety disorder) 10/21/2018    Hoarding disorder with poor insight 10/21/2018    Hypertensive heart and kidney disease with chronic diastolic congestive heart failure and stage 2 chronic kidney disease ([address] 10/21/2018    Schizoaffective disorder, depressive type ([address] 10/21/2018    Cervical herniated disc 2005    Chronic pain of both knees 2020    Bilateral primary osteoarthritis of knee 2020    Acute respiratory failure ([address] 11/06/2016    Essential hypertension     Chronic obstructive pulmonary disease (HCC)     Atrial fibrillation with rapid ventricular response [facility]     Obesity 01/27/2016    Lumbar facet arthropathy 12/10/2014    Disc degeneration, lumbar 12/10/2014    DM (diabetes mellitus) ([facility] 01/13/2014    Seizure disorder, grand mal ([facility] 01/12/2014     Allergies: Allergies   Allergen Reactions    Fish-Derived Products      Food allergy    Iodine     Mushroom Extract Complex     Onion     Other      seafood     Subjective:   Pt seen playing down in bed, confused, and shouting \"ouch\"    Pain: Pt unable to state if/where pain was this date    Current Diet: ADULT DIET; Dysphagia - Pureed  ADULT ORAL NUTRITION SUPPLEMENT; Breakfast, Lunch, Dinner; Standard 4 oz Oral Supplement    Diet Tolerance:  Patient tolerating current diet level without signs/symptoms of penetration / aspiration. P.O. Trials: Thin   X 3/3 no s/s of aspiration via cup sip   Nectar / Mildly Thick       Honey / Moderately Thick       Pudding / Extremely Thick       Puree   X 5/5 no s/s of aspiration via tsp presented to pt   Solid         Dysphagia Treatment and Impressions:  Pt tolerating current diet level w/out overt s/s of aspiration. Pt did not demonstrate increased WOB or any SOB during or after PO trials. Pt able to state \"yes\" when presented with pudding via tsp. Pt stating \"ouch\" for entire SLP session, but pt unable to state where pain was- pt's baseline. MD spoke to SLP- stating pt is not going to hospice per POA- MD stated that pt is going back to nursing home at some point. Pt able to tolerate puree solids and TL w/out s/s of aspiration, dry vocal quality. Continue with goals per POC. No instrumental or repeat BSE clinically indicated at this time, due to pt's tolerance of current diet level.     Dysphagia Goals:  Timeframe for Long-term Goals: 7 days, 11/15/22  Goal 1: The pt will tolerate safest and least restrictive diet without clinical s/s of aspiration or change in respiratory status. 11/12: Ongoing, see note above     Short-term Goals  Timeframe for Short-term Goals: 5 days, 11/13/22  Dysphagia Goals: The patient will tolerate recommended diet without observed clinical signs of aspiration. 11/12: Ongoing, see note above  The patient will tolerate repeat bedside swallowing evaluation when able. 11/12: Not clinically indicated at this time  The patient/caregiver will demonstrate understanding of compensatory strategies for improved swallowing safety. 11/12: Ongoing, see note above   The patient will tolerate instrumental swallowing procedure. 11/12: Not clinically indicated at this time    Recommendations:  Solid Consistency: Pureed diet (IDDSI 4)  Liquid Consistency: Thin liquids (IDDSI 0)  Medication: crushable meds crushed in puree  Pt successfully follows <50% of basic commands despite cues  Pt continues to be considered at an increased risk of aspiration with all PO intake d/t cognitive deficit & requiring total feed assistance. Pt now DNR-CC per chart. Oral care q4 hrs to reduce adverse affects in the event of aspiration and general aspiration precautions    Patient/Family/Caregiver Education: SLP re: role of ST, rationale for PO trials, aspiration precautions. Pt unable to demonstrate evidence of learning. Compensatory Strategies: HOB 90* and 30\" after meals; small bites/sips; alternate solids/liquids every 3-5 bites; oral care after every meal; total assist feed    Plan:    Continued Dysphagia treatment with goals per plan of care. Discharge Recommendations: If pt discharges from hospital prior to Speech/Swallowing discharge, this note serves as tx and discharge summary. Total Treatment Time / Charges     Time in Time out Total Time / units   Cognitive Tx         Speech Tx      Dysphagia Tx 1120 1132 12 mins /1 unit     Signature:   Gigi CALDWELL  [address] [facility]  Speech Language Pathologist  [phone]

## 2022-11-12 NOTE — PROGRESS NOTES
Transferred care to [address]. Face to face bedside report given, no need voiced at this time.

## 2023-04-04 ENCOUNTER — HOSPITAL ENCOUNTER (INPATIENT)
Age: 73
LOS: 11 days | Discharge: NURSING, REHAB OR LONG-TERM CARE | DRG: 871 | End: 2023-04-16
Attending: STUDENT IN AN ORGANIZED HEALTH CARE EDUCATION/TRAINING PROGRAM | Admitting: HOSPITALIST
Payer: COMMERCIAL

## 2023-04-04 ENCOUNTER — APPOINTMENT (OUTPATIENT)
Dept: CT IMAGING | Age: 73
DRG: 871 | End: 2023-04-04
Payer: COMMERCIAL

## 2023-04-04 ENCOUNTER — APPOINTMENT (OUTPATIENT)
Dept: GENERAL RADIOLOGY | Age: 73
DRG: 871 | End: 2023-04-04
Payer: COMMERCIAL

## 2023-04-04 DIAGNOSIS — E08.11 DIABETIC KETOACIDOSIS WITH COMA ASSOCIATED WITH DIABETES MELLITUS DUE TO UNDERLYING CONDITION (HCC): ICD-10-CM

## 2023-04-04 DIAGNOSIS — I48.91 ATRIAL FIBRILLATION WITH RAPID VENTRICULAR RESPONSE (HCC): Primary | ICD-10-CM

## 2023-04-04 DIAGNOSIS — E87.20 LACTIC ACIDOSIS: ICD-10-CM

## 2023-04-04 LAB
ALBUMIN SERPL-MCNC: 4.5 G/DL (ref 3.4–5)
ALBUMIN/GLOB SERPL: 1.1 {RATIO} (ref 1.1–2.2)
ALP SERPL-CCNC: 75 U/L (ref 40–129)
ALT SERPL-CCNC: 9 U/L (ref 10–40)
ANION GAP SERPL CALCULATED.3IONS-SCNC: 36 MMOL/L (ref 3–16)
AST SERPL-CCNC: 16 U/L (ref 15–37)
BASOPHILS # BLD: 0.1 K/UL (ref 0–0.2)
BASOPHILS NFR BLD: 0.5 %
BETA-HYDROXYBUTYRATE: >8 MMOL/L (ref 0–0.27)
BILIRUB SERPL-MCNC: 0.5 MG/DL (ref 0–1)
BUN SERPL-MCNC: 21 MG/DL (ref 7–20)
CALCIUM SERPL-MCNC: 10.7 MG/DL (ref 8.3–10.6)
CHLORIDE SERPL-SCNC: 99 MMOL/L (ref 99–110)
CO2 SERPL-SCNC: 11 MMOL/L (ref 21–32)
CREAT SERPL-MCNC: 0.8 MG/DL (ref 0.6–1.2)
DEPRECATED RDW RBC AUTO: 15.5 % (ref 12.4–15.4)
EOSINOPHIL # BLD: 0 K/UL (ref 0–0.6)
EOSINOPHIL NFR BLD: 0 %
FLUAV RNA RESP QL NAA+PROBE: NOT DETECTED
FLUBV RNA RESP QL NAA+PROBE: NOT DETECTED
GFR SERPLBLD CREATININE-BSD FMLA CKD-EPI: >60 ML/MIN/{1.73_M2}
GLUCOSE BLD-MCNC: 191 MG/DL (ref 70–99)
GLUCOSE SERPL-MCNC: 226 MG/DL (ref 70–99)
HCT VFR BLD AUTO: 46.8 % (ref 36–48)
HGB BLD-MCNC: 14.7 G/DL (ref 12–16)
INR PPP: 1.44 (ref 0.84–1.16)
LACTATE BLDV-SCNC: 3.7 MMOL/L (ref 0.4–1.9)
LYMPHOCYTES # BLD: 0.9 K/UL (ref 1–5.1)
LYMPHOCYTES NFR BLD: 4.6 %
MCH RBC QN AUTO: 26.9 PG (ref 26–34)
MCHC RBC AUTO-ENTMCNC: 31.3 G/DL (ref 31–36)
MCV RBC AUTO: 86 FL (ref 80–100)
MONOCYTES # BLD: 0.4 K/UL (ref 0–1.3)
MONOCYTES NFR BLD: 1.9 %
NEUTROPHILS # BLD: 17.7 K/UL (ref 1.7–7.7)
NEUTROPHILS NFR BLD: 93 %
NT-PROBNP SERPL-MCNC: 5471 PG/ML (ref 0–449)
PERFORMED ON: ABNORMAL
PHOSPHATE SERPL-MCNC: 5.3 MG/DL (ref 2.5–4.9)
PLATELET # BLD AUTO: 347 K/UL (ref 135–450)
PMV BLD AUTO: 10.2 FL (ref 5–10.5)
POTASSIUM SERPL-SCNC: 4.2 MMOL/L (ref 3.5–5.1)
PROT SERPL-MCNC: 8.5 G/DL (ref 6.4–8.2)
PROTHROMBIN TIME: 17.5 SEC (ref 11.5–14.8)
RBC # BLD AUTO: 5.45 M/UL (ref 4–5.2)
SARS-COV-2 RNA RESP QL NAA+PROBE: NOT DETECTED
SODIUM SERPL-SCNC: 146 MMOL/L (ref 136–145)
TROPONIN T SERPL-MCNC: 0.03 NG/ML
WBC # BLD AUTO: 19.1 K/UL (ref 4–11)

## 2023-04-04 PROCEDURE — 83605 ASSAY OF LACTIC ACID: CPT

## 2023-04-04 PROCEDURE — 2500000003 HC RX 250 WO HCPCS: Performed by: STUDENT IN AN ORGANIZED HEALTH CARE EDUCATION/TRAINING PROGRAM

## 2023-04-04 PROCEDURE — 96376 TX/PRO/DX INJ SAME DRUG ADON: CPT

## 2023-04-04 PROCEDURE — 70450 CT HEAD/BRAIN W/O DYE: CPT

## 2023-04-04 PROCEDURE — 2580000003 HC RX 258: Performed by: STUDENT IN AN ORGANIZED HEALTH CARE EDUCATION/TRAINING PROGRAM

## 2023-04-04 PROCEDURE — 85025 COMPLETE CBC W/AUTO DIFF WBC: CPT

## 2023-04-04 PROCEDURE — 71045 X-RAY EXAM CHEST 1 VIEW: CPT

## 2023-04-04 PROCEDURE — 6360000002 HC RX W HCPCS: Performed by: STUDENT IN AN ORGANIZED HEALTH CARE EDUCATION/TRAINING PROGRAM

## 2023-04-04 PROCEDURE — 96368 THER/DIAG CONCURRENT INF: CPT

## 2023-04-04 PROCEDURE — 96375 TX/PRO/DX INJ NEW DRUG ADDON: CPT

## 2023-04-04 PROCEDURE — 93005 ELECTROCARDIOGRAM TRACING: CPT | Performed by: STUDENT IN AN ORGANIZED HEALTH CARE EDUCATION/TRAINING PROGRAM

## 2023-04-04 PROCEDURE — 84484 ASSAY OF TROPONIN QUANT: CPT

## 2023-04-04 PROCEDURE — 85610 PROTHROMBIN TIME: CPT

## 2023-04-04 PROCEDURE — 83880 ASSAY OF NATRIURETIC PEPTIDE: CPT

## 2023-04-04 PROCEDURE — 87636 SARSCOV2 & INF A&B AMP PRB: CPT

## 2023-04-04 PROCEDURE — 84100 ASSAY OF PHOSPHORUS: CPT

## 2023-04-04 PROCEDURE — 96366 THER/PROPH/DIAG IV INF ADDON: CPT

## 2023-04-04 PROCEDURE — 96365 THER/PROPH/DIAG IV INF INIT: CPT

## 2023-04-04 PROCEDURE — 80053 COMPREHEN METABOLIC PANEL: CPT

## 2023-04-04 PROCEDURE — 71250 CT THORAX DX C-: CPT

## 2023-04-04 PROCEDURE — 82010 KETONE BODYS QUAN: CPT

## 2023-04-04 PROCEDURE — 99285 EMERGENCY DEPT VISIT HI MDM: CPT

## 2023-04-04 RX ORDER — DEXTROSE, SODIUM CHLORIDE, AND POTASSIUM CHLORIDE 5; .45; .15 G/100ML; G/100ML; G/100ML
INJECTION INTRAVENOUS CONTINUOUS PRN
Status: DISCONTINUED | OUTPATIENT
Start: 2023-04-04 | End: 2023-04-05 | Stop reason: SDUPTHER

## 2023-04-04 RX ORDER — SODIUM CHLORIDE, SODIUM LACTATE, POTASSIUM CHLORIDE, AND CALCIUM CHLORIDE .6; .31; .03; .02 G/100ML; G/100ML; G/100ML; G/100ML
1000 INJECTION, SOLUTION INTRAVENOUS ONCE
Status: COMPLETED | OUTPATIENT
Start: 2023-04-04 | End: 2023-04-05

## 2023-04-04 RX ORDER — MAGNESIUM SULFATE 1 G/100ML
1000 INJECTION INTRAVENOUS PRN
Status: DISCONTINUED | OUTPATIENT
Start: 2023-04-04 | End: 2023-04-05 | Stop reason: SDUPTHER

## 2023-04-04 RX ORDER — POTASSIUM CHLORIDE 7.45 MG/ML
10 INJECTION INTRAVENOUS PRN
Status: DISCONTINUED | OUTPATIENT
Start: 2023-04-04 | End: 2023-04-05 | Stop reason: SDUPTHER

## 2023-04-04 RX ORDER — SODIUM CHLORIDE 450 MG/100ML
INJECTION, SOLUTION INTRAVENOUS CONTINUOUS
Status: DISCONTINUED | OUTPATIENT
Start: 2023-04-04 | End: 2023-04-05

## 2023-04-04 RX ORDER — DILTIAZEM HYDROCHLORIDE 5 MG/ML
10 INJECTION INTRAVENOUS ONCE
Status: COMPLETED | OUTPATIENT
Start: 2023-04-04 | End: 2023-04-04

## 2023-04-04 RX ORDER — 0.9 % SODIUM CHLORIDE 0.9 %
1000 INTRAVENOUS SOLUTION INTRAVENOUS ONCE
Status: COMPLETED | OUTPATIENT
Start: 2023-04-04 | End: 2023-04-04

## 2023-04-04 RX ORDER — LORAZEPAM 2 MG/ML
0.5 INJECTION INTRAMUSCULAR ONCE
Status: COMPLETED | OUTPATIENT
Start: 2023-04-04 | End: 2023-04-04

## 2023-04-04 RX ORDER — DILTIAZEM HYDROCHLORIDE 5 MG/ML
10 INJECTION INTRAVENOUS ONCE
Status: DISCONTINUED | OUTPATIENT
Start: 2023-04-04 | End: 2023-04-04 | Stop reason: ALTCHOICE

## 2023-04-04 RX ADMIN — DILTIAZEM HYDROCHLORIDE 5 MG/HR: 5 INJECTION INTRAVENOUS at 22:11

## 2023-04-04 RX ADMIN — DILTIAZEM HYDROCHLORIDE 10 MG: 5 INJECTION, SOLUTION INTRAVENOUS at 22:00

## 2023-04-04 RX ADMIN — DILTIAZEM HYDROCHLORIDE 10 MG: 5 INJECTION, SOLUTION INTRAVENOUS at 21:50

## 2023-04-04 RX ADMIN — SODIUM CHLORIDE 1000 ML: 9 INJECTION, SOLUTION INTRAVENOUS at 21:55

## 2023-04-04 RX ADMIN — LORAZEPAM 0.5 MG: 2 INJECTION INTRAMUSCULAR; INTRAVENOUS at 22:17

## 2023-04-04 RX ADMIN — SODIUM CHLORIDE, POTASSIUM CHLORIDE, SODIUM LACTATE AND CALCIUM CHLORIDE 1000 ML: 600; 310; 30; 20 INJECTION, SOLUTION INTRAVENOUS at 22:37

## 2023-04-05 PROBLEM — A41.9 SEPSIS (HCC): Status: ACTIVE | Noted: 2023-04-05

## 2023-04-05 LAB
ANION GAP SERPL CALCULATED.3IONS-SCNC: 15 MMOL/L (ref 3–16)
ANION GAP SERPL CALCULATED.3IONS-SCNC: 21 MMOL/L (ref 3–16)
BACTERIA URNS QL MICRO: ABNORMAL /HPF
BASE EXCESS BLDV CALC-SCNC: -6.4 MMOL/L (ref -3–3)
BILIRUB UR QL STRIP.AUTO: ABNORMAL
BUN SERPL-MCNC: 26 MG/DL (ref 7–20)
BUN SERPL-MCNC: 31 MG/DL (ref 7–20)
CALCIUM SERPL-MCNC: 9.1 MG/DL (ref 8.3–10.6)
CALCIUM SERPL-MCNC: 9.3 MG/DL (ref 8.3–10.6)
CHLORIDE SERPL-SCNC: 109 MMOL/L (ref 99–110)
CHLORIDE SERPL-SCNC: 111 MMOL/L (ref 99–110)
CHP ED QC CHECK: 198
CLARITY UR: CLEAR
CO2 BLDV-SCNC: 17 MMOL/L
CO2 SERPL-SCNC: 16 MMOL/L (ref 21–32)
CO2 SERPL-SCNC: 17 MMOL/L (ref 21–32)
COHGB MFR BLDV: 7 % (ref 0–1.5)
COLOR UR: YELLOW
CREAT SERPL-MCNC: 0.8 MG/DL (ref 0.6–1.2)
CREAT SERPL-MCNC: 1 MG/DL (ref 0.6–1.2)
DEPRECATED RDW RBC AUTO: 15.3 % (ref 12.4–15.4)
EKG ATRIAL RATE: 144 BPM
EKG DIAGNOSIS: NORMAL
EKG Q-T INTERVAL: 236 MS
EKG QRS DURATION: 88 MS
EKG QTC CALCULATION (BAZETT): 422 MS
EKG R AXIS: 76 DEGREES
EKG T AXIS: 257 DEGREES
EKG VENTRICULAR RATE: 192 BPM
EPI CELLS #/AREA URNS HPF: ABNORMAL /HPF (ref 0–5)
GFR SERPLBLD CREATININE-BSD FMLA CKD-EPI: 59 ML/MIN/{1.73_M2}
GFR SERPLBLD CREATININE-BSD FMLA CKD-EPI: >60 ML/MIN/{1.73_M2}
GLUCOSE BLD-MCNC: 107 MG/DL (ref 70–99)
GLUCOSE BLD-MCNC: 107 MG/DL (ref 70–99)
GLUCOSE BLD-MCNC: 119 MG/DL (ref 70–99)
GLUCOSE BLD-MCNC: 119 MG/DL (ref 70–99)
GLUCOSE BLD-MCNC: 120 MG/DL (ref 70–99)
GLUCOSE BLD-MCNC: 121 MG/DL (ref 70–99)
GLUCOSE BLD-MCNC: 125 MG/DL (ref 70–99)
GLUCOSE BLD-MCNC: 126 MG/DL (ref 70–99)
GLUCOSE BLD-MCNC: 129 MG/DL (ref 70–99)
GLUCOSE BLD-MCNC: 134 MG/DL (ref 70–99)
GLUCOSE BLD-MCNC: 140 MG/DL (ref 70–99)
GLUCOSE BLD-MCNC: 146 MG/DL (ref 70–99)
GLUCOSE BLD-MCNC: 150 MG/DL (ref 70–99)
GLUCOSE BLD-MCNC: 154 MG/DL (ref 70–99)
GLUCOSE BLD-MCNC: 163 MG/DL (ref 70–99)
GLUCOSE BLD-MCNC: 181 MG/DL
GLUCOSE BLD-MCNC: 181 MG/DL (ref 70–99)
GLUCOSE BLD-MCNC: 196 MG/DL (ref 70–99)
GLUCOSE BLD-MCNC: 198 MG/DL (ref 70–99)
GLUCOSE SERPL-MCNC: 170 MG/DL (ref 70–99)
GLUCOSE SERPL-MCNC: 200 MG/DL (ref 70–99)
GLUCOSE UR STRIP.AUTO-MCNC: 500 MG/DL
HCO3 BLDV-SCNC: 15.9 MMOL/L (ref 23–29)
HCT VFR BLD AUTO: 39.1 % (ref 36–48)
HGB BLD-MCNC: 12.3 G/DL (ref 12–16)
HGB UR QL STRIP.AUTO: ABNORMAL
KETONES UR STRIP.AUTO-MCNC: >=80 MG/DL
LACTATE BLDV-SCNC: 1.3 MMOL/L (ref 0.4–2)
LACTATE BLDV-SCNC: 1.9 MMOL/L (ref 0.4–2)
LACTATE BLDV-SCNC: 2 MMOL/L (ref 0.4–1.9)
LACTATE BLDV-SCNC: 2.3 MMOL/L (ref 0.4–1.9)
LEUKOCYTE ESTERASE UR QL STRIP.AUTO: ABNORMAL
MAGNESIUM SERPL-MCNC: 1.6 MG/DL (ref 1.8–2.4)
MAGNESIUM SERPL-MCNC: 2.5 MG/DL (ref 1.8–2.4)
MCH RBC QN AUTO: 27.2 PG (ref 26–34)
MCHC RBC AUTO-ENTMCNC: 31.6 G/DL (ref 31–36)
MCV RBC AUTO: 86.3 FL (ref 80–100)
METHGB MFR BLDV: 0.3 %
NITRITE UR QL STRIP.AUTO: NEGATIVE
O2 THERAPY: ABNORMAL
PCO2 BLDV: 24.2 MMHG (ref 40–50)
PERFORMED ON: ABNORMAL
PH BLDV: 7.43 [PH] (ref 7.35–7.45)
PH UR STRIP.AUTO: 6 [PH] (ref 5–8)
PHOSPHATE SERPL-MCNC: 2.1 MG/DL (ref 2.5–4.9)
PHOSPHATE SERPL-MCNC: 3.2 MG/DL (ref 2.5–4.9)
PLATELET # BLD AUTO: 221 K/UL (ref 135–450)
PMV BLD AUTO: 9.7 FL (ref 5–10.5)
PO2 BLDV: 81 MMHG (ref 25–40)
POTASSIUM SERPL-SCNC: 3.4 MMOL/L (ref 3.5–5.1)
POTASSIUM SERPL-SCNC: 4.2 MMOL/L (ref 3.5–5.1)
PROT UR STRIP.AUTO-MCNC: 100 MG/DL
RBC # BLD AUTO: 4.53 M/UL (ref 4–5.2)
RBC #/AREA URNS HPF: ABNORMAL /HPF (ref 0–4)
SAO2 % BLDV: 97 %
SODIUM SERPL-SCNC: 143 MMOL/L (ref 136–145)
SODIUM SERPL-SCNC: 146 MMOL/L (ref 136–145)
SP GR UR STRIP.AUTO: 1.02 (ref 1–1.03)
TROPONIN T SERPL-MCNC: 0.03 NG/ML
TROPONIN T SERPL-MCNC: 0.03 NG/ML
UA COMPLETE W REFLEX CULTURE PNL UR: YES
UA DIPSTICK W REFLEX MICRO PNL UR: YES
URN SPEC COLLECT METH UR: ABNORMAL
UROBILINOGEN UR STRIP-ACNC: 0.2 E.U./DL
WBC # BLD AUTO: 16.3 K/UL (ref 4–11)
WBC #/AREA URNS HPF: ABNORMAL /HPF (ref 0–5)

## 2023-04-05 PROCEDURE — 6370000000 HC RX 637 (ALT 250 FOR IP): Performed by: STUDENT IN AN ORGANIZED HEALTH CARE EDUCATION/TRAINING PROGRAM

## 2023-04-05 PROCEDURE — 93010 ELECTROCARDIOGRAM REPORT: CPT | Performed by: INTERNAL MEDICINE

## 2023-04-05 PROCEDURE — 2500000003 HC RX 250 WO HCPCS: Performed by: HOSPITALIST

## 2023-04-05 PROCEDURE — 2700000000 HC OXYGEN THERAPY PER DAY

## 2023-04-05 PROCEDURE — 96368 THER/DIAG CONCURRENT INF: CPT

## 2023-04-05 PROCEDURE — 2580000003 HC RX 258: Performed by: STUDENT IN AN ORGANIZED HEALTH CARE EDUCATION/TRAINING PROGRAM

## 2023-04-05 PROCEDURE — 2500000003 HC RX 250 WO HCPCS: Performed by: STUDENT IN AN ORGANIZED HEALTH CARE EDUCATION/TRAINING PROGRAM

## 2023-04-05 PROCEDURE — 85027 COMPLETE CBC AUTOMATED: CPT

## 2023-04-05 PROCEDURE — 6360000002 HC RX W HCPCS: Performed by: HOSPITALIST

## 2023-04-05 PROCEDURE — 99291 CRITICAL CARE FIRST HOUR: CPT | Performed by: INTERNAL MEDICINE

## 2023-04-05 PROCEDURE — 94761 N-INVAS EAR/PLS OXIMETRY MLT: CPT

## 2023-04-05 PROCEDURE — 87077 CULTURE AEROBIC IDENTIFY: CPT

## 2023-04-05 PROCEDURE — 2580000003 HC RX 258: Performed by: HOSPITALIST

## 2023-04-05 PROCEDURE — 6360000002 HC RX W HCPCS: Performed by: INTERNAL MEDICINE

## 2023-04-05 PROCEDURE — 84100 ASSAY OF PHOSPHORUS: CPT

## 2023-04-05 PROCEDURE — 6360000002 HC RX W HCPCS: Performed by: EMERGENCY MEDICINE

## 2023-04-05 PROCEDURE — 96366 THER/PROPH/DIAG IV INF ADDON: CPT

## 2023-04-05 PROCEDURE — 2500000003 HC RX 250 WO HCPCS: Performed by: INTERNAL MEDICINE

## 2023-04-05 PROCEDURE — 6370000000 HC RX 637 (ALT 250 FOR IP): Performed by: HOSPITALIST

## 2023-04-05 PROCEDURE — 83735 ASSAY OF MAGNESIUM: CPT

## 2023-04-05 PROCEDURE — 82803 BLOOD GASES ANY COMBINATION: CPT

## 2023-04-05 PROCEDURE — 87040 BLOOD CULTURE FOR BACTERIA: CPT

## 2023-04-05 PROCEDURE — 84484 ASSAY OF TROPONIN QUANT: CPT

## 2023-04-05 PROCEDURE — 83036 HEMOGLOBIN GLYCOSYLATED A1C: CPT

## 2023-04-05 PROCEDURE — 80048 BASIC METABOLIC PNL TOTAL CA: CPT

## 2023-04-05 PROCEDURE — 87086 URINE CULTURE/COLONY COUNT: CPT

## 2023-04-05 PROCEDURE — 2580000003 HC RX 258: Performed by: INTERNAL MEDICINE

## 2023-04-05 PROCEDURE — 6360000002 HC RX W HCPCS: Performed by: STUDENT IN AN ORGANIZED HEALTH CARE EDUCATION/TRAINING PROGRAM

## 2023-04-05 PROCEDURE — 6370000000 HC RX 637 (ALT 250 FOR IP): Performed by: INTERNAL MEDICINE

## 2023-04-05 PROCEDURE — 2000000000 HC ICU R&B

## 2023-04-05 PROCEDURE — 96365 THER/PROPH/DIAG IV INF INIT: CPT

## 2023-04-05 PROCEDURE — 81001 URINALYSIS AUTO W/SCOPE: CPT

## 2023-04-05 PROCEDURE — 36415 COLL VENOUS BLD VENIPUNCTURE: CPT

## 2023-04-05 PROCEDURE — 83605 ASSAY OF LACTIC ACID: CPT

## 2023-04-05 PROCEDURE — 96376 TX/PRO/DX INJ SAME DRUG ADON: CPT

## 2023-04-05 RX ORDER — ENOXAPARIN SODIUM 100 MG/ML
50 INJECTION SUBCUTANEOUS ONCE
Status: COMPLETED | OUTPATIENT
Start: 2023-04-05 | End: 2023-04-05

## 2023-04-05 RX ORDER — SODIUM CHLORIDE 450 MG/100ML
INJECTION, SOLUTION INTRAVENOUS CONTINUOUS
Status: DISCONTINUED | OUTPATIENT
Start: 2023-04-05 | End: 2023-04-05

## 2023-04-05 RX ORDER — FERROUS SULFATE 325(65) MG
325 TABLET ORAL
Status: ON HOLD | COMMUNITY

## 2023-04-05 RX ORDER — MAGNESIUM SULFATE 1 G/100ML
1000 INJECTION INTRAVENOUS PRN
Status: DISCONTINUED | OUTPATIENT
Start: 2023-04-05 | End: 2023-04-05

## 2023-04-05 RX ORDER — DIMETHICONE, OXYBENZONE, AND PADIMATE O 2; 2.5; 6.6 G/100G; G/100G; G/100G
STICK TOPICAL PRN
Status: DISCONTINUED | OUTPATIENT
Start: 2023-04-05 | End: 2023-04-16 | Stop reason: HOSPADM

## 2023-04-05 RX ORDER — DEXTROSE MONOHYDRATE 100 MG/ML
INJECTION, SOLUTION INTRAVENOUS CONTINUOUS PRN
Status: DISCONTINUED | OUTPATIENT
Start: 2023-04-05 | End: 2023-04-16 | Stop reason: HOSPADM

## 2023-04-05 RX ORDER — POLYETHYLENE GLYCOL 3350 17 G/17G
17 POWDER, FOR SOLUTION ORAL DAILY PRN
Status: ON HOLD | COMMUNITY

## 2023-04-05 RX ORDER — SODIUM CHLORIDE, SODIUM LACTATE, POTASSIUM CHLORIDE, CALCIUM CHLORIDE 600; 310; 30; 20 MG/100ML; MG/100ML; MG/100ML; MG/100ML
INJECTION, SOLUTION INTRAVENOUS ONCE
Status: COMPLETED | OUTPATIENT
Start: 2023-04-05 | End: 2023-04-05

## 2023-04-05 RX ORDER — DIGOXIN 0.25 MG/ML
500 INJECTION INTRAMUSCULAR; INTRAVENOUS ONCE
Status: COMPLETED | OUTPATIENT
Start: 2023-04-05 | End: 2023-04-05

## 2023-04-05 RX ORDER — POTASSIUM CHLORIDE 7.45 MG/ML
10 INJECTION INTRAVENOUS PRN
Status: DISCONTINUED | OUTPATIENT
Start: 2023-04-05 | End: 2023-04-05

## 2023-04-05 RX ORDER — CEFTRIAXONE 1 G/1
1000 INJECTION, POWDER, FOR SOLUTION INTRAMUSCULAR; INTRAVENOUS EVERY 24 HOURS
Status: ON HOLD | COMMUNITY
End: 2023-04-16 | Stop reason: HOSPADM

## 2023-04-05 RX ORDER — CHLORHEXIDINE GLUCONATE 0.12 MG/ML
15 RINSE ORAL 2 TIMES DAILY
Status: DISCONTINUED | OUTPATIENT
Start: 2023-04-05 | End: 2023-04-10

## 2023-04-05 RX ORDER — DILTIAZEM HYDROCHLORIDE 5 MG/ML
20 INJECTION INTRAVENOUS ONCE
Status: COMPLETED | OUTPATIENT
Start: 2023-04-05 | End: 2023-04-05

## 2023-04-05 RX ORDER — POLYETHYLENE GLYCOL 3350 17 G/17G
17 POWDER, FOR SOLUTION ORAL DAILY PRN
Status: DISCONTINUED | OUTPATIENT
Start: 2023-04-05 | End: 2023-04-16 | Stop reason: HOSPADM

## 2023-04-05 RX ORDER — ENOXAPARIN SODIUM 100 MG/ML
1 INJECTION SUBCUTANEOUS 2 TIMES DAILY
Status: DISCONTINUED | OUTPATIENT
Start: 2023-04-05 | End: 2023-04-05

## 2023-04-05 RX ORDER — GABAPENTIN 100 MG/1
100 CAPSULE ORAL 3 TIMES DAILY
Status: DISCONTINUED | OUTPATIENT
Start: 2023-04-05 | End: 2023-04-16 | Stop reason: HOSPADM

## 2023-04-05 RX ORDER — ENOXAPARIN SODIUM 100 MG/ML
1 INJECTION SUBCUTANEOUS 2 TIMES DAILY
Status: COMPLETED | OUTPATIENT
Start: 2023-04-05 | End: 2023-04-05

## 2023-04-05 RX ORDER — DIVALPROEX SODIUM 125 MG/1
125 CAPSULE, COATED PELLETS ORAL EVERY 8 HOURS SCHEDULED
Status: DISCONTINUED | OUTPATIENT
Start: 2023-04-05 | End: 2023-04-08

## 2023-04-05 RX ORDER — DEXTROSE, SODIUM CHLORIDE, AND POTASSIUM CHLORIDE 5; .45; .15 G/100ML; G/100ML; G/100ML
INJECTION INTRAVENOUS CONTINUOUS PRN
Status: DISCONTINUED | OUTPATIENT
Start: 2023-04-05 | End: 2023-04-05

## 2023-04-05 RX ORDER — KETOROLAC TROMETHAMINE 30 MG/ML
15 INJECTION, SOLUTION INTRAMUSCULAR; INTRAVENOUS ONCE
Status: COMPLETED | OUTPATIENT
Start: 2023-04-05 | End: 2023-04-05

## 2023-04-05 RX ORDER — MAGNESIUM SULFATE IN WATER 40 MG/ML
2000 INJECTION, SOLUTION INTRAVENOUS ONCE
Status: COMPLETED | OUTPATIENT
Start: 2023-04-05 | End: 2023-04-05

## 2023-04-05 RX ORDER — ENOXAPARIN SODIUM 100 MG/ML
40 INJECTION SUBCUTANEOUS DAILY
Status: DISCONTINUED | OUTPATIENT
Start: 2023-04-05 | End: 2023-04-05 | Stop reason: DRUGHIGH

## 2023-04-05 RX ORDER — BUSPIRONE HYDROCHLORIDE 5 MG/1
5 TABLET ORAL 2 TIMES DAILY
Status: DISCONTINUED | OUTPATIENT
Start: 2023-04-05 | End: 2023-04-16 | Stop reason: HOSPADM

## 2023-04-05 RX ORDER — OXYCODONE AND ACETAMINOPHEN 7.5; 325 MG/1; MG/1
1 TABLET ORAL 4 TIMES DAILY
Status: ON HOLD | COMMUNITY
End: 2023-04-16 | Stop reason: HOSPADM

## 2023-04-05 RX ORDER — MOXIFLOXACIN 5 MG/ML
2 SOLUTION/ DROPS OPHTHALMIC DAILY
Status: ON HOLD | COMMUNITY

## 2023-04-05 RX ORDER — DULOXETIN HYDROCHLORIDE 60 MG/1
60 CAPSULE, DELAYED RELEASE ORAL DAILY
Status: DISCONTINUED | OUTPATIENT
Start: 2023-04-05 | End: 2023-04-16

## 2023-04-05 RX ADMIN — POTASSIUM CHLORIDE, DEXTROSE MONOHYDRATE AND SODIUM CHLORIDE: 150; 5; 450 INJECTION, SOLUTION INTRAVENOUS at 06:33

## 2023-04-05 RX ADMIN — POTASSIUM CHLORIDE 10 MEQ: 7.45 INJECTION INTRAVENOUS at 06:28

## 2023-04-05 RX ADMIN — MUPIROCIN: 20 OINTMENT TOPICAL at 20:07

## 2023-04-05 RX ADMIN — MAGNESIUM SULFATE HEPTAHYDRATE 2000 MG: 40 INJECTION, SOLUTION INTRAVENOUS at 04:40

## 2023-04-05 RX ADMIN — SODIUM CHLORIDE, POTASSIUM CHLORIDE, SODIUM LACTATE AND CALCIUM CHLORIDE: 600; 310; 30; 20 INJECTION, SOLUTION INTRAVENOUS at 09:44

## 2023-04-05 RX ADMIN — DIGOXIN 500 MCG: 0.25 INJECTION INTRAMUSCULAR; INTRAVENOUS at 03:35

## 2023-04-05 RX ADMIN — DILTIAZEM HYDROCHLORIDE 15 MG/HR: 5 INJECTION INTRAVENOUS at 06:00

## 2023-04-05 RX ADMIN — POTASSIUM CHLORIDE 10 MEQ: 7.45 INJECTION INTRAVENOUS at 05:39

## 2023-04-05 RX ADMIN — CHLORHEXIDINE GLUCONATE 0.12% ORAL RINSE 15 ML: 1.2 LIQUID ORAL at 08:41

## 2023-04-05 RX ADMIN — DILTIAZEM HYDROCHLORIDE 20 MG: 5 INJECTION, SOLUTION INTRAVENOUS at 01:45

## 2023-04-05 RX ADMIN — SODIUM CHLORIDE 0.1 UNITS/KG/HR: 9 INJECTION, SOLUTION INTRAVENOUS at 01:03

## 2023-04-05 RX ADMIN — Medication: at 05:20

## 2023-04-05 RX ADMIN — POTASSIUM CHLORIDE 10 MEQ: 7.45 INJECTION INTRAVENOUS at 04:35

## 2023-04-05 RX ADMIN — CEFTRIAXONE SODIUM 1000 MG: 1 INJECTION, POWDER, FOR SOLUTION INTRAMUSCULAR; INTRAVENOUS at 11:04

## 2023-04-05 RX ADMIN — ENOXAPARIN SODIUM 40 MG: 100 INJECTION SUBCUTANEOUS at 08:41

## 2023-04-05 RX ADMIN — VANCOMYCIN HYDROCHLORIDE 2000 MG: 1 INJECTION, POWDER, LYOPHILIZED, FOR SOLUTION INTRAVENOUS at 01:55

## 2023-04-05 RX ADMIN — DILTIAZEM HYDROCHLORIDE 7.5 MG/HR: 5 INJECTION INTRAVENOUS at 18:30

## 2023-04-05 RX ADMIN — SODIUM BICARBONATE: 84 INJECTION, SOLUTION INTRAVENOUS at 09:39

## 2023-04-05 RX ADMIN — POTASSIUM CHLORIDE, DEXTROSE MONOHYDRATE AND SODIUM CHLORIDE: 150; 5; 450 INJECTION, SOLUTION INTRAVENOUS at 00:53

## 2023-04-05 RX ADMIN — SODIUM PHOSPHATE, MONOBASIC, MONOHYDRATE AND SODIUM PHOSPHATE, DIBASIC, ANHYDROUS 15 MMOL: 276; 142 INJECTION, SOLUTION INTRAVENOUS at 04:47

## 2023-04-05 RX ADMIN — SODIUM CHLORIDE 3.09 UNITS/HR: 9 INJECTION, SOLUTION INTRAVENOUS at 09:50

## 2023-04-05 RX ADMIN — POTASSIUM CHLORIDE, DEXTROSE MONOHYDRATE AND SODIUM CHLORIDE: 150; 5; 450 INJECTION, SOLUTION INTRAVENOUS at 04:35

## 2023-04-05 RX ADMIN — CHLORHEXIDINE GLUCONATE 0.12% ORAL RINSE 15 ML: 1.2 LIQUID ORAL at 20:05

## 2023-04-05 RX ADMIN — ENOXAPARIN SODIUM 50 MG: 100 INJECTION SUBCUTANEOUS at 09:44

## 2023-04-05 RX ADMIN — ENOXAPARIN SODIUM 90 MG: 100 INJECTION SUBCUTANEOUS at 20:05

## 2023-04-05 RX ADMIN — CEFEPIME 2000 MG: 2 INJECTION, POWDER, FOR SOLUTION INTRAVENOUS at 01:14

## 2023-04-05 RX ADMIN — KETOROLAC TROMETHAMINE 15 MG: 30 INJECTION, SOLUTION INTRAMUSCULAR at 03:38

## 2023-04-05 RX ADMIN — SODIUM BICARBONATE: 84 INJECTION, SOLUTION INTRAVENOUS at 17:46

## 2023-04-05 RX ADMIN — MUPIROCIN: 20 OINTMENT TOPICAL at 08:41

## 2023-04-05 ASSESSMENT — LIFESTYLE VARIABLES
HOW OFTEN DO YOU HAVE A DRINK CONTAINING ALCOHOL: NEVER
HOW MANY STANDARD DRINKS CONTAINING ALCOHOL DO YOU HAVE ON A TYPICAL DAY: PATIENT DOES NOT DRINK

## 2023-04-05 ASSESSMENT — PAIN SCALES - GENERAL
PAINLEVEL_OUTOF10: 0
PAINLEVEL_OUTOF10: 0

## 2023-04-06 PROBLEM — E87.20 LACTIC ACIDOSIS: Status: ACTIVE | Noted: 2023-04-06

## 2023-04-06 PROBLEM — E08.11 DIABETIC KETOACIDOSIS WITH COMA ASSOCIATED WITH DIABETES MELLITUS DUE TO UNDERLYING CONDITION (HCC): Status: ACTIVE | Noted: 2023-04-06

## 2023-04-06 PROBLEM — K59.00 CONSTIPATION: Status: ACTIVE | Noted: 2023-04-06

## 2023-04-06 PROBLEM — N30.00 ACUTE CYSTITIS WITHOUT HEMATURIA: Status: ACTIVE | Noted: 2023-04-06

## 2023-04-06 LAB
ANION GAP SERPL CALCULATED.3IONS-SCNC: 14 MMOL/L (ref 3–16)
BACTERIA UR CULT: ABNORMAL
BASOPHILS # BLD: 0 K/UL (ref 0–0.2)
BASOPHILS NFR BLD: 0.4 %
BUN SERPL-MCNC: 19 MG/DL (ref 7–20)
CALCIUM SERPL-MCNC: 8.4 MG/DL (ref 8.3–10.6)
CHLORIDE SERPL-SCNC: 108 MMOL/L (ref 99–110)
CO2 SERPL-SCNC: 22 MMOL/L (ref 21–32)
CREAT SERPL-MCNC: <0.5 MG/DL (ref 0.6–1.2)
DEPRECATED RDW RBC AUTO: 15.5 % (ref 12.4–15.4)
EOSINOPHIL # BLD: 0 K/UL (ref 0–0.6)
EOSINOPHIL NFR BLD: 0.4 %
EST. AVERAGE GLUCOSE BLD GHB EST-MCNC: 174.3 MG/DL
GFR SERPLBLD CREATININE-BSD FMLA CKD-EPI: >60 ML/MIN/{1.73_M2}
GLUCOSE BLD-MCNC: 100 MG/DL (ref 70–99)
GLUCOSE BLD-MCNC: 102 MG/DL (ref 70–99)
GLUCOSE BLD-MCNC: 106 MG/DL (ref 70–99)
GLUCOSE BLD-MCNC: 108 MG/DL (ref 70–99)
GLUCOSE BLD-MCNC: 111 MG/DL (ref 70–99)
GLUCOSE BLD-MCNC: 115 MG/DL (ref 70–99)
GLUCOSE BLD-MCNC: 125 MG/DL (ref 70–99)
GLUCOSE BLD-MCNC: 135 MG/DL (ref 70–99)
GLUCOSE BLD-MCNC: 69 MG/DL (ref 70–99)
GLUCOSE BLD-MCNC: 95 MG/DL (ref 70–99)
GLUCOSE SERPL-MCNC: 131 MG/DL (ref 70–99)
HBA1C MFR BLD: 7.7 %
HCT VFR BLD AUTO: 37 % (ref 36–48)
HGB BLD-MCNC: 11.9 G/DL (ref 12–16)
LYMPHOCYTES # BLD: 1.6 K/UL (ref 1–5.1)
LYMPHOCYTES NFR BLD: 16.4 %
MCH RBC QN AUTO: 27.8 PG (ref 26–34)
MCHC RBC AUTO-ENTMCNC: 32 G/DL (ref 31–36)
MCV RBC AUTO: 86.9 FL (ref 80–100)
MONOCYTES # BLD: 0.8 K/UL (ref 0–1.3)
MONOCYTES NFR BLD: 7.6 %
NEUTROPHILS # BLD: 7.5 K/UL (ref 1.7–7.7)
NEUTROPHILS NFR BLD: 75.2 %
ORGANISM: ABNORMAL
PERFORMED ON: ABNORMAL
PERFORMED ON: NORMAL
PLATELET # BLD AUTO: 165 K/UL (ref 135–450)
PMV BLD AUTO: 9.8 FL (ref 5–10.5)
POTASSIUM SERPL-SCNC: 3.9 MMOL/L (ref 3.5–5.1)
RBC # BLD AUTO: 4.26 M/UL (ref 4–5.2)
SODIUM SERPL-SCNC: 144 MMOL/L (ref 136–145)
WBC # BLD AUTO: 10 K/UL (ref 4–11)

## 2023-04-06 PROCEDURE — 99233 SBSQ HOSP IP/OBS HIGH 50: CPT | Performed by: INTERNAL MEDICINE

## 2023-04-06 PROCEDURE — 2580000003 HC RX 258: Performed by: INTERNAL MEDICINE

## 2023-04-06 PROCEDURE — 1200000000 HC SEMI PRIVATE

## 2023-04-06 PROCEDURE — 6370000000 HC RX 637 (ALT 250 FOR IP)

## 2023-04-06 PROCEDURE — 2500000003 HC RX 250 WO HCPCS: Performed by: INTERNAL MEDICINE

## 2023-04-06 PROCEDURE — 6370000000 HC RX 637 (ALT 250 FOR IP): Performed by: HOSPITALIST

## 2023-04-06 PROCEDURE — 6370000000 HC RX 637 (ALT 250 FOR IP): Performed by: INTERNAL MEDICINE

## 2023-04-06 PROCEDURE — 80048 BASIC METABOLIC PNL TOTAL CA: CPT

## 2023-04-06 PROCEDURE — 85025 COMPLETE CBC W/AUTO DIFF WBC: CPT

## 2023-04-06 PROCEDURE — 92610 EVALUATE SWALLOWING FUNCTION: CPT

## 2023-04-06 PROCEDURE — 6360000002 HC RX W HCPCS: Performed by: INTERNAL MEDICINE

## 2023-04-06 PROCEDURE — 92526 ORAL FUNCTION THERAPY: CPT

## 2023-04-06 PROCEDURE — 2580000003 HC RX 258: Performed by: HOSPITALIST

## 2023-04-06 PROCEDURE — 36415 COLL VENOUS BLD VENIPUNCTURE: CPT

## 2023-04-06 RX ORDER — BUSPIRONE HYDROCHLORIDE 5 MG/1
5 TABLET ORAL ONCE
Status: COMPLETED | OUTPATIENT
Start: 2023-04-06 | End: 2023-04-06

## 2023-04-06 RX ORDER — SODIUM CHLORIDE, SODIUM LACTATE, POTASSIUM CHLORIDE, CALCIUM CHLORIDE 600; 310; 30; 20 MG/100ML; MG/100ML; MG/100ML; MG/100ML
INJECTION, SOLUTION INTRAVENOUS CONTINUOUS
Status: DISCONTINUED | OUTPATIENT
Start: 2023-04-06 | End: 2023-04-07

## 2023-04-06 RX ORDER — DILTIAZEM HYDROCHLORIDE 60 MG/1
30 TABLET, FILM COATED ORAL EVERY 6 HOURS SCHEDULED
Status: DISCONTINUED | OUTPATIENT
Start: 2023-04-06 | End: 2023-04-07

## 2023-04-06 RX ORDER — DULOXETIN HYDROCHLORIDE 60 MG/1
60 CAPSULE, DELAYED RELEASE ORAL ONCE
Status: COMPLETED | OUTPATIENT
Start: 2023-04-06 | End: 2023-04-06

## 2023-04-06 RX ORDER — DILTIAZEM HYDROCHLORIDE 60 MG/1
TABLET, FILM COATED ORAL
Status: COMPLETED
Start: 2023-04-06 | End: 2023-04-06

## 2023-04-06 RX ORDER — METOPROLOL TARTRATE 5 MG/5ML
5 INJECTION INTRAVENOUS EVERY 4 HOURS PRN
Status: DISCONTINUED | OUTPATIENT
Start: 2023-04-06 | End: 2023-04-09

## 2023-04-06 RX ADMIN — CHLORHEXIDINE GLUCONATE 0.12% ORAL RINSE 15 ML: 1.2 LIQUID ORAL at 21:58

## 2023-04-06 RX ADMIN — DILTIAZEM HYDROCHLORIDE 30 MG: 60 TABLET, FILM COATED ORAL at 13:28

## 2023-04-06 RX ADMIN — DILTIAZEM HYDROCHLORIDE 30 MG: 60 TABLET, FILM COATED ORAL at 09:00

## 2023-04-06 RX ADMIN — SODIUM CHLORIDE, POTASSIUM CHLORIDE, SODIUM LACTATE AND CALCIUM CHLORIDE: 600; 310; 30; 20 INJECTION, SOLUTION INTRAVENOUS at 13:32

## 2023-04-06 RX ADMIN — DILTIAZEM HYDROCHLORIDE 30 MG: 60 TABLET, FILM COATED ORAL at 18:04

## 2023-04-06 RX ADMIN — MUPIROCIN: 20 OINTMENT TOPICAL at 08:56

## 2023-04-06 RX ADMIN — CHLORHEXIDINE GLUCONATE 0.12% ORAL RINSE 15 ML: 1.2 LIQUID ORAL at 08:56

## 2023-04-06 RX ADMIN — DIVALPROEX SODIUM 125 MG: 125 CAPSULE, COATED PELLETS ORAL at 21:56

## 2023-04-06 RX ADMIN — BUSPIRONE HYDROCHLORIDE 5 MG: 5 TABLET ORAL at 13:28

## 2023-04-06 RX ADMIN — DULOXETINE HYDROCHLORIDE 60 MG: 60 CAPSULE, DELAYED RELEASE ORAL at 13:28

## 2023-04-06 RX ADMIN — GABAPENTIN 100 MG: 100 CAPSULE ORAL at 13:27

## 2023-04-06 RX ADMIN — DEXTROSE MONOHYDRATE 125 ML: 10 INJECTION, SOLUTION INTRAVENOUS at 03:16

## 2023-04-06 RX ADMIN — MUPIROCIN: 20 OINTMENT TOPICAL at 21:55

## 2023-04-06 RX ADMIN — BUSPIRONE HYDROCHLORIDE 5 MG: 5 TABLET ORAL at 21:54

## 2023-04-06 RX ADMIN — CEFTRIAXONE SODIUM 1000 MG: 1 INJECTION, POWDER, FOR SOLUTION INTRAMUSCULAR; INTRAVENOUS at 08:59

## 2023-04-06 RX ADMIN — DILTIAZEM HYDROCHLORIDE 30 MG: 60 TABLET, FILM COATED ORAL at 23:47

## 2023-04-06 RX ADMIN — GABAPENTIN 100 MG: 100 CAPSULE ORAL at 21:54

## 2023-04-06 RX ADMIN — DIVALPROEX SODIUM 125 MG: 125 CAPSULE, COATED PELLETS ORAL at 13:27

## 2023-04-06 RX ADMIN — RIVAROXABAN 20 MG: 20 TABLET, FILM COATED ORAL at 18:05

## 2023-04-06 RX ADMIN — SODIUM BICARBONATE: 84 INJECTION, SOLUTION INTRAVENOUS at 01:13

## 2023-04-06 ASSESSMENT — PAIN SCALES - GENERAL
PAINLEVEL_OUTOF10: 0

## 2023-04-07 PROBLEM — G93.41 METABOLIC ENCEPHALOPATHY: Status: ACTIVE | Noted: 2023-04-07

## 2023-04-07 PROBLEM — E11.649 UNCONTROLLED DIABETES MELLITUS WITH HYPOGLYCEMIA (HCC): Status: ACTIVE | Noted: 2023-04-07

## 2023-04-07 LAB
ANION GAP SERPL CALCULATED.3IONS-SCNC: 15 MMOL/L (ref 3–16)
BASOPHILS # BLD: 0 K/UL (ref 0–0.2)
BASOPHILS NFR BLD: 0.3 %
BUN SERPL-MCNC: 7 MG/DL (ref 7–20)
CALCIUM SERPL-MCNC: 9.2 MG/DL (ref 8.3–10.6)
CHLORIDE SERPL-SCNC: 100 MMOL/L (ref 99–110)
CO2 SERPL-SCNC: 29 MMOL/L (ref 21–32)
CREAT SERPL-MCNC: <0.5 MG/DL (ref 0.6–1.2)
DEPRECATED RDW RBC AUTO: 15.2 % (ref 12.4–15.4)
EOSINOPHIL # BLD: 0.2 K/UL (ref 0–0.6)
EOSINOPHIL NFR BLD: 2 %
GFR SERPLBLD CREATININE-BSD FMLA CKD-EPI: >60 ML/MIN/{1.73_M2}
GLUCOSE BLD-MCNC: 108 MG/DL (ref 70–99)
GLUCOSE BLD-MCNC: 162 MG/DL (ref 70–99)
GLUCOSE BLD-MCNC: 173 MG/DL (ref 70–99)
GLUCOSE BLD-MCNC: 211 MG/DL (ref 70–99)
GLUCOSE BLD-MCNC: 221 MG/DL (ref 70–99)
GLUCOSE BLD-MCNC: 90 MG/DL (ref 70–99)
GLUCOSE SERPL-MCNC: 105 MG/DL (ref 70–99)
HCT VFR BLD AUTO: 40.2 % (ref 36–48)
HGB BLD-MCNC: 12.7 G/DL (ref 12–16)
LYMPHOCYTES # BLD: 1.2 K/UL (ref 1–5.1)
LYMPHOCYTES NFR BLD: 13.5 %
MAGNESIUM SERPL-MCNC: 1.7 MG/DL (ref 1.8–2.4)
MCH RBC QN AUTO: 27.5 PG (ref 26–34)
MCHC RBC AUTO-ENTMCNC: 31.5 G/DL (ref 31–36)
MCV RBC AUTO: 87.2 FL (ref 80–100)
MONOCYTES # BLD: 0.7 K/UL (ref 0–1.3)
MONOCYTES NFR BLD: 7.8 %
NEUTROPHILS # BLD: 6.5 K/UL (ref 1.7–7.7)
NEUTROPHILS NFR BLD: 76.4 %
PERFORMED ON: ABNORMAL
PERFORMED ON: NORMAL
PLATELET # BLD AUTO: 162 K/UL (ref 135–450)
PMV BLD AUTO: 9.2 FL (ref 5–10.5)
POTASSIUM SERPL-SCNC: 2.7 MMOL/L (ref 3.5–5.1)
RBC # BLD AUTO: 4.61 M/UL (ref 4–5.2)
SODIUM SERPL-SCNC: 144 MMOL/L (ref 136–145)
WBC # BLD AUTO: 8.5 K/UL (ref 4–11)

## 2023-04-07 PROCEDURE — 6360000002 HC RX W HCPCS: Performed by: INTERNAL MEDICINE

## 2023-04-07 PROCEDURE — 6370000000 HC RX 637 (ALT 250 FOR IP): Performed by: INTERNAL MEDICINE

## 2023-04-07 PROCEDURE — 2700000000 HC OXYGEN THERAPY PER DAY

## 2023-04-07 PROCEDURE — 1200000000 HC SEMI PRIVATE

## 2023-04-07 PROCEDURE — 36415 COLL VENOUS BLD VENIPUNCTURE: CPT

## 2023-04-07 PROCEDURE — 2500000003 HC RX 250 WO HCPCS: Performed by: INTERNAL MEDICINE

## 2023-04-07 PROCEDURE — 80048 BASIC METABOLIC PNL TOTAL CA: CPT

## 2023-04-07 PROCEDURE — 85025 COMPLETE CBC W/AUTO DIFF WBC: CPT

## 2023-04-07 PROCEDURE — 94761 N-INVAS EAR/PLS OXIMETRY MLT: CPT

## 2023-04-07 PROCEDURE — 2580000003 HC RX 258: Performed by: INTERNAL MEDICINE

## 2023-04-07 PROCEDURE — 83735 ASSAY OF MAGNESIUM: CPT

## 2023-04-07 PROCEDURE — 99233 SBSQ HOSP IP/OBS HIGH 50: CPT | Performed by: INTERNAL MEDICINE

## 2023-04-07 RX ORDER — INSULIN LISPRO 100 [IU]/ML
0-4 INJECTION, SOLUTION INTRAVENOUS; SUBCUTANEOUS NIGHTLY
Status: DISCONTINUED | OUTPATIENT
Start: 2023-04-07 | End: 2023-04-12

## 2023-04-07 RX ORDER — INSULIN LISPRO 100 [IU]/ML
0-8 INJECTION, SOLUTION INTRAVENOUS; SUBCUTANEOUS
Status: DISCONTINUED | OUTPATIENT
Start: 2023-04-07 | End: 2023-04-12

## 2023-04-07 RX ORDER — FLUCONAZOLE 100 MG/1
200 TABLET ORAL DAILY
Status: DISPENSED | OUTPATIENT
Start: 2023-04-07 | End: 2023-04-12

## 2023-04-07 RX ORDER — MAGNESIUM SULFATE 1 G/100ML
1000 INJECTION INTRAVENOUS ONCE
Status: COMPLETED | OUTPATIENT
Start: 2023-04-07 | End: 2023-04-07

## 2023-04-07 RX ORDER — DILTIAZEM HYDROCHLORIDE 60 MG/1
60 TABLET, FILM COATED ORAL EVERY 6 HOURS SCHEDULED
Status: DISCONTINUED | OUTPATIENT
Start: 2023-04-07 | End: 2023-04-08

## 2023-04-07 RX ADMIN — FLUCONAZOLE 200 MG: 100 TABLET ORAL at 13:41

## 2023-04-07 RX ADMIN — MUPIROCIN: 20 OINTMENT TOPICAL at 08:37

## 2023-04-07 RX ADMIN — METOPROLOL TARTRATE 5 MG: 5 INJECTION INTRAVENOUS at 23:25

## 2023-04-07 RX ADMIN — INSULIN LISPRO 2 UNITS: 100 INJECTION, SOLUTION INTRAVENOUS; SUBCUTANEOUS at 13:36

## 2023-04-07 RX ADMIN — DIVALPROEX SODIUM 125 MG: 125 CAPSULE, COATED PELLETS ORAL at 13:41

## 2023-04-07 RX ADMIN — CHLORHEXIDINE GLUCONATE 0.12% ORAL RINSE 15 ML: 1.2 LIQUID ORAL at 22:26

## 2023-04-07 RX ADMIN — POTASSIUM BICARBONATE 40 MEQ: 782 TABLET, EFFERVESCENT ORAL at 10:58

## 2023-04-07 RX ADMIN — DILTIAZEM HYDROCHLORIDE 60 MG: 60 TABLET, FILM COATED ORAL at 11:53

## 2023-04-07 RX ADMIN — GABAPENTIN 100 MG: 100 CAPSULE ORAL at 13:41

## 2023-04-07 RX ADMIN — GABAPENTIN 100 MG: 100 CAPSULE ORAL at 22:25

## 2023-04-07 RX ADMIN — DIVALPROEX SODIUM 125 MG: 125 CAPSULE, COATED PELLETS ORAL at 22:25

## 2023-04-07 RX ADMIN — DULOXETINE HYDROCHLORIDE 60 MG: 60 CAPSULE, DELAYED RELEASE ORAL at 08:37

## 2023-04-07 RX ADMIN — GABAPENTIN 100 MG: 100 CAPSULE ORAL at 08:37

## 2023-04-07 RX ADMIN — MICONAZOLE NITRATE: 2 OINTMENT TOPICAL at 22:26

## 2023-04-07 RX ADMIN — BUSPIRONE HYDROCHLORIDE 5 MG: 5 TABLET ORAL at 22:25

## 2023-04-07 RX ADMIN — BUSPIRONE HYDROCHLORIDE 5 MG: 5 TABLET ORAL at 08:37

## 2023-04-07 RX ADMIN — MAGNESIUM SULFATE HEPTAHYDRATE 1000 MG: 1 INJECTION, SOLUTION INTRAVENOUS at 08:33

## 2023-04-07 RX ADMIN — POTASSIUM BICARBONATE 40 MEQ: 782 TABLET, EFFERVESCENT ORAL at 08:36

## 2023-04-07 RX ADMIN — CEFTRIAXONE SODIUM 1000 MG: 1 INJECTION, POWDER, FOR SOLUTION INTRAMUSCULAR; INTRAVENOUS at 10:21

## 2023-04-07 RX ADMIN — DILTIAZEM HYDROCHLORIDE 30 MG: 60 TABLET, FILM COATED ORAL at 05:58

## 2023-04-07 RX ADMIN — MUPIROCIN: 20 OINTMENT TOPICAL at 22:26

## 2023-04-07 RX ADMIN — MICONAZOLE NITRATE: 2 OINTMENT TOPICAL at 15:25

## 2023-04-07 RX ADMIN — RIVAROXABAN 20 MG: 20 TABLET, FILM COATED ORAL at 18:00

## 2023-04-07 RX ADMIN — CHLORHEXIDINE GLUCONATE 0.12% ORAL RINSE 15 ML: 1.2 LIQUID ORAL at 08:37

## 2023-04-07 RX ADMIN — DILTIAZEM HYDROCHLORIDE 60 MG: 60 TABLET, FILM COATED ORAL at 18:00

## 2023-04-07 RX ADMIN — DIVALPROEX SODIUM 125 MG: 125 CAPSULE, COATED PELLETS ORAL at 05:59

## 2023-04-07 ASSESSMENT — PAIN SCALES - GENERAL: PAINLEVEL_OUTOF10: 2

## 2023-04-08 ENCOUNTER — APPOINTMENT (OUTPATIENT)
Dept: GENERAL RADIOLOGY | Age: 73
DRG: 871 | End: 2023-04-08
Payer: COMMERCIAL

## 2023-04-08 LAB
ALBUMIN SERPL-MCNC: 3.8 G/DL (ref 3.4–5)
ANION GAP SERPL CALCULATED.3IONS-SCNC: 11 MMOL/L (ref 3–16)
ANION GAP SERPL CALCULATED.3IONS-SCNC: 9 MMOL/L (ref 3–16)
BASE EXCESS BLDV CALC-SCNC: 5.9 MMOL/L (ref -3–3)
BASOPHILS # BLD: 0 K/UL (ref 0–0.2)
BASOPHILS NFR BLD: 0.4 %
BUN SERPL-MCNC: 4 MG/DL (ref 7–20)
BUN SERPL-MCNC: 4 MG/DL (ref 7–20)
CALCIUM SERPL-MCNC: 9.2 MG/DL (ref 8.3–10.6)
CALCIUM SERPL-MCNC: 9.5 MG/DL (ref 8.3–10.6)
CHLORIDE SERPL-SCNC: 97 MMOL/L (ref 99–110)
CHLORIDE SERPL-SCNC: 97 MMOL/L (ref 99–110)
CO2 BLDV-SCNC: 31 MMOL/L
CO2 SERPL-SCNC: 31 MMOL/L (ref 21–32)
CO2 SERPL-SCNC: 34 MMOL/L (ref 21–32)
COHGB MFR BLDV: 6.2 % (ref 0–1.5)
CREAT SERPL-MCNC: <0.5 MG/DL (ref 0.6–1.2)
CREAT SERPL-MCNC: <0.5 MG/DL (ref 0.6–1.2)
DEPRECATED RDW RBC AUTO: 15.1 % (ref 12.4–15.4)
EKG ATRIAL RATE: 136 BPM
EKG DIAGNOSIS: NORMAL
EKG Q-T INTERVAL: 336 MS
EKG QRS DURATION: 90 MS
EKG QTC CALCULATION (BAZETT): 444 MS
EKG R AXIS: 69 DEGREES
EKG T AXIS: 264 DEGREES
EKG VENTRICULAR RATE: 105 BPM
EOSINOPHIL # BLD: 0 K/UL (ref 0–0.6)
EOSINOPHIL NFR BLD: 0.4 %
GFR SERPLBLD CREATININE-BSD FMLA CKD-EPI: >60 ML/MIN/{1.73_M2}
GFR SERPLBLD CREATININE-BSD FMLA CKD-EPI: >60 ML/MIN/{1.73_M2}
GLUCOSE BLD-MCNC: 137 MG/DL (ref 70–99)
GLUCOSE BLD-MCNC: 189 MG/DL (ref 70–99)
GLUCOSE BLD-MCNC: 326 MG/DL (ref 70–99)
GLUCOSE BLD-MCNC: 329 MG/DL (ref 70–99)
GLUCOSE SERPL-MCNC: 141 MG/DL (ref 70–99)
GLUCOSE SERPL-MCNC: 164 MG/DL (ref 70–99)
HCO3 BLDV-SCNC: 29.3 MMOL/L (ref 23–29)
HCT VFR BLD AUTO: 40.1 % (ref 36–48)
HGB BLD-MCNC: 12.9 G/DL (ref 12–16)
LYMPHOCYTES # BLD: 0.8 K/UL (ref 1–5.1)
LYMPHOCYTES NFR BLD: 10.5 %
MAGNESIUM SERPL-MCNC: 1.9 MG/DL (ref 1.8–2.4)
MCH RBC QN AUTO: 27.4 PG (ref 26–34)
MCHC RBC AUTO-ENTMCNC: 32.2 G/DL (ref 31–36)
MCV RBC AUTO: 85.1 FL (ref 80–100)
METHGB MFR BLDV: 0.3 %
MONOCYTES # BLD: 0.9 K/UL (ref 0–1.3)
MONOCYTES NFR BLD: 11.4 %
NEUTROPHILS # BLD: 5.9 K/UL (ref 1.7–7.7)
NEUTROPHILS NFR BLD: 77.3 %
O2 THERAPY: ABNORMAL
PCO2 BLDV: 38.4 MMHG (ref 40–50)
PERFORMED ON: ABNORMAL
PH BLDV: 7.5 [PH] (ref 7.35–7.45)
PHOSPHATE SERPL-MCNC: 1.8 MG/DL (ref 2.5–4.9)
PLATELET # BLD AUTO: 148 K/UL (ref 135–450)
PMV BLD AUTO: 9.3 FL (ref 5–10.5)
PO2 BLDV: 65.9 MMHG (ref 25–40)
POTASSIUM SERPL-SCNC: 3 MMOL/L (ref 3.5–5.1)
POTASSIUM SERPL-SCNC: 3.5 MMOL/L (ref 3.5–5.1)
RBC # BLD AUTO: 4.71 M/UL (ref 4–5.2)
SAO2 % BLDV: 95 %
SODIUM SERPL-SCNC: 139 MMOL/L (ref 136–145)
SODIUM SERPL-SCNC: 140 MMOL/L (ref 136–145)
TROPONIN T SERPL-MCNC: <0.01 NG/ML
TSH SERPL DL<=0.005 MIU/L-ACNC: 0.66 UIU/ML (ref 0.27–4.2)
WBC # BLD AUTO: 7.7 K/UL (ref 4–11)

## 2023-04-08 PROCEDURE — 85025 COMPLETE CBC W/AUTO DIFF WBC: CPT

## 2023-04-08 PROCEDURE — 87040 BLOOD CULTURE FOR BACTERIA: CPT

## 2023-04-08 PROCEDURE — 2700000000 HC OXYGEN THERAPY PER DAY

## 2023-04-08 PROCEDURE — 83735 ASSAY OF MAGNESIUM: CPT

## 2023-04-08 PROCEDURE — 82803 BLOOD GASES ANY COMBINATION: CPT

## 2023-04-08 PROCEDURE — 93010 ELECTROCARDIOGRAM REPORT: CPT | Performed by: INTERNAL MEDICINE

## 2023-04-08 PROCEDURE — 6370000000 HC RX 637 (ALT 250 FOR IP): Performed by: INTERNAL MEDICINE

## 2023-04-08 PROCEDURE — 71045 X-RAY EXAM CHEST 1 VIEW: CPT

## 2023-04-08 PROCEDURE — 84443 ASSAY THYROID STIM HORMONE: CPT

## 2023-04-08 PROCEDURE — 94640 AIRWAY INHALATION TREATMENT: CPT

## 2023-04-08 PROCEDURE — 2060000000 HC ICU INTERMEDIATE R&B

## 2023-04-08 PROCEDURE — 2580000003 HC RX 258: Performed by: HOSPITALIST

## 2023-04-08 PROCEDURE — 87150 DNA/RNA AMPLIFIED PROBE: CPT

## 2023-04-08 PROCEDURE — 84484 ASSAY OF TROPONIN QUANT: CPT

## 2023-04-08 PROCEDURE — 80069 RENAL FUNCTION PANEL: CPT

## 2023-04-08 PROCEDURE — 6360000002 HC RX W HCPCS: Performed by: HOSPITALIST

## 2023-04-08 PROCEDURE — 94761 N-INVAS EAR/PLS OXIMETRY MLT: CPT

## 2023-04-08 PROCEDURE — 2500000003 HC RX 250 WO HCPCS: Performed by: HOSPITALIST

## 2023-04-08 PROCEDURE — 93005 ELECTROCARDIOGRAM TRACING: CPT | Performed by: HOSPITALIST

## 2023-04-08 PROCEDURE — 36415 COLL VENOUS BLD VENIPUNCTURE: CPT

## 2023-04-08 RX ORDER — METHYLPREDNISOLONE SODIUM SUCCINATE 125 MG/2ML
125 INJECTION, POWDER, LYOPHILIZED, FOR SOLUTION INTRAMUSCULAR; INTRAVENOUS DAILY
Status: DISCONTINUED | OUTPATIENT
Start: 2023-04-08 | End: 2023-04-10

## 2023-04-08 RX ORDER — DILTIAZEM HYDROCHLORIDE 5 MG/ML
5 INJECTION INTRAVENOUS ONCE
Status: DISCONTINUED | OUTPATIENT
Start: 2023-04-08 | End: 2023-04-08 | Stop reason: SDUPTHER

## 2023-04-08 RX ORDER — POTASSIUM CHLORIDE 7.45 MG/ML
10 INJECTION INTRAVENOUS
Status: COMPLETED | OUTPATIENT
Start: 2023-04-08 | End: 2023-04-08

## 2023-04-08 RX ORDER — IPRATROPIUM BROMIDE AND ALBUTEROL SULFATE 2.5; .5 MG/3ML; MG/3ML
1 SOLUTION RESPIRATORY (INHALATION)
Status: DISCONTINUED | OUTPATIENT
Start: 2023-04-08 | End: 2023-04-08

## 2023-04-08 RX ORDER — IPRATROPIUM BROMIDE AND ALBUTEROL SULFATE 2.5; .5 MG/3ML; MG/3ML
1 SOLUTION RESPIRATORY (INHALATION) 2 TIMES DAILY
Status: DISCONTINUED | OUTPATIENT
Start: 2023-04-08 | End: 2023-04-11

## 2023-04-08 RX ORDER — DILTIAZEM HYDROCHLORIDE 5 MG/ML
5 INJECTION INTRAVENOUS ONCE
Status: COMPLETED | OUTPATIENT
Start: 2023-04-08 | End: 2023-04-08

## 2023-04-08 RX ORDER — ALBUTEROL SULFATE 2.5 MG/3ML
2.5 SOLUTION RESPIRATORY (INHALATION) EVERY 4 HOURS PRN
Status: DISCONTINUED | OUTPATIENT
Start: 2023-04-08 | End: 2023-04-11

## 2023-04-08 RX ADMIN — MEROPENEM 1000 MG: 1 INJECTION, POWDER, FOR SOLUTION INTRAVENOUS at 20:08

## 2023-04-08 RX ADMIN — GABAPENTIN 100 MG: 100 CAPSULE ORAL at 14:48

## 2023-04-08 RX ADMIN — POTASSIUM CHLORIDE 10 MEQ: 7.46 INJECTION, SOLUTION INTRAVENOUS at 05:46

## 2023-04-08 RX ADMIN — IPRATROPIUM BROMIDE AND ALBUTEROL SULFATE 1 AMPULE: 2.5; .5 SOLUTION RESPIRATORY (INHALATION) at 07:52

## 2023-04-08 RX ADMIN — METHYLPREDNISOLONE SODIUM SUCCINATE 125 MG: 125 INJECTION, POWDER, FOR SOLUTION INTRAMUSCULAR; INTRAVENOUS at 10:25

## 2023-04-08 RX ADMIN — DILTIAZEM HYDROCHLORIDE 5 MG/HR: 5 INJECTION, SOLUTION INTRAVENOUS at 05:11

## 2023-04-08 RX ADMIN — GABAPENTIN 100 MG: 100 CAPSULE ORAL at 20:06

## 2023-04-08 RX ADMIN — CHLORHEXIDINE GLUCONATE 0.12% ORAL RINSE 15 ML: 1.2 LIQUID ORAL at 09:59

## 2023-04-08 RX ADMIN — DULOXETINE HYDROCHLORIDE 60 MG: 60 CAPSULE, DELAYED RELEASE ORAL at 09:59

## 2023-04-08 RX ADMIN — MUPIROCIN: 20 OINTMENT TOPICAL at 20:05

## 2023-04-08 RX ADMIN — DILTIAZEM HYDROCHLORIDE 5 MG: 5 INJECTION, SOLUTION INTRAVENOUS at 05:07

## 2023-04-08 RX ADMIN — CHLORHEXIDINE GLUCONATE 0.12% ORAL RINSE 15 ML: 1.2 LIQUID ORAL at 20:05

## 2023-04-08 RX ADMIN — BUSPIRONE HYDROCHLORIDE 5 MG: 5 TABLET ORAL at 20:06

## 2023-04-08 RX ADMIN — GABAPENTIN 100 MG: 100 CAPSULE ORAL at 09:59

## 2023-04-08 RX ADMIN — POLYETHYLENE GLYCOL 3350 17 G: 17 POWDER, FOR SOLUTION ORAL at 14:04

## 2023-04-08 RX ADMIN — MICONAZOLE NITRATE: 2 OINTMENT TOPICAL at 20:05

## 2023-04-08 RX ADMIN — POTASSIUM CHLORIDE 10 MEQ: 7.46 INJECTION, SOLUTION INTRAVENOUS at 04:07

## 2023-04-08 RX ADMIN — VANCOMYCIN HYDROCHLORIDE 1000 MG: 1 INJECTION, POWDER, LYOPHILIZED, FOR SOLUTION INTRAVENOUS at 11:38

## 2023-04-08 RX ADMIN — MEROPENEM 1000 MG: 1 INJECTION, POWDER, FOR SOLUTION INTRAVENOUS at 04:06

## 2023-04-08 RX ADMIN — DILTIAZEM HYDROCHLORIDE 7.5 MG/HR: 5 INJECTION, SOLUTION INTRAVENOUS at 20:05

## 2023-04-08 RX ADMIN — INSULIN LISPRO 6 UNITS: 100 INJECTION, SOLUTION INTRAVENOUS; SUBCUTANEOUS at 20:11

## 2023-04-08 RX ADMIN — DILTIAZEM HYDROCHLORIDE 7.5 MG/HR: 5 INJECTION, SOLUTION INTRAVENOUS at 08:19

## 2023-04-08 RX ADMIN — FLUCONAZOLE 200 MG: 100 TABLET ORAL at 09:59

## 2023-04-08 RX ADMIN — INSULIN LISPRO 6 UNITS: 100 INJECTION, SOLUTION INTRAVENOUS; SUBCUTANEOUS at 17:27

## 2023-04-08 RX ADMIN — BUSPIRONE HYDROCHLORIDE 5 MG: 5 TABLET ORAL at 09:59

## 2023-04-08 RX ADMIN — MEROPENEM 1000 MG: 1 INJECTION, POWDER, FOR SOLUTION INTRAVENOUS at 11:44

## 2023-04-08 RX ADMIN — VANCOMYCIN HYDROCHLORIDE 1000 MG: 1 INJECTION, POWDER, LYOPHILIZED, FOR SOLUTION INTRAVENOUS at 17:20

## 2023-04-08 RX ADMIN — DILTIAZEM HYDROCHLORIDE 60 MG: 60 TABLET, FILM COATED ORAL at 02:09

## 2023-04-08 RX ADMIN — RIVAROXABAN 20 MG: 20 TABLET, FILM COATED ORAL at 17:15

## 2023-04-08 RX ADMIN — MUPIROCIN: 20 OINTMENT TOPICAL at 10:15

## 2023-04-08 RX ADMIN — IPRATROPIUM BROMIDE AND ALBUTEROL SULFATE 1 AMPULE: 2.5; .5 SOLUTION RESPIRATORY (INHALATION) at 19:49

## 2023-04-08 RX ADMIN — MICONAZOLE NITRATE: 2 OINTMENT TOPICAL at 10:16

## 2023-04-08 RX ADMIN — POTASSIUM CHLORIDE 10 MEQ: 7.46 INJECTION, SOLUTION INTRAVENOUS at 06:54

## 2023-04-08 ASSESSMENT — PAIN DESCRIPTION - LOCATION
LOCATION: ABDOMEN

## 2023-04-08 ASSESSMENT — PAIN DESCRIPTION - ORIENTATION
ORIENTATION: LEFT;UPPER
ORIENTATION: UPPER;MID

## 2023-04-08 ASSESSMENT — PAIN SCALES - GENERAL
PAINLEVEL_OUTOF10: 7
PAINLEVEL_OUTOF10: 7

## 2023-04-09 LAB
BACTERIA BLD CULT ORG #2: NORMAL
BACTERIA BLD CULT: NORMAL
GLUCOSE BLD-MCNC: 171 MG/DL (ref 70–99)
GLUCOSE BLD-MCNC: 289 MG/DL (ref 70–99)
GLUCOSE BLD-MCNC: 292 MG/DL (ref 70–99)
GLUCOSE BLD-MCNC: 346 MG/DL (ref 70–99)
PERFORMED ON: ABNORMAL
REPORT: NORMAL
VANCOMYCIN TROUGH SERPL-MCNC: 9.6 UG/ML (ref 10–20)

## 2023-04-09 PROCEDURE — 6370000000 HC RX 637 (ALT 250 FOR IP): Performed by: INTERNAL MEDICINE

## 2023-04-09 PROCEDURE — 99222 1ST HOSP IP/OBS MODERATE 55: CPT | Performed by: INTERNAL MEDICINE

## 2023-04-09 PROCEDURE — 80202 ASSAY OF VANCOMYCIN: CPT

## 2023-04-09 PROCEDURE — 6360000002 HC RX W HCPCS: Performed by: HOSPITALIST

## 2023-04-09 PROCEDURE — 2700000000 HC OXYGEN THERAPY PER DAY

## 2023-04-09 PROCEDURE — 2060000000 HC ICU INTERMEDIATE R&B

## 2023-04-09 PROCEDURE — 94761 N-INVAS EAR/PLS OXIMETRY MLT: CPT

## 2023-04-09 PROCEDURE — 2580000003 HC RX 258: Performed by: INTERNAL MEDICINE

## 2023-04-09 PROCEDURE — 94640 AIRWAY INHALATION TREATMENT: CPT

## 2023-04-09 PROCEDURE — 2580000003 HC RX 258: Performed by: HOSPITALIST

## 2023-04-09 PROCEDURE — 6360000002 HC RX W HCPCS: Performed by: INTERNAL MEDICINE

## 2023-04-09 PROCEDURE — 36415 COLL VENOUS BLD VENIPUNCTURE: CPT

## 2023-04-09 PROCEDURE — 99232 SBSQ HOSP IP/OBS MODERATE 35: CPT | Performed by: INTERNAL MEDICINE

## 2023-04-09 RX ORDER — DILTIAZEM HYDROCHLORIDE 60 MG/1
60 TABLET, FILM COATED ORAL EVERY 8 HOURS SCHEDULED
Status: DISCONTINUED | OUTPATIENT
Start: 2023-04-09 | End: 2023-04-10

## 2023-04-09 RX ADMIN — IPRATROPIUM BROMIDE AND ALBUTEROL SULFATE 1 AMPULE: 2.5; .5 SOLUTION RESPIRATORY (INHALATION) at 07:57

## 2023-04-09 RX ADMIN — INSULIN LISPRO 4 UNITS: 100 INJECTION, SOLUTION INTRAVENOUS; SUBCUTANEOUS at 20:34

## 2023-04-09 RX ADMIN — BUSPIRONE HYDROCHLORIDE 5 MG: 5 TABLET ORAL at 08:01

## 2023-04-09 RX ADMIN — METHYLPREDNISOLONE SODIUM SUCCINATE 125 MG: 125 INJECTION, POWDER, FOR SOLUTION INTRAMUSCULAR; INTRAVENOUS at 08:02

## 2023-04-09 RX ADMIN — FLUCONAZOLE 200 MG: 100 TABLET ORAL at 08:02

## 2023-04-09 RX ADMIN — DILTIAZEM HYDROCHLORIDE 60 MG: 60 TABLET, FILM COATED ORAL at 20:32

## 2023-04-09 RX ADMIN — GABAPENTIN 100 MG: 100 CAPSULE ORAL at 20:32

## 2023-04-09 RX ADMIN — VANCOMYCIN HYDROCHLORIDE 1000 MG: 1 INJECTION, POWDER, LYOPHILIZED, FOR SOLUTION INTRAVENOUS at 05:14

## 2023-04-09 RX ADMIN — METOPROLOL TARTRATE 25 MG: 25 TABLET, FILM COATED ORAL at 09:15

## 2023-04-09 RX ADMIN — MUPIROCIN: 20 OINTMENT TOPICAL at 09:19

## 2023-04-09 RX ADMIN — CHLORHEXIDINE GLUCONATE 0.12% ORAL RINSE 15 ML: 1.2 LIQUID ORAL at 20:34

## 2023-04-09 RX ADMIN — VANCOMYCIN HYDROCHLORIDE 1000 MG: 1 INJECTION, POWDER, LYOPHILIZED, FOR SOLUTION INTRAVENOUS at 13:00

## 2023-04-09 RX ADMIN — VANCOMYCIN HYDROCHLORIDE 1000 MG: 1 INJECTION, POWDER, LYOPHILIZED, FOR SOLUTION INTRAVENOUS at 20:58

## 2023-04-09 RX ADMIN — MEROPENEM 1000 MG: 1 INJECTION, POWDER, FOR SOLUTION INTRAVENOUS at 11:06

## 2023-04-09 RX ADMIN — MICONAZOLE NITRATE: 2 OINTMENT TOPICAL at 20:39

## 2023-04-09 RX ADMIN — DILTIAZEM HYDROCHLORIDE 60 MG: 60 TABLET, FILM COATED ORAL at 14:02

## 2023-04-09 RX ADMIN — GABAPENTIN 100 MG: 100 CAPSULE ORAL at 16:34

## 2023-04-09 RX ADMIN — MEROPENEM 1000 MG: 1 INJECTION, POWDER, FOR SOLUTION INTRAVENOUS at 20:47

## 2023-04-09 RX ADMIN — IPRATROPIUM BROMIDE AND ALBUTEROL SULFATE 1 AMPULE: 2.5; .5 SOLUTION RESPIRATORY (INHALATION) at 20:37

## 2023-04-09 RX ADMIN — MUPIROCIN: 20 OINTMENT TOPICAL at 20:33

## 2023-04-09 RX ADMIN — DULOXETINE HYDROCHLORIDE 60 MG: 60 CAPSULE, DELAYED RELEASE ORAL at 08:01

## 2023-04-09 RX ADMIN — BUSPIRONE HYDROCHLORIDE 5 MG: 5 TABLET ORAL at 20:32

## 2023-04-09 RX ADMIN — MICONAZOLE NITRATE: 2 OINTMENT TOPICAL at 11:10

## 2023-04-09 RX ADMIN — GABAPENTIN 100 MG: 100 CAPSULE ORAL at 08:02

## 2023-04-09 RX ADMIN — RIVAROXABAN 20 MG: 20 TABLET, FILM COATED ORAL at 16:34

## 2023-04-09 RX ADMIN — METOPROLOL TARTRATE 25 MG: 25 TABLET, FILM COATED ORAL at 20:32

## 2023-04-09 RX ADMIN — INSULIN LISPRO 4 UNITS: 100 INJECTION, SOLUTION INTRAVENOUS; SUBCUTANEOUS at 11:06

## 2023-04-09 RX ADMIN — MEROPENEM 1000 MG: 1 INJECTION, POWDER, FOR SOLUTION INTRAVENOUS at 03:00

## 2023-04-09 RX ADMIN — INSULIN LISPRO 6 UNITS: 100 INJECTION, SOLUTION INTRAVENOUS; SUBCUTANEOUS at 16:38

## 2023-04-09 ASSESSMENT — PAIN DESCRIPTION - LOCATION: LOCATION: HIP

## 2023-04-09 ASSESSMENT — PAIN DESCRIPTION - ORIENTATION: ORIENTATION: LEFT

## 2023-04-09 ASSESSMENT — PAIN SCALES - GENERAL: PAINLEVEL_OUTOF10: 7

## 2023-04-09 ASSESSMENT — PAIN DESCRIPTION - DESCRIPTORS: DESCRIPTORS: ACHING

## 2023-04-10 ENCOUNTER — APPOINTMENT (OUTPATIENT)
Dept: CT IMAGING | Age: 73
DRG: 871 | End: 2023-04-10
Payer: COMMERCIAL

## 2023-04-10 PROBLEM — I25.10 CORONARY ARTERY DISEASE INVOLVING NATIVE CORONARY ARTERY OF NATIVE HEART WITHOUT ANGINA PECTORIS: Status: ACTIVE | Noted: 2023-04-10

## 2023-04-10 LAB
GLUCOSE BLD-MCNC: 123 MG/DL (ref 70–99)
GLUCOSE BLD-MCNC: 220 MG/DL (ref 70–99)
GLUCOSE BLD-MCNC: 242 MG/DL (ref 70–99)
GLUCOSE BLD-MCNC: 268 MG/DL (ref 70–99)
PERFORMED ON: ABNORMAL
VANCOMYCIN TROUGH SERPL-MCNC: 15.2 UG/ML (ref 10–20)

## 2023-04-10 PROCEDURE — 2700000000 HC OXYGEN THERAPY PER DAY

## 2023-04-10 PROCEDURE — 6370000000 HC RX 637 (ALT 250 FOR IP): Performed by: INTERNAL MEDICINE

## 2023-04-10 PROCEDURE — 2060000000 HC ICU INTERMEDIATE R&B

## 2023-04-10 PROCEDURE — 2580000003 HC RX 258: Performed by: INTERNAL MEDICINE

## 2023-04-10 PROCEDURE — 99232 SBSQ HOSP IP/OBS MODERATE 35: CPT | Performed by: INTERNAL MEDICINE

## 2023-04-10 PROCEDURE — 51701 INSERT BLADDER CATHETER: CPT

## 2023-04-10 PROCEDURE — 80202 ASSAY OF VANCOMYCIN: CPT

## 2023-04-10 PROCEDURE — 36415 COLL VENOUS BLD VENIPUNCTURE: CPT

## 2023-04-10 PROCEDURE — 51798 US URINE CAPACITY MEASURE: CPT

## 2023-04-10 PROCEDURE — 94640 AIRWAY INHALATION TREATMENT: CPT

## 2023-04-10 PROCEDURE — 6360000002 HC RX W HCPCS: Performed by: HOSPITALIST

## 2023-04-10 PROCEDURE — 6360000002 HC RX W HCPCS: Performed by: INTERNAL MEDICINE

## 2023-04-10 PROCEDURE — 2580000003 HC RX 258: Performed by: HOSPITALIST

## 2023-04-10 PROCEDURE — 74176 CT ABD & PELVIS W/O CONTRAST: CPT

## 2023-04-10 PROCEDURE — 94761 N-INVAS EAR/PLS OXIMETRY MLT: CPT

## 2023-04-10 RX ORDER — METOPROLOL TARTRATE 5 MG/5ML
2.5 INJECTION INTRAVENOUS EVERY 4 HOURS PRN
Status: DISCONTINUED | OUTPATIENT
Start: 2023-04-10 | End: 2023-04-16 | Stop reason: HOSPADM

## 2023-04-10 RX ORDER — DILTIAZEM HYDROCHLORIDE 180 MG/1
180 CAPSULE, COATED, EXTENDED RELEASE ORAL DAILY
Qty: 30 CAPSULE | Refills: 3 | Status: CANCELLED
Start: 2023-04-10

## 2023-04-10 RX ORDER — DILTIAZEM HYDROCHLORIDE 180 MG/1
180 CAPSULE, COATED, EXTENDED RELEASE ORAL DAILY
Status: DISCONTINUED | OUTPATIENT
Start: 2023-04-10 | End: 2023-04-11

## 2023-04-10 RX ORDER — FLUCONAZOLE 200 MG/1
200 TABLET ORAL DAILY
Qty: 2 TABLET | Refills: 0 | Status: CANCELLED
Start: 2023-04-10 | End: 2023-04-12

## 2023-04-10 RX ORDER — DIVALPROEX SODIUM 125 MG/1
125 CAPSULE, COATED PELLETS ORAL EVERY 8 HOURS SCHEDULED
Status: DISCONTINUED | OUTPATIENT
Start: 2023-04-10 | End: 2023-04-16 | Stop reason: HOSPADM

## 2023-04-10 RX ORDER — INSULIN GLARGINE 100 [IU]/ML
15 INJECTION, SOLUTION SUBCUTANEOUS ONCE
Status: COMPLETED | OUTPATIENT
Start: 2023-04-10 | End: 2023-04-10

## 2023-04-10 RX ADMIN — MEROPENEM 1000 MG: 1 INJECTION, POWDER, FOR SOLUTION INTRAVENOUS at 03:00

## 2023-04-10 RX ADMIN — IPRATROPIUM BROMIDE AND ALBUTEROL SULFATE 1 AMPULE: 2.5; .5 SOLUTION RESPIRATORY (INHALATION) at 07:50

## 2023-04-10 RX ADMIN — DILTIAZEM HYDROCHLORIDE 180 MG: 180 CAPSULE, COATED, EXTENDED RELEASE ORAL at 08:42

## 2023-04-10 RX ADMIN — INSULIN LISPRO 2 UNITS: 100 INJECTION, SOLUTION INTRAVENOUS; SUBCUTANEOUS at 11:46

## 2023-04-10 RX ADMIN — INSULIN LISPRO 4 UNITS: 100 INJECTION, SOLUTION INTRAVENOUS; SUBCUTANEOUS at 16:26

## 2023-04-10 RX ADMIN — RIVAROXABAN 20 MG: 20 TABLET, FILM COATED ORAL at 19:03

## 2023-04-10 RX ADMIN — CHLORHEXIDINE GLUCONATE 0.12% ORAL RINSE 15 ML: 1.2 LIQUID ORAL at 08:42

## 2023-04-10 RX ADMIN — DIVALPROEX SODIUM 125 MG: 125 CAPSULE, COATED PELLETS ORAL at 13:32

## 2023-04-10 RX ADMIN — DILTIAZEM HYDROCHLORIDE 60 MG: 60 TABLET, FILM COATED ORAL at 05:55

## 2023-04-10 RX ADMIN — FLUCONAZOLE 200 MG: 100 TABLET ORAL at 08:42

## 2023-04-10 RX ADMIN — GABAPENTIN 100 MG: 100 CAPSULE ORAL at 08:42

## 2023-04-10 RX ADMIN — VANCOMYCIN HYDROCHLORIDE 1000 MG: 1 INJECTION, POWDER, LYOPHILIZED, FOR SOLUTION INTRAVENOUS at 05:46

## 2023-04-10 RX ADMIN — BUSPIRONE HYDROCHLORIDE 5 MG: 5 TABLET ORAL at 08:42

## 2023-04-10 RX ADMIN — INSULIN GLARGINE 15 UNITS: 100 INJECTION, SOLUTION SUBCUTANEOUS at 08:43

## 2023-04-10 RX ADMIN — METOPROLOL TARTRATE 25 MG: 25 TABLET, FILM COATED ORAL at 08:42

## 2023-04-10 RX ADMIN — MICONAZOLE NITRATE: 2 OINTMENT TOPICAL at 20:43

## 2023-04-10 RX ADMIN — GABAPENTIN 100 MG: 100 CAPSULE ORAL at 20:44

## 2023-04-10 RX ADMIN — IPRATROPIUM BROMIDE AND ALBUTEROL SULFATE 1 AMPULE: 2.5; .5 SOLUTION RESPIRATORY (INHALATION) at 20:03

## 2023-04-10 RX ADMIN — DULOXETINE HYDROCHLORIDE 60 MG: 60 CAPSULE, DELAYED RELEASE ORAL at 08:42

## 2023-04-10 RX ADMIN — MICONAZOLE NITRATE: 2 OINTMENT TOPICAL at 08:42

## 2023-04-10 RX ADMIN — METOPROLOL TARTRATE 25 MG: 25 TABLET, FILM COATED ORAL at 20:35

## 2023-04-10 RX ADMIN — DIVALPROEX SODIUM 125 MG: 125 CAPSULE, COATED PELLETS ORAL at 08:43

## 2023-04-10 RX ADMIN — DIVALPROEX SODIUM 125 MG: 125 CAPSULE, COATED PELLETS ORAL at 21:52

## 2023-04-10 RX ADMIN — BUSPIRONE HYDROCHLORIDE 5 MG: 5 TABLET ORAL at 20:34

## 2023-04-10 RX ADMIN — GABAPENTIN 100 MG: 100 CAPSULE ORAL at 13:32

## 2023-04-10 ASSESSMENT — PAIN DESCRIPTION - LOCATION: LOCATION: HIP

## 2023-04-10 ASSESSMENT — PAIN SCALES - GENERAL
PAINLEVEL_OUTOF10: 4
PAINLEVEL_OUTOF10: 6
PAINLEVEL_OUTOF10: 4

## 2023-04-10 ASSESSMENT — PAIN DESCRIPTION - ORIENTATION: ORIENTATION: LEFT

## 2023-04-11 ENCOUNTER — APPOINTMENT (OUTPATIENT)
Dept: GENERAL RADIOLOGY | Age: 73
DRG: 871 | End: 2023-04-11
Payer: COMMERCIAL

## 2023-04-11 ENCOUNTER — APPOINTMENT (OUTPATIENT)
Dept: INTERVENTIONAL RADIOLOGY/VASCULAR | Age: 73
DRG: 871 | End: 2023-04-11
Payer: COMMERCIAL

## 2023-04-11 PROBLEM — J96.21 ACUTE ON CHRONIC RESPIRATORY FAILURE WITH HYPOXIA (HCC): Status: ACTIVE | Noted: 2023-04-11

## 2023-04-11 LAB
ALBUMIN SERPL-MCNC: 3.7 G/DL (ref 3.4–5)
ANION GAP SERPL CALCULATED.3IONS-SCNC: 15 MMOL/L (ref 3–16)
BASE EXCESS BLDV CALC-SCNC: 7.9 MMOL/L (ref -3–3)
BASOPHILS # BLD: 0 K/UL (ref 0–0.2)
BASOPHILS NFR BLD: 0 %
BUN SERPL-MCNC: 16 MG/DL (ref 7–20)
CALCIUM SERPL-MCNC: 9.4 MG/DL (ref 8.3–10.6)
CHLORIDE SERPL-SCNC: 92 MMOL/L (ref 99–110)
CO2 BLDV-SCNC: 28 MMOL/L
CO2 SERPL-SCNC: 31 MMOL/L (ref 21–32)
COHGB MFR BLDV: 10 % (ref 0–1.5)
CREAT SERPL-MCNC: <0.5 MG/DL (ref 0.6–1.2)
DEPRECATED RDW RBC AUTO: 15.8 % (ref 12.4–15.4)
EKG ATRIAL RATE: 141 BPM
EKG DIAGNOSIS: NORMAL
EKG Q-T INTERVAL: 330 MS
EKG QRS DURATION: 88 MS
EKG QTC CALCULATION (BAZETT): 492 MS
EKG R AXIS: 77 DEGREES
EKG T AXIS: 157 DEGREES
EKG VENTRICULAR RATE: 134 BPM
EOSINOPHIL # BLD: 0 K/UL (ref 0–0.6)
EOSINOPHIL NFR BLD: 0 %
GFR SERPLBLD CREATININE-BSD FMLA CKD-EPI: >60 ML/MIN/{1.73_M2}
GLUCOSE BLD-MCNC: 178 MG/DL (ref 70–99)
GLUCOSE BLD-MCNC: 213 MG/DL (ref 70–99)
GLUCOSE BLD-MCNC: 290 MG/DL (ref 70–99)
GLUCOSE BLD-MCNC: 305 MG/DL (ref 70–99)
GLUCOSE BLD-MCNC: 364 MG/DL (ref 70–99)
GLUCOSE SERPL-MCNC: 324 MG/DL (ref 70–99)
HCO3 BLDV-SCNC: 27.3 MMOL/L (ref 23–29)
HCT VFR BLD AUTO: 44.1 % (ref 36–48)
HGB BLD-MCNC: 14 G/DL (ref 12–16)
INR PPP: 1.36 (ref 0.84–1.16)
LYMPHOCYTES # BLD: 2.2 K/UL (ref 1–5.1)
LYMPHOCYTES NFR BLD: 13 %
MAGNESIUM SERPL-MCNC: 1.6 MG/DL (ref 1.8–2.4)
MCH RBC QN AUTO: 27.4 PG (ref 26–34)
MCHC RBC AUTO-ENTMCNC: 31.8 G/DL (ref 31–36)
MCV RBC AUTO: 85.9 FL (ref 80–100)
METHGB MFR BLDV: 0.3 %
MONOCYTES # BLD: 0.8 K/UL (ref 0–1.3)
MONOCYTES NFR BLD: 5 %
NEUTROPHILS # BLD: 13.7 K/UL (ref 1.7–7.7)
NEUTROPHILS NFR BLD: 81 %
NEUTS BAND NFR BLD MANUAL: 1 % (ref 0–7)
O2 CT VFR BLDV CALC: 20 VOL %
O2 THERAPY: ABNORMAL
PCO2 BLDV: 25.1 MMHG (ref 40–50)
PERFORMED ON: ABNORMAL
PH BLDV: 7.65 [PH] (ref 7.35–7.45)
PHOSPHATE SERPL-MCNC: 2.8 MG/DL (ref 2.5–4.9)
PLATELET # BLD AUTO: 245 K/UL (ref 135–450)
PLATELET BLD QL SMEAR: ADEQUATE
PMV BLD AUTO: 9.7 FL (ref 5–10.5)
PO2 BLDV: 153.2 MMHG (ref 25–40)
POTASSIUM SERPL-SCNC: 3 MMOL/L (ref 3.5–5.1)
PROTHROMBIN TIME: 16.7 SEC (ref 11.5–14.8)
RBC # BLD AUTO: 5.13 M/UL (ref 4–5.2)
SAO2 % BLDV: 99 %
SLIDE REVIEW: ABNORMAL
SODIUM SERPL-SCNC: 138 MMOL/L (ref 136–145)
WBC # BLD AUTO: 16.7 K/UL (ref 4–11)

## 2023-04-11 PROCEDURE — 94761 N-INVAS EAR/PLS OXIMETRY MLT: CPT

## 2023-04-11 PROCEDURE — 99233 SBSQ HOSP IP/OBS HIGH 50: CPT | Performed by: INTERNAL MEDICINE

## 2023-04-11 PROCEDURE — 83735 ASSAY OF MAGNESIUM: CPT

## 2023-04-11 PROCEDURE — 36415 COLL VENOUS BLD VENIPUNCTURE: CPT

## 2023-04-11 PROCEDURE — 80069 RENAL FUNCTION PANEL: CPT

## 2023-04-11 PROCEDURE — 2700000000 HC OXYGEN THERAPY PER DAY

## 2023-04-11 PROCEDURE — 36410 VNPNXR 3YR/> PHY/QHP DX/THER: CPT

## 2023-04-11 PROCEDURE — 93010 ELECTROCARDIOGRAM REPORT: CPT | Performed by: INTERNAL MEDICINE

## 2023-04-11 PROCEDURE — 6370000000 HC RX 637 (ALT 250 FOR IP): Performed by: INTERNAL MEDICINE

## 2023-04-11 PROCEDURE — 94660 CPAP INITIATION&MGMT: CPT

## 2023-04-11 PROCEDURE — 2500000003 HC RX 250 WO HCPCS: Performed by: INTERNAL MEDICINE

## 2023-04-11 PROCEDURE — 93005 ELECTROCARDIOGRAM TRACING: CPT | Performed by: INTERNAL MEDICINE

## 2023-04-11 PROCEDURE — 71045 X-RAY EXAM CHEST 1 VIEW: CPT

## 2023-04-11 PROCEDURE — 6360000002 HC RX W HCPCS: Performed by: INTERNAL MEDICINE

## 2023-04-11 PROCEDURE — 99291 CRITICAL CARE FIRST HOUR: CPT | Performed by: INTERNAL MEDICINE

## 2023-04-11 PROCEDURE — 76937 US GUIDE VASCULAR ACCESS: CPT

## 2023-04-11 PROCEDURE — 85610 PROTHROMBIN TIME: CPT

## 2023-04-11 PROCEDURE — 85025 COMPLETE CBC W/AUTO DIFF WBC: CPT

## 2023-04-11 PROCEDURE — 2580000003 HC RX 258: Performed by: INTERNAL MEDICINE

## 2023-04-11 PROCEDURE — 82803 BLOOD GASES ANY COMBINATION: CPT

## 2023-04-11 PROCEDURE — 05HA33Z INSERTION OF INFUSION DEVICE INTO LEFT BRACHIAL VEIN, PERCUTANEOUS APPROACH: ICD-10-PCS | Performed by: INTERNAL MEDICINE

## 2023-04-11 PROCEDURE — 2000000000 HC ICU R&B

## 2023-04-11 RX ORDER — LIDOCAINE HYDROCHLORIDE 10 MG/ML
5 INJECTION, SOLUTION INFILTRATION; PERINEURAL ONCE
Status: DISCONTINUED | OUTPATIENT
Start: 2023-04-11 | End: 2023-04-15

## 2023-04-11 RX ORDER — DIGOXIN 0.25 MG/ML
500 INJECTION INTRAMUSCULAR; INTRAVENOUS ONCE
Status: COMPLETED | OUTPATIENT
Start: 2023-04-11 | End: 2023-04-11

## 2023-04-11 RX ORDER — POLYETHYLENE GLYCOL 3350 17 G/17G
17 POWDER, FOR SOLUTION ORAL DAILY
Status: DISCONTINUED | OUTPATIENT
Start: 2023-04-11 | End: 2023-04-16 | Stop reason: HOSPADM

## 2023-04-11 RX ORDER — METOPROLOL TARTRATE 5 MG/5ML
2.5 INJECTION INTRAVENOUS ONCE
Status: COMPLETED | OUTPATIENT
Start: 2023-04-11 | End: 2023-04-11

## 2023-04-11 RX ORDER — SODIUM CHLORIDE 0.9 % (FLUSH) 0.9 %
5-40 SYRINGE (ML) INJECTION EVERY 12 HOURS SCHEDULED
Status: DISCONTINUED | OUTPATIENT
Start: 2023-04-11 | End: 2023-04-16 | Stop reason: HOSPADM

## 2023-04-11 RX ORDER — DILTIAZEM HYDROCHLORIDE 5 MG/ML
10 INJECTION INTRAVENOUS ONCE
Status: COMPLETED | OUTPATIENT
Start: 2023-04-11 | End: 2023-04-11

## 2023-04-11 RX ORDER — ALBUTEROL SULFATE 2.5 MG/3ML
1.25 SOLUTION RESPIRATORY (INHALATION) EVERY 4 HOURS PRN
Status: DISCONTINUED | OUTPATIENT
Start: 2023-04-11 | End: 2023-04-16 | Stop reason: HOSPADM

## 2023-04-11 RX ORDER — SODIUM CHLORIDE 9 MG/ML
25 INJECTION, SOLUTION INTRAVENOUS PRN
Status: DISCONTINUED | OUTPATIENT
Start: 2023-04-11 | End: 2023-04-16 | Stop reason: HOSPADM

## 2023-04-11 RX ORDER — METOPROLOL TARTRATE 50 MG/1
50 TABLET, FILM COATED ORAL 2 TIMES DAILY
Status: DISCONTINUED | OUTPATIENT
Start: 2023-04-11 | End: 2023-04-11

## 2023-04-11 RX ORDER — SODIUM CHLORIDE 0.9 % (FLUSH) 0.9 %
5-40 SYRINGE (ML) INJECTION PRN
Status: DISCONTINUED | OUTPATIENT
Start: 2023-04-11 | End: 2023-04-16 | Stop reason: HOSPADM

## 2023-04-11 RX ORDER — BISACODYL 10 MG
10 SUPPOSITORY, RECTAL RECTAL DAILY
Status: DISCONTINUED | OUTPATIENT
Start: 2023-04-11 | End: 2023-04-14

## 2023-04-11 RX ORDER — DEXMEDETOMIDINE HYDROCHLORIDE 4 UG/ML
.1-1.5 INJECTION, SOLUTION INTRAVENOUS CONTINUOUS
Status: DISCONTINUED | OUTPATIENT
Start: 2023-04-11 | End: 2023-04-13

## 2023-04-11 RX ORDER — POTASSIUM CHLORIDE 29.8 MG/ML
20 INJECTION INTRAVENOUS
Status: COMPLETED | OUTPATIENT
Start: 2023-04-11 | End: 2023-04-11

## 2023-04-11 RX ORDER — METOPROLOL TARTRATE 5 MG/5ML
5 INJECTION INTRAVENOUS ONCE
Status: COMPLETED | OUTPATIENT
Start: 2023-04-11 | End: 2023-04-11

## 2023-04-11 RX ORDER — MAGNESIUM SULFATE IN WATER 40 MG/ML
2000 INJECTION, SOLUTION INTRAVENOUS ONCE
Status: COMPLETED | OUTPATIENT
Start: 2023-04-11 | End: 2023-04-11

## 2023-04-11 RX ADMIN — Medication 0.7 MCG/KG/HR: at 15:11

## 2023-04-11 RX ADMIN — DIGOXIN 500 MCG: 0.25 INJECTION INTRAMUSCULAR; INTRAVENOUS at 09:40

## 2023-04-11 RX ADMIN — DILTIAZEM HYDROCHLORIDE 10 MG: 5 INJECTION INTRAVENOUS at 11:08

## 2023-04-11 RX ADMIN — METOPROLOL TARTRATE 2.5 MG: 5 INJECTION INTRAVENOUS at 04:04

## 2023-04-11 RX ADMIN — DIVALPROEX SODIUM 125 MG: 125 CAPSULE, COATED PELLETS ORAL at 06:00

## 2023-04-11 RX ADMIN — POTASSIUM CHLORIDE 20 MEQ: 29.8 INJECTION, SOLUTION INTRAVENOUS at 14:50

## 2023-04-11 RX ADMIN — Medication 0.8 MCG/KG/HR: at 19:53

## 2023-04-11 RX ADMIN — METOPROLOL TARTRATE 5 MG: 5 INJECTION, SOLUTION INTRAVENOUS at 08:57

## 2023-04-11 RX ADMIN — SODIUM CHLORIDE, PRESERVATIVE FREE 10 ML: 5 INJECTION INTRAVENOUS at 19:57

## 2023-04-11 RX ADMIN — DILTIAZEM HYDROCHLORIDE 5 MG/HR: 5 INJECTION INTRAVENOUS at 11:16

## 2023-04-11 RX ADMIN — METOPROLOL TARTRATE 2.5 MG: 5 INJECTION INTRAVENOUS at 06:16

## 2023-04-11 RX ADMIN — MICONAZOLE NITRATE: 2 OINTMENT TOPICAL at 11:09

## 2023-04-11 RX ADMIN — DILTIAZEM HYDROCHLORIDE 12.5 MG/HR: 5 INJECTION INTRAVENOUS at 19:51

## 2023-04-11 RX ADMIN — BISACODYL 10 MG: 10 SUPPOSITORY RECTAL at 11:08

## 2023-04-11 RX ADMIN — MAGNESIUM SULFATE HEPTAHYDRATE 2000 MG: 40 INJECTION, SOLUTION INTRAVENOUS at 13:53

## 2023-04-11 RX ADMIN — INSULIN LISPRO 4 UNITS: 100 INJECTION, SOLUTION INTRAVENOUS; SUBCUTANEOUS at 20:03

## 2023-04-11 RX ADMIN — INSULIN LISPRO 8 UNITS: 100 INJECTION, SOLUTION INTRAVENOUS; SUBCUTANEOUS at 16:35

## 2023-04-11 RX ADMIN — POTASSIUM CHLORIDE 20 MEQ: 29.8 INJECTION, SOLUTION INTRAVENOUS at 13:51

## 2023-04-11 RX ADMIN — INSULIN LISPRO 4 UNITS: 100 INJECTION, SOLUTION INTRAVENOUS; SUBCUTANEOUS at 13:32

## 2023-04-12 LAB
ANION GAP SERPL CALCULATED.3IONS-SCNC: 16 MMOL/L (ref 3–16)
BACTERIA BLD CULT ORG #2: ABNORMAL
BACTERIA BLD CULT ORG #2: ABNORMAL
BACTERIA BLD CULT: NORMAL
BASOPHILS # BLD: 0 K/UL (ref 0–0.2)
BASOPHILS NFR BLD: 0.2 %
BUN SERPL-MCNC: 33 MG/DL (ref 7–20)
CALCIUM SERPL-MCNC: 8.9 MG/DL (ref 8.3–10.6)
CHLORIDE SERPL-SCNC: 101 MMOL/L (ref 99–110)
CO2 SERPL-SCNC: 26 MMOL/L (ref 21–32)
CREAT SERPL-MCNC: <0.5 MG/DL (ref 0.6–1.2)
DEPRECATED RDW RBC AUTO: 16 % (ref 12.4–15.4)
EOSINOPHIL # BLD: 0 K/UL (ref 0–0.6)
EOSINOPHIL NFR BLD: 0 %
GFR SERPLBLD CREATININE-BSD FMLA CKD-EPI: >60 ML/MIN/{1.73_M2}
GLUCOSE BLD-MCNC: 176 MG/DL (ref 70–99)
GLUCOSE BLD-MCNC: 197 MG/DL (ref 70–99)
GLUCOSE BLD-MCNC: 209 MG/DL (ref 70–99)
GLUCOSE BLD-MCNC: 211 MG/DL (ref 70–99)
GLUCOSE BLD-MCNC: 234 MG/DL (ref 70–99)
GLUCOSE BLD-MCNC: 250 MG/DL (ref 70–99)
GLUCOSE SERPL-MCNC: 244 MG/DL (ref 70–99)
HCT VFR BLD AUTO: 45.7 % (ref 36–48)
HGB BLD-MCNC: 14.7 G/DL (ref 12–16)
LYMPHOCYTES # BLD: 1.3 K/UL (ref 1–5.1)
LYMPHOCYTES NFR BLD: 17.5 %
MAGNESIUM SERPL-MCNC: 2.6 MG/DL (ref 1.8–2.4)
MCH RBC QN AUTO: 27.8 PG (ref 26–34)
MCHC RBC AUTO-ENTMCNC: 32.2 G/DL (ref 31–36)
MCV RBC AUTO: 86.3 FL (ref 80–100)
MONOCYTES # BLD: 0.8 K/UL (ref 0–1.3)
MONOCYTES NFR BLD: 11.7 %
NEUTROPHILS # BLD: 5.1 K/UL (ref 1.7–7.7)
NEUTROPHILS NFR BLD: 70.6 %
ORGANISM: ABNORMAL
ORGANISM: ABNORMAL
PERFORMED ON: ABNORMAL
PLATELET # BLD AUTO: 140 K/UL (ref 135–450)
PMV BLD AUTO: 10.4 FL (ref 5–10.5)
POTASSIUM SERPL-SCNC: 3 MMOL/L (ref 3.5–5.1)
RBC # BLD AUTO: 5.3 M/UL (ref 4–5.2)
SODIUM SERPL-SCNC: 143 MMOL/L (ref 136–145)
WBC # BLD AUTO: 7.2 K/UL (ref 4–11)

## 2023-04-12 PROCEDURE — 2000000000 HC ICU R&B

## 2023-04-12 PROCEDURE — 83735 ASSAY OF MAGNESIUM: CPT

## 2023-04-12 PROCEDURE — 6360000002 HC RX W HCPCS: Performed by: INTERNAL MEDICINE

## 2023-04-12 PROCEDURE — 6370000000 HC RX 637 (ALT 250 FOR IP): Performed by: INTERNAL MEDICINE

## 2023-04-12 PROCEDURE — 80048 BASIC METABOLIC PNL TOTAL CA: CPT

## 2023-04-12 PROCEDURE — 2580000003 HC RX 258: Performed by: INTERNAL MEDICINE

## 2023-04-12 PROCEDURE — 99233 SBSQ HOSP IP/OBS HIGH 50: CPT | Performed by: INTERNAL MEDICINE

## 2023-04-12 PROCEDURE — 6370000000 HC RX 637 (ALT 250 FOR IP): Performed by: NURSE PRACTITIONER

## 2023-04-12 PROCEDURE — 36415 COLL VENOUS BLD VENIPUNCTURE: CPT

## 2023-04-12 PROCEDURE — 2700000000 HC OXYGEN THERAPY PER DAY

## 2023-04-12 PROCEDURE — 99291 CRITICAL CARE FIRST HOUR: CPT | Performed by: INTERNAL MEDICINE

## 2023-04-12 PROCEDURE — 2500000003 HC RX 250 WO HCPCS: Performed by: INTERNAL MEDICINE

## 2023-04-12 PROCEDURE — 99233 SBSQ HOSP IP/OBS HIGH 50: CPT | Performed by: NURSE PRACTITIONER

## 2023-04-12 PROCEDURE — 94660 CPAP INITIATION&MGMT: CPT

## 2023-04-12 PROCEDURE — 94761 N-INVAS EAR/PLS OXIMETRY MLT: CPT

## 2023-04-12 PROCEDURE — 85025 COMPLETE CBC W/AUTO DIFF WBC: CPT

## 2023-04-12 RX ORDER — POTASSIUM CHLORIDE 7.45 MG/ML
10 INJECTION INTRAVENOUS ONCE
Status: COMPLETED | OUTPATIENT
Start: 2023-04-12 | End: 2023-04-12

## 2023-04-12 RX ORDER — 0.9 % SODIUM CHLORIDE 0.9 %
250 INTRAVENOUS SOLUTION INTRAVENOUS
Status: DISCONTINUED | OUTPATIENT
Start: 2023-04-12 | End: 2023-04-16 | Stop reason: HOSPADM

## 2023-04-12 RX ORDER — POTASSIUM CHLORIDE 7.45 MG/ML
10 INJECTION INTRAVENOUS
Status: DISPENSED | OUTPATIENT
Start: 2023-04-12 | End: 2023-04-12

## 2023-04-12 RX ORDER — INSULIN LISPRO 100 [IU]/ML
0-16 INJECTION, SOLUTION INTRAVENOUS; SUBCUTANEOUS EVERY 4 HOURS
Status: DISCONTINUED | OUTPATIENT
Start: 2023-04-12 | End: 2023-04-16 | Stop reason: HOSPADM

## 2023-04-12 RX ORDER — DILTIAZEM HYDROCHLORIDE 60 MG/1
30 TABLET, FILM COATED ORAL EVERY 6 HOURS SCHEDULED
Status: DISCONTINUED | OUTPATIENT
Start: 2023-04-12 | End: 2023-04-13

## 2023-04-12 RX ADMIN — BUSPIRONE HYDROCHLORIDE 5 MG: 5 TABLET ORAL at 10:10

## 2023-04-12 RX ADMIN — DILTIAZEM HYDROCHLORIDE 5 MG/HR: 5 INJECTION INTRAVENOUS at 09:59

## 2023-04-12 RX ADMIN — POTASSIUM CHLORIDE 10 MEQ: 7.46 INJECTION, SOLUTION INTRAVENOUS at 10:00

## 2023-04-12 RX ADMIN — DULOXETINE HYDROCHLORIDE 60 MG: 60 CAPSULE, DELAYED RELEASE ORAL at 10:10

## 2023-04-12 RX ADMIN — INSULIN LISPRO 2 UNITS: 100 INJECTION, SOLUTION INTRAVENOUS; SUBCUTANEOUS at 08:29

## 2023-04-12 RX ADMIN — DILTIAZEM HYDROCHLORIDE 2.5 MG/HR: 5 INJECTION INTRAVENOUS at 11:02

## 2023-04-12 RX ADMIN — BUSPIRONE HYDROCHLORIDE 5 MG: 5 TABLET ORAL at 20:18

## 2023-04-12 RX ADMIN — DILTIAZEM HYDROCHLORIDE 30 MG: 60 TABLET, FILM COATED ORAL at 14:37

## 2023-04-12 RX ADMIN — MICONAZOLE NITRATE: 2 OINTMENT TOPICAL at 08:54

## 2023-04-12 RX ADMIN — GABAPENTIN 100 MG: 100 CAPSULE ORAL at 13:33

## 2023-04-12 RX ADMIN — GABAPENTIN 100 MG: 100 CAPSULE ORAL at 20:18

## 2023-04-12 RX ADMIN — BISACODYL 10 MG: 10 SUPPOSITORY RECTAL at 11:43

## 2023-04-12 RX ADMIN — POLYETHYLENE GLYCOL 3350 17 G: 17 POWDER, FOR SOLUTION ORAL at 11:48

## 2023-04-12 RX ADMIN — GABAPENTIN 100 MG: 100 CAPSULE ORAL at 10:30

## 2023-04-12 RX ADMIN — MICONAZOLE NITRATE: 2 OINTMENT TOPICAL at 20:29

## 2023-04-12 RX ADMIN — SODIUM CHLORIDE 250 ML: 9 INJECTION, SOLUTION INTRAVENOUS at 03:17

## 2023-04-12 RX ADMIN — SODIUM CHLORIDE, PRESERVATIVE FREE 10 ML: 5 INJECTION INTRAVENOUS at 10:11

## 2023-04-12 RX ADMIN — POTASSIUM CHLORIDE 10 MEQ: 7.46 INJECTION, SOLUTION INTRAVENOUS at 14:23

## 2023-04-12 RX ADMIN — INSULIN LISPRO 8 UNITS: 100 INJECTION, SOLUTION INTRAVENOUS; SUBCUTANEOUS at 11:44

## 2023-04-12 RX ADMIN — POTASSIUM CHLORIDE 10 MEQ: 7.46 INJECTION, SOLUTION INTRAVENOUS at 11:00

## 2023-04-12 RX ADMIN — MUPIROCIN: 20 OINTMENT TOPICAL at 08:28

## 2023-04-12 RX ADMIN — POTASSIUM CHLORIDE 10 MEQ: 7.46 INJECTION, SOLUTION INTRAVENOUS at 16:23

## 2023-04-12 RX ADMIN — DIVALPROEX SODIUM 125 MG: 125 CAPSULE, COATED PELLETS ORAL at 20:18

## 2023-04-12 RX ADMIN — SODIUM CHLORIDE, PRESERVATIVE FREE 10 ML: 5 INJECTION INTRAVENOUS at 20:18

## 2023-04-12 RX ADMIN — POTASSIUM CHLORIDE 10 MEQ: 7.46 INJECTION, SOLUTION INTRAVENOUS at 11:58

## 2023-04-12 RX ADMIN — POTASSIUM CHLORIDE 10 MEQ: 7.46 INJECTION, SOLUTION INTRAVENOUS at 13:16

## 2023-04-12 RX ADMIN — SODIUM CHLORIDE 250 ML: 9 INJECTION, SOLUTION INTRAVENOUS at 05:05

## 2023-04-12 RX ADMIN — RIVAROXABAN 20 MG: 20 TABLET, FILM COATED ORAL at 18:17

## 2023-04-12 RX ADMIN — MUPIROCIN: 20 OINTMENT TOPICAL at 20:25

## 2023-04-12 RX ADMIN — DIVALPROEX SODIUM 125 MG: 125 CAPSULE, COATED PELLETS ORAL at 13:33

## 2023-04-12 RX ADMIN — DILTIAZEM HYDROCHLORIDE 30 MG: 60 TABLET, FILM COATED ORAL at 18:18

## 2023-04-13 LAB
ANION GAP SERPL CALCULATED.3IONS-SCNC: 15 MMOL/L (ref 3–16)
BASOPHILS # BLD: 0 K/UL (ref 0–0.2)
BASOPHILS NFR BLD: 0.4 %
BUN SERPL-MCNC: 28 MG/DL (ref 7–20)
CALCIUM SERPL-MCNC: 9.1 MG/DL (ref 8.3–10.6)
CHLORIDE SERPL-SCNC: 104 MMOL/L (ref 99–110)
CO2 SERPL-SCNC: 27 MMOL/L (ref 21–32)
CREAT SERPL-MCNC: <0.5 MG/DL (ref 0.6–1.2)
DEPRECATED RDW RBC AUTO: 16.2 % (ref 12.4–15.4)
EOSINOPHIL # BLD: 0.1 K/UL (ref 0–0.6)
EOSINOPHIL NFR BLD: 0.7 %
GFR SERPLBLD CREATININE-BSD FMLA CKD-EPI: >60 ML/MIN/{1.73_M2}
GLUCOSE BLD-MCNC: 175 MG/DL (ref 70–99)
GLUCOSE BLD-MCNC: 176 MG/DL (ref 70–99)
GLUCOSE BLD-MCNC: 181 MG/DL (ref 70–99)
GLUCOSE BLD-MCNC: 186 MG/DL (ref 70–99)
GLUCOSE BLD-MCNC: 191 MG/DL (ref 70–99)
GLUCOSE BLD-MCNC: 191 MG/DL (ref 70–99)
GLUCOSE BLD-MCNC: 192 MG/DL (ref 70–99)
GLUCOSE SERPL-MCNC: 210 MG/DL (ref 70–99)
HCT VFR BLD AUTO: 43.1 % (ref 36–48)
HGB BLD-MCNC: 13.6 G/DL (ref 12–16)
LYMPHOCYTES # BLD: 1.6 K/UL (ref 1–5.1)
LYMPHOCYTES NFR BLD: 16.4 %
MCH RBC QN AUTO: 27.6 PG (ref 26–34)
MCHC RBC AUTO-ENTMCNC: 31.7 G/DL (ref 31–36)
MCV RBC AUTO: 87 FL (ref 80–100)
MONOCYTES # BLD: 1 K/UL (ref 0–1.3)
MONOCYTES NFR BLD: 10.1 %
NEUTROPHILS # BLD: 7.3 K/UL (ref 1.7–7.7)
NEUTROPHILS NFR BLD: 72.4 %
PERFORMED ON: ABNORMAL
PLATELET # BLD AUTO: 136 K/UL (ref 135–450)
PMV BLD AUTO: 10.8 FL (ref 5–10.5)
POTASSIUM SERPL-SCNC: 4 MMOL/L (ref 3.5–5.1)
RBC # BLD AUTO: 4.95 M/UL (ref 4–5.2)
SODIUM SERPL-SCNC: 146 MMOL/L (ref 136–145)
WBC # BLD AUTO: 10.1 K/UL (ref 4–11)

## 2023-04-13 PROCEDURE — 36415 COLL VENOUS BLD VENIPUNCTURE: CPT

## 2023-04-13 PROCEDURE — 6370000000 HC RX 637 (ALT 250 FOR IP): Performed by: INTERNAL MEDICINE

## 2023-04-13 PROCEDURE — 92526 ORAL FUNCTION THERAPY: CPT

## 2023-04-13 PROCEDURE — 94660 CPAP INITIATION&MGMT: CPT

## 2023-04-13 PROCEDURE — 99233 SBSQ HOSP IP/OBS HIGH 50: CPT | Performed by: INTERNAL MEDICINE

## 2023-04-13 PROCEDURE — 2500000003 HC RX 250 WO HCPCS: Performed by: INTERNAL MEDICINE

## 2023-04-13 PROCEDURE — 99233 SBSQ HOSP IP/OBS HIGH 50: CPT | Performed by: NURSE PRACTITIONER

## 2023-04-13 PROCEDURE — 6370000000 HC RX 637 (ALT 250 FOR IP): Performed by: NURSE PRACTITIONER

## 2023-04-13 PROCEDURE — 2580000003 HC RX 258: Performed by: INTERNAL MEDICINE

## 2023-04-13 PROCEDURE — 2000000000 HC ICU R&B

## 2023-04-13 PROCEDURE — 80048 BASIC METABOLIC PNL TOTAL CA: CPT

## 2023-04-13 PROCEDURE — 99232 SBSQ HOSP IP/OBS MODERATE 35: CPT | Performed by: INTERNAL MEDICINE

## 2023-04-13 PROCEDURE — 94761 N-INVAS EAR/PLS OXIMETRY MLT: CPT

## 2023-04-13 PROCEDURE — 2700000000 HC OXYGEN THERAPY PER DAY

## 2023-04-13 PROCEDURE — 85025 COMPLETE CBC W/AUTO DIFF WBC: CPT

## 2023-04-13 RX ORDER — DILTIAZEM HYDROCHLORIDE 60 MG/1
60 TABLET, FILM COATED ORAL EVERY 6 HOURS SCHEDULED
Status: DISCONTINUED | OUTPATIENT
Start: 2023-04-13 | End: 2023-04-14

## 2023-04-13 RX ADMIN — MUPIROCIN: 20 OINTMENT TOPICAL at 20:20

## 2023-04-13 RX ADMIN — GABAPENTIN 100 MG: 100 CAPSULE ORAL at 13:59

## 2023-04-13 RX ADMIN — RIVAROXABAN 20 MG: 20 TABLET, FILM COATED ORAL at 17:05

## 2023-04-13 RX ADMIN — DIVALPROEX SODIUM 125 MG: 125 CAPSULE, COATED PELLETS ORAL at 20:19

## 2023-04-13 RX ADMIN — DIVALPROEX SODIUM 125 MG: 125 CAPSULE, COATED PELLETS ORAL at 13:59

## 2023-04-13 RX ADMIN — SODIUM CHLORIDE, PRESERVATIVE FREE 10 ML: 5 INJECTION INTRAVENOUS at 20:20

## 2023-04-13 RX ADMIN — DILTIAZEM HYDROCHLORIDE 60 MG: 60 TABLET, FILM COATED ORAL at 23:05

## 2023-04-13 RX ADMIN — MICONAZOLE NITRATE: 2 OINTMENT TOPICAL at 20:19

## 2023-04-13 RX ADMIN — MUPIROCIN: 20 OINTMENT TOPICAL at 08:10

## 2023-04-13 RX ADMIN — DIVALPROEX SODIUM 125 MG: 125 CAPSULE, COATED PELLETS ORAL at 06:20

## 2023-04-13 RX ADMIN — MICONAZOLE NITRATE: 2 OINTMENT TOPICAL at 08:10

## 2023-04-13 RX ADMIN — DILTIAZEM HYDROCHLORIDE 30 MG: 60 TABLET, FILM COATED ORAL at 00:50

## 2023-04-13 RX ADMIN — DILTIAZEM HYDROCHLORIDE 60 MG: 60 TABLET, FILM COATED ORAL at 11:46

## 2023-04-13 RX ADMIN — BISACODYL 10 MG: 10 SUPPOSITORY RECTAL at 08:06

## 2023-04-13 RX ADMIN — METOPROLOL TARTRATE 2.5 MG: 5 INJECTION INTRAVENOUS at 15:00

## 2023-04-13 RX ADMIN — GABAPENTIN 100 MG: 100 CAPSULE ORAL at 20:19

## 2023-04-13 RX ADMIN — METOPROLOL TARTRATE 25 MG: 25 TABLET, FILM COATED ORAL at 20:19

## 2023-04-13 RX ADMIN — DILTIAZEM HYDROCHLORIDE 30 MG: 60 TABLET, FILM COATED ORAL at 06:20

## 2023-04-13 RX ADMIN — BUSPIRONE HYDROCHLORIDE 5 MG: 5 TABLET ORAL at 08:05

## 2023-04-13 RX ADMIN — DILTIAZEM HYDROCHLORIDE 60 MG: 60 TABLET, FILM COATED ORAL at 17:05

## 2023-04-13 RX ADMIN — DULOXETINE HYDROCHLORIDE 60 MG: 60 CAPSULE, DELAYED RELEASE ORAL at 08:05

## 2023-04-13 RX ADMIN — BUSPIRONE HYDROCHLORIDE 5 MG: 5 TABLET ORAL at 20:19

## 2023-04-13 RX ADMIN — GABAPENTIN 100 MG: 100 CAPSULE ORAL at 08:05

## 2023-04-13 ASSESSMENT — PAIN SCALES - GENERAL
PAINLEVEL_OUTOF10: 0
PAINLEVEL_OUTOF10: 0

## 2023-04-14 LAB
ANION GAP SERPL CALCULATED.3IONS-SCNC: 20 MMOL/L (ref 3–16)
BUN SERPL-MCNC: 13 MG/DL (ref 7–20)
CALCIUM SERPL-MCNC: 8.8 MG/DL (ref 8.3–10.6)
CHLORIDE SERPL-SCNC: 106 MMOL/L (ref 99–110)
CO2 SERPL-SCNC: 18 MMOL/L (ref 21–32)
CREAT SERPL-MCNC: <0.5 MG/DL (ref 0.6–1.2)
DEPRECATED RDW RBC AUTO: 16 % (ref 12.4–15.4)
GFR SERPLBLD CREATININE-BSD FMLA CKD-EPI: >60 ML/MIN/{1.73_M2}
GLUCOSE BLD-MCNC: 178 MG/DL (ref 70–99)
GLUCOSE BLD-MCNC: 186 MG/DL (ref 70–99)
GLUCOSE BLD-MCNC: 194 MG/DL (ref 70–99)
GLUCOSE BLD-MCNC: 225 MG/DL (ref 70–99)
GLUCOSE SERPL-MCNC: 203 MG/DL (ref 70–99)
HCT VFR BLD AUTO: 42.7 % (ref 36–48)
HGB BLD-MCNC: 13.5 G/DL (ref 12–16)
MCH RBC QN AUTO: 27.7 PG (ref 26–34)
MCHC RBC AUTO-ENTMCNC: 31.6 G/DL (ref 31–36)
MCV RBC AUTO: 87.7 FL (ref 80–100)
PERFORMED ON: ABNORMAL
PLATELET # BLD AUTO: 144 K/UL (ref 135–450)
PMV BLD AUTO: 10.7 FL (ref 5–10.5)
POTASSIUM SERPL-SCNC: 3.9 MMOL/L (ref 3.5–5.1)
RBC # BLD AUTO: 4.87 M/UL (ref 4–5.2)
REASON FOR REJECTION: NORMAL
REJECTED TEST: NORMAL
SODIUM SERPL-SCNC: 144 MMOL/L (ref 136–145)
WBC # BLD AUTO: 8.8 K/UL (ref 4–11)

## 2023-04-14 PROCEDURE — 2580000003 HC RX 258: Performed by: INTERNAL MEDICINE

## 2023-04-14 PROCEDURE — 6370000000 HC RX 637 (ALT 250 FOR IP): Performed by: INTERNAL MEDICINE

## 2023-04-14 PROCEDURE — 99232 SBSQ HOSP IP/OBS MODERATE 35: CPT | Performed by: INTERNAL MEDICINE

## 2023-04-14 PROCEDURE — 94761 N-INVAS EAR/PLS OXIMETRY MLT: CPT

## 2023-04-14 PROCEDURE — 6370000000 HC RX 637 (ALT 250 FOR IP): Performed by: NURSE PRACTITIONER

## 2023-04-14 PROCEDURE — 94660 CPAP INITIATION&MGMT: CPT

## 2023-04-14 PROCEDURE — 80048 BASIC METABOLIC PNL TOTAL CA: CPT

## 2023-04-14 PROCEDURE — 85027 COMPLETE CBC AUTOMATED: CPT

## 2023-04-14 PROCEDURE — 2060000000 HC ICU INTERMEDIATE R&B

## 2023-04-14 PROCEDURE — 36415 COLL VENOUS BLD VENIPUNCTURE: CPT

## 2023-04-14 PROCEDURE — 2700000000 HC OXYGEN THERAPY PER DAY

## 2023-04-14 RX ORDER — DILTIAZEM HYDROCHLORIDE 60 MG/1
90 TABLET, FILM COATED ORAL EVERY 6 HOURS SCHEDULED
Status: DISCONTINUED | OUTPATIENT
Start: 2023-04-14 | End: 2023-04-16

## 2023-04-14 RX ORDER — DILTIAZEM HYDROCHLORIDE 60 MG/1
90 TABLET, FILM COATED ORAL EVERY 6 HOURS SCHEDULED
Status: DISCONTINUED | OUTPATIENT
Start: 2023-04-14 | End: 2023-04-14

## 2023-04-14 RX ADMIN — DIVALPROEX SODIUM 125 MG: 125 CAPSULE, COATED PELLETS ORAL at 20:41

## 2023-04-14 RX ADMIN — INSULIN LISPRO 4 UNITS: 100 INJECTION, SOLUTION INTRAVENOUS; SUBCUTANEOUS at 12:14

## 2023-04-14 RX ADMIN — SODIUM CHLORIDE, PRESERVATIVE FREE 10 ML: 5 INJECTION INTRAVENOUS at 20:40

## 2023-04-14 RX ADMIN — POLYETHYLENE GLYCOL 3350 17 G: 17 POWDER, FOR SOLUTION ORAL at 08:07

## 2023-04-14 RX ADMIN — DILTIAZEM HYDROCHLORIDE 90 MG: 60 TABLET, FILM COATED ORAL at 18:13

## 2023-04-14 RX ADMIN — SODIUM CHLORIDE, PRESERVATIVE FREE 10 ML: 5 INJECTION INTRAVENOUS at 08:08

## 2023-04-14 RX ADMIN — BUSPIRONE HYDROCHLORIDE 5 MG: 5 TABLET ORAL at 08:07

## 2023-04-14 RX ADMIN — RIVAROXABAN 20 MG: 20 TABLET, FILM COATED ORAL at 18:13

## 2023-04-14 RX ADMIN — MICONAZOLE NITRATE: 2 OINTMENT TOPICAL at 08:07

## 2023-04-14 RX ADMIN — GABAPENTIN 100 MG: 100 CAPSULE ORAL at 08:07

## 2023-04-14 RX ADMIN — DILTIAZEM HYDROCHLORIDE 90 MG: 60 TABLET, FILM COATED ORAL at 13:03

## 2023-04-14 RX ADMIN — DIVALPROEX SODIUM 125 MG: 125 CAPSULE, COATED PELLETS ORAL at 05:07

## 2023-04-14 RX ADMIN — INSULIN LISPRO 8 UNITS: 100 INJECTION, SOLUTION INTRAVENOUS; SUBCUTANEOUS at 20:42

## 2023-04-14 RX ADMIN — DIVALPROEX SODIUM 125 MG: 125 CAPSULE, COATED PELLETS ORAL at 13:03

## 2023-04-14 RX ADMIN — DILTIAZEM HYDROCHLORIDE 60 MG: 60 TABLET, FILM COATED ORAL at 05:06

## 2023-04-14 RX ADMIN — MUPIROCIN: 20 OINTMENT TOPICAL at 08:09

## 2023-04-14 RX ADMIN — METOPROLOL TARTRATE 25 MG: 25 TABLET, FILM COATED ORAL at 20:41

## 2023-04-14 RX ADMIN — MUPIROCIN: 20 OINTMENT TOPICAL at 20:40

## 2023-04-14 RX ADMIN — GABAPENTIN 100 MG: 100 CAPSULE ORAL at 20:41

## 2023-04-14 RX ADMIN — BUSPIRONE HYDROCHLORIDE 5 MG: 5 TABLET ORAL at 20:41

## 2023-04-14 RX ADMIN — GABAPENTIN 100 MG: 100 CAPSULE ORAL at 13:03

## 2023-04-14 RX ADMIN — METOPROLOL TARTRATE 25 MG: 25 TABLET, FILM COATED ORAL at 08:07

## 2023-04-14 RX ADMIN — DULOXETINE HYDROCHLORIDE 60 MG: 60 CAPSULE, DELAYED RELEASE ORAL at 08:07

## 2023-04-14 RX ADMIN — MICONAZOLE NITRATE: 2 OINTMENT TOPICAL at 21:54

## 2023-04-14 RX ADMIN — DILTIAZEM HYDROCHLORIDE 90 MG: 60 TABLET, FILM COATED ORAL at 23:42

## 2023-04-15 LAB
GLUCOSE BLD-MCNC: 127 MG/DL (ref 70–99)
GLUCOSE BLD-MCNC: 225 MG/DL (ref 70–99)
GLUCOSE BLD-MCNC: 246 MG/DL (ref 70–99)
GLUCOSE BLD-MCNC: 246 MG/DL (ref 70–99)
GLUCOSE BLD-MCNC: 61 MG/DL (ref 70–99)
GLUCOSE BLD-MCNC: 77 MG/DL (ref 70–99)
GLUCOSE BLD-MCNC: 91 MG/DL (ref 70–99)
PERFORMED ON: ABNORMAL
PERFORMED ON: NORMAL
PERFORMED ON: NORMAL

## 2023-04-15 PROCEDURE — 6370000000 HC RX 637 (ALT 250 FOR IP): Performed by: INTERNAL MEDICINE

## 2023-04-15 PROCEDURE — 99233 SBSQ HOSP IP/OBS HIGH 50: CPT | Performed by: INTERNAL MEDICINE

## 2023-04-15 PROCEDURE — 2060000000 HC ICU INTERMEDIATE R&B

## 2023-04-15 PROCEDURE — 2700000000 HC OXYGEN THERAPY PER DAY

## 2023-04-15 PROCEDURE — 94761 N-INVAS EAR/PLS OXIMETRY MLT: CPT

## 2023-04-15 PROCEDURE — 6370000000 HC RX 637 (ALT 250 FOR IP): Performed by: NURSE PRACTITIONER

## 2023-04-15 PROCEDURE — 2580000003 HC RX 258: Performed by: INTERNAL MEDICINE

## 2023-04-15 RX ORDER — ACETAMINOPHEN 325 MG/1
650 TABLET ORAL EVERY 6 HOURS PRN
Status: DISCONTINUED | OUTPATIENT
Start: 2023-04-15 | End: 2023-04-16 | Stop reason: HOSPADM

## 2023-04-15 RX ADMIN — RIVAROXABAN 20 MG: 20 TABLET, FILM COATED ORAL at 17:23

## 2023-04-15 RX ADMIN — BUSPIRONE HYDROCHLORIDE 5 MG: 5 TABLET ORAL at 08:05

## 2023-04-15 RX ADMIN — BUSPIRONE HYDROCHLORIDE 5 MG: 5 TABLET ORAL at 20:39

## 2023-04-15 RX ADMIN — POLYETHYLENE GLYCOL 3350 17 G: 17 POWDER, FOR SOLUTION ORAL at 08:03

## 2023-04-15 RX ADMIN — DIVALPROEX SODIUM 125 MG: 125 CAPSULE, COATED PELLETS ORAL at 15:37

## 2023-04-15 RX ADMIN — MICONAZOLE NITRATE: 2 OINTMENT TOPICAL at 20:40

## 2023-04-15 RX ADMIN — GABAPENTIN 100 MG: 100 CAPSULE ORAL at 15:37

## 2023-04-15 RX ADMIN — DILTIAZEM HYDROCHLORIDE 90 MG: 60 TABLET, FILM COATED ORAL at 17:23

## 2023-04-15 RX ADMIN — METOPROLOL TARTRATE 25 MG: 25 TABLET, FILM COATED ORAL at 08:05

## 2023-04-15 RX ADMIN — MICONAZOLE NITRATE: 2 OINTMENT TOPICAL at 08:09

## 2023-04-15 RX ADMIN — INSULIN LISPRO 4 UNITS: 100 INJECTION, SOLUTION INTRAVENOUS; SUBCUTANEOUS at 17:24

## 2023-04-15 RX ADMIN — GABAPENTIN 100 MG: 100 CAPSULE ORAL at 08:05

## 2023-04-15 RX ADMIN — ACETAMINOPHEN 650 MG: 325 TABLET ORAL at 18:49

## 2023-04-15 RX ADMIN — DIVALPROEX SODIUM 125 MG: 125 CAPSULE, COATED PELLETS ORAL at 05:02

## 2023-04-15 RX ADMIN — DIVALPROEX SODIUM 125 MG: 125 CAPSULE, COATED PELLETS ORAL at 20:39

## 2023-04-15 RX ADMIN — DILTIAZEM HYDROCHLORIDE 90 MG: 60 TABLET, FILM COATED ORAL at 11:51

## 2023-04-15 RX ADMIN — GABAPENTIN 100 MG: 100 CAPSULE ORAL at 20:39

## 2023-04-15 RX ADMIN — DULOXETINE HYDROCHLORIDE 60 MG: 60 CAPSULE, DELAYED RELEASE ORAL at 08:05

## 2023-04-15 RX ADMIN — SODIUM CHLORIDE, PRESERVATIVE FREE 10 ML: 5 INJECTION INTRAVENOUS at 09:58

## 2023-04-15 RX ADMIN — INSULIN LISPRO 4 UNITS: 100 INJECTION, SOLUTION INTRAVENOUS; SUBCUTANEOUS at 20:45

## 2023-04-15 RX ADMIN — INSULIN LISPRO 8 UNITS: 100 INJECTION, SOLUTION INTRAVENOUS; SUBCUTANEOUS at 00:29

## 2023-04-15 RX ADMIN — METOPROLOL TARTRATE 25 MG: 25 TABLET, FILM COATED ORAL at 20:39

## 2023-04-15 RX ADMIN — SODIUM CHLORIDE, PRESERVATIVE FREE 10 ML: 5 INJECTION INTRAVENOUS at 20:40

## 2023-04-15 ASSESSMENT — PAIN DESCRIPTION - LOCATION: LOCATION: HEAD

## 2023-04-15 ASSESSMENT — PAIN SCALES - GENERAL
PAINLEVEL_OUTOF10: 0
PAINLEVEL_OUTOF10: 5

## 2023-04-16 VITALS
WEIGHT: 194.6 LBS | SYSTOLIC BLOOD PRESSURE: 129 MMHG | HEART RATE: 87 BPM | BODY MASS INDEX: 34.48 KG/M2 | OXYGEN SATURATION: 95 % | HEIGHT: 63 IN | DIASTOLIC BLOOD PRESSURE: 75 MMHG | RESPIRATION RATE: 20 BRPM | TEMPERATURE: 97.6 F

## 2023-04-16 LAB
GLUCOSE BLD-MCNC: 134 MG/DL (ref 70–99)
GLUCOSE BLD-MCNC: 182 MG/DL (ref 70–99)
GLUCOSE BLD-MCNC: 229 MG/DL (ref 70–99)
GLUCOSE BLD-MCNC: 251 MG/DL (ref 70–99)
PERFORMED ON: ABNORMAL
SARS-COV-2 RDRP RESP QL NAA+PROBE: DETECTED

## 2023-04-16 PROCEDURE — 6370000000 HC RX 637 (ALT 250 FOR IP): Performed by: INTERNAL MEDICINE

## 2023-04-16 PROCEDURE — 99239 HOSP IP/OBS DSCHRG MGMT >30: CPT | Performed by: INTERNAL MEDICINE

## 2023-04-16 PROCEDURE — 99231 SBSQ HOSP IP/OBS SF/LOW 25: CPT

## 2023-04-16 PROCEDURE — 87635 SARS-COV-2 COVID-19 AMP PRB: CPT

## 2023-04-16 PROCEDURE — 2580000003 HC RX 258: Performed by: INTERNAL MEDICINE

## 2023-04-16 PROCEDURE — 6370000000 HC RX 637 (ALT 250 FOR IP): Performed by: NURSE PRACTITIONER

## 2023-04-16 PROCEDURE — 94761 N-INVAS EAR/PLS OXIMETRY MLT: CPT

## 2023-04-16 PROCEDURE — 2700000000 HC OXYGEN THERAPY PER DAY

## 2023-04-16 RX ORDER — DILTIAZEM HYDROCHLORIDE 240 MG/1
240 CAPSULE, COATED, EXTENDED RELEASE ORAL DAILY
Qty: 30 CAPSULE | Refills: 3
Start: 2023-04-17 | End: 2023-04-20

## 2023-04-16 RX ORDER — DULOXETIN HYDROCHLORIDE 30 MG/1
30 CAPSULE, DELAYED RELEASE ORAL DAILY
Status: DISCONTINUED | OUTPATIENT
Start: 2023-04-16 | End: 2023-04-16 | Stop reason: HOSPADM

## 2023-04-16 RX ORDER — DILTIAZEM HYDROCHLORIDE 240 MG/1
240 CAPSULE, COATED, EXTENDED RELEASE ORAL DAILY
Status: DISCONTINUED | OUTPATIENT
Start: 2023-04-16 | End: 2023-04-16 | Stop reason: HOSPADM

## 2023-04-16 RX ORDER — INSULIN LISPRO 100 [IU]/ML
0-16 INJECTION, SOLUTION INTRAVENOUS; SUBCUTANEOUS EVERY 4 HOURS
Qty: 10 ML | Refills: 0 | Status: ON HOLD
Start: 2023-04-16

## 2023-04-16 RX ADMIN — DIVALPROEX SODIUM 125 MG: 125 CAPSULE, COATED PELLETS ORAL at 06:43

## 2023-04-16 RX ADMIN — DILTIAZEM HYDROCHLORIDE 90 MG: 60 TABLET, FILM COATED ORAL at 00:33

## 2023-04-16 RX ADMIN — DULOXETINE HYDROCHLORIDE 30 MG: 30 CAPSULE, DELAYED RELEASE ORAL at 09:47

## 2023-04-16 RX ADMIN — MICONAZOLE NITRATE: 2 OINTMENT TOPICAL at 09:53

## 2023-04-16 RX ADMIN — BUSPIRONE HYDROCHLORIDE 5 MG: 5 TABLET ORAL at 09:47

## 2023-04-16 RX ADMIN — GABAPENTIN 100 MG: 100 CAPSULE ORAL at 09:47

## 2023-04-16 RX ADMIN — DILTIAZEM HYDROCHLORIDE 90 MG: 60 TABLET, FILM COATED ORAL at 06:36

## 2023-04-16 RX ADMIN — DILTIAZEM HYDROCHLORIDE 240 MG: 240 CAPSULE, COATED, EXTENDED RELEASE ORAL at 09:43

## 2023-04-16 RX ADMIN — INSULIN LISPRO 8 UNITS: 100 INJECTION, SOLUTION INTRAVENOUS; SUBCUTANEOUS at 00:37

## 2023-04-16 RX ADMIN — SODIUM CHLORIDE, PRESERVATIVE FREE 10 ML: 5 INJECTION INTRAVENOUS at 09:53

## 2023-04-16 RX ADMIN — POLYETHYLENE GLYCOL 3350 17 G: 17 POWDER, FOR SOLUTION ORAL at 09:52

## 2023-04-16 RX ADMIN — METOPROLOL TARTRATE 25 MG: 25 TABLET, FILM COATED ORAL at 09:47

## 2023-04-20 ENCOUNTER — APPOINTMENT (OUTPATIENT)
Dept: CT IMAGING | Age: 73
End: 2023-04-20
Payer: COMMERCIAL

## 2023-04-20 ENCOUNTER — HOSPITAL ENCOUNTER (EMERGENCY)
Age: 73
Discharge: ANOTHER HOSPITAL | End: 2023-04-21
Attending: EMERGENCY MEDICINE
Payer: COMMERCIAL

## 2023-04-20 ENCOUNTER — APPOINTMENT (OUTPATIENT)
Dept: GENERAL RADIOLOGY | Age: 73
End: 2023-04-20
Payer: COMMERCIAL

## 2023-04-20 DIAGNOSIS — I21.4 NSTEMI (NON-ST ELEVATED MYOCARDIAL INFARCTION) (HCC): Primary | ICD-10-CM

## 2023-04-20 DIAGNOSIS — R61 DIAPHORESIS: ICD-10-CM

## 2023-04-20 DIAGNOSIS — U07.1 COVID-19: ICD-10-CM

## 2023-04-20 DIAGNOSIS — R94.31 ST SEGMENT DEPRESSION: ICD-10-CM

## 2023-04-20 DIAGNOSIS — R07.9 CHEST PAIN, UNSPECIFIED TYPE: ICD-10-CM

## 2023-04-20 LAB
ALBUMIN SERPL-MCNC: 3.8 G/DL (ref 3.4–5)
ALBUMIN/GLOB SERPL: 1 {RATIO} (ref 1.1–2.2)
ALP SERPL-CCNC: 83 U/L (ref 40–129)
ALT SERPL-CCNC: 15 U/L (ref 10–40)
ANION GAP SERPL CALCULATED.3IONS-SCNC: 9 MMOL/L (ref 3–16)
APTT BLD: 34.3 SEC (ref 22.7–35.9)
APTT BLD: 58.8 SEC (ref 22.7–35.9)
AST SERPL-CCNC: 17 U/L (ref 15–37)
BASE EXCESS BLDA CALC-SCNC: 5.7 MMOL/L (ref -3–3)
BASE EXCESS BLDV CALC-SCNC: 0.7 MMOL/L (ref -3–3)
BASE EXCESS BLDV CALC-SCNC: 3.7 MMOL/L (ref -3–3)
BASOPHILS # BLD: 0.1 K/UL (ref 0–0.2)
BASOPHILS NFR BLD: 0.6 %
BILIRUB SERPL-MCNC: 0.5 MG/DL (ref 0–1)
BUN SERPL-MCNC: 11 MG/DL (ref 7–20)
CALCIUM SERPL-MCNC: 10.2 MG/DL (ref 8.3–10.6)
CHLORIDE SERPL-SCNC: 97 MMOL/L (ref 99–110)
CO2 BLDA-SCNC: 30.9 MMOL/L
CO2 BLDV-SCNC: 30 MMOL/L
CO2 BLDV-SCNC: 31 MMOL/L
CO2 SERPL-SCNC: 34 MMOL/L (ref 21–32)
COHGB MFR BLDA: 0.8 % (ref 0–1.5)
COHGB MFR BLDV: 2.2 % (ref 0–1.5)
COHGB MFR BLDV: 2.8 % (ref 0–1.5)
CREAT SERPL-MCNC: <0.5 MG/DL (ref 0.6–1.2)
CRP SERPL-MCNC: 21.4 MG/L (ref 0–5.1)
D DIMER: 1.94 UG/ML FEU (ref 0–0.6)
DEPRECATED RDW RBC AUTO: 16.2 % (ref 12.4–15.4)
EKG ATRIAL RATE: 144 BPM
EKG ATRIAL RATE: 441 BPM
EKG ATRIAL RATE: 51 BPM
EKG ATRIAL RATE: 77 BPM
EKG DIAGNOSIS: NORMAL
EKG Q-T INTERVAL: 328 MS
EKG Q-T INTERVAL: 440 MS
EKG Q-T INTERVAL: 444 MS
EKG Q-T INTERVAL: 450 MS
EKG QRS DURATION: 82 MS
EKG QRS DURATION: 84 MS
EKG QRS DURATION: 90 MS
EKG QRS DURATION: 92 MS
EKG QTC CALCULATION (BAZETT): 302 MS
EKG QTC CALCULATION (BAZETT): 424 MS
EKG QTC CALCULATION (BAZETT): 482 MS
EKG QTC CALCULATION (BAZETT): 482 MS
EKG R AXIS: 54 DEGREES
EKG R AXIS: 57 DEGREES
EKG R AXIS: 72 DEGREES
EKG R AXIS: 72 DEGREES
EKG T AXIS: 196 DEGREES
EKG T AXIS: 205 DEGREES
EKG T AXIS: 215 DEGREES
EKG T AXIS: 229 DEGREES
EKG VENTRICULAR RATE: 51 BPM
EKG VENTRICULAR RATE: 56 BPM
EKG VENTRICULAR RATE: 69 BPM
EKG VENTRICULAR RATE: 71 BPM
EOSINOPHIL # BLD: 0.2 K/UL (ref 0–0.6)
EOSINOPHIL NFR BLD: 1.7 %
GFR SERPLBLD CREATININE-BSD FMLA CKD-EPI: >60 ML/MIN/{1.73_M2}
GLUCOSE SERPL-MCNC: 257 MG/DL (ref 70–99)
HCO3 BLDA-SCNC: 29.6 MMOL/L (ref 21–29)
HCO3 BLDV-SCNC: 28.5 MMOL/L (ref 23–29)
HCO3 BLDV-SCNC: 29.5 MMOL/L (ref 23–29)
HCT VFR BLD AUTO: 44.4 % (ref 36–48)
HGB BLD-MCNC: 14.3 G/DL (ref 12–16)
HGB BLDA-MCNC: 14.7 G/DL (ref 12–16)
LYMPHOCYTES # BLD: 2.2 K/UL (ref 1–5.1)
LYMPHOCYTES NFR BLD: 23.7 %
MCH RBC QN AUTO: 27.4 PG (ref 26–34)
MCHC RBC AUTO-ENTMCNC: 32.1 G/DL (ref 31–36)
MCV RBC AUTO: 85.5 FL (ref 80–100)
METHGB MFR BLDA: 0.3 %
METHGB MFR BLDV: 0.3 %
METHGB MFR BLDV: 0.3 %
MONOCYTES # BLD: 0.5 K/UL (ref 0–1.3)
MONOCYTES NFR BLD: 5.8 %
NEUTROPHILS # BLD: 6.4 K/UL (ref 1.7–7.7)
NEUTROPHILS NFR BLD: 68.2 %
NT-PROBNP SERPL-MCNC: 4706 PG/ML (ref 0–449)
O2 CT VFR BLDV CALC: 20 VOL %
O2 THERAPY: ABNORMAL
PCO2 BLDA: 40.7 MMHG (ref 35–45)
PCO2 BLDV: 49.1 MMHG (ref 40–50)
PCO2 BLDV: 57.5 MMHG (ref 40–50)
PH BLDA: 7.48 [PH] (ref 7.35–7.45)
PH BLDV: 7.31 [PH] (ref 7.35–7.45)
PH BLDV: 7.4 [PH] (ref 7.35–7.45)
PLATELET # BLD AUTO: 237 K/UL (ref 135–450)
PMV BLD AUTO: 10.2 FL (ref 5–10.5)
PO2 BLDA: 71.5 MMHG (ref 75–108)
PO2 BLDV: 38.5 MMHG (ref 25–40)
PO2 BLDV: 61.1 MMHG (ref 25–40)
POTASSIUM SERPL-SCNC: 3.8 MMOL/L (ref 3.5–5.1)
PROT SERPL-MCNC: 7.7 G/DL (ref 6.4–8.2)
RBC # BLD AUTO: 5.19 M/UL (ref 4–5.2)
SAO2 % BLDA: 95.4 %
SAO2 % BLDV: 72 %
SAO2 % BLDV: 89 %
SODIUM SERPL-SCNC: 140 MMOL/L (ref 136–145)
TROPONIN, HIGH SENSITIVITY: 12 NG/L (ref 0–14)
TROPONIN, HIGH SENSITIVITY: 28 NG/L (ref 0–14)
TROPONIN, HIGH SENSITIVITY: 30 NG/L (ref 0–14)
WBC # BLD AUTO: 9.4 K/UL (ref 4–11)

## 2023-04-20 PROCEDURE — 80053 COMPREHEN METABOLIC PANEL: CPT

## 2023-04-20 PROCEDURE — 85379 FIBRIN DEGRADATION QUANT: CPT

## 2023-04-20 PROCEDURE — 86140 C-REACTIVE PROTEIN: CPT

## 2023-04-20 PROCEDURE — 6360000004 HC RX CONTRAST MEDICATION: Performed by: EMERGENCY MEDICINE

## 2023-04-20 PROCEDURE — 93010 ELECTROCARDIOGRAM REPORT: CPT | Performed by: INTERNAL MEDICINE

## 2023-04-20 PROCEDURE — 6360000002 HC RX W HCPCS: Performed by: EMERGENCY MEDICINE

## 2023-04-20 PROCEDURE — 96366 THER/PROPH/DIAG IV INF ADDON: CPT

## 2023-04-20 PROCEDURE — 94660 CPAP INITIATION&MGMT: CPT

## 2023-04-20 PROCEDURE — 99285 EMERGENCY DEPT VISIT HI MDM: CPT

## 2023-04-20 PROCEDURE — 2500000003 HC RX 250 WO HCPCS: Performed by: EMERGENCY MEDICINE

## 2023-04-20 PROCEDURE — 84484 ASSAY OF TROPONIN QUANT: CPT

## 2023-04-20 PROCEDURE — 85730 THROMBOPLASTIN TIME PARTIAL: CPT

## 2023-04-20 PROCEDURE — 96365 THER/PROPH/DIAG IV INF INIT: CPT

## 2023-04-20 PROCEDURE — 96361 HYDRATE IV INFUSION ADD-ON: CPT

## 2023-04-20 PROCEDURE — 2580000003 HC RX 258: Performed by: EMERGENCY MEDICINE

## 2023-04-20 PROCEDURE — 85025 COMPLETE CBC W/AUTO DIFF WBC: CPT

## 2023-04-20 PROCEDURE — 83880 ASSAY OF NATRIURETIC PEPTIDE: CPT

## 2023-04-20 PROCEDURE — 36600 WITHDRAWAL OF ARTERIAL BLOOD: CPT

## 2023-04-20 PROCEDURE — 84145 PROCALCITONIN (PCT): CPT

## 2023-04-20 PROCEDURE — 96376 TX/PRO/DX INJ SAME DRUG ADON: CPT

## 2023-04-20 PROCEDURE — 93005 ELECTROCARDIOGRAM TRACING: CPT | Performed by: EMERGENCY MEDICINE

## 2023-04-20 PROCEDURE — 71045 X-RAY EXAM CHEST 1 VIEW: CPT

## 2023-04-20 PROCEDURE — 96368 THER/DIAG CONCURRENT INF: CPT

## 2023-04-20 PROCEDURE — 6370000000 HC RX 637 (ALT 250 FOR IP): Performed by: EMERGENCY MEDICINE

## 2023-04-20 PROCEDURE — 71260 CT THORAX DX C+: CPT

## 2023-04-20 PROCEDURE — 36415 COLL VENOUS BLD VENIPUNCTURE: CPT

## 2023-04-20 PROCEDURE — 82803 BLOOD GASES ANY COMBINATION: CPT

## 2023-04-20 PROCEDURE — 96375 TX/PRO/DX INJ NEW DRUG ADDON: CPT

## 2023-04-20 RX ORDER — LORAZEPAM 0.5 MG/1
0.5 TABLET ORAL EVERY 8 HOURS PRN
Status: ON HOLD | COMMUNITY
End: 2023-04-28 | Stop reason: HOSPADM

## 2023-04-20 RX ORDER — METOPROLOL TARTRATE 5 MG/5ML
5 INJECTION INTRAVENOUS ONCE
Status: COMPLETED | OUTPATIENT
Start: 2023-04-20 | End: 2023-04-20

## 2023-04-20 RX ORDER — HEPARIN SODIUM 1000 [USP'U]/ML
4000 INJECTION, SOLUTION INTRAVENOUS; SUBCUTANEOUS PRN
Status: DISCONTINUED | OUTPATIENT
Start: 2023-04-20 | End: 2023-04-21 | Stop reason: HOSPADM

## 2023-04-20 RX ORDER — HEPARIN SODIUM 10000 [USP'U]/100ML
16 INJECTION, SOLUTION INTRAVENOUS CONTINUOUS
Status: DISCONTINUED | OUTPATIENT
Start: 2023-04-20 | End: 2023-04-21 | Stop reason: HOSPADM

## 2023-04-20 RX ORDER — NITROGLYCERIN 20 MG/100ML
5-200 INJECTION INTRAVENOUS CONTINUOUS
Status: DISCONTINUED | OUTPATIENT
Start: 2023-04-20 | End: 2023-04-21 | Stop reason: HOSPADM

## 2023-04-20 RX ORDER — LORAZEPAM 2 MG/ML
0.5 INJECTION INTRAMUSCULAR ONCE
Status: COMPLETED | OUTPATIENT
Start: 2023-04-20 | End: 2023-04-20

## 2023-04-20 RX ORDER — OXYCODONE AND ACETAMINOPHEN 7.5; 325 MG/1; MG/1
1 TABLET ORAL EVERY 4 HOURS PRN
Status: ON HOLD | COMMUNITY
End: 2023-04-28 | Stop reason: HOSPADM

## 2023-04-20 RX ORDER — HEPARIN SODIUM 1000 [USP'U]/ML
5000 INJECTION, SOLUTION INTRAVENOUS; SUBCUTANEOUS ONCE
Status: COMPLETED | OUTPATIENT
Start: 2023-04-20 | End: 2023-04-20

## 2023-04-20 RX ORDER — SENNOSIDES 8.6 MG
650 CAPSULE ORAL EVERY 8 HOURS PRN
COMMUNITY

## 2023-04-20 RX ORDER — HEPARIN SODIUM 10000 [USP'U]/100ML
5-30 INJECTION, SOLUTION INTRAVENOUS CONTINUOUS
Status: DISCONTINUED | OUTPATIENT
Start: 2023-04-20 | End: 2023-04-20 | Stop reason: SDUPTHER

## 2023-04-20 RX ORDER — TIZANIDINE 2 MG/1
2 TABLET ORAL EVERY 6 HOURS PRN
Status: ON HOLD | COMMUNITY
End: 2023-04-28 | Stop reason: SDUPTHER

## 2023-04-20 RX ORDER — DILTIAZEM HYDROCHLORIDE 120 MG/1
120 CAPSULE, EXTENDED RELEASE ORAL 2 TIMES DAILY
Status: ON HOLD | COMMUNITY
End: 2023-04-28 | Stop reason: HOSPADM

## 2023-04-20 RX ORDER — MORPHINE SULFATE 4 MG/ML
4 INJECTION, SOLUTION INTRAMUSCULAR; INTRAVENOUS ONCE
Status: COMPLETED | OUTPATIENT
Start: 2023-04-20 | End: 2023-04-20

## 2023-04-20 RX ORDER — FENTANYL CITRATE 50 UG/ML
75 INJECTION, SOLUTION INTRAMUSCULAR; INTRAVENOUS ONCE
Status: COMPLETED | OUTPATIENT
Start: 2023-04-20 | End: 2023-04-20

## 2023-04-20 RX ORDER — 0.9 % SODIUM CHLORIDE 0.9 %
1000 INTRAVENOUS SOLUTION INTRAVENOUS ONCE
Status: COMPLETED | OUTPATIENT
Start: 2023-04-20 | End: 2023-04-20

## 2023-04-20 RX ORDER — NITROGLYCERIN 0.4 MG/1
0.4 TABLET SUBLINGUAL ONCE
Status: COMPLETED | OUTPATIENT
Start: 2023-04-20 | End: 2023-04-20

## 2023-04-20 RX ORDER — HEPARIN SODIUM 1000 [USP'U]/ML
2000 INJECTION, SOLUTION INTRAVENOUS; SUBCUTANEOUS PRN
Status: DISCONTINUED | OUTPATIENT
Start: 2023-04-20 | End: 2023-04-21 | Stop reason: HOSPADM

## 2023-04-20 RX ORDER — HEPARIN SODIUM 1000 [USP'U]/ML
5000 INJECTION, SOLUTION INTRAVENOUS; SUBCUTANEOUS ONCE
Status: DISCONTINUED | OUTPATIENT
Start: 2023-04-20 | End: 2023-04-20 | Stop reason: CLARIF

## 2023-04-20 RX ORDER — FUROSEMIDE 20 MG/1
20 TABLET ORAL DAILY
COMMUNITY

## 2023-04-20 RX ADMIN — NITROGLYCERIN 1 INCH: 20 OINTMENT TOPICAL at 21:57

## 2023-04-20 RX ADMIN — TICAGRELOR 180 MG: 90 TABLET ORAL at 15:04

## 2023-04-20 RX ADMIN — IOPAMIDOL 75 ML: 755 INJECTION, SOLUTION INTRAVENOUS at 17:47

## 2023-04-20 RX ADMIN — HEPARIN SODIUM 5000 UNITS: 1000 INJECTION INTRAVENOUS; SUBCUTANEOUS at 15:11

## 2023-04-20 RX ADMIN — NITROGLYCERIN 20 MCG/MIN: 20 INJECTION INTRAVENOUS at 23:54

## 2023-04-20 RX ADMIN — HEPARIN SODIUM 12 UNITS/KG/HR: 10000 INJECTION, SOLUTION INTRAVENOUS at 17:10

## 2023-04-20 RX ADMIN — NITROGLYCERIN 0.4 MG: 0.4 TABLET SUBLINGUAL at 15:00

## 2023-04-20 RX ADMIN — LORAZEPAM 0.5 MG: 2 INJECTION INTRAMUSCULAR; INTRAVENOUS at 20:19

## 2023-04-20 RX ADMIN — SODIUM CHLORIDE 1000 ML: 9 INJECTION, SOLUTION INTRAVENOUS at 15:10

## 2023-04-20 RX ADMIN — METOPROLOL TARTRATE 5 MG: 5 INJECTION INTRAVENOUS at 21:58

## 2023-04-20 RX ADMIN — HEPARIN SODIUM 5000 UNITS: 1000 INJECTION INTRAVENOUS; SUBCUTANEOUS at 15:06

## 2023-04-20 RX ADMIN — FENTANYL CITRATE 75 MCG: 50 INJECTION, SOLUTION INTRAMUSCULAR; INTRAVENOUS at 15:09

## 2023-04-20 RX ADMIN — MORPHINE SULFATE 4 MG: 4 INJECTION, SOLUTION INTRAMUSCULAR; INTRAVENOUS at 21:58

## 2023-04-20 ASSESSMENT — ENCOUNTER SYMPTOMS: SHORTNESS OF BREATH: 1

## 2023-04-20 ASSESSMENT — LIFESTYLE VARIABLES
HOW MANY STANDARD DRINKS CONTAINING ALCOHOL DO YOU HAVE ON A TYPICAL DAY: PATIENT DOES NOT DRINK
HOW OFTEN DO YOU HAVE A DRINK CONTAINING ALCOHOL: NEVER

## 2023-04-20 ASSESSMENT — PAIN - FUNCTIONAL ASSESSMENT: PAIN_FUNCTIONAL_ASSESSMENT: NONE - DENIES PAIN

## 2023-04-20 NOTE — ED NOTES
[id number] consult completed with call back from Dr. Leonard Agee speaking with Dr. Jt Santa  04/20/23 5228

## 2023-04-20 NOTE — ED NOTES
1457-Two 12 Lead ECG's completed and given to Dr. Kathy Rios for review. Matthew Velez  04/20/23 1518  1530-Two Posterior 12 Lead ECG's completed and given to Dr. Kathy Rios for review.       Matthew Velez  04/20/23 4945

## 2023-04-20 NOTE — PROGRESS NOTES
Pharmacy to Manage Heparin Infusion per Hospital Policy    Dx: CP  Low dose weight based heparin ordered by Dr Tomas Solorio in the ED. Pt wt = 88 kg (will use adjusted wt =  66.6 kg since actual body weight > 120% ideal body weight). Baseline aPTT = 34.3 sec at 1454 today. Patient on Xarelto at home. Oral factor Xa-inhibitors may alter and elevate anti-Xa levels used for unfractionated heparin monitoring. As a result, anti-Xa monitoring is not accurate while Xa-inhibitor activity is detectable. Utilize aPTT monitoring when patient received an oral factor Xa-inhibitor (apixaban, betrixaban, edoxaban or rivaroxaban) within 72 hours prior to admission (please document last administration time). The goal is to allow a washout of oral factor Xa-inhibitors by using aPTT for 72 hours, then change to ant-Xa levels for UFH. Heparin (weight-based) Infusion: CAD/STEMI/NSTEMI/UA/AFib)   Heparin bolus dose was given earlier this afternoon. Start Heparin infusion at 12 units/kg/hr (recommended max initial rate: 1000 units/hr). Recheck anti-Xa (unless aPTT being used) in 6 hours. Goal anti-Xa 0.3-0.7 IU/mL  Goal aPTT =  seconds.

## 2023-04-20 NOTE — ED PROVIDER NOTES
Coloration: Skin is not jaundiced. Findings: No bruising. Neurological:      General: No focal deficit present. Mental Status: She is alert and oriented to person, place, and time. Psychiatric:         Mood and Affect: Mood normal.         Behavior: Behavior normal.             DIAGNOSTIC RESULTS:  LABS:    Labs Reviewed   CBC WITH AUTO DIFFERENTIAL - Abnormal; Notable for the following components:       Result Value    RDW 16.2 (*)     All other components within normal limits   COMPREHENSIVE METABOLIC PANEL W/ REFLEX TO MG FOR LOW K - Abnormal; Notable for the following components:    Chloride 97 (*)     CO2 34 (*)     Glucose 257 (*)     Creatinine <0.5 (*)     Albumin/Globulin Ratio 1.0 (*)     All other components within normal limits   C-REACTIVE PROTEIN - Abnormal; Notable for the following components:    CRP 21.4 (*)     All other components within normal limits   D-DIMER, QUANTITATIVE - Abnormal; Notable for the following components:    D-Dimer, Quant 1.94 (*)     All other components within normal limits   TROPONIN - Abnormal; Notable for the following components:    Troponin, High Sensitivity 30 (*)     All other components within normal limits   BRAIN NATRIURETIC PEPTIDE - Abnormal; Notable for the following components:    Pro-BNP 4,706 (*)     All other components within normal limits   BLOOD GAS, VENOUS - Abnormal; Notable for the following components:    pH, Jeffery 7.313 (*)     pCO2, Jeffery 57.5 (*)     pO2, Jeffery 61.1 (*)     Carboxyhemoglobin 2.8 (*)     All other components within normal limits   BLOOD GAS, ARTERIAL - Abnormal; Notable for the following components:    pH, Arterial 7.480 (*)     pO2, Arterial 71.5 (*)     HCO3, Arterial 29.6 (*)     Base Excess, Arterial 5.7 (*)     All other components within normal limits   APTT   TROPONIN   PROCALCITONIN   APTT   BLOOD GAS, VENOUS   TROPONIN       When ordered, only abnormal lab results are displayed.  All other labs were within normal

## 2023-04-21 ENCOUNTER — HOSPITAL ENCOUNTER (INPATIENT)
Age: 73
LOS: 7 days | Discharge: NURSING, REHAB OR LONG-TERM CARE | End: 2023-04-28
Attending: INTERNAL MEDICINE | Admitting: INTERNAL MEDICINE
Payer: COMMERCIAL

## 2023-04-21 VITALS
BODY MASS INDEX: 34.38 KG/M2 | DIASTOLIC BLOOD PRESSURE: 97 MMHG | TEMPERATURE: 98.6 F | HEART RATE: 132 BPM | WEIGHT: 194 LBS | OXYGEN SATURATION: 95 % | HEIGHT: 63 IN | RESPIRATION RATE: 20 BRPM | SYSTOLIC BLOOD PRESSURE: 137 MMHG

## 2023-04-21 DIAGNOSIS — M54.50 LOW BACK PAIN WITHOUT SCIATICA, UNSPECIFIED BACK PAIN LATERALITY, UNSPECIFIED CHRONICITY: Primary | ICD-10-CM

## 2023-04-21 PROBLEM — U07.1 PNEUMONIA DUE TO COVID-19 VIRUS: Status: ACTIVE | Noted: 2023-04-21

## 2023-04-21 PROBLEM — J96.01 ACUTE RESPIRATORY FAILURE WITH HYPOXIA (HCC): Status: ACTIVE | Noted: 2023-04-11

## 2023-04-21 PROBLEM — I21.4 NSTEMI (NON-ST ELEVATED MYOCARDIAL INFARCTION) (HCC): Status: ACTIVE | Noted: 2023-04-21

## 2023-04-21 PROBLEM — J12.82 PNEUMONIA DUE TO COVID-19 VIRUS: Status: ACTIVE | Noted: 2023-04-21

## 2023-04-21 LAB
ALBUMIN SERPL-MCNC: 3.5 G/DL (ref 3.4–5)
ALBUMIN/GLOB SERPL: 0.8 {RATIO} (ref 1.1–2.2)
ALP SERPL-CCNC: 81 U/L (ref 40–129)
ALT SERPL-CCNC: 16 U/L (ref 10–40)
ANION GAP SERPL CALCULATED.3IONS-SCNC: 15 MMOL/L (ref 3–16)
ANTI-XA UNFRAC HEPARIN: >1.1 IU/ML (ref 0.3–0.7)
APTT BLD: 62.4 SEC (ref 22.7–35.9)
APTT BLD: 80.2 SEC (ref 22.7–35.9)
APTT BLD: 84.7 SEC (ref 22.7–35.9)
APTT BLD: >180 SEC (ref 22.7–35.9)
AST SERPL-CCNC: 17 U/L (ref 15–37)
BASE EXCESS BLDA CALC-SCNC: 9 MMOL/L (ref -3–3)
BASOPHILS # BLD: 0.1 K/UL (ref 0–0.2)
BASOPHILS NFR BLD: 0.7 %
BILIRUB SERPL-MCNC: 0.7 MG/DL (ref 0–1)
BUN SERPL-MCNC: 9 MG/DL (ref 7–20)
CALCIUM SERPL-MCNC: 9.7 MG/DL (ref 8.3–10.6)
CHLORIDE SERPL-SCNC: 98 MMOL/L (ref 99–110)
CO2 BLDA-SCNC: 35 MMOL/L
CO2 SERPL-SCNC: 29 MMOL/L (ref 21–32)
CREAT SERPL-MCNC: <0.5 MG/DL (ref 0.6–1.2)
CRP SERPL-MCNC: 21.3 MG/L (ref 0–5.1)
DEPRECATED RDW RBC AUTO: 15.9 % (ref 12.4–15.4)
EKG ATRIAL RATE: 105 BPM
EKG ATRIAL RATE: 107 BPM
EKG ATRIAL RATE: 166 BPM
EKG ATRIAL RATE: 288 BPM
EKG DIAGNOSIS: NORMAL
EKG Q-T INTERVAL: 356 MS
EKG Q-T INTERVAL: 384 MS
EKG Q-T INTERVAL: 400 MS
EKG Q-T INTERVAL: 426 MS
EKG QRS DURATION: 88 MS
EKG QRS DURATION: 88 MS
EKG QRS DURATION: 90 MS
EKG QRS DURATION: 92 MS
EKG QTC CALCULATION (BAZETT): 440 MS
EKG QTC CALCULATION (BAZETT): 518 MS
EKG QTC CALCULATION (BAZETT): 527 MS
EKG QTC CALCULATION (BAZETT): 538 MS
EKG R AXIS: 112 DEGREES
EKG R AXIS: 69 DEGREES
EKG R AXIS: 77 DEGREES
EKG R AXIS: 78 DEGREES
EKG T AXIS: -55 DEGREES
EKG T AXIS: 219 DEGREES
EKG T AXIS: 245 DEGREES
EKG T AXIS: 260 DEGREES
EKG VENTRICULAR RATE: 118 BPM
EKG VENTRICULAR RATE: 132 BPM
EKG VENTRICULAR RATE: 73 BPM
EKG VENTRICULAR RATE: 89 BPM
EOSINOPHIL # BLD: 0.1 K/UL (ref 0–0.6)
EOSINOPHIL NFR BLD: 0.5 %
GFR SERPLBLD CREATININE-BSD FMLA CKD-EPI: >60 ML/MIN/{1.73_M2}
GLUCOSE BLD-MCNC: 180 MG/DL (ref 70–99)
GLUCOSE BLD-MCNC: 299 MG/DL (ref 70–99)
GLUCOSE BLD-MCNC: 300 MG/DL (ref 70–99)
GLUCOSE BLD-MCNC: 334 MG/DL (ref 70–99)
GLUCOSE SERPL-MCNC: 315 MG/DL (ref 70–99)
HCO3 BLDA-SCNC: 33.6 MMOL/L (ref 21–29)
HCT VFR BLD AUTO: 45.2 % (ref 36–48)
HGB BLD-MCNC: 14.2 G/DL (ref 12–16)
INR PPP: 1.19 (ref 0.84–1.16)
LACTATE BLDV-SCNC: 2 MMOL/L (ref 0.4–2)
LYMPHOCYTES # BLD: 1.3 K/UL (ref 1–5.1)
LYMPHOCYTES NFR BLD: 10.5 %
MCH RBC QN AUTO: 27.2 PG (ref 26–34)
MCHC RBC AUTO-ENTMCNC: 31.3 G/DL (ref 31–36)
MCV RBC AUTO: 86.7 FL (ref 80–100)
MONOCYTES # BLD: 0.7 K/UL (ref 0–1.3)
MONOCYTES NFR BLD: 5.3 %
NEUTROPHILS # BLD: 10.5 K/UL (ref 1.7–7.7)
NEUTROPHILS NFR BLD: 83 %
PCO2 BLDA: 50 MM HG (ref 35–45)
PERFORMED ON: ABNORMAL
PH BLDA: 7.43 [PH] (ref 7.35–7.45)
PLATELET # BLD AUTO: 257 K/UL (ref 135–450)
PMV BLD AUTO: 9.8 FL (ref 5–10.5)
PO2 BLDA: 61.5 MM HG (ref 75–108)
POC SAMPLE TYPE: ABNORMAL
POTASSIUM SERPL-SCNC: 3.7 MMOL/L (ref 3.5–5.1)
PROT SERPL-MCNC: 8.1 G/DL (ref 6.4–8.2)
PROTHROMBIN TIME: 15.1 SEC (ref 11.5–14.8)
RBC # BLD AUTO: 5.22 M/UL (ref 4–5.2)
SAO2 % BLDA: 92 % (ref 93–100)
SODIUM SERPL-SCNC: 142 MMOL/L (ref 136–145)
TROPONIN, HIGH SENSITIVITY: 26 NG/L (ref 0–14)
WBC # BLD AUTO: 12.7 K/UL (ref 4–11)

## 2023-04-21 PROCEDURE — 2700000000 HC OXYGEN THERAPY PER DAY

## 2023-04-21 PROCEDURE — 80053 COMPREHEN METABOLIC PANEL: CPT

## 2023-04-21 PROCEDURE — 96366 THER/PROPH/DIAG IV INF ADDON: CPT

## 2023-04-21 PROCEDURE — 85730 THROMBOPLASTIN TIME PARTIAL: CPT

## 2023-04-21 PROCEDURE — 6360000002 HC RX W HCPCS: Performed by: INTERNAL MEDICINE

## 2023-04-21 PROCEDURE — 6360000002 HC RX W HCPCS: Performed by: STUDENT IN AN ORGANIZED HEALTH CARE EDUCATION/TRAINING PROGRAM

## 2023-04-21 PROCEDURE — APPNB45 APP NON BILLABLE 31-45 MINUTES: Performed by: NURSE PRACTITIONER

## 2023-04-21 PROCEDURE — 2500000003 HC RX 250 WO HCPCS: Performed by: INTERNAL MEDICINE

## 2023-04-21 PROCEDURE — 93010 ELECTROCARDIOGRAM REPORT: CPT | Performed by: INTERNAL MEDICINE

## 2023-04-21 PROCEDURE — 96368 THER/DIAG CONCURRENT INF: CPT

## 2023-04-21 PROCEDURE — 2000000000 HC ICU R&B

## 2023-04-21 PROCEDURE — 6370000000 HC RX 637 (ALT 250 FOR IP): Performed by: STUDENT IN AN ORGANIZED HEALTH CARE EDUCATION/TRAINING PROGRAM

## 2023-04-21 PROCEDURE — 99291 CRITICAL CARE FIRST HOUR: CPT | Performed by: INTERNAL MEDICINE

## 2023-04-21 PROCEDURE — 6370000000 HC RX 637 (ALT 250 FOR IP): Performed by: INTERNAL MEDICINE

## 2023-04-21 PROCEDURE — 99291 CRITICAL CARE FIRST HOUR: CPT | Performed by: STUDENT IN AN ORGANIZED HEALTH CARE EDUCATION/TRAINING PROGRAM

## 2023-04-21 PROCEDURE — 83605 ASSAY OF LACTIC ACID: CPT

## 2023-04-21 PROCEDURE — 2580000003 HC RX 258: Performed by: INTERNAL MEDICINE

## 2023-04-21 PROCEDURE — 85520 HEPARIN ASSAY: CPT

## 2023-04-21 PROCEDURE — 82803 BLOOD GASES ANY COMBINATION: CPT

## 2023-04-21 PROCEDURE — 93005 ELECTROCARDIOGRAM TRACING: CPT | Performed by: INTERNAL MEDICINE

## 2023-04-21 PROCEDURE — 84484 ASSAY OF TROPONIN QUANT: CPT

## 2023-04-21 PROCEDURE — 2500000003 HC RX 250 WO HCPCS: Performed by: STUDENT IN AN ORGANIZED HEALTH CARE EDUCATION/TRAINING PROGRAM

## 2023-04-21 PROCEDURE — 85610 PROTHROMBIN TIME: CPT

## 2023-04-21 PROCEDURE — 85025 COMPLETE CBC W/AUTO DIFF WBC: CPT

## 2023-04-21 PROCEDURE — 94761 N-INVAS EAR/PLS OXIMETRY MLT: CPT

## 2023-04-21 PROCEDURE — 36415 COLL VENOUS BLD VENIPUNCTURE: CPT

## 2023-04-21 PROCEDURE — 86140 C-REACTIVE PROTEIN: CPT

## 2023-04-21 RX ORDER — MORPHINE SULFATE 2 MG/ML
2 INJECTION, SOLUTION INTRAMUSCULAR; INTRAVENOUS EVERY 4 HOURS PRN
Status: DISCONTINUED | OUTPATIENT
Start: 2023-04-21 | End: 2023-04-28 | Stop reason: HOSPADM

## 2023-04-21 RX ORDER — INSULIN LISPRO 100 [IU]/ML
0-4 INJECTION, SOLUTION INTRAVENOUS; SUBCUTANEOUS EVERY 4 HOURS
Status: DISCONTINUED | OUTPATIENT
Start: 2023-04-21 | End: 2023-04-21

## 2023-04-21 RX ORDER — ACETAMINOPHEN 650 MG/1
650 SUPPOSITORY RECTAL EVERY 6 HOURS PRN
Status: DISCONTINUED | OUTPATIENT
Start: 2023-04-21 | End: 2023-04-28 | Stop reason: HOSPADM

## 2023-04-21 RX ORDER — MORPHINE SULFATE 4 MG/ML
4 INJECTION, SOLUTION INTRAMUSCULAR; INTRAVENOUS ONCE
Status: COMPLETED | OUTPATIENT
Start: 2023-04-21 | End: 2023-04-21

## 2023-04-21 RX ORDER — PROCHLORPERAZINE EDISYLATE 5 MG/ML
10 INJECTION INTRAMUSCULAR; INTRAVENOUS EVERY 6 HOURS PRN
Status: DISCONTINUED | OUTPATIENT
Start: 2023-04-21 | End: 2023-04-28 | Stop reason: HOSPADM

## 2023-04-21 RX ORDER — FERROUS SULFATE 325(65) MG
325 TABLET ORAL
Status: DISCONTINUED | OUTPATIENT
Start: 2023-04-21 | End: 2023-04-28 | Stop reason: HOSPADM

## 2023-04-21 RX ORDER — INSULIN LISPRO 100 [IU]/ML
0-4 INJECTION, SOLUTION INTRAVENOUS; SUBCUTANEOUS NIGHTLY
Status: DISCONTINUED | OUTPATIENT
Start: 2023-04-21 | End: 2023-04-21

## 2023-04-21 RX ORDER — SODIUM CHLORIDE 0.9 % (FLUSH) 0.9 %
5-40 SYRINGE (ML) INJECTION EVERY 12 HOURS SCHEDULED
Status: DISCONTINUED | OUTPATIENT
Start: 2023-04-21 | End: 2023-04-28 | Stop reason: HOSPADM

## 2023-04-21 RX ORDER — IPRATROPIUM BROMIDE AND ALBUTEROL SULFATE 2.5; .5 MG/3ML; MG/3ML
1 SOLUTION RESPIRATORY (INHALATION)
Status: DISCONTINUED | OUTPATIENT
Start: 2023-04-21 | End: 2023-04-21

## 2023-04-21 RX ORDER — METHYLPREDNISOLONE SODIUM SUCCINATE 40 MG/ML
40 INJECTION, POWDER, LYOPHILIZED, FOR SOLUTION INTRAMUSCULAR; INTRAVENOUS DAILY
Status: DISCONTINUED | OUTPATIENT
Start: 2023-04-22 | End: 2023-04-22

## 2023-04-21 RX ORDER — IPRATROPIUM BROMIDE AND ALBUTEROL SULFATE 2.5; .5 MG/3ML; MG/3ML
1 SOLUTION RESPIRATORY (INHALATION) EVERY 4 HOURS PRN
Status: DISCONTINUED | OUTPATIENT
Start: 2023-04-21 | End: 2023-04-28 | Stop reason: HOSPADM

## 2023-04-21 RX ORDER — OXYCODONE AND ACETAMINOPHEN 7.5; 325 MG/1; MG/1
1 TABLET ORAL EVERY 4 HOURS PRN
Status: DISCONTINUED | OUTPATIENT
Start: 2023-04-21 | End: 2023-04-28 | Stop reason: HOSPADM

## 2023-04-21 RX ORDER — FUROSEMIDE 10 MG/ML
20 INJECTION INTRAMUSCULAR; INTRAVENOUS DAILY
Status: DISCONTINUED | OUTPATIENT
Start: 2023-04-21 | End: 2023-04-23

## 2023-04-21 RX ORDER — ONDANSETRON 4 MG/1
4 TABLET, ORALLY DISINTEGRATING ORAL EVERY 8 HOURS PRN
Status: DISCONTINUED | OUTPATIENT
Start: 2023-04-21 | End: 2023-04-21

## 2023-04-21 RX ORDER — METHYLPREDNISOLONE SODIUM SUCCINATE 40 MG/ML
40 INJECTION, POWDER, LYOPHILIZED, FOR SOLUTION INTRAMUSCULAR; INTRAVENOUS EVERY 12 HOURS
Status: DISCONTINUED | OUTPATIENT
Start: 2023-04-21 | End: 2023-04-21

## 2023-04-21 RX ORDER — SODIUM CHLORIDE 0.9 % (FLUSH) 0.9 %
5-40 SYRINGE (ML) INJECTION PRN
Status: DISCONTINUED | OUTPATIENT
Start: 2023-04-21 | End: 2023-04-28 | Stop reason: HOSPADM

## 2023-04-21 RX ORDER — DILTIAZEM HYDROCHLORIDE 5 MG/ML
10 INJECTION INTRAVENOUS ONCE
Status: DISCONTINUED | OUTPATIENT
Start: 2023-04-21 | End: 2023-04-21

## 2023-04-21 RX ORDER — HEPARIN SODIUM 10000 [USP'U]/100ML
1330 INJECTION, SOLUTION INTRAVENOUS CONTINUOUS
Status: DISCONTINUED | OUTPATIENT
Start: 2023-04-21 | End: 2023-04-21

## 2023-04-21 RX ORDER — FUROSEMIDE 20 MG/1
20 TABLET ORAL DAILY
Status: DISCONTINUED | OUTPATIENT
Start: 2023-04-21 | End: 2023-04-21

## 2023-04-21 RX ORDER — DIVALPROEX SODIUM 125 MG/1
125 CAPSULE, COATED PELLETS ORAL 3 TIMES DAILY
Status: DISCONTINUED | OUTPATIENT
Start: 2023-04-21 | End: 2023-04-28 | Stop reason: HOSPADM

## 2023-04-21 RX ORDER — NITROGLYCERIN 20 MG/100ML
5-200 INJECTION INTRAVENOUS CONTINUOUS
Status: DISCONTINUED | OUTPATIENT
Start: 2023-04-21 | End: 2023-04-21

## 2023-04-21 RX ORDER — INSULIN LISPRO 100 [IU]/ML
0-4 INJECTION, SOLUTION INTRAVENOUS; SUBCUTANEOUS NIGHTLY
Status: DISCONTINUED | OUTPATIENT
Start: 2023-04-21 | End: 2023-04-28 | Stop reason: HOSPADM

## 2023-04-21 RX ORDER — ONDANSETRON 2 MG/ML
4 INJECTION INTRAMUSCULAR; INTRAVENOUS EVERY 6 HOURS PRN
Status: DISCONTINUED | OUTPATIENT
Start: 2023-04-21 | End: 2023-04-21

## 2023-04-21 RX ORDER — HEPARIN SODIUM 1000 [USP'U]/ML
2000 INJECTION, SOLUTION INTRAVENOUS; SUBCUTANEOUS PRN
Status: DISCONTINUED | OUTPATIENT
Start: 2023-04-21 | End: 2023-04-28 | Stop reason: HOSPADM

## 2023-04-21 RX ORDER — HEPARIN SODIUM 10000 [USP'U]/100ML
1260 INJECTION, SOLUTION INTRAVENOUS CONTINUOUS
Status: DISCONTINUED | OUTPATIENT
Start: 2023-04-21 | End: 2023-04-22

## 2023-04-21 RX ORDER — DEXMEDETOMIDINE HYDROCHLORIDE 4 UG/ML
.1-1.5 INJECTION, SOLUTION INTRAVENOUS CONTINUOUS
Status: DISCONTINUED | OUTPATIENT
Start: 2023-04-21 | End: 2023-04-23

## 2023-04-21 RX ORDER — HEPARIN SODIUM 1000 [USP'U]/ML
4000 INJECTION, SOLUTION INTRAVENOUS; SUBCUTANEOUS ONCE
Status: COMPLETED | OUTPATIENT
Start: 2023-04-21 | End: 2023-04-21

## 2023-04-21 RX ORDER — LORAZEPAM 0.5 MG/1
0.5 TABLET ORAL EVERY 8 HOURS PRN
Status: DISCONTINUED | OUTPATIENT
Start: 2023-04-21 | End: 2023-04-28 | Stop reason: HOSPADM

## 2023-04-21 RX ORDER — DILTIAZEM HYDROCHLORIDE 5 MG/ML
10 INJECTION INTRAVENOUS ONCE
Status: COMPLETED | OUTPATIENT
Start: 2023-04-21 | End: 2023-04-21

## 2023-04-21 RX ORDER — POLYETHYLENE GLYCOL 3350 17 G/17G
17 POWDER, FOR SOLUTION ORAL DAILY PRN
Status: DISCONTINUED | OUTPATIENT
Start: 2023-04-21 | End: 2023-04-28 | Stop reason: HOSPADM

## 2023-04-21 RX ORDER — HEPARIN SODIUM 1000 [USP'U]/ML
2000 INJECTION, SOLUTION INTRAVENOUS; SUBCUTANEOUS ONCE
Status: COMPLETED | OUTPATIENT
Start: 2023-04-21 | End: 2023-04-21

## 2023-04-21 RX ORDER — INSULIN LISPRO 100 [IU]/ML
0-16 INJECTION, SOLUTION INTRAVENOUS; SUBCUTANEOUS
Status: DISCONTINUED | OUTPATIENT
Start: 2023-04-21 | End: 2023-04-28 | Stop reason: HOSPADM

## 2023-04-21 RX ORDER — GABAPENTIN 100 MG/1
100 CAPSULE ORAL 3 TIMES DAILY
Status: DISCONTINUED | OUTPATIENT
Start: 2023-04-21 | End: 2023-04-28 | Stop reason: HOSPADM

## 2023-04-21 RX ORDER — SODIUM CHLORIDE 9 MG/ML
INJECTION, SOLUTION INTRAVENOUS PRN
Status: DISCONTINUED | OUTPATIENT
Start: 2023-04-21 | End: 2023-04-28 | Stop reason: HOSPADM

## 2023-04-21 RX ORDER — HEPARIN SODIUM 1000 [USP'U]/ML
4000 INJECTION, SOLUTION INTRAVENOUS; SUBCUTANEOUS PRN
Status: DISCONTINUED | OUTPATIENT
Start: 2023-04-21 | End: 2023-04-28 | Stop reason: HOSPADM

## 2023-04-21 RX ORDER — TIZANIDINE 4 MG/1
2 TABLET ORAL EVERY 6 HOURS PRN
Status: DISCONTINUED | OUTPATIENT
Start: 2023-04-21 | End: 2023-04-28 | Stop reason: HOSPADM

## 2023-04-21 RX ORDER — ACETAMINOPHEN 325 MG/1
650 TABLET ORAL EVERY 6 HOURS PRN
Status: DISCONTINUED | OUTPATIENT
Start: 2023-04-21 | End: 2023-04-28 | Stop reason: HOSPADM

## 2023-04-21 RX ORDER — DIGOXIN 0.25 MG/ML
125 INJECTION INTRAMUSCULAR; INTRAVENOUS DAILY
Status: DISCONTINUED | OUTPATIENT
Start: 2023-04-21 | End: 2023-04-23

## 2023-04-21 RX ORDER — BUSPIRONE HYDROCHLORIDE 5 MG/1
5 TABLET ORAL 2 TIMES DAILY
Status: DISCONTINUED | OUTPATIENT
Start: 2023-04-21 | End: 2023-04-28 | Stop reason: HOSPADM

## 2023-04-21 RX ORDER — SENNOSIDES 8.6 MG
650 CAPSULE ORAL EVERY 8 HOURS PRN
Status: DISCONTINUED | OUTPATIENT
Start: 2023-04-21 | End: 2023-04-21 | Stop reason: SDUPTHER

## 2023-04-21 RX ORDER — DEXTROSE MONOHYDRATE 100 MG/ML
INJECTION, SOLUTION INTRAVENOUS CONTINUOUS PRN
Status: DISCONTINUED | OUTPATIENT
Start: 2023-04-21 | End: 2023-04-28 | Stop reason: HOSPADM

## 2023-04-21 RX ORDER — ATORVASTATIN CALCIUM 40 MG/1
40 TABLET, FILM COATED ORAL NIGHTLY
Status: DISCONTINUED | OUTPATIENT
Start: 2023-04-21 | End: 2023-04-28 | Stop reason: HOSPADM

## 2023-04-21 RX ORDER — DULOXETIN HYDROCHLORIDE 60 MG/1
60 CAPSULE, DELAYED RELEASE ORAL DAILY
Status: DISCONTINUED | OUTPATIENT
Start: 2023-04-21 | End: 2023-04-28 | Stop reason: HOSPADM

## 2023-04-21 RX ADMIN — Medication 0.2 MCG/KG/HR: at 10:06

## 2023-04-21 RX ADMIN — INSULIN LISPRO 2 UNITS: 100 INJECTION, SOLUTION INTRAVENOUS; SUBCUTANEOUS at 08:25

## 2023-04-21 RX ADMIN — HEPARIN SODIUM 1330 UNITS/HR: 10000 INJECTION, SOLUTION INTRAVENOUS at 01:38

## 2023-04-21 RX ADMIN — INSULIN LISPRO 3 UNITS: 100 INJECTION, SOLUTION INTRAVENOUS; SUBCUTANEOUS at 11:34

## 2023-04-21 RX ADMIN — DILTIAZEM HYDROCHLORIDE 10 MG: 5 INJECTION, SOLUTION INTRAVENOUS at 10:21

## 2023-04-21 RX ADMIN — HEPARIN SODIUM 1260 UNITS/HR: 10000 INJECTION, SOLUTION INTRAVENOUS at 15:05

## 2023-04-21 RX ADMIN — SODIUM CHLORIDE 12.5 MG/HR: 900 INJECTION, SOLUTION INTRAVENOUS at 15:59

## 2023-04-21 RX ADMIN — INSULIN LISPRO 12 UNITS: 100 INJECTION, SOLUTION INTRAVENOUS; SUBCUTANEOUS at 15:59

## 2023-04-21 RX ADMIN — FUROSEMIDE 20 MG: 10 INJECTION, SOLUTION INTRAMUSCULAR; INTRAVENOUS at 12:23

## 2023-04-21 RX ADMIN — HEPARIN SODIUM 1130 UNITS/HR: 10000 INJECTION, SOLUTION INTRAVENOUS at 04:03

## 2023-04-21 RX ADMIN — Medication 0.6 MCG/KG/HR: at 17:15

## 2023-04-21 RX ADMIN — MUPIROCIN: 20 OINTMENT TOPICAL at 08:25

## 2023-04-21 RX ADMIN — MORPHINE SULFATE 4 MG: 4 INJECTION, SOLUTION INTRAMUSCULAR; INTRAVENOUS at 10:01

## 2023-04-21 RX ADMIN — INSULIN LISPRO 3 UNITS: 100 INJECTION, SOLUTION INTRAVENOUS; SUBCUTANEOUS at 05:25

## 2023-04-21 RX ADMIN — NITROGLYCERIN 5 MCG/MIN: 20 INJECTION INTRAVENOUS at 02:28

## 2023-04-21 RX ADMIN — HEPARIN SODIUM 4000 UNITS: 1000 INJECTION INTRAVENOUS; SUBCUTANEOUS at 01:54

## 2023-04-21 RX ADMIN — DIGOXIN 125 MCG: 0.25 INJECTION INTRAMUSCULAR; INTRAVENOUS at 10:27

## 2023-04-21 RX ADMIN — MORPHINE SULFATE 2 MG: 2 INJECTION, SOLUTION INTRAMUSCULAR; INTRAVENOUS at 06:26

## 2023-04-21 RX ADMIN — HEPARIN SODIUM 2000 UNITS: 1000 INJECTION INTRAVENOUS; SUBCUTANEOUS at 10:07

## 2023-04-21 RX ADMIN — MUPIROCIN: 20 OINTMENT TOPICAL at 20:01

## 2023-04-21 RX ADMIN — MORPHINE SULFATE 2 MG: 2 INJECTION, SOLUTION INTRAMUSCULAR; INTRAVENOUS at 23:09

## 2023-04-21 RX ADMIN — METHYLPREDNISOLONE SODIUM SUCCINATE 40 MG: 40 INJECTION, POWDER, FOR SOLUTION INTRAMUSCULAR; INTRAVENOUS at 02:25

## 2023-04-21 RX ADMIN — MORPHINE SULFATE 2 MG: 2 INJECTION, SOLUTION INTRAMUSCULAR; INTRAVENOUS at 02:25

## 2023-04-21 RX ADMIN — Medication 10 ML: at 20:01

## 2023-04-21 RX ADMIN — Medication 10 ML: at 08:25

## 2023-04-21 RX ADMIN — DILTIAZEM HYDROCHLORIDE 5 MG/HR: 100 INJECTION, POWDER, LYOPHILIZED, FOR SOLUTION INTRAVENOUS at 10:26

## 2023-04-21 ASSESSMENT — PAIN SCALES - GENERAL: PAINLEVEL_OUTOF10: 6

## 2023-04-21 ASSESSMENT — PAIN DESCRIPTION - LOCATION
LOCATION: CHEST

## 2023-04-21 NOTE — PROGRESS NOTES
Patient refusing to wear bipap, placed patient on 3 l/m nc.   [facility] Phoenixtta Plume RRT

## 2023-04-21 NOTE — ED NOTES
2303 - [facility] called back with Dr. Genie Valenzuela on the line. He spoke directly to Dr. Manuelito Narayanan at this time. Shantel Francisco  04/20/23 2304    2310 - Call placed to Cardiology for consult with the [address] returned the page and spoke directly to Dr. Omar Watson  04/20/23 1501 [facility] called back and spoke directly to Dr. Omar Watson  04/20/23 2322    2320 - [facility] called with bed assignment and N2N. Bed - [id number]  Charge Nurse - [phone]     Shantel Francisco  04/20/23 2323    2339 - [facility] called and stated that [address] [facility] MICU will  the patient. ETA 0045/0100     Shantel Francisco  04/20/23 2342    2345 - Informed ETA to Dr. Manuelito Narayanan, she stated that we should fly the patient. At this time I called [address]. I was given an ETA of 15 mins.      Shantel Francisco  04/20/23 3428

## 2023-04-21 NOTE — ED NOTES
2200 call placed to [facility] for update still no bed at [facility]  04/20/23 2231    2248 call placed to [facility] requesting to look for abed at other hospitals.  Pt is decompensating now will need ICU level of care       Sari Perez  04/20/23 2252    Paging Dr. Eva Beltran does have an ICU bed available      Sari Perez  04/20/23 5932

## 2023-04-21 NOTE — PROGRESS NOTES
4/21  aPTT = 58.8 sec at 2135. Give heparin bolus of 4000 units and increase heparin gtt to 16 units/kg/hr. Recheck aPTT in 6 hours.   Mauricio Gage PharmD  4/21/2023 12:50 AM

## 2023-04-21 NOTE — ED NOTES
Call to [facility]. Dr. Bryan Delarosa to callback.       Omega Jean RN  04/20/23 3444

## 2023-04-21 NOTE — PROGRESS NOTES
04/20/23 2013   NIV Type   $NIV $Daily Charge   NIV Started/Stopped On   Equipment Type v60   Mode Bilevel   Mask Type Full face mask   Mask Size Small   Settings/Measurements   IPAP 14 cmH20   CPAP/EPAP 7 cmH2O   Vt (Measured) 563 mL   Rate Ordered 16   FiO2  35 %   Minute Volume (L/min) 16.1 Liters   Mask Leak (lpm) 3 lpm   Comfort Level Good   Using Accessory Muscles No   SpO2 95   Patient's Home Machine No   Alarm Settings   Alarms On Y   Low Pressure (cmH2O) 8 cmH2O   High Pressure (cmH2O) 40 cmH2O   Apnea (secs) 20 secs   RR Low (bpm) 12   RR High (bpm) 50 br/min

## 2023-04-22 LAB
ALBUMIN SERPL-MCNC: 3.4 G/DL (ref 3.4–5)
ALBUMIN/GLOB SERPL: 0.8 {RATIO} (ref 1.1–2.2)
ALP SERPL-CCNC: 73 U/L (ref 40–129)
ALT SERPL-CCNC: 9 U/L (ref 10–40)
ANION GAP SERPL CALCULATED.3IONS-SCNC: 19 MMOL/L (ref 3–16)
APTT BLD: 46.1 SEC (ref 22.7–35.9)
APTT BLD: 54 SEC (ref 22.7–35.9)
APTT BLD: >180 SEC (ref 22.7–35.9)
AST SERPL-CCNC: 15 U/L (ref 15–37)
BASOPHILS # BLD: 0.1 K/UL (ref 0–0.2)
BASOPHILS NFR BLD: 1.2 %
BILIRUB SERPL-MCNC: 0.7 MG/DL (ref 0–1)
BUN SERPL-MCNC: 24 MG/DL (ref 7–20)
CALCIUM SERPL-MCNC: 9.7 MG/DL (ref 8.3–10.6)
CHLORIDE SERPL-SCNC: 99 MMOL/L (ref 99–110)
CO2 SERPL-SCNC: 27 MMOL/L (ref 21–32)
CREAT SERPL-MCNC: 0.7 MG/DL (ref 0.6–1.2)
DEPRECATED RDW RBC AUTO: 16.4 % (ref 12.4–15.4)
EOSINOPHIL # BLD: 0 K/UL (ref 0–0.6)
EOSINOPHIL NFR BLD: 0.1 %
GFR SERPLBLD CREATININE-BSD FMLA CKD-EPI: >60 ML/MIN/{1.73_M2}
GLUCOSE BLD-MCNC: 218 MG/DL (ref 70–99)
GLUCOSE BLD-MCNC: 238 MG/DL (ref 70–99)
GLUCOSE BLD-MCNC: 267 MG/DL (ref 70–99)
GLUCOSE BLD-MCNC: 272 MG/DL (ref 70–99)
GLUCOSE SERPL-MCNC: 250 MG/DL (ref 70–99)
HCT VFR BLD AUTO: 43.4 % (ref 36–48)
HGB BLD-MCNC: 13.5 G/DL (ref 12–16)
LACTATE BLDV-SCNC: 2.1 MMOL/L (ref 0.4–2)
LACTATE BLDV-SCNC: 2.6 MMOL/L (ref 0.4–2)
LV EF: 55 %
LVEF MODALITY: NORMAL
LYMPHOCYTES # BLD: 2.5 K/UL (ref 1–5.1)
LYMPHOCYTES NFR BLD: 24.5 %
MCH RBC QN AUTO: 27.6 PG (ref 26–34)
MCHC RBC AUTO-ENTMCNC: 31.1 G/DL (ref 31–36)
MCV RBC AUTO: 88.5 FL (ref 80–100)
MONOCYTES # BLD: 1 K/UL (ref 0–1.3)
MONOCYTES NFR BLD: 9.4 %
NEUTROPHILS # BLD: 6.5 K/UL (ref 1.7–7.7)
NEUTROPHILS NFR BLD: 64.8 %
PERFORMED ON: ABNORMAL
PLATELET # BLD AUTO: 214 K/UL (ref 135–450)
PMV BLD AUTO: 9.8 FL (ref 5–10.5)
POTASSIUM SERPL-SCNC: 4.7 MMOL/L (ref 3.5–5.1)
PROT SERPL-MCNC: 7.6 G/DL (ref 6.4–8.2)
RBC # BLD AUTO: 4.91 M/UL (ref 4–5.2)
REASON FOR REJECTION: NORMAL
REJECTED TEST: NORMAL
SODIUM SERPL-SCNC: 145 MMOL/L (ref 136–145)
WBC # BLD AUTO: 10.1 K/UL (ref 4–11)

## 2023-04-22 PROCEDURE — 6370000000 HC RX 637 (ALT 250 FOR IP): Performed by: INTERNAL MEDICINE

## 2023-04-22 PROCEDURE — 85025 COMPLETE CBC W/AUTO DIFF WBC: CPT

## 2023-04-22 PROCEDURE — 2580000003 HC RX 258: Performed by: INTERNAL MEDICINE

## 2023-04-22 PROCEDURE — 93306 TTE W/DOPPLER COMPLETE: CPT

## 2023-04-22 PROCEDURE — 99291 CRITICAL CARE FIRST HOUR: CPT | Performed by: STUDENT IN AN ORGANIZED HEALTH CARE EDUCATION/TRAINING PROGRAM

## 2023-04-22 PROCEDURE — 6360000002 HC RX W HCPCS: Performed by: INTERNAL MEDICINE

## 2023-04-22 PROCEDURE — 99232 SBSQ HOSP IP/OBS MODERATE 35: CPT | Performed by: INTERNAL MEDICINE

## 2023-04-22 PROCEDURE — 2000000000 HC ICU R&B

## 2023-04-22 PROCEDURE — 83605 ASSAY OF LACTIC ACID: CPT

## 2023-04-22 PROCEDURE — 85730 THROMBOPLASTIN TIME PARTIAL: CPT

## 2023-04-22 PROCEDURE — 6360000002 HC RX W HCPCS: Performed by: STUDENT IN AN ORGANIZED HEALTH CARE EDUCATION/TRAINING PROGRAM

## 2023-04-22 PROCEDURE — 6370000000 HC RX 637 (ALT 250 FOR IP): Performed by: STUDENT IN AN ORGANIZED HEALTH CARE EDUCATION/TRAINING PROGRAM

## 2023-04-22 PROCEDURE — 80053 COMPREHEN METABOLIC PANEL: CPT

## 2023-04-22 PROCEDURE — 94761 N-INVAS EAR/PLS OXIMETRY MLT: CPT

## 2023-04-22 PROCEDURE — 2580000003 HC RX 258: Performed by: STUDENT IN AN ORGANIZED HEALTH CARE EDUCATION/TRAINING PROGRAM

## 2023-04-22 PROCEDURE — 2500000003 HC RX 250 WO HCPCS: Performed by: STUDENT IN AN ORGANIZED HEALTH CARE EDUCATION/TRAINING PROGRAM

## 2023-04-22 PROCEDURE — 36415 COLL VENOUS BLD VENIPUNCTURE: CPT

## 2023-04-22 PROCEDURE — 2700000000 HC OXYGEN THERAPY PER DAY

## 2023-04-22 RX ORDER — HEPARIN SODIUM 10000 [USP'U]/100ML
1190 INJECTION, SOLUTION INTRAVENOUS CONTINUOUS
Status: DISCONTINUED | OUTPATIENT
Start: 2023-04-22 | End: 2023-04-23

## 2023-04-22 RX ORDER — HEPARIN SODIUM 1000 [USP'U]/ML
4000 INJECTION, SOLUTION INTRAVENOUS; SUBCUTANEOUS ONCE
Status: COMPLETED | OUTPATIENT
Start: 2023-04-22 | End: 2023-04-22

## 2023-04-22 RX ORDER — SODIUM CHLORIDE, SODIUM LACTATE, POTASSIUM CHLORIDE, AND CALCIUM CHLORIDE .6; .31; .03; .02 G/100ML; G/100ML; G/100ML; G/100ML
500 INJECTION, SOLUTION INTRAVENOUS ONCE
Status: COMPLETED | OUTPATIENT
Start: 2023-04-22 | End: 2023-04-22

## 2023-04-22 RX ORDER — METHYLPREDNISOLONE SODIUM SUCCINATE 40 MG/ML
40 INJECTION, POWDER, LYOPHILIZED, FOR SOLUTION INTRAMUSCULAR; INTRAVENOUS DAILY
Status: DISCONTINUED | OUTPATIENT
Start: 2023-04-23 | End: 2023-04-24

## 2023-04-22 RX ADMIN — SODIUM CHLORIDE, POTASSIUM CHLORIDE, SODIUM LACTATE AND CALCIUM CHLORIDE 500 ML: 600; 310; 30; 20 INJECTION, SOLUTION INTRAVENOUS at 08:56

## 2023-04-22 RX ADMIN — Medication 0.6 MCG/KG/HR: at 01:39

## 2023-04-22 RX ADMIN — GABAPENTIN 100 MG: 100 CAPSULE ORAL at 21:06

## 2023-04-22 RX ADMIN — DULOXETINE HYDROCHLORIDE 60 MG: 60 CAPSULE, DELAYED RELEASE ORAL at 09:13

## 2023-04-22 RX ADMIN — HEPARIN SODIUM 2000 UNITS: 1000 INJECTION INTRAVENOUS; SUBCUTANEOUS at 17:05

## 2023-04-22 RX ADMIN — INSULIN LISPRO 8 UNITS: 100 INJECTION, SOLUTION INTRAVENOUS; SUBCUTANEOUS at 11:47

## 2023-04-22 RX ADMIN — METHYLPREDNISOLONE SODIUM SUCCINATE 40 MG: 40 INJECTION, POWDER, FOR SOLUTION INTRAMUSCULAR; INTRAVENOUS at 08:58

## 2023-04-22 RX ADMIN — ATORVASTATIN CALCIUM 40 MG: 40 TABLET, FILM COATED ORAL at 21:06

## 2023-04-22 RX ADMIN — INSULIN LISPRO 8 UNITS: 100 INJECTION, SOLUTION INTRAVENOUS; SUBCUTANEOUS at 16:10

## 2023-04-22 RX ADMIN — DIVALPROEX SODIUM 125 MG: 125 CAPSULE, COATED PELLETS ORAL at 21:06

## 2023-04-22 RX ADMIN — INSULIN LISPRO 4 UNITS: 100 INJECTION, SOLUTION INTRAVENOUS; SUBCUTANEOUS at 08:57

## 2023-04-22 RX ADMIN — METOPROLOL TARTRATE 25 MG: 25 TABLET, FILM COATED ORAL at 09:13

## 2023-04-22 RX ADMIN — GABAPENTIN 100 MG: 100 CAPSULE ORAL at 16:10

## 2023-04-22 RX ADMIN — MORPHINE SULFATE 2 MG: 2 INJECTION, SOLUTION INTRAMUSCULAR; INTRAVENOUS at 19:48

## 2023-04-22 RX ADMIN — BUSPIRONE HYDROCHLORIDE 5 MG: 5 TABLET ORAL at 09:13

## 2023-04-22 RX ADMIN — Medication 10 ML: at 08:59

## 2023-04-22 RX ADMIN — FERROUS SULFATE TAB 325 MG (65 MG ELEMENTAL FE) 325 MG: 325 (65 FE) TAB at 09:13

## 2023-04-22 RX ADMIN — HEPARIN SODIUM 4000 UNITS: 1000 INJECTION INTRAVENOUS; SUBCUTANEOUS at 23:05

## 2023-04-22 RX ADMIN — GABAPENTIN 100 MG: 100 CAPSULE ORAL at 09:17

## 2023-04-22 RX ADMIN — DIVALPROEX SODIUM 125 MG: 125 CAPSULE, COATED PELLETS ORAL at 16:10

## 2023-04-22 RX ADMIN — MUPIROCIN: 20 OINTMENT TOPICAL at 08:59

## 2023-04-22 RX ADMIN — DIGOXIN 125 MCG: 0.25 INJECTION INTRAMUSCULAR; INTRAVENOUS at 08:58

## 2023-04-22 RX ADMIN — ASPIRIN 325 MG: 325 TABLET, COATED ORAL at 09:12

## 2023-04-22 RX ADMIN — METOPROLOL TARTRATE 25 MG: 25 TABLET, FILM COATED ORAL at 21:06

## 2023-04-22 RX ADMIN — BUSPIRONE HYDROCHLORIDE 5 MG: 5 TABLET ORAL at 21:06

## 2023-04-22 RX ADMIN — DIVALPROEX SODIUM 125 MG: 125 CAPSULE, COATED PELLETS ORAL at 09:17

## 2023-04-22 RX ADMIN — HEPARIN SODIUM 1060 UNITS/HR: 10000 INJECTION, SOLUTION INTRAVENOUS at 11:49

## 2023-04-22 ASSESSMENT — PAIN SCALES - GENERAL
PAINLEVEL_OUTOF10: 2
PAINLEVEL_OUTOF10: 7
PAINLEVEL_OUTOF10: 2

## 2023-04-23 LAB
ALBUMIN SERPL-MCNC: 3.6 G/DL (ref 3.4–5)
ALBUMIN/GLOB SERPL: 0.9 {RATIO} (ref 1.1–2.2)
ALP SERPL-CCNC: 73 U/L (ref 40–129)
ALT SERPL-CCNC: 9 U/L (ref 10–40)
ANION GAP SERPL CALCULATED.3IONS-SCNC: 11 MMOL/L (ref 3–16)
APTT BLD: 116.9 SEC (ref 22.7–35.9)
APTT BLD: 68 SEC (ref 22.7–35.9)
APTT BLD: 86 SEC (ref 22.7–35.9)
AST SERPL-CCNC: 13 U/L (ref 15–37)
BILIRUB SERPL-MCNC: 0.6 MG/DL (ref 0–1)
BUN SERPL-MCNC: 26 MG/DL (ref 7–20)
CALCIUM SERPL-MCNC: 10 MG/DL (ref 8.3–10.6)
CHLORIDE SERPL-SCNC: 101 MMOL/L (ref 99–110)
CO2 SERPL-SCNC: 32 MMOL/L (ref 21–32)
CREAT SERPL-MCNC: 0.6 MG/DL (ref 0.6–1.2)
DEPRECATED RDW RBC AUTO: 16.6 % (ref 12.4–15.4)
GFR SERPLBLD CREATININE-BSD FMLA CKD-EPI: >60 ML/MIN/{1.73_M2}
GLUCOSE BLD-MCNC: 178 MG/DL (ref 70–99)
GLUCOSE BLD-MCNC: 274 MG/DL (ref 70–99)
GLUCOSE BLD-MCNC: 277 MG/DL (ref 70–99)
GLUCOSE BLD-MCNC: 283 MG/DL (ref 70–99)
GLUCOSE SERPL-MCNC: 231 MG/DL (ref 70–99)
HCT VFR BLD AUTO: 43.7 % (ref 36–48)
HGB BLD-MCNC: 13.8 G/DL (ref 12–16)
MCH RBC QN AUTO: 27.6 PG (ref 26–34)
MCHC RBC AUTO-ENTMCNC: 31.5 G/DL (ref 31–36)
MCV RBC AUTO: 87.8 FL (ref 80–100)
PERFORMED ON: ABNORMAL
PLATELET # BLD AUTO: 261 K/UL (ref 135–450)
PMV BLD AUTO: 9.9 FL (ref 5–10.5)
POTASSIUM SERPL-SCNC: 4.1 MMOL/L (ref 3.5–5.1)
PROT SERPL-MCNC: 7.6 G/DL (ref 6.4–8.2)
RBC # BLD AUTO: 4.98 M/UL (ref 4–5.2)
SODIUM SERPL-SCNC: 144 MMOL/L (ref 136–145)
WBC # BLD AUTO: 12.8 K/UL (ref 4–11)

## 2023-04-23 PROCEDURE — 99232 SBSQ HOSP IP/OBS MODERATE 35: CPT | Performed by: INTERNAL MEDICINE

## 2023-04-23 PROCEDURE — 85027 COMPLETE CBC AUTOMATED: CPT

## 2023-04-23 PROCEDURE — 6370000000 HC RX 637 (ALT 250 FOR IP): Performed by: STUDENT IN AN ORGANIZED HEALTH CARE EDUCATION/TRAINING PROGRAM

## 2023-04-23 PROCEDURE — 6370000000 HC RX 637 (ALT 250 FOR IP): Performed by: INTERNAL MEDICINE

## 2023-04-23 PROCEDURE — 6360000002 HC RX W HCPCS: Performed by: INTERNAL MEDICINE

## 2023-04-23 PROCEDURE — 36415 COLL VENOUS BLD VENIPUNCTURE: CPT

## 2023-04-23 PROCEDURE — 2700000000 HC OXYGEN THERAPY PER DAY

## 2023-04-23 PROCEDURE — 94761 N-INVAS EAR/PLS OXIMETRY MLT: CPT

## 2023-04-23 PROCEDURE — 2060000000 HC ICU INTERMEDIATE R&B

## 2023-04-23 PROCEDURE — 2580000003 HC RX 258: Performed by: INTERNAL MEDICINE

## 2023-04-23 PROCEDURE — 6360000002 HC RX W HCPCS: Performed by: STUDENT IN AN ORGANIZED HEALTH CARE EDUCATION/TRAINING PROGRAM

## 2023-04-23 PROCEDURE — 85730 THROMBOPLASTIN TIME PARTIAL: CPT

## 2023-04-23 PROCEDURE — 80053 COMPREHEN METABOLIC PANEL: CPT

## 2023-04-23 PROCEDURE — 99233 SBSQ HOSP IP/OBS HIGH 50: CPT | Performed by: STUDENT IN AN ORGANIZED HEALTH CARE EDUCATION/TRAINING PROGRAM

## 2023-04-23 RX ORDER — INSULIN GLARGINE 100 [IU]/ML
15 INJECTION, SOLUTION SUBCUTANEOUS NIGHTLY
Status: DISCONTINUED | OUTPATIENT
Start: 2023-04-23 | End: 2023-04-28 | Stop reason: HOSPADM

## 2023-04-23 RX ORDER — INSULIN GLARGINE 100 [IU]/ML
10 INJECTION, SOLUTION SUBCUTANEOUS NIGHTLY
Status: DISCONTINUED | OUTPATIENT
Start: 2023-04-23 | End: 2023-04-23

## 2023-04-23 RX ORDER — HEPARIN SODIUM 1000 [USP'U]/ML
2000 INJECTION, SOLUTION INTRAVENOUS; SUBCUTANEOUS ONCE
Status: COMPLETED | OUTPATIENT
Start: 2023-04-23 | End: 2023-04-23

## 2023-04-23 RX ORDER — FUROSEMIDE 20 MG/1
20 TABLET ORAL DAILY
Status: DISCONTINUED | OUTPATIENT
Start: 2023-04-23 | End: 2023-04-28 | Stop reason: HOSPADM

## 2023-04-23 RX ORDER — METOPROLOL TARTRATE 50 MG/1
50 TABLET, FILM COATED ORAL 2 TIMES DAILY
Status: DISCONTINUED | OUTPATIENT
Start: 2023-04-23 | End: 2023-04-28 | Stop reason: HOSPADM

## 2023-04-23 RX ADMIN — DIVALPROEX SODIUM 125 MG: 125 CAPSULE, COATED PELLETS ORAL at 08:48

## 2023-04-23 RX ADMIN — INSULIN LISPRO 8 UNITS: 100 INJECTION, SOLUTION INTRAVENOUS; SUBCUTANEOUS at 11:39

## 2023-04-23 RX ADMIN — FUROSEMIDE 20 MG: 20 TABLET ORAL at 08:48

## 2023-04-23 RX ADMIN — ASPIRIN 325 MG: 325 TABLET, COATED ORAL at 08:48

## 2023-04-23 RX ADMIN — ATORVASTATIN CALCIUM 40 MG: 40 TABLET, FILM COATED ORAL at 21:22

## 2023-04-23 RX ADMIN — Medication 10 ML: at 08:49

## 2023-04-23 RX ADMIN — METOPROLOL TARTRATE 50 MG: 50 TABLET ORAL at 21:22

## 2023-04-23 RX ADMIN — FERROUS SULFATE TAB 325 MG (65 MG ELEMENTAL FE) 325 MG: 325 (65 FE) TAB at 08:48

## 2023-04-23 RX ADMIN — METHYLPREDNISOLONE SODIUM SUCCINATE 40 MG: 40 INJECTION, POWDER, FOR SOLUTION INTRAMUSCULAR; INTRAVENOUS at 08:48

## 2023-04-23 RX ADMIN — HEPARIN SODIUM 2000 UNITS: 1000 INJECTION INTRAVENOUS; SUBCUTANEOUS at 13:19

## 2023-04-23 RX ADMIN — DIGOXIN 125 MCG: 0.25 INJECTION INTRAMUSCULAR; INTRAVENOUS at 08:48

## 2023-04-23 RX ADMIN — APIXABAN 5 MG: 5 TABLET, FILM COATED ORAL at 14:32

## 2023-04-23 RX ADMIN — METOPROLOL TARTRATE 25 MG: 25 TABLET, FILM COATED ORAL at 08:49

## 2023-04-23 RX ADMIN — APIXABAN 5 MG: 5 TABLET, FILM COATED ORAL at 21:22

## 2023-04-23 RX ADMIN — DIVALPROEX SODIUM 125 MG: 125 CAPSULE, COATED PELLETS ORAL at 21:22

## 2023-04-23 RX ADMIN — INSULIN GLARGINE 15 UNITS: 100 INJECTION, SOLUTION SUBCUTANEOUS at 21:32

## 2023-04-23 RX ADMIN — GABAPENTIN 100 MG: 100 CAPSULE ORAL at 08:48

## 2023-04-23 RX ADMIN — MORPHINE SULFATE 2 MG: 2 INJECTION, SOLUTION INTRAMUSCULAR; INTRAVENOUS at 04:44

## 2023-04-23 RX ADMIN — MUPIROCIN: 20 OINTMENT TOPICAL at 08:49

## 2023-04-23 RX ADMIN — INSULIN LISPRO 8 UNITS: 100 INJECTION, SOLUTION INTRAVENOUS; SUBCUTANEOUS at 16:23

## 2023-04-23 RX ADMIN — DIVALPROEX SODIUM 125 MG: 125 CAPSULE, COATED PELLETS ORAL at 13:20

## 2023-04-23 RX ADMIN — GABAPENTIN 100 MG: 100 CAPSULE ORAL at 13:20

## 2023-04-23 RX ADMIN — BUSPIRONE HYDROCHLORIDE 5 MG: 5 TABLET ORAL at 21:22

## 2023-04-23 RX ADMIN — HEPARIN SODIUM 1190 UNITS/HR: 10000 INJECTION, SOLUTION INTRAVENOUS at 08:55

## 2023-04-23 RX ADMIN — GABAPENTIN 100 MG: 100 CAPSULE ORAL at 21:22

## 2023-04-23 RX ADMIN — DULOXETINE HYDROCHLORIDE 60 MG: 60 CAPSULE, DELAYED RELEASE ORAL at 08:48

## 2023-04-23 RX ADMIN — BUSPIRONE HYDROCHLORIDE 5 MG: 5 TABLET ORAL at 08:48

## 2023-04-23 ASSESSMENT — PAIN SCALES - GENERAL: PAINLEVEL_OUTOF10: 7

## 2023-04-23 NOTE — CARE COORDINATION
Case Management Assessment  Initial Evaluation and Discharge Note    Date/Time of Evaluation: 2020 11:29 AM  Assessment Completed by: Shola Ramirez    Patient Name: Faye Benavides  YOB: 1950  Diagnosis: UTI (urinary tract infection) [N39.0]  Date / Time: 2020  3:44 AM  Admission status/Date:in-pt  Chart Reviewed: Yes      Patient Interviewed: Yes   Family Interviewed:  No      Hospitalization in the last 30 days:  No    Contacts  :     Relationship to Patient:   Phone Number:    Alternate Contact:     Relationship to Patient:     Phone Number:    Met with:    Current PCP  Pebbles Hannon MD      Financial  Commercial  Precert required for SNF : Y, N        3 night stay required - Y, N    ADLS  Support Systems:    Transportation: EMS transportation        DISCHARGE PLAN: Reviewed chart. Pt from [address] and plan return. Writer left  for Fannie Calvert with [facility] to confirm bed hold. Follow. 12:08PM CM spoke with Fannie Calvert from [facility] and she can accept pt back at d/c without pre-cert as long as there are no skilled needs. Follow. DISCHARGE ORDER  Date/Time 2020 1:00 PM  Completed by: Shola Ramirez, Case Management    Patient Name: Faye Benavides    : 1950    Admitting Diagnosis: UTI (urinary tract infection) [N39.0]    46 CM spoke with Esperanza with [facility] and she can accept the pt back at d/c today. No pre-cert required per Fannie Calvert. Discharge orders and Continuity of Care faxed to facility: Yes  Hospital Exemption Notification System complete: na  Transportation arranged: Yes - [facility]  Pick-up @ 1430. Patient / Family (pt) aware of  time: Yes   Nursing aware of  time: Yes  Receiving facility aware of  time: Yes  Pre-cert obtained?  na    Discharge timeout done with [facility].  All discharge needs and
No

## 2023-04-24 PROBLEM — R79.89 ELEVATED TROPONIN: Status: ACTIVE | Noted: 2023-04-24

## 2023-04-24 PROBLEM — E11.65 UNCONTROLLED TYPE 2 DIABETES MELLITUS WITH HYPERGLYCEMIA (HCC): Status: ACTIVE | Noted: 2023-04-24

## 2023-04-24 PROBLEM — J90 PLEURAL EFFUSION, BILATERAL: Status: ACTIVE | Noted: 2023-04-24

## 2023-04-24 PROBLEM — R91.8 PULMONARY INFILTRATES: Status: ACTIVE | Noted: 2023-04-24

## 2023-04-24 PROBLEM — R77.8 ELEVATED TROPONIN: Status: ACTIVE | Noted: 2023-04-24

## 2023-04-24 PROBLEM — J96.12 CHRONIC RESPIRATORY FAILURE WITH HYPOXIA AND HYPERCAPNIA (HCC): Status: ACTIVE | Noted: 2018-10-21

## 2023-04-24 PROBLEM — R79.89 ELEVATED BRAIN NATRIURETIC PEPTIDE (BNP) LEVEL: Status: ACTIVE | Noted: 2023-04-24

## 2023-04-24 PROBLEM — R91.8 LUNG NODULES: Status: ACTIVE | Noted: 2023-04-24

## 2023-04-24 LAB
ANION GAP SERPL CALCULATED.3IONS-SCNC: 11 MMOL/L (ref 3–16)
BUN SERPL-MCNC: 25 MG/DL (ref 7–20)
CALCIUM SERPL-MCNC: 9.8 MG/DL (ref 8.3–10.6)
CHLORIDE SERPL-SCNC: 96 MMOL/L (ref 99–110)
CO2 SERPL-SCNC: 32 MMOL/L (ref 21–32)
CREAT SERPL-MCNC: <0.5 MG/DL (ref 0.6–1.2)
GFR SERPLBLD CREATININE-BSD FMLA CKD-EPI: >60 ML/MIN/{1.73_M2}
GLUCOSE BLD-MCNC: 163 MG/DL (ref 70–99)
GLUCOSE BLD-MCNC: 193 MG/DL (ref 70–99)
GLUCOSE BLD-MCNC: 271 MG/DL (ref 70–99)
GLUCOSE BLD-MCNC: 273 MG/DL (ref 70–99)
GLUCOSE SERPL-MCNC: 198 MG/DL (ref 70–99)
PERFORMED ON: ABNORMAL
POTASSIUM SERPL-SCNC: 4.4 MMOL/L (ref 3.5–5.1)
SODIUM SERPL-SCNC: 139 MMOL/L (ref 136–145)

## 2023-04-24 PROCEDURE — 6360000002 HC RX W HCPCS: Performed by: STUDENT IN AN ORGANIZED HEALTH CARE EDUCATION/TRAINING PROGRAM

## 2023-04-24 PROCEDURE — 2700000000 HC OXYGEN THERAPY PER DAY

## 2023-04-24 PROCEDURE — 6370000000 HC RX 637 (ALT 250 FOR IP): Performed by: INTERNAL MEDICINE

## 2023-04-24 PROCEDURE — 97535 SELF CARE MNGMENT TRAINING: CPT

## 2023-04-24 PROCEDURE — 99233 SBSQ HOSP IP/OBS HIGH 50: CPT | Performed by: INTERNAL MEDICINE

## 2023-04-24 PROCEDURE — 2580000003 HC RX 258: Performed by: INTERNAL MEDICINE

## 2023-04-24 PROCEDURE — 97166 OT EVAL MOD COMPLEX 45 MIN: CPT

## 2023-04-24 PROCEDURE — 6370000000 HC RX 637 (ALT 250 FOR IP): Performed by: STUDENT IN AN ORGANIZED HEALTH CARE EDUCATION/TRAINING PROGRAM

## 2023-04-24 PROCEDURE — 97530 THERAPEUTIC ACTIVITIES: CPT

## 2023-04-24 PROCEDURE — 97162 PT EVAL MOD COMPLEX 30 MIN: CPT

## 2023-04-24 PROCEDURE — 2060000000 HC ICU INTERMEDIATE R&B

## 2023-04-24 PROCEDURE — 80048 BASIC METABOLIC PNL TOTAL CA: CPT

## 2023-04-24 PROCEDURE — 94761 N-INVAS EAR/PLS OXIMETRY MLT: CPT

## 2023-04-24 PROCEDURE — 36415 COLL VENOUS BLD VENIPUNCTURE: CPT

## 2023-04-24 RX ADMIN — BUSPIRONE HYDROCHLORIDE 5 MG: 5 TABLET ORAL at 10:09

## 2023-04-24 RX ADMIN — DULOXETINE HYDROCHLORIDE 60 MG: 60 CAPSULE, DELAYED RELEASE ORAL at 10:10

## 2023-04-24 RX ADMIN — FUROSEMIDE 20 MG: 20 TABLET ORAL at 10:10

## 2023-04-24 RX ADMIN — METHYLPREDNISOLONE SODIUM SUCCINATE 40 MG: 40 INJECTION, POWDER, FOR SOLUTION INTRAMUSCULAR; INTRAVENOUS at 10:10

## 2023-04-24 RX ADMIN — GABAPENTIN 100 MG: 100 CAPSULE ORAL at 22:07

## 2023-04-24 RX ADMIN — DIVALPROEX SODIUM 125 MG: 125 CAPSULE, COATED PELLETS ORAL at 10:17

## 2023-04-24 RX ADMIN — FERROUS SULFATE TAB 325 MG (65 MG ELEMENTAL FE) 325 MG: 325 (65 FE) TAB at 10:17

## 2023-04-24 RX ADMIN — APIXABAN 5 MG: 5 TABLET, FILM COATED ORAL at 22:07

## 2023-04-24 RX ADMIN — APIXABAN 5 MG: 5 TABLET, FILM COATED ORAL at 10:17

## 2023-04-24 RX ADMIN — GABAPENTIN 100 MG: 100 CAPSULE ORAL at 10:10

## 2023-04-24 RX ADMIN — LORAZEPAM 0.5 MG: 0.5 TABLET ORAL at 01:41

## 2023-04-24 RX ADMIN — DIVALPROEX SODIUM 125 MG: 125 CAPSULE, COATED PELLETS ORAL at 22:48

## 2023-04-24 RX ADMIN — BUSPIRONE HYDROCHLORIDE 5 MG: 5 TABLET ORAL at 22:07

## 2023-04-24 RX ADMIN — METOPROLOL TARTRATE 50 MG: 50 TABLET ORAL at 22:50

## 2023-04-24 RX ADMIN — ATORVASTATIN CALCIUM 40 MG: 40 TABLET, FILM COATED ORAL at 22:07

## 2023-04-24 RX ADMIN — INSULIN LISPRO 8 UNITS: 100 INJECTION, SOLUTION INTRAVENOUS; SUBCUTANEOUS at 17:57

## 2023-04-24 RX ADMIN — INSULIN GLARGINE 15 UNITS: 100 INJECTION, SOLUTION SUBCUTANEOUS at 22:51

## 2023-04-24 RX ADMIN — Medication 10 ML: at 04:29

## 2023-04-24 ASSESSMENT — PAIN SCALES - GENERAL
PAINLEVEL_OUTOF10: 0
PAINLEVEL_OUTOF10: 0

## 2023-04-24 NOTE — PLAN OF CARE
Problem: Discharge Planning  Goal: Discharge to home or other facility with appropriate resources  Outcome: Not Progressing     Problem: Chronic Conditions and Co-morbidities  Goal: Patient's chronic conditions and co-morbidity symptoms are monitored and maintained or improved  Outcome: Not Progressing     Problem: Safety - Adult  Goal: Free from fall injury  Outcome: Progressing     Problem: Pain  Goal: Verbalizes/displays adequate comfort level or baseline comfort level  Outcome: Progressing     Problem: Skin/Tissue Integrity  Goal: Absence of new skin breakdown  Description: 1. Monitor for areas of redness and/or skin breakdown  2. Assess vascular access sites hourly  3. Every 4-6 hours minimum:  Change oxygen saturation probe site  4. Every 4-6 hours:  If on nasal continuous positive airway pressure, respiratory therapy assess nares and determine need for appliance change or resting period.   Outcome: Progressing     Problem: Discharge Planning  Goal: Discharge to home or other facility with appropriate resources  Outcome: Not Progressing     Problem: Chronic Conditions and Co-morbidities  Goal: Patient's chronic conditions and co-morbidity symptoms are monitored and maintained or improved  Outcome: Not Progressing

## 2023-04-24 NOTE — CARE COORDINATION
Message left for Renee Nichole in admissions at [facility] to check status of referral from Friday.

## 2023-04-25 LAB
DEPRECATED RDW RBC AUTO: 15.4 % (ref 12.4–15.4)
GLUCOSE BLD-MCNC: 123 MG/DL (ref 70–99)
GLUCOSE BLD-MCNC: 124 MG/DL (ref 70–99)
GLUCOSE BLD-MCNC: 137 MG/DL (ref 70–99)
GLUCOSE BLD-MCNC: 194 MG/DL (ref 70–99)
HCT VFR BLD AUTO: 39.9 % (ref 36–48)
HGB BLD-MCNC: 12.6 G/DL (ref 12–16)
MCH RBC QN AUTO: 27.8 PG (ref 26–34)
MCHC RBC AUTO-ENTMCNC: 31.7 G/DL (ref 31–36)
MCV RBC AUTO: 87.6 FL (ref 80–100)
PERFORMED ON: ABNORMAL
PLATELET # BLD AUTO: 174 K/UL (ref 135–450)
PMV BLD AUTO: 10.2 FL (ref 5–10.5)
RBC # BLD AUTO: 4.56 M/UL (ref 4–5.2)
WBC # BLD AUTO: 12.2 K/UL (ref 4–11)

## 2023-04-25 PROCEDURE — 85027 COMPLETE CBC AUTOMATED: CPT

## 2023-04-25 PROCEDURE — 87184 SC STD DISK METHOD PER PLATE: CPT

## 2023-04-25 PROCEDURE — 87186 SC STD MICRODIL/AGAR DIL: CPT

## 2023-04-25 PROCEDURE — 99232 SBSQ HOSP IP/OBS MODERATE 35: CPT | Performed by: INTERNAL MEDICINE

## 2023-04-25 PROCEDURE — 36415 COLL VENOUS BLD VENIPUNCTURE: CPT

## 2023-04-25 PROCEDURE — 93005 ELECTROCARDIOGRAM TRACING: CPT | Performed by: NURSE PRACTITIONER

## 2023-04-25 PROCEDURE — 6370000000 HC RX 637 (ALT 250 FOR IP): Performed by: INTERNAL MEDICINE

## 2023-04-25 PROCEDURE — 6370000000 HC RX 637 (ALT 250 FOR IP): Performed by: STUDENT IN AN ORGANIZED HEALTH CARE EDUCATION/TRAINING PROGRAM

## 2023-04-25 PROCEDURE — 87077 CULTURE AEROBIC IDENTIFY: CPT

## 2023-04-25 PROCEDURE — 94761 N-INVAS EAR/PLS OXIMETRY MLT: CPT

## 2023-04-25 PROCEDURE — 2700000000 HC OXYGEN THERAPY PER DAY

## 2023-04-25 PROCEDURE — 87205 SMEAR GRAM STAIN: CPT

## 2023-04-25 PROCEDURE — 2580000003 HC RX 258: Performed by: NURSE PRACTITIONER

## 2023-04-25 PROCEDURE — 2060000000 HC ICU INTERMEDIATE R&B

## 2023-04-25 PROCEDURE — 86403 PARTICLE AGGLUT ANTBDY SCRN: CPT

## 2023-04-25 PROCEDURE — 2580000003 HC RX 258: Performed by: INTERNAL MEDICINE

## 2023-04-25 PROCEDURE — 87070 CULTURE OTHR SPECIMN AEROBIC: CPT

## 2023-04-25 RX ORDER — 0.9 % SODIUM CHLORIDE 0.9 %
250 INTRAVENOUS SOLUTION INTRAVENOUS ONCE
Status: COMPLETED | OUTPATIENT
Start: 2023-04-25 | End: 2023-04-25

## 2023-04-25 RX ADMIN — BUSPIRONE HYDROCHLORIDE 5 MG: 5 TABLET ORAL at 08:40

## 2023-04-25 RX ADMIN — SODIUM CHLORIDE 250 ML: 9 INJECTION, SOLUTION INTRAVENOUS at 21:35

## 2023-04-25 RX ADMIN — DIVALPROEX SODIUM 125 MG: 125 CAPSULE, COATED PELLETS ORAL at 16:16

## 2023-04-25 RX ADMIN — GABAPENTIN 100 MG: 100 CAPSULE ORAL at 08:40

## 2023-04-25 RX ADMIN — DULOXETINE HYDROCHLORIDE 60 MG: 60 CAPSULE, DELAYED RELEASE ORAL at 08:39

## 2023-04-25 RX ADMIN — METOPROLOL TARTRATE 50 MG: 50 TABLET ORAL at 08:39

## 2023-04-25 RX ADMIN — INSULIN GLARGINE 15 UNITS: 100 INJECTION, SOLUTION SUBCUTANEOUS at 19:51

## 2023-04-25 RX ADMIN — Medication 10 ML: at 19:48

## 2023-04-25 RX ADMIN — APIXABAN 5 MG: 5 TABLET, FILM COATED ORAL at 19:47

## 2023-04-25 RX ADMIN — DIVALPROEX SODIUM 125 MG: 125 CAPSULE, COATED PELLETS ORAL at 08:39

## 2023-04-25 RX ADMIN — GABAPENTIN 100 MG: 100 CAPSULE ORAL at 19:46

## 2023-04-25 RX ADMIN — DIVALPROEX SODIUM 125 MG: 125 CAPSULE, COATED PELLETS ORAL at 19:47

## 2023-04-25 RX ADMIN — Medication 10 ML: at 08:42

## 2023-04-25 RX ADMIN — FERROUS SULFATE TAB 325 MG (65 MG ELEMENTAL FE) 325 MG: 325 (65 FE) TAB at 08:40

## 2023-04-25 RX ADMIN — APIXABAN 5 MG: 5 TABLET, FILM COATED ORAL at 08:39

## 2023-04-25 RX ADMIN — LORAZEPAM 0.5 MG: 0.5 TABLET ORAL at 21:35

## 2023-04-25 RX ADMIN — BUSPIRONE HYDROCHLORIDE 5 MG: 5 TABLET ORAL at 19:46

## 2023-04-25 RX ADMIN — ATORVASTATIN CALCIUM 40 MG: 40 TABLET, FILM COATED ORAL at 19:47

## 2023-04-25 RX ADMIN — FUROSEMIDE 20 MG: 20 TABLET ORAL at 08:40

## 2023-04-25 RX ADMIN — Medication 10 ML: at 21:34

## 2023-04-25 ASSESSMENT — PAIN SCALES - GENERAL
PAINLEVEL_OUTOF10: 0

## 2023-04-25 NOTE — CARE COORDINATION
Writer JENNIFER for Arlette about update on referral sent. Cert will need done. Ransom Najjar at [facility] said she called [facility] and indicated that they don't have a LTC bed available and that patient is Covid + they cant accept. Likely will need to return back to [facility].     Duong Darling, RN

## 2023-04-26 PROBLEM — J15.212 PNEUMONIA OF BOTH LOWER LOBES DUE TO METHICILLIN RESISTANT STAPHYLOCOCCUS AUREUS (MRSA) (HCC): Status: ACTIVE | Noted: 2023-04-26

## 2023-04-26 PROBLEM — A49.8 PROTEUS INFECTION: Status: ACTIVE | Noted: 2023-04-26

## 2023-04-26 LAB
ANION GAP SERPL CALCULATED.3IONS-SCNC: 8 MMOL/L (ref 3–16)
BUN SERPL-MCNC: 13 MG/DL (ref 7–20)
CALCIUM SERPL-MCNC: 8.9 MG/DL (ref 8.3–10.6)
CHLORIDE SERPL-SCNC: 98 MMOL/L (ref 99–110)
CO2 SERPL-SCNC: 34 MMOL/L (ref 21–32)
CREAT SERPL-MCNC: <0.5 MG/DL (ref 0.6–1.2)
EKG ATRIAL RATE: 156 BPM
EKG DIAGNOSIS: NORMAL
EKG Q-T INTERVAL: 352 MS
EKG QRS DURATION: 92 MS
EKG QTC CALCULATION (BAZETT): 478 MS
EKG R AXIS: 66 DEGREES
EKG T AXIS: 245 DEGREES
EKG VENTRICULAR RATE: 111 BPM
GFR SERPLBLD CREATININE-BSD FMLA CKD-EPI: >60 ML/MIN/{1.73_M2}
GLUCOSE BLD-MCNC: 124 MG/DL (ref 70–99)
GLUCOSE BLD-MCNC: 154 MG/DL (ref 70–99)
GLUCOSE BLD-MCNC: 189 MG/DL (ref 70–99)
GLUCOSE BLD-MCNC: 89 MG/DL (ref 70–99)
GLUCOSE SERPL-MCNC: 107 MG/DL (ref 70–99)
MAGNESIUM SERPL-MCNC: 1.8 MG/DL (ref 1.8–2.4)
PERFORMED ON: ABNORMAL
PERFORMED ON: NORMAL
POTASSIUM SERPL-SCNC: 3.3 MMOL/L (ref 3.5–5.1)
SODIUM SERPL-SCNC: 140 MMOL/L (ref 136–145)

## 2023-04-26 PROCEDURE — 94761 N-INVAS EAR/PLS OXIMETRY MLT: CPT

## 2023-04-26 PROCEDURE — 6360000002 HC RX W HCPCS: Performed by: INTERNAL MEDICINE

## 2023-04-26 PROCEDURE — 6370000000 HC RX 637 (ALT 250 FOR IP): Performed by: INTERNAL MEDICINE

## 2023-04-26 PROCEDURE — 2580000003 HC RX 258: Performed by: INTERNAL MEDICINE

## 2023-04-26 PROCEDURE — 6370000000 HC RX 637 (ALT 250 FOR IP): Performed by: STUDENT IN AN ORGANIZED HEALTH CARE EDUCATION/TRAINING PROGRAM

## 2023-04-26 PROCEDURE — 2060000000 HC ICU INTERMEDIATE R&B

## 2023-04-26 PROCEDURE — 80048 BASIC METABOLIC PNL TOTAL CA: CPT

## 2023-04-26 PROCEDURE — 36415 COLL VENOUS BLD VENIPUNCTURE: CPT

## 2023-04-26 PROCEDURE — 2700000000 HC OXYGEN THERAPY PER DAY

## 2023-04-26 PROCEDURE — 93010 ELECTROCARDIOGRAM REPORT: CPT | Performed by: INTERNAL MEDICINE

## 2023-04-26 PROCEDURE — 99232 SBSQ HOSP IP/OBS MODERATE 35: CPT | Performed by: INTERNAL MEDICINE

## 2023-04-26 PROCEDURE — 83735 ASSAY OF MAGNESIUM: CPT

## 2023-04-26 RX ORDER — LINEZOLID 600 MG/1
600 TABLET, FILM COATED ORAL EVERY 12 HOURS SCHEDULED
Status: DISCONTINUED | OUTPATIENT
Start: 2023-04-26 | End: 2023-04-28 | Stop reason: HOSPADM

## 2023-04-26 RX ADMIN — LORAZEPAM 0.5 MG: 0.5 TABLET ORAL at 11:34

## 2023-04-26 RX ADMIN — DIVALPROEX SODIUM 125 MG: 125 CAPSULE, COATED PELLETS ORAL at 09:29

## 2023-04-26 RX ADMIN — LINEZOLID 600 MG: 600 TABLET, FILM COATED ORAL at 20:42

## 2023-04-26 RX ADMIN — METOPROLOL TARTRATE 50 MG: 50 TABLET ORAL at 09:29

## 2023-04-26 RX ADMIN — DIVALPROEX SODIUM 125 MG: 125 CAPSULE, COATED PELLETS ORAL at 20:43

## 2023-04-26 RX ADMIN — GABAPENTIN 100 MG: 100 CAPSULE ORAL at 09:29

## 2023-04-26 RX ADMIN — DULOXETINE HYDROCHLORIDE 60 MG: 60 CAPSULE, DELAYED RELEASE ORAL at 09:29

## 2023-04-26 RX ADMIN — METOPROLOL TARTRATE 50 MG: 50 TABLET ORAL at 20:43

## 2023-04-26 RX ADMIN — GABAPENTIN 100 MG: 100 CAPSULE ORAL at 14:49

## 2023-04-26 RX ADMIN — DIVALPROEX SODIUM 125 MG: 125 CAPSULE, COATED PELLETS ORAL at 14:49

## 2023-04-26 RX ADMIN — OXYCODONE AND ACETAMINOPHEN 1 TABLET: 7.5; 325 TABLET ORAL at 20:42

## 2023-04-26 RX ADMIN — BUSPIRONE HYDROCHLORIDE 5 MG: 5 TABLET ORAL at 09:29

## 2023-04-26 RX ADMIN — GABAPENTIN 100 MG: 100 CAPSULE ORAL at 20:46

## 2023-04-26 RX ADMIN — PIPERACILLIN AND TAZOBACTAM 3375 MG: 3; .375 INJECTION, POWDER, LYOPHILIZED, FOR SOLUTION INTRAVENOUS at 16:52

## 2023-04-26 RX ADMIN — APIXABAN 5 MG: 5 TABLET, FILM COATED ORAL at 20:43

## 2023-04-26 RX ADMIN — APIXABAN 5 MG: 5 TABLET, FILM COATED ORAL at 09:29

## 2023-04-26 RX ADMIN — INSULIN GLARGINE 15 UNITS: 100 INJECTION, SOLUTION SUBCUTANEOUS at 20:49

## 2023-04-26 RX ADMIN — OXYCODONE AND ACETAMINOPHEN 1 TABLET: 7.5; 325 TABLET ORAL at 11:33

## 2023-04-26 RX ADMIN — FUROSEMIDE 20 MG: 20 TABLET ORAL at 09:29

## 2023-04-26 RX ADMIN — FERROUS SULFATE TAB 325 MG (65 MG ELEMENTAL FE) 325 MG: 325 (65 FE) TAB at 09:29

## 2023-04-26 RX ADMIN — BUSPIRONE HYDROCHLORIDE 5 MG: 5 TABLET ORAL at 20:42

## 2023-04-26 RX ADMIN — ATORVASTATIN CALCIUM 40 MG: 40 TABLET, FILM COATED ORAL at 20:46

## 2023-04-26 ASSESSMENT — PAIN DESCRIPTION - LOCATION
LOCATION: ABDOMEN;BACK
LOCATION: LEG

## 2023-04-26 ASSESSMENT — PAIN SCALES - GENERAL
PAINLEVEL_OUTOF10: 10
PAINLEVEL_OUTOF10: 0
PAINLEVEL_OUTOF10: 0
PAINLEVEL_OUTOF10: 7
PAINLEVEL_OUTOF10: 5

## 2023-04-26 ASSESSMENT — PAIN - FUNCTIONAL ASSESSMENT: PAIN_FUNCTIONAL_ASSESSMENT: ACTIVITIES ARE NOT PREVENTED

## 2023-04-26 ASSESSMENT — PAIN DESCRIPTION - ORIENTATION: ORIENTATION: MID

## 2023-04-26 ASSESSMENT — PAIN SCALES - WONG BAKER: WONGBAKER_NUMERICALRESPONSE: 0

## 2023-04-26 ASSESSMENT — PAIN DESCRIPTION - DESCRIPTORS
DESCRIPTORS: ACHING
DESCRIPTORS: ACHING

## 2023-04-27 LAB
ANION GAP SERPL CALCULATED.3IONS-SCNC: 12 MMOL/L (ref 3–16)
BUN SERPL-MCNC: 11 MG/DL (ref 7–20)
CALCIUM SERPL-MCNC: 8.9 MG/DL (ref 8.3–10.6)
CHLORIDE SERPL-SCNC: 101 MMOL/L (ref 99–110)
CO2 SERPL-SCNC: 30 MMOL/L (ref 21–32)
CREAT SERPL-MCNC: <0.5 MG/DL (ref 0.6–1.2)
DEPRECATED RDW RBC AUTO: 16.1 % (ref 12.4–15.4)
GFR SERPLBLD CREATININE-BSD FMLA CKD-EPI: >60 ML/MIN/{1.73_M2}
GLUCOSE BLD-MCNC: 100 MG/DL (ref 70–99)
GLUCOSE BLD-MCNC: 161 MG/DL (ref 70–99)
GLUCOSE BLD-MCNC: 170 MG/DL (ref 70–99)
GLUCOSE BLD-MCNC: 190 MG/DL (ref 70–99)
GLUCOSE SERPL-MCNC: 104 MG/DL (ref 70–99)
HCT VFR BLD AUTO: 41.2 % (ref 36–48)
HGB BLD-MCNC: 13.1 G/DL (ref 12–16)
MCH RBC QN AUTO: 28 PG (ref 26–34)
MCHC RBC AUTO-ENTMCNC: 31.9 G/DL (ref 31–36)
MCV RBC AUTO: 87.9 FL (ref 80–100)
PERFORMED ON: ABNORMAL
PLATELET # BLD AUTO: 133 K/UL (ref 135–450)
PMV BLD AUTO: 10.2 FL (ref 5–10.5)
POTASSIUM SERPL-SCNC: 4.2 MMOL/L (ref 3.5–5.1)
RBC # BLD AUTO: 4.68 M/UL (ref 4–5.2)
SODIUM SERPL-SCNC: 143 MMOL/L (ref 136–145)
VALPROATE SERPL-MCNC: 27.6 UG/ML (ref 50–100)
WBC # BLD AUTO: 15.1 K/UL (ref 4–11)

## 2023-04-27 PROCEDURE — 6370000000 HC RX 637 (ALT 250 FOR IP): Performed by: INTERNAL MEDICINE

## 2023-04-27 PROCEDURE — 80164 ASSAY DIPROPYLACETIC ACD TOT: CPT

## 2023-04-27 PROCEDURE — 36415 COLL VENOUS BLD VENIPUNCTURE: CPT

## 2023-04-27 PROCEDURE — 85027 COMPLETE CBC AUTOMATED: CPT

## 2023-04-27 PROCEDURE — 2060000000 HC ICU INTERMEDIATE R&B

## 2023-04-27 PROCEDURE — 6360000002 HC RX W HCPCS: Performed by: INTERNAL MEDICINE

## 2023-04-27 PROCEDURE — 80048 BASIC METABOLIC PNL TOTAL CA: CPT

## 2023-04-27 PROCEDURE — 6370000000 HC RX 637 (ALT 250 FOR IP): Performed by: STUDENT IN AN ORGANIZED HEALTH CARE EDUCATION/TRAINING PROGRAM

## 2023-04-27 PROCEDURE — 94761 N-INVAS EAR/PLS OXIMETRY MLT: CPT

## 2023-04-27 PROCEDURE — 2580000003 HC RX 258: Performed by: INTERNAL MEDICINE

## 2023-04-27 PROCEDURE — 99232 SBSQ HOSP IP/OBS MODERATE 35: CPT | Performed by: INTERNAL MEDICINE

## 2023-04-27 PROCEDURE — 2700000000 HC OXYGEN THERAPY PER DAY

## 2023-04-27 RX ORDER — TRAMADOL HYDROCHLORIDE 50 MG/1
25 TABLET ORAL EVERY 6 HOURS PRN
Status: DISCONTINUED | OUTPATIENT
Start: 2023-04-27 | End: 2023-04-27

## 2023-04-27 RX ADMIN — PIPERACILLIN AND TAZOBACTAM 3375 MG: 3; .375 INJECTION, POWDER, LYOPHILIZED, FOR SOLUTION INTRAVENOUS at 00:53

## 2023-04-27 RX ADMIN — APIXABAN 5 MG: 5 TABLET, FILM COATED ORAL at 11:08

## 2023-04-27 RX ADMIN — LINEZOLID 600 MG: 600 TABLET, FILM COATED ORAL at 22:39

## 2023-04-27 RX ADMIN — OXYCODONE AND ACETAMINOPHEN 1 TABLET: 7.5; 325 TABLET ORAL at 06:04

## 2023-04-27 RX ADMIN — METOPROLOL TARTRATE 50 MG: 50 TABLET ORAL at 11:08

## 2023-04-27 RX ADMIN — DIVALPROEX SODIUM 125 MG: 125 CAPSULE, COATED PELLETS ORAL at 11:08

## 2023-04-27 RX ADMIN — INSULIN GLARGINE 15 UNITS: 100 INJECTION, SOLUTION SUBCUTANEOUS at 22:42

## 2023-04-27 RX ADMIN — GABAPENTIN 100 MG: 100 CAPSULE ORAL at 11:48

## 2023-04-27 RX ADMIN — PIPERACILLIN AND TAZOBACTAM 3375 MG: 3; .375 INJECTION, POWDER, LYOPHILIZED, FOR SOLUTION INTRAVENOUS at 11:22

## 2023-04-27 RX ADMIN — FUROSEMIDE 20 MG: 20 TABLET ORAL at 11:08

## 2023-04-27 RX ADMIN — LINEZOLID 600 MG: 600 TABLET, FILM COATED ORAL at 11:08

## 2023-04-27 RX ADMIN — GABAPENTIN 100 MG: 100 CAPSULE ORAL at 22:39

## 2023-04-27 RX ADMIN — ATORVASTATIN CALCIUM 40 MG: 40 TABLET, FILM COATED ORAL at 22:39

## 2023-04-27 RX ADMIN — DIVALPROEX SODIUM 125 MG: 125 CAPSULE, COATED PELLETS ORAL at 14:37

## 2023-04-27 RX ADMIN — FERROUS SULFATE TAB 325 MG (65 MG ELEMENTAL FE) 325 MG: 325 (65 FE) TAB at 11:08

## 2023-04-27 RX ADMIN — BUSPIRONE HYDROCHLORIDE 5 MG: 5 TABLET ORAL at 11:08

## 2023-04-27 RX ADMIN — OXYCODONE AND ACETAMINOPHEN 1 TABLET: 7.5; 325 TABLET ORAL at 14:37

## 2023-04-27 RX ADMIN — BUSPIRONE HYDROCHLORIDE 5 MG: 5 TABLET ORAL at 22:39

## 2023-04-27 RX ADMIN — METOPROLOL TARTRATE 50 MG: 50 TABLET ORAL at 22:38

## 2023-04-27 RX ADMIN — PIPERACILLIN AND TAZOBACTAM 3375 MG: 3; .375 INJECTION, POWDER, LYOPHILIZED, FOR SOLUTION INTRAVENOUS at 18:13

## 2023-04-27 RX ADMIN — APIXABAN 5 MG: 5 TABLET, FILM COATED ORAL at 22:39

## 2023-04-27 RX ADMIN — GABAPENTIN 100 MG: 100 CAPSULE ORAL at 14:37

## 2023-04-27 RX ADMIN — DULOXETINE HYDROCHLORIDE 60 MG: 60 CAPSULE, DELAYED RELEASE ORAL at 11:08

## 2023-04-27 ASSESSMENT — PAIN DESCRIPTION - ORIENTATION: ORIENTATION: LEFT

## 2023-04-27 ASSESSMENT — PAIN SCALES - GENERAL: PAINLEVEL_OUTOF10: 7

## 2023-04-27 ASSESSMENT — PAIN DESCRIPTION - LOCATION: LOCATION: LEG

## 2023-04-27 ASSESSMENT — PAIN DESCRIPTION - DESCRIPTORS: DESCRIPTORS: ACHING

## 2023-04-27 NOTE — CARE COORDINATION
Case Management Follow up      Chart reviewed and case discussed during huddle and rounds. Pt is admitted day # 6. Unit C-4. Diagnosis and currently status as per MD progress: Chest pain, NSTEMI- xfr from [facility]. Medical management. C19, COPD with Pna. Pulm following- weaning O2 as tolerated. On empiric Zyvox and Zosyn. Services following:IM, Pulm    Anticipated Discharge date: possibly today. Expected Plan for Discharge:return to [facility]. Brother requested ref. To [facility] and they are unable to accept due to no LTC beds and C19 +. Pre cert needed: n/a    Potential Barriers:none    RN CM will continue to follow Pt clinical course for DCP needs.

## 2023-04-28 VITALS
TEMPERATURE: 97.4 F | OXYGEN SATURATION: 95 % | WEIGHT: 194.67 LBS | SYSTOLIC BLOOD PRESSURE: 114 MMHG | RESPIRATION RATE: 18 BRPM | DIASTOLIC BLOOD PRESSURE: 77 MMHG | HEART RATE: 93 BPM | HEIGHT: 63 IN | BODY MASS INDEX: 34.49 KG/M2

## 2023-04-28 LAB
BACTERIA SPEC RESP CULT: ABNORMAL
EKG ATRIAL RATE: 93 BPM
EKG DIAGNOSIS: NORMAL
EKG Q-T INTERVAL: 408 MS
EKG QRS DURATION: 94 MS
EKG QTC CALCULATION (BAZETT): 485 MS
EKG R AXIS: 64 DEGREES
EKG T AXIS: 236 DEGREES
EKG VENTRICULAR RATE: 85 BPM
GLUCOSE BLD-MCNC: 101 MG/DL (ref 70–99)
GLUCOSE BLD-MCNC: 174 MG/DL (ref 70–99)
GRAM STN SPEC: ABNORMAL
ORGANISM: ABNORMAL
ORGANISM: ABNORMAL
PERFORMED ON: ABNORMAL
PERFORMED ON: ABNORMAL

## 2023-04-28 PROCEDURE — 2700000000 HC OXYGEN THERAPY PER DAY

## 2023-04-28 PROCEDURE — 6370000000 HC RX 637 (ALT 250 FOR IP): Performed by: INTERNAL MEDICINE

## 2023-04-28 PROCEDURE — 6360000002 HC RX W HCPCS: Performed by: INTERNAL MEDICINE

## 2023-04-28 PROCEDURE — 6370000000 HC RX 637 (ALT 250 FOR IP): Performed by: STUDENT IN AN ORGANIZED HEALTH CARE EDUCATION/TRAINING PROGRAM

## 2023-04-28 PROCEDURE — 94761 N-INVAS EAR/PLS OXIMETRY MLT: CPT

## 2023-04-28 PROCEDURE — 2580000003 HC RX 258: Performed by: INTERNAL MEDICINE

## 2023-04-28 PROCEDURE — 99232 SBSQ HOSP IP/OBS MODERATE 35: CPT | Performed by: INTERNAL MEDICINE

## 2023-04-28 PROCEDURE — 93005 ELECTROCARDIOGRAM TRACING: CPT | Performed by: INTERNAL MEDICINE

## 2023-04-28 PROCEDURE — 93010 ELECTROCARDIOGRAM REPORT: CPT | Performed by: INTERNAL MEDICINE

## 2023-04-28 RX ORDER — INSULIN GLARGINE 100 [IU]/ML
10 INJECTION, SOLUTION SUBCUTANEOUS NIGHTLY
Qty: 10 ML | Refills: 3 | DISCHARGE
Start: 2023-04-28

## 2023-04-28 RX ORDER — CEFUROXIME AXETIL 500 MG/1
500 TABLET ORAL 2 TIMES DAILY
Qty: 14 TABLET | Refills: 0 | DISCHARGE
Start: 2023-04-28 | End: 2023-05-05

## 2023-04-28 RX ORDER — INSULIN LISPRO 100 [IU]/ML
0-16 INJECTION, SOLUTION INTRAVENOUS; SUBCUTANEOUS
DISCHARGE
Start: 2023-04-28

## 2023-04-28 RX ORDER — OXYCODONE AND ACETAMINOPHEN 7.5; 325 MG/1; MG/1
1 TABLET ORAL EVERY 4 HOURS PRN
Qty: 18 TABLET | Refills: 0 | Status: SHIPPED | OUTPATIENT
Start: 2023-04-28 | End: 2023-05-01

## 2023-04-28 RX ORDER — ATORVASTATIN CALCIUM 40 MG/1
40 TABLET, FILM COATED ORAL NIGHTLY
Qty: 30 TABLET | Refills: 3 | DISCHARGE
Start: 2023-04-28

## 2023-04-28 RX ORDER — TIZANIDINE 2 MG/1
2 TABLET ORAL EVERY 6 HOURS PRN
Qty: 12 TABLET | Refills: 0 | Status: SHIPPED | OUTPATIENT
Start: 2023-04-28 | End: 2023-05-01

## 2023-04-28 RX ORDER — INSULIN LISPRO 100 [IU]/ML
0-4 INJECTION, SOLUTION INTRAVENOUS; SUBCUTANEOUS NIGHTLY
DISCHARGE
Start: 2023-04-28

## 2023-04-28 RX ORDER — LINEZOLID 600 MG/1
600 TABLET, FILM COATED ORAL EVERY 12 HOURS SCHEDULED
Qty: 10 TABLET | Refills: 0 | DISCHARGE
Start: 2023-04-28 | End: 2023-05-03

## 2023-04-28 RX ADMIN — LINEZOLID 600 MG: 600 TABLET, FILM COATED ORAL at 09:50

## 2023-04-28 RX ADMIN — FERROUS SULFATE TAB 325 MG (65 MG ELEMENTAL FE) 325 MG: 325 (65 FE) TAB at 09:50

## 2023-04-28 RX ADMIN — DIVALPROEX SODIUM 125 MG: 125 CAPSULE, COATED PELLETS ORAL at 04:12

## 2023-04-28 RX ADMIN — BUSPIRONE HYDROCHLORIDE 5 MG: 5 TABLET ORAL at 09:49

## 2023-04-28 RX ADMIN — FUROSEMIDE 20 MG: 20 TABLET ORAL at 09:50

## 2023-04-28 RX ADMIN — APIXABAN 5 MG: 5 TABLET, FILM COATED ORAL at 09:50

## 2023-04-28 RX ADMIN — PIPERACILLIN AND TAZOBACTAM 3375 MG: 3; .375 INJECTION, POWDER, LYOPHILIZED, FOR SOLUTION INTRAVENOUS at 09:48

## 2023-04-28 RX ADMIN — Medication 10 ML: at 15:12

## 2023-04-28 RX ADMIN — GABAPENTIN 100 MG: 100 CAPSULE ORAL at 15:09

## 2023-04-28 RX ADMIN — SODIUM CHLORIDE 25 ML: 9 INJECTION, SOLUTION INTRAVENOUS at 09:45

## 2023-04-28 RX ADMIN — GABAPENTIN 100 MG: 100 CAPSULE ORAL at 09:50

## 2023-04-28 RX ADMIN — PIPERACILLIN AND TAZOBACTAM 3375 MG: 3; .375 INJECTION, POWDER, LYOPHILIZED, FOR SOLUTION INTRAVENOUS at 00:39

## 2023-04-28 RX ADMIN — MORPHINE SULFATE 2 MG: 2 INJECTION, SOLUTION INTRAMUSCULAR; INTRAVENOUS at 06:18

## 2023-04-28 RX ADMIN — METOPROLOL TARTRATE 50 MG: 50 TABLET ORAL at 09:49

## 2023-04-28 RX ADMIN — DIVALPROEX SODIUM 125 MG: 125 CAPSULE, COATED PELLETS ORAL at 09:49

## 2023-04-28 RX ADMIN — DIVALPROEX SODIUM 125 MG: 125 CAPSULE, COATED PELLETS ORAL at 15:09

## 2023-04-28 RX ADMIN — DULOXETINE HYDROCHLORIDE 60 MG: 60 CAPSULE, DELAYED RELEASE ORAL at 09:50

## 2023-04-28 ASSESSMENT — PAIN DESCRIPTION - LOCATION: LOCATION: CHEST

## 2023-04-28 ASSESSMENT — PAIN DESCRIPTION - DESCRIPTORS: DESCRIPTORS: TIGHTNESS

## 2023-04-28 ASSESSMENT — PAIN SCALES - GENERAL
PAINLEVEL_OUTOF10: 6
PAINLEVEL_OUTOF10: 0
PAINLEVEL_OUTOF10: 0

## 2023-04-28 NOTE — DISCHARGE SUMMARY
Soln injection vial  Commonly known as: HUMALOG  Inject 0-16 Units into the skin 3 times daily (with meals)  What changed: You were already taking a medication with the same name, and this prescription was added. Make sure you understand how and when to take each. tiZANidine 2 MG tablet  Commonly known as: ZANAFLEX  Take 1 tablet by mouth every 6 hours as needed (muscles spasm)  What changed: reasons to take this           * This list has 2 medication(s) that are the same as other medications prescribed for you. Read the directions carefully, and ask your doctor or other care provider to review them with you. CONTINUE taking these medications      acetaminophen 650 MG extended release tablet  Commonly known as: TYLENOL     bisacodyl 10 MG suppository  Commonly known as: DULCOLAX     busPIRone 5 MG tablet  Commonly known as: BUSPAR     CULTURELLE DIGESTIVE DAILY PO     divalproex 125 MG DR capsule  Commonly known as: DEPAKOTE SPRINKLE     DULoxetine 60 MG extended release capsule  Commonly known as: CYMBALTA     ferrous sulfate 325 (65 Fe) MG tablet  Commonly known as: IRON 325     furosemide 20 MG tablet  Commonly known as: LASIX     gabapentin 100 MG capsule  Commonly known as: NEURONTIN     melatonin 3 MG Tabs tablet     metoprolol tartrate 25 MG tablet  Commonly known as: LOPRESSOR  Take 1 tablet by mouth 2 times daily     moxifloxacin 0.5 % ophthalmic solution  Commonly known as: VIGAMOX     oxyCODONE-acetaminophen 7.5-325 MG per tablet  Commonly known as: PERCOCET  Take 1 tablet by mouth every 4 hours as needed for Pain for up to 3 days.  Max Daily Amount: 6 tablets     polyethylene glycol 17 GM/SCOOP powder  Commonly known as: GLYCOLAX     potassium chloride 10 MEQ extended release capsule  Commonly known as: MICRO-K     Vitamin D3 1.25 MG (24548 UT) Caps            STOP taking these medications      dilTIAZem 120 MG extended release capsule  Commonly known as: CARDIZEM 12 HR     LORazepam

## 2023-04-28 NOTE — DISCHARGE INSTR - COC
[[id number]]    Safety Concerns: At Risk for Falls    Impairments/Disabilities:      Vision, Hearing, and Contractures - LUE, LLE, RLE    Nutrition Therapy:  Current Nutrition Therapy:   - Oral Diet:  General    Routes of Feeding: Oral  Liquids: Thin Liquids  Daily Fluid Restriction: no  Last Modified Barium Swallow with Video (Video Swallowing Test): not done    Treatments at the Time of Hospital Discharge:   Respiratory Treatments: N/A  Oxygen Therapy:  is on oxygen at 1-2 L/min per nasal cannula. Ventilator:    - No ventilator support    Rehab Therapies: N/A  Weight Bearing Status/Restrictions: No weight bearing restrictions  Other Medical Equipment (for information only, NOT a DME order):  hospital bed  Other Treatments: N/A    Patient's personal belongings (please select all that are sent with patient):  None    RN SIGNATURE:  Electronically signed by Daphne Duncan RN on 4/28/23 at 4:09 PM EDT    CASE MANAGEMENT/SOCIAL WORK SECTION    Inpatient Status Date: 4/28/23    Readmission Risk Assessment Score:  Readmission Risk              Risk of Unplanned Readmission:  34           Discharging to Facility/ Agency   Name: [facility]  Address:  Phone: [phone]  Fax:    Dialysis Facility (if applicable)   Name:  Address:  Dialysis Schedule:  Phone:  Fax:    / signature: Electronically signed by Mihir Chou RN on 4/28/23 at 2:55 PM EDT    PHYSICIAN SECTION    Prognosis: Fair    Condition at Discharge: Stable    Rehab Potential (if transferring to Rehab): Fair    Recommended Labs or Other Treatments After Discharge: monitor BS and BP / labs , f/u PCP    Physician Certification: I certify the above information and transfer of Olayinka Cedeño  is necessary for the continuing treatment of the diagnosis listed and that she requires [facility] for less 30 days.      Update Admission H&P: No change in H&P    PHYSICIAN SIGNATURE:  Electronically signed by

## 2023-04-28 NOTE — CARE COORDINATION
CASE MANAGEMENT DISCHARGE SUMMARY      Discharge to: [address] [place] [address]         IMM given: (date) 4/28/23    New Durable Medical Equipment ordered/agency:     Transportation:        Medical Transport explained to pt/family. Pt/family voice no agency preference. Agency used: [employer] [facility] time: 4pm   Ambulance form completed: Yes    Confirmed discharge plan with:     Patient: yes      Family:  yes    Name: Krista Blanco number: [phone]; left message      Facility/Agency, name: [address] [place] [address] faxed   Phone number for report to facility: [phone]     RN, name: Raul Ch    Note: Discharging nurse to complete OLEG, reconcile AVS, and place final copy with patient's discharge packet. RN to ensure that written prescriptions for  Level II medications are sent with patient to the facility as per protocol.

## 2023-04-28 NOTE — FLOWSHEET NOTE
04/28/23 1246   Treatment Team Notification   Reason for Communication Abnormal vitals  (BP 88/50)   Team Member Name GARETH Rosales MD   Treatment Team Role Attending Provider   Method of Communication Secure Message   Response Other (Comment)  (okay to discharge per MD @ [phone] [id number] PM now that BP up to 108/69)   Notification Time [phone]

## 2023-05-02 NOTE — PROGRESS NOTES
[address] @ [facility] report via phone to [facility].
Advised on call that EKG results have been uploaded.
Comprehensive Nutrition Assessment    Type and Reason for Visit:  Initial, RD Nutrition Re-Screen/LOS    Nutrition Recommendations/Plan:   Continue soft and bite sized 4 carb control diet  Monitor nutrition adequacy, pertinent labs, bowel habits, wt changes, and clinical progress     Malnutrition Assessment:  Malnutrition Status: At risk for malnutrition (Comment) (04/28/23 5622)    Context:  Acute Illness       Nutrition Assessment:    LOS: 73 yo F w PMH CHF, dementia, COPD, DM admitted for a-fib. Possible d/c today. Pt on soft and bite sized, 4 carb controlled diet with mostly 26-75% PO intakes per EMR. Mild weight loss noted over past six months per EMR. Pt sleeping at time of visit, per nurse pt ate all of breakfast and 50-75% of lunch today. Nurse reports pt has had no complaints of stomach pain or difficulty chewing/swallowing. Will monitor pt nutrition status and need for ONS. Nutrition Related Findings:    BM 4/25. -190 x 24hrs. Wound Type: None       Current Nutrition Intake & Therapies:    Average Meal Intake: 26-50%, 51-75%, %, 1-25%  Average Supplements Intake: None Ordered  ADULT DIET; Dysphagia - Soft and Bite Sized; 4 carb choices (60 gm/meal)    Anthropometric Measures:  Height: 5' 3\" (160 cm)  Ideal Body Weight (IBW): 115 lbs (52 kg)       Current Body Weight: 194 lb 10.7 oz (88.3 kg), 169.3 % IBW. Weight Source: Bed Scale  Current BMI (kg/m2): 34.5  Usual Body Weight: 212 lb 1.3 oz (96.2 kg) (10/6/2022)  % Weight Change (Calculated): -8.2  Weight Adjustment For: No Adjustment                 BMI Categories: Obese Class 1 (BMI 30.0-34. 9)    Estimated Daily Nutrient Needs:  Energy Requirements Based On: Kcal/kg (25-30)  Weight Used for Energy Requirements: Ideal  Energy (kcal/day): 1114-8557 kcals  Weight Used for Protein Requirements: Ideal (1-1.2)  Protein (g/day): 52-62 g  Method Used for Fluid Requirements: 1 ml/kcal  Fluid (ml/day): 5004-7589 ml    Nutrition Diagnosis:
During report pt was yelling out for water. Multiple drinks given. Hr elevated to the 140's attending aware. At rest hr is about 111. Pt had breakfast. She had a whole pudding with her crushed meds and another full cup of water. Pt still yelling out help me please help me and when assessed pt states she wants water. Ice water given. Pt continues to hit the nurse button but then tells me she doesn't want anyone in her room. She is angry and then nice. Angry then nice. Tv on and she does tend to rest comfortably off and on. I was charting in her room, watching tv with her, and she continued to press the call light button. When I explained that it was to bring me into the room. Then she said I dont want you in my room. I told her then to not push the button. But she still continues to hit the button.
Fed pt dinner. She ate very well. No signs of aspiration. Pt clean and dry. Fresh purwick placed.
Following message sent to on call:  Pt transferred from [facility] 04/21/23 with chest pain/NSTEMI. Pt is stating she doesn't feel well. Current BP99/60 MAP73 's (afib RVR) T98.1 O2 sat 93% on 3L. Please advise. Awaiting response.
Hospitalist Progress Note      PCP: Caro Prado MD    Date of Admission: 4/21/2023    Chief Complaint:  Transfer from [facility] ER for chest pain, NSTEMI, COVID-19    Hospital Course:     74 y.o. female with PMH of DM 2, chronic A-fib on AC, dementia, COPD, chronic hypoxemic respiratory failure on 4 LPM O2, CHF, depression, schizoaffective disorder who is a nursing home resident who was recently admitted to [facility] inpatient from 4/4 to 4/16 for hypoglycemia, UTI, metabolic encephalopathy, COPD with respiratory failure, A-fib with RVR. She presented to [facility] ED  with complaint of chest pain. Of pertinence is that the Patient did test positive for COVID-19 on discharge. However she had minimal symptoms so did not require any particular treatment. In the ED her EKG was abnormal.  Troponin high-sensitivity elevated. Cardiology was consulted. Patient was deemed not a candidate for angiogram.  Heparin drip was initiated. Due to ongoing chest pain repeat EKGs were obtained with more pronounced ST depressions. Cardiology was repaged however again deemed not a candidate for emergent cath. Instructions were given to start nitroglycerin drip for chest pain. In the meantime patient was flown by air care to [facility] ICU. Her condition has stabilized, she is not having any further chest pain. She was transferred out of the ICU on 4/23/2023  She has been seen in consultation by pulmonary, cardiology. Subjective:    She is sitting up in bed, AAO X 3 , Has chronic Contractures in Lower extremities . Offer no new c/o. Nurse participated in rounds. Plan of care discussed.      Medications:  Reviewed    Infusion Medications    sodium chloride      dextrose       Scheduled Medications    furosemide  20 mg Oral Daily    metoprolol tartrate  50 mg Oral BID    apixaban  5 mg Oral BID    insulin glargine  15 Units SubCUTAneous Nightly    busPIRone  5 mg Oral BID    divalproex  125 mg Oral
Hospitalist Progress Note      PCP: Lucía Hunt MD    Date of Admission: 4/21/2023    Chief Complaint:  Transfer from [facility] ER for chest pain, NSTEMI, COVID-19    Hospital Course: 74 y.o. female with PMH of DM 2, chronic A-fib on AC, dementia, COPD, chronic hypoxemic respiratory failure on 4 LPM O2, CHF, depression, schizoaffective disorder who is a nursing home resident who was recently admitted to [facility] inpatient from 4/4 to 4/16 for hypoglycemia, UTI, metabolic encephalopathy, COPD with respiratory failure, A-fib with RVR. She presented to [facility] ED  with complaint of chest pain. Of pertinence is that the Patient did test positive for COVID-19 on discharge. However she had minimal symptoms so did not require any particular treatment. In the ED her EKG was abnormal.  Troponin high-sensitivity elevated. Cardiology was consulted. Patient was deemed not a candidate for angiogram.  Heparin drip was initiated. Due to ongoing chest pain repeat EKGs were obtained with more pronounced ST depressions. Cardiology was repaged however again deemed not a candidate for emergent cath. Instructions were given to start nitroglycerin drip for chest pain. In the meantime patient was flown by air care to [facility] ICU. Her condition has stabilized, she is not having any further chest pain. She was transferred out of the ICU on 4/23/2023  She has been seen in consultation by pulmonary, cardiology. Subjective:    She is sitting up in bed, AAO X 3 , Has chronic Contractures in Lower extremities . Offer no new c/o. -RN reports patient's blood pressure has been soft  and tachycardic overnight. Received fluid bolus. Currently blood pressure better and with ongoing tachycardia. Will give morning dose of metoprolol with parameters . We will have cardiology adjust medications. Continue to monitor.   Discussed with RN regarding care plan    Medications:  Reviewed    Infusion
Hospitalist Progress Note      PCP: William Smith MD    Date of Admission: 4/21/2023    Chief Complaint:  Transfer from [facility] ER for chest pain, NSTEMI, COVID-19    Hospital Course:     74 y.o. female with PMH of DM 2, chronic A-fib on AC, dementia, COPD, chronic hypoxemic respiratory failure on 4 LPM O2, CHF, depression, schizoaffective disorder who is a nursing home resident who was recently admitted to [facility] inpatient from 4/4 to 4/16 for hypoglycemia, UTI, metabolic encephalopathy, COPD with respiratory failure, A-fib with RVR. She presented to [facility] ED  with complaint of chest pain. Of pertinence is that the Patient did test positive for COVID-19 on discharge. However she had minimal symptoms so did not require any particular treatment. In the ED her EKG was abnormal.  Troponin high-sensitivity elevated. Cardiology was consulted. Patient was deemed not a candidate for angiogram.  Heparin drip was initiated. Due to ongoing chest pain repeat EKGs were obtained with more pronounced ST depressions. Cardiology was repaged however again deemed not a candidate for emergent cath. Instructions were given to start nitroglycerin drip for chest pain. In the meantime patient was flown by air care to [facility] ICU. Her condition has stabilized, she is not having any further chest pain. She was transferred out of the ICU on 4/23/2023  She has been seen in consultation by pulmonary, cardiology. Subjective:    She is sitting up in bed, AAO X 3 , Has chronic Contractures in Lower extremities . Offer no new c/o. Nurse participated in rounds. Plan of care discussed.      Medications:  Reviewed    Infusion Medications    sodium chloride      dextrose       Scheduled Medications    furosemide  20 mg Oral Daily    metoprolol tartrate  50 mg Oral BID    apixaban  5 mg Oral BID    insulin glargine  15 Units SubCUTAneous Nightly    busPIRone  5 mg Oral BID    divalproex  125 mg Oral
INPATIENT PULMONARY CRITICAL CARE PROGRESS NOTE      Reason for visit    NSTEMI, encephalopathy. SUBJECTIVE: Patient when seen this morning was comfortably lying in the bed without any significant shortness of breath or increased work of breathing, patient states that she t is to have some cough with mucopurulent expectoration along with that patient also has some shortness of breath and wheezing, patient does not have any chest pain or palpitations, patient was on 3 L of nasal, oxygen was saturating 90% when evaluated, patient was afebrile, patient continues to have atrial fibrillation with variable ventricular rate on the monitor, patient was hemodynamically maintained, patient's glycemic control was better as compared to yesterday, patient's documented urine output is on the lower side, no other pertinent review of system of concern         Physical Exam:  Blood pressure 110/63, pulse 90, temperature 97.9 °F (36.6 °C), temperature source Oral, resp. rate 18, weight 192 lb 0.3 oz (87.1 kg), SpO2 98 %.'   Constitutional:  No acute distress. HENT:  Oropharynx is clear and moist. No thyromegaly. Eyes:  Conjunctivae are normal. Pupils equal, round, and reactive to light. No scleral icterus. Neck: . No tracheal deviation present. No obvious thyroid mass. Short enlarged neck  Cardiovascular: Normal rate, regular rhythm, normal heart sounds. No right ventricular heave. No lower extremity edema. Pulmonary/Chest: No wheezes. Scattered rales. Some chest congestion chest wall is not dull to percussion. No accessory muscle usage or stridor. Abdominal: Soft. Bowel sounds present. No distension or hernia. No tenderness. Musculoskeletal: No cyanosis. No clubbing. No obvious joint deformity. Lymphadenopathy: No cervical or supraclavicular adenopathy. Skin: Skin is warm and dry. No rash or nodules on the exposed extremities. Psychiatric: Normal mood and affect. Behavior is normal.  No anxiety.    Neurologic:
INPATIENT PULMONARY CRITICAL CARE PROGRESS NOTE      Reason for visit    NSTEMI, encephalopathy. SUBJECTIVE: Patient when seen this morning was comfortably lying in the bed without any significant shortness of breath, patient was on 3 L of nasal can oxygen with saturation of 94%, patient has been having some increased chest congestion, patient does have increased phlegm which is green in color as per patient and nursing, patient does not have any significant chest pain or palpitations, patient was afebrile and hemodynamically maintained, patient was in atrial fibrillation with variable ventricular rate on the monitor, patient blood sugars are not optimally controlled, patient has had low urine output as documented in the epic with cumulative fluid balance of +2.9 L, no other pertinent review of system of concern           Physical Exam:  Blood pressure 109/73, pulse 87, temperature 97.3 °F (36.3 °C), temperature source Oral, resp. rate 16, weight 189 lb 13.1 oz (86.1 kg), SpO2 94 %.'   Constitutional:  No acute distress. HENT:  Oropharynx is clear and moist. No thyromegaly. Eyes:  Conjunctivae are normal. Pupils equal, round, and reactive to light. No scleral icterus. Neck: . No tracheal deviation present. No obvious thyroid mass. Short enlarged neck  Cardiovascular: Normal rate, regular rhythm, normal heart sounds. No right ventricular heave. No lower extremity edema. Pulmonary/Chest: No wheezes. Scattered rales. Some chest congestion chest wall is not dull to percussion. No accessory muscle usage or stridor. Abdominal: Soft. Bowel sounds present. No distension or hernia. No tenderness. Musculoskeletal: No cyanosis. No clubbing. No obvious joint deformity. Lymphadenopathy: No cervical or supraclavicular adenopathy. Skin: Skin is warm and dry. No rash or nodules on the exposed extremities. Psychiatric: Normal mood and affect. Behavior is normal.  No anxiety.    Neurologic: Alert, awake and
Occupational Therapy  Facility/Department: [id number] [place], [place] [address]  Occupational Therapy Initial Assessment, Treatment, and Discharge    Name: Denice Jones  : 1950  MRN: [id number]  Date of Service: 2023    Discharge Recommendations:  [address]   Therapy discharge recommendations take into account each patient's current medical complexities and are subject to input/oversight from the patient's healthcare team.   Barriers to Home Discharge:   [] Steps to access home entry or bed/bath:   [x] Unable to transfer, ambulate, or propel wheelchair household distances without assist   [] Limited available assist at home upon discharge    [] Patient or family requests d/c to post-acute facility    [x] Poor cognition/safety awareness for d/c to home alone   [] Unable to maintain ordered weight bearing status    [] Patient with salient signs of long-standing immobility   [x] Other: Decreased IND with ADLs    Patient Diagnosis(es): There were no encounter diagnoses. Past Medical History:  has a past medical history of Acute diastolic congestive heart failure ([address] Acute diastolic heart failure ([address] Acute respiratory failure ([address] Adjustment disorder with mixed anxiety and depressed mood, Allergic rhinitis, Alzheimer's dementia ([address] Arachnoid cyst, Atrial fibrillation ([address] CHF (congestive heart failure) ([address] Chronic back pain, Constipation, COPD (chronic obstructive pulmonary disease) ([address] Diabetes mellitus ([address] Diskitis, Disseminated superficial actinic porokeratosis (DSAP), Edema, ESBL (extended spectrum beta-lactamase) producing bacteria infection, Hypertension, Hypo-osmolality and hyponatremia, Major depressive disorder, Morbid obesity ([address] Muscle weakness, Osteomyelitis of spine ([address] Overactive bladder, Schizoaffective disorder ([address] Seizures ([address] Urinary tract infection without hematuria, and Xerosis cutis.   Past Surgical History:  has a past surgical history that
On call inquired to see what pt meant by not feeling well. I advised that pt has alzheimers so she isn't able to describe, just voices she isn't feeling well. On call advised of current BP 86/52 HR 97 atrial fib O2 sat 96% on 3L per NC. Order received for an EKG and 0.9NaCl bolus.
Patient prepared for discharge. Tele monitor removed & logged, CMU aware. Peripheral IV removed w/o complication. Catheter intact, no bleeding at site, dressing applied, pt tolerated well. Bath performed by writer and PCA. AVS, OLEG and paper scripts given to transport staff during bedside report at pick-up. Phone report given to Keila Leary RN at [facility].
Physical Therapy  Facility/Department: [facility]  Physical Therapy Initial Assessment/Treatment/Discharge     Name: Diego Naik  : 1950  MRN: [id number]  Date of Service: 2023    Discharge Recommendations:  [address] without PT (back to [facility])   PT Equipment Recommendations  Equipment Needed: No      Patient Diagnosis(es): There were no encounter diagnoses. Past Medical History:  has a past medical history of Acute diastolic congestive heart failure ([address] Acute diastolic heart failure ([address] Acute respiratory failure ([address] Adjustment disorder with mixed anxiety and depressed mood, Allergic rhinitis, Alzheimer's dementia ([address] Arachnoid cyst, Atrial fibrillation ([address] CHF (congestive heart failure) ([address] Chronic back pain, Constipation, COPD (chronic obstructive pulmonary disease) ([address] Diabetes mellitus ([address] Diskitis, Disseminated superficial actinic porokeratosis (DSAP), Edema, ESBL (extended spectrum beta-lactamase) producing bacteria infection, Hypertension, Hypo-osmolality and hyponatremia, Major depressive disorder, Morbid obesity ([address] Muscle weakness, Osteomyelitis of spine ([address] Overactive bladder, Schizoaffective disorder ([address] Seizures ([address] Urinary tract infection without hematuria, and Xerosis cutis. Past Surgical History:  has a past surgical history that includes Tubal ligation; knee surgery; Tonsillectomy; IR MIDLINE CATH (10/10/2022); and IR MIDLINE CATH (2023). Assessment   Assessment: Pt to A with diagnosis of penumonia due to COVID-19. Pt poor historian with baseline dementia. Per chart review pt from LTC at [facility]. Pt was dependent and non-ambulatory at baseline. Pt currently require max A for bilateral rolling and is dependent for alexander-care. Pt is currently at baseline funtion of dependent and does not require skilled PT services at this time. Safe to return back to LTC when deemed medically appropraite.  Co-tx collaboration this date to safely
Physician Progress Note      PATIENT:               Julia Hayes  CSN #:                  [id number]  :                       1950  ADMIT DATE:       2023 1:07 AM  DISCH DATE:  RESPONDING  PROVIDER #:        Luanne Lam MD          QUERY TEXT:    Patient admitted with chest pain. If possible, please document in the   progress notes and discharge summary if you are evaluating and/or treating any   of the following: The medical record reflects the following:  Risk Factors: Age, DM, COPD, CHF, AFIB, Covid  Clinical Indicators: High sensitivity Troponin 30, 12, 28, 26  PN -  \"Chest pain  Symptoms concerning for NSTEMI. Previous work-up Lexiscan  no reversible   ischemia  -High-sensitivity troponin slightly elevated  -Continue heparin drip, nitroglycerin drip for chest pain relief  continue aspirin, statin and metoprolol. Cardiology is following in consultation. Feel elevation in troponin may be attributed to underlying demand ischemia. Continue on aspirin and statin. Not a Candidate at this time for invasive angiography at this time. Continue   medical therapy. \"  Cardiology -  \"[place] to be type II NSTEMI due to myocardial injury related to rapid heart   rate in the setting of atrial fibrillation. Ryne Zhou Elevated troponin  Coronary artery disease by CT  -Troponin while elevated has flat trend (believe the 12 value is spurious) and   is likely attributed to underlying demand ischemia in the setting of rapid   heart rate/hypotension superimposed on underlying coronary artery disease. \"  Treatment: Cardiology consult, heparin gtt, nitro gtt, beta blocker, digoxin,   serial labs, and supportive care    Thank you,  Karthikeyan Palomo, RN, BSN, CRCR  Options provided:  -- NSTEMI  -- Type 2 MI  -- Demand Ischemia only, no MI  -- Other - I will add my own diagnosis  -- Disagree - Not applicable / Not valid  -- Disagree - Clinically unable to determine / Unknown  -- Refer to Clinical
Physician Progress Note      PATIENT:               Pramod Stephenson  CSN #:                  [id number]  :                       1950  ADMIT DATE:       2023 1:07 AM  DISCH DATE:        2023 4:15 PM  RESPONDING  PROVIDER #:        Angélica Key MD          QUERY TEXT:    Pt admitted with PNA secondary to Covid. Pt noted to have WBC 12.7, ,   T96.3, resp Cx with proteus mirabilis and staph aureus . If possible, please   document in the progress notes and discharge summary if you are evaluating and   /or treating any of the following: The medical record reflects the following:  Risk Factors: PNA, Covid, DM, COPD  Clinical Indicators: WBC 12.7, , T96.3, resp Cx with proteus mirabilis   and staph aureus  Cardiology -  \"We will recommend that the patient be seen by our care team for further   recommendations regarding her possible sepsis. \"  Treatment: IVF Bolus, zosyn, RCx serial labs, and supportive care    Thank you,  Tomas Sood, RN, BSN, CRCR  Options provided:  -- Sepsis secondary to PNA, present on arrival  -- PNA without Sepsis  -- Other - I will add my own diagnosis  -- Disagree - Not applicable / Not valid  -- Disagree - Clinically unable to determine / Unknown  -- Refer to Clinical Documentation Reviewer    PROVIDER RESPONSE TEXT:    This patient has sepsis secondary to PNA which was present on arrival.    Query created by: Kalina Cantor on 2023 1:19 PM      Electronically signed by:  Angélica Key MD 2023 6:33 PM
Pt incontinent of urine, has external catheter in place. Pt awake, alert to self but disoriented to place, time, and situation. Reoriented pt x3 but remains confused and easily agitated. Pt with dementia and hx of schizoaffective disorder. Pt not cooperative with care. Pt becoming agitated when explained need to turn and check and changed d/t pt is incontinent. Pt already given Ativan 0.5 mg po as ordered prn appoximately 1 hr ago in anticipation of turning pt and changing external catheter per protocol. Pt has long nails and scratching staff while repositioning. Kassandra care given, external purewick changed, gown changed, and repositioned pt with assist x3 staff. Pt calmed down after care given. Pt remains on telemetry showing controlled afib. O2 @ 3L/nc which is pt's baseline. Will continue to assess. Bed rails up x3. Nursing call light within pt's reach.
Pt pulled out purwick, and iv access. Pt was bathed and linens changed. New gown and depends placed. Buttock zinc creamed.
TECHNOLOGIST PROVIDED HISTORY: Reason for exam:->COVID, CP, r/o large PNA, cardiomeg, effusions Reason for Exam: sob pleural effusion FINDINGS: Patient is rotated. There is cardiomegaly. There is pulmonary vascular congestion. Interstitial type opacities compatible with edema. No pneumothorax or pleural effusion. Cardiomegaly with interstitial opacities favored to represent edema. However, COVID pneumonia could have a similar appearance. CT CHEST PULMONARY EMBOLISM W CONTRAST    Result Date: 4/20/2023  EXAMINATION: CTA OF THE CHEST 4/20/2023 5:13 pm TECHNIQUE: CTA of the chest was performed after the administration of intravenous contrast.  Multiplanar reformatted images are provided for review. MIP images are provided for review. Automated exposure control, iterative reconstruction, and/or weight based adjustment of the mA/kV was utilized to reduce the radiation dose to as low as reasonably achievable. COMPARISON: April 4, 2023 HISTORY: ORDERING SYSTEM PROVIDED HISTORY: r/o pe TECHNOLOGIST PROVIDED HISTORY: Reason for exam:->r/o pe Decision Support Exception - unselect if not a suspected or confirmed emergency medical condition->Emergency Medical Condition (MA) Reason for Exam: Chest Pain, post heart attack FINDINGS: Pulmonary Arteries: Pulmonary arteries are adequately opacified for evaluation. No evidence of intraluminal filling defect to suggest pulmonary embolism. Main pulmonary artery is normal in caliber. Mediastinum: AP window nodes are mildly enlarged measuring 1.1 cm. Precarinal nodes measure up to 1 cm. Right hilar nodes measure up to 1.5 cm. The heart and pericardium demonstrate no acute abnormality. There is no acute abnormality of the thoracic aorta. Lungs/pleura: Small bilateral pleural effusions and lower lobe atelectatic changes. Significant emphysema. Pulmonary nodule right upper lobe measures 7.5 mm needs follow-up. Additional pulmonary nodule right upper lobe measures 7.7 mm.
PANEL BY CLAYTON     levofloxacin Resistant >4 mcg/mL BACTERIAL SUSCEPTIBILITY PANEL BY CLAYTON     oxacillin Resistant >2 mcg/mL BACTERIAL SUSCEPTIBILITY PANEL BY CLAYTON     tetracycline Resistant >8 mcg/mL BACTERIAL SUSCEPTIBILITY PANEL BY CLAYTON     trimethoprim-sulfamethoxazole Sensitive <=0.5/9.5 mcg/mL BACTERIAL SUSCEPTIBILITY PANEL BY CLAYTON     vancomycin Sensitive 2 mcg/mL BACTERIAL SUSCEPTIBILITY PANEL BY CLAYTON                  Assessment:  Active Problems:    Persistent atrial fibrillation (HCC)    Chronic respiratory failure with hypoxia and hypercapnia ([facility])    Dementia with behavioral disturbance ([facility])    Class 1 obesity in adult    Atrial fibrillation (HCC)    Chronic obstructive pulmonary disease (HCC)    Metabolic encephalopathy    Coronary artery disease involving native coronary artery of native heart without angina pectoris    Acute respiratory failure with hypoxia ([facility])    COVID-19    Pulmonary infiltrates    Elevated brain natriuretic peptide (BNP) level    Lung nodules    Pleural effusion, bilateral    Elevated troponin    Uncontrolled type 2 diabetes mellitus with hyperglycemia ([facility])    Pneumonia of both lower lobes due to methicillin resistant Staphylococcus aureus (MRSA) ([facility])    Proteus infection  Resolved Problems:    * No resolved hospital problems.  *          Plan:   Oxygen supplementation to keep saturating 90-94% only  Please titrate oxygen as per the above parameters-nursing communication has been sent to that effect  Patient when seen this morning was on 2 L of nasal cannula, saturating 94%  Keep negative fluid balance  Monitor input output and BMP  Correct electrolytes on whenever whenever necessary basis  Patient has chronic respiratory failure with hypoxia and hypercapnia patient is secondary to operative disease or/and ?FILEMON  Patient sputum culture which was sent was positive for Proteus and MRSA  Zosyn being changed to PO Ceftin by IM  Zyvox to continue  Contact isolation precautions   Aggressive
hold off for now  Patient does not have any increased wheezing or bronchospasm after the steroids were discontinued  Lantus insulin as per blood glucose monitoring as per IM  Blood glucose monitoring with sliding scale insulin  Monitor for any hypoglycemia with steroids being discontinued  Cardiac medications as per IM/cardiology-patient continues to have intermittent atrial fibrillation with RVR with intermittent hypotension  Monitor cardiac rhythm and hemodynamics closely  Patient is on Eliquis which can be continued  Monitor for any bleeding  Continue Incentive spirometry  PUD prophylaxis as per primary team  PT/OT    Case d/w patient and nursing             Electronically signed by:  Taqueria Elliott MD    4/26/2023    4:00 PM.
operative disease or/and ?FILEMON  Patient sputum culture which was sent was positive for Proteus and MRSA  Patient was empirically started on Zosyn along with p.o.  Zyvox  Aggressive pulmonary care  No need for any bronchoscopy at this time  Patient does not have any increased wheezing or bronchospasm after the steroids were discontinued  Lantus insulin as per blood glucose monitoring as per IM  Blood glucose monitoring with sliding scale insulin  Monitor for any hypoglycemia with steroids being discontinued  Patient blood sugar still on the higher side  Cardiac medications as per IM/cardiology-patient continues to have intermittent atrial fibrillation with RVR with intermittent hypotension  Monitor cardiac rhythm and hemodynamics closely  Patient is on Eliquis which can be continued  Monitor for any bleeding  Continue Incentive spirometry  PUD prophylaxis as per primary team  PT/OT    Case d/w patient and nursing       Electronically signed by:  Shruthi Whittaker MD    4/27/2023    9:28 AM.

## 2023-05-03 ENCOUNTER — TELEPHONE (OUTPATIENT)
Dept: CASE MANAGEMENT | Age: 73
End: 2023-05-03

## 2023-05-03 NOTE — TELEPHONE ENCOUNTER
Imaging report CT Chest 4/20/23 with f/u imaging recommendations sent to Homar Curiel MD    May consider pulmonology referral for nodules greater than or equal to 6 mm    [id number] [facility] ESA(ANTONINO)  [email or web address]

## 2023-05-24 PROBLEM — R77.8 ELEVATED TROPONIN: Status: RESOLVED | Noted: 2023-04-24 | Resolved: 2023-05-24

## 2023-05-24 PROBLEM — R79.89 ELEVATED TROPONIN: Status: RESOLVED | Noted: 2023-04-24 | Resolved: 2023-05-24

## 2023-06-03 ENCOUNTER — APPOINTMENT (OUTPATIENT)
Dept: CT IMAGING | Age: 73
DRG: 637 | End: 2023-06-03
Payer: COMMERCIAL

## 2023-06-03 ENCOUNTER — HOSPITAL ENCOUNTER (INPATIENT)
Age: 73
LOS: 2 days | Discharge: NURSING, REHAB OR LONG-TERM CARE | DRG: 637 | End: 2023-06-05
Attending: EMERGENCY MEDICINE | Admitting: INTERNAL MEDICINE
Payer: COMMERCIAL

## 2023-06-03 ENCOUNTER — APPOINTMENT (OUTPATIENT)
Dept: GENERAL RADIOLOGY | Age: 73
DRG: 637 | End: 2023-06-03
Payer: COMMERCIAL

## 2023-06-03 DIAGNOSIS — E16.2 HYPOGLYCEMIA: Primary | ICD-10-CM

## 2023-06-03 DIAGNOSIS — D64.9 ANEMIA, UNSPECIFIED TYPE: ICD-10-CM

## 2023-06-03 PROBLEM — T68.XXXA HYPOTHERMIA: Status: ACTIVE | Noted: 2023-06-03

## 2023-06-03 LAB
ABO + RH BLD: NORMAL
ALBUMIN SERPL-MCNC: 2.8 G/DL (ref 3.4–5)
ALBUMIN/GLOB SERPL: 0.8 {RATIO} (ref 1.1–2.2)
ALP SERPL-CCNC: 53 U/L (ref 40–129)
ALT SERPL-CCNC: <5 U/L (ref 10–40)
AMPHETAMINES UR QL SCN>1000 NG/ML: ABNORMAL
ANION GAP SERPL CALCULATED.3IONS-SCNC: 9 MMOL/L (ref 3–16)
AST SERPL-CCNC: 34 U/L (ref 15–37)
BACTERIA URNS QL MICRO: ABNORMAL /HPF
BARBITURATES UR QL SCN>200 NG/ML: ABNORMAL
BASE EXCESS BLDV CALC-SCNC: -12.3 MMOL/L (ref -3–3)
BASOPHILS # BLD: 0 K/UL (ref 0–0.2)
BASOPHILS NFR BLD: 0.7 %
BENZODIAZ UR QL SCN>200 NG/ML: ABNORMAL
BETA-HYDROXYBUTYRATE: 0.2 MMOL/L (ref 0–0.27)
BILIRUB SERPL-MCNC: 0.5 MG/DL (ref 0–1)
BILIRUB UR QL STRIP.AUTO: NEGATIVE
BLD GP AB SCN SERPL QL: NORMAL
BUN SERPL-MCNC: 21 MG/DL (ref 7–20)
CALCIUM SERPL-MCNC: 8.3 MG/DL (ref 8.3–10.6)
CANNABINOIDS UR QL SCN>50 NG/ML: ABNORMAL
CHLORIDE SERPL-SCNC: 100 MMOL/L (ref 99–110)
CLARITY UR: CLEAR
CO2 BLDV-SCNC: 14 MMOL/L
CO2 SERPL-SCNC: 30 MMOL/L (ref 21–32)
COCAINE UR QL SCN: ABNORMAL
COHGB MFR BLDV: 5.1 % (ref 0–1.5)
COLOR UR: YELLOW
CREAT SERPL-MCNC: <0.5 MG/DL (ref 0.6–1.2)
DEPRECATED RDW RBC AUTO: 17.6 % (ref 12.4–15.4)
DRUG SCREEN COMMENT UR-IMP: ABNORMAL
EKG ATRIAL RATE: 144 BPM
EKG DIAGNOSIS: NORMAL
EKG Q-T INTERVAL: 370 MS
EKG QRS DURATION: 88 MS
EKG QTC CALCULATION (BAZETT): 460 MS
EKG R AXIS: 80 DEGREES
EKG T AXIS: 257 DEGREES
EKG VENTRICULAR RATE: 93 BPM
EOSINOPHIL # BLD: 0.2 K/UL (ref 0–0.6)
EOSINOPHIL NFR BLD: 2.4 %
EPI CELLS #/AREA URNS HPF: ABNORMAL /HPF (ref 0–5)
ETHANOLAMINE SERPL-MCNC: NORMAL MG/DL (ref 0–0.08)
FENTANYL SCREEN, URINE: ABNORMAL
GFR SERPLBLD CREATININE-BSD FMLA CKD-EPI: >60 ML/MIN/{1.73_M2}
GLUCOSE BLD-MCNC: 158 MG/DL (ref 70–99)
GLUCOSE BLD-MCNC: 174 MG/DL (ref 70–99)
GLUCOSE BLD-MCNC: 235 MG/DL (ref 70–99)
GLUCOSE BLD-MCNC: 68 MG/DL (ref 70–99)
GLUCOSE BLD-MCNC: 99 MG/DL (ref 70–99)
GLUCOSE SERPL-MCNC: 137 MG/DL (ref 70–99)
GLUCOSE UR STRIP.AUTO-MCNC: NEGATIVE MG/DL
HCO3 BLDV-SCNC: 13.3 MMOL/L (ref 23–29)
HCT VFR BLD AUTO: 28.3 % (ref 36–48)
HCT VFR BLD AUTO: 36.7 % (ref 36–48)
HCT VFR BLD AUTO: 39.7 % (ref 36–48)
HEMOCCULT STL QL: ABNORMAL
HGB BLD-MCNC: 11.5 G/DL (ref 12–16)
HGB BLD-MCNC: 12.2 G/DL (ref 12–16)
HGB BLD-MCNC: 8.6 G/DL (ref 12–16)
HGB UR QL STRIP.AUTO: ABNORMAL
KETONES UR STRIP.AUTO-MCNC: NEGATIVE MG/DL
LACTATE BLDV-SCNC: 1.2 MMOL/L (ref 0.4–2)
LACTATE BLDV-SCNC: 1.4 MMOL/L (ref 0.4–2)
LEUKOCYTE ESTERASE UR QL STRIP.AUTO: NEGATIVE
LYMPHOCYTES # BLD: 1.7 K/UL (ref 1–5.1)
LYMPHOCYTES NFR BLD: 26.6 %
MAGNESIUM SERPL-MCNC: 2 MG/DL (ref 1.8–2.4)
MCH RBC QN AUTO: 28 PG (ref 26–34)
MCHC RBC AUTO-ENTMCNC: 30.2 G/DL (ref 31–36)
MCV RBC AUTO: 92.6 FL (ref 80–100)
METHADONE UR QL SCN>300 NG/ML: ABNORMAL
METHGB MFR BLDV: 0.3 %
MONOCYTES # BLD: 0.6 K/UL (ref 0–1.3)
MONOCYTES NFR BLD: 9.2 %
NEUTROPHILS # BLD: 4 K/UL (ref 1.7–7.7)
NEUTROPHILS NFR BLD: 61.1 %
NITRITE UR QL STRIP.AUTO: NEGATIVE
NT-PROBNP SERPL-MCNC: 2811 PG/ML (ref 0–449)
O2 CT VFR BLDV CALC: 9 VOL %
O2 THERAPY: ABNORMAL
OPIATES UR QL SCN>300 NG/ML: ABNORMAL
OXYCODONE UR QL SCN: POSITIVE
PCO2 BLDV: 29.3 MMHG (ref 40–50)
PCP UR QL SCN>25 NG/ML: ABNORMAL
PERFORMED ON: ABNORMAL
PERFORMED ON: NORMAL
PH BLDV: 7.28 [PH] (ref 7.35–7.45)
PH UR STRIP.AUTO: 6 [PH] (ref 5–8)
PH UR STRIP: 6 [PH]
PLATELET # BLD AUTO: 106 K/UL (ref 135–450)
PMV BLD AUTO: 10.7 FL (ref 5–10.5)
PO2 BLDV: 125.2 MMHG (ref 25–40)
POTASSIUM SERPL-SCNC: 5 MMOL/L (ref 3.5–5.1)
PROCALCITONIN SERPL IA-MCNC: 0.11 NG/ML (ref 0–0.15)
PROT SERPL-MCNC: 6.5 G/DL (ref 6.4–8.2)
PROT UR STRIP.AUTO-MCNC: 30 MG/DL
RBC # BLD AUTO: 3.06 M/UL (ref 4–5.2)
RBC #/AREA URNS HPF: ABNORMAL /HPF (ref 0–4)
SAO2 % BLDV: 98 %
SODIUM SERPL-SCNC: 139 MMOL/L (ref 136–145)
SP GR UR STRIP.AUTO: 1.02 (ref 1–1.03)
TROPONIN, HIGH SENSITIVITY: 15 NG/L (ref 0–14)
TSH SERPL DL<=0.005 MIU/L-ACNC: 1.66 UIU/ML (ref 0.27–4.2)
UA COMPLETE W REFLEX CULTURE PNL UR: ABNORMAL
UA DIPSTICK W REFLEX MICRO PNL UR: YES
URN SPEC COLLECT METH UR: ABNORMAL
UROBILINOGEN UR STRIP-ACNC: 0.2 E.U./DL
WBC # BLD AUTO: 6.6 K/UL (ref 4–11)
WBC #/AREA URNS HPF: ABNORMAL /HPF (ref 0–5)
YEAST URNS QL MICRO: PRESENT /HPF

## 2023-06-03 PROCEDURE — 71045 X-RAY EXAM CHEST 1 VIEW: CPT

## 2023-06-03 PROCEDURE — 99223 1ST HOSP IP/OBS HIGH 75: CPT | Performed by: INTERNAL MEDICINE

## 2023-06-03 PROCEDURE — 6360000002 HC RX W HCPCS: Performed by: NURSE PRACTITIONER

## 2023-06-03 PROCEDURE — 86850 RBC ANTIBODY SCREEN: CPT

## 2023-06-03 PROCEDURE — 36415 COLL VENOUS BLD VENIPUNCTURE: CPT

## 2023-06-03 PROCEDURE — 87040 BLOOD CULTURE FOR BACTERIA: CPT

## 2023-06-03 PROCEDURE — 51702 INSERT TEMP BLADDER CATH: CPT

## 2023-06-03 PROCEDURE — 84443 ASSAY THYROID STIM HORMONE: CPT

## 2023-06-03 PROCEDURE — 99285 EMERGENCY DEPT VISIT HI MDM: CPT

## 2023-06-03 PROCEDURE — 82077 ASSAY SPEC XCP UR&BREATH IA: CPT

## 2023-06-03 PROCEDURE — 82803 BLOOD GASES ANY COMBINATION: CPT

## 2023-06-03 PROCEDURE — 2500000003 HC RX 250 WO HCPCS: Performed by: EMERGENCY MEDICINE

## 2023-06-03 PROCEDURE — 2700000000 HC OXYGEN THERAPY PER DAY

## 2023-06-03 PROCEDURE — 93010 ELECTROCARDIOGRAM REPORT: CPT | Performed by: INTERNAL MEDICINE

## 2023-06-03 PROCEDURE — 83880 ASSAY OF NATRIURETIC PEPTIDE: CPT

## 2023-06-03 PROCEDURE — C9113 INJ PANTOPRAZOLE SODIUM, VIA: HCPCS | Performed by: NURSE PRACTITIONER

## 2023-06-03 PROCEDURE — 85018 HEMOGLOBIN: CPT

## 2023-06-03 PROCEDURE — 85025 COMPLETE CBC W/AUTO DIFF WBC: CPT

## 2023-06-03 PROCEDURE — 2580000003 HC RX 258: Performed by: NURSE PRACTITIONER

## 2023-06-03 PROCEDURE — 83735 ASSAY OF MAGNESIUM: CPT

## 2023-06-03 PROCEDURE — 84145 PROCALCITONIN (PCT): CPT

## 2023-06-03 PROCEDURE — 81001 URINALYSIS AUTO W/SCOPE: CPT

## 2023-06-03 PROCEDURE — 83605 ASSAY OF LACTIC ACID: CPT

## 2023-06-03 PROCEDURE — A4216 STERILE WATER/SALINE, 10 ML: HCPCS | Performed by: NURSE PRACTITIONER

## 2023-06-03 PROCEDURE — 2060000000 HC ICU INTERMEDIATE R&B

## 2023-06-03 PROCEDURE — 86900 BLOOD TYPING SEROLOGIC ABO: CPT

## 2023-06-03 PROCEDURE — 80053 COMPREHEN METABOLIC PANEL: CPT

## 2023-06-03 PROCEDURE — 82270 OCCULT BLOOD FECES: CPT

## 2023-06-03 PROCEDURE — 2580000003 HC RX 258: Performed by: INTERNAL MEDICINE

## 2023-06-03 PROCEDURE — 85014 HEMATOCRIT: CPT

## 2023-06-03 PROCEDURE — 86923 COMPATIBILITY TEST ELECTRIC: CPT

## 2023-06-03 PROCEDURE — 2580000003 HC RX 258: Performed by: EMERGENCY MEDICINE

## 2023-06-03 PROCEDURE — 70450 CT HEAD/BRAIN W/O DYE: CPT

## 2023-06-03 PROCEDURE — 86901 BLOOD TYPING SEROLOGIC RH(D): CPT

## 2023-06-03 PROCEDURE — 84484 ASSAY OF TROPONIN QUANT: CPT

## 2023-06-03 PROCEDURE — 2500000003 HC RX 250 WO HCPCS: Performed by: INTERNAL MEDICINE

## 2023-06-03 PROCEDURE — 82010 KETONE BODYS QUAN: CPT

## 2023-06-03 PROCEDURE — 93005 ELECTROCARDIOGRAM TRACING: CPT | Performed by: EMERGENCY MEDICINE

## 2023-06-03 PROCEDURE — 94761 N-INVAS EAR/PLS OXIMETRY MLT: CPT

## 2023-06-03 PROCEDURE — 80307 DRUG TEST PRSMV CHEM ANLYZR: CPT

## 2023-06-03 RX ORDER — DEXTROSE MONOHYDRATE 100 MG/ML
INJECTION, SOLUTION INTRAVENOUS
Status: DISCONTINUED
Start: 2023-06-03 | End: 2023-06-03

## 2023-06-03 RX ORDER — METOPROLOL TARTRATE 5 MG/5ML
2.5 INJECTION INTRAVENOUS
Status: DISCONTINUED | OUTPATIENT
Start: 2023-06-03 | End: 2023-06-03

## 2023-06-03 RX ORDER — 0.9 % SODIUM CHLORIDE 0.9 %
1000 INTRAVENOUS SOLUTION INTRAVENOUS ONCE
Status: COMPLETED | OUTPATIENT
Start: 2023-06-03 | End: 2023-06-03

## 2023-06-03 RX ORDER — 0.9 % SODIUM CHLORIDE 0.9 %
500 INTRAVENOUS SOLUTION INTRAVENOUS ONCE
Status: COMPLETED | OUTPATIENT
Start: 2023-06-03 | End: 2023-06-03

## 2023-06-03 RX ORDER — ONDANSETRON 2 MG/ML
4 INJECTION INTRAMUSCULAR; INTRAVENOUS EVERY 6 HOURS PRN
Status: DISCONTINUED | OUTPATIENT
Start: 2023-06-03 | End: 2023-06-05 | Stop reason: HOSPADM

## 2023-06-03 RX ORDER — SODIUM CHLORIDE 0.9 % (FLUSH) 0.9 %
5-40 SYRINGE (ML) INJECTION EVERY 12 HOURS SCHEDULED
Status: DISCONTINUED | OUTPATIENT
Start: 2023-06-03 | End: 2023-06-05 | Stop reason: HOSPADM

## 2023-06-03 RX ORDER — SODIUM CHLORIDE 9 MG/ML
INJECTION, SOLUTION INTRAVENOUS PRN
Status: DISCONTINUED | OUTPATIENT
Start: 2023-06-03 | End: 2023-06-05 | Stop reason: HOSPADM

## 2023-06-03 RX ORDER — METOPROLOL TARTRATE 5 MG/5ML
2.5 INJECTION INTRAVENOUS EVERY 6 HOURS
Status: DISCONTINUED | OUTPATIENT
Start: 2023-06-03 | End: 2023-06-05

## 2023-06-03 RX ORDER — ACETAMINOPHEN 325 MG/1
650 TABLET ORAL EVERY 6 HOURS PRN
Status: DISCONTINUED | OUTPATIENT
Start: 2023-06-03 | End: 2023-06-05 | Stop reason: HOSPADM

## 2023-06-03 RX ORDER — DEXTROSE, SODIUM CHLORIDE, SODIUM LACTATE, POTASSIUM CHLORIDE, AND CALCIUM CHLORIDE 5; .6; .31; .03; .02 G/100ML; G/100ML; G/100ML; G/100ML; G/100ML
INJECTION, SOLUTION INTRAVENOUS CONTINUOUS
Status: DISCONTINUED | OUTPATIENT
Start: 2023-06-03 | End: 2023-06-04

## 2023-06-03 RX ORDER — DEXTROSE MONOHYDRATE 25 G/50ML
50 INJECTION, SOLUTION INTRAVENOUS ONCE
Status: COMPLETED | OUTPATIENT
Start: 2023-06-03 | End: 2023-06-03

## 2023-06-03 RX ORDER — SODIUM CHLORIDE 0.9 % (FLUSH) 0.9 %
5-40 SYRINGE (ML) INJECTION PRN
Status: DISCONTINUED | OUTPATIENT
Start: 2023-06-03 | End: 2023-06-05 | Stop reason: HOSPADM

## 2023-06-03 RX ORDER — ACETAMINOPHEN 650 MG/1
650 SUPPOSITORY RECTAL EVERY 6 HOURS PRN
Status: DISCONTINUED | OUTPATIENT
Start: 2023-06-03 | End: 2023-06-05 | Stop reason: HOSPADM

## 2023-06-03 RX ORDER — ONDANSETRON 4 MG/1
4 TABLET, ORALLY DISINTEGRATING ORAL EVERY 8 HOURS PRN
Status: DISCONTINUED | OUTPATIENT
Start: 2023-06-03 | End: 2023-06-05 | Stop reason: HOSPADM

## 2023-06-03 RX ADMIN — DEXTROSE MONOHYDRATE 50 ML: 25 INJECTION, SOLUTION INTRAVENOUS at 07:43

## 2023-06-03 RX ADMIN — SODIUM CHLORIDE, SODIUM LACTATE, POTASSIUM CHLORIDE, CALCIUM CHLORIDE AND DEXTROSE MONOHYDRATE: 5; 600; 310; 30; 20 INJECTION, SOLUTION INTRAVENOUS at 16:56

## 2023-06-03 RX ADMIN — PANTOPRAZOLE SODIUM 40 MG: 40 INJECTION, POWDER, FOR SOLUTION INTRAVENOUS at 17:00

## 2023-06-03 RX ADMIN — SODIUM CHLORIDE 1000 ML: 9 INJECTION, SOLUTION INTRAVENOUS at 13:26

## 2023-06-03 RX ADMIN — METOPROLOL TARTRATE 2.5 MG: 5 INJECTION INTRAVENOUS at 18:50

## 2023-06-03 RX ADMIN — SODIUM CHLORIDE 1000 ML: 9 INJECTION, SOLUTION INTRAVENOUS at 08:40

## 2023-06-03 RX ADMIN — SODIUM CHLORIDE 500 ML: 9 INJECTION, SOLUTION INTRAVENOUS at 16:48

## 2023-06-03 ASSESSMENT — LIFESTYLE VARIABLES: HOW OFTEN DO YOU HAVE A DRINK CONTAINING ALCOHOL: PATIENT DECLINED

## 2023-06-04 LAB
ALBUMIN SERPL-MCNC: 3 G/DL (ref 3.4–5)
ALBUMIN/GLOB SERPL: 1 {RATIO} (ref 1.1–2.2)
ALP SERPL-CCNC: 57 U/L (ref 40–129)
ALT SERPL-CCNC: <5 U/L (ref 10–40)
ANION GAP SERPL CALCULATED.3IONS-SCNC: 9 MMOL/L (ref 3–16)
AST SERPL-CCNC: 10 U/L (ref 15–37)
BILIRUB SERPL-MCNC: 0.4 MG/DL (ref 0–1)
BUN SERPL-MCNC: 17 MG/DL (ref 7–20)
CALCIUM SERPL-MCNC: 8.7 MG/DL (ref 8.3–10.6)
CHLORIDE SERPL-SCNC: 107 MMOL/L (ref 99–110)
CO2 SERPL-SCNC: 29 MMOL/L (ref 21–32)
CREAT SERPL-MCNC: <0.5 MG/DL (ref 0.6–1.2)
GFR SERPLBLD CREATININE-BSD FMLA CKD-EPI: >60 ML/MIN/{1.73_M2}
GLUCOSE BLD-MCNC: 101 MG/DL (ref 70–99)
GLUCOSE BLD-MCNC: 144 MG/DL (ref 70–99)
GLUCOSE BLD-MCNC: 159 MG/DL (ref 70–99)
GLUCOSE BLD-MCNC: 187 MG/DL (ref 70–99)
GLUCOSE BLD-MCNC: 205 MG/DL (ref 70–99)
GLUCOSE BLD-MCNC: 250 MG/DL (ref 70–99)
GLUCOSE SERPL-MCNC: 162 MG/DL (ref 70–99)
HCT VFR BLD AUTO: 34.1 % (ref 36–48)
HCT VFR BLD AUTO: 34.7 % (ref 36–48)
HCT VFR BLD AUTO: 36.7 % (ref 36–48)
HCT VFR BLD AUTO: 38.9 % (ref 36–48)
HGB BLD-MCNC: 10.8 G/DL (ref 12–16)
HGB BLD-MCNC: 11 G/DL (ref 12–16)
HGB BLD-MCNC: 11.7 G/DL (ref 12–16)
HGB BLD-MCNC: 12.1 G/DL (ref 12–16)
IRON SATN MFR SERPL: 13 % (ref 15–50)
IRON SERPL-MCNC: 31 UG/DL (ref 37–145)
PERFORMED ON: ABNORMAL
POTASSIUM SERPL-SCNC: 4.2 MMOL/L (ref 3.5–5.1)
PROT SERPL-MCNC: 6 G/DL (ref 6.4–8.2)
SODIUM SERPL-SCNC: 145 MMOL/L (ref 136–145)
TIBC SERPL-MCNC: 246 UG/DL (ref 260–445)

## 2023-06-04 PROCEDURE — 80053 COMPREHEN METABOLIC PANEL: CPT

## 2023-06-04 PROCEDURE — 36415 COLL VENOUS BLD VENIPUNCTURE: CPT

## 2023-06-04 PROCEDURE — 94761 N-INVAS EAR/PLS OXIMETRY MLT: CPT

## 2023-06-04 PROCEDURE — 83550 IRON BINDING TEST: CPT

## 2023-06-04 PROCEDURE — 83540 ASSAY OF IRON: CPT

## 2023-06-04 PROCEDURE — 2700000000 HC OXYGEN THERAPY PER DAY

## 2023-06-04 PROCEDURE — 99233 SBSQ HOSP IP/OBS HIGH 50: CPT | Performed by: INTERNAL MEDICINE

## 2023-06-04 PROCEDURE — 85018 HEMOGLOBIN: CPT

## 2023-06-04 PROCEDURE — 85014 HEMATOCRIT: CPT

## 2023-06-04 PROCEDURE — 2580000003 HC RX 258: Performed by: INTERNAL MEDICINE

## 2023-06-04 PROCEDURE — 2500000003 HC RX 250 WO HCPCS: Performed by: INTERNAL MEDICINE

## 2023-06-04 PROCEDURE — 2580000003 HC RX 258: Performed by: NURSE PRACTITIONER

## 2023-06-04 PROCEDURE — 2060000000 HC ICU INTERMEDIATE R&B

## 2023-06-04 PROCEDURE — 6360000002 HC RX W HCPCS: Performed by: NURSE PRACTITIONER

## 2023-06-04 PROCEDURE — C9113 INJ PANTOPRAZOLE SODIUM, VIA: HCPCS | Performed by: NURSE PRACTITIONER

## 2023-06-04 PROCEDURE — A4216 STERILE WATER/SALINE, 10 ML: HCPCS | Performed by: NURSE PRACTITIONER

## 2023-06-04 RX ORDER — DILTIAZEM HYDROCHLORIDE 5 MG/ML
10 INJECTION INTRAVENOUS ONCE
Status: COMPLETED | OUTPATIENT
Start: 2023-06-04 | End: 2023-06-04

## 2023-06-04 RX ADMIN — ONDANSETRON 4 MG: 2 INJECTION INTRAMUSCULAR; INTRAVENOUS at 16:14

## 2023-06-04 RX ADMIN — METOPROLOL TARTRATE 2.5 MG: 5 INJECTION INTRAVENOUS at 10:46

## 2023-06-04 RX ADMIN — DILTIAZEM HYDROCHLORIDE 2.5 MG/HR: 5 INJECTION INTRAVENOUS at 13:30

## 2023-06-04 RX ADMIN — ONDANSETRON 4 MG: 2 INJECTION INTRAMUSCULAR; INTRAVENOUS at 21:50

## 2023-06-04 RX ADMIN — DILTIAZEM HYDROCHLORIDE 10 MG: 5 INJECTION INTRAVENOUS at 13:26

## 2023-06-04 RX ADMIN — SODIUM CHLORIDE, SODIUM LACTATE, POTASSIUM CHLORIDE, CALCIUM CHLORIDE AND DEXTROSE MONOHYDRATE: 5; 600; 310; 30; 20 INJECTION, SOLUTION INTRAVENOUS at 00:41

## 2023-06-04 RX ADMIN — METOPROLOL TARTRATE 2.5 MG: 5 INJECTION INTRAVENOUS at 18:36

## 2023-06-04 RX ADMIN — PANTOPRAZOLE SODIUM 40 MG: 40 INJECTION, POWDER, FOR SOLUTION INTRAVENOUS at 16:13

## 2023-06-04 RX ADMIN — PANTOPRAZOLE SODIUM 40 MG: 40 INJECTION, POWDER, FOR SOLUTION INTRAVENOUS at 04:02

## 2023-06-04 RX ADMIN — Medication 10 ML: at 21:50

## 2023-06-04 RX ADMIN — ONDANSETRON 4 MG: 2 INJECTION INTRAMUSCULAR; INTRAVENOUS at 03:56

## 2023-06-04 RX ADMIN — SODIUM CHLORIDE, SODIUM LACTATE, POTASSIUM CHLORIDE, CALCIUM CHLORIDE AND DEXTROSE MONOHYDRATE: 5; 600; 310; 30; 20 INJECTION, SOLUTION INTRAVENOUS at 09:51

## 2023-06-05 VITALS
BODY MASS INDEX: 35.01 KG/M2 | OXYGEN SATURATION: 97 % | HEIGHT: 63 IN | DIASTOLIC BLOOD PRESSURE: 79 MMHG | SYSTOLIC BLOOD PRESSURE: 127 MMHG | WEIGHT: 197.6 LBS | TEMPERATURE: 99.1 F | RESPIRATION RATE: 22 BRPM | HEART RATE: 97 BPM

## 2023-06-05 LAB
GLUCOSE BLD-MCNC: 109 MG/DL (ref 70–99)
GLUCOSE BLD-MCNC: 155 MG/DL (ref 70–99)
GLUCOSE BLD-MCNC: 88 MG/DL (ref 70–99)
GLUCOSE BLD-MCNC: 94 MG/DL (ref 70–99)
HCT VFR BLD AUTO: 33.8 % (ref 36–48)
HCT VFR BLD AUTO: 34.3 % (ref 36–48)
HCT VFR BLD AUTO: 34.3 % (ref 36–48)
HGB BLD-MCNC: 10.6 G/DL (ref 12–16)
HGB BLD-MCNC: 10.8 G/DL (ref 12–16)
HGB BLD-MCNC: 10.9 G/DL (ref 12–16)
PERFORMED ON: ABNORMAL
PERFORMED ON: ABNORMAL
PERFORMED ON: NORMAL
PERFORMED ON: NORMAL
SARS-COV-2 RDRP RESP QL NAA+PROBE: NOT DETECTED

## 2023-06-05 PROCEDURE — 94761 N-INVAS EAR/PLS OXIMETRY MLT: CPT

## 2023-06-05 PROCEDURE — 99239 HOSP IP/OBS DSCHRG MGMT >30: CPT | Performed by: INTERNAL MEDICINE

## 2023-06-05 PROCEDURE — 85018 HEMOGLOBIN: CPT

## 2023-06-05 PROCEDURE — 87635 SARS-COV-2 COVID-19 AMP PRB: CPT

## 2023-06-05 PROCEDURE — 2700000000 HC OXYGEN THERAPY PER DAY

## 2023-06-05 PROCEDURE — A4216 STERILE WATER/SALINE, 10 ML: HCPCS | Performed by: NURSE PRACTITIONER

## 2023-06-05 PROCEDURE — 36415 COLL VENOUS BLD VENIPUNCTURE: CPT

## 2023-06-05 PROCEDURE — 6360000002 HC RX W HCPCS: Performed by: NURSE PRACTITIONER

## 2023-06-05 PROCEDURE — 6370000000 HC RX 637 (ALT 250 FOR IP): Performed by: INTERNAL MEDICINE

## 2023-06-05 PROCEDURE — 2580000003 HC RX 258: Performed by: NURSE PRACTITIONER

## 2023-06-05 PROCEDURE — 85014 HEMATOCRIT: CPT

## 2023-06-05 PROCEDURE — C9113 INJ PANTOPRAZOLE SODIUM, VIA: HCPCS | Performed by: NURSE PRACTITIONER

## 2023-06-05 PROCEDURE — 2500000003 HC RX 250 WO HCPCS: Performed by: INTERNAL MEDICINE

## 2023-06-05 RX ORDER — DILTIAZEM HYDROCHLORIDE 180 MG/1
180 CAPSULE, COATED, EXTENDED RELEASE ORAL DAILY
Status: DISCONTINUED | OUTPATIENT
Start: 2023-06-05 | End: 2023-06-05 | Stop reason: HOSPADM

## 2023-06-05 RX ORDER — DILTIAZEM HYDROCHLORIDE 180 MG/1
180 CAPSULE, COATED, EXTENDED RELEASE ORAL DAILY
Qty: 30 CAPSULE | Refills: 3
Start: 2023-06-06

## 2023-06-05 RX ADMIN — METOPROLOL TARTRATE 2.5 MG: 5 INJECTION INTRAVENOUS at 00:05

## 2023-06-05 RX ADMIN — METOPROLOL TARTRATE 2.5 MG: 5 INJECTION INTRAVENOUS at 06:12

## 2023-06-05 RX ADMIN — DILTIAZEM HYDROCHLORIDE 180 MG: 180 CAPSULE, COATED, EXTENDED RELEASE ORAL at 10:14

## 2023-06-05 RX ADMIN — PANTOPRAZOLE SODIUM 40 MG: 40 INJECTION, POWDER, FOR SOLUTION INTRAVENOUS at 03:58

## 2023-06-05 RX ADMIN — Medication 10 ML: at 08:06

## 2023-06-05 NOTE — PLAN OF CARE
Problem: Discharge Planning  Goal: Discharge to home or other facility with appropriate resources  6/5/2023 1258 by Kei Brunner RN  Outcome: Progressing  6/5/2023 0754 by Flor Lilly RN  Outcome: Progressing     Problem: Safety - Adult  Goal: Free from fall injury  6/5/2023 1258 by Kei Brunner RN  Outcome: Progressing  6/5/2023 0754 by Flor Lilly RN  Outcome: Progressing     Problem: ABCDS Injury Assessment  Goal: Absence of physical injury  6/5/2023 0754 by Flor Lilly RN  Outcome: Progressing     Problem: Skin/Tissue Integrity  Goal: Absence of new skin breakdown  Description: 1. Monitor for areas of redness and/or skin breakdown  2. Assess vascular access sites hourly  3. Every 4-6 hours minimum:  Change oxygen saturation probe site  4. Every 4-6 hours:  If on nasal continuous positive airway pressure, respiratory therapy assess nares and determine need for appliance change or resting period. 6/5/2023 0754 by Flor Lilly RN  Outcome: Progressing     Problem: Confusion  Goal: Confusion, delirium, dementia, or psychosis is improved or at baseline  Description: INTERVENTIONS:  1. Assess for possible contributors to thought disturbance, including medications, impaired vision or hearing, underlying metabolic abnormalities, dehydration, psychiatric diagnoses, and notify attending LIP  2. [place] high risk fall precautions, as indicated  3. Provide frequent short contacts to provide reality reorientation, refocusing and direction  4. Decrease environmental stimuli, including noise as appropriate  5. Monitor and intervene to maintain adequate nutrition, hydration, elimination, sleep and activity  6. If unable to ensure safety without constant attention obtain sitter and review sitter guidelines with assigned personnel  7.  Initiate Psychosocial CNS and Spiritual Care consult, as indicated  6/5/2023 0754 by Flor Lilly RN  Outcome: Progressing     Problem: Chronic Conditions and
Patient and/or Family's stated Goal of Care this Admission: reduce shortness of breath, increase activity tolerance, better understand heart failure and disease management, be more comfortable, and reduce lower extremity edema prior to discharge.
dementia/aphasia/word salad. She was unable to convey her goals/thoughts about CHF.        Problem: Cardiovascular - Adult  Goal: Maintains optimal cardiac output and hemodynamic stability  Outcome: Progressing  Goal: Absence of cardiac dysrhythmias or at baseline  Outcome: Progressing

## 2023-06-05 NOTE — DISCHARGE SUMMARY
Mild hypotension, difficulty getting accurate BPs  Repeat lactic acid     Hypoglycemia resolved. Start diet. D5 NS, q4 hour glucose checks  Hold insulin but can resume when she is eating. Diastolic CHF, chronic  Monitor volume status. stable     Afib with RVR  TSH, Mg  Lactic acid  Work-up as above  Lopressor IV  Start PO Cardizem CD. HR stable. Resume Eliquis    CBC:   Recent Labs     06/03/23  1007 06/03/23  1648 06/04/23  1934 06/05/23  0053 06/05/23  0744   WBC 6.6  --   --   --   --    HGB 8.6*   < > 11.0* 10.9* 10.6*   HCT 28.3*   < > 34.1* 34.3* 33.8*   MCV 92.6  --   --   --   --    *  --   --   --   --     < > = values in this interval not displayed. BMP:   Recent Labs     06/03/23  1107 06/04/23  0449    145   K 5.0 4.2    107   CO2 30 29   BUN 21* 17   CREATININE <0.5* <0.5*     LIVER PROFILE:   Recent Labs     06/03/23  1107 06/04/23  0449   AST 34 10*   ALT <5* <5*   BILITOT 0.5 0.4   ALKPHOS 53 57     PT/INR: No results for input(s): PROTIME, INR in the last 72 hours. APTT: No results for input(s): APTT in the last 72 hours. UA:  Recent Labs     06/03/23  1234   COLORU Yellow   PHUR 6.0  6.0   WBCUA 6-9*   RBCUA 5-10*   YEAST Present*   BACTERIA Rare*   CLARITYU Clear   SPECGRAV 1.020   LEUKOCYTESUR Negative   UROBILINOGEN 0.2   BILIRUBINUR Negative   BLOODU MODERATE*   GLUCOSEU Negative         CT HEAD WO CONTRAST   Final Result   1. No acute intracranial abnormality. XR CHEST PORTABLE   Final Result   1. Improved aeration with residual bibasilar airspace disease.                 Discharge Medications     Medication List        START taking these medications      dilTIAZem 180 MG extended release capsule  Commonly known as: CARDIZEM CD  Take 1 capsule by mouth daily  Start taking on: June 6, 2023            Skip Vinson taking these medications      acetaminophen 650 MG extended release tablet  Commonly known as: TYLENOL     apixaban 5 MG Tabs tablet  Commonly

## 2023-06-05 NOTE — DISCHARGE INSTRUCTIONS
Follow-up with MD provider at [facility] post-discharge      Heart Failure Resources:    Heart Failure Interactive Workbook:   Go to [email or web address]/aha-heartfailure for a Free Heart Failure Interactive Workbook provided by [employer]. This interactive workbook will provide information on Healthier Living with Heart Failure. Please copy and paste link into search bar. Use your mouse to scroll through the pages. HF [place] dennys:   Heart Failure Free smart phone dennys available for iPhone and Android download. Use your phone to track sodium intake, fluid intake, symptoms, and weight. Low Sodium Diet:  Go to www. [employer]. [address] website for [employer] which is Low Sodium! [employer] is a dialysis company, but this website offers free seasonal cookbooks. Each quarter, they will release 25-30 new recipes with a breakdown of calories, sodium, and glucose. Recipes:   Go to www.[employer]/recipes website for free recipes. Home Exercise Program:     Identification of Green/yellow/Red zones: You should be able to identify when you feel good (green zone), if you have 1-2 symptoms of HF (yellow zone), or if you are in need of medical attention (red zone). In your CHF education folder you were provided a stop light tool to outline this information. We want to you to rate your exertion levels: Our therapy team has discussed means of identification with you such as the \"Patsy scale. \"  The Patsy rating scale ranges from 6 to 20, where 6 means \"no exertion at all\" and 20 means \"maximal exertion. \" The goal is to use this to gauge how much effort it is taking for you to do your normal daily tasks. You should be able to recognize when too much exertion is being expended.     Elements of Energy Conservation:   Prioritize/Plan: Decide what needs to be done today, and what can wait for a later date, write to do lists, Plan ahead to avoid extra trips, Gather supplies and equipment needed before starting an

## 2023-06-05 NOTE — CARE COORDINATION
Case Management Assessment  Initial Evaluation    Date/Time of Evaluation: 6/5/2023 12:21 PM  Assessment Completed by: Felicity Stein RN    If patient is discharged prior to next notation, then this note serves as note for discharge by case management. Patient Name: Hitesh Leon                   YOB: 1950  Diagnosis: Anemia [D64.9]  Hypoglycemia [E16.2]  Anemia, unspecified type [D64.9]                   Date / Time: 6/3/2023  6:53 AM    Patient Admission Status: Inpatient   Readmission Risk (Low < 19, Mod (19-27), High > 27): Readmission Risk Score: 28.7    Current PCP: William Smith MD  PCP verified by CM? Yes (thomas)    Chart Reviewed: Yes      History Provided by: Child/Family  Patient Orientation: Self    Patient Cognition: Dementia / Early Alzheimer's    Hospitalization in the last 30 days (Readmission):  No    If yes, Readmission Assessment in  Navigator will be completed. Advance Directives:      Code Status: DNR-CCA   Patient's Primary Decision Maker is: Legal Next of Kin    Primary Decision MakerTara Mahmood - Brother/Sister - [phone]    Discharge Planning:    Patient lives with: Other (Comment) ([facility]) Type of Home: Long-Term Care  Primary Care Giver: Other (Comment) ([facility])  Patient Support Systems include: Children   Current Financial resources: Medicare  Current community resources: ECF/Home Care  Current services prior to admission: Other (Comment) ([facility])            Current DME:              Type of Home Care services:  None    ADLS  Prior functional level: Assistance with the following:, Bathing, Dressing, Toileting, Cooking, Housework, Shopping  Current functional level: Assistance with the following:, Bathing, Dressing, Toileting, Cooking, Housework, Shopping    PT AM-PAC:   /24  OT AM-PAC:   /24    Family can provide assistance at DC:  Other (comment) ([facility])  Would you like Case Management to discuss the discharge plan with any other family

## 2023-06-05 NOTE — DISCHARGE INSTR - DIET

## 2023-06-05 NOTE — PROGRESS NOTES
Pt sitting up in bed this morning, alert, VSS. Shift assessment complete and all meds given per MAR. PO Cardizem given, will turn gtt off one hour later. Beverage provided. Bed in lowest position, call light in hand. Will continue to monitor.

## 2023-06-05 NOTE — PROGRESS NOTES
Progress Note    Admit Date:  6/3/2023    Subjective:  Ms. Marge Albright was admitted for hypoglycemia. This has resolved. H and H is stable. No bleeding seen. HR up off and on. HR better this am.    Objective:   /71   Pulse (!) 115   Temp 99.1 °F (37.3 °C) (Oral)   Resp 22   Ht 5' 3\" (1.6 m)   Wt 197 lb 9.6 oz (89.6 kg)   SpO2 97%   BMI 35.00 kg/m²        Intake/Output Summary (Last 24 hours) at 6/5/2023 1002  Last data filed at 6/5/2023 0848  Gross per 24 hour   Intake 960 ml   Output --   Net 960 ml         Physical Exam:    General appearance: alert, appears stated age and cooperative  Head: Normocephalic, without obvious abnormality, atraumatic  Eyes: conjunctivae/corneas clear. PERRL, EOM's intact.   Neck: no adenopathy, no carotid bruit, no JVD, supple, symmetrical, trachea midline and thyroid not enlarged, symmetric, no tenderness/mass/nodules  Lungs: clear to auscultation bilaterally  Heart: irregular rate and rhythm, S1, S2 normal, no murmur, click, rub or gallop  Abdomen: soft, non-tender; bowel sounds normal; no masses,  no organomegaly  Extremities: extremities normal, atraumatic, no cyanosis + edema  Pulses: 2+ and symmetric  Skin: Skin color, texture, turgor normal. No rashes or lesions  Neurologic: Grossly normal. She has contractures    Scheduled Meds:   dilTIAZem  180 mg Oral Daily    sodium chloride flush  5-40 mL IntraVENous 2 times per day    pantoprazole (PROTONIX) 40 mg injection  40 mg IntraVENous Q12H       Continuous Infusions:   sodium chloride      sodium chloride         PRN Meds:  sodium chloride flush, sodium chloride, ondansetron **OR** ondansetron, acetaminophen **OR** acetaminophen, sodium chloride      Data:  CBC:   Recent Labs     06/03/23  1007 06/03/23  1648 06/04/23  1934 06/05/23  0053 06/05/23  0744   WBC 6.6  --   --   --   --    HGB 8.6*   < > 11.0* 10.9* 10.6*   HCT 28.3*   < > 34.1* 34.3* 33.8*   MCV 92.6  --   --   --   --    *  --   --   --   --     < >

## 2023-06-05 NOTE — ACP (ADVANCE CARE PLANNING)
Advance Care Planning     General Advance Care Planning (ACP) Conversation    Date of Conversation: 6/3/2023  Conducted with: Patient with Decision Making Capacity   Healthcare Decision Maker: Next of Kin by law (only applies in absence of above) (name) Maggy Ramsey Decision Maker:    Primary Decision Maker: Julienne Pollock - Brother/Sister - [phone]  Click here to complete Healthcare Decision Makers including selection of the Healthcare Decision Maker Relationship (ie \"Primary\"). Today we documented Decision Maker(s) consistent with Legal Next of Kin hierarchy.     Content/Action Overview:  DECLINED ACP Conversation - will revisit periodically  Reviewed DNR/DNI and patient confirms current DNR status - completed forms on file (place new order if needed) via [place] of Voluntary ACP Conversation in minutes:  <16 minutes (Non-Billable)    Eden Vail RN

## 2023-06-05 NOTE — DISCHARGE INSTR - COC
Continuity of Care Form    Patient Name: Olayinka Cedeño   :  1950  MRN:  [id number]    Admit date:  6/3/2023  Discharge date:  23    Code Status Order: DNR-CCA   Advance Directives:     Admitting Physician:  Raina Bravo DO  PCP: Chico Ball MD    Discharging Nurse: [facility] Unit/Room#: /1538-24  Discharging Unit Phone Number: [phone]    Emergency Contact:   Extended Emergency Contact Information  Primary Emergency Contact: Derek Gordon Phone: [phone]  Mobile Phone: [phone]  Relation: Brother/Sister  Secondary Emergency Contact: [facility] Lisa [place]  Mobile Phone: [phone]  Relation: Niece/Nephew   needed?  No    Past Surgical History:  Past Surgical History:   Procedure Laterality Date    IR MIDLINE CATH  10/10/2022    IR MIDLINE CATH 10/10/2022 [facility] SPECIAL PROCEDURES    IR MIDLINE CATH  2023    IR MIDLINE CATH 2023 [facility] PROCEDURES    KNEE SURGERY      TONSILLECTOMY      TUBAL LIGATION         Immunization History:   Immunization History   Administered Date(s) Administered    Influenza Virus Vaccine 2014, 10/20/2019    Influenza, FLUARIX, FLULAVAL, FLUZONE (age 10 mo+) AND AFLURIA, (age 1 y+), PF, 0.5mL 2016    Pneumococcal, PCV-13, PREVNAR 15, (age 6w+), IM, 0.5mL 2016       Active Problems:  Patient Active Problem List   Diagnosis Code    Seizure disorder, grand mal ([address] G40.409    DM (diabetes mellitus) ([address] E11.9    Lumbar facet arthropathy M47.816    Disc degeneration, lumbar M51.36    Class 1 obesity in adult E66.9    Atrial fibrillation with RVR ([address] I48.91    Essential hypertension I10    Chronic obstructive pulmonary disease ([address] J44.9    Acute hypoxemic respiratory failure (HCC) J96.01    Persistent atrial fibrillation (HCC) I48.19    Chronic pain of both knees M25.561, M25.562, G89.29    Bilateral primary osteoarthritis of knee M17.0    Encephalopathy G93.40    Sepsis [facility]TI [facility]

## 2023-06-05 NOTE — PROGRESS NOTES
Patient educated on discharge instructions as well as new medications use, dosage, administration and possible side effects. Patient verified knowledge. IV removed without difficulty and dry dressing in place. Telemetry monitor removed and returned to [facility]. Pt left facility in stable condition to home with all of their personal belongings.

## 2023-06-05 NOTE — PROGRESS NOTES
Pt laying in bed with eyes closed. She c/o pain everywhere, however it is hard to follow some of what she is saying due to word salad. Assessment complete-see flowsheet. Medications given-see MAR. Pt repositioned and cleaned up. Further needs denied, call light within reach. Will continue to monitor.

## 2023-06-06 NOTE — PROGRESS NOTES
Physician Progress Note      PATIENT:               West Blackman  CSN #:                  [id number]  :                       1950  ADMIT DATE:       6/3/2023 6:53 AM  DISCH DATE:        2023 1:55 PM  RESPONDING  PROVIDER #:        Nisa Tipton MD          QUERY TEXT:    Pt admitted with hypoglycemia and has encephalopathy documented. If possible,   please document in progress notes and discharge summary further specificity   regarding the type of encephalopathy:    The medical record reflects the following:  Risk Factors: advanced age, hypoglycemia, chf  Clinical Indicators: confused on arrival to the ed, drowsy, female presents   with altered mental status, likely secondary to hypoglycemia, UDS +oxycodone  Treatment: UDS, CT head, D5 NS, q4 hour glucose checks, Hold insulin    Thank you,  Fiona Jung RN,BSN,CCDS,CRCR  Options provided:  -- Metabolic encephalopathy due to hypoglycemia in the setting of dementia but   with worsening mental status from baseline  -- Toxic encephalopathy due to oxycodone  -- Encephalopathy ruled out, patient with dementia with behavior disturbance  -- Other - I will add my own diagnosis  -- Disagree - Not applicable / Not valid  -- Disagree - Clinically unable to determine / Unknown  -- Refer to Clinical Documentation Reviewer    PROVIDER RESPONSE TEXT:    This patient has metabolic encephalopathy due to hypoglycemia in the setting   of dementia but with worsening mental status from baseline.     Query created by: Tamela Salguero on 2023 1:17 PM      Electronically signed by:  Nisa Tipton MD 2023 10:27 AM

## 2023-06-07 LAB
BACTERIA BLD CULT ORG #2: NORMAL
BACTERIA BLD CULT: NORMAL
BLOOD BANK DISPENSE STATUS: NORMAL
BLOOD BANK PRODUCT CODE: NORMAL
BPU ID: NORMAL
DESCRIPTION BLOOD BANK: NORMAL

## 2023-07-06 ENCOUNTER — APPOINTMENT (OUTPATIENT)
Dept: GENERAL RADIOLOGY | Age: 73
DRG: 177 | End: 2023-07-06
Payer: COMMERCIAL

## 2023-07-06 ENCOUNTER — HOSPITAL ENCOUNTER (INPATIENT)
Age: 73
LOS: 6 days | Discharge: HOME | DRG: 177 | End: 2023-07-13
Attending: INTERNAL MEDICINE | Admitting: PEDIATRICS
Payer: COMMERCIAL

## 2023-07-06 ENCOUNTER — APPOINTMENT (OUTPATIENT)
Dept: CT IMAGING | Age: 73
DRG: 177 | End: 2023-07-06
Payer: COMMERCIAL

## 2023-07-06 DIAGNOSIS — J96.01 ACUTE RESPIRATORY FAILURE WITH HYPOXIA (HCC): ICD-10-CM

## 2023-07-06 DIAGNOSIS — R77.8 ELEVATED TROPONIN: ICD-10-CM

## 2023-07-06 DIAGNOSIS — R07.9 CHEST PAIN, UNSPECIFIED TYPE: Primary | ICD-10-CM

## 2023-07-06 DIAGNOSIS — R79.89 ELEVATED BRAIN NATRIURETIC PEPTIDE (BNP) LEVEL: ICD-10-CM

## 2023-07-06 DIAGNOSIS — M51.36 DISC DEGENERATION, LUMBAR: ICD-10-CM

## 2023-07-06 LAB
ALBUMIN SERPL-MCNC: 3.6 G/DL (ref 3.4–5)
ALBUMIN/GLOB SERPL: 1.1 {RATIO} (ref 1.1–2.2)
ALP SERPL-CCNC: 69 U/L (ref 40–129)
ALT SERPL-CCNC: <5 U/L (ref 10–40)
ANION GAP SERPL CALCULATED.3IONS-SCNC: 8 MMOL/L (ref 3–16)
AST SERPL-CCNC: 12 U/L (ref 15–37)
BASE EXCESS BLDV CALC-SCNC: 0.3 MMOL/L (ref -3–3)
BASOPHILS # BLD: 0 K/UL (ref 0–0.2)
BASOPHILS NFR BLD: 0.4 %
BILIRUB SERPL-MCNC: 0.3 MG/DL (ref 0–1)
BUN SERPL-MCNC: 18 MG/DL (ref 7–20)
CALCIUM SERPL-MCNC: 9.5 MG/DL (ref 8.3–10.6)
CHLORIDE SERPL-SCNC: 97 MMOL/L (ref 99–110)
CO2 BLDV-SCNC: 30 MMOL/L
CO2 SERPL-SCNC: 35 MMOL/L (ref 21–32)
COHGB MFR BLDV: 3.8 % (ref 0–1.5)
CREAT SERPL-MCNC: 0.6 MG/DL (ref 0.6–1.2)
D DIMER: 0.34 UG/ML FEU (ref 0–0.6)
DEPRECATED RDW RBC AUTO: 15.5 % (ref 12.4–15.4)
EOSINOPHIL # BLD: 0.2 K/UL (ref 0–0.6)
EOSINOPHIL NFR BLD: 2.2 %
GFR SERPLBLD CREATININE-BSD FMLA CKD-EPI: >60 ML/MIN/{1.73_M2}
GLUCOSE SERPL-MCNC: 210 MG/DL (ref 70–99)
HCO3 BLDV-SCNC: 27.9 MMOL/L (ref 23–29)
HCT VFR BLD AUTO: 41.8 % (ref 36–48)
HGB BLD-MCNC: 13.1 G/DL (ref 12–16)
LACTATE BLDV-SCNC: 0.9 MMOL/L (ref 0.4–1.9)
LACTATE BLDV-SCNC: 1.6 MMOL/L (ref 0.4–1.9)
LIPASE SERPL-CCNC: 13 U/L (ref 13–60)
LYMPHOCYTES # BLD: 1.7 K/UL (ref 1–5.1)
LYMPHOCYTES NFR BLD: 20.6 %
MCH RBC QN AUTO: 27.9 PG (ref 26–34)
MCHC RBC AUTO-ENTMCNC: 31.4 G/DL (ref 31–36)
MCV RBC AUTO: 88.9 FL (ref 80–100)
METHGB MFR BLDV: 0.3 %
MONOCYTES # BLD: 0.7 K/UL (ref 0–1.3)
MONOCYTES NFR BLD: 9 %
NEUTROPHILS # BLD: 5.5 K/UL (ref 1.7–7.7)
NEUTROPHILS NFR BLD: 67.8 %
NT-PROBNP SERPL-MCNC: 1137 PG/ML (ref 0–449)
O2 CT VFR BLDV CALC: 15 VOL %
O2 THERAPY: ABNORMAL
PCO2 BLDV: 58.5 MMHG (ref 40–50)
PH BLDV: 7.3 [PH] (ref 7.35–7.45)
PLATELET # BLD AUTO: 146 K/UL (ref 135–450)
PMV BLD AUTO: 10.4 FL (ref 5–10.5)
PO2 BLDV: 52.6 MMHG (ref 25–40)
POTASSIUM SERPL-SCNC: 4 MMOL/L (ref 3.5–5.1)
PROT SERPL-MCNC: 7 G/DL (ref 6.4–8.2)
RBC # BLD AUTO: 4.71 M/UL (ref 4–5.2)
SAO2 % BLDV: 83 %
SODIUM SERPL-SCNC: 140 MMOL/L (ref 136–145)
TROPONIN, HIGH SENSITIVITY: 19 NG/L (ref 0–14)
TROPONIN, HIGH SENSITIVITY: 19 NG/L (ref 0–14)
WBC # BLD AUTO: 8.2 K/UL (ref 4–11)

## 2023-07-06 PROCEDURE — 80053 COMPREHEN METABOLIC PANEL: CPT

## 2023-07-06 PROCEDURE — 85025 COMPLETE CBC W/AUTO DIFF WBC: CPT

## 2023-07-06 PROCEDURE — 82803 BLOOD GASES ANY COMBINATION: CPT

## 2023-07-06 PROCEDURE — 71046 X-RAY EXAM CHEST 2 VIEWS: CPT

## 2023-07-06 PROCEDURE — 96374 THER/PROPH/DIAG INJ IV PUSH: CPT

## 2023-07-06 PROCEDURE — 83690 ASSAY OF LIPASE: CPT

## 2023-07-06 PROCEDURE — 96375 TX/PRO/DX INJ NEW DRUG ADDON: CPT

## 2023-07-06 PROCEDURE — 74177 CT ABD & PELVIS W/CONTRAST: CPT

## 2023-07-06 PROCEDURE — 6360000004 HC RX CONTRAST MEDICATION: Performed by: REGISTERED NURSE

## 2023-07-06 PROCEDURE — 85379 FIBRIN DEGRADATION QUANT: CPT

## 2023-07-06 PROCEDURE — 6360000002 HC RX W HCPCS: Performed by: REGISTERED NURSE

## 2023-07-06 PROCEDURE — 93005 ELECTROCARDIOGRAM TRACING: CPT | Performed by: REGISTERED NURSE

## 2023-07-06 PROCEDURE — 84484 ASSAY OF TROPONIN QUANT: CPT

## 2023-07-06 PROCEDURE — 36415 COLL VENOUS BLD VENIPUNCTURE: CPT

## 2023-07-06 PROCEDURE — 2580000003 HC RX 258: Performed by: REGISTERED NURSE

## 2023-07-06 PROCEDURE — 99285 EMERGENCY DEPT VISIT HI MDM: CPT

## 2023-07-06 PROCEDURE — 83880 ASSAY OF NATRIURETIC PEPTIDE: CPT

## 2023-07-06 PROCEDURE — 83605 ASSAY OF LACTIC ACID: CPT

## 2023-07-06 RX ORDER — ARIPIPRAZOLE 5 MG/1
5 TABLET ORAL NIGHTLY
COMMUNITY

## 2023-07-06 RX ORDER — 0.9 % SODIUM CHLORIDE 0.9 %
500 INTRAVENOUS SOLUTION INTRAVENOUS ONCE
Status: COMPLETED | OUTPATIENT
Start: 2023-07-06 | End: 2023-07-06

## 2023-07-06 RX ORDER — OXYCODONE AND ACETAMINOPHEN 7.5; 325 MG/1; MG/1
1 TABLET ORAL EVERY 6 HOURS PRN
Status: ON HOLD | COMMUNITY
End: 2023-07-13 | Stop reason: SDUPTHER

## 2023-07-06 RX ORDER — MORPHINE SULFATE 4 MG/ML
4 INJECTION, SOLUTION INTRAMUSCULAR; INTRAVENOUS
Status: COMPLETED | OUTPATIENT
Start: 2023-07-06 | End: 2023-07-06

## 2023-07-06 RX ORDER — LEVOFLOXACIN 500 MG/1
500 TABLET, FILM COATED ORAL DAILY
Status: ON HOLD | COMMUNITY
Start: 2023-07-04 | End: 2023-07-13 | Stop reason: HOSPADM

## 2023-07-06 RX ORDER — SENNA PLUS 8.6 MG/1
2 TABLET ORAL 2 TIMES DAILY
Status: ON HOLD | COMMUNITY
End: 2023-07-13 | Stop reason: HOSPADM

## 2023-07-06 RX ORDER — ONDANSETRON 2 MG/ML
4 INJECTION INTRAMUSCULAR; INTRAVENOUS ONCE
Status: COMPLETED | OUTPATIENT
Start: 2023-07-06 | End: 2023-07-06

## 2023-07-06 RX ORDER — PANTOPRAZOLE SODIUM 40 MG/1
40 TABLET, DELAYED RELEASE ORAL DAILY
COMMUNITY
Start: 2023-06-05

## 2023-07-06 RX ADMIN — ONDANSETRON 4 MG: 2 INJECTION INTRAMUSCULAR; INTRAVENOUS at 19:22

## 2023-07-06 RX ADMIN — SODIUM CHLORIDE 500 ML: 9 INJECTION, SOLUTION INTRAVENOUS at 19:24

## 2023-07-06 RX ADMIN — IOPAMIDOL 75 ML: 755 INJECTION, SOLUTION INTRAVENOUS at 20:58

## 2023-07-06 RX ADMIN — MORPHINE SULFATE 4 MG: 4 INJECTION, SOLUTION INTRAMUSCULAR; INTRAVENOUS at 19:23

## 2023-07-06 ASSESSMENT — PAIN - FUNCTIONAL ASSESSMENT: PAIN_FUNCTIONAL_ASSESSMENT: 0-10

## 2023-07-06 ASSESSMENT — ENCOUNTER SYMPTOMS
SORE THROAT: 0
NAUSEA: 1
PHOTOPHOBIA: 0
ABDOMINAL PAIN: 1
SHORTNESS OF BREATH: 0
COUGH: 0
DIARRHEA: 0
BACK PAIN: 0
VOMITING: 0
WHEEZING: 0
CHEST TIGHTNESS: 1
CONSTIPATION: 0
STRIDOR: 0
RHINORRHEA: 0
EYE PAIN: 0

## 2023-07-06 ASSESSMENT — PAIN SCALES - GENERAL
PAINLEVEL_OUTOF10: 2
PAINLEVEL_OUTOF10: 4
PAINLEVEL_OUTOF10: 5

## 2023-07-06 ASSESSMENT — PAIN DESCRIPTION - LOCATION
LOCATION: ABDOMEN;CHEST
LOCATION: ABDOMEN

## 2023-07-07 PROBLEM — J96.90 RESPIRATORY FAILURE (HCC): Status: ACTIVE | Noted: 2023-07-07

## 2023-07-07 PROBLEM — R07.9 CHEST PAIN: Status: ACTIVE | Noted: 2023-07-07

## 2023-07-07 PROBLEM — G93.40 ACUTE ENCEPHALOPATHY: Status: ACTIVE | Noted: 2023-07-07

## 2023-07-07 LAB
AMPHETAMINES UR QL SCN>1000 NG/ML: ABNORMAL
BACTERIA URNS QL MICRO: ABNORMAL /HPF
BARBITURATES UR QL SCN>200 NG/ML: ABNORMAL
BASE EXCESS BLDA CALC-SCNC: 3.8 MMOL/L (ref -3–3)
BASE EXCESS BLDA CALC-SCNC: 7 MMOL/L (ref -3–3)
BASE EXCESS BLDV CALC-SCNC: 1.3 MMOL/L (ref -3–3)
BENZODIAZ UR QL SCN>200 NG/ML: ABNORMAL
BILIRUB UR QL STRIP.AUTO: NEGATIVE
CANNABINOIDS UR QL SCN>50 NG/ML: ABNORMAL
CLARITY UR: ABNORMAL
CO2 BLDA-SCNC: 36.3 MMOL/L
CO2 BLDV-SCNC: 32 MMOL/L
COCAINE UR QL SCN: ABNORMAL
COHGB MFR BLDA: 1.1 % (ref 0–1.5)
COHGB MFR BLDV: 2.9 % (ref 0–1.5)
COLOR UR: YELLOW
DRUG SCREEN COMMENT UR-IMP: ABNORMAL
EKG ATRIAL RATE: 56 BPM
EKG DIAGNOSIS: NORMAL
EKG Q-T INTERVAL: 418 MS
EKG QRS DURATION: 90 MS
EKG QTC CALCULATION (BAZETT): 447 MS
EKG R AXIS: 72 DEGREES
EKG T AXIS: -69 DEGREES
EKG VENTRICULAR RATE: 69 BPM
EPI CELLS #/AREA URNS HPF: ABNORMAL /HPF (ref 0–5)
FENTANYL SCREEN, URINE: ABNORMAL
GLUCOSE BLD-MCNC: 110 MG/DL (ref 70–99)
GLUCOSE BLD-MCNC: 113 MG/DL (ref 70–99)
GLUCOSE BLD-MCNC: 119 MG/DL (ref 70–99)
GLUCOSE BLD-MCNC: 156 MG/DL (ref 70–99)
GLUCOSE BLD-MCNC: 163 MG/DL (ref 70–99)
GLUCOSE BLD-MCNC: 86 MG/DL (ref 70–99)
GLUCOSE UR STRIP.AUTO-MCNC: NEGATIVE MG/DL
HCO3 BLDA-SCNC: 32.7 MMOL/L (ref 21–29)
HCO3 BLDA-SCNC: 33.8 MMOL/L (ref 21–29)
HCO3 BLDV-SCNC: 30 MMOL/L (ref 23–29)
HCT VFR BLD AUTO: 38.1 % (ref 36–48)
HEMOCCULT STL QL: NORMAL
HGB BLD-MCNC: 12.1 G/DL (ref 12–16)
HGB BLDA-MCNC: 14.1 G/DL (ref 12–16)
HGB UR QL STRIP.AUTO: ABNORMAL
KETONES UR STRIP.AUTO-MCNC: NEGATIVE MG/DL
LEUKOCYTE ESTERASE UR QL STRIP.AUTO: ABNORMAL
METHADONE UR QL SCN>300 NG/ML: ABNORMAL
METHGB MFR BLDA: 0.3 %
METHGB MFR BLDV: 0.3 %
NITRITE UR QL STRIP.AUTO: NEGATIVE
O2 CT VFR BLDV CALC: 16 VOL %
O2 THERAPY: ABNORMAL
O2 THERAPY: ABNORMAL
OPIATES UR QL SCN>300 NG/ML: POSITIVE
OXYCODONE UR QL SCN: POSITIVE
PCO2 BLDA: 58.4 MM HG (ref 35–45)
PCO2 BLDA: 79.5 MMHG (ref 35–45)
PCO2 BLDV: 67.2 MMHG (ref 40–50)
PCP UR QL SCN>25 NG/ML: ABNORMAL
PERFORMED ON: ABNORMAL
PERFORMED ON: NORMAL
PH BLDA: 7.25 [PH] (ref 7.35–7.45)
PH BLDA: 7.36 [PH] (ref 7.35–7.45)
PH BLDV: 7.27 [PH] (ref 7.35–7.45)
PH UR STRIP.AUTO: 5 [PH] (ref 5–8)
PH UR STRIP: 6 [PH]
PO2 BLDA: 73.2 MMHG (ref 75–108)
PO2 BLDA: 98.4 MM HG (ref 75–108)
PO2 BLDV: 55.3 MMHG (ref 25–40)
POC SAMPLE TYPE: ABNORMAL
PROCALCITONIN SERPL IA-MCNC: 0.03 NG/ML (ref 0–0.15)
PROT UR STRIP.AUTO-MCNC: NEGATIVE MG/DL
RBC #/AREA URNS HPF: ABNORMAL /HPF (ref 0–4)
SAO2 % BLDA: 91.5 %
SAO2 % BLDA: 97 % (ref 93–100)
SAO2 % BLDV: 84 %
SP GR UR STRIP.AUTO: 1.01 (ref 1–1.03)
TROPONIN, HIGH SENSITIVITY: 17 NG/L (ref 0–14)
TSH SERPL DL<=0.005 MIU/L-ACNC: 2.04 UIU/ML (ref 0.27–4.2)
UA COMPLETE W REFLEX CULTURE PNL UR: YES
UA DIPSTICK W REFLEX MICRO PNL UR: YES
URN SPEC COLLECT METH UR: ABNORMAL
UROBILINOGEN UR STRIP-ACNC: 0.2 E.U./DL
VANCOMYCIN TROUGH SERPL-MCNC: 15.6 UG/ML (ref 10–20)
WBC #/AREA URNS HPF: ABNORMAL /HPF (ref 0–5)

## 2023-07-07 PROCEDURE — 84443 ASSAY THYROID STIM HORMONE: CPT

## 2023-07-07 PROCEDURE — 85014 HEMATOCRIT: CPT

## 2023-07-07 PROCEDURE — 80202 ASSAY OF VANCOMYCIN: CPT

## 2023-07-07 PROCEDURE — C9113 INJ PANTOPRAZOLE SODIUM, VIA: HCPCS | Performed by: PEDIATRICS

## 2023-07-07 PROCEDURE — 6360000002 HC RX W HCPCS: Performed by: PEDIATRICS

## 2023-07-07 PROCEDURE — 81001 URINALYSIS AUTO W/SCOPE: CPT

## 2023-07-07 PROCEDURE — 5A09357 ASSISTANCE WITH RESPIRATORY VENTILATION, LESS THAN 24 CONSECUTIVE HOURS, CONTINUOUS POSITIVE AIRWAY PRESSURE: ICD-10-PCS | Performed by: INTERNAL MEDICINE

## 2023-07-07 PROCEDURE — 36415 COLL VENOUS BLD VENIPUNCTURE: CPT

## 2023-07-07 PROCEDURE — 82270 OCCULT BLOOD FECES: CPT

## 2023-07-07 PROCEDURE — 6370000000 HC RX 637 (ALT 250 FOR IP): Performed by: INTERNAL MEDICINE

## 2023-07-07 PROCEDURE — 94761 N-INVAS EAR/PLS OXIMETRY MLT: CPT

## 2023-07-07 PROCEDURE — 99222 1ST HOSP IP/OBS MODERATE 55: CPT | Performed by: INTERNAL MEDICINE

## 2023-07-07 PROCEDURE — 99223 1ST HOSP IP/OBS HIGH 75: CPT | Performed by: INTERNAL MEDICINE

## 2023-07-07 PROCEDURE — 85018 HEMOGLOBIN: CPT

## 2023-07-07 PROCEDURE — 2000000000 HC ICU R&B

## 2023-07-07 PROCEDURE — 2580000003 HC RX 258: Performed by: INTERNAL MEDICINE

## 2023-07-07 PROCEDURE — 6360000002 HC RX W HCPCS: Performed by: INTERNAL MEDICINE

## 2023-07-07 PROCEDURE — 94640 AIRWAY INHALATION TREATMENT: CPT

## 2023-07-07 PROCEDURE — 93010 ELECTROCARDIOGRAM REPORT: CPT | Performed by: INTERNAL MEDICINE

## 2023-07-07 PROCEDURE — 6370000000 HC RX 637 (ALT 250 FOR IP): Performed by: PEDIATRICS

## 2023-07-07 PROCEDURE — 82803 BLOOD GASES ANY COMBINATION: CPT

## 2023-07-07 PROCEDURE — 80307 DRUG TEST PRSMV CHEM ANLYZR: CPT

## 2023-07-07 PROCEDURE — 2580000003 HC RX 258: Performed by: PEDIATRICS

## 2023-07-07 PROCEDURE — 87449 NOS EACH ORGANISM AG IA: CPT

## 2023-07-07 PROCEDURE — 2700000000 HC OXYGEN THERAPY PER DAY

## 2023-07-07 PROCEDURE — 84484 ASSAY OF TROPONIN QUANT: CPT

## 2023-07-07 PROCEDURE — 87086 URINE CULTURE/COLONY COUNT: CPT

## 2023-07-07 PROCEDURE — 84145 PROCALCITONIN (PCT): CPT

## 2023-07-07 PROCEDURE — 51702 INSERT TEMP BLADDER CATH: CPT

## 2023-07-07 PROCEDURE — 94660 CPAP INITIATION&MGMT: CPT

## 2023-07-07 PROCEDURE — 36600 WITHDRAWAL OF ARTERIAL BLOOD: CPT

## 2023-07-07 RX ORDER — INSULIN LISPRO 100 [IU]/ML
0-4 INJECTION, SOLUTION INTRAVENOUS; SUBCUTANEOUS
Status: DISCONTINUED | OUTPATIENT
Start: 2023-07-07 | End: 2023-07-13 | Stop reason: HOSPADM

## 2023-07-07 RX ORDER — POTASSIUM CHLORIDE 750 MG/1
10 TABLET, EXTENDED RELEASE ORAL
Status: DISCONTINUED | OUTPATIENT
Start: 2023-07-07 | End: 2023-07-13

## 2023-07-07 RX ORDER — INSULIN LISPRO 100 [IU]/ML
0-4 INJECTION, SOLUTION INTRAVENOUS; SUBCUTANEOUS NIGHTLY
Status: DISCONTINUED | OUTPATIENT
Start: 2023-07-07 | End: 2023-07-13 | Stop reason: HOSPADM

## 2023-07-07 RX ORDER — POLYETHYLENE GLYCOL 3350 17 G/17G
17 POWDER, FOR SOLUTION ORAL DAILY PRN
Status: DISCONTINUED | OUTPATIENT
Start: 2023-07-07 | End: 2023-07-13 | Stop reason: HOSPADM

## 2023-07-07 RX ORDER — ONDANSETRON 2 MG/ML
4 INJECTION INTRAMUSCULAR; INTRAVENOUS EVERY 6 HOURS PRN
Status: DISCONTINUED | OUTPATIENT
Start: 2023-07-07 | End: 2023-07-13 | Stop reason: HOSPADM

## 2023-07-07 RX ORDER — SENNA PLUS 8.6 MG/1
2 TABLET ORAL 2 TIMES DAILY
Status: DISCONTINUED | OUTPATIENT
Start: 2023-07-07 | End: 2023-07-07

## 2023-07-07 RX ORDER — FUROSEMIDE 20 MG/1
20 TABLET ORAL DAILY
Status: DISCONTINUED | OUTPATIENT
Start: 2023-07-07 | End: 2023-07-07

## 2023-07-07 RX ORDER — ACETAMINOPHEN 650 MG/1
650 SUPPOSITORY RECTAL EVERY 6 HOURS PRN
Status: DISCONTINUED | OUTPATIENT
Start: 2023-07-07 | End: 2023-07-13 | Stop reason: HOSPADM

## 2023-07-07 RX ORDER — BISACODYL 10 MG
10 SUPPOSITORY, RECTAL RECTAL DAILY PRN
Status: DISCONTINUED | OUTPATIENT
Start: 2023-07-07 | End: 2023-07-13 | Stop reason: HOSPADM

## 2023-07-07 RX ORDER — INSULIN GLARGINE 100 [IU]/ML
10 INJECTION, SOLUTION SUBCUTANEOUS NIGHTLY
Status: DISCONTINUED | OUTPATIENT
Start: 2023-07-07 | End: 2023-07-07

## 2023-07-07 RX ORDER — ENOXAPARIN SODIUM 100 MG/ML
1 INJECTION SUBCUTANEOUS 2 TIMES DAILY
Status: DISCONTINUED | OUTPATIENT
Start: 2023-07-07 | End: 2023-07-12

## 2023-07-07 RX ORDER — SODIUM CHLORIDE 0.9 % (FLUSH) 0.9 %
5-40 SYRINGE (ML) INJECTION PRN
Status: DISCONTINUED | OUTPATIENT
Start: 2023-07-07 | End: 2023-07-13 | Stop reason: HOSPADM

## 2023-07-07 RX ORDER — ACETAMINOPHEN 325 MG/1
650 TABLET ORAL EVERY 6 HOURS PRN
Status: DISCONTINUED | OUTPATIENT
Start: 2023-07-07 | End: 2023-07-13 | Stop reason: HOSPADM

## 2023-07-07 RX ORDER — DIVALPROEX SODIUM 125 MG/1
125 CAPSULE, COATED PELLETS ORAL 3 TIMES DAILY
Status: DISCONTINUED | OUTPATIENT
Start: 2023-07-07 | End: 2023-07-13 | Stop reason: HOSPADM

## 2023-07-07 RX ORDER — DILTIAZEM HYDROCHLORIDE 180 MG/1
180 CAPSULE, COATED, EXTENDED RELEASE ORAL DAILY
Status: DISCONTINUED | OUTPATIENT
Start: 2023-07-07 | End: 2023-07-07

## 2023-07-07 RX ORDER — INSULIN GLARGINE 100 [IU]/ML
5 INJECTION, SOLUTION SUBCUTANEOUS NIGHTLY
Status: DISCONTINUED | OUTPATIENT
Start: 2023-07-07 | End: 2023-07-13 | Stop reason: HOSPADM

## 2023-07-07 RX ORDER — OXYCODONE AND ACETAMINOPHEN 7.5; 325 MG/1; MG/1
1 TABLET ORAL EVERY 6 HOURS PRN
Status: CANCELLED | OUTPATIENT
Start: 2023-07-07

## 2023-07-07 RX ORDER — DIGOXIN 0.25 MG/ML
250 INJECTION INTRAMUSCULAR; INTRAVENOUS EVERY 6 HOURS
Status: COMPLETED | OUTPATIENT
Start: 2023-07-07 | End: 2023-07-08

## 2023-07-07 RX ORDER — ATORVASTATIN CALCIUM 40 MG/1
40 TABLET, FILM COATED ORAL ONCE
Status: DISCONTINUED | OUTPATIENT
Start: 2023-07-07 | End: 2023-07-08

## 2023-07-07 RX ORDER — PANTOPRAZOLE SODIUM 40 MG/1
40 TABLET, DELAYED RELEASE ORAL DAILY
Status: DISCONTINUED | OUTPATIENT
Start: 2023-07-07 | End: 2023-07-07

## 2023-07-07 RX ORDER — SODIUM CHLORIDE 9 MG/ML
INJECTION, SOLUTION INTRAVENOUS PRN
Status: DISCONTINUED | OUTPATIENT
Start: 2023-07-07 | End: 2023-07-13 | Stop reason: HOSPADM

## 2023-07-07 RX ORDER — DEXTROSE MONOHYDRATE 100 MG/ML
INJECTION, SOLUTION INTRAVENOUS CONTINUOUS PRN
Status: DISCONTINUED | OUTPATIENT
Start: 2023-07-07 | End: 2023-07-13 | Stop reason: HOSPADM

## 2023-07-07 RX ORDER — ONDANSETRON 4 MG/1
4 TABLET, ORALLY DISINTEGRATING ORAL EVERY 8 HOURS PRN
Status: DISCONTINUED | OUTPATIENT
Start: 2023-07-07 | End: 2023-07-13 | Stop reason: HOSPADM

## 2023-07-07 RX ORDER — SODIUM CHLORIDE 0.9 % (FLUSH) 0.9 %
5-40 SYRINGE (ML) INJECTION EVERY 12 HOURS SCHEDULED
Status: DISCONTINUED | OUTPATIENT
Start: 2023-07-07 | End: 2023-07-13 | Stop reason: HOSPADM

## 2023-07-07 RX ORDER — POLYETHYLENE GLYCOL 3350 17 G/17G
17 POWDER, FOR SOLUTION ORAL DAILY
Status: DISCONTINUED | OUTPATIENT
Start: 2023-07-07 | End: 2023-07-07

## 2023-07-07 RX ORDER — GABAPENTIN 100 MG/1
100 CAPSULE ORAL 3 TIMES DAILY
Status: DISCONTINUED | OUTPATIENT
Start: 2023-07-07 | End: 2023-07-11

## 2023-07-07 RX ORDER — IPRATROPIUM BROMIDE AND ALBUTEROL SULFATE 2.5; .5 MG/3ML; MG/3ML
1 SOLUTION RESPIRATORY (INHALATION) EVERY 4 HOURS PRN
Status: DISCONTINUED | OUTPATIENT
Start: 2023-07-07 | End: 2023-07-13 | Stop reason: HOSPADM

## 2023-07-07 RX ORDER — ARIPIPRAZOLE 10 MG/1
5 TABLET ORAL NIGHTLY
Status: DISCONTINUED | OUTPATIENT
Start: 2023-07-07 | End: 2023-07-13 | Stop reason: HOSPADM

## 2023-07-07 RX ORDER — SODIUM CHLORIDE 9 MG/ML
INJECTION, SOLUTION INTRAVENOUS CONTINUOUS
Status: DISCONTINUED | OUTPATIENT
Start: 2023-07-07 | End: 2023-07-08

## 2023-07-07 RX ORDER — ARIPIPRAZOLE 10 MG/1
5 TABLET ORAL ONCE
Status: DISCONTINUED | OUTPATIENT
Start: 2023-07-07 | End: 2023-07-08

## 2023-07-07 RX ORDER — IPRATROPIUM BROMIDE AND ALBUTEROL SULFATE 2.5; .5 MG/3ML; MG/3ML
1 SOLUTION RESPIRATORY (INHALATION) 4 TIMES DAILY
Status: DISCONTINUED | OUTPATIENT
Start: 2023-07-07 | End: 2023-07-13 | Stop reason: HOSPADM

## 2023-07-07 RX ORDER — LANOLIN ALCOHOL/MO/W.PET/CERES
3 CREAM (GRAM) TOPICAL ONCE
Status: DISCONTINUED | OUTPATIENT
Start: 2023-07-07 | End: 2023-07-08 | Stop reason: SDUPTHER

## 2023-07-07 RX ORDER — POLYETHYLENE GLYCOL 3350 17 G/17G
17 POWDER, FOR SOLUTION ORAL DAILY
Status: DISCONTINUED | OUTPATIENT
Start: 2023-07-07 | End: 2023-07-13 | Stop reason: HOSPADM

## 2023-07-07 RX ORDER — INSULIN LISPRO 100 [IU]/ML
0-4 INJECTION, SOLUTION INTRAVENOUS; SUBCUTANEOUS EVERY 4 HOURS
Status: DISCONTINUED | OUTPATIENT
Start: 2023-07-07 | End: 2023-07-08

## 2023-07-07 RX ORDER — PANTOPRAZOLE SODIUM 40 MG/10ML
40 INJECTION, POWDER, LYOPHILIZED, FOR SOLUTION INTRAVENOUS DAILY
Status: DISCONTINUED | OUTPATIENT
Start: 2023-07-07 | End: 2023-07-10

## 2023-07-07 RX ORDER — LANOLIN ALCOHOL/MO/W.PET/CERES
3 CREAM (GRAM) TOPICAL NIGHTLY
Status: DISCONTINUED | OUTPATIENT
Start: 2023-07-07 | End: 2023-07-13 | Stop reason: HOSPADM

## 2023-07-07 RX ORDER — ATORVASTATIN CALCIUM 40 MG/1
40 TABLET, FILM COATED ORAL NIGHTLY
Status: DISCONTINUED | OUTPATIENT
Start: 2023-07-07 | End: 2023-07-13 | Stop reason: HOSPADM

## 2023-07-07 RX ADMIN — MUPIROCIN: 20 OINTMENT TOPICAL at 09:35

## 2023-07-07 RX ADMIN — IPRATROPIUM BROMIDE AND ALBUTEROL SULFATE 1 DOSE: .5; 2.5 SOLUTION RESPIRATORY (INHALATION) at 07:35

## 2023-07-07 RX ADMIN — GABAPENTIN 100 MG: 100 CAPSULE ORAL at 15:06

## 2023-07-07 RX ADMIN — GLYCERIN 2 G: 2 SUPPOSITORY RECTAL at 05:54

## 2023-07-07 RX ADMIN — IPRATROPIUM BROMIDE AND ALBUTEROL SULFATE 1 DOSE: .5; 2.5 SOLUTION RESPIRATORY (INHALATION) at 15:53

## 2023-07-07 RX ADMIN — SERTRALINE HYDROCHLORIDE 50 MG: 50 TABLET ORAL at 09:35

## 2023-07-07 RX ADMIN — ENOXAPARIN SODIUM 90 MG: 100 INJECTION SUBCUTANEOUS at 20:44

## 2023-07-07 RX ADMIN — IPRATROPIUM BROMIDE AND ALBUTEROL SULFATE 1 DOSE: .5; 2.5 SOLUTION RESPIRATORY (INHALATION) at 19:22

## 2023-07-07 RX ADMIN — SODIUM CHLORIDE: 9 INJECTION, SOLUTION INTRAVENOUS at 09:43

## 2023-07-07 RX ADMIN — ATORVASTATIN CALCIUM 40 MG: 40 TABLET, FILM COATED ORAL at 20:13

## 2023-07-07 RX ADMIN — DIGOXIN 250 MCG: 0.25 INJECTION INTRAMUSCULAR; INTRAVENOUS at 15:22

## 2023-07-07 RX ADMIN — POLYETHYLENE GLYCOL (3350) 17 G: 17 POWDER, FOR SOLUTION ORAL at 09:41

## 2023-07-07 RX ADMIN — ARIPIPRAZOLE 5 MG: 10 TABLET ORAL at 20:13

## 2023-07-07 RX ADMIN — VANCOMYCIN HYDROCHLORIDE 2000 MG: 10 INJECTION, POWDER, LYOPHILIZED, FOR SOLUTION INTRAVENOUS at 05:51

## 2023-07-07 RX ADMIN — SODIUM CHLORIDE: 9 INJECTION, SOLUTION INTRAVENOUS at 20:23

## 2023-07-07 RX ADMIN — DIGOXIN 250 MCG: 0.25 INJECTION INTRAMUSCULAR; INTRAVENOUS at 09:35

## 2023-07-07 RX ADMIN — DEXTROMETHORPHAN HYDROBROMIDE AND QUINIDINE SULFATE 1 CAPSULE: 20; 10 CAPSULE, GELATIN COATED ORAL at 20:46

## 2023-07-07 RX ADMIN — PANTOPRAZOLE SODIUM 40 MG: 40 INJECTION, POWDER, FOR SOLUTION INTRAVENOUS at 09:35

## 2023-07-07 RX ADMIN — DIGOXIN 250 MCG: 0.25 INJECTION INTRAMUSCULAR; INTRAVENOUS at 20:45

## 2023-07-07 RX ADMIN — MEROPENEM 1000 MG: 1 INJECTION, POWDER, FOR SOLUTION INTRAVENOUS at 05:48

## 2023-07-07 RX ADMIN — DEXTROMETHORPHAN HYDROBROMIDE AND QUINIDINE SULFATE 1 CAPSULE: 20; 10 CAPSULE, GELATIN COATED ORAL at 09:35

## 2023-07-07 RX ADMIN — DIVALPROEX SODIUM 125 MG: 125 CAPSULE, COATED PELLETS ORAL at 15:06

## 2023-07-07 RX ADMIN — Medication 10 ML: at 20:12

## 2023-07-07 RX ADMIN — Medication 10 ML: at 09:44

## 2023-07-07 RX ADMIN — ENOXAPARIN SODIUM 90 MG: 100 INJECTION SUBCUTANEOUS at 09:35

## 2023-07-07 RX ADMIN — CEFEPIME 2000 MG: 2 INJECTION, POWDER, FOR SOLUTION INTRAVENOUS at 15:09

## 2023-07-07 RX ADMIN — VANCOMYCIN HYDROCHLORIDE 1250 MG: 10 INJECTION, POWDER, LYOPHILIZED, FOR SOLUTION INTRAVENOUS at 19:17

## 2023-07-07 RX ADMIN — Medication 3 MG: at 20:13

## 2023-07-07 RX ADMIN — CEFEPIME 2000 MG: 2 INJECTION, POWDER, FOR SOLUTION INTRAVENOUS at 22:25

## 2023-07-07 RX ADMIN — MUPIROCIN: 20 OINTMENT TOPICAL at 20:12

## 2023-07-07 RX ADMIN — POTASSIUM CHLORIDE 10 MEQ: 1500 TABLET, EXTENDED RELEASE ORAL at 09:37

## 2023-07-07 RX ADMIN — GABAPENTIN 100 MG: 100 CAPSULE ORAL at 09:35

## 2023-07-07 RX ADMIN — DIVALPROEX SODIUM 125 MG: 125 CAPSULE, COATED PELLETS ORAL at 20:13

## 2023-07-07 RX ADMIN — IPRATROPIUM BROMIDE AND ALBUTEROL SULFATE 1 DOSE: .5; 2.5 SOLUTION RESPIRATORY (INHALATION) at 11:20

## 2023-07-07 RX ADMIN — GABAPENTIN 100 MG: 100 CAPSULE ORAL at 20:44

## 2023-07-07 ASSESSMENT — PAIN DESCRIPTION - LOCATION: LOCATION: ABDOMEN

## 2023-07-08 PROBLEM — D69.6 THROMBOCYTOPENIA (HCC): Status: ACTIVE | Noted: 2023-07-08

## 2023-07-08 PROBLEM — R79.89 ELEVATED TROPONIN: Status: ACTIVE | Noted: 2023-07-08

## 2023-07-08 PROBLEM — R77.8 ELEVATED TROPONIN: Status: ACTIVE | Noted: 2023-07-08

## 2023-07-08 LAB
ANION GAP SERPL CALCULATED.3IONS-SCNC: 10 MMOL/L (ref 3–16)
BACTERIA UR CULT: NORMAL
BASOPHILS # BLD: 0.1 K/UL (ref 0–0.2)
BASOPHILS NFR BLD: 0.6 %
BUN SERPL-MCNC: 17 MG/DL (ref 7–20)
CALCIUM SERPL-MCNC: 9.4 MG/DL (ref 8.3–10.6)
CHLORIDE SERPL-SCNC: 104 MMOL/L (ref 99–110)
CO2 SERPL-SCNC: 25 MMOL/L (ref 21–32)
CREAT SERPL-MCNC: <0.5 MG/DL (ref 0.6–1.2)
DEPRECATED RDW RBC AUTO: 15.1 % (ref 12.4–15.4)
DIGOXIN SERPL-MCNC: 2.4 NG/ML (ref 0.8–2)
EOSINOPHIL # BLD: 0.2 K/UL (ref 0–0.6)
EOSINOPHIL NFR BLD: 1.9 %
GFR SERPLBLD CREATININE-BSD FMLA CKD-EPI: >60 ML/MIN/{1.73_M2}
GLUCOSE BLD-MCNC: 127 MG/DL (ref 70–99)
GLUCOSE BLD-MCNC: 129 MG/DL (ref 70–99)
GLUCOSE BLD-MCNC: 130 MG/DL (ref 70–99)
GLUCOSE BLD-MCNC: 86 MG/DL (ref 70–99)
GLUCOSE BLD-MCNC: 90 MG/DL (ref 70–99)
GLUCOSE BLD-MCNC: 98 MG/DL (ref 70–99)
GLUCOSE SERPL-MCNC: 96 MG/DL (ref 70–99)
HCT VFR BLD AUTO: 33.2 % (ref 36–48)
HCT VFR BLD AUTO: 39.2 % (ref 36–48)
HGB BLD-MCNC: 10.7 G/DL (ref 12–16)
HGB BLD-MCNC: 12.1 G/DL (ref 12–16)
LEGIONELLA AG UR QL: NORMAL
LYMPHOCYTES # BLD: 3.1 K/UL (ref 1–5.1)
LYMPHOCYTES NFR BLD: 28.6 %
MAGNESIUM SERPL-MCNC: 2 MG/DL (ref 1.8–2.4)
MCH RBC QN AUTO: 28.6 PG (ref 26–34)
MCHC RBC AUTO-ENTMCNC: 30.7 G/DL (ref 31–36)
MCV RBC AUTO: 93.2 FL (ref 80–100)
MONOCYTES # BLD: 1.2 K/UL (ref 0–1.3)
MONOCYTES NFR BLD: 10.8 %
NEUTROPHILS # BLD: 6.3 K/UL (ref 1.7–7.7)
NEUTROPHILS NFR BLD: 58.1 %
PERFORMED ON: ABNORMAL
PERFORMED ON: NORMAL
PHOSPHATE SERPL-MCNC: 3 MG/DL (ref 2.5–4.9)
PLATELET # BLD AUTO: 99 K/UL (ref 135–450)
PLATELET BLD QL SMEAR: ABNORMAL
PMV BLD AUTO: 10.7 FL (ref 5–10.5)
POTASSIUM SERPL-SCNC: 4.6 MMOL/L (ref 3.5–5.1)
POTASSIUM SERPL-SCNC: 4.6 MMOL/L (ref 3.5–5.1)
RBC # BLD AUTO: 4.21 M/UL (ref 4–5.2)
SLIDE REVIEW: ABNORMAL
SODIUM SERPL-SCNC: 139 MMOL/L (ref 136–145)
WBC # BLD AUTO: 10.9 K/UL (ref 4–11)

## 2023-07-08 PROCEDURE — 94761 N-INVAS EAR/PLS OXIMETRY MLT: CPT

## 2023-07-08 PROCEDURE — 94660 CPAP INITIATION&MGMT: CPT

## 2023-07-08 PROCEDURE — 6370000000 HC RX 637 (ALT 250 FOR IP): Performed by: PEDIATRICS

## 2023-07-08 PROCEDURE — 1200000000 HC SEMI PRIVATE

## 2023-07-08 PROCEDURE — 6360000002 HC RX W HCPCS: Performed by: INTERNAL MEDICINE

## 2023-07-08 PROCEDURE — 6360000002 HC RX W HCPCS: Performed by: PEDIATRICS

## 2023-07-08 PROCEDURE — 94640 AIRWAY INHALATION TREATMENT: CPT

## 2023-07-08 PROCEDURE — 6370000000 HC RX 637 (ALT 250 FOR IP): Performed by: INTERNAL MEDICINE

## 2023-07-08 PROCEDURE — 36415 COLL VENOUS BLD VENIPUNCTURE: CPT

## 2023-07-08 PROCEDURE — 2580000003 HC RX 258: Performed by: PEDIATRICS

## 2023-07-08 PROCEDURE — 2700000000 HC OXYGEN THERAPY PER DAY

## 2023-07-08 PROCEDURE — 2580000003 HC RX 258: Performed by: INTERNAL MEDICINE

## 2023-07-08 PROCEDURE — 83735 ASSAY OF MAGNESIUM: CPT

## 2023-07-08 PROCEDURE — 84100 ASSAY OF PHOSPHORUS: CPT

## 2023-07-08 PROCEDURE — C9113 INJ PANTOPRAZOLE SODIUM, VIA: HCPCS | Performed by: PEDIATRICS

## 2023-07-08 PROCEDURE — 99291 CRITICAL CARE FIRST HOUR: CPT | Performed by: INTERNAL MEDICINE

## 2023-07-08 PROCEDURE — 80162 ASSAY OF DIGOXIN TOTAL: CPT

## 2023-07-08 PROCEDURE — 99233 SBSQ HOSP IP/OBS HIGH 50: CPT | Performed by: INTERNAL MEDICINE

## 2023-07-08 PROCEDURE — 85025 COMPLETE CBC W/AUTO DIFF WBC: CPT

## 2023-07-08 PROCEDURE — 80048 BASIC METABOLIC PNL TOTAL CA: CPT

## 2023-07-08 PROCEDURE — 85014 HEMATOCRIT: CPT

## 2023-07-08 PROCEDURE — 85018 HEMOGLOBIN: CPT

## 2023-07-08 PROCEDURE — 99232 SBSQ HOSP IP/OBS MODERATE 35: CPT | Performed by: INTERNAL MEDICINE

## 2023-07-08 PROCEDURE — 51702 INSERT TEMP BLADDER CATH: CPT

## 2023-07-08 RX ORDER — DILTIAZEM HYDROCHLORIDE 60 MG/1
60 TABLET, FILM COATED ORAL EVERY 6 HOURS SCHEDULED
Status: DISCONTINUED | OUTPATIENT
Start: 2023-07-08 | End: 2023-07-13 | Stop reason: HOSPADM

## 2023-07-08 RX ADMIN — Medication 3 MG: at 20:42

## 2023-07-08 RX ADMIN — GABAPENTIN 100 MG: 100 CAPSULE ORAL at 14:09

## 2023-07-08 RX ADMIN — DIVALPROEX SODIUM 125 MG: 125 CAPSULE, COATED PELLETS ORAL at 20:43

## 2023-07-08 RX ADMIN — VANCOMYCIN HYDROCHLORIDE 1250 MG: 10 INJECTION, POWDER, LYOPHILIZED, FOR SOLUTION INTRAVENOUS at 17:22

## 2023-07-08 RX ADMIN — IPRATROPIUM BROMIDE AND ALBUTEROL SULFATE 1 DOSE: .5; 2.5 SOLUTION RESPIRATORY (INHALATION) at 20:11

## 2023-07-08 RX ADMIN — DIVALPROEX SODIUM 125 MG: 125 CAPSULE, COATED PELLETS ORAL at 08:21

## 2023-07-08 RX ADMIN — POLYETHYLENE GLYCOL (3350) 17 G: 17 POWDER, FOR SOLUTION ORAL at 08:21

## 2023-07-08 RX ADMIN — ARIPIPRAZOLE 5 MG: 10 TABLET ORAL at 20:42

## 2023-07-08 RX ADMIN — ACETAMINOPHEN 650 MG: 325 TABLET ORAL at 20:42

## 2023-07-08 RX ADMIN — ENOXAPARIN SODIUM 90 MG: 100 INJECTION SUBCUTANEOUS at 08:21

## 2023-07-08 RX ADMIN — INSULIN GLARGINE 5 UNITS: 100 INJECTION, SOLUTION SUBCUTANEOUS at 20:41

## 2023-07-08 RX ADMIN — VANCOMYCIN HYDROCHLORIDE 1250 MG: 10 INJECTION, POWDER, LYOPHILIZED, FOR SOLUTION INTRAVENOUS at 05:33

## 2023-07-08 RX ADMIN — PANTOPRAZOLE SODIUM 40 MG: 40 INJECTION, POWDER, FOR SOLUTION INTRAVENOUS at 08:20

## 2023-07-08 RX ADMIN — ATORVASTATIN CALCIUM 40 MG: 40 TABLET, FILM COATED ORAL at 20:42

## 2023-07-08 RX ADMIN — CEFEPIME 2000 MG: 2 INJECTION, POWDER, FOR SOLUTION INTRAVENOUS at 23:21

## 2023-07-08 RX ADMIN — CEFEPIME 2000 MG: 2 INJECTION, POWDER, FOR SOLUTION INTRAVENOUS at 06:34

## 2023-07-08 RX ADMIN — ENOXAPARIN SODIUM 90 MG: 100 INJECTION SUBCUTANEOUS at 20:41

## 2023-07-08 RX ADMIN — IPRATROPIUM BROMIDE AND ALBUTEROL SULFATE 1 DOSE: .5; 2.5 SOLUTION RESPIRATORY (INHALATION) at 07:31

## 2023-07-08 RX ADMIN — GABAPENTIN 100 MG: 100 CAPSULE ORAL at 08:21

## 2023-07-08 RX ADMIN — POTASSIUM CHLORIDE 10 MEQ: 1500 TABLET, EXTENDED RELEASE ORAL at 08:21

## 2023-07-08 RX ADMIN — CEFEPIME 2000 MG: 2 INJECTION, POWDER, FOR SOLUTION INTRAVENOUS at 14:09

## 2023-07-08 RX ADMIN — DIVALPROEX SODIUM 125 MG: 125 CAPSULE, COATED PELLETS ORAL at 14:09

## 2023-07-08 RX ADMIN — SERTRALINE HYDROCHLORIDE 50 MG: 50 TABLET ORAL at 08:21

## 2023-07-08 RX ADMIN — DEXTROMETHORPHAN HYDROBROMIDE AND QUINIDINE SULFATE 1 CAPSULE: 20; 10 CAPSULE, GELATIN COATED ORAL at 08:24

## 2023-07-08 RX ADMIN — DILTIAZEM HYDROCHLORIDE 60 MG: 60 TABLET, FILM COATED ORAL at 17:11

## 2023-07-08 RX ADMIN — IPRATROPIUM BROMIDE AND ALBUTEROL SULFATE 1 DOSE: .5; 2.5 SOLUTION RESPIRATORY (INHALATION) at 11:36

## 2023-07-08 RX ADMIN — MUPIROCIN: 20 OINTMENT TOPICAL at 08:20

## 2023-07-08 RX ADMIN — GABAPENTIN 100 MG: 100 CAPSULE ORAL at 20:42

## 2023-07-08 RX ADMIN — DIGOXIN 250 MCG: 0.25 INJECTION INTRAMUSCULAR; INTRAVENOUS at 03:45

## 2023-07-08 ASSESSMENT — PAIN DESCRIPTION - LOCATION
LOCATION: GENERALIZED
LOCATION: ABDOMEN

## 2023-07-08 ASSESSMENT — PAIN - FUNCTIONAL ASSESSMENT: PAIN_FUNCTIONAL_ASSESSMENT: ACTIVITIES ARE NOT PREVENTED

## 2023-07-08 ASSESSMENT — PAIN SCALES - GENERAL
PAINLEVEL_OUTOF10: 5
PAINLEVEL_OUTOF10: 3

## 2023-07-08 ASSESSMENT — PAIN DESCRIPTION - ORIENTATION: ORIENTATION: MID

## 2023-07-08 ASSESSMENT — PAIN DESCRIPTION - PAIN TYPE: TYPE: ACUTE PAIN

## 2023-07-08 ASSESSMENT — PAIN DESCRIPTION - FREQUENCY: FREQUENCY: INTERMITTENT

## 2023-07-08 ASSESSMENT — PAIN DESCRIPTION - ONSET: ONSET: GRADUAL

## 2023-07-08 ASSESSMENT — PAIN DESCRIPTION - DESCRIPTORS: DESCRIPTORS: ACHING

## 2023-07-09 LAB
ANION GAP SERPL CALCULATED.3IONS-SCNC: 7 MMOL/L (ref 3–16)
BASOPHILS # BLD: 0 K/UL (ref 0–0.2)
BASOPHILS NFR BLD: 0.8 %
BUN SERPL-MCNC: 10 MG/DL (ref 7–20)
CALCIUM SERPL-MCNC: 9.1 MG/DL (ref 8.3–10.6)
CHLORIDE SERPL-SCNC: 104 MMOL/L (ref 99–110)
CO2 SERPL-SCNC: 30 MMOL/L (ref 21–32)
CREAT SERPL-MCNC: <0.5 MG/DL (ref 0.6–1.2)
DEPRECATED RDW RBC AUTO: 15 % (ref 12.4–15.4)
EOSINOPHIL # BLD: 0.4 K/UL (ref 0–0.6)
EOSINOPHIL NFR BLD: 7.9 %
GFR SERPLBLD CREATININE-BSD FMLA CKD-EPI: >60 ML/MIN/{1.73_M2}
GLUCOSE BLD-MCNC: 103 MG/DL (ref 70–99)
GLUCOSE BLD-MCNC: 112 MG/DL (ref 70–99)
GLUCOSE BLD-MCNC: 144 MG/DL (ref 70–99)
GLUCOSE BLD-MCNC: 165 MG/DL (ref 70–99)
GLUCOSE BLD-MCNC: 211 MG/DL (ref 70–99)
GLUCOSE SERPL-MCNC: 108 MG/DL (ref 70–99)
HCT VFR BLD AUTO: 34.2 % (ref 36–48)
HCT VFR BLD AUTO: 35.2 % (ref 36–48)
HGB BLD-MCNC: 11.3 G/DL (ref 12–16)
HGB BLD-MCNC: 11.3 G/DL (ref 12–16)
LYMPHOCYTES # BLD: 1.3 K/UL (ref 1–5.1)
LYMPHOCYTES NFR BLD: 22.9 %
MAGNESIUM SERPL-MCNC: 1.8 MG/DL (ref 1.8–2.4)
MCH RBC QN AUTO: 28.3 PG (ref 26–34)
MCHC RBC AUTO-ENTMCNC: 32.2 G/DL (ref 31–36)
MCV RBC AUTO: 87.9 FL (ref 80–100)
MONOCYTES # BLD: 0.5 K/UL (ref 0–1.3)
MONOCYTES NFR BLD: 8.7 %
NEUTROPHILS # BLD: 3.3 K/UL (ref 1.7–7.7)
NEUTROPHILS NFR BLD: 59.7 %
PERFORMED ON: ABNORMAL
PHOSPHATE SERPL-MCNC: 2.9 MG/DL (ref 2.5–4.9)
PLATELET # BLD AUTO: 103 K/UL (ref 135–450)
PMV BLD AUTO: 10.3 FL (ref 5–10.5)
POTASSIUM SERPL-SCNC: 3.4 MMOL/L (ref 3.5–5.1)
RBC # BLD AUTO: 4 M/UL (ref 4–5.2)
SODIUM SERPL-SCNC: 141 MMOL/L (ref 136–145)
VANCOMYCIN TROUGH SERPL-MCNC: 19.3 UG/ML (ref 10–20)
WBC # BLD AUTO: 5.6 K/UL (ref 4–11)

## 2023-07-09 PROCEDURE — 36415 COLL VENOUS BLD VENIPUNCTURE: CPT

## 2023-07-09 PROCEDURE — 85025 COMPLETE CBC W/AUTO DIFF WBC: CPT

## 2023-07-09 PROCEDURE — 2580000003 HC RX 258: Performed by: INTERNAL MEDICINE

## 2023-07-09 PROCEDURE — 6370000000 HC RX 637 (ALT 250 FOR IP): Performed by: INTERNAL MEDICINE

## 2023-07-09 PROCEDURE — 2580000003 HC RX 258: Performed by: PEDIATRICS

## 2023-07-09 PROCEDURE — 83735 ASSAY OF MAGNESIUM: CPT

## 2023-07-09 PROCEDURE — 99233 SBSQ HOSP IP/OBS HIGH 50: CPT | Performed by: INTERNAL MEDICINE

## 2023-07-09 PROCEDURE — C9113 INJ PANTOPRAZOLE SODIUM, VIA: HCPCS | Performed by: PEDIATRICS

## 2023-07-09 PROCEDURE — 6360000002 HC RX W HCPCS: Performed by: INTERNAL MEDICINE

## 2023-07-09 PROCEDURE — 94640 AIRWAY INHALATION TREATMENT: CPT

## 2023-07-09 PROCEDURE — 80202 ASSAY OF VANCOMYCIN: CPT

## 2023-07-09 PROCEDURE — 84100 ASSAY OF PHOSPHORUS: CPT

## 2023-07-09 PROCEDURE — 2700000000 HC OXYGEN THERAPY PER DAY

## 2023-07-09 PROCEDURE — 1200000000 HC SEMI PRIVATE

## 2023-07-09 PROCEDURE — 85018 HEMOGLOBIN: CPT

## 2023-07-09 PROCEDURE — 6370000000 HC RX 637 (ALT 250 FOR IP): Performed by: PEDIATRICS

## 2023-07-09 PROCEDURE — 6360000002 HC RX W HCPCS: Performed by: PEDIATRICS

## 2023-07-09 PROCEDURE — 85014 HEMATOCRIT: CPT

## 2023-07-09 PROCEDURE — 94761 N-INVAS EAR/PLS OXIMETRY MLT: CPT

## 2023-07-09 PROCEDURE — 80048 BASIC METABOLIC PNL TOTAL CA: CPT

## 2023-07-09 RX ORDER — OXYCODONE AND ACETAMINOPHEN 7.5; 325 MG/1; MG/1
1 TABLET ORAL EVERY 6 HOURS PRN
Status: DISCONTINUED | OUTPATIENT
Start: 2023-07-09 | End: 2023-07-11

## 2023-07-09 RX ORDER — BUSPIRONE HYDROCHLORIDE 5 MG/1
5 TABLET ORAL 2 TIMES DAILY
Status: DISCONTINUED | OUTPATIENT
Start: 2023-07-09 | End: 2023-07-13 | Stop reason: HOSPADM

## 2023-07-09 RX ADMIN — DEXTROMETHORPHAN HYDROBROMIDE AND QUINIDINE SULFATE 1 CAPSULE: 20; 10 CAPSULE, GELATIN COATED ORAL at 19:58

## 2023-07-09 RX ADMIN — ATORVASTATIN CALCIUM 40 MG: 40 TABLET, FILM COATED ORAL at 19:58

## 2023-07-09 RX ADMIN — Medication 10 ML: at 10:04

## 2023-07-09 RX ADMIN — OXYCODONE HYDROCHLORIDE AND ACETAMINOPHEN 1 TABLET: 7.5; 325 TABLET ORAL at 19:59

## 2023-07-09 RX ADMIN — IPRATROPIUM BROMIDE AND ALBUTEROL SULFATE 1 DOSE: .5; 2.5 SOLUTION RESPIRATORY (INHALATION) at 11:37

## 2023-07-09 RX ADMIN — DILTIAZEM HYDROCHLORIDE 60 MG: 60 TABLET, FILM COATED ORAL at 12:04

## 2023-07-09 RX ADMIN — ARIPIPRAZOLE 5 MG: 10 TABLET ORAL at 19:58

## 2023-07-09 RX ADMIN — DILTIAZEM HYDROCHLORIDE 60 MG: 60 TABLET, FILM COATED ORAL at 17:29

## 2023-07-09 RX ADMIN — IPRATROPIUM BROMIDE AND ALBUTEROL SULFATE 1 DOSE: .5; 2.5 SOLUTION RESPIRATORY (INHALATION) at 07:46

## 2023-07-09 RX ADMIN — DIVALPROEX SODIUM 125 MG: 125 CAPSULE, COATED PELLETS ORAL at 08:26

## 2023-07-09 RX ADMIN — DIVALPROEX SODIUM 125 MG: 125 CAPSULE, COATED PELLETS ORAL at 14:03

## 2023-07-09 RX ADMIN — ENOXAPARIN SODIUM 90 MG: 100 INJECTION SUBCUTANEOUS at 10:05

## 2023-07-09 RX ADMIN — CEFEPIME 2000 MG: 2 INJECTION, POWDER, FOR SOLUTION INTRAVENOUS at 14:03

## 2023-07-09 RX ADMIN — PANTOPRAZOLE SODIUM 40 MG: 40 INJECTION, POWDER, FOR SOLUTION INTRAVENOUS at 10:04

## 2023-07-09 RX ADMIN — SERTRALINE HYDROCHLORIDE 50 MG: 50 TABLET ORAL at 10:05

## 2023-07-09 RX ADMIN — Medication 3 MG: at 19:59

## 2023-07-09 RX ADMIN — POLYETHYLENE GLYCOL (3350) 17 G: 17 POWDER, FOR SOLUTION ORAL at 10:04

## 2023-07-09 RX ADMIN — GABAPENTIN 100 MG: 100 CAPSULE ORAL at 10:05

## 2023-07-09 RX ADMIN — ENOXAPARIN SODIUM 90 MG: 100 INJECTION SUBCUTANEOUS at 19:58

## 2023-07-09 RX ADMIN — GABAPENTIN 100 MG: 100 CAPSULE ORAL at 14:03

## 2023-07-09 RX ADMIN — VANCOMYCIN HYDROCHLORIDE 1250 MG: 10 INJECTION, POWDER, LYOPHILIZED, FOR SOLUTION INTRAVENOUS at 06:06

## 2023-07-09 RX ADMIN — DILTIAZEM HYDROCHLORIDE 60 MG: 60 TABLET, FILM COATED ORAL at 05:28

## 2023-07-09 RX ADMIN — DEXTROMETHORPHAN HYDROBROMIDE AND QUINIDINE SULFATE 1 CAPSULE: 20; 10 CAPSULE, GELATIN COATED ORAL at 10:58

## 2023-07-09 RX ADMIN — DIVALPROEX SODIUM 125 MG: 125 CAPSULE, COATED PELLETS ORAL at 19:58

## 2023-07-09 RX ADMIN — CEFEPIME 2000 MG: 2 INJECTION, POWDER, FOR SOLUTION INTRAVENOUS at 05:28

## 2023-07-09 RX ADMIN — GABAPENTIN 100 MG: 100 CAPSULE ORAL at 19:58

## 2023-07-09 RX ADMIN — POTASSIUM CHLORIDE 10 MEQ: 1500 TABLET, EXTENDED RELEASE ORAL at 08:26

## 2023-07-09 RX ADMIN — MUPIROCIN: 20 OINTMENT TOPICAL at 10:10

## 2023-07-09 RX ADMIN — BUSPIRONE HYDROCHLORIDE 5 MG: 5 TABLET ORAL at 19:59

## 2023-07-09 RX ADMIN — CEFEPIME 2000 MG: 2 INJECTION, POWDER, FOR SOLUTION INTRAVENOUS at 22:52

## 2023-07-09 RX ADMIN — IPRATROPIUM BROMIDE AND ALBUTEROL SULFATE 1 DOSE: .5; 2.5 SOLUTION RESPIRATORY (INHALATION) at 19:55

## 2023-07-09 RX ADMIN — MUPIROCIN: 20 OINTMENT TOPICAL at 19:59

## 2023-07-09 RX ADMIN — IPRATROPIUM BROMIDE AND ALBUTEROL SULFATE 1 DOSE: .5; 2.5 SOLUTION RESPIRATORY (INHALATION) at 15:44

## 2023-07-09 ASSESSMENT — PAIN SCALES - PAIN ASSESSMENT IN ADVANCED DEMENTIA (PAINAD)
CONSOLABILITY: 0
FACIALEXPRESSION: 0
BODYLANGUAGE: 0
BREATHING: 0
NEGVOCALIZATION: 0
TOTALSCORE: 0

## 2023-07-09 ASSESSMENT — PAIN DESCRIPTION - LOCATION: LOCATION: LEG

## 2023-07-09 ASSESSMENT — PAIN SCALES - GENERAL
PAINLEVEL_OUTOF10: 0
PAINLEVEL_OUTOF10: 7

## 2023-07-09 ASSESSMENT — PAIN DESCRIPTION - DESCRIPTORS
DESCRIPTORS: ACHING;DISCOMFORT
DESCRIPTORS: DISCOMFORT;ACHING

## 2023-07-09 ASSESSMENT — PAIN DESCRIPTION - ORIENTATION: ORIENTATION: RIGHT;LEFT

## 2023-07-10 LAB
ANION GAP SERPL CALCULATED.3IONS-SCNC: 11 MMOL/L (ref 3–16)
BASOPHILS # BLD: 0 K/UL (ref 0–0.2)
BASOPHILS NFR BLD: 0.9 %
BUN SERPL-MCNC: 6 MG/DL (ref 7–20)
CALCIUM SERPL-MCNC: 8.9 MG/DL (ref 8.3–10.6)
CHLORIDE SERPL-SCNC: 103 MMOL/L (ref 99–110)
CO2 SERPL-SCNC: 28 MMOL/L (ref 21–32)
CREAT SERPL-MCNC: <0.5 MG/DL (ref 0.6–1.2)
DEPRECATED RDW RBC AUTO: 14.8 % (ref 12.4–15.4)
EOSINOPHIL # BLD: 0.3 K/UL (ref 0–0.6)
EOSINOPHIL NFR BLD: 6.9 %
GFR SERPLBLD CREATININE-BSD FMLA CKD-EPI: >60 ML/MIN/{1.73_M2}
GLUCOSE BLD-MCNC: 116 MG/DL (ref 70–99)
GLUCOSE BLD-MCNC: 124 MG/DL (ref 70–99)
GLUCOSE BLD-MCNC: 146 MG/DL (ref 70–99)
GLUCOSE BLD-MCNC: 201 MG/DL (ref 70–99)
GLUCOSE SERPL-MCNC: 120 MG/DL (ref 70–99)
HCT VFR BLD AUTO: 36.5 % (ref 36–48)
HGB BLD-MCNC: 12 G/DL (ref 12–16)
LYMPHOCYTES # BLD: 1.2 K/UL (ref 1–5.1)
LYMPHOCYTES NFR BLD: 24.5 %
MAGNESIUM SERPL-MCNC: 1.6 MG/DL (ref 1.8–2.4)
MCH RBC QN AUTO: 28.5 PG (ref 26–34)
MCHC RBC AUTO-ENTMCNC: 32.9 G/DL (ref 31–36)
MCV RBC AUTO: 86.7 FL (ref 80–100)
MONOCYTES # BLD: 0.6 K/UL (ref 0–1.3)
MONOCYTES NFR BLD: 11.3 %
NEUTROPHILS # BLD: 2.8 K/UL (ref 1.7–7.7)
NEUTROPHILS NFR BLD: 56.4 %
PERFORMED ON: ABNORMAL
PHOSPHATE SERPL-MCNC: 2.9 MG/DL (ref 2.5–4.9)
PLATELET # BLD AUTO: 89 K/UL (ref 135–450)
PMV BLD AUTO: 10.1 FL (ref 5–10.5)
POTASSIUM SERPL-SCNC: 2.9 MMOL/L (ref 3.5–5.1)
RBC # BLD AUTO: 4.21 M/UL (ref 4–5.2)
SODIUM SERPL-SCNC: 142 MMOL/L (ref 136–145)
WBC # BLD AUTO: 5 K/UL (ref 4–11)

## 2023-07-10 PROCEDURE — 6370000000 HC RX 637 (ALT 250 FOR IP): Performed by: PEDIATRICS

## 2023-07-10 PROCEDURE — 99232 SBSQ HOSP IP/OBS MODERATE 35: CPT | Performed by: NURSE PRACTITIONER

## 2023-07-10 PROCEDURE — 6370000000 HC RX 637 (ALT 250 FOR IP): Performed by: INTERNAL MEDICINE

## 2023-07-10 PROCEDURE — 2580000003 HC RX 258: Performed by: PEDIATRICS

## 2023-07-10 PROCEDURE — 94640 AIRWAY INHALATION TREATMENT: CPT

## 2023-07-10 PROCEDURE — 36415 COLL VENOUS BLD VENIPUNCTURE: CPT

## 2023-07-10 PROCEDURE — C9113 INJ PANTOPRAZOLE SODIUM, VIA: HCPCS | Performed by: PEDIATRICS

## 2023-07-10 PROCEDURE — 99232 SBSQ HOSP IP/OBS MODERATE 35: CPT | Performed by: INTERNAL MEDICINE

## 2023-07-10 PROCEDURE — 83735 ASSAY OF MAGNESIUM: CPT

## 2023-07-10 PROCEDURE — 80048 BASIC METABOLIC PNL TOTAL CA: CPT

## 2023-07-10 PROCEDURE — 1200000000 HC SEMI PRIVATE

## 2023-07-10 PROCEDURE — 6360000002 HC RX W HCPCS: Performed by: PEDIATRICS

## 2023-07-10 PROCEDURE — 2580000003 HC RX 258: Performed by: INTERNAL MEDICINE

## 2023-07-10 PROCEDURE — 84100 ASSAY OF PHOSPHORUS: CPT

## 2023-07-10 PROCEDURE — 6360000002 HC RX W HCPCS: Performed by: INTERNAL MEDICINE

## 2023-07-10 PROCEDURE — 99233 SBSQ HOSP IP/OBS HIGH 50: CPT | Performed by: INTERNAL MEDICINE

## 2023-07-10 PROCEDURE — 99223 1ST HOSP IP/OBS HIGH 75: CPT | Performed by: PSYCHIATRY & NEUROLOGY

## 2023-07-10 PROCEDURE — 85025 COMPLETE CBC W/AUTO DIFF WBC: CPT

## 2023-07-10 RX ORDER — PANTOPRAZOLE SODIUM 40 MG/1
40 TABLET, DELAYED RELEASE ORAL
Status: DISCONTINUED | OUTPATIENT
Start: 2023-07-11 | End: 2023-07-13 | Stop reason: HOSPADM

## 2023-07-10 RX ORDER — POTASSIUM CHLORIDE 20 MEQ/1
40 TABLET, EXTENDED RELEASE ORAL
Status: COMPLETED | OUTPATIENT
Start: 2023-07-10 | End: 2023-07-10

## 2023-07-10 RX ADMIN — BUSPIRONE HYDROCHLORIDE 5 MG: 5 TABLET ORAL at 20:45

## 2023-07-10 RX ADMIN — IPRATROPIUM BROMIDE AND ALBUTEROL SULFATE 1 DOSE: .5; 2.5 SOLUTION RESPIRATORY (INHALATION) at 07:40

## 2023-07-10 RX ADMIN — CEFEPIME 2000 MG: 2 INJECTION, POWDER, FOR SOLUTION INTRAVENOUS at 14:11

## 2023-07-10 RX ADMIN — OXYCODONE HYDROCHLORIDE AND ACETAMINOPHEN 1 TABLET: 7.5; 325 TABLET ORAL at 20:45

## 2023-07-10 RX ADMIN — IPRATROPIUM BROMIDE AND ALBUTEROL SULFATE 1 DOSE: .5; 2.5 SOLUTION RESPIRATORY (INHALATION) at 19:03

## 2023-07-10 RX ADMIN — ATORVASTATIN CALCIUM 40 MG: 40 TABLET, FILM COATED ORAL at 20:45

## 2023-07-10 RX ADMIN — GABAPENTIN 100 MG: 100 CAPSULE ORAL at 14:08

## 2023-07-10 RX ADMIN — Medication 3 MG: at 20:44

## 2023-07-10 RX ADMIN — ENOXAPARIN SODIUM 90 MG: 100 INJECTION SUBCUTANEOUS at 20:45

## 2023-07-10 RX ADMIN — CEFEPIME 2000 MG: 2 INJECTION, POWDER, FOR SOLUTION INTRAVENOUS at 05:45

## 2023-07-10 RX ADMIN — DIVALPROEX SODIUM 125 MG: 125 CAPSULE, COATED PELLETS ORAL at 14:08

## 2023-07-10 RX ADMIN — IPRATROPIUM BROMIDE AND ALBUTEROL SULFATE 1 DOSE: .5; 2.5 SOLUTION RESPIRATORY (INHALATION) at 15:34

## 2023-07-10 RX ADMIN — DIVALPROEX SODIUM 125 MG: 125 CAPSULE, COATED PELLETS ORAL at 20:44

## 2023-07-10 RX ADMIN — Medication 10 ML: at 08:38

## 2023-07-10 RX ADMIN — MUPIROCIN: 20 OINTMENT TOPICAL at 08:31

## 2023-07-10 RX ADMIN — CEFEPIME 2000 MG: 2 INJECTION, POWDER, FOR SOLUTION INTRAVENOUS at 22:31

## 2023-07-10 RX ADMIN — POLYETHYLENE GLYCOL (3350) 17 G: 17 POWDER, FOR SOLUTION ORAL at 08:29

## 2023-07-10 RX ADMIN — ARIPIPRAZOLE 5 MG: 10 TABLET ORAL at 20:44

## 2023-07-10 RX ADMIN — DILTIAZEM HYDROCHLORIDE 60 MG: 60 TABLET, FILM COATED ORAL at 12:44

## 2023-07-10 RX ADMIN — DILTIAZEM HYDROCHLORIDE 60 MG: 60 TABLET, FILM COATED ORAL at 05:44

## 2023-07-10 RX ADMIN — INSULIN GLARGINE 5 UNITS: 100 INJECTION, SOLUTION SUBCUTANEOUS at 20:45

## 2023-07-10 RX ADMIN — BUSPIRONE HYDROCHLORIDE 5 MG: 5 TABLET ORAL at 08:29

## 2023-07-10 RX ADMIN — DIVALPROEX SODIUM 125 MG: 125 CAPSULE, COATED PELLETS ORAL at 08:29

## 2023-07-10 RX ADMIN — GABAPENTIN 100 MG: 100 CAPSULE ORAL at 08:31

## 2023-07-10 RX ADMIN — POTASSIUM CHLORIDE 40 MEQ: 1500 TABLET, EXTENDED RELEASE ORAL at 10:15

## 2023-07-10 RX ADMIN — IPRATROPIUM BROMIDE AND ALBUTEROL SULFATE 1 DOSE: .5; 2.5 SOLUTION RESPIRATORY (INHALATION) at 11:43

## 2023-07-10 RX ADMIN — DEXTROMETHORPHAN HYDROBROMIDE AND QUINIDINE SULFATE 1 CAPSULE: 20; 10 CAPSULE, GELATIN COATED ORAL at 20:51

## 2023-07-10 RX ADMIN — DILTIAZEM HYDROCHLORIDE 60 MG: 60 TABLET, FILM COATED ORAL at 18:12

## 2023-07-10 RX ADMIN — PANTOPRAZOLE SODIUM 40 MG: 40 INJECTION, POWDER, FOR SOLUTION INTRAVENOUS at 08:38

## 2023-07-10 RX ADMIN — SERTRALINE HYDROCHLORIDE 50 MG: 50 TABLET ORAL at 08:31

## 2023-07-10 RX ADMIN — POTASSIUM CHLORIDE 40 MEQ: 1500 TABLET, EXTENDED RELEASE ORAL at 08:30

## 2023-07-10 RX ADMIN — POTASSIUM CHLORIDE 10 MEQ: 1500 TABLET, EXTENDED RELEASE ORAL at 08:30

## 2023-07-10 RX ADMIN — DEXTROMETHORPHAN HYDROBROMIDE AND QUINIDINE SULFATE 1 CAPSULE: 20; 10 CAPSULE, GELATIN COATED ORAL at 08:40

## 2023-07-10 RX ADMIN — OXYCODONE HYDROCHLORIDE AND ACETAMINOPHEN 1 TABLET: 7.5; 325 TABLET ORAL at 08:28

## 2023-07-10 RX ADMIN — GABAPENTIN 100 MG: 100 CAPSULE ORAL at 20:44

## 2023-07-10 ASSESSMENT — PAIN SCALES - GENERAL: PAINLEVEL_OUTOF10: 5

## 2023-07-10 ASSESSMENT — PAIN DESCRIPTION - LOCATION: LOCATION: BACK

## 2023-07-10 NOTE — CARE COORDINATION
INTERDISCIPLINARY PLAN OF CARE CONFERENCE    Date/Time: 7/10/2023 12:10 PM  Completed by: Princess Mancuso, Case Management      Patient Name:  John Paul Gallego  YOB: 1950  Admitting Diagnosis: Respiratory failure ([address]) [J96.90]  Chest pain [R07.9]  Elevated troponin [R77.8]  Elevated brain natriuretic peptide (BNP) level [R79.89]  Acute respiratory failure with hypoxia ([address]) [J96.01]  Chest pain, unspecified type [R07.9]     Admit Date/Time:  7/6/2023  6:17 PM    Chart reviewed. Interdisciplinary team contacted or reviewed plan related to patient progress and discharge plans. Disciplines included Case Management, Nursing, and Dietitian. Current Status: stable/confused  PT/OT recommendation for discharge plan of care: NA - LTC @ [facility]    Expected D/C Disposition:  Nursing Home  Confirmed plan with patient and/or family Yes confirmed with: (name) arlene  Met with: Panfilo Salomon    Discharge Plan Comments: Reviewed chart, met with pt at bedside. Pt confused. Current plan is for pt to return to [facility] LT at [address] [place] [address]. Will continue to follow.      Home O2 in place on admit: Yes  Pt informed of need to bring portable home O2 tank on day of discharge for nursing to connect prior to leaving:  Not Indicated  Verbalized agreement/Understanding:  Not Indicated

## 2023-07-10 NOTE — CONSULTS
Neurology consultation note    Patient name: Rashawn Juan      Chief Complaint:  Altered mental status. History of present illness: This is 74years old female. The patient has been admitted in the hospital since last week due to hypercapnic respiratory failure. Upon admission, the patient is noted for confusion but the patient has a baseline dementia with schizoaffective disorder. The patient is a poor historian. So I obtain history from medical record review. The patient was diagnosed with Alzheimer dementia back in 2005. But there is no acetylcholine esterase inhibitor or NMDA antagonist on her current medication lists. The patient is also taking Depakote, Abilify, and gabapentin. The patient has no focality except bilateral leg weakness at her baseline.     Past medical history:    Past Medical History:   Diagnosis Date    Acute diastolic congestive heart failure ([address]) 9/01/2444    Acute diastolic heart failure (HCC)     Acute respiratory failure ([facility])     Adjustment disorder with mixed anxiety and depressed mood     Allergic rhinitis     Alzheimer's dementia ([address])     Arachnoid cyst 5/23/2005    Atrial fibrillation ([facility])     CHF (congestive heart failure) ([facility])     Chronic back pain     Constipation     COPD (chronic obstructive pulmonary disease) ([facility])     Diabetes mellitus ([address])     Diskitis 10/6/2011    Disseminated superficial actinic porokeratosis (DSAP)     Edema     ESBL (extended spectrum beta-lactamase) producing bacteria infection 04/17/2020    Urine Klebsiella    Hypertension     Hypo-osmolality and hyponatremia     Major depressive disorder     Morbid obesity ([facility])     Muscle weakness     Osteomyelitis of spine ([address]) 10/6/2011    Overactive bladder     Schizoaffective disorder ([facility])     Seizures ([facility])     Urinary tract infection without hematuria     Xerosis cutis        Past surgical history:    Past Surgical History:   Procedure Laterality Date    IR MIDLINE CATH  10/10/2022

## 2023-07-11 ENCOUNTER — APPOINTMENT (OUTPATIENT)
Dept: GENERAL RADIOLOGY | Age: 73
DRG: 177 | End: 2023-07-11
Payer: COMMERCIAL

## 2023-07-11 LAB
AMMONIA PLAS-SCNC: 33 UMOL/L (ref 11–51)
GLUCOSE BLD-MCNC: 127 MG/DL (ref 70–99)
GLUCOSE BLD-MCNC: 141 MG/DL (ref 70–99)
GLUCOSE BLD-MCNC: 146 MG/DL (ref 70–99)
GLUCOSE BLD-MCNC: 158 MG/DL (ref 70–99)
PERFORMED ON: ABNORMAL

## 2023-07-11 PROCEDURE — 2580000003 HC RX 258: Performed by: INTERNAL MEDICINE

## 2023-07-11 PROCEDURE — 6370000000 HC RX 637 (ALT 250 FOR IP): Performed by: INTERNAL MEDICINE

## 2023-07-11 PROCEDURE — 95816 EEG AWAKE AND DROWSY: CPT

## 2023-07-11 PROCEDURE — 99233 SBSQ HOSP IP/OBS HIGH 50: CPT | Performed by: INTERNAL MEDICINE

## 2023-07-11 PROCEDURE — 6370000000 HC RX 637 (ALT 250 FOR IP): Performed by: PEDIATRICS

## 2023-07-11 PROCEDURE — 82746 ASSAY OF FOLIC ACID SERUM: CPT

## 2023-07-11 PROCEDURE — 71045 X-RAY EXAM CHEST 1 VIEW: CPT

## 2023-07-11 PROCEDURE — 1200000000 HC SEMI PRIVATE

## 2023-07-11 PROCEDURE — 6360000002 HC RX W HCPCS: Performed by: INTERNAL MEDICINE

## 2023-07-11 PROCEDURE — 99232 SBSQ HOSP IP/OBS MODERATE 35: CPT | Performed by: INTERNAL MEDICINE

## 2023-07-11 PROCEDURE — 2700000000 HC OXYGEN THERAPY PER DAY

## 2023-07-11 PROCEDURE — 94640 AIRWAY INHALATION TREATMENT: CPT

## 2023-07-11 PROCEDURE — 99232 SBSQ HOSP IP/OBS MODERATE 35: CPT | Performed by: PSYCHIATRY & NEUROLOGY

## 2023-07-11 PROCEDURE — 82140 ASSAY OF AMMONIA: CPT

## 2023-07-11 PROCEDURE — 36415 COLL VENOUS BLD VENIPUNCTURE: CPT

## 2023-07-11 PROCEDURE — 82607 VITAMIN B-12: CPT

## 2023-07-11 PROCEDURE — 2580000003 HC RX 258: Performed by: PEDIATRICS

## 2023-07-11 PROCEDURE — 94761 N-INVAS EAR/PLS OXIMETRY MLT: CPT

## 2023-07-11 RX ORDER — GABAPENTIN 100 MG/1
100 CAPSULE ORAL 3 TIMES DAILY PRN
Status: DISCONTINUED | OUTPATIENT
Start: 2023-07-11 | End: 2023-07-13 | Stop reason: HOSPADM

## 2023-07-11 RX ORDER — OXYCODONE HYDROCHLORIDE AND ACETAMINOPHEN 5; 325 MG/1; MG/1
1 TABLET ORAL NIGHTLY PRN
Status: DISCONTINUED | OUTPATIENT
Start: 2023-07-11 | End: 2023-07-13 | Stop reason: HOSPADM

## 2023-07-11 RX ORDER — CEFDINIR 300 MG/1
300 CAPSULE ORAL EVERY 12 HOURS SCHEDULED
Status: DISCONTINUED | OUTPATIENT
Start: 2023-07-11 | End: 2023-07-13 | Stop reason: HOSPADM

## 2023-07-11 RX ADMIN — DILTIAZEM HYDROCHLORIDE 60 MG: 60 TABLET, FILM COATED ORAL at 17:40

## 2023-07-11 RX ADMIN — IPRATROPIUM BROMIDE AND ALBUTEROL SULFATE 1 DOSE: .5; 2.5 SOLUTION RESPIRATORY (INHALATION) at 19:24

## 2023-07-11 RX ADMIN — INSULIN GLARGINE 5 UNITS: 100 INJECTION, SOLUTION SUBCUTANEOUS at 23:17

## 2023-07-11 RX ADMIN — BUSPIRONE HYDROCHLORIDE 5 MG: 5 TABLET ORAL at 23:17

## 2023-07-11 RX ADMIN — SERTRALINE HYDROCHLORIDE 50 MG: 50 TABLET ORAL at 08:48

## 2023-07-11 RX ADMIN — ENOXAPARIN SODIUM 90 MG: 100 INJECTION SUBCUTANEOUS at 08:48

## 2023-07-11 RX ADMIN — DILTIAZEM HYDROCHLORIDE 60 MG: 60 TABLET, FILM COATED ORAL at 02:30

## 2023-07-11 RX ADMIN — CEFEPIME 2000 MG: 2 INJECTION, POWDER, FOR SOLUTION INTRAVENOUS at 13:51

## 2023-07-11 RX ADMIN — DILTIAZEM HYDROCHLORIDE 60 MG: 60 TABLET, FILM COATED ORAL at 12:20

## 2023-07-11 RX ADMIN — DEXTROMETHORPHAN HYDROBROMIDE AND QUINIDINE SULFATE 1 CAPSULE: 20; 10 CAPSULE, GELATIN COATED ORAL at 08:48

## 2023-07-11 RX ADMIN — GABAPENTIN 100 MG: 100 CAPSULE ORAL at 13:51

## 2023-07-11 RX ADMIN — PANTOPRAZOLE SODIUM 40 MG: 40 TABLET, DELAYED RELEASE ORAL at 06:35

## 2023-07-11 RX ADMIN — DILTIAZEM HYDROCHLORIDE 60 MG: 60 TABLET, FILM COATED ORAL at 23:17

## 2023-07-11 RX ADMIN — DILTIAZEM HYDROCHLORIDE 60 MG: 60 TABLET, FILM COATED ORAL at 08:54

## 2023-07-11 RX ADMIN — CEFDINIR 300 MG: 300 CAPSULE ORAL at 23:17

## 2023-07-11 RX ADMIN — DEXTROMETHORPHAN HYDROBROMIDE AND QUINIDINE SULFATE 1 CAPSULE: 20; 10 CAPSULE, GELATIN COATED ORAL at 23:18

## 2023-07-11 RX ADMIN — MUPIROCIN: 20 OINTMENT TOPICAL at 23:15

## 2023-07-11 RX ADMIN — CEFEPIME 2000 MG: 2 INJECTION, POWDER, FOR SOLUTION INTRAVENOUS at 06:35

## 2023-07-11 RX ADMIN — ENOXAPARIN SODIUM 90 MG: 100 INJECTION SUBCUTANEOUS at 23:18

## 2023-07-11 RX ADMIN — IPRATROPIUM BROMIDE AND ALBUTEROL SULFATE 1 DOSE: .5; 2.5 SOLUTION RESPIRATORY (INHALATION) at 11:16

## 2023-07-11 RX ADMIN — ATORVASTATIN CALCIUM 40 MG: 40 TABLET, FILM COATED ORAL at 23:17

## 2023-07-11 RX ADMIN — Medication 3 MG: at 23:17

## 2023-07-11 RX ADMIN — DIVALPROEX SODIUM 125 MG: 125 CAPSULE, COATED PELLETS ORAL at 23:17

## 2023-07-11 RX ADMIN — POLYETHYLENE GLYCOL (3350) 17 G: 17 POWDER, FOR SOLUTION ORAL at 08:48

## 2023-07-11 RX ADMIN — Medication 10 ML: at 23:17

## 2023-07-11 RX ADMIN — DIVALPROEX SODIUM 125 MG: 125 CAPSULE, COATED PELLETS ORAL at 08:48

## 2023-07-11 RX ADMIN — BUSPIRONE HYDROCHLORIDE 5 MG: 5 TABLET ORAL at 08:48

## 2023-07-11 RX ADMIN — GABAPENTIN 100 MG: 100 CAPSULE ORAL at 23:17

## 2023-07-11 RX ADMIN — POTASSIUM CHLORIDE 10 MEQ: 1500 TABLET, EXTENDED RELEASE ORAL at 08:48

## 2023-07-11 RX ADMIN — ARIPIPRAZOLE 5 MG: 10 TABLET ORAL at 23:17

## 2023-07-11 RX ADMIN — GABAPENTIN 100 MG: 100 CAPSULE ORAL at 08:48

## 2023-07-11 RX ADMIN — IPRATROPIUM BROMIDE AND ALBUTEROL SULFATE 1 DOSE: .5; 2.5 SOLUTION RESPIRATORY (INHALATION) at 07:49

## 2023-07-11 RX ADMIN — DIVALPROEX SODIUM 125 MG: 125 CAPSULE, COATED PELLETS ORAL at 13:51

## 2023-07-11 ASSESSMENT — PAIN SCALES - GENERAL: PAINLEVEL_OUTOF10: 0

## 2023-07-11 NOTE — PROCEDURES
INTERPRETATION:  This 23-minute, computer-assisted video EEG recording is mildly abnormal.  It showed mild degree of generalized slowing background activity. No potentially epileptiform activity was present during the recording. The EEG findings were consistent with mild degree of generalized non-specific cerebral dysfunction. CLASSIFICATION:  Dysrhythmia grade 1, generalized. Sleep - unsuccessful. EKG channel. DESCRIPTION:    BACKGROUND:  The awake recording revealed 9 Hz alpha activity over the posterior head region. There were increased 4 to 7 Hz theta activity into the EEG background. Given the extensive study, the patient still did not sleep. The EEG showed normal V waves during drowsiness. There was no significant change on the EEG background with photic stimulation. Hyperventilation was omitted due to the patient was not cooperative. INTERICTAL DISCHARGES: None    CLINICAL EVENTS:  None    The EKG channel was unremarkable.

## 2023-07-11 NOTE — PLAN OF CARE
Problem: Safety - Adult  Goal: Free from fall injury  Outcome: Progressing     Problem: Pain  Goal: Verbalizes/displays adequate comfort level or baseline comfort level  Outcome: Progressing     Problem: Respiratory - Adult  Goal: Achieves optimal ventilation and oxygenation  Outcome: Progressing     Problem: ABCDS Injury Assessment  Goal: Absence of physical injury  Outcome: Progressing

## 2023-07-11 NOTE — PLAN OF CARE
HEART FAILURE CARE PLAN:    Comorbidities Reviewed: Yes   Patient has a past medical history of Acute diastolic congestive heart failure ([address]), Acute diastolic heart failure ([address]), Acute respiratory failure ([address]), Adjustment disorder with mixed anxiety and depressed mood, Allergic rhinitis, Alzheimer's dementia ([address]), Arachnoid cyst, Atrial fibrillation ([address]), CHF (congestive heart failure) ([address]), Chronic back pain, Constipation, COPD (chronic obstructive pulmonary disease) ([address]), Diabetes mellitus ([address]), Diskitis, Disseminated superficial actinic porokeratosis (DSAP), Edema, ESBL (extended spectrum beta-lactamase) producing bacteria infection, Hypertension, Hypo-osmolality and hyponatremia, Major depressive disorder, Morbid obesity ([address]), Muscle weakness, Osteomyelitis of spine ([address]), Overactive bladder, Schizoaffective disorder ([address]), Seizures ([address]), Urinary tract infection without hematuria, and Xerosis cutis. ECHOCARDIOGRAM Reviewed: Yes   Patient's Ejection Fraction (EF) is greater than 40%    Weights Reviewed:  Yes   Admission weight: 196 lb 4 oz (89 kg)   Wt Readings from Last 3 Encounters:   07/11/23 201 lb 1.6 oz (91.2 kg)   06/05/23 197 lb 9.6 oz (89.6 kg)   04/28/23 194 lb 10.7 oz (88.3 kg)     Intake & Output Reviewed: Yes     Intake/Output Summary (Last 24 hours) at 7/11/2023 1049  Last data filed at 7/11/2023 0947  Gross per 24 hour   Intake 360 ml   Output 1150 ml   Net -790 ml     Medications Reviewed: Yes   SCHEDULED HOSPITAL MEDICATIONS:   pantoprazole  40 mg Oral QAM AC    busPIRone  5 mg Oral BID    dilTIAZem  60 mg Oral 4 times per day    ARIPiprazole  5 mg Oral Nightly    atorvastatin  40 mg Oral Nightly    [START ON 7/15/2023] vitamin D3  50,000 Units Oral Q30 Days    dextromethorphan-quiNIDine  1 capsule Oral BID    divalproex  125 mg Oral TID    gabapentin  100 mg Oral TID    melatonin  3 mg Oral Nightly    potassium chloride  10 mEq Oral Daily with breakfast    sertraline  50 mg Oral

## 2023-07-12 LAB
ANION GAP SERPL CALCULATED.3IONS-SCNC: 10 MMOL/L (ref 3–16)
BUN SERPL-MCNC: 6 MG/DL (ref 7–20)
CALCIUM SERPL-MCNC: 9.4 MG/DL (ref 8.3–10.6)
CHLORIDE SERPL-SCNC: 100 MMOL/L (ref 99–110)
CO2 SERPL-SCNC: 28 MMOL/L (ref 21–32)
CREAT SERPL-MCNC: <0.5 MG/DL (ref 0.6–1.2)
FOLATE SERPL-MCNC: 10.94 NG/ML (ref 4.78–24.2)
GFR SERPLBLD CREATININE-BSD FMLA CKD-EPI: >60 ML/MIN/{1.73_M2}
GLUCOSE BLD-MCNC: 100 MG/DL (ref 70–99)
GLUCOSE BLD-MCNC: 113 MG/DL (ref 70–99)
GLUCOSE BLD-MCNC: 122 MG/DL (ref 70–99)
GLUCOSE BLD-MCNC: 147 MG/DL (ref 70–99)
GLUCOSE SERPL-MCNC: 132 MG/DL (ref 70–99)
MAGNESIUM SERPL-MCNC: 1.8 MG/DL (ref 1.8–2.4)
PERFORMED ON: ABNORMAL
POTASSIUM SERPL-SCNC: 3.3 MMOL/L (ref 3.5–5.1)
SODIUM SERPL-SCNC: 138 MMOL/L (ref 136–145)
VIT B12 SERPL-MCNC: 655 PG/ML (ref 211–911)

## 2023-07-12 PROCEDURE — 2580000003 HC RX 258: Performed by: PEDIATRICS

## 2023-07-12 PROCEDURE — 2700000000 HC OXYGEN THERAPY PER DAY

## 2023-07-12 PROCEDURE — 80048 BASIC METABOLIC PNL TOTAL CA: CPT

## 2023-07-12 PROCEDURE — 6370000000 HC RX 637 (ALT 250 FOR IP): Performed by: INTERNAL MEDICINE

## 2023-07-12 PROCEDURE — 94640 AIRWAY INHALATION TREATMENT: CPT

## 2023-07-12 PROCEDURE — 6370000000 HC RX 637 (ALT 250 FOR IP): Performed by: PEDIATRICS

## 2023-07-12 PROCEDURE — 94761 N-INVAS EAR/PLS OXIMETRY MLT: CPT

## 2023-07-12 PROCEDURE — 6360000002 HC RX W HCPCS: Performed by: INTERNAL MEDICINE

## 2023-07-12 PROCEDURE — 83735 ASSAY OF MAGNESIUM: CPT

## 2023-07-12 PROCEDURE — 99232 SBSQ HOSP IP/OBS MODERATE 35: CPT | Performed by: INTERNAL MEDICINE

## 2023-07-12 PROCEDURE — 1200000000 HC SEMI PRIVATE

## 2023-07-12 PROCEDURE — 99232 SBSQ HOSP IP/OBS MODERATE 35: CPT | Performed by: PSYCHIATRY & NEUROLOGY

## 2023-07-12 PROCEDURE — 36415 COLL VENOUS BLD VENIPUNCTURE: CPT

## 2023-07-12 RX ORDER — QUETIAPINE FUMARATE 25 MG/1
25 TABLET, FILM COATED ORAL ONCE
Status: DISCONTINUED | OUTPATIENT
Start: 2023-07-12 | End: 2023-07-12

## 2023-07-12 RX ORDER — QUETIAPINE FUMARATE 25 MG/1
25 TABLET, FILM COATED ORAL
Status: COMPLETED | OUTPATIENT
Start: 2023-07-12 | End: 2023-07-12

## 2023-07-12 RX ADMIN — IPRATROPIUM BROMIDE AND ALBUTEROL SULFATE 1 DOSE: .5; 2.5 SOLUTION RESPIRATORY (INHALATION) at 08:37

## 2023-07-12 RX ADMIN — IPRATROPIUM BROMIDE AND ALBUTEROL SULFATE 1 DOSE: .5; 2.5 SOLUTION RESPIRATORY (INHALATION) at 16:09

## 2023-07-12 RX ADMIN — CEFDINIR 300 MG: 300 CAPSULE ORAL at 12:26

## 2023-07-12 RX ADMIN — Medication 10 ML: at 12:37

## 2023-07-12 RX ADMIN — IPRATROPIUM BROMIDE AND ALBUTEROL SULFATE 1 DOSE: .5; 2.5 SOLUTION RESPIRATORY (INHALATION) at 11:37

## 2023-07-12 RX ADMIN — QUETIAPINE FUMARATE 25 MG: 25 TABLET ORAL at 00:58

## 2023-07-12 RX ADMIN — DILTIAZEM HYDROCHLORIDE 60 MG: 60 TABLET, FILM COATED ORAL at 18:23

## 2023-07-12 RX ADMIN — Medication 3 MG: at 20:05

## 2023-07-12 RX ADMIN — ATORVASTATIN CALCIUM 40 MG: 40 TABLET, FILM COATED ORAL at 20:05

## 2023-07-12 RX ADMIN — ARIPIPRAZOLE 5 MG: 10 TABLET ORAL at 20:04

## 2023-07-12 RX ADMIN — DILTIAZEM HYDROCHLORIDE 60 MG: 60 TABLET, FILM COATED ORAL at 05:23

## 2023-07-12 RX ADMIN — CEFDINIR 300 MG: 300 CAPSULE ORAL at 20:05

## 2023-07-12 RX ADMIN — DIVALPROEX SODIUM 125 MG: 125 CAPSULE, COATED PELLETS ORAL at 12:26

## 2023-07-12 RX ADMIN — PANTOPRAZOLE SODIUM 40 MG: 40 TABLET, DELAYED RELEASE ORAL at 05:23

## 2023-07-12 RX ADMIN — BUSPIRONE HYDROCHLORIDE 5 MG: 5 TABLET ORAL at 20:04

## 2023-07-12 RX ADMIN — IPRATROPIUM BROMIDE AND ALBUTEROL SULFATE 1 DOSE: .5; 2.5 SOLUTION RESPIRATORY (INHALATION) at 19:35

## 2023-07-12 RX ADMIN — DIVALPROEX SODIUM 125 MG: 125 CAPSULE, COATED PELLETS ORAL at 20:05

## 2023-07-12 RX ADMIN — INSULIN GLARGINE 5 UNITS: 100 INJECTION, SOLUTION SUBCUTANEOUS at 20:05

## 2023-07-12 RX ADMIN — DEXTROMETHORPHAN HYDROBROMIDE AND QUINIDINE SULFATE 1 CAPSULE: 20; 10 CAPSULE, GELATIN COATED ORAL at 20:05

## 2023-07-12 RX ADMIN — SERTRALINE HYDROCHLORIDE 50 MG: 50 TABLET ORAL at 12:26

## 2023-07-12 RX ADMIN — DILTIAZEM HYDROCHLORIDE 60 MG: 60 TABLET, FILM COATED ORAL at 12:26

## 2023-07-12 RX ADMIN — Medication 10 ML: at 20:06

## 2023-07-12 RX ADMIN — DEXTROMETHORPHAN HYDROBROMIDE AND QUINIDINE SULFATE 1 CAPSULE: 20; 10 CAPSULE, GELATIN COATED ORAL at 12:27

## 2023-07-12 RX ADMIN — ENOXAPARIN SODIUM 90 MG: 100 INJECTION SUBCUTANEOUS at 20:03

## 2023-07-12 RX ADMIN — BUSPIRONE HYDROCHLORIDE 5 MG: 5 TABLET ORAL at 12:26

## 2023-07-12 RX ADMIN — POLYETHYLENE GLYCOL (3350) 17 G: 17 POWDER, FOR SOLUTION ORAL at 12:27

## 2023-07-12 NOTE — PLAN OF CARE
Problem: Discharge Planning  Goal: Discharge to home or other facility with appropriate resources  7/12/2023 1754 by Mary Beth Blanco RN  Outcome: Progressing  Flowsheets (Taken 7/12/2023 1226)  Discharge to home or other facility with appropriate resources: Identify barriers to discharge with patient and caregiver  7/12/2023 0442 by Latia Parish RN  Outcome: Progressing     Problem: Safety - Adult  Goal: Free from fall injury  7/12/2023 1754 by Mary Beth Blanco RN  Outcome: Progressing  7/12/2023 0442 by Latia Parish RN  Outcome: Progressing     Problem: Pain  Goal: Verbalizes/displays adequate comfort level or baseline comfort level  7/12/2023 1754 by Mary Beth Blanco RN  Outcome: Progressing  7/12/2023 0442 by Latia Parish RN  Outcome: Progressing     Problem: Chronic Conditions and Co-morbidities  Goal: Patient's chronic conditions and co-morbidity symptoms are monitored and maintained or improved  7/12/2023 1754 by Mary Beth Blanco RN  Outcome: Progressing  7/12/2023 0442 by Latia Parish RN  Outcome: Progressing     Problem: Respiratory - Adult  Goal: Achieves optimal ventilation and oxygenation  7/12/2023 1754 by Mary Beth Blanco RN  Outcome: Progressing  7/12/2023 0442 by Latia Parish RN  Outcome: Progressing     Problem: Infection - Adult  Goal: Absence of infection at discharge  7/12/2023 1754 by Mary Beth Blanco RN  Outcome: Progressing  7/12/2023 0442 by Latia Parish RN  Outcome: Progressing     Problem: Neurosensory - Adult  Goal: Achieves stable or improved neurological status  7/12/2023 1754 by Mary Beth Blanco RN  Outcome: Progressing  7/12/2023 0442 by Latia Parish RN  Outcome: Progressing     Problem: Cardiovascular - Adult  Goal: Absence of cardiac dysrhythmias or at baseline  7/12/2023 1754 by Mary Beth Blanco RN  Outcome: Progressing  7/12/2023 0442 by Latia Parish RN  Outcome: Progressing     Problem: Skin/Tissue Integrity - Adult  Goal: Skin integrity remains intact  7/12/2023 1754 by Mary Beth Blanco

## 2023-07-12 NOTE — PLAN OF CARE
Problem: Discharge Planning  Goal: Discharge to home or other facility with appropriate resources  Outcome: Progressing     Problem: Safety - Adult  Goal: Free from fall injury  Outcome: Progressing     Problem: Pain  Goal: Verbalizes/displays adequate comfort level or baseline comfort level  Outcome: Progressing     Problem: Chronic Conditions and Co-morbidities  Goal: Patient's chronic conditions and co-morbidity symptoms are monitored and maintained or improved  Outcome: Progressing     Problem: Respiratory - Adult  Goal: Achieves optimal ventilation and oxygenation  Outcome: Progressing     Problem: Skin/Tissue Integrity  Goal: Absence of new skin breakdown  Description: 1. Monitor for areas of redness and/or skin breakdown  2. Assess vascular access sites hourly  3. Every 4-6 hours minimum:  Change oxygen saturation probe site  4. Every 4-6 hours:  If on nasal continuous positive airway pressure, respiratory therapy assess nares and determine need for appliance change or resting period.   Outcome: Progressing     Problem: ABCDS Injury Assessment  Goal: Absence of physical injury  Outcome: Progressing     Problem: Infection - Adult  Goal: Absence of infection at discharge  Outcome: Progressing     Problem: Neurosensory - Adult  Goal: Achieves stable or improved neurological status  Outcome: Progressing     Problem: Cardiovascular - Adult  Goal: Absence of cardiac dysrhythmias or at baseline  Outcome: Progressing     Problem: Skin/Tissue Integrity - Adult  Goal: Skin integrity remains intact  Outcome: Progressing     Problem: Musculoskeletal - Adult  Goal: Return mobility to safest level of function  Outcome: Progressing     Problem: Gastrointestinal - Adult  Goal: Maintains adequate nutritional intake  Outcome: Progressing     Problem: Genitourinary - Adult  Goal: Urinary catheter remains patent  Outcome: Progressing     Problem: Metabolic/Fluid and Electrolytes - Adult  Goal: Electrolytes maintained within

## 2023-07-12 NOTE — CARE COORDINATION
INTERDISCIPLINARY PLAN OF CARE CONFERENCE    Date/Time: 7/12/2023 9:14 AM  Completed by: Brandi Monterroso RN, Case Management      Patient Name:  Mirna Springer  YOB: 1950  Admitting Diagnosis: Respiratory failure ([address]) [J96.90]  Chest pain [R07.9]  Elevated troponin [R77.8]  Elevated brain natriuretic peptide (BNP) level [R79.89]  Acute respiratory failure with hypoxia ([address]) [J96.01]  Chest pain, unspecified type [R07.9]     Admit Date/Time:  7/6/2023  6:17 PM    Chart reviewed. Interdisciplinary team contacted or reviewed plan related to patient progress and discharge plans. Disciplines included Case Management, Nursing, and Dietitian. Current Status:IP 07/07/2023  PT/OT recommendation for discharge plan of care: NA    Expected D/C Disposition:  Nursing Home    Discharge Plan Comments: From [address] and will return. +bed hold. [facility] admissions already aware of need for BiPap and arrangements already made by [facility] to accommodate needs. +CM following    Home O2 in place on admit: Yes (base line 2 liters)  Pt informed of need to bring portable home O2 tank on day of discharge for nursing to connect prior to leaving:  YES  Verbalized agreement/Understanding:   Yes

## 2023-07-13 VITALS
BODY MASS INDEX: 35.22 KG/M2 | SYSTOLIC BLOOD PRESSURE: 131 MMHG | WEIGHT: 198.8 LBS | RESPIRATION RATE: 16 BRPM | TEMPERATURE: 97.5 F | HEART RATE: 96 BPM | OXYGEN SATURATION: 96 % | HEIGHT: 63 IN | DIASTOLIC BLOOD PRESSURE: 78 MMHG

## 2023-07-13 LAB
ANION GAP SERPL CALCULATED.3IONS-SCNC: 12 MMOL/L (ref 3–16)
BASOPHILS # BLD: 0 K/UL (ref 0–0.2)
BASOPHILS NFR BLD: 0.7 %
BUN SERPL-MCNC: 5 MG/DL (ref 7–20)
CALCIUM SERPL-MCNC: 9.5 MG/DL (ref 8.3–10.6)
CHLORIDE SERPL-SCNC: 99 MMOL/L (ref 99–110)
CO2 SERPL-SCNC: 27 MMOL/L (ref 21–32)
CREAT SERPL-MCNC: <0.5 MG/DL (ref 0.6–1.2)
DEPRECATED RDW RBC AUTO: 15.5 % (ref 12.4–15.4)
EOSINOPHIL # BLD: 0.1 K/UL (ref 0–0.6)
EOSINOPHIL NFR BLD: 2 %
GFR SERPLBLD CREATININE-BSD FMLA CKD-EPI: >60 ML/MIN/{1.73_M2}
GLUCOSE BLD-MCNC: 113 MG/DL (ref 70–99)
GLUCOSE BLD-MCNC: 113 MG/DL (ref 70–99)
GLUCOSE SERPL-MCNC: 117 MG/DL (ref 70–99)
HCT VFR BLD AUTO: 37.7 % (ref 36–48)
HGB BLD-MCNC: 12.4 G/DL (ref 12–16)
LYMPHOCYTES # BLD: 1.8 K/UL (ref 1–5.1)
LYMPHOCYTES NFR BLD: 24.8 %
MAGNESIUM SERPL-MCNC: 1.7 MG/DL (ref 1.8–2.4)
MCH RBC QN AUTO: 28.5 PG (ref 26–34)
MCHC RBC AUTO-ENTMCNC: 33 G/DL (ref 31–36)
MCV RBC AUTO: 86.4 FL (ref 80–100)
MONOCYTES # BLD: 0.7 K/UL (ref 0–1.3)
MONOCYTES NFR BLD: 9.3 %
NEUTROPHILS # BLD: 4.5 K/UL (ref 1.7–7.7)
NEUTROPHILS NFR BLD: 63.2 %
PERFORMED ON: ABNORMAL
PERFORMED ON: ABNORMAL
PLATELET # BLD AUTO: 117 K/UL (ref 135–450)
PMV BLD AUTO: 10.1 FL (ref 5–10.5)
POTASSIUM SERPL-SCNC: 3.3 MMOL/L (ref 3.5–5.1)
RBC # BLD AUTO: 4.36 M/UL (ref 4–5.2)
SODIUM SERPL-SCNC: 138 MMOL/L (ref 136–145)
WBC # BLD AUTO: 7.1 K/UL (ref 4–11)

## 2023-07-13 PROCEDURE — 83735 ASSAY OF MAGNESIUM: CPT

## 2023-07-13 PROCEDURE — 6370000000 HC RX 637 (ALT 250 FOR IP): Performed by: INTERNAL MEDICINE

## 2023-07-13 PROCEDURE — 94640 AIRWAY INHALATION TREATMENT: CPT

## 2023-07-13 PROCEDURE — 80048 BASIC METABOLIC PNL TOTAL CA: CPT

## 2023-07-13 PROCEDURE — 2700000000 HC OXYGEN THERAPY PER DAY

## 2023-07-13 PROCEDURE — 94010 BREATHING CAPACITY TEST: CPT

## 2023-07-13 PROCEDURE — 85025 COMPLETE CBC W/AUTO DIFF WBC: CPT

## 2023-07-13 PROCEDURE — 99232 SBSQ HOSP IP/OBS MODERATE 35: CPT | Performed by: INTERNAL MEDICINE

## 2023-07-13 PROCEDURE — 6370000000 HC RX 637 (ALT 250 FOR IP): Performed by: PEDIATRICS

## 2023-07-13 PROCEDURE — 36415 COLL VENOUS BLD VENIPUNCTURE: CPT

## 2023-07-13 PROCEDURE — 94761 N-INVAS EAR/PLS OXIMETRY MLT: CPT

## 2023-07-13 RX ORDER — FUROSEMIDE 20 MG/1
20 TABLET ORAL DAILY
Qty: 60 TABLET | Refills: 3 | Status: SHIPPED
Start: 2023-07-13

## 2023-07-13 RX ORDER — GABAPENTIN 100 MG/1
100 CAPSULE ORAL 3 TIMES DAILY PRN
Qty: 15 CAPSULE | Refills: 0 | Status: SHIPPED
Start: 2023-07-13 | End: 2023-07-18

## 2023-07-13 RX ORDER — INSULIN LISPRO 100 [IU]/ML
0-4 INJECTION, SOLUTION INTRAVENOUS; SUBCUTANEOUS
Qty: 5 ML | Refills: 0
Start: 2023-07-13 | End: 2023-08-24

## 2023-07-13 RX ORDER — OXYCODONE AND ACETAMINOPHEN 7.5; 325 MG/1; MG/1
1 TABLET ORAL EVERY 6 HOURS PRN
Qty: 12 TABLET | Refills: 0 | Status: SHIPPED | OUTPATIENT
Start: 2023-07-13 | End: 2023-07-16

## 2023-07-13 RX ORDER — INSULIN GLARGINE 100 [IU]/ML
5 INJECTION, SOLUTION SUBCUTANEOUS NIGHTLY
Qty: 10 ML | Refills: 3 | Status: SHIPPED
Start: 2023-07-13

## 2023-07-13 RX ORDER — SENNA PLUS 8.6 MG/1
1 TABLET ORAL 2 TIMES DAILY
Qty: 60 TABLET | Refills: 11
Start: 2023-07-13 | End: 2024-07-12

## 2023-07-13 RX ORDER — POTASSIUM CHLORIDE 20 MEQ/1
40 TABLET, EXTENDED RELEASE ORAL EVERY 4 HOURS
Status: DISCONTINUED | OUTPATIENT
Start: 2023-07-13 | End: 2023-07-13

## 2023-07-13 RX ORDER — LANOLIN ALCOHOL/MO/W.PET/CERES
400 CREAM (GRAM) TOPICAL ONCE
Status: COMPLETED | OUTPATIENT
Start: 2023-07-13 | End: 2023-07-13

## 2023-07-13 RX ORDER — CEFDINIR 300 MG/1
300 CAPSULE ORAL EVERY 12 HOURS SCHEDULED
Qty: 6 CAPSULE | Refills: 0
Start: 2023-07-13 | End: 2023-07-16

## 2023-07-13 RX ADMIN — IPRATROPIUM BROMIDE AND ALBUTEROL SULFATE 1 DOSE: .5; 2.5 SOLUTION RESPIRATORY (INHALATION) at 11:13

## 2023-07-13 RX ADMIN — SERTRALINE HYDROCHLORIDE 50 MG: 50 TABLET ORAL at 10:39

## 2023-07-13 RX ADMIN — DIVALPROEX SODIUM 125 MG: 125 CAPSULE, COATED PELLETS ORAL at 10:39

## 2023-07-13 RX ADMIN — CEFDINIR 300 MG: 300 CAPSULE ORAL at 10:39

## 2023-07-13 RX ADMIN — POTASSIUM BICARBONATE 40 MEQ: 782 TABLET, EFFERVESCENT ORAL at 15:26

## 2023-07-13 RX ADMIN — DILTIAZEM HYDROCHLORIDE 60 MG: 60 TABLET, FILM COATED ORAL at 00:16

## 2023-07-13 RX ADMIN — Medication 400 MG: at 10:39

## 2023-07-13 RX ADMIN — IPRATROPIUM BROMIDE AND ALBUTEROL SULFATE 1 DOSE: .5; 2.5 SOLUTION RESPIRATORY (INHALATION) at 07:38

## 2023-07-13 RX ADMIN — DILTIAZEM HYDROCHLORIDE 60 MG: 60 TABLET, FILM COATED ORAL at 10:51

## 2023-07-13 RX ADMIN — APIXABAN 5 MG: 5 TABLET, FILM COATED ORAL at 10:39

## 2023-07-13 RX ADMIN — POTASSIUM BICARBONATE 40 MEQ: 782 TABLET, EFFERVESCENT ORAL at 10:39

## 2023-07-13 RX ADMIN — BUSPIRONE HYDROCHLORIDE 5 MG: 5 TABLET ORAL at 10:39

## 2023-07-13 RX ADMIN — PANTOPRAZOLE SODIUM 40 MG: 40 TABLET, DELAYED RELEASE ORAL at 05:39

## 2023-07-13 RX ADMIN — ACETAMINOPHEN 650 MG: 325 TABLET ORAL at 10:46

## 2023-07-13 RX ADMIN — DIVALPROEX SODIUM 125 MG: 125 CAPSULE, COATED PELLETS ORAL at 15:26

## 2023-07-13 RX ADMIN — DILTIAZEM HYDROCHLORIDE 60 MG: 60 TABLET, FILM COATED ORAL at 05:39

## 2023-07-13 RX ADMIN — DEXTROMETHORPHAN HYDROBROMIDE AND QUINIDINE SULFATE 1 CAPSULE: 20; 10 CAPSULE, GELATIN COATED ORAL at 10:39

## 2023-07-13 ASSESSMENT — COPD QUESTIONNAIRES
QUESTION8_ENERGYLEVEL: 4
QUESTION3_CHESTTIGHTNESS: 3
QUESTION4_WALKINCLINE: 2
QUESTION7_SLEEPQUALITY: 3
QUESTION5_HOMEACTIVITIES: 5
QUESTION2_CHESTPHLEGM: 3
QUESTION1_COUGHFREQUENCY: 5
CAT_TOTALSCORE: 30
QUESTION6_LEAVINGHOUSE: 5

## 2023-07-13 ASSESSMENT — PAIN DESCRIPTION - LOCATION: LOCATION: BACK

## 2023-07-13 ASSESSMENT — PAIN DESCRIPTION - DESCRIPTORS: DESCRIPTORS: ACHING

## 2023-07-13 NOTE — DISCHARGE SUMMARY
Hospital Medicine Discharge Summary    Patient: Ramakrishna Ocampo     Gender: female  : 1950   Age: 74 y.o. MRN: [id number]    Admitting Physician: Storm Ayala MD  Discharge Physician: Se Yan DO    Code Status: Full Code     Admit Date: 2023   Discharge Date:  2023     Disposition:  LTC    Discharge Diagnoses:     Active Hospital Problems    Diagnosis Date Noted    Thrombocytopenia ([address]) [D69.6] 2023    Elevated troponin [R77.8] 2023    Chest pain [R07.9] 2023    Respiratory failure ([address]) [J96.90] 2023    Acute encephalopathy [G93.40] 2023    Hypokalemia [E87.6] 2016       Outpatient to do list:     1) Follow-up appointments:    Primary care provider  Psychiatry    Condition at Discharge: Stable    Hospital Course:     Ramakrishna Ocampo is a 74 y.o. female with a pmh of MRSA, ESBL, DMRO afib, chronic back pain, chronic opiate use, dementia, LORENZO, hoarding behavior, COPD, DM, AFIB RVR, NSTEMI, cystitis, heart failure, GI bleed, acute anemia, hypoglycemia who presents with Chest pain      Acute on Chronic respiratory failure with hypercapnia, hypoxia, COPD, tobacco use, with atelectasis, concern for HAP:  - hx of MDRO, ESBL, and MRSA noted   - respiratory culture positive previously for MRSA noted on 23   - scheduled duonebs QID   - BIPAP started and admitted to ICU  - Started on meropenem and vancomycin  - critical care consulted, Pulm following  - Complete antibiotics with Cefdnir  - BiPAP at night and while taking naps     Fluctuating consciousness and orientation, may be multifactorial in the setting of dementia, acute illness, schizoaffective   Neurology consulted  Ammonia level normal 33  EEG completed  Decrease narcotics  -Moraes out     CAD with hx of NSTEMI, pt presents with chest pain with abnormal TNI, atrial fibrillation, chronic HF-pEF:   - presenting TNI elevated to 19 - 19 - likely with resp distress  - telemetry   - continue
AST 12 07/06/2023 06:55 PM    BILITOT 0.3 07/06/2023 06:55 PM    BILIDIR <0.2 11/04/2022 06:07 AM    LABALBU 3.6 07/06/2023 06:55 PM    LDLCALC 69 04/04/2013 12:33 PM    TRIG 166 04/04/2013 12:33 PM     Lab Results   Component Value Date    INR 1.19 (H) 04/21/2023    INR 1.36 (H) 04/11/2023    INR 1.44 (H) 04/04/2023       Radiology:  XR CHEST (2 VW)    Result Date: 7/6/2023  EXAMINATION: TWO XRAY VIEWS OF THE CHEST 7/6/2023 7:00 pm COMPARISON: 04/20/2023 and 06/03/2023 HISTORY: ORDERING SYSTEM PROVIDED HISTORY: chest pain TECHNOLOGIST PROVIDED HISTORY: Reason for exam:->chest pain Reason for Exam: chest pain Additional signs and symptoms: best possible images FINDINGS: Stable cardiac silhouette enlargement. No significant pleural effusion. No pneumothorax. No consolidation or overt pulmonary edema. Streaky bibasilar opacities likely due to atelectasis. No acute fracture identified radiographically. Streaky bibasilar opacities likely representing atelectasis rather than infiltrates. Stable cardiac silhouette enlargement. No overt pulmonary edema. CT ABDOMEN PELVIS W IV CONTRAST Additional Contrast? None    Result Date: 7/6/2023  EXAMINATION: CT OF THE ABDOMEN AND PELVIS WITH CONTRAST 7/6/2023 7:57 pm TECHNIQUE: CT of the abdomen and pelvis was performed with the administration of intravenous contrast. Multiplanar reformatted images are provided for review. Automated exposure control, iterative reconstruction, and/or weight based adjustment of the mA/kV was utilized to reduce the radiation dose to as low as reasonably achievable. COMPARISON: 04/10/2023.  HISTORY: ORDERING SYSTEM PROVIDED HISTORY: generalized abdominal pain TECHNOLOGIST PROVIDED HISTORY: Reason for exam:->generalized abdominal pain Additional Contrast?->None Decision Support Exception - unselect if not a suspected or confirmed emergency medical condition->Emergency Medical Condition (MA) Reason for Exam: generalized abdominal pain, pt

## 2023-07-13 NOTE — PLAN OF CARE
Problem: Discharge Planning  Goal: Discharge to home or other facility with appropriate resources  Outcome: Progressing  Flowsheets (Taken 7/13/2023 1030)  Discharge to home or other facility with appropriate resources: Identify barriers to discharge with patient and caregiver     Problem: Safety - Adult  Goal: Free from fall injury  Outcome: Progressing     Problem: Pain  Goal: Verbalizes/displays adequate comfort level or baseline comfort level  Outcome: Progressing     Problem: Chronic Conditions and Co-morbidities  Goal: Patient's chronic conditions and co-morbidity symptoms are monitored and maintained or improved  Outcome: Progressing     Problem: Respiratory - Adult  Goal: Achieves optimal ventilation and oxygenation  Outcome: Progressing     Problem: Infection - Adult  Goal: Absence of infection at discharge  Outcome: Progressing     Problem: Neurosensory - Adult  Goal: Achieves stable or improved neurological status  Outcome: Progressing     Problem: Cardiovascular - Adult  Goal: Absence of cardiac dysrhythmias or at baseline  Outcome: Progressing     Problem: Skin/Tissue Integrity - Adult  Goal: Skin integrity remains intact  Outcome: Progressing  Flowsheets  Taken 7/13/2023 1504  Skin Integrity Remains Intact: Monitor for areas of redness and/or skin breakdown  Taken 7/13/2023 1030  Skin Integrity Remains Intact: Monitor for areas of redness and/or skin breakdown     Problem: Musculoskeletal - Adult  Goal: Return mobility to safest level of function  Outcome: Progressing     Problem: Gastrointestinal - Adult  Goal: Maintains adequate nutritional intake  Outcome: Progressing     Problem: Genitourinary - Adult  Goal: Urinary catheter remains patent  Outcome: Progressing     Problem: Metabolic/Fluid and Electrolytes - Adult  Goal: Electrolytes maintained within normal limits  Outcome: Progressing     Problem: Hematologic - Adult  Goal: Maintains hematologic stability  Outcome: Progressing     Problem:

## 2023-07-13 NOTE — CARE COORDINATION
DISCHARGE ORDER  Date/Time 2023 2:50 PM  Completed by: Janet Villalta, Case Management    Patient Name: Rashawn Juan    : 1950      Admit order Date and Status: 23 Stable  Noted discharge order. (verify MD's last order for status of admission/Traditional Medicare 3 [place] Inpatient qualifying stay required for SNF)    Confirmed discharge plan with:              Patient:  Yes              When pt confirms DC plan does any support person need to be contacted by CM No                  Discharge to Facility: [facility] phone number for staff giving report: [phone]  Pre-certification completed: [address] Notification (HENS) completed: NA LTC  Discharge orders and Continuity of Care faxed to facility:  can pull from [facility]     Transportation:               Medical Transport explained with choice list offered to pt/family. Choice:(no preference)  Agency used: [address] [place] [address] Drive up time:   1600      Pt/family/Nursing/Facility aware of  time:  Yes  Names: PT, Esperanza [place] at [facility], [address] [place] [address], MaryE llen RODGERS, [employer]   Ambulance form completed: yes      Date Last IMM Given: NA- LTC    Comments: Reviewed chart, met with pt at bedside, noted DC order. Pt to return to [facility] at [address] at 1600 via [address]. Pt verbally states understanding. Pt is being d/c'd to [facility] today. Pt's O2 sats are 96% on RA. Discharge timeout done with PTMIROSLAVA Nikki at [facility], [address] [place] [address], [employer], [employer] . All discharge needs and concerns addressed. Discharging nurse to complete OLEG, reconcile AVS, and place final copy with patient's discharge packet. Discharging RN to ensure that written prescriptions for  Level II medications are sent with patient to the facility as per protocol.

## 2023-08-07 PROBLEM — R79.89 ELEVATED TROPONIN: Status: RESOLVED | Noted: 2023-07-08 | Resolved: 2023-08-07

## 2023-09-05 PROBLEM — J18.9 COMMUNITY ACQUIRED PNEUMONIA: Status: ACTIVE | Noted: 2023-09-05

## 2023-12-08 ENCOUNTER — APPOINTMENT (OUTPATIENT)
Dept: GENERAL RADIOLOGY | Age: 73
DRG: 177 | End: 2023-12-08
Payer: COMMERCIAL

## 2023-12-08 ENCOUNTER — HOSPITAL ENCOUNTER (INPATIENT)
Age: 73
LOS: 3 days | Discharge: NURSING, REHAB OR LONG-TERM CARE | DRG: 177 | End: 2023-12-11
Attending: EMERGENCY MEDICINE | Admitting: INTERNAL MEDICINE
Payer: COMMERCIAL

## 2023-12-08 DIAGNOSIS — J18.9 PNEUMONIA OF LEFT LOWER LOBE DUE TO INFECTIOUS ORGANISM: Primary | ICD-10-CM

## 2023-12-08 DIAGNOSIS — J96.21 ACUTE ON CHRONIC RESPIRATORY FAILURE WITH HYPOXIA AND HYPERCAPNIA (HCC): ICD-10-CM

## 2023-12-08 DIAGNOSIS — G89.4 CHRONIC PAIN SYNDROME: ICD-10-CM

## 2023-12-08 DIAGNOSIS — A41.9 SEPTICEMIA (HCC): ICD-10-CM

## 2023-12-08 DIAGNOSIS — J96.22 ACUTE ON CHRONIC RESPIRATORY FAILURE WITH HYPOXIA AND HYPERCAPNIA (HCC): ICD-10-CM

## 2023-12-08 PROBLEM — J44.1 COPD EXACERBATION (HCC): Status: ACTIVE | Noted: 2023-12-08

## 2023-12-08 PROBLEM — L03.115 CELLULITIS OF RIGHT LOWER EXTREMITY: Status: ACTIVE | Noted: 2023-12-08

## 2023-12-08 LAB
ALBUMIN SERPL-MCNC: 3.8 G/DL (ref 3.4–5)
ALBUMIN/GLOB SERPL: 0.9 {RATIO} (ref 1.1–2.2)
ALP SERPL-CCNC: 99 U/L (ref 40–129)
ALT SERPL-CCNC: <5 U/L (ref 10–40)
ANION GAP SERPL CALCULATED.3IONS-SCNC: 7 MMOL/L (ref 3–16)
AST SERPL-CCNC: 10 U/L (ref 15–37)
BASE EXCESS BLDA CALC-SCNC: 4.2 MMOL/L (ref -3–3)
BASE EXCESS BLDV CALC-SCNC: 4.5 MMOL/L (ref -3–3)
BASOPHILS # BLD: 0 K/UL (ref 0–0.2)
BASOPHILS NFR BLD: 0.4 %
BILIRUB SERPL-MCNC: 0.5 MG/DL (ref 0–1)
BUN SERPL-MCNC: 11 MG/DL (ref 7–20)
CALCIUM SERPL-MCNC: 9.4 MG/DL (ref 8.3–10.6)
CHLORIDE SERPL-SCNC: 97 MMOL/L (ref 99–110)
CO2 BLDA-SCNC: 31.5 MMOL/L
CO2 BLDV-SCNC: 35 MMOL/L
CO2 SERPL-SCNC: 34 MMOL/L (ref 21–32)
COHGB MFR BLDA: 0.3 % (ref 0–1.5)
COHGB MFR BLDV: 2.1 % (ref 0–1.5)
CREAT SERPL-MCNC: <0.5 MG/DL (ref 0.6–1.2)
DEPRECATED RDW RBC AUTO: 15.3 % (ref 12.4–15.4)
EKG ATRIAL RATE: 111 BPM
EKG DIAGNOSIS: NORMAL
EKG Q-T INTERVAL: 322 MS
EKG QRS DURATION: 90 MS
EKG QTC CALCULATION (BAZETT): 406 MS
EKG R AXIS: 78 DEGREES
EKG T AXIS: 117 DEGREES
EKG VENTRICULAR RATE: 96 BPM
EOSINOPHIL # BLD: 0.1 K/UL (ref 0–0.6)
EOSINOPHIL NFR BLD: 1.1 %
FLUAV RNA RESP QL NAA+PROBE: NOT DETECTED
FLUBV RNA RESP QL NAA+PROBE: NOT DETECTED
GFR SERPLBLD CREATININE-BSD FMLA CKD-EPI: >60 ML/MIN/{1.73_M2}
GLUCOSE BLD-MCNC: 171 MG/DL (ref 70–99)
GLUCOSE SERPL-MCNC: 252 MG/DL (ref 70–99)
HCO3 BLDA-SCNC: 30 MMOL/L (ref 21–29)
HCO3 BLDV-SCNC: 32.7 MMOL/L (ref 23–29)
HCT VFR BLD AUTO: 41.4 % (ref 36–48)
HGB BLD-MCNC: 13 G/DL (ref 12–16)
HGB BLDA-MCNC: 12.4 G/DL (ref 12–16)
LACTATE BLDV-SCNC: 1.1 MMOL/L (ref 0.4–1.9)
LACTATE BLDV-SCNC: 1.5 MMOL/L (ref 0.4–1.9)
LYMPHOCYTES # BLD: 1.7 K/UL (ref 1–5.1)
LYMPHOCYTES NFR BLD: 15.4 %
MCH RBC QN AUTO: 26 PG (ref 26–34)
MCHC RBC AUTO-ENTMCNC: 31.4 G/DL (ref 31–36)
MCV RBC AUTO: 83 FL (ref 80–100)
METHGB MFR BLDA: 0.3 %
METHGB MFR BLDV: 0.3 %
MONOCYTES # BLD: 1 K/UL (ref 0–1.3)
MONOCYTES NFR BLD: 8.8 %
NEUTROPHILS # BLD: 8.4 K/UL (ref 1.7–7.7)
NEUTROPHILS NFR BLD: 74.3 %
NT-PROBNP SERPL-MCNC: 3552 PG/ML (ref 0–449)
O2 CT VFR BLDV CALC: 16 VOL %
O2 THERAPY: ABNORMAL
O2 THERAPY: ABNORMAL
PCO2 BLDA: 50.2 MMHG (ref 35–45)
PCO2 BLDV: 65 MMHG (ref 40–50)
PERFORMED ON: ABNORMAL
PH BLDA: 7.39 [PH] (ref 7.35–7.45)
PH BLDV: 7.32 [PH] (ref 7.35–7.45)
PLATELET # BLD AUTO: 185 K/UL (ref 135–450)
PMV BLD AUTO: 9.6 FL (ref 5–10.5)
PO2 BLDA: 89.3 MMHG (ref 75–108)
PO2 BLDV: 48.7 MMHG (ref 25–40)
POTASSIUM SERPL-SCNC: 4.4 MMOL/L (ref 3.5–5.1)
PROCALCITONIN SERPL IA-MCNC: 0.13 NG/ML (ref 0–0.15)
PROT SERPL-MCNC: 7.9 G/DL (ref 6.4–8.2)
RBC # BLD AUTO: 4.99 M/UL (ref 4–5.2)
RSV AG NOSE QL: NEGATIVE
SAO2 % BLDA: 96.7 %
SAO2 % BLDV: 80 %
SARS-COV-2 RNA RESP QL NAA+PROBE: NOT DETECTED
SODIUM SERPL-SCNC: 138 MMOL/L (ref 136–145)
TROPONIN, HIGH SENSITIVITY: 12 NG/L (ref 0–14)
TROPONIN, HIGH SENSITIVITY: 13 NG/L (ref 0–14)
WBC # BLD AUTO: 11.3 K/UL (ref 4–11)

## 2023-12-08 PROCEDURE — 99285 EMERGENCY DEPT VISIT HI MDM: CPT

## 2023-12-08 PROCEDURE — 73620 X-RAY EXAM OF FOOT: CPT

## 2023-12-08 PROCEDURE — 87040 BLOOD CULTURE FOR BACTERIA: CPT

## 2023-12-08 PROCEDURE — 94761 N-INVAS EAR/PLS OXIMETRY MLT: CPT

## 2023-12-08 PROCEDURE — 6360000002 HC RX W HCPCS: Performed by: NURSE PRACTITIONER

## 2023-12-08 PROCEDURE — 94640 AIRWAY INHALATION TREATMENT: CPT

## 2023-12-08 PROCEDURE — 85025 COMPLETE CBC W/AUTO DIFF WBC: CPT

## 2023-12-08 PROCEDURE — 99223 1ST HOSP IP/OBS HIGH 75: CPT | Performed by: INTERNAL MEDICINE

## 2023-12-08 PROCEDURE — 96365 THER/PROPH/DIAG IV INF INIT: CPT

## 2023-12-08 PROCEDURE — 93010 ELECTROCARDIOGRAM REPORT: CPT | Performed by: INTERNAL MEDICINE

## 2023-12-08 PROCEDURE — 93005 ELECTROCARDIOGRAM TRACING: CPT | Performed by: PHYSICIAN ASSISTANT

## 2023-12-08 PROCEDURE — 6370000000 HC RX 637 (ALT 250 FOR IP): Performed by: NURSE PRACTITIONER

## 2023-12-08 PROCEDURE — 6360000002 HC RX W HCPCS: Performed by: PHYSICIAN ASSISTANT

## 2023-12-08 PROCEDURE — 83605 ASSAY OF LACTIC ACID: CPT

## 2023-12-08 PROCEDURE — 84484 ASSAY OF TROPONIN QUANT: CPT

## 2023-12-08 PROCEDURE — 96368 THER/DIAG CONCURRENT INF: CPT

## 2023-12-08 PROCEDURE — 2060000000 HC ICU INTERMEDIATE R&B

## 2023-12-08 PROCEDURE — 83880 ASSAY OF NATRIURETIC PEPTIDE: CPT

## 2023-12-08 PROCEDURE — 96366 THER/PROPH/DIAG IV INF ADDON: CPT

## 2023-12-08 PROCEDURE — 87636 SARSCOV2 & INF A&B AMP PRB: CPT

## 2023-12-08 PROCEDURE — 83036 HEMOGLOBIN GLYCOSYLATED A1C: CPT

## 2023-12-08 PROCEDURE — 71045 X-RAY EXAM CHEST 1 VIEW: CPT

## 2023-12-08 PROCEDURE — 82803 BLOOD GASES ANY COMBINATION: CPT

## 2023-12-08 PROCEDURE — 87807 RSV ASSAY W/OPTIC: CPT

## 2023-12-08 PROCEDURE — 84145 PROCALCITONIN (PCT): CPT

## 2023-12-08 PROCEDURE — 2700000000 HC OXYGEN THERAPY PER DAY

## 2023-12-08 PROCEDURE — 80053 COMPREHEN METABOLIC PANEL: CPT

## 2023-12-08 PROCEDURE — 99223 1ST HOSP IP/OBS HIGH 75: CPT | Performed by: NURSE PRACTITIONER

## 2023-12-08 PROCEDURE — 36600 WITHDRAWAL OF ARTERIAL BLOOD: CPT

## 2023-12-08 PROCEDURE — 2580000003 HC RX 258: Performed by: NURSE PRACTITIONER

## 2023-12-08 PROCEDURE — 36415 COLL VENOUS BLD VENIPUNCTURE: CPT

## 2023-12-08 PROCEDURE — 6370000000 HC RX 637 (ALT 250 FOR IP): Performed by: PHYSICIAN ASSISTANT

## 2023-12-08 PROCEDURE — 2580000003 HC RX 258: Performed by: PHYSICIAN ASSISTANT

## 2023-12-08 RX ORDER — SODIUM CHLORIDE 0.9 % (FLUSH) 0.9 %
5-40 SYRINGE (ML) INJECTION EVERY 12 HOURS SCHEDULED
Status: DISCONTINUED | OUTPATIENT
Start: 2023-12-08 | End: 2023-12-11 | Stop reason: HOSPADM

## 2023-12-08 RX ORDER — ONDANSETRON 4 MG/1
4 TABLET, ORALLY DISINTEGRATING ORAL EVERY 8 HOURS PRN
Status: DISCONTINUED | OUTPATIENT
Start: 2023-12-08 | End: 2023-12-11 | Stop reason: HOSPADM

## 2023-12-08 RX ORDER — IPRATROPIUM BROMIDE AND ALBUTEROL SULFATE 2.5; .5 MG/3ML; MG/3ML
1 SOLUTION RESPIRATORY (INHALATION) ONCE
Status: COMPLETED | OUTPATIENT
Start: 2023-12-08 | End: 2023-12-08

## 2023-12-08 RX ORDER — ALBUTEROL SULFATE 2.5 MG/3ML
5 SOLUTION RESPIRATORY (INHALATION) ONCE
Status: COMPLETED | OUTPATIENT
Start: 2023-12-08 | End: 2023-12-08

## 2023-12-08 RX ORDER — DILTIAZEM HYDROCHLORIDE 180 MG/1
180 CAPSULE, COATED, EXTENDED RELEASE ORAL DAILY
Status: DISCONTINUED | OUTPATIENT
Start: 2023-12-09 | End: 2023-12-11 | Stop reason: HOSPADM

## 2023-12-08 RX ORDER — LACTOBACILLUS RHAMNOSUS GG 10B CELL
1 CAPSULE ORAL 2 TIMES DAILY
Status: DISCONTINUED | OUTPATIENT
Start: 2023-12-08 | End: 2023-12-11 | Stop reason: HOSPADM

## 2023-12-08 RX ORDER — GABAPENTIN 100 MG/1
100 CAPSULE ORAL 3 TIMES DAILY
COMMUNITY

## 2023-12-08 RX ORDER — POLYETHYLENE GLYCOL 3350 17 G/17G
17 POWDER, FOR SOLUTION ORAL DAILY PRN
Status: DISCONTINUED | OUTPATIENT
Start: 2023-12-08 | End: 2023-12-11 | Stop reason: HOSPADM

## 2023-12-08 RX ORDER — GABAPENTIN 100 MG/1
100 CAPSULE ORAL 3 TIMES DAILY
Status: DISCONTINUED | OUTPATIENT
Start: 2023-12-08 | End: 2023-12-11 | Stop reason: HOSPADM

## 2023-12-08 RX ORDER — CEPHALEXIN 500 MG/1
500 CAPSULE ORAL 3 TIMES DAILY
Status: ON HOLD | COMMUNITY
End: 2023-12-11 | Stop reason: HOSPADM

## 2023-12-08 RX ORDER — SODIUM CHLORIDE 9 MG/ML
INJECTION, SOLUTION INTRAVENOUS PRN
Status: DISCONTINUED | OUTPATIENT
Start: 2023-12-08 | End: 2023-12-11 | Stop reason: HOSPADM

## 2023-12-08 RX ORDER — ARIPIPRAZOLE 10 MG/1
5 TABLET ORAL NIGHTLY
Status: DISCONTINUED | OUTPATIENT
Start: 2023-12-08 | End: 2023-12-11 | Stop reason: HOSPADM

## 2023-12-08 RX ORDER — ATORVASTATIN CALCIUM 40 MG/1
40 TABLET, FILM COATED ORAL NIGHTLY
Status: DISCONTINUED | OUTPATIENT
Start: 2023-12-08 | End: 2023-12-11 | Stop reason: HOSPADM

## 2023-12-08 RX ORDER — OXYCODONE AND ACETAMINOPHEN 7.5; 325 MG/1; MG/1
1 TABLET ORAL EVERY 6 HOURS PRN
Status: ON HOLD | COMMUNITY
End: 2023-12-11 | Stop reason: SDUPTHER

## 2023-12-08 RX ORDER — 0.9 % SODIUM CHLORIDE 0.9 %
1000 INTRAVENOUS SOLUTION INTRAVENOUS ONCE
Status: COMPLETED | OUTPATIENT
Start: 2023-12-08 | End: 2023-12-08

## 2023-12-08 RX ORDER — OXYCODONE AND ACETAMINOPHEN 7.5; 325 MG/1; MG/1
1 TABLET ORAL EVERY 6 HOURS PRN
Status: DISCONTINUED | OUTPATIENT
Start: 2023-12-08 | End: 2023-12-11 | Stop reason: HOSPADM

## 2023-12-08 RX ORDER — SENNOSIDES A AND B 8.6 MG/1
1 TABLET, FILM COATED ORAL 2 TIMES DAILY
Status: DISCONTINUED | OUTPATIENT
Start: 2023-12-08 | End: 2023-12-11 | Stop reason: HOSPADM

## 2023-12-08 RX ORDER — IPRATROPIUM BROMIDE AND ALBUTEROL SULFATE 2.5; .5 MG/3ML; MG/3ML
1 SOLUTION RESPIRATORY (INHALATION)
Status: DISCONTINUED | OUTPATIENT
Start: 2023-12-08 | End: 2023-12-10

## 2023-12-08 RX ORDER — DEXTROSE MONOHYDRATE 100 MG/ML
INJECTION, SOLUTION INTRAVENOUS CONTINUOUS PRN
Status: DISCONTINUED | OUTPATIENT
Start: 2023-12-08 | End: 2023-12-11 | Stop reason: HOSPADM

## 2023-12-08 RX ORDER — INSULIN LISPRO 100 [IU]/ML
0-4 INJECTION, SOLUTION INTRAVENOUS; SUBCUTANEOUS NIGHTLY
Status: DISCONTINUED | OUTPATIENT
Start: 2023-12-08 | End: 2023-12-11 | Stop reason: HOSPADM

## 2023-12-08 RX ORDER — POTASSIUM CHLORIDE 750 MG/1
10 TABLET, EXTENDED RELEASE ORAL DAILY
Status: DISCONTINUED | OUTPATIENT
Start: 2023-12-09 | End: 2023-12-11 | Stop reason: HOSPADM

## 2023-12-08 RX ORDER — INSULIN LISPRO 100 [IU]/ML
0-4 INJECTION, SOLUTION INTRAVENOUS; SUBCUTANEOUS
Status: DISCONTINUED | OUTPATIENT
Start: 2023-12-09 | End: 2023-12-11 | Stop reason: HOSPADM

## 2023-12-08 RX ORDER — SODIUM CHLORIDE 0.9 % (FLUSH) 0.9 %
5-40 SYRINGE (ML) INJECTION PRN
Status: DISCONTINUED | OUTPATIENT
Start: 2023-12-08 | End: 2023-12-11 | Stop reason: HOSPADM

## 2023-12-08 RX ORDER — INSULIN GLARGINE 100 [IU]/ML
5 INJECTION, SOLUTION SUBCUTANEOUS NIGHTLY
Status: DISCONTINUED | OUTPATIENT
Start: 2023-12-08 | End: 2023-12-11 | Stop reason: HOSPADM

## 2023-12-08 RX ORDER — ACETAMINOPHEN 325 MG/1
650 TABLET ORAL EVERY 6 HOURS PRN
Status: DISCONTINUED | OUTPATIENT
Start: 2023-12-08 | End: 2023-12-11 | Stop reason: HOSPADM

## 2023-12-08 RX ORDER — ONDANSETRON 2 MG/ML
4 INJECTION INTRAMUSCULAR; INTRAVENOUS EVERY 6 HOURS PRN
Status: DISCONTINUED | OUTPATIENT
Start: 2023-12-08 | End: 2023-12-11 | Stop reason: HOSPADM

## 2023-12-08 RX ORDER — IPRATROPIUM BROMIDE AND ALBUTEROL SULFATE 2.5; .5 MG/3ML; MG/3ML
1 SOLUTION RESPIRATORY (INHALATION)
Status: DISCONTINUED | OUTPATIENT
Start: 2023-12-09 | End: 2023-12-08

## 2023-12-08 RX ORDER — BISACODYL 10 MG
10 SUPPOSITORY, RECTAL RECTAL DAILY PRN
Status: DISCONTINUED | OUTPATIENT
Start: 2023-12-08 | End: 2023-12-11 | Stop reason: HOSPADM

## 2023-12-08 RX ORDER — ACETAMINOPHEN 650 MG/1
650 SUPPOSITORY RECTAL EVERY 6 HOURS PRN
Status: DISCONTINUED | OUTPATIENT
Start: 2023-12-08 | End: 2023-12-11 | Stop reason: HOSPADM

## 2023-12-08 RX ORDER — LANOLIN ALCOHOL/MO/W.PET/CERES
3 CREAM (GRAM) TOPICAL NIGHTLY
Status: DISCONTINUED | OUTPATIENT
Start: 2023-12-08 | End: 2023-12-11 | Stop reason: HOSPADM

## 2023-12-08 RX ORDER — BUSPIRONE HYDROCHLORIDE 5 MG/1
5 TABLET ORAL DAILY
Status: DISCONTINUED | OUTPATIENT
Start: 2023-12-08 | End: 2023-12-11 | Stop reason: HOSPADM

## 2023-12-08 RX ADMIN — CEFEPIME 2000 MG: 2 INJECTION, POWDER, FOR SOLUTION INTRAVENOUS at 12:20

## 2023-12-08 RX ADMIN — ARIPIPRAZOLE 5 MG: 10 TABLET ORAL at 21:46

## 2023-12-08 RX ADMIN — Medication 10 ML: at 21:48

## 2023-12-08 RX ADMIN — ALBUTEROL SULFATE 5 MG: 2.5 SOLUTION RESPIRATORY (INHALATION) at 12:20

## 2023-12-08 RX ADMIN — Medication 3 MG: at 21:47

## 2023-12-08 RX ADMIN — DEXTROMETHORPHAN HYDROBROMIDE AND QUINIDINE SULFATE 1 CAPSULE: 20; 10 CAPSULE, GELATIN COATED ORAL at 21:47

## 2023-12-08 RX ADMIN — Medication 1 CAPSULE: at 21:46

## 2023-12-08 RX ADMIN — SODIUM CHLORIDE 1000 ML: 9 INJECTION, SOLUTION INTRAVENOUS at 12:22

## 2023-12-08 RX ADMIN — CEFEPIME 2000 MG: 2 INJECTION, POWDER, FOR SOLUTION INTRAVENOUS at 22:00

## 2023-12-08 RX ADMIN — ATORVASTATIN CALCIUM 40 MG: 40 TABLET, FILM COATED ORAL at 21:47

## 2023-12-08 RX ADMIN — SODIUM CHLORIDE: 9 INJECTION, SOLUTION INTRAVENOUS at 21:56

## 2023-12-08 RX ADMIN — SENNOSIDES 8.6 MG: 8.6 TABLET, FILM COATED ORAL at 21:46

## 2023-12-08 RX ADMIN — APIXABAN 5 MG: 5 TABLET, FILM COATED ORAL at 21:47

## 2023-12-08 RX ADMIN — INSULIN GLARGINE 5 UNITS: 100 INJECTION, SOLUTION SUBCUTANEOUS at 21:48

## 2023-12-08 RX ADMIN — BUSPIRONE HYDROCHLORIDE 5 MG: 5 TABLET ORAL at 21:46

## 2023-12-08 RX ADMIN — METOPROLOL TARTRATE 25 MG: 25 TABLET, FILM COATED ORAL at 21:47

## 2023-12-08 RX ADMIN — VANCOMYCIN HYDROCHLORIDE 2000 MG: 10 INJECTION, POWDER, LYOPHILIZED, FOR SOLUTION INTRAVENOUS at 12:39

## 2023-12-08 RX ADMIN — GABAPENTIN 100 MG: 100 CAPSULE ORAL at 21:47

## 2023-12-08 RX ADMIN — IPRATROPIUM BROMIDE AND ALBUTEROL SULFATE 1 DOSE: 2.5; .5 SOLUTION RESPIRATORY (INHALATION) at 12:21

## 2023-12-08 RX ADMIN — IPRATROPIUM BROMIDE AND ALBUTEROL SULFATE 1 DOSE: 2.5; .5 SOLUTION RESPIRATORY (INHALATION) at 20:26

## 2023-12-08 ASSESSMENT — LIFESTYLE VARIABLES
HOW MANY STANDARD DRINKS CONTAINING ALCOHOL DO YOU HAVE ON A TYPICAL DAY: PATIENT DOES NOT DRINK
HOW OFTEN DO YOU HAVE A DRINK CONTAINING ALCOHOL: NEVER
HOW MANY STANDARD DRINKS CONTAINING ALCOHOL DO YOU HAVE ON A TYPICAL DAY: PATIENT DOES NOT DRINK
HOW OFTEN DO YOU HAVE A DRINK CONTAINING ALCOHOL: NEVER

## 2023-12-08 ASSESSMENT — PAIN DESCRIPTION - ORIENTATION: ORIENTATION: MID

## 2023-12-08 ASSESSMENT — PAIN DESCRIPTION - FREQUENCY: FREQUENCY: CONTINUOUS

## 2023-12-08 ASSESSMENT — PAIN DESCRIPTION - DESCRIPTORS: DESCRIPTORS: ACHING

## 2023-12-08 ASSESSMENT — PAIN DESCRIPTION - LOCATION: LOCATION: CHEST

## 2023-12-08 ASSESSMENT — PAIN SCALES - GENERAL: PAINLEVEL_OUTOF10: 6

## 2023-12-08 ASSESSMENT — PAIN - FUNCTIONAL ASSESSMENT: PAIN_FUNCTIONAL_ASSESSMENT: 0-10

## 2023-12-08 ASSESSMENT — PAIN DESCRIPTION - PAIN TYPE: TYPE: ACUTE PAIN

## 2023-12-08 NOTE — ED NOTES
Respiratory notified of need for ABG and possible bipap initiation.       Kristin Schwartz RN  12/08/23 1400

## 2023-12-08 NOTE — H&P
INFLUENZA B NOT DETECTED    Rapid RSV Antigen [[id number]] Collected: 12/08/23 1158    Order Status: Completed Specimen: Nasopharyngeal Swab Updated: 12/08/23 1221     RSV Rapid Ag Negative    Blood Culture 2 [[id number]] Collected: 12/08/23 1145    Order Status: Sent Specimen: Blood Updated: 12/08/23 1205    Blood Culture 1 [[id number]] Collected: 12/08/23 1141    Order Status: Sent Specimen: Blood Updated: 12/08/23 1151             EKG:  I have reviewed the EKG with the following interpretation:     Encounter Date: 12/08/23   EKG 12 Lead   Result Value    Ventricular Rate 96    Atrial Rate 111    QRS Duration 90    Q-T Interval 322    QTc Calculation (Bazett) 406    R Axis 78    T Axis 117    Diagnosis      Atrial fibrillationNonspecific ST and T wave abnormality , probably digitalis effectAbnormal ECGWhen compared with ECG of 06-JUL-2023 18:32,T wave inversion no longer evident in Inferior leadsConfirmed by Abel Cooper ([id number]) on 12/8/2023 2:21:42 PM        RADIOLOGY    XR CHEST PORTABLE   Final Result   Left lower lobe airspace infiltrate compatible with pneumonia. Echo 4/22/2023  Summary   Technically difficult examination. Normal left ventricle size, wall thickness and systolic function with an   estimated ejection fraction of 55%. Within the limits of the study, no regional wall motion abnormalities noted. Indeterminate diastolic function. The right ventricle is normal in size with decreased function. The left atrium is moderately dilated. The right atrium is mildly dilated. The tricuspid valve is normal in structure and function. MIld tricuspid regurgitation. Systolic pulmonary artery pressure (SPAP) is normal and estimated at 36 mmHg   (right atrial pressure 3 mmHg). Irregular heart rate throughout the study.          Principal Problem:    Acute on chronic respiratory failure with hypoxia and hypercapnia (HCC)  Active Problems:    Schizoaffective disorder, depressive type

## 2023-12-09 LAB
BASOPHILS # BLD: 0 K/UL (ref 0–0.2)
BASOPHILS NFR BLD: 0.4 %
DEPRECATED RDW RBC AUTO: 15.1 % (ref 12.4–15.4)
EOSINOPHIL # BLD: 0.1 K/UL (ref 0–0.6)
EOSINOPHIL NFR BLD: 0.6 %
EST. AVERAGE GLUCOSE BLD GHB EST-MCNC: 188.6 MG/DL
GLUCOSE BLD-MCNC: 161 MG/DL (ref 70–99)
GLUCOSE BLD-MCNC: 198 MG/DL (ref 70–99)
GLUCOSE BLD-MCNC: 257 MG/DL (ref 70–99)
GLUCOSE BLD-MCNC: 333 MG/DL (ref 70–99)
HBA1C MFR BLD: 8.2 %
HCT VFR BLD AUTO: 44.2 % (ref 36–48)
HGB BLD-MCNC: 13.8 G/DL (ref 12–16)
LYMPHOCYTES # BLD: 2 K/UL (ref 1–5.1)
LYMPHOCYTES NFR BLD: 19.1 %
MCH RBC QN AUTO: 26.6 PG (ref 26–34)
MCHC RBC AUTO-ENTMCNC: 31.2 G/DL (ref 31–36)
MCV RBC AUTO: 85.3 FL (ref 80–100)
MONOCYTES # BLD: 0.7 K/UL (ref 0–1.3)
MONOCYTES NFR BLD: 6.3 %
NEUTROPHILS # BLD: 7.6 K/UL (ref 1.7–7.7)
NEUTROPHILS NFR BLD: 73.6 %
PERFORMED ON: ABNORMAL
PLATELET # BLD AUTO: 160 K/UL (ref 135–450)
PMV BLD AUTO: 10.3 FL (ref 5–10.5)
RBC # BLD AUTO: 5.18 M/UL (ref 4–5.2)
WBC # BLD AUTO: 10.3 K/UL (ref 4–11)

## 2023-12-09 PROCEDURE — 94761 N-INVAS EAR/PLS OXIMETRY MLT: CPT

## 2023-12-09 PROCEDURE — 99233 SBSQ HOSP IP/OBS HIGH 50: CPT | Performed by: INTERNAL MEDICINE

## 2023-12-09 PROCEDURE — 6370000000 HC RX 637 (ALT 250 FOR IP): Performed by: NURSE PRACTITIONER

## 2023-12-09 PROCEDURE — 2700000000 HC OXYGEN THERAPY PER DAY

## 2023-12-09 PROCEDURE — 36415 COLL VENOUS BLD VENIPUNCTURE: CPT

## 2023-12-09 PROCEDURE — 85025 COMPLETE CBC W/AUTO DIFF WBC: CPT

## 2023-12-09 PROCEDURE — 6360000002 HC RX W HCPCS: Performed by: NURSE PRACTITIONER

## 2023-12-09 PROCEDURE — 2060000000 HC ICU INTERMEDIATE R&B

## 2023-12-09 PROCEDURE — 6370000000 HC RX 637 (ALT 250 FOR IP): Performed by: INTERNAL MEDICINE

## 2023-12-09 PROCEDURE — 2580000003 HC RX 258: Performed by: NURSE PRACTITIONER

## 2023-12-09 PROCEDURE — 94640 AIRWAY INHALATION TREATMENT: CPT

## 2023-12-09 RX ADMIN — IPRATROPIUM BROMIDE AND ALBUTEROL SULFATE 1 DOSE: 2.5; .5 SOLUTION RESPIRATORY (INHALATION) at 12:08

## 2023-12-09 RX ADMIN — POTASSIUM CHLORIDE 10 MEQ: 750 TABLET, EXTENDED RELEASE ORAL at 10:19

## 2023-12-09 RX ADMIN — INSULIN LISPRO 3 UNITS: 100 INJECTION, SOLUTION INTRAVENOUS; SUBCUTANEOUS at 16:41

## 2023-12-09 RX ADMIN — IPRATROPIUM BROMIDE AND ALBUTEROL SULFATE 1 DOSE: 2.5; .5 SOLUTION RESPIRATORY (INHALATION) at 08:08

## 2023-12-09 RX ADMIN — GABAPENTIN 100 MG: 100 CAPSULE ORAL at 21:30

## 2023-12-09 RX ADMIN — METHYLPREDNISOLONE SODIUM SUCCINATE 40 MG: 40 INJECTION INTRAMUSCULAR; INTRAVENOUS at 04:22

## 2023-12-09 RX ADMIN — INSULIN GLARGINE 5 UNITS: 100 INJECTION, SOLUTION SUBCUTANEOUS at 21:30

## 2023-12-09 RX ADMIN — GABAPENTIN 100 MG: 100 CAPSULE ORAL at 10:19

## 2023-12-09 RX ADMIN — VANCOMYCIN HYDROCHLORIDE 1000 MG: 1 INJECTION, POWDER, LYOPHILIZED, FOR SOLUTION INTRAVENOUS at 13:57

## 2023-12-09 RX ADMIN — Medication 1 CAPSULE: at 10:19

## 2023-12-09 RX ADMIN — METHYLPREDNISOLONE SODIUM SUCCINATE 40 MG: 40 INJECTION INTRAMUSCULAR; INTRAVENOUS at 18:31

## 2023-12-09 RX ADMIN — IPRATROPIUM BROMIDE AND ALBUTEROL SULFATE 1 DOSE: 2.5; .5 SOLUTION RESPIRATORY (INHALATION) at 20:02

## 2023-12-09 RX ADMIN — BUSPIRONE HYDROCHLORIDE 5 MG: 5 TABLET ORAL at 10:19

## 2023-12-09 RX ADMIN — Medication 3 MG: at 21:30

## 2023-12-09 RX ADMIN — GABAPENTIN 100 MG: 100 CAPSULE ORAL at 13:55

## 2023-12-09 RX ADMIN — ATORVASTATIN CALCIUM 40 MG: 40 TABLET, FILM COATED ORAL at 21:30

## 2023-12-09 RX ADMIN — APIXABAN 5 MG: 5 TABLET, FILM COATED ORAL at 10:19

## 2023-12-09 RX ADMIN — DEXTROMETHORPHAN HYDROBROMIDE AND QUINIDINE SULFATE 1 CAPSULE: 20; 10 CAPSULE, GELATIN COATED ORAL at 10:16

## 2023-12-09 RX ADMIN — APIXABAN 5 MG: 5 TABLET, FILM COATED ORAL at 21:30

## 2023-12-09 RX ADMIN — CEFEPIME 2000 MG: 2 INJECTION, POWDER, FOR SOLUTION INTRAVENOUS at 21:28

## 2023-12-09 RX ADMIN — SENNOSIDES 8.6 MG: 8.6 TABLET, FILM COATED ORAL at 21:30

## 2023-12-09 RX ADMIN — VANCOMYCIN HYDROCHLORIDE 1000 MG: 1 INJECTION, POWDER, LYOPHILIZED, FOR SOLUTION INTRAVENOUS at 01:25

## 2023-12-09 RX ADMIN — SODIUM CHLORIDE: 9 INJECTION, SOLUTION INTRAVENOUS at 10:31

## 2023-12-09 RX ADMIN — SENNOSIDES 8.6 MG: 8.6 TABLET, FILM COATED ORAL at 10:19

## 2023-12-09 RX ADMIN — DEXTROMETHORPHAN HYDROBROMIDE AND QUINIDINE SULFATE 1 CAPSULE: 20; 10 CAPSULE, GELATIN COATED ORAL at 21:44

## 2023-12-09 RX ADMIN — Medication 1 CAPSULE: at 21:30

## 2023-12-09 RX ADMIN — Medication 10 ML: at 10:32

## 2023-12-09 RX ADMIN — ARIPIPRAZOLE 5 MG: 10 TABLET ORAL at 21:30

## 2023-12-09 RX ADMIN — DILTIAZEM HYDROCHLORIDE 180 MG: 180 CAPSULE, COATED, EXTENDED RELEASE ORAL at 10:19

## 2023-12-09 RX ADMIN — METOPROLOL TARTRATE 25 MG: 25 TABLET, FILM COATED ORAL at 21:30

## 2023-12-09 RX ADMIN — CEFEPIME 2000 MG: 2 INJECTION, POWDER, FOR SOLUTION INTRAVENOUS at 10:32

## 2023-12-09 RX ADMIN — OXYCODONE HYDROCHLORIDE AND ACETAMINOPHEN 1 TABLET: 7.5; 325 TABLET ORAL at 10:18

## 2023-12-09 RX ADMIN — IPRATROPIUM BROMIDE AND ALBUTEROL SULFATE 1 DOSE: 2.5; .5 SOLUTION RESPIRATORY (INHALATION) at 14:56

## 2023-12-09 RX ADMIN — METOPROLOL TARTRATE 25 MG: 25 TABLET, FILM COATED ORAL at 10:16

## 2023-12-10 ENCOUNTER — APPOINTMENT (OUTPATIENT)
Dept: MRI IMAGING | Age: 73
DRG: 177 | End: 2023-12-10
Payer: COMMERCIAL

## 2023-12-10 LAB
ALBUMIN SERPL-MCNC: 3.4 G/DL (ref 3.4–5)
ALBUMIN/GLOB SERPL: 0.8 {RATIO} (ref 1.1–2.2)
ALP SERPL-CCNC: 81 U/L (ref 40–129)
ALT SERPL-CCNC: <5 U/L (ref 10–40)
ANION GAP SERPL CALCULATED.3IONS-SCNC: 13 MMOL/L (ref 3–16)
AST SERPL-CCNC: 8 U/L (ref 15–37)
BASOPHILS # BLD: 0 K/UL (ref 0–0.2)
BASOPHILS NFR BLD: 0.1 %
BILIRUB SERPL-MCNC: 0.5 MG/DL (ref 0–1)
BUN SERPL-MCNC: 29 MG/DL (ref 7–20)
CALCIUM SERPL-MCNC: 9.5 MG/DL (ref 8.3–10.6)
CHLORIDE SERPL-SCNC: 96 MMOL/L (ref 99–110)
CO2 SERPL-SCNC: 23 MMOL/L (ref 21–32)
CREAT SERPL-MCNC: <0.5 MG/DL (ref 0.6–1.2)
DEPRECATED RDW RBC AUTO: 15.2 % (ref 12.4–15.4)
EOSINOPHIL # BLD: 0 K/UL (ref 0–0.6)
EOSINOPHIL NFR BLD: 0 %
GFR SERPLBLD CREATININE-BSD FMLA CKD-EPI: >60 ML/MIN/{1.73_M2}
GLUCOSE BLD-MCNC: 224 MG/DL (ref 70–99)
GLUCOSE BLD-MCNC: 293 MG/DL (ref 70–99)
GLUCOSE BLD-MCNC: 295 MG/DL (ref 70–99)
GLUCOSE BLD-MCNC: 311 MG/DL (ref 70–99)
GLUCOSE BLD-MCNC: 323 MG/DL (ref 70–99)
GLUCOSE SERPL-MCNC: 321 MG/DL (ref 70–99)
HCT VFR BLD AUTO: 37.6 % (ref 36–48)
HGB BLD-MCNC: 12 G/DL (ref 12–16)
LYMPHOCYTES # BLD: 1.2 K/UL (ref 1–5.1)
LYMPHOCYTES NFR BLD: 12.5 %
MCH RBC QN AUTO: 26.1 PG (ref 26–34)
MCHC RBC AUTO-ENTMCNC: 31.9 G/DL (ref 31–36)
MCV RBC AUTO: 81.9 FL (ref 80–100)
MONOCYTES # BLD: 0.3 K/UL (ref 0–1.3)
MONOCYTES NFR BLD: 3.3 %
NEUTROPHILS # BLD: 7.8 K/UL (ref 1.7–7.7)
NEUTROPHILS NFR BLD: 84.1 %
PERFORMED ON: ABNORMAL
PLATELET # BLD AUTO: 227 K/UL (ref 135–450)
PMV BLD AUTO: 9.8 FL (ref 5–10.5)
POTASSIUM SERPL-SCNC: 4.3 MMOL/L (ref 3.5–5.1)
PROT SERPL-MCNC: 7.6 G/DL (ref 6.4–8.2)
RBC # BLD AUTO: 4.59 M/UL (ref 4–5.2)
SODIUM SERPL-SCNC: 132 MMOL/L (ref 136–145)
VANCOMYCIN TROUGH SERPL-MCNC: 17.9 UG/ML (ref 10–20)
WBC # BLD AUTO: 9.3 K/UL (ref 4–11)

## 2023-12-10 PROCEDURE — 6360000002 HC RX W HCPCS: Performed by: INTERNAL MEDICINE

## 2023-12-10 PROCEDURE — 6370000000 HC RX 637 (ALT 250 FOR IP): Performed by: INTERNAL MEDICINE

## 2023-12-10 PROCEDURE — 94761 N-INVAS EAR/PLS OXIMETRY MLT: CPT

## 2023-12-10 PROCEDURE — 2580000003 HC RX 258: Performed by: NURSE PRACTITIONER

## 2023-12-10 PROCEDURE — 2060000000 HC ICU INTERMEDIATE R&B

## 2023-12-10 PROCEDURE — 80053 COMPREHEN METABOLIC PANEL: CPT

## 2023-12-10 PROCEDURE — 36415 COLL VENOUS BLD VENIPUNCTURE: CPT

## 2023-12-10 PROCEDURE — 80202 ASSAY OF VANCOMYCIN: CPT

## 2023-12-10 PROCEDURE — 6370000000 HC RX 637 (ALT 250 FOR IP): Performed by: NURSE PRACTITIONER

## 2023-12-10 PROCEDURE — 6360000002 HC RX W HCPCS: Performed by: NURSE PRACTITIONER

## 2023-12-10 PROCEDURE — 94640 AIRWAY INHALATION TREATMENT: CPT

## 2023-12-10 PROCEDURE — 99233 SBSQ HOSP IP/OBS HIGH 50: CPT | Performed by: INTERNAL MEDICINE

## 2023-12-10 PROCEDURE — 85025 COMPLETE CBC W/AUTO DIFF WBC: CPT

## 2023-12-10 PROCEDURE — 99232 SBSQ HOSP IP/OBS MODERATE 35: CPT | Performed by: INTERNAL MEDICINE

## 2023-12-10 PROCEDURE — 2700000000 HC OXYGEN THERAPY PER DAY

## 2023-12-10 RX ORDER — LORAZEPAM 2 MG/ML
1 INJECTION INTRAMUSCULAR ONCE
Status: COMPLETED | OUTPATIENT
Start: 2023-12-10 | End: 2023-12-10

## 2023-12-10 RX ORDER — IPRATROPIUM BROMIDE AND ALBUTEROL SULFATE 2.5; .5 MG/3ML; MG/3ML
1 SOLUTION RESPIRATORY (INHALATION)
Status: DISCONTINUED | OUTPATIENT
Start: 2023-12-10 | End: 2023-12-11 | Stop reason: HOSPADM

## 2023-12-10 RX ADMIN — Medication 10 ML: at 10:08

## 2023-12-10 RX ADMIN — CEFEPIME 2000 MG: 2 INJECTION, POWDER, FOR SOLUTION INTRAVENOUS at 10:04

## 2023-12-10 RX ADMIN — IPRATROPIUM BROMIDE AND ALBUTEROL SULFATE 1 DOSE: 2.5; .5 SOLUTION RESPIRATORY (INHALATION) at 20:05

## 2023-12-10 RX ADMIN — POTASSIUM CHLORIDE 10 MEQ: 750 TABLET, EXTENDED RELEASE ORAL at 10:01

## 2023-12-10 RX ADMIN — SODIUM CHLORIDE: 9 INJECTION, SOLUTION INTRAVENOUS at 14:00

## 2023-12-10 RX ADMIN — Medication 1 CAPSULE: at 10:00

## 2023-12-10 RX ADMIN — CEFEPIME 2000 MG: 2 INJECTION, POWDER, FOR SOLUTION INTRAVENOUS at 21:11

## 2023-12-10 RX ADMIN — INSULIN LISPRO 3 UNITS: 100 INJECTION, SOLUTION INTRAVENOUS; SUBCUTANEOUS at 10:01

## 2023-12-10 RX ADMIN — DEXTROMETHORPHAN HYDROBROMIDE AND QUINIDINE SULFATE 1 CAPSULE: 20; 10 CAPSULE, GELATIN COATED ORAL at 10:00

## 2023-12-10 RX ADMIN — Medication 1 CAPSULE: at 21:09

## 2023-12-10 RX ADMIN — SODIUM CHLORIDE: 9 INJECTION, SOLUTION INTRAVENOUS at 10:03

## 2023-12-10 RX ADMIN — APIXABAN 5 MG: 5 TABLET, FILM COATED ORAL at 21:09

## 2023-12-10 RX ADMIN — GABAPENTIN 100 MG: 100 CAPSULE ORAL at 10:01

## 2023-12-10 RX ADMIN — METHYLPREDNISOLONE SODIUM SUCCINATE 40 MG: 40 INJECTION INTRAMUSCULAR; INTRAVENOUS at 17:32

## 2023-12-10 RX ADMIN — VANCOMYCIN HYDROCHLORIDE 1000 MG: 1 INJECTION, POWDER, LYOPHILIZED, FOR SOLUTION INTRAVENOUS at 14:03

## 2023-12-10 RX ADMIN — APIXABAN 5 MG: 5 TABLET, FILM COATED ORAL at 10:00

## 2023-12-10 RX ADMIN — SENNOSIDES 8.6 MG: 8.6 TABLET, FILM COATED ORAL at 10:01

## 2023-12-10 RX ADMIN — GABAPENTIN 100 MG: 100 CAPSULE ORAL at 21:09

## 2023-12-10 RX ADMIN — ARIPIPRAZOLE 5 MG: 10 TABLET ORAL at 21:09

## 2023-12-10 RX ADMIN — SENNOSIDES 8.6 MG: 8.6 TABLET, FILM COATED ORAL at 21:09

## 2023-12-10 RX ADMIN — IPRATROPIUM BROMIDE AND ALBUTEROL SULFATE 1 DOSE: 2.5; .5 SOLUTION RESPIRATORY (INHALATION) at 11:09

## 2023-12-10 RX ADMIN — ATORVASTATIN CALCIUM 40 MG: 40 TABLET, FILM COATED ORAL at 21:09

## 2023-12-10 RX ADMIN — METOPROLOL TARTRATE 25 MG: 25 TABLET, FILM COATED ORAL at 21:09

## 2023-12-10 RX ADMIN — BUSPIRONE HYDROCHLORIDE 5 MG: 5 TABLET ORAL at 10:00

## 2023-12-10 RX ADMIN — GABAPENTIN 100 MG: 100 CAPSULE ORAL at 13:59

## 2023-12-10 RX ADMIN — INSULIN LISPRO 2 UNITS: 100 INJECTION, SOLUTION INTRAVENOUS; SUBCUTANEOUS at 11:27

## 2023-12-10 RX ADMIN — INSULIN LISPRO 1 UNITS: 100 INJECTION, SOLUTION INTRAVENOUS; SUBCUTANEOUS at 17:32

## 2023-12-10 RX ADMIN — METHYLPREDNISOLONE SODIUM SUCCINATE 40 MG: 40 INJECTION INTRAMUSCULAR; INTRAVENOUS at 04:56

## 2023-12-10 RX ADMIN — OXYCODONE HYDROCHLORIDE AND ACETAMINOPHEN 1 TABLET: 7.5; 325 TABLET ORAL at 11:27

## 2023-12-10 RX ADMIN — LORAZEPAM 1 MG: 2 INJECTION INTRAMUSCULAR; INTRAVENOUS at 07:26

## 2023-12-10 RX ADMIN — METOPROLOL TARTRATE 25 MG: 25 TABLET, FILM COATED ORAL at 10:00

## 2023-12-10 RX ADMIN — VANCOMYCIN HYDROCHLORIDE 1000 MG: 1 INJECTION, POWDER, LYOPHILIZED, FOR SOLUTION INTRAVENOUS at 00:48

## 2023-12-10 RX ADMIN — DEXTROMETHORPHAN HYDROBROMIDE AND QUINIDINE SULFATE 1 CAPSULE: 20; 10 CAPSULE, GELATIN COATED ORAL at 21:40

## 2023-12-10 RX ADMIN — DILTIAZEM HYDROCHLORIDE 180 MG: 180 CAPSULE, COATED, EXTENDED RELEASE ORAL at 10:00

## 2023-12-10 RX ADMIN — Medication 3 MG: at 21:09

## 2023-12-10 ASSESSMENT — PAIN SCALES - GENERAL
PAINLEVEL_OUTOF10: 7
PAINLEVEL_OUTOF10: 0
PAINLEVEL_OUTOF10: 2

## 2023-12-10 ASSESSMENT — PAIN DESCRIPTION - ORIENTATION: ORIENTATION: LEFT

## 2023-12-10 ASSESSMENT — PAIN SCALES - WONG BAKER
WONGBAKER_NUMERICALRESPONSE: 0
WONGBAKER_NUMERICALRESPONSE: HURTS A LITTLE BIT
WONGBAKER_NUMERICALRESPONSE: 2
WONGBAKER_NUMERICALRESPONSE: NO HURT

## 2023-12-10 ASSESSMENT — PAIN DESCRIPTION - DESCRIPTORS: DESCRIPTORS: ACHING

## 2023-12-10 ASSESSMENT — PAIN DESCRIPTION - LOCATION: LOCATION: LEG;FOOT

## 2023-12-11 VITALS
WEIGHT: 197.8 LBS | RESPIRATION RATE: 16 BRPM | HEIGHT: 63 IN | TEMPERATURE: 97.5 F | BODY MASS INDEX: 35.05 KG/M2 | SYSTOLIC BLOOD PRESSURE: 114 MMHG | OXYGEN SATURATION: 99 % | DIASTOLIC BLOOD PRESSURE: 77 MMHG | HEART RATE: 76 BPM

## 2023-12-11 LAB
GLUCOSE BLD-MCNC: 255 MG/DL (ref 70–99)
GLUCOSE BLD-MCNC: 257 MG/DL (ref 70–99)
GLUCOSE BLD-MCNC: 280 MG/DL (ref 70–99)
PERFORMED ON: ABNORMAL

## 2023-12-11 PROCEDURE — 2700000000 HC OXYGEN THERAPY PER DAY

## 2023-12-11 PROCEDURE — 99239 HOSP IP/OBS DSCHRG MGMT >30: CPT | Performed by: INTERNAL MEDICINE

## 2023-12-11 PROCEDURE — 94761 N-INVAS EAR/PLS OXIMETRY MLT: CPT

## 2023-12-11 PROCEDURE — 6360000002 HC RX W HCPCS: Performed by: NURSE PRACTITIONER

## 2023-12-11 PROCEDURE — 94010 BREATHING CAPACITY TEST: CPT

## 2023-12-11 PROCEDURE — 6370000000 HC RX 637 (ALT 250 FOR IP): Performed by: NURSE PRACTITIONER

## 2023-12-11 PROCEDURE — 2580000003 HC RX 258: Performed by: NURSE PRACTITIONER

## 2023-12-11 PROCEDURE — 99233 SBSQ HOSP IP/OBS HIGH 50: CPT | Performed by: INTERNAL MEDICINE

## 2023-12-11 RX ORDER — PREDNISONE 20 MG/1
TABLET ORAL
Qty: 10 TABLET | Refills: 0 | Status: ON HOLD
Start: 2023-12-11 | End: 2024-01-19 | Stop reason: HOSPADM

## 2023-12-11 RX ORDER — DOXYCYCLINE HYCLATE 100 MG
100 TABLET ORAL 2 TIMES DAILY
Qty: 10 TABLET | Refills: 0
Start: 2023-12-11 | End: 2023-12-16

## 2023-12-11 RX ORDER — OXYCODONE AND ACETAMINOPHEN 7.5; 325 MG/1; MG/1
1 TABLET ORAL EVERY 6 HOURS PRN
Qty: 8 TABLET | Refills: 0 | Status: SHIPPED | OUTPATIENT
Start: 2023-12-11 | End: 2023-12-13

## 2023-12-11 RX ORDER — LEVOFLOXACIN 500 MG/1
500 TABLET, FILM COATED ORAL DAILY
Qty: 5 TABLET | Refills: 0
Start: 2023-12-11 | End: 2023-12-16

## 2023-12-11 RX ADMIN — GABAPENTIN 100 MG: 100 CAPSULE ORAL at 09:55

## 2023-12-11 RX ADMIN — METOPROLOL TARTRATE 25 MG: 25 TABLET, FILM COATED ORAL at 09:55

## 2023-12-11 RX ADMIN — Medication 1 CAPSULE: at 09:55

## 2023-12-11 RX ADMIN — INSULIN LISPRO 2 UNITS: 100 INJECTION, SOLUTION INTRAVENOUS; SUBCUTANEOUS at 17:43

## 2023-12-11 RX ADMIN — INSULIN LISPRO 2 UNITS: 100 INJECTION, SOLUTION INTRAVENOUS; SUBCUTANEOUS at 09:55

## 2023-12-11 RX ADMIN — VANCOMYCIN HYDROCHLORIDE 1000 MG: 1 INJECTION, POWDER, LYOPHILIZED, FOR SOLUTION INTRAVENOUS at 14:01

## 2023-12-11 RX ADMIN — VANCOMYCIN HYDROCHLORIDE 1000 MG: 1 INJECTION, POWDER, LYOPHILIZED, FOR SOLUTION INTRAVENOUS at 02:35

## 2023-12-11 RX ADMIN — METHYLPREDNISOLONE SODIUM SUCCINATE 40 MG: 40 INJECTION INTRAMUSCULAR; INTRAVENOUS at 05:06

## 2023-12-11 RX ADMIN — SENNOSIDES 8.6 MG: 8.6 TABLET, FILM COATED ORAL at 09:55

## 2023-12-11 RX ADMIN — POTASSIUM CHLORIDE 10 MEQ: 750 TABLET, EXTENDED RELEASE ORAL at 09:55

## 2023-12-11 RX ADMIN — DILTIAZEM HYDROCHLORIDE 180 MG: 180 CAPSULE, COATED, EXTENDED RELEASE ORAL at 09:55

## 2023-12-11 RX ADMIN — BUSPIRONE HYDROCHLORIDE 5 MG: 5 TABLET ORAL at 09:55

## 2023-12-11 RX ADMIN — DEXTROMETHORPHAN HYDROBROMIDE AND QUINIDINE SULFATE 1 CAPSULE: 20; 10 CAPSULE, GELATIN COATED ORAL at 10:02

## 2023-12-11 RX ADMIN — Medication 10 ML: at 10:02

## 2023-12-11 RX ADMIN — CEFEPIME 2000 MG: 2 INJECTION, POWDER, FOR SOLUTION INTRAVENOUS at 09:52

## 2023-12-11 RX ADMIN — APIXABAN 5 MG: 5 TABLET, FILM COATED ORAL at 09:55

## 2023-12-11 RX ADMIN — INSULIN LISPRO 2 UNITS: 100 INJECTION, SOLUTION INTRAVENOUS; SUBCUTANEOUS at 11:20

## 2023-12-11 ASSESSMENT — COPD QUESTIONNAIRES
CAT_TOTALSCORE: 24
QUESTION4_WALKINCLINE: 4
QUESTION1_COUGHFREQUENCY: 2
QUESTION5_HOMEACTIVITIES: 4
QUESTION8_ENERGYLEVEL: 5
QUESTION7_SLEEPQUALITY: 0
QUESTION6_LEAVINGHOUSE: 5
QUESTION3_CHESTTIGHTNESS: 2
QUESTION2_CHESTPHLEGM: 2

## 2023-12-11 NOTE — CARE COORDINATION
Not able to assess pt prior to DC order. Pt will return to [facility] as she is a bedhold. Transport being arranged. [facility] is aware pt is returning.

## 2023-12-11 NOTE — PLAN OF CARE
HEART FAILURE CARE PLAN:    Comorbidities Reviewed: Yes   Patient has a past medical history of Acute diastolic congestive heart failure ([address]), Acute diastolic heart failure ([address]), Acute respiratory failure ([address]), Adjustment disorder with mixed anxiety and depressed mood, Allergic rhinitis, Alzheimer's dementia ([address]), Arachnoid cyst, Atrial fibrillation ([address]), CHF (congestive heart failure) ([address]), Chronic back pain, Constipation, COPD (chronic obstructive pulmonary disease) ([address]), Diabetes mellitus ([address]), Diskitis, Disseminated superficial actinic porokeratosis (DSAP), Edema, ESBL (extended spectrum beta-lactamase) producing bacteria infection, Hypertension, Hypo-osmolality and hyponatremia, Major depressive disorder, Morbid obesity ([address]), Muscle weakness, Osteomyelitis of spine ([address]), Overactive bladder, Schizoaffective disorder ([address]), Seizures ([address]), Urinary tract infection without hematuria, and Xerosis cutis. ECHOCARDIOGRAM Reviewed: Yes   Patient's Ejection Fraction (EF) is greater than 40%    Weights Reviewed:  Yes   Admission weight: 89.8 kg (198 lb)   Wt Readings from Last 3 Encounters:   12/08/23 89.8 kg (198 lb)   07/13/23 90.2 kg (198 lb 12.8 oz)   06/05/23 89.6 kg (197 lb 9.6 oz)     Intake & Output Reviewed: Yes     Intake/Output Summary (Last 24 hours) at 12/9/2023 2205  Last data filed at 12/9/2023 1630  Gross per 24 hour   Intake 0 ml   Output 2540 ml   Net -2540 ml     Medications Reviewed: Yes   SCHEDULED HOSPITAL MEDICATIONS:   apixaban  5 mg Oral BID    sodium chloride flush  5-40 mL IntraVENous 2 times per day    insulin lispro  0-4 Units SubCUTAneous TID WC    insulin lispro  0-4 Units SubCUTAneous Nightly    cefepime  2,000 mg IntraVENous Q12H    ARIPiprazole  5 mg Oral Nightly    atorvastatin  40 mg Oral Nightly    busPIRone  5 mg Oral Daily    dilTIAZem  180 mg Oral Daily    lactobacillus  1 capsule Oral BID    gabapentin  100 mg Oral TID    insulin glargine  5 Units SubCUTAneous Nightly
Patient discharged to [facility], patient picked up by transport team and off unit at 1823. Attempted to contact next of kin without success.
Problem: Discharge Planning  Goal: Discharge to home or other facility with appropriate resources  Outcome: Progressing     Problem: Pain  Goal: Verbalizes/displays adequate comfort level or baseline comfort level  Outcome: Progressing     Problem: Safety - Adult  Goal: Free from fall injury  Outcome: Progressing     Problem: Skin/Tissue Integrity  Goal: Absence of new skin breakdown  Description: 1. Monitor for areas of redness and/or skin breakdown  2. Assess vascular access sites hourly  3. Every 4-6 hours minimum:  Change oxygen saturation probe site  4. Every 4-6 hours:  If on nasal continuous positive airway pressure, respiratory therapy assess nares and determine need for appliance change or resting period.   Outcome: Progressing
Problem: Discharge Planning  Goal: Discharge to home or other facility with appropriate resources  Outcome: Progressing     Problem: Pain  Goal: Verbalizes/displays adequate comfort level or baseline comfort level  Outcome: Progressing     Problem: Safety - Adult  Goal: Free from fall injury  Outcome: Progressing     Problem: Skin/Tissue Integrity  Goal: Absence of new skin breakdown  Description: 1. Monitor for areas of redness and/or skin breakdown  2. Assess vascular access sites hourly  3. Every 4-6 hours minimum:  Change oxygen saturation probe site  4. Every 4-6 hours:  If on nasal continuous positive airway pressure, respiratory therapy assess nares and determine need for appliance change or resting period.   Outcome: Progressing
Nightly    melatonin  3 mg Oral Nightly    metoprolol tartrate  25 mg Oral BID    dextromethorphan-quiNIDine  1 capsule Oral BID    potassium chloride  10 mEq Oral Daily    senna  1 tablet Oral BID    methylPREDNISolone  40 mg IntraVENous Q12H    vancomycin  1,000 mg IntraVENous Q12H    ipratropium 0.5 mg-albuterol 2.5 mg  1 Dose Inhalation 4x Daily RT     ACE/ARB/ARNI is REQUIRED for EF </= 19% SYSTOLIC FAILURE:   ACE[de-identified] None  ARB[de-identified] None  ARNI[de-identified] None    Evidenced-Based Beta Blocker is REQUIRED for EF </= 65% SYSTOLIC FAILURE:   [de-identified]  Metoprolol tartrate     Diuretics:  [de-identified] None    Diet Reviewed: Yes   Diet NPO    Goal of Care Reviewed: Yes   Patient and/or Family's stated Goal of Care this Admission: reduce shortness of breath, increase activity tolerance, better understand heart failure and disease management, be more comfortable, and reduce lower extremity edema prior to discharge.

## 2023-12-11 NOTE — DISCHARGE SUMMARY
Condition/Location: Stable to NH    Follow Up: Follow up with PCP.     Total time spent on discharge is 35 minutes      Jonathan Mckeon MD 12/11/2023 9:40 AM

## 2023-12-11 NOTE — DISCHARGE INSTR - COC
Continuity of Care Form    Patient Name: Shruthi Smith   :  1950  MRN:  [id number]    Admit date:  2023  Discharge date:  ***    Code Status Order: DNR-CC   Advance Directives:     Admitting Physician:  Sherice Tan MD  PCP: Edwin Silva MD    Discharging Nurse: [facility] Unit/Room#: /6760-04  Discharging Unit Phone Number: ***    Emergency Contact:   Extended Emergency Contact Information  Primary Emergency Contact: [address]  Mobile Phone: [phone]  Relation: Niece/Nephew   needed? No  Secondary Emergency Contact: [address] [facility] Phone: [phone]  Mobile Phone: [phone]  Relation: Brother/Sister   needed?  No    Past Surgical History:  Past Surgical History:   Procedure Laterality Date    IR MIDLINE CATH  10/10/2022    IR MIDLINE CATH 10/10/2022 [facility] SPECIAL PROCEDURES    IR MIDLINE CATH  2023    IR MIDLINE CATH 2023 [facility] SPECIAL PROCEDURES    KNEE SURGERY      TONSILLECTOMY      TUBAL LIGATION         Immunization History:   Immunization History   Administered Date(s) Administered    COVID-19, PFIZER Bivalent, DO NOT Dilute, (age 12y+), IM, 30 mcg/0.3 mL 2023    COVID-19, PFIZER GRAY top, DO NOT Dilute, (age 15 y+), IM, 30 mcg/0.3 mL 2022    COVID-19, PFIZER PURPLE top, DILUTE for use, (age 15 y+), 30mcg/0.3mL 2020, 2021, 2021    Influenza Virus Vaccine 2014, 10/20/2019    Influenza, FLUARIX, FLULAVAL, FLUZONE (age 10 mo+) AND AFLURIA, (age 1 y+), PF, 0.5mL 2016    Pneumococcal, PCV-13, PREVNAR 13, (age 6w+), IM, 0.5mL 2016       Active Problems:  Patient Active Problem List   Diagnosis Code    Seizure disorder, grand mal ([address]) G40.409    DM (diabetes mellitus) ([address]) E11.9    Lumbar facet arthropathy M47.816    Disc degeneration, lumbar M51.36    Class 1 obesity in adult E66.9    Hypokalemia E87.6    Atrial fibrillation with RVR ([address]) I48.91    Essential

## 2023-12-12 LAB
BACTERIA BLD CULT ORG #2: NORMAL
BACTERIA BLD CULT: NORMAL

## 2024-01-16 ENCOUNTER — APPOINTMENT (OUTPATIENT)
Dept: GENERAL RADIOLOGY | Age: 74
End: 2024-01-16
Payer: COMMERCIAL

## 2024-01-16 ENCOUNTER — HOSPITAL ENCOUNTER (INPATIENT)
Age: 74
LOS: 2 days | Discharge: NURSING, REHAB OR LONG-TERM CARE | End: 2024-01-19
Attending: EMERGENCY MEDICINE | Admitting: INTERNAL MEDICINE
Payer: COMMERCIAL

## 2024-01-16 DIAGNOSIS — M47.816 LUMBAR FACET ARTHROPATHY: ICD-10-CM

## 2024-01-16 DIAGNOSIS — E87.1 HYPONATREMIA: ICD-10-CM

## 2024-01-16 DIAGNOSIS — A41.9 SEPTICEMIA (HCC): ICD-10-CM

## 2024-01-16 DIAGNOSIS — G89.29 OTHER CHRONIC PAIN: ICD-10-CM

## 2024-01-16 DIAGNOSIS — J18.9 HCAP (HEALTHCARE-ASSOCIATED PNEUMONIA): Primary | ICD-10-CM

## 2024-01-16 DIAGNOSIS — M50.20 CERVICAL HERNIATED DISC: ICD-10-CM

## 2024-01-16 DIAGNOSIS — R79.89 ELEVATED BRAIN NATRIURETIC PEPTIDE (BNP) LEVEL: ICD-10-CM

## 2024-01-16 LAB
ALBUMIN SERPL-MCNC: 3.7 G/DL (ref 3.4–5)
ALBUMIN/GLOB SERPL: 1.3 {RATIO} (ref 1.1–2.2)
ALP SERPL-CCNC: 81 U/L (ref 40–129)
ALT SERPL-CCNC: 9 U/L (ref 10–40)
ANION GAP SERPL CALCULATED.3IONS-SCNC: 8 MMOL/L (ref 3–16)
AST SERPL-CCNC: 11 U/L (ref 15–37)
BASE EXCESS BLDV CALC-SCNC: -1.2 MMOL/L (ref -3–3)
BASOPHILS # BLD: 0.1 K/UL (ref 0–0.2)
BASOPHILS NFR BLD: 0.5 %
BILIRUB SERPL-MCNC: 0.4 MG/DL (ref 0–1)
BUN SERPL-MCNC: 17 MG/DL (ref 7–20)
CALCIUM SERPL-MCNC: 9.3 MG/DL (ref 8.3–10.6)
CHLORIDE SERPL-SCNC: 95 MMOL/L (ref 99–110)
CO2 BLDV-SCNC: 26 MMOL/L
CO2 SERPL-SCNC: 30 MMOL/L (ref 21–32)
COHGB MFR BLDV: 1.9 % (ref 0–1.5)
CREAT SERPL-MCNC: <0.5 MG/DL (ref 0.6–1.2)
D DIMER: 1.77 UG/ML FEU (ref 0–0.6)
DEPRECATED RDW RBC AUTO: 15.4 % (ref 12.4–15.4)
EKG ATRIAL RATE: 88 BPM
EKG DIAGNOSIS: NORMAL
EKG Q-T INTERVAL: 386 MS
EKG QRS DURATION: 92 MS
EKG QTC CALCULATION (BAZETT): 442 MS
EKG R AXIS: 80 DEGREES
EKG T AXIS: 258 DEGREES
EKG VENTRICULAR RATE: 79 BPM
EOSINOPHIL # BLD: 0.2 K/UL (ref 0–0.6)
EOSINOPHIL NFR BLD: 1.7 %
FLUAV RNA RESP QL NAA+PROBE: NOT DETECTED
FLUBV RNA RESP QL NAA+PROBE: NOT DETECTED
GFR SERPLBLD CREATININE-BSD FMLA CKD-EPI: >60 ML/MIN/{1.73_M2}
GLUCOSE SERPL-MCNC: 227 MG/DL (ref 70–99)
HCO3 BLDV-SCNC: 24.3 MMOL/L (ref 23–29)
HCT VFR BLD AUTO: 40.6 % (ref 36–48)
HGB BLD-MCNC: 12.8 G/DL (ref 12–16)
LACTATE BLDV-SCNC: 1.4 MMOL/L (ref 0.4–1.9)
LYMPHOCYTES # BLD: 1.7 K/UL (ref 1–5.1)
LYMPHOCYTES NFR BLD: 14 %
MCH RBC QN AUTO: 25.6 PG (ref 26–34)
MCHC RBC AUTO-ENTMCNC: 31.5 G/DL (ref 31–36)
MCV RBC AUTO: 81 FL (ref 80–100)
METHGB MFR BLDV: 0.3 %
MONOCYTES # BLD: 1.1 K/UL (ref 0–1.3)
MONOCYTES NFR BLD: 8.4 %
NEUTROPHILS # BLD: 9.4 K/UL (ref 1.7–7.7)
NEUTROPHILS NFR BLD: 75.4 %
NT-PROBNP SERPL-MCNC: 4257 PG/ML (ref 0–449)
O2 CT VFR BLDV CALC: 15 VOL %
O2 THERAPY: ABNORMAL
PCO2 BLDV: 43.8 MMHG (ref 40–50)
PH BLDV: 7.36 [PH] (ref 7.35–7.45)
PLATELET # BLD AUTO: 159 K/UL (ref 135–450)
PMV BLD AUTO: 9.7 FL (ref 5–10.5)
PO2 BLDV: 41.3 MMHG (ref 25–40)
POTASSIUM SERPL-SCNC: 4.2 MMOL/L (ref 3.5–5.1)
PROCALCITONIN SERPL IA-MCNC: 0.09 NG/ML (ref 0–0.15)
PROT SERPL-MCNC: 6.6 G/DL (ref 6.4–8.2)
RBC # BLD AUTO: 5.01 M/UL (ref 4–5.2)
RSV AG NOSE QL: NEGATIVE
SAO2 % BLDV: 75 %
SARS-COV-2 RNA RESP QL NAA+PROBE: NOT DETECTED
SODIUM SERPL-SCNC: 133 MMOL/L (ref 136–145)
TROPONIN, HIGH SENSITIVITY: 14 NG/L (ref 0–14)
TROPONIN, HIGH SENSITIVITY: 15 NG/L (ref 0–14)
WBC # BLD AUTO: 12.5 K/UL (ref 4–11)

## 2024-01-16 PROCEDURE — 6370000000 HC RX 637 (ALT 250 FOR IP): Performed by: REGISTERED NURSE

## 2024-01-16 PROCEDURE — 87040 BLOOD CULTURE FOR BACTERIA: CPT

## 2024-01-16 PROCEDURE — 93010 ELECTROCARDIOGRAM REPORT: CPT | Performed by: INTERNAL MEDICINE

## 2024-01-16 PROCEDURE — 36415 COLL VENOUS BLD VENIPUNCTURE: CPT

## 2024-01-16 PROCEDURE — 96367 TX/PROPH/DG ADDL SEQ IV INF: CPT

## 2024-01-16 PROCEDURE — 85379 FIBRIN DEGRADATION QUANT: CPT

## 2024-01-16 PROCEDURE — 87449 NOS EACH ORGANISM AG IA: CPT

## 2024-01-16 PROCEDURE — 83880 ASSAY OF NATRIURETIC PEPTIDE: CPT

## 2024-01-16 PROCEDURE — 2580000003 HC RX 258: Performed by: REGISTERED NURSE

## 2024-01-16 PROCEDURE — 96365 THER/PROPH/DIAG IV INF INIT: CPT

## 2024-01-16 PROCEDURE — 81001 URINALYSIS AUTO W/SCOPE: CPT

## 2024-01-16 PROCEDURE — 82803 BLOOD GASES ANY COMBINATION: CPT

## 2024-01-16 PROCEDURE — 6360000002 HC RX W HCPCS: Performed by: REGISTERED NURSE

## 2024-01-16 PROCEDURE — 85025 COMPLETE CBC W/AUTO DIFF WBC: CPT

## 2024-01-16 PROCEDURE — 84484 ASSAY OF TROPONIN QUANT: CPT

## 2024-01-16 PROCEDURE — 93005 ELECTROCARDIOGRAM TRACING: CPT | Performed by: REGISTERED NURSE

## 2024-01-16 PROCEDURE — 80053 COMPREHEN METABOLIC PANEL: CPT

## 2024-01-16 PROCEDURE — 71046 X-RAY EXAM CHEST 2 VIEWS: CPT

## 2024-01-16 PROCEDURE — 84145 PROCALCITONIN (PCT): CPT

## 2024-01-16 PROCEDURE — 99285 EMERGENCY DEPT VISIT HI MDM: CPT

## 2024-01-16 PROCEDURE — 87807 RSV ASSAY W/OPTIC: CPT

## 2024-01-16 PROCEDURE — 87636 SARSCOV2 & INF A&B AMP PRB: CPT

## 2024-01-16 PROCEDURE — 83605 ASSAY OF LACTIC ACID: CPT

## 2024-01-16 PROCEDURE — 96375 TX/PRO/DX INJ NEW DRUG ADDON: CPT

## 2024-01-16 RX ORDER — ASPIRIN 81 MG/1
324 TABLET, CHEWABLE ORAL ONCE
Status: COMPLETED | OUTPATIENT
Start: 2024-01-16 | End: 2024-01-16

## 2024-01-16 RX ORDER — MORPHINE SULFATE 2 MG/ML
2 INJECTION, SOLUTION INTRAMUSCULAR; INTRAVENOUS ONCE
Status: COMPLETED | OUTPATIENT
Start: 2024-01-16 | End: 2024-01-16

## 2024-01-16 RX ORDER — IPRATROPIUM BROMIDE AND ALBUTEROL SULFATE 2.5; .5 MG/3ML; MG/3ML
1 SOLUTION RESPIRATORY (INHALATION) ONCE
Status: COMPLETED | OUTPATIENT
Start: 2024-01-16 | End: 2024-01-16

## 2024-01-16 RX ORDER — 0.9 % SODIUM CHLORIDE 0.9 %
250 INTRAVENOUS SOLUTION INTRAVENOUS ONCE
Status: DISCONTINUED | OUTPATIENT
Start: 2024-01-16 | End: 2024-01-16

## 2024-01-16 RX ORDER — ONDANSETRON 2 MG/ML
4 INJECTION INTRAMUSCULAR; INTRAVENOUS ONCE
Status: COMPLETED | OUTPATIENT
Start: 2024-01-16 | End: 2024-01-16

## 2024-01-16 RX ORDER — ALBUTEROL SULFATE 2.5 MG/3ML
5 SOLUTION RESPIRATORY (INHALATION) ONCE
Status: COMPLETED | OUTPATIENT
Start: 2024-01-16 | End: 2024-01-16

## 2024-01-16 RX ADMIN — MORPHINE SULFATE 2 MG: 2 INJECTION, SOLUTION INTRAMUSCULAR; INTRAVENOUS at 20:40

## 2024-01-16 RX ADMIN — AZITHROMYCIN MONOHYDRATE 500 MG: 500 INJECTION, POWDER, LYOPHILIZED, FOR SOLUTION INTRAVENOUS at 23:45

## 2024-01-16 RX ADMIN — ONDANSETRON 4 MG: 2 INJECTION INTRAMUSCULAR; INTRAVENOUS at 20:40

## 2024-01-16 RX ADMIN — IPRATROPIUM BROMIDE AND ALBUTEROL SULFATE 1 DOSE: 2.5; .5 SOLUTION RESPIRATORY (INHALATION) at 19:32

## 2024-01-16 RX ADMIN — ALBUTEROL SULFATE 5 MG: 2.5 SOLUTION RESPIRATORY (INHALATION) at 19:39

## 2024-01-16 RX ADMIN — ASPIRIN 324 MG: 81 TABLET, CHEWABLE ORAL at 20:45

## 2024-01-16 RX ADMIN — CEFEPIME 2000 MG: 2 INJECTION, POWDER, FOR SOLUTION INTRAVENOUS at 22:23

## 2024-01-16 RX ADMIN — METHYLPREDNISOLONE SODIUM SUCCINATE 125 MG: 125 INJECTION INTRAMUSCULAR; INTRAVENOUS at 19:34

## 2024-01-16 ASSESSMENT — ENCOUNTER SYMPTOMS
ABDOMINAL PAIN: 0
COUGH: 0
SHORTNESS OF BREATH: 1
CHEST TIGHTNESS: 0
WHEEZING: 0
STRIDOR: 0

## 2024-01-16 ASSESSMENT — PAIN SCALES - GENERAL: PAINLEVEL_OUTOF10: 7

## 2024-01-17 ENCOUNTER — APPOINTMENT (OUTPATIENT)
Dept: CT IMAGING | Age: 74
End: 2024-01-17
Payer: COMMERCIAL

## 2024-01-17 PROBLEM — J96.01 ACUTE HYPOXIC RESPIRATORY FAILURE (HCC): Status: ACTIVE | Noted: 2024-01-17

## 2024-01-17 PROBLEM — R91.1 PULMONARY NODULE: Status: ACTIVE | Noted: 2024-01-17

## 2024-01-17 LAB
ANION GAP SERPL CALCULATED.3IONS-SCNC: 17 MMOL/L (ref 3–16)
BACTERIA URNS QL MICRO: ABNORMAL /HPF
BASOPHILS # BLD: 0 K/UL (ref 0–0.2)
BASOPHILS NFR BLD: 0.4 %
BILIRUB UR QL STRIP.AUTO: NEGATIVE
BUN SERPL-MCNC: 20 MG/DL (ref 7–20)
CALCIUM SERPL-MCNC: 8.9 MG/DL (ref 8.3–10.6)
CHLORIDE SERPL-SCNC: 95 MMOL/L (ref 99–110)
CLARITY UR: CLEAR
CO2 SERPL-SCNC: 20 MMOL/L (ref 21–32)
COLOR UR: YELLOW
CREAT SERPL-MCNC: <0.5 MG/DL (ref 0.6–1.2)
DEPRECATED RDW RBC AUTO: 15.6 % (ref 12.4–15.4)
EOSINOPHIL # BLD: 0 K/UL (ref 0–0.6)
EOSINOPHIL NFR BLD: 0.1 %
EPI CELLS #/AREA URNS HPF: ABNORMAL /HPF (ref 0–5)
GFR SERPLBLD CREATININE-BSD FMLA CKD-EPI: >60 ML/MIN/{1.73_M2}
GLUCOSE BLD-MCNC: 223 MG/DL (ref 70–99)
GLUCOSE BLD-MCNC: 243 MG/DL (ref 70–99)
GLUCOSE BLD-MCNC: 243 MG/DL (ref 70–99)
GLUCOSE SERPL-MCNC: 306 MG/DL (ref 70–99)
GLUCOSE UR STRIP.AUTO-MCNC: >=1000 MG/DL
HCT VFR BLD AUTO: 37.9 % (ref 36–48)
HGB BLD-MCNC: 11.9 G/DL (ref 12–16)
HGB UR QL STRIP.AUTO: ABNORMAL
KETONES UR STRIP.AUTO-MCNC: NEGATIVE MG/DL
LEUKOCYTE ESTERASE UR QL STRIP.AUTO: NEGATIVE
LYMPHOCYTES # BLD: 1 K/UL (ref 1–5.1)
LYMPHOCYTES NFR BLD: 10.3 %
MCH RBC QN AUTO: 25.7 PG (ref 26–34)
MCHC RBC AUTO-ENTMCNC: 31.5 G/DL (ref 31–36)
MCV RBC AUTO: 81.6 FL (ref 80–100)
MONOCYTES # BLD: 0.3 K/UL (ref 0–1.3)
MONOCYTES NFR BLD: 2.7 %
NEUTROPHILS # BLD: 8.6 K/UL (ref 1.7–7.7)
NEUTROPHILS NFR BLD: 86.5 %
NITRITE UR QL STRIP.AUTO: NEGATIVE
PERFORMED ON: ABNORMAL
PH UR STRIP.AUTO: 6 [PH] (ref 5–8)
PLATELET # BLD AUTO: 131 K/UL (ref 135–450)
PMV BLD AUTO: 9.7 FL (ref 5–10.5)
POTASSIUM SERPL-SCNC: 4.5 MMOL/L (ref 3.5–5.1)
PROT UR STRIP.AUTO-MCNC: NEGATIVE MG/DL
RBC # BLD AUTO: 4.65 M/UL (ref 4–5.2)
RBC #/AREA URNS HPF: ABNORMAL /HPF (ref 0–4)
SODIUM SERPL-SCNC: 132 MMOL/L (ref 136–145)
SP GR UR STRIP.AUTO: <=1.005 (ref 1–1.03)
UA COMPLETE W REFLEX CULTURE PNL UR: ABNORMAL
UA DIPSTICK W REFLEX MICRO PNL UR: YES
URN SPEC COLLECT METH UR: ABNORMAL
UROBILINOGEN UR STRIP-ACNC: 0.2 E.U./DL
WBC # BLD AUTO: 10 K/UL (ref 4–11)
WBC #/AREA URNS HPF: ABNORMAL /HPF (ref 0–5)
YEAST URNS QL MICRO: PRESENT /HPF

## 2024-01-17 PROCEDURE — 6360000004 HC RX CONTRAST MEDICATION: Performed by: INTERNAL MEDICINE

## 2024-01-17 PROCEDURE — 96366 THER/PROPH/DIAG IV INF ADDON: CPT

## 2024-01-17 PROCEDURE — 2700000000 HC OXYGEN THERAPY PER DAY

## 2024-01-17 PROCEDURE — 87641 MR-STAPH DNA AMP PROBE: CPT

## 2024-01-17 PROCEDURE — 99223 1ST HOSP IP/OBS HIGH 75: CPT | Performed by: INTERNAL MEDICINE

## 2024-01-17 PROCEDURE — 6370000000 HC RX 637 (ALT 250 FOR IP): Performed by: NURSE PRACTITIONER

## 2024-01-17 PROCEDURE — 99223 1ST HOSP IP/OBS HIGH 75: CPT | Performed by: NURSE PRACTITIONER

## 2024-01-17 PROCEDURE — 94761 N-INVAS EAR/PLS OXIMETRY MLT: CPT

## 2024-01-17 PROCEDURE — 80048 BASIC METABOLIC PNL TOTAL CA: CPT

## 2024-01-17 PROCEDURE — 1200000000 HC SEMI PRIVATE

## 2024-01-17 PROCEDURE — 71275 CT ANGIOGRAPHY CHEST: CPT

## 2024-01-17 PROCEDURE — 85025 COMPLETE CBC W/AUTO DIFF WBC: CPT

## 2024-01-17 PROCEDURE — 96367 TX/PROPH/DG ADDL SEQ IV INF: CPT

## 2024-01-17 PROCEDURE — 6360000002 HC RX W HCPCS: Performed by: REGISTERED NURSE

## 2024-01-17 PROCEDURE — 2580000003 HC RX 258: Performed by: INTERNAL MEDICINE

## 2024-01-17 PROCEDURE — 2580000003 HC RX 258: Performed by: NURSE PRACTITIONER

## 2024-01-17 PROCEDURE — 6360000002 HC RX W HCPCS: Performed by: NURSE PRACTITIONER

## 2024-01-17 PROCEDURE — 36415 COLL VENOUS BLD VENIPUNCTURE: CPT

## 2024-01-17 PROCEDURE — 2580000003 HC RX 258: Performed by: REGISTERED NURSE

## 2024-01-17 RX ORDER — GABAPENTIN 100 MG/1
100 CAPSULE ORAL 3 TIMES DAILY
Status: DISCONTINUED | OUTPATIENT
Start: 2024-01-17 | End: 2024-01-19 | Stop reason: HOSPADM

## 2024-01-17 RX ORDER — INSULIN LISPRO 100 [IU]/ML
0-4 INJECTION, SOLUTION INTRAVENOUS; SUBCUTANEOUS NIGHTLY
Status: DISCONTINUED | OUTPATIENT
Start: 2024-01-17 | End: 2024-01-19 | Stop reason: HOSPADM

## 2024-01-17 RX ORDER — DEXTROSE MONOHYDRATE 100 MG/ML
INJECTION, SOLUTION INTRAVENOUS CONTINUOUS PRN
Status: DISCONTINUED | OUTPATIENT
Start: 2024-01-17 | End: 2024-01-19 | Stop reason: HOSPADM

## 2024-01-17 RX ORDER — ENOXAPARIN SODIUM 100 MG/ML
40 INJECTION SUBCUTANEOUS DAILY
Status: DISCONTINUED | OUTPATIENT
Start: 2024-01-17 | End: 2024-01-17

## 2024-01-17 RX ORDER — ARIPIPRAZOLE 2 MG/1
2 TABLET ORAL NIGHTLY
Status: DISCONTINUED | OUTPATIENT
Start: 2024-01-17 | End: 2024-01-19 | Stop reason: HOSPADM

## 2024-01-17 RX ORDER — SODIUM CHLORIDE 9 MG/ML
INJECTION, SOLUTION INTRAVENOUS PRN
Status: DISCONTINUED | OUTPATIENT
Start: 2024-01-17 | End: 2024-01-19 | Stop reason: HOSPADM

## 2024-01-17 RX ORDER — OXYCODONE AND ACETAMINOPHEN 7.5; 325 MG/1; MG/1
1 TABLET ORAL 4 TIMES DAILY
Status: ON HOLD | COMMUNITY
End: 2024-01-19

## 2024-01-17 RX ORDER — DILTIAZEM HYDROCHLORIDE 180 MG/1
180 CAPSULE, COATED, EXTENDED RELEASE ORAL DAILY
Status: DISCONTINUED | OUTPATIENT
Start: 2024-01-17 | End: 2024-01-19 | Stop reason: HOSPADM

## 2024-01-17 RX ORDER — SENNOSIDES A AND B 8.6 MG/1
1 TABLET, FILM COATED ORAL 2 TIMES DAILY
Status: DISCONTINUED | OUTPATIENT
Start: 2024-01-17 | End: 2024-01-19 | Stop reason: HOSPADM

## 2024-01-17 RX ORDER — BISACODYL 10 MG
10 SUPPOSITORY, RECTAL RECTAL DAILY PRN
Status: DISCONTINUED | OUTPATIENT
Start: 2024-01-17 | End: 2024-01-19 | Stop reason: HOSPADM

## 2024-01-17 RX ORDER — ONDANSETRON 2 MG/ML
4 INJECTION INTRAMUSCULAR; INTRAVENOUS EVERY 6 HOURS PRN
Status: DISCONTINUED | OUTPATIENT
Start: 2024-01-17 | End: 2024-01-19 | Stop reason: HOSPADM

## 2024-01-17 RX ORDER — ATORVASTATIN CALCIUM 40 MG/1
40 TABLET, FILM COATED ORAL NIGHTLY
Status: DISCONTINUED | OUTPATIENT
Start: 2024-01-17 | End: 2024-01-19 | Stop reason: HOSPADM

## 2024-01-17 RX ORDER — SODIUM CHLORIDE 0.9 % (FLUSH) 0.9 %
5-40 SYRINGE (ML) INJECTION EVERY 12 HOURS SCHEDULED
Status: DISCONTINUED | OUTPATIENT
Start: 2024-01-17 | End: 2024-01-19 | Stop reason: HOSPADM

## 2024-01-17 RX ORDER — BUSPIRONE HYDROCHLORIDE 5 MG/1
5 TABLET ORAL DAILY
Status: DISCONTINUED | OUTPATIENT
Start: 2024-01-18 | End: 2024-01-19 | Stop reason: HOSPADM

## 2024-01-17 RX ORDER — INSULIN GLARGINE 100 [IU]/ML
10 INJECTION, SOLUTION SUBCUTANEOUS NIGHTLY
Status: DISCONTINUED | OUTPATIENT
Start: 2024-01-17 | End: 2024-01-19 | Stop reason: HOSPADM

## 2024-01-17 RX ORDER — ACETAMINOPHEN 325 MG/1
650 TABLET ORAL EVERY 6 HOURS PRN
Status: DISCONTINUED | OUTPATIENT
Start: 2024-01-17 | End: 2024-01-19

## 2024-01-17 RX ORDER — INSULIN LISPRO 100 [IU]/ML
0-4 INJECTION, SOLUTION INTRAVENOUS; SUBCUTANEOUS
Status: DISCONTINUED | OUTPATIENT
Start: 2024-01-17 | End: 2024-01-19 | Stop reason: HOSPADM

## 2024-01-17 RX ORDER — ONDANSETRON 4 MG/1
4 TABLET, ORALLY DISINTEGRATING ORAL EVERY 8 HOURS PRN
Status: DISCONTINUED | OUTPATIENT
Start: 2024-01-17 | End: 2024-01-19 | Stop reason: HOSPADM

## 2024-01-17 RX ORDER — OXYCODONE AND ACETAMINOPHEN 7.5; 325 MG/1; MG/1
1 TABLET ORAL 4 TIMES DAILY
Status: DISCONTINUED | OUTPATIENT
Start: 2024-01-17 | End: 2024-01-19 | Stop reason: HOSPADM

## 2024-01-17 RX ORDER — LANOLIN ALCOHOL/MO/W.PET/CERES
3 CREAM (GRAM) TOPICAL NIGHTLY
Status: DISCONTINUED | OUTPATIENT
Start: 2024-01-17 | End: 2024-01-19 | Stop reason: HOSPADM

## 2024-01-17 RX ORDER — POLYETHYLENE GLYCOL 3350 17 G/17G
17 POWDER, FOR SOLUTION ORAL DAILY PRN
Status: DISCONTINUED | OUTPATIENT
Start: 2024-01-17 | End: 2024-01-19 | Stop reason: HOSPADM

## 2024-01-17 RX ORDER — LACTOBACILLUS RHAMNOSUS GG 10B CELL
1 CAPSULE ORAL 2 TIMES DAILY
Status: DISCONTINUED | OUTPATIENT
Start: 2024-01-17 | End: 2024-01-19 | Stop reason: HOSPADM

## 2024-01-17 RX ORDER — ACETAMINOPHEN 650 MG/1
650 SUPPOSITORY RECTAL EVERY 6 HOURS PRN
Status: DISCONTINUED | OUTPATIENT
Start: 2024-01-17 | End: 2024-01-19

## 2024-01-17 RX ORDER — SODIUM CHLORIDE 0.9 % (FLUSH) 0.9 %
5-40 SYRINGE (ML) INJECTION PRN
Status: DISCONTINUED | OUTPATIENT
Start: 2024-01-17 | End: 2024-01-19 | Stop reason: HOSPADM

## 2024-01-17 RX ADMIN — CEFEPIME 2000 MG: 2 INJECTION, POWDER, FOR SOLUTION INTRAVENOUS at 12:20

## 2024-01-17 RX ADMIN — SENNOSIDES 8.6 MG: 8.6 TABLET, FILM COATED ORAL at 21:16

## 2024-01-17 RX ADMIN — INSULIN LISPRO 1 UNITS: 100 INJECTION, SOLUTION INTRAVENOUS; SUBCUTANEOUS at 11:11

## 2024-01-17 RX ADMIN — GABAPENTIN 100 MG: 100 CAPSULE ORAL at 23:25

## 2024-01-17 RX ADMIN — METOPROLOL TARTRATE 25 MG: 25 TABLET, FILM COATED ORAL at 21:16

## 2024-01-17 RX ADMIN — CEFEPIME 2000 MG: 2 INJECTION, POWDER, FOR SOLUTION INTRAVENOUS at 19:33

## 2024-01-17 RX ADMIN — ARIPIPRAZOLE 2 MG: 2 TABLET ORAL at 23:25

## 2024-01-17 RX ADMIN — APIXABAN 5 MG: 5 TABLET, FILM COATED ORAL at 12:22

## 2024-01-17 RX ADMIN — VANCOMYCIN HYDROCHLORIDE 1000 MG: 1 INJECTION, POWDER, LYOPHILIZED, FOR SOLUTION INTRAVENOUS at 12:20

## 2024-01-17 RX ADMIN — APIXABAN 5 MG: 5 TABLET, FILM COATED ORAL at 21:16

## 2024-01-17 RX ADMIN — Medication 1 CAPSULE: at 21:16

## 2024-01-17 RX ADMIN — METOPROLOL TARTRATE 25 MG: 25 TABLET, FILM COATED ORAL at 12:22

## 2024-01-17 RX ADMIN — DILTIAZEM HYDROCHLORIDE 180 MG: 180 CAPSULE, COATED, EXTENDED RELEASE ORAL at 12:22

## 2024-01-17 RX ADMIN — IOPAMIDOL 75 ML: 755 INJECTION, SOLUTION INTRAVENOUS at 06:06

## 2024-01-17 RX ADMIN — METHYLPREDNISOLONE SODIUM SUCCINATE 40 MG: 40 INJECTION INTRAMUSCULAR; INTRAVENOUS at 14:39

## 2024-01-17 RX ADMIN — Medication 1 CAPSULE: at 12:22

## 2024-01-17 RX ADMIN — OXYCODONE HYDROCHLORIDE AND ACETAMINOPHEN 1 TABLET: 7.5; 325 TABLET ORAL at 23:26

## 2024-01-17 RX ADMIN — SENNOSIDES 8.6 MG: 8.6 TABLET, FILM COATED ORAL at 12:22

## 2024-01-17 RX ADMIN — VANCOMYCIN HYDROCHLORIDE 1750 MG: 10 INJECTION, POWDER, LYOPHILIZED, FOR SOLUTION INTRAVENOUS at 01:01

## 2024-01-17 RX ADMIN — ATORVASTATIN CALCIUM 40 MG: 40 TABLET, FILM COATED ORAL at 23:25

## 2024-01-17 RX ADMIN — SODIUM CHLORIDE, PRESERVATIVE FREE 10 ML: 5 INJECTION INTRAVENOUS at 19:19

## 2024-01-17 RX ADMIN — Medication 3 MG: at 23:31

## 2024-01-17 RX ADMIN — DEXTROMETHORPHAN HYDROBROMIDE AND QUINIDINE SULFATE 1 CAPSULE: 20; 10 CAPSULE, GELATIN COATED ORAL at 23:25

## 2024-01-17 ASSESSMENT — PAIN DESCRIPTION - ONSET: ONSET: ON-GOING

## 2024-01-17 ASSESSMENT — PAIN DESCRIPTION - FREQUENCY: FREQUENCY: CONTINUOUS

## 2024-01-17 ASSESSMENT — PAIN DESCRIPTION - PAIN TYPE: TYPE: CHRONIC PAIN

## 2024-01-17 ASSESSMENT — PAIN SCALES - GENERAL: PAINLEVEL_OUTOF10: 6

## 2024-01-17 ASSESSMENT — PAIN DESCRIPTION - LOCATION: LOCATION: GENERALIZED

## 2024-01-17 ASSESSMENT — PAIN DESCRIPTION - DESCRIPTORS: DESCRIPTORS: ACHING;DISCOMFORT;SORE

## 2024-01-17 NOTE — CARE COORDINATION
Case Management Assessment  Initial Evaluation    Date/Time of Evaluation: 1/17/2024 3:19 PM  Assessment Completed by: REJI Joe    If patient is discharged prior to next notation, then this note serves as note for discharge by case management.    Patient Name: Leslie Farris                   YOB: 1950  Diagnosis: Acute hypoxic respiratory failure (HCC) [J96.01]                   Date / Time: 1/16/2024  7:10 PM    Patient Admission Status: Inpatient   Readmission Risk (Low < 19, Mod (19-27), High > 27): Readmission Risk Score: 24.8    Current PCP: Kristin Pacheco MD  PCP verified by CM? Yes    Chart Reviewed: Yes      History Provided by: Patient, Other (see comment) ([facility]\)  Patient Orientation: Other (see comment) (patient is confused)    Patient Cognition: Other (see comment) (confused)    Hospitalization in the last 30 days (Readmission):  No    If yes, Readmission Assessment in  Navigator will be completed.    Advance Directives:      Code Status: Full Code   Patient's Primary Decision Maker is: Named in Scanned ACP Document    Primary Decision Maker: ElpidioSaeed - Brother/Sister - [phone]    Discharge Planning:    Patient lives with: (P) Other (Comment) ([facility]) Type of Home: (P) Long-Term Care  Primary Care Giver: Other (Comment) ([facility])  Patient Support Systems include: Other (Comment) (staff at [facility])   Current Financial resources: (P) Medicaid  Current community resources: (P) Other (Comment) (LTC @[facility])  Current services prior to admission: (P) Home Bipap, Oxygen Therapy, Other (Comment) (LTC@[email or web address])            Current DME:              Type of Home Care services:  (P) None    ADLS  Prior functional level: Bathing, Dressing, Toileting, Cooking, Housework, Shopping, Mobility, Assistance with the following:  Current functional level: Bathing, Assistance with the following:, Dressing, Toileting, Cooking, Housework, Shopping, Mobility    PT AM-PAC:   /24  OT AM-PAC:

## 2024-01-17 NOTE — DISCHARGE INSTRUCTIONS
Heart Failure Resources:  Heart Failure Interactive Workbook:  Go to https://[employer]/publication/?l=247404 for a Free Heart Failure Interactive Workbook provided by The American Heart Association. This interactive workbook will provide information on Healthier Living with Heart Failure. Please copy and paste link into search bar. Use your mouse to scroll through the pages.    HF Martelle dennys:   Heart Failure Free smart phone dennys available for iPhone and Android download. Use your phone to track sodium intake, fluid intake, symptoms, and weight.     Low Sodium Diet / Recipes:  Go to www.[employer] website for “renal” diet which is Low Sodium! [employer] is a dialysis company, but this website offers free seasonal cookbooks. Each quarter, they will release 25-30 new recipes with a breakdown of calories, sodium, and glucose. You can also go to wwwCaptify/recipes website for free recipes.     Discharge Instruction Video:  Scan the QR code below with your camera and click the [employer].com link to open the video and watch educational information on Heart Failure and Medications from one of our nurses.   https://www.EnglishCentral/design/DAFZnsH_JRk/8NapthqZOWWrlCSheS8msv/edit    Home Exercise Program:   Identification of Green/Yellow/Red zones:  You should be able to identify when you feel good (green zone), if you have 1-2 symptoms of HF (yellow zone), or if you are in need of medical attention (red zone).  In your CHF education folder you were provided a “stop light tool” to outline this information.     We want to you to rate your exertion levels:    Our therapy team has discussed means of identification with you such as the \"Patsy scale.\"  The Patsy rating scale ranges from 6 to 20, where 6 means \"no exertion at all\" and 20 means \"maximal exertion.\" The goal is to use this to gauge how much effort it is taking for you to do your normal daily tasks.   You should be able to recognize when too much

## 2024-01-17 NOTE — CONSULTS
Pulmonary & Critical Care Consultation Note    Patient is being seen at the request of LIZNADRO Terry CNP for a consultation for recurrent left lower lobe pneumonia    HISTORY OF PRESENT ILLNESS:   73 years old with history of COPD presented with shortness of breath for 6 days.  Mild to moderate.  Worse with exertion better with resting.  Associated with yellow sputum production.  Wheezes.  No fever.  No smoking, quit 2002.  Denies any sick contact.  Normally uses 2 L at home.  Compliant with inhaled bronchodilators.  No home BiPAP.  Hypoxemic requiring up to 6 L O2.      PAST MEDICAL HISTORY:  Past Medical History:   Diagnosis Date    Acute diastolic congestive heart failure (HCC) 9/14/2016    Acute diastolic heart failure (HCC)     Acute respiratory failure ([facility])     Adjustment disorder with mixed anxiety and depressed mood     Allergic rhinitis     Alzheimer's dementia ([facility])     Arachnoid cyst 5/23/2005    Atrial fibrillation ([facility])     CHF (congestive heart failure) ([facility])     Chronic back pain     Constipation     COPD (chronic obstructive pulmonary disease) ([facility])     Diabetes mellitus ([facility])     Diskitis 10/6/2011    Disseminated superficial actinic porokeratosis (DSAP)     Edema     ESBL (extended spectrum beta-lactamase) producing bacteria infection 04/17/2020    Urine Klebsiella    Hypertension     Hypo-osmolality and hyponatremia     Major depressive disorder     Morbid obesity ([facility])     Muscle weakness     Osteomyelitis of spine ([facility]) 10/6/2011    Overactive bladder     Schizoaffective disorder ([facility])     Seizures ([facility])     Urinary tract infection without hematuria     Xerosis cutis      PAST SURGICAL HISTORY:  Past Surgical History:   Procedure Laterality Date    IR MIDLINE CATH  10/10/2022    [facility]    IR MIDLINE CATH  4/11/2023    IR MIDLINE CATH 4/11/2023 [facility]    KNEE SURGERY      TONSILLECTOMY      TUBAL LIGATION         FAMILY  quit February 2002      PLAN:  Supplemental oxygen to maintain SaO2 >92%; wean as tolerated  Intensive inhaled bronchodilator therapy  IV solumedrol 40 mg IV Q12 hrs. Plan to switch to oral prednisone taper when improved  IV antibiotics to include Levaquin for now  Sputum GS&C  Acapella and Mucinex  CT chest 6-8 weeks to document resolution of abnormalities

## 2024-01-17 NOTE — PROGRESS NOTES
HOSPITALIST  ADMIT ACCEPT NOTE    1/17/2024 5:17 AM    Patient Information: KALEIGH FRAUSTO   Date of Admit:  1/16/2024  Primary Care Physician:  Kristin Pacheco MD    Chief complaint:    Chief Complaint   Patient presents with    Shortness of Breath     Pt from [facility].  Squad called for SOB.  She was on 5L and was 86%.  Squad put on NRB and her sob resolved.  Is usually on oxygen       History of Present Illness:  KALEIGH FRAUSTO is a 73 y.o. female with history of CHF, COPD, diabetes mellitus, hypertension, dementia, seizures, pulmonary nodule who was seen in ER for 1/16/2024 for shortness of breath associated with left-sided chest pain and hypoxia.    She was diagnosed with pneumonia and sepsis despite the clinical data being fairly indeterminant of this diagnosis.  Patient received antibiotics and a small fluid bolus.  D-dimer was positive.  Patient's allergies list iodine but it appears she had a PE study less than a year ago which she apparently did okay with.    Plan:  CT PE study  Patient perhaps on anticoagulant but med rec has not been done and I do not see diagnoses that would require anticoagulant at this time.  Since I have not yet had time to interview the patient myself I need to assume that they are not on any anticoagulant and at risk for PE.  Further therapeutics to be decided upon result of the study        Philippe Cross MD

## 2024-01-17 NOTE — CONSULTS
Pharmacy to dose IV Vanco HAP  Pharmacy to dose IV Vanco HAP please give 1,750mg IV x1 in ED to provide Gram+ organism coverage to include MRSA.    Arnaud Van RPH PharmD 1/16/2024 9:07 PM

## 2024-01-17 NOTE — PROGRESS NOTES
AM assessment completed. No complaints of pain or discomfort voiced. No signs of symptoms of distress noted. Respirations easy and even. Bed in lowest position, bed alarm in place and functioning properly, SR up x 2 and bed in low position. Call light within reach.     Bedside Mobility Assessment Tool (BMAT):     Assessment Level 1- Sit and Shake    1. From a semi-reclined position, ask patient to sit up and rotate to a seated position at the side of the bed. Can use the bedrail.    2. Ask patient to reach out and grab your hand and shake making sure patient reaches across his/her midline.   Fail- Patient is unable to perform tasks, patient is MOBILITY LEVEL 1.    Assessment Level 2- Stretch and Point   1. With patient in seated position at the side of the bed, have patient place both feet on the floor (or stool) with knees no higher than hips.    2. Ask patient to stretch one leg and straighten the knee, then bend the ankle/flex and point the toes. If appropriate, repeat with the other leg.   Fail- Patient is unable to complete task. Patient is MOBILITY LEVEL 2.     Assessment Level 3- Stand   1. Ask patient to elevate off the bed or chair (seated to standing) using an assistive device (cane, bedrail).    2. Patient should be able to raise buttocks off be and hold for a count of five. May repeat once.   Fail- Patient unable to demonstrate standing stability. Patient is MOBILITY LEVEL 3.     Assessment Level 4- Walk   1. Ask patient to march in place at bedside.    2. Then ask patient to advance step and return each foot. Some medical conditions may render a patient from stepping backwards, use your best clinical judgement.   Fail- Patient not able to complete tasks OR requires use of assistive device. Patient is MOBILITY LEVEL 3.       Mobility Level- 1

## 2024-01-17 NOTE — PROGRESS NOTES
Transferred care to VISHAL [place]. Face to face bedside report given, no need voiced at this time.

## 2024-01-17 NOTE — ED PROVIDER NOTES
In addition to the advanced practice provider, I personally saw Leslie Farris and performed a substantive portion of the visit including all aspects of the medical decision making.    Medical Decision Making  Patient seen and evaluated at bedside.  Briefly, this is a 73-year-old female who presents via EMS for shortness of breath and left-sided chest pain.  She usually uses 2 L of nasal cannula oxygen at home, however she has been more oxygen today and in the ED.  Chest x-ray showed persistent air base opacities in the lung left lung base which appear more prominent than before which could be related to pneumonia per radiology impression.  Given patient's increasing oxygen requirement and symptoms, I do suspect acute pneumonia.  Patient started on cefepime and vancomycin in the emergency department.  She will admitted for continued treatment for community-acquired pneumonia with IV antibiotics.    EKG  Atrial fibrillation with ventricular rate of 79 bpm.    SEP-1  Is this patient to be included in the SEP-1 Core Measure due to severe sepsis or septic shock?   No   Exclusion criteria - the patient is NOT to be included for SEP-1 Core Measure due to:  May have criteria for sepsis, but does not meet criteria for severe sepsis or septic shock    Screenings     Maxim Coma Scale  Eye Opening: Spontaneous  Best Verbal Response: Confused  Best Motor Response: Obeys commands  Jber Coma Scale Score: 14             Patient Referrals:  No follow-up provider specified.    Discharge Medications:  Current Discharge Medication List          FINAL IMPRESSION  1. HCAP (healthcare-associated pneumonia)    2. Septicemia (HCC)    3. Elevated brain natriuretic peptide (BNP) level    4. Hyponatremia        Blood pressure 123/74, pulse 96, temperature 97.4 °F (36.3 °C), temperature source Oral, resp. rate 18, weight 90 kg (198 lb 6.6 oz), SpO2 93 %.     I personally saw the patient and made/approved the management plan and take

## 2024-01-17 NOTE — H&P
Hospital Medicine History & Physical      PCP: Kristin Pacheco MD    Date of Admission: 1/16/2024    Date of Service: Pt seen/examined on 01/17/24      Chief Complaint:    Chief Complaint   Patient presents with    Shortness of Breath     Pt from [facility].  Beto called for SOB.  She was on 5L and was 86%.  Squad put on NRB and her sob resolved.  Is usually on oxygen         History Of Present Illness:      The patient is a 73 y.o. female with PMH of  PMH of MRSA, ESBL, DMRO afib, chronic back pain, chronic opiate use, dementia, LORENZO, schizophrenia, COPD, Chronic hypoxic respiratory failure on 5-6,  DM, CAD, NSTEMI,  heart failure, GI bleed, anemia, hypoglycemia, who presents to [facility] with shortness of breath.  Pt is from [facility] long-term care.  Pt is awake and alert to self only.  She is unable to give me a reliable history.  She did say she has a productive cough with clear sputum and shortness of breath.  She thinks her oxygen dropped that is why she was sent here.  Limited history obtained from the patient and review of EMR. Of note, previous admission pt was DNR-CC with paperwork from [facility].  Today paperwork shows Full Code. Pt denies any chest pain, palpitations, dizziness, nausea or vomiting.     Past Medical History:        Diagnosis Date    Acute diastolic congestive heart failure (HCC) 9/14/2016    Acute diastolic heart failure (HCC)     Acute respiratory failure (HCC)     Adjustment disorder with mixed anxiety and depressed mood     Allergic rhinitis     Alzheimer's dementia (HCC)     Arachnoid cyst 5/23/2005    Atrial fibrillation (HCC)     CHF (congestive heart failure) (HCC)     Chronic back pain     Constipation     COPD (chronic obstructive pulmonary disease) (HCC)     Diabetes mellitus (HCC)     Diskitis 10/6/2011    Disseminated superficial actinic porokeratosis (DSAP)     Edema     ESBL (extended spectrum beta-lactamase) producing bacteria infection 04/17/2020    Urine Klebsiella     Hypertension     Hypo-osmolality and hyponatremia     Major depressive disorder     Morbid obesity ([facility])     Muscle weakness     Osteomyelitis of spine ([facility]) 10/6/2011    Overactive bladder     Schizoaffective disorder ([facility])     Seizures ([facility])     Urinary tract infection without hematuria     Xerosis cutis        Past Surgical History:        Procedure Laterality Date    IR MIDLINE CATH  10/10/2022    [facility]    IR MIDLINE CATH  4/11/2023    IR MIDLINE CATH [facility]20[facility]    KNEE SURGERY      TONSILLECTOMY      TUBAL LIGATION         Medications Prior to Admission:    Prior to Admission medications    Medication Sig Start Date End Date Taking? Authorizing Provider   metFORMIN (GLUCOPHAGE) 500 MG tablet Take 1 tablet by mouth daily (with breakfast)   Yes ProviderEliza MD   oxyCODONE-acetaminophen (PERCOCET) 7.5-325 MG per tablet Take 1 tablet by mouth in the morning, at noon, in the evening, and at bedtime. Max Daily Amount: 4 tablets   Yes Eliza Reid MD   predniSONE (DELTASONE) 20 MG tablet 2 qd- 2 days, 1 qd- 3 days, 1/2 qd- 2 days  Patient not taking: Reported on 1/17/2024 12/11/23   Wilfrido Hatfield MD   gabapentin (NEURONTIN) 100 MG capsule Take 1 capsule by mouth 3 times daily.    ProviderEliza MD   gabapentin (NEURONTIN) 100 MG capsule Take 1 capsule by mouth 3 times daily as needed (pain) for up to 5 days. Indications: Nerve Disease 7/13/23 7/18/23  Dena Lovell DO   insulin glargine (LANTUS) 100 UNIT/ML injection vial Inject 5 Units into the skin nightly  Patient taking differently: Inject 10 Units into the skin nightly 7/13/23   Dena Lovell DO   insulin lispro (HUMALOG) 100 UNIT/ML SOLN injection vial Inject 0-4 Units into the skin 3 times daily (with meals) **Corrective Low Dose Algorithm**  Glucose: Dose:    No Insulin  200-249 1 Unit  250-299 2 Units  300-349 3 Units  Over 349 4 Units and notify

## 2024-01-17 NOTE — CONSULTS
[facility]   Pharmacy Pharmacokinetic Monitoring Service - Vancomycin     Leslie Farris is a 73 y.o. female starting on vancomycin therapy for Pneumonia (HAP) x 7 days. Pharmacy consulted by BIANKA Terry for monitoring and adjustment.    Target Concentration: Goal AUC/CLAYTON 400-600 mg*hr/L    Additional Antimicrobials: Cefepime    Pertinent Laboratory Values:   Wt Readings from Last 1 Encounters:   01/16/24 90 kg (198 lb 6.6 oz)     Temp Readings from Last 1 Encounters:   01/17/24 97.8 °F (36.6 °C) (Oral)     Estimated Creatinine Clearance: 103 mL/min (based on SCr of 0.5 mg/dL).  Recent Labs     01/16/24  1930   CREATININE <0.5*   BUN 17   WBC 12.5*     Procalcitonin: 0.09    MRSA Nasal Swab: was ordered by provider, awaiting results.    Plan:  Dosing recommendations based on Bayesian software  Patient received vancomycin 1750 mg IV x one dose in the ED at 0101. Will give 1000 mg IV every 12 hours.  Anticipated AUC of 417 and trough concentration of 11.4 at steady state.  Renal labs as indicated   Vancomycin trough level ordered for 1/18 @ 1200.   Pharmacy will continue to monitor patient and adjust therapy as indicated.    Thank you for the consult.

## 2024-01-17 NOTE — ED PROVIDER NOTES
otherwise nontoxic in appearance.  She is requiring increased O2 from her baseline.  Typically she is on 2 L nasal cannula but is currently requiring 5 and is audibly wheezing.  I discussed with her plan for evaluation including laboratory studies and imaging and she was in agreement.    Laboratory studies and images were evaluated  CBC does reveal a leukocytosis of 12.5 but is otherwise grossly negative.  CMP does reveal a mild hyponatremia of 133, hyperglycemia of 227.  Lactic is negative at 1.4  Venous blood gas is negative for acidosis does reveal carboxyhemoglobin of 1.9 and pO2 of 41.3.  Troponin is negative at 14, repeat troponin uptrending at 15  BNP is elevated at 4257 which does appear uptrending from baseline.  Swabs for COVID and flu are negative.  EKG was interpreted by the attending physician  Chest x-ray interpreted by the radiologist and myself for persistent airspace opacities at the left lung base which appear more prominent than before and could be related to pneumonia.    On reevaluation after breathing treatments patient's wheezing had completely resolved.  Asleep her heart rates were 105-108 however when she woke up and started talking to me heart rate increased to 120 and she remains in A-fib.  Patient is also continuing to require 4 to 5 L nasal cannula to maintain saturations greater than 90%.  I did have a conversation with the patient regarding disposition of admission for further treatment of her condition and she was in agreement.  Patient will be treated for healthcare associated pneumonia.  Orders placed for antibiotics.  Consult placed to hospital medicine team for disposition of admission.      Social Determinants Significantly Affecting Health : None    Is this patient to be included in the SEP-1 Core Measure due to severe sepsis or septic shock?   Yes   SEP-1 CORE MEASURE DATA      Sepsis Criteria   Severe Sepsis Criteria   Septic Shock Criteria     Must be confirmed or suspected    DISPOSITION Admitted 01/17/2024 11:45:42 AM      PATIENT REFERREDTO:  No follow-up provider specified.    DISCHARGE MEDICATIONS:  Current Discharge Medication List          DISCONTINUED MEDICATIONS:  Current Discharge Medication List                 (Please note that portions ofthis note were completed with a voice recognition program.  Efforts were made to edit the dictations but occasionally words are mis-transcribed.)    LIZANDRO Kidd CNP (electronically signed)       Alana Velasco APRN - CNP  01/17/24 9958

## 2024-01-17 NOTE — PLAN OF CARE
Problem: Respiratory - Adult  Goal: Achieves optimal ventilation and oxygenation  Outcome: Progressing     Problem: Cardiovascular - Adult  Goal: Maintains optimal cardiac output and hemodynamic stability  Outcome: Progressing  Goal: Absence of cardiac dysrhythmias or at baseline  Outcome: Progressing     Problem: Chronic Conditions and Co-morbidities  Goal: Patient's chronic conditions and co-morbidity symptoms are monitored and maintained or improved  Outcome: Progressing

## 2024-01-18 PROBLEM — J96.21 ACUTE ON CHRONIC HYPOXIC RESPIRATORY FAILURE (HCC): Status: ACTIVE | Noted: 2018-10-21

## 2024-01-18 LAB
ANION GAP SERPL CALCULATED.3IONS-SCNC: 16 MMOL/L (ref 3–16)
BASOPHILS # BLD: 0 K/UL (ref 0–0.2)
BASOPHILS NFR BLD: 0.1 %
BUN SERPL-MCNC: 23 MG/DL (ref 7–20)
CALCIUM SERPL-MCNC: 9.3 MG/DL (ref 8.3–10.6)
CHLORIDE SERPL-SCNC: 98 MMOL/L (ref 99–110)
CO2 SERPL-SCNC: 21 MMOL/L (ref 21–32)
CREAT SERPL-MCNC: <0.5 MG/DL (ref 0.6–1.2)
DEPRECATED RDW RBC AUTO: 15 % (ref 12.4–15.4)
EOSINOPHIL # BLD: 0 K/UL (ref 0–0.6)
EOSINOPHIL NFR BLD: 0 %
GFR SERPLBLD CREATININE-BSD FMLA CKD-EPI: >60 ML/MIN/{1.73_M2}
GLUCOSE BLD-MCNC: 195 MG/DL (ref 70–99)
GLUCOSE BLD-MCNC: 224 MG/DL (ref 70–99)
GLUCOSE SERPL-MCNC: 250 MG/DL (ref 70–99)
HCT VFR BLD AUTO: 39.1 % (ref 36–48)
HGB BLD-MCNC: 12.4 G/DL (ref 12–16)
LEGIONELLA AG UR QL: NORMAL
LYMPHOCYTES # BLD: 0.9 K/UL (ref 1–5.1)
LYMPHOCYTES NFR BLD: 7.6 %
MCH RBC QN AUTO: 25.6 PG (ref 26–34)
MCHC RBC AUTO-ENTMCNC: 31.7 G/DL (ref 31–36)
MCV RBC AUTO: 80.9 FL (ref 80–100)
MONOCYTES # BLD: 0.4 K/UL (ref 0–1.3)
MONOCYTES NFR BLD: 3.1 %
NEUTROPHILS # BLD: 11 K/UL (ref 1.7–7.7)
NEUTROPHILS NFR BLD: 89.2 %
PERFORMED ON: ABNORMAL
PERFORMED ON: ABNORMAL
PLATELET # BLD AUTO: 162 K/UL (ref 135–450)
PMV BLD AUTO: 10.8 FL (ref 5–10.5)
POTASSIUM SERPL-SCNC: 4.6 MMOL/L (ref 3.5–5.1)
RBC # BLD AUTO: 4.83 M/UL (ref 4–5.2)
S PNEUM AG UR QL: NORMAL
SODIUM SERPL-SCNC: 135 MMOL/L (ref 136–145)
VANCOMYCIN TROUGH SERPL-MCNC: 14.7 UG/ML (ref 10–20)
WBC # BLD AUTO: 12.3 K/UL (ref 4–11)

## 2024-01-18 PROCEDURE — 6370000000 HC RX 637 (ALT 250 FOR IP): Performed by: NURSE PRACTITIONER

## 2024-01-18 PROCEDURE — 6370000000 HC RX 637 (ALT 250 FOR IP): Performed by: INTERNAL MEDICINE

## 2024-01-18 PROCEDURE — 94640 AIRWAY INHALATION TREATMENT: CPT

## 2024-01-18 PROCEDURE — 1200000000 HC SEMI PRIVATE

## 2024-01-18 PROCEDURE — 2700000000 HC OXYGEN THERAPY PER DAY

## 2024-01-18 PROCEDURE — 2580000003 HC RX 258: Performed by: NURSE PRACTITIONER

## 2024-01-18 PROCEDURE — 80048 BASIC METABOLIC PNL TOTAL CA: CPT

## 2024-01-18 PROCEDURE — 36415 COLL VENOUS BLD VENIPUNCTURE: CPT

## 2024-01-18 PROCEDURE — 85025 COMPLETE CBC W/AUTO DIFF WBC: CPT

## 2024-01-18 PROCEDURE — 80202 ASSAY OF VANCOMYCIN: CPT

## 2024-01-18 PROCEDURE — 2580000003 HC RX 258: Performed by: INTERNAL MEDICINE

## 2024-01-18 PROCEDURE — 94761 N-INVAS EAR/PLS OXIMETRY MLT: CPT

## 2024-01-18 PROCEDURE — 6360000002 HC RX W HCPCS: Performed by: NURSE PRACTITIONER

## 2024-01-18 PROCEDURE — 99233 SBSQ HOSP IP/OBS HIGH 50: CPT | Performed by: INTERNAL MEDICINE

## 2024-01-18 RX ORDER — IPRATROPIUM BROMIDE AND ALBUTEROL SULFATE 2.5; .5 MG/3ML; MG/3ML
1 SOLUTION RESPIRATORY (INHALATION)
Status: DISCONTINUED | OUTPATIENT
Start: 2024-01-18 | End: 2024-01-19 | Stop reason: HOSPADM

## 2024-01-18 RX ORDER — SENNOSIDES 8.6 MG
650 CAPSULE ORAL EVERY 8 HOURS PRN
Status: DISCONTINUED | OUTPATIENT
Start: 2024-01-18 | End: 2024-01-19 | Stop reason: SDUPTHER

## 2024-01-18 RX ORDER — IPRATROPIUM BROMIDE AND ALBUTEROL SULFATE 2.5; .5 MG/3ML; MG/3ML
1 SOLUTION RESPIRATORY (INHALATION) EVERY 4 HOURS PRN
Status: DISCONTINUED | OUTPATIENT
Start: 2024-01-18 | End: 2024-01-19 | Stop reason: HOSPADM

## 2024-01-18 RX ORDER — LEVOFLOXACIN 500 MG/1
500 TABLET, FILM COATED ORAL DAILY
Status: DISCONTINUED | OUTPATIENT
Start: 2024-01-18 | End: 2024-01-19 | Stop reason: HOSPADM

## 2024-01-18 RX ORDER — POLYETHYLENE GLYCOL 3350 17 G/17G
17 POWDER, FOR SOLUTION ORAL DAILY PRN
Status: DISCONTINUED | OUTPATIENT
Start: 2024-01-18 | End: 2024-01-18 | Stop reason: SDUPTHER

## 2024-01-18 RX ADMIN — LEVOFLOXACIN 500 MG: 500 TABLET, FILM COATED ORAL at 12:58

## 2024-01-18 RX ADMIN — DEXTROMETHORPHAN HYDROBROMIDE AND QUINIDINE SULFATE 1 CAPSULE: 20; 10 CAPSULE, GELATIN COATED ORAL at 20:41

## 2024-01-18 RX ADMIN — ARIPIPRAZOLE 2 MG: 2 TABLET ORAL at 20:41

## 2024-01-18 RX ADMIN — SENNOSIDES 8.6 MG: 8.6 TABLET, FILM COATED ORAL at 20:41

## 2024-01-18 RX ADMIN — DILTIAZEM HYDROCHLORIDE 180 MG: 180 CAPSULE, COATED, EXTENDED RELEASE ORAL at 09:36

## 2024-01-18 RX ADMIN — OXYCODONE HYDROCHLORIDE AND ACETAMINOPHEN 1 TABLET: 7.5; 325 TABLET ORAL at 20:41

## 2024-01-18 RX ADMIN — METOPROLOL TARTRATE 25 MG: 25 TABLET, FILM COATED ORAL at 20:41

## 2024-01-18 RX ADMIN — IPRATROPIUM BROMIDE AND ALBUTEROL SULFATE 1 DOSE: 2.5; .5 SOLUTION RESPIRATORY (INHALATION) at 23:06

## 2024-01-18 RX ADMIN — SODIUM CHLORIDE, PRESERVATIVE FREE 10 ML: 5 INJECTION INTRAVENOUS at 09:42

## 2024-01-18 RX ADMIN — BUSPIRONE HYDROCHLORIDE 5 MG: 5 TABLET ORAL at 09:35

## 2024-01-18 RX ADMIN — METHYLPREDNISOLONE SODIUM SUCCINATE 40 MG: 40 INJECTION INTRAMUSCULAR; INTRAVENOUS at 01:52

## 2024-01-18 RX ADMIN — OXYCODONE HYDROCHLORIDE AND ACETAMINOPHEN 1 TABLET: 7.5; 325 TABLET ORAL at 12:33

## 2024-01-18 RX ADMIN — Medication 1 CAPSULE: at 09:35

## 2024-01-18 RX ADMIN — CEFEPIME 2000 MG: 2 INJECTION, POWDER, FOR SOLUTION INTRAVENOUS at 04:45

## 2024-01-18 RX ADMIN — OXYCODONE HYDROCHLORIDE AND ACETAMINOPHEN 1 TABLET: 7.5; 325 TABLET ORAL at 09:35

## 2024-01-18 RX ADMIN — CEFEPIME 2000 MG: 2 INJECTION, POWDER, FOR SOLUTION INTRAVENOUS at 11:42

## 2024-01-18 RX ADMIN — Medication 3 MG: at 20:41

## 2024-01-18 RX ADMIN — GABAPENTIN 100 MG: 100 CAPSULE ORAL at 14:34

## 2024-01-18 RX ADMIN — VANCOMYCIN HYDROCHLORIDE 1000 MG: 1 INJECTION, POWDER, LYOPHILIZED, FOR SOLUTION INTRAVENOUS at 12:27

## 2024-01-18 RX ADMIN — EMPAGLIFLOZIN 10 MG: 10 TABLET, FILM COATED ORAL at 09:36

## 2024-01-18 RX ADMIN — Medication 1 CAPSULE: at 20:41

## 2024-01-18 RX ADMIN — GABAPENTIN 100 MG: 100 CAPSULE ORAL at 09:35

## 2024-01-18 RX ADMIN — INSULIN GLARGINE 10 UNITS: 100 INJECTION, SOLUTION SUBCUTANEOUS at 20:41

## 2024-01-18 RX ADMIN — VANCOMYCIN HYDROCHLORIDE 1000 MG: 1 INJECTION, POWDER, LYOPHILIZED, FOR SOLUTION INTRAVENOUS at 01:00

## 2024-01-18 RX ADMIN — SENNOSIDES 8.6 MG: 8.6 TABLET, FILM COATED ORAL at 09:36

## 2024-01-18 RX ADMIN — APIXABAN 5 MG: 5 TABLET, FILM COATED ORAL at 20:41

## 2024-01-18 RX ADMIN — APIXABAN 5 MG: 5 TABLET, FILM COATED ORAL at 09:36

## 2024-01-18 RX ADMIN — METOPROLOL TARTRATE 25 MG: 25 TABLET, FILM COATED ORAL at 09:36

## 2024-01-18 RX ADMIN — SODIUM CHLORIDE, PRESERVATIVE FREE 10 ML: 5 INJECTION INTRAVENOUS at 20:33

## 2024-01-18 RX ADMIN — METHYLPREDNISOLONE SODIUM SUCCINATE 40 MG: 40 INJECTION INTRAMUSCULAR; INTRAVENOUS at 14:34

## 2024-01-18 RX ADMIN — OXYCODONE HYDROCHLORIDE AND ACETAMINOPHEN 1 TABLET: 7.5; 325 TABLET ORAL at 17:14

## 2024-01-18 RX ADMIN — DEXTROMETHORPHAN HYDROBROMIDE AND QUINIDINE SULFATE 1 CAPSULE: 20; 10 CAPSULE, GELATIN COATED ORAL at 09:39

## 2024-01-18 RX ADMIN — GABAPENTIN 100 MG: 100 CAPSULE ORAL at 20:41

## 2024-01-18 RX ADMIN — ATORVASTATIN CALCIUM 40 MG: 40 TABLET, FILM COATED ORAL at 20:41

## 2024-01-18 RX ADMIN — Medication 10 ML: at 14:36

## 2024-01-18 ASSESSMENT — COPD QUESTIONNAIRES
QUESTION5_HOMEACTIVITIES: 5
QUESTION1_COUGHFREQUENCY: 3
CAT_TOTALSCORE: 28
QUESTION4_WALKINCLINE: 5
QUESTION8_ENERGYLEVEL: 5
QUESTION6_LEAVINGHOUSE: 5
QUESTION2_CHESTPHLEGM: 3
QUESTION7_SLEEPQUALITY: 2
QUESTION3_CHESTTIGHTNESS: 0

## 2024-01-18 ASSESSMENT — PAIN SCALES - GENERAL
PAINLEVEL_OUTOF10: 8
PAINLEVEL_OUTOF10: 0

## 2024-01-18 ASSESSMENT — PAIN DESCRIPTION - DESCRIPTORS: DESCRIPTORS: DULL

## 2024-01-18 ASSESSMENT — PAIN DESCRIPTION - LOCATION: LOCATION: OTHER (COMMENT)

## 2024-01-18 ASSESSMENT — PAIN DESCRIPTION - ORIENTATION: ORIENTATION: LEFT

## 2024-01-18 NOTE — PLAN OF CARE
Problem: Respiratory - Adult  Goal: Achieves optimal ventilation and oxygenation  1/17/2024 1055 by Bre Guido RN  Outcome: Progressing     Problem: Chronic Conditions and Co-morbidities  Goal: Patient's chronic conditions and co-morbidity symptoms are monitored and maintained or improved  1/17/2024 2127 by Anum Hutton, RN  Outcome: Progressing  1/17/2024 1055 by Bre Guido RN  Outcome: Progressing

## 2024-01-18 NOTE — PROGRESS NOTES
Patient provided a COPD Educational Folder that includes the following materials:     [x]  [employer] Booklet: Managing your COPD  [x]  ALA: Getting the Most Out of Medication Delivery Devices  [x]  ALA: My COPD Action Plan  [x]  Better Breathers [facility]: [facility]   [x]  Smoking Cessation Classes  [x]  Outpatient Spiritual Care Services  [x]  Magnet: Signs of COPD    PATIENT/CAREGIVER TEACHING:   Level of patient/caregiver understanding able to:   [] Verbalize understanding   [] Demonstrate understanding       [] Teach back        [x] Needs reinforcement     []  Other:     Patient received copd educational folder during previous admission on 12/11. Stated she did not need another one.     Electronically signed by Karishma Lui RN on 1/18/2024 at 3:27 PM

## 2024-01-18 NOTE — FLOWSHEET NOTE
01/18/24 0716   Vital Signs   Temp 97.7 °F (36.5 °C)   Temp Source Oral   Pulse 87   Heart Rate Source Monitor   Respirations 16   BP (!) 138/92   MAP (Calculated) 107   BP Location Right upper arm   BP Method Automatic   Patient Position Semi fowlers   Oxygen Therapy   SpO2 97 %   O2 Device Nasal cannula   O2 Flow Rate (L/min) 5 L/min     Patient IV antibiotics infusing as ordered at this time.
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
## 2024-01-18 NOTE — PROGRESS NOTES
Pt drowsy, confused at times, no complaints of pain . Am assessment completed see flow sheet. Patient refusing FSBS and insulins, Dr. Maciel aware. Call light within reach.

## 2024-01-18 NOTE — PROGRESS NOTES
Rn in room for antibiotic. Pt does not want to be woken up again. Informed pt that she has another medication at 0230 and 0430 and her vitals need to be taken. Offered to take vitals at this time. Pt would rather wait until 0430. Pt also refuses her blood glucose to be checked at 0200.

## 2024-01-18 NOTE — PROGRESS NOTES
chloride      dextrose       PRN Meds:  sodium chloride flush, sodium chloride, ondansetron **OR** ondansetron, polyethylene glycol, acetaminophen **OR** acetaminophen, glucose, dextrose bolus **OR** dextrose bolus, dextrose, bisacodyl    Labs:  CBC:   Recent Labs     01/16/24 1930 01/17/24  1209 01/18/24  0557   WBC 12.5* 10.0 12.3*   HGB 12.8 11.9* 12.4   HCT 40.6 37.9 39.1   MCV 81.0 81.6 80.9    131* 162     BMP:   Recent Labs     01/16/24 1930 01/17/24  1209   * 132*   K 4.2 4.5   CL 95* 95*   CO2 30 20*   BUN 17 20   CREATININE <0.5* <0.5*     LIVER PROFILE:   Recent Labs     01/16/24 1930   AST 11*   ALT 9*   BILITOT 0.4   ALKPHOS 81     PT/INR: No results for input(s): \"PROTIME\", \"INR\" in the last 72 hours.  APTT: No results for input(s): \"APTT\" in the last 72 hours.  UA:  Recent Labs     01/16/24  2333   COLORU Yellow   PHUR 6.0   WBCUA 6-9*   RBCUA 5-10*   YEAST Present*   BACTERIA 1+*   CLARITYU Clear   SPECGRAV <=1.005   LEUKOCYTESUR Negative   UROBILINOGEN 0.2   BILIRUBINUR Negative   BLOODU LARGE*   GLUCOSEU >=1000*     No results for input(s): \"PHART\", \"CGZ0YDW\", \"PO2ART\" in the last 72 hours.      Microbiology:  1/16 BC NGTD  1/16 COVID-19 and influenza negative  1/16 Legionella and strep negative     Imaging:  CTPA 1/17 imaging was reviewed by me and showed   1. Left lower lobe pneumonia.   2. Improved with residual trace left pleural effusion.   3. Unchanged 5 mm solid right pulmonary nodule.            ASSESSMENT:  Acute hypoxemic respiratory failure  Left lower lobe pneumonia  COPD with acute exacerbation  RUL 5 mm nodule on CT 1/17/2024 not significantly changed from CT 4/20/2023.    Former smoker 1.5 pack/day for over 30 years quit February 2002        PLAN:  Supplemental oxygen to maintain SaO2 >92%; wean as tolerated  Intensive inhaled bronchodilator therapy  IV solumedrol 40 mg IV Q12 hrs. Plan to switch to oral prednisone taper when improved  Vancomycin and cefepime day

## 2024-01-18 NOTE — DISCHARGE INSTR - COC
Continuity of Care Form    Patient Name: Leslie Farris   :  1950  MRN:  [id number]    Admit date:  2024  Discharge date:  2024    Code Status Order: Full Code   Advance Directives:     Admitting Physician:  Philippe Cross MD  PCP: Kristin Pacheco MD    Discharging Nurse: Janet Kiran RN  Discharging Hospital Unit/Room#: 0204/0204-01  Discharging Unit Phone Number: [phone]    Emergency Contact:   Extended Emergency Contact Information  Primary Emergency Contact: Suresh Hill  Mobile Phone: [phone]  Relation: Niece/Nephew   needed? No  Secondary Emergency Contact: Saeed Magallanes  [place] Phone: [phone]  Mobile Phone: [phone]  Relation: Brother/Sister   needed? No    Past Surgical History:  Past Surgical History:   Procedure Laterality Date    IR MIDLINE CATH  10/10/2022    IR MIDLINE CATH 10/10/2022 [facility] PROCEDURES    IR MIDLINE CATH  2023    IR MIDLINE CATH 2023 [facility] PROC[facility]    KNEE SURGERY      TONSILLECTOMY      TUBAL LIGATION         Immunization History:   Immunization History   Administered Date(s) Administered    COVID-19, PFIZER Bivalent, DO NOT Dilute, (age 12y+), IM, 30 mcg/0.3 mL 2023    COVID-19, PFIZER GRAY top, DO NOT Dilute, (age 12 y+), IM, 30 mcg/0.3 mL 2022    COVID-19, PFIZER PURPLE top, DILUTE for use, (age 12 y+), 30mcg/0.3mL 2020, 2021, 2021    Influenza Virus Vaccine 2014, 10/20/2019    Influenza, FLUARIX, FLULAVAL, FLUZONE (age 6 mo+) AND AFLURIA, (age 3 y+), PF, 0.5mL 2016    Pneumococcal, PCV-13, PREVNAR 13, (age 6w+), IM, 0.5mL 2016       Active Problems:  Patient Active Problem List   Diagnosis Code    Seizure disorder, grand mal ([facility]) G40.409    DM (diabetes mellitus) ([facility]) E11.9    Lumbar facet arthropathy M47.816    Disc degeneration, lumbar M51.36    Class 1 obesity in adult E66.9    Hypokalemia E87.6    Atrial fibrillation with RVR  fluid intake to 48 - 64 ounces ( 1.5 - 2 liters) per day.   Call Facility MD} with any questions or concerns.   Please continue heart failure education to patient and family/support system.  See After Visit Summary for hospital follow up appointment details.  Consider Spiritual Care Referral for support and/or completion of advance directives:   [facility] Outpatient Spiritual Care Services: [phone]  Consider: having the facility MD complete required 7 day follow up.    Patient's personal belongings (please select all that are sent with patient):  None    RN SIGNATURE:  Electronically signed by Janet Kiran RN on 1/19/24 at 2:02 PM EST    CASE MANAGEMENT/SOCIAL WORK SECTION    Inpatient Status Date: 1/17/24    Readmission Risk Assessment Score:  Readmission Risk              Risk of Unplanned Readmission:  37           Discharging to Facility/ Agency   [facility]  Skilled Nursing  [address] [place] [address]  [phone]     / signature: Electronically signed by Carol Triana RN on 1/19/24 at 12:14 PM EST    PHYSICIAN SECTION    Prognosis: Good    Condition at Discharge: Stable    Rehab Potential (if transferring to Rehab): poor    Recommended Labs or Other Treatments After Discharge:     Physician Certification: I certify the above information and transfer of Leslie Farris  is necessary for the continuing treatment of the diagnosis listed and that she requires Skilled Nursing Facility for greater 30 days.     Update Admission H&P: No change in H&P    PHYSICIAN SIGNATURE:  Electronically signed by Marisabel Maciel MD on 1/19/24 at 10:33 AM EST

## 2024-01-18 NOTE — PLAN OF CARE
HEART FAILURE CARE PLAN:    Comorbidities Reviewed: Yes   Patient has a past medical history of Acute diastolic congestive heart failure (HCC), Acute diastolic heart failure (HCC), Acute respiratory failure (HCC), Adjustment disorder with mixed anxiety and depressed mood, Allergic rhinitis, Alzheimer's dementia (HCC), Arachnoid cyst, Atrial fibrillation (HCC), CHF (congestive heart failure) ([facility]), Chronic back pain, Constipation, COPD (chronic obstructive pulmonary disease) (HCC), Diabetes mellitus (HCC), Diskitis, Disseminated superficial actinic porokeratosis (DSAP), Edema, ESBL (extended spectrum beta-lactamase) producing bacteria infection, Hypertension, Hypo-osmolality and hyponatremia, Major depressive disorder, Morbid obesity ([facility]), Muscle weakness, Osteomyelitis of spine ([facility]), Overactive bladder, Schizoaffective disorder (HCC), Seizures ([facility]), Urinary tract infection without hematuria, and Xerosis cutis.     ECHOCARDIOGRAM Reviewed: Yes   Patient's Ejection Fraction (EF) is greater than 40%    Weights Reviewed: Yes   Admission weight: 90 kg (198 lb 6.6 oz)   Wt Readings from Last 3 Encounters:   01/16/24 90 kg (198 lb 6.6 oz)   12/11/23 89.7 kg (197 lb 12.8 oz)   07/13/23 90.2 kg (198 lb 12.8 oz)     Intake & Output Reviewed: Yes     Intake/Output Summary (Last 24 hours) at 1/18/2024 0159  Last data filed at 1/17/2024 1915  Gross per 24 hour   Intake 971.42 ml   Output 900 ml   Net 71.42 ml     Medications Reviewed: Yes   SCHEDULED HOSPITAL MEDICATIONS:   sodium chloride flush  5-40 mL IntraVENous 2 times per day    insulin lispro  0-4 Units SubCUTAneous TID     insulin lispro  0-4 Units SubCUTAneous Nightly    cefepime  2,000 mg IntraVENous q8h    apixaban  5 mg Oral BID    dilTIAZem  180 mg Oral Daily    insulin glargine  10 Units SubCUTAneous Nightly    metoprolol tartrate  25 mg Oral BID    senna  1 tablet Oral BID    lactobacillus  1 capsule Oral BID    vancomycin  1,000 mg IntraVENous Q12H     methylPREDNISolone  40 mg IntraVENous Q12H    ARIPiprazole  2 mg Oral Nightly    atorvastatin  40 mg Oral Nightly    busPIRone  5 mg Oral Daily    empagliflozin  10 mg Oral Daily    dextromethorphan-quiNIDine  1 capsule Oral BID    gabapentin  100 mg Oral TID    melatonin  3 mg Oral Nightly    oxyCODONE-acetaminophen  1 tablet Oral 4x daily     ACE/ARB/ARNI is REQUIRED for EF </= 40% SYSTOLIC FAILURE:   ACE:: None  ARB:: None  ARNI:: None    Evidenced-Based Beta Blocker is REQUIRED for EF </= 40% SYSTOLIC FAILURE:   :: Metoprolol SUCCinate- Toprol XL    Diuretics:  :: None  Diet Reviewed: Yes   ADULT DIET; Easy to Chew; 4 carb choices (60 gm/meal)    Goal of Care Reviewed: Yes   Patient and/or Family's stated Goal of Care this Admission: reduce shortness of breath, increase activity tolerance, better understand heart failure and disease management, be more comfortable, and reduce lower extremity edema prior to discharge.

## 2024-01-18 NOTE — PROGRESS NOTES
4 Eyes Skin Assessment     NAME:  Leslie Farris  YOB: 1950  MEDICAL RECORD NUMBER:  [id number]    The patient is being assessed for  Admission    I agree that at least one RN has performed a thorough Head to Toe Skin Assessment on the patient. ALL assessment sites listed below have been assessed.      Areas assessed by both nurses:    Head, Face, Ears, Shoulders, Back, Chest, Arms, Elbows, Hands, Sacrum. Buttock, Coccyx, Ischium, Legs. Feet and Heels, and Under Medical Devices         Does the Patient have a Wound? No noted wound(s)       Jarod Prevention initiated by RN: Yes  Wound Care Orders initiated by RN: No    Pressure Injury (Stage 3,4, Unstageable, DTI, NWPT, and Complex wounds) if present, place Wound referral order by RN under : No    New Ostomies, if present place, Ostomy referral order under : No     Nurse 1 eSignature: Electronically signed by Dena Thao RN on 1/18/24 at 4:50 AM EST    **SHARE this note so that the co-signing nurse can place an eSignature**    Nurse 2 eSignature:

## 2024-01-18 NOTE — PROGRESS NOTES
Bedside report given to Janet RODGERS  pt in stable condition no needs at this time. Call light within reach

## 2024-01-18 NOTE — PROGRESS NOTES
IM Progress Note    Admit Date:  1/16/2024       Pt from [facility]    Admitted with PNA, COPD exacerbation      Subjective:  Ms. Farris seen. Looks ill, fatigued. No distress.   SOB better.   Still on O2 5 L    Baseline O2 2L      Objective:   /84   Pulse 84   Temp 97.8 °F (36.6 °C) (Axillary)   Resp 18   Wt 93.4 kg (205 lb 12.8 oz)   SpO2 95%   BMI 36.46 kg/m²     Intake/Output Summary (Last 24 hours) at 1/18/2024 1455  Last data filed at 1/18/2024 0448  Gross per 24 hour   Intake --   Output 2300 ml   Net -2300 ml         Physical Exam:  General:  Awake, alert, NAD   Ill appearing. Fatigued   Obese   On O2 per NC   Oriented X2  Skin:  Warm and dry  Neck:  JVD absent. Neck supple  Chest:  diminished breath sounds .  No wheezes, rales or rhonchi.   Cardiovascular:  irregular  ,S1S2 normal  Abdomen:  Soft, non tender, non distended, BS +  Extremities:  No edema. BLE contracted   Intact peripheral pulses. Brisk cap refill, < 2 secs  Neuro: non focal      Medications:   Scheduled Meds:   levoFLOXacin  500 mg Oral Daily    sodium chloride flush  5-40 mL IntraVENous 2 times per day    insulin lispro  0-4 Units SubCUTAneous TID WC    insulin lispro  0-4 Units SubCUTAneous Nightly    apixaban  5 mg Oral BID    dilTIAZem  180 mg Oral Daily    insulin glargine  10 Units SubCUTAneous Nightly    metoprolol tartrate  25 mg Oral BID    senna  1 tablet Oral BID    lactobacillus  1 capsule Oral BID    methylPREDNISolone  40 mg IntraVENous Q12H    ARIPiprazole  2 mg Oral Nightly    atorvastatin  40 mg Oral Nightly    busPIRone  5 mg Oral Daily    empagliflozin  10 mg Oral Daily    dextromethorphan-quiNIDine  1 capsule Oral BID    gabapentin  100 mg Oral TID    melatonin  3 mg Oral Nightly    oxyCODONE-acetaminophen  1 tablet Oral 4x daily       Continuous Infusions:   sodium chloride      dextrose         Data:  CBC:   Recent Labs     01/16/24  1930 01/17/24  1209 01/18/24  0557   WBC 12.5* 10.0 12.3*   RBC 5.01 4.65 4.83      Note above  diagnoses makes patient higher risk for morbidity and mortality requiring testing and treatment.       DVT Prophylaxis: eliquis   Diet: ADULT DIET; Easy to Chew; 4 carb choices (60 gm/meal)  Code Status: Full Code        PO abx   IV steroids   Supplemental O2- wean as tolerated     Marisabel Maciel MD   1/18/2024 2:55 PM

## 2024-01-19 ENCOUNTER — TELEPHONE (OUTPATIENT)
Dept: PULMONOLOGY | Age: 74
End: 2024-01-19

## 2024-01-19 VITALS
SYSTOLIC BLOOD PRESSURE: 112 MMHG | OXYGEN SATURATION: 94 % | TEMPERATURE: 97.6 F | DIASTOLIC BLOOD PRESSURE: 68 MMHG | WEIGHT: 212.5 LBS | RESPIRATION RATE: 18 BRPM | BODY MASS INDEX: 37.64 KG/M2 | HEART RATE: 62 BPM

## 2024-01-19 DIAGNOSIS — R91.8 LUNG NODULES: Primary | ICD-10-CM

## 2024-01-19 LAB
ANION GAP SERPL CALCULATED.3IONS-SCNC: 14 MMOL/L (ref 3–16)
BASOPHILS # BLD: 0 K/UL (ref 0–0.2)
BASOPHILS NFR BLD: 0 %
BUN SERPL-MCNC: 26 MG/DL (ref 7–20)
CALCIUM SERPL-MCNC: 8.9 MG/DL (ref 8.3–10.6)
CHLORIDE SERPL-SCNC: 94 MMOL/L (ref 99–110)
CO2 SERPL-SCNC: 23 MMOL/L (ref 21–32)
CREAT SERPL-MCNC: <0.5 MG/DL (ref 0.6–1.2)
DEPRECATED RDW RBC AUTO: 15.4 % (ref 12.4–15.4)
EOSINOPHIL # BLD: 0 K/UL (ref 0–0.6)
EOSINOPHIL NFR BLD: 0 %
GFR SERPLBLD CREATININE-BSD FMLA CKD-EPI: >60 ML/MIN/{1.73_M2}
GLUCOSE BLD-MCNC: 166 MG/DL (ref 70–99)
GLUCOSE BLD-MCNC: 206 MG/DL (ref 70–99)
GLUCOSE BLD-MCNC: 222 MG/DL (ref 70–99)
GLUCOSE BLD-MCNC: 270 MG/DL (ref 70–99)
GLUCOSE SERPL-MCNC: 228 MG/DL (ref 70–99)
HCT VFR BLD AUTO: 40.2 % (ref 36–48)
HGB BLD-MCNC: 12.8 G/DL (ref 12–16)
LYMPHOCYTES # BLD: 1 K/UL (ref 1–5.1)
LYMPHOCYTES NFR BLD: 12.3 %
MCH RBC QN AUTO: 26.2 PG (ref 26–34)
MCHC RBC AUTO-ENTMCNC: 31.9 G/DL (ref 31–36)
MCV RBC AUTO: 82.1 FL (ref 80–100)
MONOCYTES # BLD: 0.4 K/UL (ref 0–1.3)
MONOCYTES NFR BLD: 4.8 %
MRSA DNA SPEC QL NAA+PROBE: ABNORMAL
NEUTROPHILS # BLD: 7 K/UL (ref 1.7–7.7)
NEUTROPHILS NFR BLD: 82.9 %
ORGANISM: ABNORMAL
PERFORMED ON: ABNORMAL
PLATELET # BLD AUTO: 188 K/UL (ref 135–450)
PMV BLD AUTO: 10 FL (ref 5–10.5)
POTASSIUM SERPL-SCNC: 4.3 MMOL/L (ref 3.5–5.1)
RBC # BLD AUTO: 4.9 M/UL (ref 4–5.2)
SODIUM SERPL-SCNC: 131 MMOL/L (ref 136–145)
WBC # BLD AUTO: 8.4 K/UL (ref 4–11)

## 2024-01-19 PROCEDURE — 6370000000 HC RX 637 (ALT 250 FOR IP): Performed by: INTERNAL MEDICINE

## 2024-01-19 PROCEDURE — 85025 COMPLETE CBC W/AUTO DIFF WBC: CPT

## 2024-01-19 PROCEDURE — 80048 BASIC METABOLIC PNL TOTAL CA: CPT

## 2024-01-19 PROCEDURE — 94761 N-INVAS EAR/PLS OXIMETRY MLT: CPT

## 2024-01-19 PROCEDURE — 2580000003 HC RX 258: Performed by: NURSE PRACTITIONER

## 2024-01-19 PROCEDURE — 94640 AIRWAY INHALATION TREATMENT: CPT

## 2024-01-19 PROCEDURE — 6370000000 HC RX 637 (ALT 250 FOR IP): Performed by: NURSE PRACTITIONER

## 2024-01-19 PROCEDURE — 99233 SBSQ HOSP IP/OBS HIGH 50: CPT | Performed by: INTERNAL MEDICINE

## 2024-01-19 PROCEDURE — 36415 COLL VENOUS BLD VENIPUNCTURE: CPT

## 2024-01-19 PROCEDURE — 99239 HOSP IP/OBS DSCHRG MGMT >30: CPT | Performed by: INTERNAL MEDICINE

## 2024-01-19 PROCEDURE — 2700000000 HC OXYGEN THERAPY PER DAY

## 2024-01-19 PROCEDURE — 6360000002 HC RX W HCPCS: Performed by: NURSE PRACTITIONER

## 2024-01-19 PROCEDURE — 2580000003 HC RX 258: Performed by: INTERNAL MEDICINE

## 2024-01-19 RX ORDER — OXYCODONE AND ACETAMINOPHEN 7.5; 325 MG/1; MG/1
1 TABLET ORAL 4 TIMES DAILY
Qty: 4 TABLET | Refills: 0 | Status: SHIPPED | OUTPATIENT
Start: 2024-01-19 | End: 2024-01-22

## 2024-01-19 RX ORDER — LEVOFLOXACIN 500 MG/1
500 TABLET, FILM COATED ORAL DAILY
Qty: 5 TABLET | Refills: 0 | DISCHARGE
Start: 2024-01-20 | End: 2024-01-25

## 2024-01-19 RX ORDER — ARIPIPRAZOLE 2 MG/1
2 TABLET ORAL NIGHTLY
DISCHARGE
Start: 2024-01-19

## 2024-01-19 RX ORDER — FLUTICASONE PROPIONATE 50 MCG
1 SPRAY, SUSPENSION (ML) NASAL DAILY
DISCHARGE
Start: 2024-01-19

## 2024-01-19 RX ORDER — ACETAMINOPHEN 650 MG/1
650 SUPPOSITORY RECTAL EVERY 6 HOURS PRN
Status: DISCONTINUED | OUTPATIENT
Start: 2024-01-19 | End: 2024-01-19 | Stop reason: HOSPADM

## 2024-01-19 RX ORDER — IPRATROPIUM BROMIDE AND ALBUTEROL SULFATE 2.5; .5 MG/3ML; MG/3ML
3 SOLUTION RESPIRATORY (INHALATION)
Qty: 360 ML | Refills: 0 | DISCHARGE
Start: 2024-01-19

## 2024-01-19 RX ORDER — PREDNISONE 10 MG/1
TABLET ORAL
Qty: 30 TABLET | Refills: 0 | DISCHARGE
Start: 2024-01-19

## 2024-01-19 RX ORDER — INSULIN GLARGINE 100 [IU]/ML
10 INJECTION, SOLUTION SUBCUTANEOUS NIGHTLY
DISCHARGE
Start: 2024-01-19

## 2024-01-19 RX ORDER — ACETAMINOPHEN 325 MG/1
650 TABLET ORAL EVERY 6 HOURS PRN
Status: DISCONTINUED | OUTPATIENT
Start: 2024-01-19 | End: 2024-01-19 | Stop reason: HOSPADM

## 2024-01-19 RX ORDER — FLUTICASONE PROPIONATE 50 MCG
1 SPRAY, SUSPENSION (ML) NASAL DAILY
Status: DISCONTINUED | OUTPATIENT
Start: 2024-01-19 | End: 2024-01-19 | Stop reason: HOSPADM

## 2024-01-19 RX ADMIN — SODIUM CHLORIDE, PRESERVATIVE FREE 10 ML: 5 INJECTION INTRAVENOUS at 09:52

## 2024-01-19 RX ADMIN — APIXABAN 5 MG: 5 TABLET, FILM COATED ORAL at 09:53

## 2024-01-19 RX ADMIN — OXYCODONE HYDROCHLORIDE AND ACETAMINOPHEN 1 TABLET: 7.5; 325 TABLET ORAL at 18:25

## 2024-01-19 RX ADMIN — IPRATROPIUM BROMIDE AND ALBUTEROL SULFATE 1 DOSE: 2.5; .5 SOLUTION RESPIRATORY (INHALATION) at 11:12

## 2024-01-19 RX ADMIN — GABAPENTIN 100 MG: 100 CAPSULE ORAL at 14:09

## 2024-01-19 RX ADMIN — IPRATROPIUM BROMIDE AND ALBUTEROL SULFATE 1 DOSE: 2.5; .5 SOLUTION RESPIRATORY (INHALATION) at 15:33

## 2024-01-19 RX ADMIN — SENNOSIDES 8.6 MG: 8.6 TABLET, FILM COATED ORAL at 09:53

## 2024-01-19 RX ADMIN — ACETAMINOPHEN 650 MG: 325 TABLET ORAL at 06:33

## 2024-01-19 RX ADMIN — IPRATROPIUM BROMIDE AND ALBUTEROL SULFATE 1 DOSE: 2.5; .5 SOLUTION RESPIRATORY (INHALATION) at 07:13

## 2024-01-19 RX ADMIN — BUSPIRONE HYDROCHLORIDE 5 MG: 5 TABLET ORAL at 09:52

## 2024-01-19 RX ADMIN — GABAPENTIN 100 MG: 100 CAPSULE ORAL at 09:53

## 2024-01-19 RX ADMIN — METOPROLOL TARTRATE 25 MG: 25 TABLET, FILM COATED ORAL at 09:53

## 2024-01-19 RX ADMIN — METHYLPREDNISOLONE SODIUM SUCCINATE 40 MG: 40 INJECTION INTRAMUSCULAR; INTRAVENOUS at 01:53

## 2024-01-19 RX ADMIN — DEXTROMETHORPHAN HYDROBROMIDE AND QUINIDINE SULFATE 1 CAPSULE: 20; 10 CAPSULE, GELATIN COATED ORAL at 09:59

## 2024-01-19 RX ADMIN — LEVOFLOXACIN 500 MG: 500 TABLET, FILM COATED ORAL at 09:53

## 2024-01-19 RX ADMIN — METFORMIN HYDROCHLORIDE 500 MG: 500 TABLET ORAL at 09:53

## 2024-01-19 RX ADMIN — DILTIAZEM HYDROCHLORIDE 180 MG: 180 CAPSULE, COATED, EXTENDED RELEASE ORAL at 09:53

## 2024-01-19 RX ADMIN — OXYCODONE HYDROCHLORIDE AND ACETAMINOPHEN 1 TABLET: 7.5; 325 TABLET ORAL at 12:57

## 2024-01-19 RX ADMIN — OXYCODONE HYDROCHLORIDE AND ACETAMINOPHEN 1 TABLET: 7.5; 325 TABLET ORAL at 09:53

## 2024-01-19 RX ADMIN — EMPAGLIFLOZIN 10 MG: 10 TABLET, FILM COATED ORAL at 09:53

## 2024-01-19 RX ADMIN — FLUTICASONE PROPIONATE 1 SPRAY: 50 SPRAY, METERED NASAL at 12:56

## 2024-01-19 RX ADMIN — Medication 1 CAPSULE: at 09:53

## 2024-01-19 ASSESSMENT — PAIN SCALES - GENERAL
PAINLEVEL_OUTOF10: 3
PAINLEVEL_OUTOF10: 1

## 2024-01-19 ASSESSMENT — PAIN DESCRIPTION - DESCRIPTORS: DESCRIPTORS: ACHING

## 2024-01-19 ASSESSMENT — PAIN DESCRIPTION - LOCATION: LOCATION: BACK

## 2024-01-19 NOTE — PLAN OF CARE
Problem: Respiratory - Adult  Goal: Achieves optimal ventilation and oxygenation  1/18/2024 2114 by Anum Hutton RN  Outcome: Progressing  1/18/2024 1903 by Janet Kiran, RN  Outcome: Progressing     Problem: Cardiovascular - Adult  Goal: Maintains optimal cardiac output and hemodynamic stability  Outcome: Progressing

## 2024-01-19 NOTE — CARE COORDINATION
CM update: LOS # 2; Call placed to [place] at [facility] related to bed Hold . Medical update given and call back requested with all contact information.Carol Triana RN

## 2024-01-19 NOTE — PROGRESS NOTES
Pt a/o. Am assessment completed see flow sheet. Pt denies any pain/ needs at this time. Call light within reach. Patient to be discharged back to [facility] at 1400.  Vitals:    01/19/24 1113   BP:    Pulse: 62   Resp: 18   Temp:    SpO2: 94%

## 2024-01-19 NOTE — PROGRESS NOTES
RT Inhaler-Nebulizer Bronchodilator Protocol Note    There is a bronchodilator order in the chart from a provider indicating to follow the RT Bronchodilator Protocol and there is an “Initiate RT Inhaler-Nebulizer Bronchodilator Protocol” order as well (see protocol at bottom of note).    CXR Findings:  No results found.    The findings from the last RT Protocol Assessment were as follows:   History Pulmonary Disease: (P) Chronic pulmonary disease  Respiratory Pattern: (P) Dyspnea on exertion or RR 21-25 bpm  Breath Sounds: (P) Slightly diminished and/or crackles  Cough: (P) Strong, spontaneous, non-productive  Indication for Bronchodilator Therapy: (P) Decreased or absent breath sounds  Bronchodilator Assessment Score: (P) 6    Aerosolized bronchodilator medication orders have been revised according to the RT Inhaler-Nebulizer Bronchodilator Protocol below.    Respiratory Therapist to perform RT Therapy Protocol Assessment initially then follow the protocol.  Repeat RT Therapy Protocol Assessment PRN for score 0-3 or on second treatment, BID, and PRN for scores above 3.    No Indications - adjust the frequency to every 6 hours PRN wheezing or bronchospasm, if no treatments needed after 48 hours then discontinue using Per Protocol order mode.     If indication present, adjust the RT bronchodilator orders based on the Bronchodilator Assessment Score as indicated below.  Use Inhaler orders unless patient has one or more of the following: on home nebulizer, not able to hold breath for 10 seconds, is not alert and oriented, cannot activate and use MDI correctly, or respiratory rate 25 breaths per minute or more, then use the equivalent nebulizer order(s) with same Frequency and PRN reasons based on the score.  If a patient is on this medication at home then do not decrease Frequency below that used at home.    0-3 - enter or revise RT bronchodilator order(s) to equivalent RT Bronchodilator order with Frequency of every 4  hours PRN for wheezing or increased work of breathing using Per Protocol order mode.        4-6 - enter or revise RT Bronchodilator order(s) to two equivalent RT bronchodilator orders with one order with BID Frequency and one order with Frequency of every 4 hours PRN wheezing or increased work of breathing using Per Protocol order mode.        7-10 - enter or revise RT Bronchodilator order(s) to two equivalent RT bronchodilator orders with one order with TID Frequency and one order with Frequency of every 4 hours PRN wheezing or increased work of breathing using Per Protocol order mode.       11-13 - enter or revise RT Bronchodilator order(s) to one equivalent RT bronchodilator order with QID Frequency and an Albuterol order with Frequency of every 4 hours PRN wheezing or increased work of breathing using Per Protocol order mode.      Greater than 13 - enter or revise RT Bronchodilator order(s) to one equivalent RT bronchodilator order with every 4 hours Frequency and an Albuterol order with Frequency of every 2 hours PRN wheezing or increased work of breathing using Per Protocol order mode.     Electronically signed by Nadiya Reed RCP on 1/18/2024 at 11:09 PM

## 2024-01-19 NOTE — PROGRESS NOTES
Patient currently resting in bed. Alert and oriented x 4, but has periods of confusion and forgetfulness. Patient often impulsive.  On oxygen NC 5 lpm and in no distress. Afib on tele.  External purewick cath in place.  Telesitter present in patient room.  Bed alarm on for patient safety.  Patient given routine oxycodone for pain with effective results.  Nurse will continue to monitor/reassess. Call light within reach.

## 2024-01-19 NOTE — PROGRESS NOTES
IM Progress Note    Admit Date:  1/16/2024       Pt from [facility]    Admitted with PNA, COPD exacerbation      Subjective:  Ms. Farris seen. Looks ill, fatigued. No distress.   SOB better.   Still on O2 5 L    Baseline O2 2L          Objective:   /79   Pulse 68   Temp 97.3 °F (36.3 °C) (Oral)   Resp 18   Wt 96.4 kg (212 lb 8 oz)   SpO2 94%   BMI 37.64 kg/m²     Intake/Output Summary (Last 24 hours) at 1/19/2024 1026  Last data filed at 1/19/2024 0836  Gross per 24 hour   Intake 220 ml   Output 2400 ml   Net -2180 ml           Physical Exam:  General:  Awake, alert, NAD   Ill appearing. Fatigued   Obese   On O2 per NC   Oriented X1 .  To person only  Skin:  Warm and dry  Neck:  JVD absent. Neck supple  Chest:  diminished breath sounds .  No wheezes, rales or rhonchi.   Cardiovascular:  irregular  ,S1S2 normal  Abdomen:  Soft, non tender, non distended, BS +  Extremities:  No edema. BLE contracted   Intact peripheral pulses. Brisk cap refill, < 2 secs  Neuro: non focal      Medications:   Scheduled Meds:   fluticasone  1 spray Each Nostril Daily    levoFLOXacin  500 mg Oral Daily    ipratropium 0.5 mg-albuterol 2.5 mg  1 Dose Inhalation 4x Daily RT    [START ON 2/9/2024] vitamin D3  50,000 Units Oral Q30 Days    metFORMIN  500 mg Oral Daily with breakfast    sodium chloride flush  5-40 mL IntraVENous 2 times per day    insulin lispro  0-4 Units SubCUTAneous TID WC    insulin lispro  0-4 Units SubCUTAneous Nightly    apixaban  5 mg Oral BID    dilTIAZem  180 mg Oral Daily    insulin glargine  10 Units SubCUTAneous Nightly    metoprolol tartrate  25 mg Oral BID    senna  1 tablet Oral BID    lactobacillus  1 capsule Oral BID    methylPREDNISolone  40 mg IntraVENous Q12H    ARIPiprazole  2 mg Oral Nightly    atorvastatin  40 mg Oral Nightly    busPIRone  5 mg Oral Daily    empagliflozin  10 mg Oral Daily    dextromethorphan-quiNIDine  1 capsule Oral BID    gabapentin  100 mg Oral TID    melatonin  3 mg Oral

## 2024-01-19 NOTE — PROGRESS NOTES
HEART FAILURE CARE PLAN:    Comorbidities Reviewed: Yes   Patient has a past medical history of Acute diastolic congestive heart failure (HCC), Acute diastolic heart failure (HCC), Acute respiratory failure (HCC), Adjustment disorder with mixed anxiety and depressed mood, Allergic rhinitis, Alzheimer's dementia (HCC), Arachnoid cyst, Atrial fibrillation (HCC), CHF (congestive heart failure) (HCC), Chronic back pain, Constipation, COPD (chronic obstructive pulmonary disease) (HCC), Diabetes mellitus (HCC), Diskitis, Disseminated superficial actinic porokeratosis (DSAP), Edema, ESBL (extended spectrum beta-lactamase) producing bacteria infection, Hypertension, Hypo-osmolality and hyponatremia, Major depressive disorder, Morbid obesity ([facility]), Muscle weakness, Osteomyelitis of spine ([facility]), Overactive bladder, Schizoaffective disorder (HCC), Seizures ([facility]), Urinary tract infection without hematuria, and Xerosis cutis.     ECHOCARDIOGRAM Reviewed: Yes   Patient's Ejection Fraction (EF) is greater than 40%    Weights Reviewed: Yes   Admission weight: 90 kg (198 lb 6.6 oz)   Wt Readings from Last 3 Encounters:   01/18/24 93.4 kg (205 lb 12.8 oz)   12/11/23 89.7 kg (197 lb 12.8 oz)   07/13/23 90.2 kg (198 lb 12.8 oz)     Intake & Output Reviewed: Yes     Intake/Output Summary (Last 24 hours) at 1/18/2024 2251  Last data filed at 1/18/2024 1810  Gross per 24 hour   Intake 120 ml   Output 2900 ml   Net -2780 ml     Medications Reviewed: Yes   SCHEDULED HOSPITAL MEDICATIONS:   levoFLOXacin  500 mg Oral Daily    ipratropium 0.5 mg-albuterol 2.5 mg  1 Dose Inhalation 4x Daily RT    Vitamin D3  1 capsule Oral Q30 Days    [START ON 1/19/2024] metFORMIN  500 mg Oral Daily with breakfast    sodium chloride flush  5-40 mL IntraVENous 2 times per day    insulin lispro  0-4 Units SubCUTAneous TID WC    insulin lispro  0-4 Units SubCUTAneous Nightly    apixaban  5 mg Oral BID    dilTIAZem  180 mg Oral Daily    insulin glargine  10 Units  SubCUTAneous Nightly    metoprolol tartrate  25 mg Oral BID    senna  1 tablet Oral BID    lactobacillus  1 capsule Oral BID    methylPREDNISolone  40 mg IntraVENous Q12H    ARIPiprazole  2 mg Oral Nightly    atorvastatin  40 mg Oral Nightly    busPIRone  5 mg Oral Daily    empagliflozin  10 mg Oral Daily    dextromethorphan-quiNIDine  1 capsule Oral BID    gabapentin  100 mg Oral TID    melatonin  3 mg Oral Nightly    oxyCODONE-acetaminophen  1 tablet Oral 4x daily     ACE/ARB/ARNI is REQUIRED for EF </= 40% SYSTOLIC FAILURE:   ACE:: None  ARB:: None  ARNI:: None    Evidenced-Based Beta Blocker is REQUIRED for EF </= 40% SYSTOLIC FAILURE:   :: None    Diuretics:  :: None    Diet Reviewed: Yes   ADULT DIET; Easy to Chew; 4 carb choices (60 gm/meal)    Goal of Care Reviewed: Yes   Patient and/or Family's stated Goal of Care this Admission: reduce shortness of breath, increase activity tolerance, better understand heart failure and disease management, be more comfortable, and reduce lower extremity edema prior to discharge.

## 2024-01-19 NOTE — CARE COORDINATION
CASE MANAGEMENT DISCHARGE SUMMARY      Discharge to: [facility]    Precertification completed: N/A  Hospital Exemption Notification (HENS) completed: N/A: patient is returning to LTC    IMM given: (date) 1/17/24    New Durable Medical Equipment ordered/agency: Provided by the facility    Transportation:       Medical Transport explained to pt/family. Pt/family voice no agency preference.    Agency used: [facility] will arrange   time: 1400   Ambulance form completed: Yes    Confirmed discharge plan with: Patient aware she is going back     Patient: yes     Family:  yes    Name: Saeed  Contact number: [phone]     Facility/Agency, name: [facility] can pull from [facility] OLEG/AVS will be in the packet no need to be faxed   Phone number for report to facility: [phone]     RN, name: Janet    Note: Discharging nurse to complete OLEG, reconcile AVS, and place final copy with patient's discharge packet. RN to ensure that written prescriptions for  Level II medications are sent with patient to the facility as per protocol.    .Carol Triana RN

## 2024-01-19 NOTE — PLAN OF CARE
Problem: Pain  Goal: Verbalizes/displays adequate comfort level or baseline comfort level  Outcome: Progressing   Patient resting in bed, denies complaints.

## 2024-01-19 NOTE — DISCHARGE SUMMARY
Name:  Leslie Farris  Room:  0204/0204-01  MRN:    [id number]    Discharge Summary      This discharge summary is in conjunction with a complete physical exam done on the day of discharge.    Discharging Provider: Dr. Marisabel Maciel MD      Admit: 1/16/2024  Discharge: 01/19/24      HPI taken from admission H&P:    The patient is a 73 y.o. female with PMH of  PMH of MRSA, ESBL, DMRO afib, chronic back pain, chronic opiate use, dementia, LORENZO, schizophrenia, COPD, Chronic hypoxic respiratory failure on 5-6,  DM, CAD, NSTEMI,  heart failure, GI bleed, anemia, hypoglycemia, who presents to [facility] with shortness of breath.  Pt is from [facility] long-term care.  Pt is awake and alert to self only.  She is unable to give me a reliable history.  She did say she has a productive cough with clear sputum and shortness of breath.  She thinks her oxygen dropped that is why she was sent here.  Limited history obtained from the patient and review of EMR. Of note, previous admission pt was DNR-CC with paperwork from [facility].  Today paperwork shows Full Code. Pt denies any chest pain, palpitations, dizziness, nausea or vomiting.      Diagnoses this Admission and Hospital Course   Sepsis  - Source: PNA  - POA ; Criteria:  (+) HR, (+) RR,   - Admit to 2W with telemetry   - Blood & Resp cx , pneumonia panel ordered  - monitor vitals and labs  - mgmt as below       Pneumonia -MRSA  Acute on chronic hypoxic resp failure   COPD AE  Pulmonary Nodule  - Health care acquired: suspect a gram negative organism, also risk for MRSA. Previous H/O MRSA .  Nasal swab positive for MRSA  - pulmonology consulted   - CT as above with LLL PNA and pulmonary nodule   - Treated with antibiotics Vancomycin and Cefepime -  switched to PO levaquin now    - check cultures , sputum and pneumonia panel, MRSA nasal swab  - started on solumedrol, plan to switch to oral prednisone once improved  - cont supplemental o2 - on O2 5L- wean as tolerated .  O2 has

## 2024-01-19 NOTE — FLOWSHEET NOTE
01/18/24 1918   Handoff   Communication Given Shift Handoff   Handoff Given To Maryam   Handoff Received From Mandeep   Handoff Communication Face to Face;In department   Time Handoff Given 1915   End of Shift Check Performed Yes

## 2024-01-19 NOTE — TELEPHONE ENCOUNTER
Inpatient Notes  Received: Yesterday  Edenilson Alvarez MD Jones, Amanda N, MA  CT chest 6 to 8 weeks

## 2024-01-19 NOTE — PROGRESS NOTES
Pulmonary Progress Note    CC: Pneumonia    Subjective:   O2 trending down 3 L-uses 2 L at home  Minimal cough with clear phlegm      Intake/Output Summary (Last 24 hours) at 1/19/2024 0731  Last data filed at 1/19/2024 0104  Gross per 24 hour   Intake 120 ml   Output 1700 ml   Net -1580 ml       Exam:   /74   Pulse 83   Temp 97.2 °F (36.2 °C) (Oral)   Resp 20   Wt 96.4 kg (212 lb 8 oz)   SpO2 95%   BMI 37.64 kg/m²  on 2 L  Gen: No distress.  Ill-appearing  Eyes: PERRL. No sclera icterus. No conjunctival injection.   ENT: No discharge. Pharynx clear.   Neck: Trachea midline. No obvious mass.    Resp: No accessory muscle use.  Left basilar crackles.  Scattered wheezes. No rhonchi. No dullness on percussion.  CV: Regular rate. Regular rhythm. No murmur or rub. No edema.   GI: Non-tender. Non-distended. No hernia.   Skin: Warm and dry. No nodule on exposed extremities.   Lymph: No cervical LAD. No supraclavicular LAD.   M/S: No cyanosis. No joint deformity. No clubbing.   Neuro: Awake. Alert. Moves all four extremities.   Psych: Oriented x 3. No anxiety.        Scheduled Meds:   levoFLOXacin  500 mg Oral Daily    ipratropium 0.5 mg-albuterol 2.5 mg  1 Dose Inhalation 4x Daily RT    [START ON 2/9/2024] vitamin D3  50,000 Units Oral Q30 Days    metFORMIN  500 mg Oral Daily with breakfast    sodium chloride flush  5-40 mL IntraVENous 2 times per day    insulin lispro  0-4 Units SubCUTAneous TID     insulin lispro  0-4 Units SubCUTAneous Nightly    apixaban  5 mg Oral BID    dilTIAZem  180 mg Oral Daily    insulin glargine  10 Units SubCUTAneous Nightly    metoprolol tartrate  25 mg Oral BID    senna  1 tablet Oral BID    lactobacillus  1 capsule Oral BID    methylPREDNISolone  40 mg IntraVENous Q12H    ARIPiprazole  2 mg Oral Nightly    atorvastatin  40 mg Oral Nightly    busPIRone  5 mg Oral Daily    empagliflozin  10 mg Oral Daily    dextromethorphan-quiNIDine  1 capsule Oral BID    gabapentin  100 mg Oral  TID    melatonin  3 mg Oral Nightly    oxyCODONE-acetaminophen  1 tablet Oral 4x daily     Continuous Infusions:   sodium chloride      dextrose       PRN Meds:  acetaminophen, ipratropium 0.5 mg-albuterol 2.5 mg, sodium chloride flush, sodium chloride, ondansetron **OR** ondansetron, polyethylene glycol, acetaminophen **OR** acetaminophen, glucose, dextrose bolus **OR** dextrose bolus, dextrose, bisacodyl    Labs:  CBC:   Recent Labs     01/17/24  1209 01/18/24  0557 01/19/24  0550   WBC 10.0 12.3* 8.4   HGB 11.9* 12.4 12.8   HCT 37.9 39.1 40.2   MCV 81.6 80.9 82.1   * 162 188     BMP:   Recent Labs     01/17/24  1209 01/18/24  0557 01/19/24  0550   * 135* 131*   K 4.5 4.6 4.3   CL 95* 98* 94*   CO2 20* 21 23   BUN 20 23* 26*   CREATININE <0.5* <0.5* <0.5*     LIVER PROFILE:   Recent Labs     01/16/24  1930   AST 11*   ALT 9*   BILITOT 0.4   ALKPHOS 81     PT/INR: No results for input(s): \"PROTIME\", \"INR\" in the last 72 hours.  APTT: No results for input(s): \"APTT\" in the last 72 hours.  UA:  Recent Labs     01/16/24  2333   COLORU Yellow   PHUR 6.0   WBCUA 6-9*   RBCUA 5-10*   YEAST Present*   BACTERIA 1+*   CLARITYU Clear   SPECGRAV <=1.005   LEUKOCYTESUR Negative   UROBILINOGEN 0.2   BILIRUBINUR Negative   BLOODU LARGE*   GLUCOSEU >=1000*     No results for input(s): \"PHART\", \"HZC1SEG\", \"PO2ART\" in the last 72 hours.      Microbiology:  1/16 BC NGTD  1/16 COVID-19 and influenza negative  1/16 Legionella and strep negative     Imaging:  CTPA 1/17 imaging was reviewed by me and showed   1. Left lower lobe pneumonia.   2. Improved with residual trace left pleural effusion.   3. Unchanged 5 mm solid right pulmonary nodule.            ASSESSMENT:  Acute hypoxemic respiratory failure  Left lower lobe pneumonia  COPD with acute exacerbation  RUL 5 mm nodule on CT 1/17/2024 not significantly changed from CT 4/20/2023.    Former smoker 1.5 pack/day for over 30 years quit February 2002

## 2024-01-19 NOTE — PROGRESS NOTES
Jazmin Sheldon NP, made aware of patients nasal swab being positive for MRSA.  Stated to pass on to attending.

## 2024-01-20 LAB
BACTERIA BLD CULT ORG #2: NORMAL
BACTERIA BLD CULT: NORMAL

## 2024-01-20 NOTE — PROGRESS NOTES
Patient discharged to [facility] via stretcher with [facility]. Called to give report, got recording that no one was available.

## 2024-03-13 ENCOUNTER — HOSPITAL ENCOUNTER (OUTPATIENT)
Dept: CT IMAGING | Age: 74
Discharge: HOME | End: 2024-03-13
Payer: COMMERCIAL

## 2024-03-13 DIAGNOSIS — R91.8 LUNG NODULES: ICD-10-CM

## 2024-03-13 PROCEDURE — 71250 CT THORAX DX C-: CPT

## 2024-03-17 ENCOUNTER — HOSPITAL ENCOUNTER (INPATIENT)
Age: 74
LOS: 2 days | Discharge: NURSING, REHAB OR LONG-TERM CARE | End: 2024-03-20
Attending: EMERGENCY MEDICINE | Admitting: INTERNAL MEDICINE
Payer: COMMERCIAL

## 2024-03-17 ENCOUNTER — APPOINTMENT (OUTPATIENT)
Dept: GENERAL RADIOLOGY | Age: 74
End: 2024-03-17
Payer: COMMERCIAL

## 2024-03-17 DIAGNOSIS — R06.00 DYSPNEA AND RESPIRATORY ABNORMALITIES: ICD-10-CM

## 2024-03-17 DIAGNOSIS — R06.89 DYSPNEA AND RESPIRATORY ABNORMALITIES: ICD-10-CM

## 2024-03-17 DIAGNOSIS — M50.20 CERVICAL HERNIATED DISC: ICD-10-CM

## 2024-03-17 DIAGNOSIS — J44.1 COPD EXACERBATION (HCC): Primary | ICD-10-CM

## 2024-03-17 DIAGNOSIS — R09.02 HYPOXIA: ICD-10-CM

## 2024-03-17 LAB
ANION GAP SERPL CALCULATED.3IONS-SCNC: 13 MMOL/L (ref 3–16)
BASE EXCESS BLDV CALC-SCNC: 0.3 MMOL/L (ref -3–3)
BUN SERPL-MCNC: 14 MG/DL (ref 7–20)
CALCIUM SERPL-MCNC: 9.4 MG/DL (ref 8.3–10.6)
CHLORIDE SERPL-SCNC: 95 MMOL/L (ref 99–110)
CO2 BLDV-SCNC: 27 MMOL/L
CO2 SERPL-SCNC: 29 MMOL/L (ref 21–32)
COHGB MFR BLDV: 2.5 % (ref 0–1.5)
CREAT SERPL-MCNC: <0.5 MG/DL (ref 0.6–1.2)
GFR SERPLBLD CREATININE-BSD FMLA CKD-EPI: >60 ML/MIN/{1.73_M2}
GLUCOSE SERPL-MCNC: 247 MG/DL (ref 70–99)
HCO3 BLDV-SCNC: 25.4 MMOL/L (ref 23–29)
METHGB MFR BLDV: 0.3 %
NT-PROBNP SERPL-MCNC: 2846 PG/ML (ref 0–449)
O2 CT VFR BLDV CALC: 16 VOL %
O2 THERAPY: ABNORMAL
PCO2 BLDV: 42.8 MMHG (ref 40–50)
PH BLDV: 7.39 [PH] (ref 7.35–7.45)
PO2 BLDV: 48.8 MMHG (ref 25–40)
POTASSIUM SERPL-SCNC: 4.8 MMOL/L (ref 3.5–5.1)
SAO2 % BLDV: 84 %
SODIUM SERPL-SCNC: 137 MMOL/L (ref 136–145)
TROPONIN, HIGH SENSITIVITY: 12 NG/L (ref 0–14)

## 2024-03-17 PROCEDURE — 2580000003 HC RX 258: Performed by: EMERGENCY MEDICINE

## 2024-03-17 PROCEDURE — 71045 X-RAY EXAM CHEST 1 VIEW: CPT

## 2024-03-17 PROCEDURE — 36415 COLL VENOUS BLD VENIPUNCTURE: CPT

## 2024-03-17 PROCEDURE — 85025 COMPLETE CBC W/AUTO DIFF WBC: CPT

## 2024-03-17 PROCEDURE — 6360000002 HC RX W HCPCS: Performed by: EMERGENCY MEDICINE

## 2024-03-17 PROCEDURE — 96374 THER/PROPH/DIAG INJ IV PUSH: CPT

## 2024-03-17 PROCEDURE — 82803 BLOOD GASES ANY COMBINATION: CPT

## 2024-03-17 PROCEDURE — 99285 EMERGENCY DEPT VISIT HI MDM: CPT

## 2024-03-17 PROCEDURE — 87636 SARSCOV2 & INF A&B AMP PRB: CPT

## 2024-03-17 PROCEDURE — 84484 ASSAY OF TROPONIN QUANT: CPT

## 2024-03-17 PROCEDURE — 83880 ASSAY OF NATRIURETIC PEPTIDE: CPT

## 2024-03-17 PROCEDURE — 93005 ELECTROCARDIOGRAM TRACING: CPT | Performed by: EMERGENCY MEDICINE

## 2024-03-17 PROCEDURE — 80048 BASIC METABOLIC PNL TOTAL CA: CPT

## 2024-03-17 RX ADMIN — WATER 125 MG: 1 INJECTION INTRAMUSCULAR; INTRAVENOUS; SUBCUTANEOUS at 23:49

## 2024-03-17 ASSESSMENT — PAIN DESCRIPTION - LOCATION: LOCATION: ABDOMEN

## 2024-03-17 ASSESSMENT — PAIN SCALES - GENERAL: PAINLEVEL_OUTOF10: 6

## 2024-03-17 ASSESSMENT — PAIN DESCRIPTION - ORIENTATION: ORIENTATION: MID;UPPER

## 2024-03-17 ASSESSMENT — PAIN DESCRIPTION - DESCRIPTORS: DESCRIPTORS: ACHING

## 2024-03-17 ASSESSMENT — PAIN DESCRIPTION - PAIN TYPE: TYPE: ACUTE PAIN

## 2024-03-17 ASSESSMENT — PAIN - FUNCTIONAL ASSESSMENT: PAIN_FUNCTIONAL_ASSESSMENT: 0-10

## 2024-03-18 ENCOUNTER — APPOINTMENT (OUTPATIENT)
Dept: CT IMAGING | Age: 74
End: 2024-03-18
Payer: COMMERCIAL

## 2024-03-18 LAB
BASOPHILS # BLD: 0.1 K/UL (ref 0–0.2)
BASOPHILS NFR BLD: 1.1 %
DEPRECATED RDW RBC AUTO: 16.9 % (ref 12.4–15.4)
EKG ATRIAL RATE: 192 BPM
EKG DIAGNOSIS: NORMAL
EKG Q-T INTERVAL: 390 MS
EKG QRS DURATION: 88 MS
EKG QTC CALCULATION (BAZETT): 482 MS
EKG R AXIS: 85 DEGREES
EKG T AXIS: -52 DEGREES
EKG VENTRICULAR RATE: 92 BPM
EOSINOPHIL # BLD: 0.1 K/UL (ref 0–0.6)
EOSINOPHIL NFR BLD: 1.3 %
FLUAV RNA RESP QL NAA+PROBE: NOT DETECTED
FLUBV RNA RESP QL NAA+PROBE: NOT DETECTED
GLUCOSE BLD-MCNC: 213 MG/DL (ref 70–99)
GLUCOSE BLD-MCNC: 247 MG/DL (ref 70–99)
GLUCOSE BLD-MCNC: 262 MG/DL (ref 70–99)
GLUCOSE BLD-MCNC: 279 MG/DL (ref 70–99)
GLUCOSE BLD-MCNC: 326 MG/DL (ref 70–99)
HCT VFR BLD AUTO: 39 % (ref 36–48)
HGB BLD-MCNC: 12.5 G/DL (ref 12–16)
LYMPHOCYTES # BLD: 2 K/UL (ref 1–5.1)
LYMPHOCYTES NFR BLD: 18.8 %
MCH RBC QN AUTO: 26.3 PG (ref 26–34)
MCHC RBC AUTO-ENTMCNC: 32.1 G/DL (ref 31–36)
MCV RBC AUTO: 81.9 FL (ref 80–100)
MONOCYTES # BLD: 0.5 K/UL (ref 0–1.3)
MONOCYTES NFR BLD: 5 %
NEUTROPHILS # BLD: 8 K/UL (ref 1.7–7.7)
NEUTROPHILS NFR BLD: 73.8 %
PERFORMED ON: ABNORMAL
PLATELET # BLD AUTO: 223 K/UL (ref 135–450)
PLATELET BLD QL SMEAR: ADEQUATE
PMV BLD AUTO: 10 FL (ref 5–10.5)
RBC # BLD AUTO: 4.76 M/UL (ref 4–5.2)
SARS-COV-2 RNA RESP QL NAA+PROBE: NOT DETECTED
SLIDE REVIEW: ABNORMAL
TROPONIN, HIGH SENSITIVITY: 11 NG/L (ref 0–14)
WBC # BLD AUTO: 10.9 K/UL (ref 4–11)

## 2024-03-18 PROCEDURE — 84484 ASSAY OF TROPONIN QUANT: CPT

## 2024-03-18 PROCEDURE — 93010 ELECTROCARDIOGRAM REPORT: CPT | Performed by: STUDENT IN AN ORGANIZED HEALTH CARE EDUCATION/TRAINING PROGRAM

## 2024-03-18 PROCEDURE — 2580000003 HC RX 258: Performed by: INTERNAL MEDICINE

## 2024-03-18 PROCEDURE — 6370000000 HC RX 637 (ALT 250 FOR IP): Performed by: INTERNAL MEDICINE

## 2024-03-18 PROCEDURE — 71260 CT THORAX DX C+: CPT

## 2024-03-18 PROCEDURE — 94640 AIRWAY INHALATION TREATMENT: CPT

## 2024-03-18 PROCEDURE — 36415 COLL VENOUS BLD VENIPUNCTURE: CPT

## 2024-03-18 PROCEDURE — 2500000003 HC RX 250 WO HCPCS: Performed by: EMERGENCY MEDICINE

## 2024-03-18 PROCEDURE — 6370000000 HC RX 637 (ALT 250 FOR IP)

## 2024-03-18 PROCEDURE — 6360000004 HC RX CONTRAST MEDICATION: Performed by: EMERGENCY MEDICINE

## 2024-03-18 PROCEDURE — 1200000000 HC SEMI PRIVATE

## 2024-03-18 PROCEDURE — 2580000003 HC RX 258: Performed by: EMERGENCY MEDICINE

## 2024-03-18 PROCEDURE — 6360000002 HC RX W HCPCS: Performed by: INTERNAL MEDICINE

## 2024-03-18 RX ORDER — GLUCAGON 1 MG/ML
1 KIT INJECTION PRN
Status: DISCONTINUED | OUTPATIENT
Start: 2024-03-18 | End: 2024-03-20 | Stop reason: HOSPADM

## 2024-03-18 RX ORDER — IPRATROPIUM BROMIDE AND ALBUTEROL SULFATE 2.5; .5 MG/3ML; MG/3ML
1 SOLUTION RESPIRATORY (INHALATION)
Status: DISCONTINUED | OUTPATIENT
Start: 2024-03-18 | End: 2024-03-20 | Stop reason: HOSPADM

## 2024-03-18 RX ORDER — ATORVASTATIN CALCIUM 40 MG/1
40 TABLET, FILM COATED ORAL NIGHTLY
Status: DISCONTINUED | OUTPATIENT
Start: 2024-03-18 | End: 2024-03-20 | Stop reason: HOSPADM

## 2024-03-18 RX ORDER — FLUCONAZOLE 100 MG/1
200 TABLET ORAL DAILY
Status: DISCONTINUED | OUTPATIENT
Start: 2024-03-18 | End: 2024-03-20 | Stop reason: HOSPADM

## 2024-03-18 RX ORDER — ONDANSETRON 4 MG/1
4 TABLET, FILM COATED ORAL EVERY 6 HOURS PRN
Status: ON HOLD | COMMUNITY
End: 2024-03-18 | Stop reason: ALTCHOICE

## 2024-03-18 RX ORDER — INSULIN GLARGINE 100 [IU]/ML
10 INJECTION, SOLUTION SUBCUTANEOUS ONCE
Status: COMPLETED | OUTPATIENT
Start: 2024-03-18 | End: 2024-03-18

## 2024-03-18 RX ORDER — DILTIAZEM HYDROCHLORIDE 120 MG/1
120 CAPSULE, COATED, EXTENDED RELEASE ORAL DAILY
COMMUNITY

## 2024-03-18 RX ORDER — SODIUM CHLORIDE 0.9 % (FLUSH) 0.9 %
5-40 SYRINGE (ML) INJECTION EVERY 12 HOURS SCHEDULED
Status: DISCONTINUED | OUTPATIENT
Start: 2024-03-18 | End: 2024-03-20 | Stop reason: HOSPADM

## 2024-03-18 RX ORDER — SODIUM CHLORIDE 9 MG/ML
INJECTION, SOLUTION INTRAVENOUS PRN
Status: DISCONTINUED | OUTPATIENT
Start: 2024-03-18 | End: 2024-03-20 | Stop reason: HOSPADM

## 2024-03-18 RX ORDER — PREDNISONE 20 MG/1
40 TABLET ORAL DAILY
Status: DISCONTINUED | OUTPATIENT
Start: 2024-03-18 | End: 2024-03-20 | Stop reason: HOSPADM

## 2024-03-18 RX ORDER — IPRATROPIUM BROMIDE AND ALBUTEROL SULFATE 2.5; .5 MG/3ML; MG/3ML
1 SOLUTION RESPIRATORY (INHALATION) EVERY 4 HOURS PRN
Status: DISCONTINUED | OUTPATIENT
Start: 2024-03-18 | End: 2024-03-20 | Stop reason: HOSPADM

## 2024-03-18 RX ORDER — ACETAMINOPHEN 325 MG/1
650 TABLET ORAL EVERY 6 HOURS PRN
Status: DISCONTINUED | OUTPATIENT
Start: 2024-03-18 | End: 2024-03-20 | Stop reason: HOSPADM

## 2024-03-18 RX ORDER — ONDANSETRON 4 MG/1
4 TABLET, ORALLY DISINTEGRATING ORAL EVERY 6 HOURS PRN
COMMUNITY

## 2024-03-18 RX ORDER — DILTIAZEM HYDROCHLORIDE 120 MG/1
120 CAPSULE, COATED, EXTENDED RELEASE ORAL DAILY
Status: DISCONTINUED | OUTPATIENT
Start: 2024-03-18 | End: 2024-03-20 | Stop reason: HOSPADM

## 2024-03-18 RX ORDER — QUETIAPINE FUMARATE 25 MG/1
25 TABLET, FILM COATED ORAL ONCE
Status: COMPLETED | OUTPATIENT
Start: 2024-03-18 | End: 2024-03-18

## 2024-03-18 RX ORDER — OXYCODONE AND ACETAMINOPHEN 7.5; 325 MG/1; MG/1
1 TABLET ORAL ONCE
Status: COMPLETED | OUTPATIENT
Start: 2024-03-18 | End: 2024-03-18

## 2024-03-18 RX ORDER — SODIUM CHLORIDE 0.9 % (FLUSH) 0.9 %
5-40 SYRINGE (ML) INJECTION PRN
Status: DISCONTINUED | OUTPATIENT
Start: 2024-03-18 | End: 2024-03-20 | Stop reason: HOSPADM

## 2024-03-18 RX ORDER — INSULIN LISPRO 100 [IU]/ML
0-8 INJECTION, SOLUTION INTRAVENOUS; SUBCUTANEOUS
Status: DISCONTINUED | OUTPATIENT
Start: 2024-03-18 | End: 2024-03-20 | Stop reason: HOSPADM

## 2024-03-18 RX ORDER — INSULIN GLARGINE 100 [IU]/ML
10 INJECTION, SOLUTION SUBCUTANEOUS NIGHTLY
Status: DISCONTINUED | OUTPATIENT
Start: 2024-03-18 | End: 2024-03-20 | Stop reason: HOSPADM

## 2024-03-18 RX ORDER — INSULIN LISPRO 100 [IU]/ML
0-4 INJECTION, SOLUTION INTRAVENOUS; SUBCUTANEOUS NIGHTLY
Status: DISCONTINUED | OUTPATIENT
Start: 2024-03-18 | End: 2024-03-20 | Stop reason: HOSPADM

## 2024-03-18 RX ORDER — AZITHROMYCIN 250 MG/1
500 TABLET, FILM COATED ORAL DAILY
Status: COMPLETED | OUTPATIENT
Start: 2024-03-18 | End: 2024-03-20

## 2024-03-18 RX ORDER — OXYCODONE AND ACETAMINOPHEN 7.5; 325 MG/1; MG/1
1 TABLET ORAL 4 TIMES DAILY
Status: ON HOLD | COMMUNITY
End: 2024-03-20

## 2024-03-18 RX ORDER — ARIPIPRAZOLE 2 MG/1
2 TABLET ORAL NIGHTLY
Status: DISCONTINUED | OUTPATIENT
Start: 2024-03-18 | End: 2024-03-18

## 2024-03-18 RX ORDER — GABAPENTIN 100 MG/1
100 CAPSULE ORAL 3 TIMES DAILY
Status: DISCONTINUED | OUTPATIENT
Start: 2024-03-18 | End: 2024-03-20 | Stop reason: HOSPADM

## 2024-03-18 RX ORDER — DEXTROSE MONOHYDRATE 100 MG/ML
INJECTION, SOLUTION INTRAVENOUS CONTINUOUS PRN
Status: DISCONTINUED | OUTPATIENT
Start: 2024-03-18 | End: 2024-03-20 | Stop reason: HOSPADM

## 2024-03-18 RX ORDER — FLUCONAZOLE 100 MG/1
200 TABLET ORAL DAILY
Status: DISCONTINUED | OUTPATIENT
Start: 2024-03-18 | End: 2024-03-18

## 2024-03-18 RX ORDER — SENNOSIDES A AND B 8.6 MG/1
2 TABLET, FILM COATED ORAL 2 TIMES DAILY
COMMUNITY

## 2024-03-18 RX ORDER — BUSPIRONE HYDROCHLORIDE 5 MG/1
5 TABLET ORAL DAILY
Status: DISCONTINUED | OUTPATIENT
Start: 2024-03-18 | End: 2024-03-20 | Stop reason: HOSPADM

## 2024-03-18 RX ORDER — DIPHENHYDRAMINE HCL 25 MG
25 TABLET ORAL EVERY 6 HOURS PRN
COMMUNITY

## 2024-03-18 RX ORDER — ACETAMINOPHEN 650 MG/1
650 SUPPOSITORY RECTAL EVERY 6 HOURS PRN
Status: DISCONTINUED | OUTPATIENT
Start: 2024-03-18 | End: 2024-03-20 | Stop reason: HOSPADM

## 2024-03-18 RX ADMIN — METOPROLOL TARTRATE 25 MG: 25 TABLET, FILM COATED ORAL at 12:24

## 2024-03-18 RX ADMIN — Medication 10 ML: at 20:23

## 2024-03-18 RX ADMIN — QUETIAPINE FUMARATE 25 MG: 25 TABLET ORAL at 21:53

## 2024-03-18 RX ADMIN — MICONAZOLE NITRATE: 2 OINTMENT TOPICAL at 20:22

## 2024-03-18 RX ADMIN — OXYCODONE HYDROCHLORIDE AND ACETAMINOPHEN 1 TABLET: 7.5; 325 TABLET ORAL at 21:53

## 2024-03-18 RX ADMIN — APIXABAN 5 MG: 5 TABLET, FILM COATED ORAL at 09:07

## 2024-03-18 RX ADMIN — DILTIAZEM HYDROCHLORIDE 120 MG: 120 CAPSULE, EXTENDED RELEASE ORAL at 09:03

## 2024-03-18 RX ADMIN — MICONAZOLE NITRATE: 2 OINTMENT TOPICAL at 13:46

## 2024-03-18 RX ADMIN — GABAPENTIN 100 MG: 100 CAPSULE ORAL at 20:21

## 2024-03-18 RX ADMIN — IPRATROPIUM BROMIDE AND ALBUTEROL SULFATE 1 DOSE: 2.5; .5 SOLUTION RESPIRATORY (INHALATION) at 08:26

## 2024-03-18 RX ADMIN — GABAPENTIN 100 MG: 100 CAPSULE ORAL at 09:07

## 2024-03-18 RX ADMIN — INSULIN LISPRO 2 UNITS: 100 INJECTION, SOLUTION INTRAVENOUS; SUBCUTANEOUS at 16:19

## 2024-03-18 RX ADMIN — FLUCONAZOLE 200 MG: 100 TABLET ORAL at 12:16

## 2024-03-18 RX ADMIN — INSULIN GLARGINE 10 UNITS: 100 INJECTION, SOLUTION SUBCUTANEOUS at 12:17

## 2024-03-18 RX ADMIN — IOPAMIDOL 80 ML: 755 INJECTION, SOLUTION INTRAVENOUS at 00:49

## 2024-03-18 RX ADMIN — GABAPENTIN 100 MG: 100 CAPSULE ORAL at 13:46

## 2024-03-18 RX ADMIN — METOPROLOL TARTRATE 25 MG: 25 TABLET, FILM COATED ORAL at 20:21

## 2024-03-18 RX ADMIN — INSULIN GLARGINE 10 UNITS: 100 INJECTION, SOLUTION SUBCUTANEOUS at 20:22

## 2024-03-18 RX ADMIN — DESMOPRESSIN ACETATE 40 MG: 0.2 TABLET ORAL at 20:21

## 2024-03-18 RX ADMIN — DOXYCYCLINE 100 MG: 100 INJECTION, POWDER, LYOPHILIZED, FOR SOLUTION INTRAVENOUS at 04:27

## 2024-03-18 RX ADMIN — CEFTRIAXONE SODIUM 1000 MG: 1 INJECTION, POWDER, FOR SOLUTION INTRAMUSCULAR; INTRAVENOUS at 08:59

## 2024-03-18 RX ADMIN — BUSPIRONE HYDROCHLORIDE 5 MG: 5 TABLET ORAL at 09:03

## 2024-03-18 RX ADMIN — IPRATROPIUM BROMIDE AND ALBUTEROL SULFATE 1 DOSE: 2.5; .5 SOLUTION RESPIRATORY (INHALATION) at 18:56

## 2024-03-18 RX ADMIN — INSULIN LISPRO 6 UNITS: 100 INJECTION, SOLUTION INTRAVENOUS; SUBCUTANEOUS at 12:17

## 2024-03-18 RX ADMIN — APIXABAN 5 MG: 5 TABLET, FILM COATED ORAL at 20:21

## 2024-03-18 RX ADMIN — Medication 10 ML: at 09:03

## 2024-03-18 RX ADMIN — AZITHROMYCIN 500 MG: 250 TABLET, FILM COATED ORAL at 09:07

## 2024-03-18 RX ADMIN — PREDNISONE 40 MG: 20 TABLET ORAL at 09:03

## 2024-03-18 ASSESSMENT — PAIN - FUNCTIONAL ASSESSMENT: PAIN_FUNCTIONAL_ASSESSMENT: ACTIVITIES ARE NOT PREVENTED

## 2024-03-18 ASSESSMENT — PAIN DESCRIPTION - DESCRIPTORS: DESCRIPTORS: ACHING

## 2024-03-18 ASSESSMENT — PAIN DESCRIPTION - PAIN TYPE: TYPE: CHRONIC PAIN

## 2024-03-18 ASSESSMENT — PAIN DESCRIPTION - FREQUENCY: FREQUENCY: CONTINUOUS

## 2024-03-18 ASSESSMENT — PAIN DESCRIPTION - LOCATION: LOCATION: BACK

## 2024-03-18 ASSESSMENT — PAIN DESCRIPTION - ONSET: ONSET: GRADUAL

## 2024-03-18 ASSESSMENT — PAIN SCALES - GENERAL
PAINLEVEL_OUTOF10: 10
PAINLEVEL_OUTOF10: 5

## 2024-03-18 ASSESSMENT — PAIN DESCRIPTION - ORIENTATION: ORIENTATION: LOWER

## 2024-03-18 NOTE — CONSULTS
[facility] Contin[facility] Nurse  Consult Note       NAME:  Leslie Farris  MEDICAL RECORD NUMBER:  [id number]  AGE: 73 y.o.   GENDER: female  : 1950  TODAY'S DATE:  3/18/2024    Subjective Pt yelling out, repeats words, Hx of dementia, schizophrenia   Reason for WOCN Evaluation and Assessment: periarea, buttocks, sacrum, medial thighs      Leslie Farris is a 73 y.o. female referred by:   [x] Physician  [x] Nursing  [] Other:     Wound Identification:  Wound Type:  MASD  Contributing Factors: decreased mobility, obesity, incontinence of stool, and incontinence of urine    Pt seen for wound care.  Pt admitted from [facility] for SOB and generalized weakness x 2 days.  She is incontinent of urine and stool.  Her legs are contracted and she favors her right side.  She complains of pain when inner thighs and periarea cleansed.      Patient Goal of Care:  HERNAN  [] Wound Healing  [] Odor Control  [] Palliative Care  [] Pain Control   [] Other:         PAST MEDICAL HISTORY        Diagnosis Date    Acute diastolic congestive heart failure ([facility]) 2016    Acute diastolic heart failure ([facility])     Acute respiratory failure ([facility])     Adjustment disorder with mixed anxiety and depressed mood     Allergic rhinitis     Alzheimer's dementia ([facility])     Arachnoid cyst 2005    Atrial fibrillation ([facility])     CHF (congestive heart failure) ([facility])     Chronic back pain     Constipation     COPD (chronic obstructive pulmonary disease) ([facility])     Diabetes mellitus ([facility])     Diskitis 10/6/2011    Disseminated superficial actinic porokeratosis (DSAP)     Edema     ESBL (extended spectrum beta-lactamase) producing bacteria infection 2020    Urine Klebsiella    Hypertension     Hypo-osmolality and hyponatremia     Major depressive disorder     Morbid obesity ([facility])     Muscle weakness     Osteomyelitis of spine ([facility]) 10/6/2011    Overactive bladder     Schizoaffective disorder ([facility])     Seizures ([facility])     Urinary  kidney disease ([facility]) I13.0, I50.32, N18.2    Schizoaffective disorder, depressive type ([facility]) F25.1    Providencia bacteremia R78.81    Permanent atrial fibrillation ([facility]) I48.21    Bacteremia R78.81    Hyperkalemia E87.5    Septic shock ([facility]) A41.9, R65.21    Hypernatremia E87.0    ESBL (extended spectrum beta-lactamase) producing bacteria infection A49.9, Z16.12    Sepsis ([facility]) A41.9    Diabetic ketoacidosis with coma associated with diabetes mellitus due to underlying condition ([facility]) E08.11    Lactic acidosis E87.20    Constipation K59.00    Acute cystitis without hematuria N30.00    Uncontrolled diabetes mellitus with hypoglycemia ([facility]) E11.649    Metabolic encephalopathy G93.41    Coronary artery disease involving native coronary artery of native heart without angina pectoris I25.10    Acute respiratory failure with hypoxia ([facility]) J96.01    NSTEMI (non-ST elevated myocardial infarction) ([facility]) I21.4    COVID-19 U07.1    Pulmonary infiltrates R91.8    Elevated brain natriuretic peptide (BNP) level R79.89    Lung nodules R91.8    Pleural effusion, bilateral J90    Uncontrolled type 2 diabetes mellitus with hyperglycemia ([facility]) E11.65    Pneumonia of both lower lobes due to methicillin resistant Staphylococcus aureus (MRSA) ([facility]) J15.212    Proteus infection A49.8    Anemia D64.9    Hypoglycemia E16.2    Hypothermia T68.XXXA    Chest pain R07.9    Respiratory failure ([facility]) J96.90    Acute encephalopathy G93.40    Thrombocytopenia ([facility]) D69.6    Community acquired pneumonia J18.9    Acute on chronic respiratory failure with hypoxia and hypercapnia ([facility]) J96.21, J96.22    Cellulitis of right lower extremity L03.115    HCAP (healthcare-associated pneumonia) J18.9    COPD exacerbation ([facility]) J44.1    Septicemia ([facility]) A41.9    Pneumonia of left lower lobe due to infectious organism J18.9    Acute hypoxic respiratory failure ([facility]) J96.01    Pulmonary nodule R91.1       Right plantar 4th toe:  Dry stable callus, painted with

## 2024-03-18 NOTE — PROGRESS NOTES
RT Inhaler-Nebulizer Bronchodilator Protocol Note    There is a bronchodilator order in the chart from a provider indicating to follow the RT Bronchodilator Protocol and there is an “Initiate RT Inhaler-Nebulizer Bronchodilator Protocol” order as well (see protocol at bottom of note).    CXR Findings:  XR CHEST PORTABLE    Result Date: 3/17/2024  Cardiomegaly with pulmonary vascular congestion.       The findings from the last RT Protocol Assessment were as follows:   History Pulmonary Disease: Chronic pulmonary disease  Respiratory Pattern: Regular pattern and RR 12-20 bpm  Breath Sounds: Slightly diminished and/or crackles  Cough: Strong, spontaneous, non-productive  Indication for Bronchodilator Therapy: Decreased or absent breath sounds  Bronchodilator Assessment Score: 4    Aerosolized bronchodilator medication orders have been revised according to the RT Inhaler-Nebulizer Bronchodilator Protocol below.    Respiratory Therapist to perform RT Therapy Protocol Assessment initially then follow the protocol.  Repeat RT Therapy Protocol Assessment PRN for score 0-3 or on second treatment, BID, and PRN for scores above 3.    No Indications - adjust the frequency to every 6 hours PRN wheezing or bronchospasm, if no treatments needed after 48 hours then discontinue using Per Protocol order mode.     If indication present, adjust the RT bronchodilator orders based on the Bronchodilator Assessment Score as indicated below.  Use Inhaler orders unless patient has one or more of the following: on home nebulizer, not able to hold breath for 10 seconds, is not alert and oriented, cannot activate and use MDI correctly, or respiratory rate 25 breaths per minute or more, then use the equivalent nebulizer order(s) with same Frequency and PRN reasons based on the score.  If a patient is on this medication at home then do not decrease Frequency below that used at home.    0-3 - enter or revise RT bronchodilator order(s) to

## 2024-03-18 NOTE — PLAN OF CARE

## 2024-03-18 NOTE — PROGRESS NOTES
Brief Progress Note    Date: 03/18/24    Subjective: Patient was evaluated by scottist early this AM. Current plan of care below. I have reviewed vitals, labs and orders and have briefly seen the patient.     Objective:  Vitals:    03/18/24 0500 03/18/24 0745 03/18/24 0843 03/18/24 0900   BP: 139/82 (!) 140/92     Pulse: (!) 111 99     Resp: 18 18     Temp: 96.9 °F (36.1 °C) 97.4 °F (36.3 °C)     TempSrc: Axillary Axillary     SpO2: 97% 98% 94% 95%   Weight:  91.8 kg (202 lb 6.4 oz)         Physical Exam:  Gen: No distress. Alert. Confused  Eyes: PERRL. No sclera icterus. No conjunctival injection.   ENT: No discharge. Pharynx clear.   Neck: No JVD.  Trachea midline.  Resp: No accessory muscle use. No crackles. Mild wheezing. Overall diminished airflow  CV: Regular rate. Regular rhythm. No murmur.  No rub. No edema.   Peripheral Pulses: +2 palpable, equal bilaterally   GI: Non-tender. Non-distended.  Normal bowel sounds.  Skin: Warm and dry. No nodule on exposed extremities. No rash on exposed extremities.   M/S: No cyanosis. No joint deformity. No clubbing.   Neuro:  Awake. Grossly nonfocal      Labs:  CBC:   Recent Labs     03/17/24  2308   WBC 10.9   HGB 12.5   HCT 39.0   MCV 81.9        BMP:   Recent Labs     03/17/24  2308      K 4.8   CL 95*   CO2 29   BUN 14   CREATININE <0.5*        CT CHEST PULMONARY EMBOLISM W CONTRAST   Final Result   1. No pulmonary emboli.   2. Chronic emphysematous changes, with small airways disease and small   left-sided pleural effusion.  No consolidation or other acute process.   3. Unchanged subcentimeter pulmonary nodule in the right upper lobe for which   no additional follow-up is necessary dating back to 2023.   4. Other similar findings as detailed above for which no additional follow-up   necessary.         XR CHEST PORTABLE   Final Result   Cardiomegaly with pulmonary vascular congestion.               Assessment & Plan:  #Acute on chronic hypoxic respiratory

## 2024-03-18 NOTE — ED PROVIDER NOTES
component of chest pain.    The differential diagnosis associated with the patient's presentation includes, but is not limited to: Pulmonary edema, pneumonia, volume overload, COPD exacerbation, pulmonary embolism    CONSULTS: (Who and What was discussed)  None    Discussion with Other Professionals : Admitting Team      Management of the patient was discussed with the admitting hospitalist.  Troponin is unremarkable.  BNP is elevated to 2800.  There is no significant leukocytosis.  No significant electrolyte abnormality.  Patient was given methylprednisolone 125 mg.  She will be started on doxycycline.  Patient has continued to be hypoxic requiring 5 to 8 L nasal cannula which is increased oxygen requirement.  CT chest without evidence of pulmonary embolism no evidence of pneumonia.  Patient will require admission for continued management of suspected COPD exacerbation and increased oxygen requirement.    Social Determinants : None    Patient's care impacted by chronic condition(s):   Past Medical History:   Diagnosis Date    Acute diastolic congestive heart failure (HCC) 9/14/2016    Acute diastolic heart failure (Formerly [facility])     Acute respiratory failure (Formerly [facility])     Adjustment disorder with mixed anxiety and depressed mood     Allergic rhinitis     Alzheimer's dementia (Formerly [facility])     Arachnoid cyst 5/23/2005    Atrial fibrillation (Formerly [facility])     CHF (congestive heart failure) (Formerly [facility])     Chronic back pain     Constipation     COPD (chronic obstructive pulmonary disease) (Formerly [facility])     Diabetes mellitus (Formerly [facility])     Diskitis 10/6/2011    Disseminated superficial actinic porokeratosis (DSAP)     Edema     ESBL (extended spectrum beta-lactamase) producing bacteria infection 04/17/2020    Urine Klebsiella    Hypertension     Hypo-osmolality and hyponatremia     Major depressive disorder     Morbid obesity (Formerly [facility])     Muscle weakness     Osteomyelitis of spine (Formerly [facility]) 10/6/2011    Overactive bladder     Schizoaffective disorder (Formerly [facility])     Seizures  (HCC)     Urinary tract infection without hematuria     Xerosis cutis          Records Reviewed : Inpatient Notes      Clinical information obtained from an independent historian.  Reviewed inpatient discharge summary from 1/27/2024 where patient was managed for sepsis related to pneumonia    Disposition Considerations (include 1 Tests not done, Shared Decision Making, Pt Expectation of Test or Tx.):     I am the Primary Clinician of Record.    FINAL IMPRESSION      1. COPD exacerbation (HCC)    2. Dyspnea and respiratory abnormalities    3. Hypoxia          DISPOSITION/PLAN     DISPOSITION Admitted 03/18/2024 03:20:13 AM      PATIENT REFERRED TO:  No follow-up provider specified.    DISCHARGE MEDICATIONS:  Patient was given scripts for the following medications. I counseled patient how to take these medications:  New Prescriptions    No medications on file       DISCONTINUED MEDICATIONS:  Discontinued Medications    No medications on file              (This chart was generated in part by using Dragon Dictation system and may contain errors related to that system including errors in grammar, punctuation, and spelling, as well as words and phrases that may be inappropriate. If there are any questions or concerns please feel free to contact the dictating provider for clarification.)    MD Gurvinder Pradhan Benjamin, MD  03/18/24 6803

## 2024-03-18 NOTE — ACP (ADVANCE CARE PLANNING)
Advance Care Planning     General Advance Care Planning (ACP) Conversation    Date of Conversation: 3/18/2024  Conducted with: Patient with Decision Making Capacity    Healthcare Decision Maker:    Primary Decision Maker: Saeed Magallanes - Brother/Sister - [phone]  Click here to complete Healthcare Decision Makers including selection of the Healthcare Decision Maker Relationship (ie \"Primary\").   Today we documented Decision Maker(s) consistent with Legal Next of Kin hierarchy.    Content/Action Overview:  Has ACP document(s) on file - reflects the patient's care preferences  Reviewed DNR/DNI and patient confirms current DNR status - completed forms on file (place new order if needed)        Length of Voluntary ACP Conversation in minutes:  <16 minutes (Non-Billable)    Dinah Blandon

## 2024-03-18 NOTE — DISCHARGE INSTR - COC
I48.91    Essential hypertension I10    Chronic obstructive pulmonary disease (Formerly [facility]) J44.9    Acute hypoxemic respiratory failure ([facility]ly [facility]) J96.01    Persistent atrial fibrillation ([facility]) I48.19    Chronic pain of both knees M25.561, M25.562, G89.29    Bilateral primary osteoarthritis of knee M17.0    Encephalopathy G93.40    Sepsis secondary to UTI ([facility]ly [facility]) A41.9, N39.0    Altered mental status R41.82    FABIO (acute kidney injury) ([facility]ly [facility]) N17.9    Urinary tract infection with hematuria N39.0, R31.9    Cervical herniated disc M50.20    Chronic bilateral low back pain with bilateral sciatica M54.42, M54.41, G89.29    Acute on chronic hypoxic respiratory failure ([facility]ly [facility]) J96.21    Dementia with behavioral disturbance ([facility]) F03.918    LORENZO (generalized anxiety disorder) F41.1    Hoarding disorder with poor insight F42.3    Hypertensive heart and kidney disease with chronic diastolic congestive heart failure and stage 2 chronic kidney disease ([facility]) I13.0, I50.32, N18.2    Schizoaffective disorder, depressive type ([facility]ly [facility]) F25.1    Providencia bacteremia R78.81    Permanent atrial fibrillation ([facility]) I48.21    Bacteremia R78.81    Hyperkalemia E87.5    Septic shock ([facility]) A41.9, R65.21    Hypernatremia E87.0    ESBL (extended spectrum beta-lactamase) producing bacteria infection A49.9, Z16.12    Sepsis ([facility]) A41.9    Diabetic ketoacidosis with coma associated with diabetes mellitus due to underlying condition ([facility]) E08.11    Lactic acidosis E87.20    Constipation K59.00    Acute cystitis without hematuria N30.00    Uncontrolled diabetes mellitus with hypoglycemia ([facility]) E11.649    Metabolic encephalopathy G93.41    Coronary artery disease involving native coronary artery of native heart without angina pectoris I25.10    Acute respiratory failure with hypoxia ([facility]) J96.01    NSTEMI (non-ST elevated myocardial infarction) ([facility]) I21.4    COVID-19 U07.1    Pulmonary infiltrates R91.8    Elevated brain natriuretic peptide (BNP) level R79.89     education to patient and family/support system.  See After Visit Summary for hospital follow up appointment details.  Consider Spiritual Care Referral for support and/or completion of advance directives:   [facility]: [phone]  Consider: having the facility MD complete required 7 day follow up.    Patient's personal belongings (please select all that are sent with patient):  None    RN SIGNATURE:  {Esignature:[id number]}    CASE MANAGEMENT/SOCIAL WORK SECTION    Inpatient Status Date: ***    Readmission Risk Assessment Score:  Readmission Risk              Risk of Unplanned Readmission:  30           Discharging to Facility/ Agency   Name:   Address:  Phone:  Fax:    Dialysis Facility (if applicable)   Name:  Address:  Dialysis Schedule:  Phone:  Fax:    / signature: {Esignature:[id number]}    PHYSICIAN SECTION    Prognosis: Good    Condition at Discharge: Stable    Rehab Potential (if transferring to Rehab): Good    Recommended Labs or Other Treatments After Discharge:  stop farxiga  Breathing tx as needed  Avoid aspiration      Physician Certification: I certify the above information and transfer of Leslie Farris  is necessary for the continuing treatment of the diagnosis listed and that she requires Skilled Nursing Facility for greater 30 days.     Update Admission H&P: No change in H&P    PHYSICIAN SIGNATURE:  Electronically signed by Brody Glasgow MD on 3/20/24 at 12:56 PM EDT

## 2024-03-18 NOTE — H&P
[address] [address]                           HISTORY & PHYSICAL      PATIENT NAME: KALEIGH FRAUSTO          : 1950  MED REC NO: [id number]                      ROOM: 0210  ACCOUNT NO: [id number]                       ADMIT DATE: 2024  PROVIDER: Jose A Womack MD      I examined the patient in the emergency room on 2024.    CHIEF COMPLAINT:  Shortness of breath.    SOURCE OF HISTORY:  ER documentation.  The patient has history of underlying schizophrenia and dementia with behavioral disturbances and is unable to provide any meaningful history.    HISTORY OF PRESENT ILLNESS:  The patient is a 73-year-old female, presenting with some shortness of breath and body aches apparently for the last 2 days.  The patient is unable to provide history.  All this history has been per ER documentation.  EMS found the patient to have significant hypoxia and her baseline 2 L oxygen requirement increased to almost 5-8 L/minute.    PAST MEDICAL HISTORY:    1. COPD.  2. Dementia.  3. Schizophrenia.  4. Atrial fibrillation.  5. Coronary artery disease.    PAST SURGICAL HISTORY:  As noted above.    ALLERGIES:  NUMEROUS ALLERGIES AS LISTED IN THE EMR.  THE PATIENT IS ALLERGIC TO 12 DIFFERENT MEDICATIONS.      FAMILY HISTORY:  Unobtainable from the patient.    SOCIAL HISTORY:  Previous smoker.    MEDICATIONS:  The patient's medication list has been reviewed and documented in the EMR.    REVIEW OF SYSTEMS:  Significant for shortness of breath and per the history of present illness.  All other systems reviewed and are negative except for the history of present illness.    PHYSICAL EXAMINATION:  VITAL SIGNS:  The patient is hypertensive at 141/98, pulse 107, temperature 96.9, respiratory rate is around 18.  Saturation lowest was initially 84% on 5 L, improved to 96% to 99% on 8 L high-flow.  CNS:  The patient is currently  nonverbal, not currently seem to be agitated.  PSYCH:  The patient is not currently actively hallucinating.  EYES:  Pupils bilaterally equal.  ENT:  No oral mucosal lesions.  RESPIRATORY SYSTEM:  The patient has significant expiratory bilateral wheezes.  CVS:  S1, S2 are heard.  No murmurs or rubs.  Irregularly irregular rhythm.  ABDOMEN:  No guarding, rigidity, or rebound.  MUSCULOSKELETAL:  The patient has significant contractures of the bilateral lower extremities and some contractures of the upper extremities as well.  SKIN:  No obvious cutaneous rashes or lesions.    DIAGNOSTIC DATA:  CT chest PE protocol shows no pulmonary embolism.  CBC shows white count 10.9 with neutrophilic shift.  Platelets 223.    COVID test negative.    EKG shows atrial fibrillation.    Sodium 137, potassium 4.8.  BUN 14, creatinine 0.4.    BNP 2846.    ASSESSMENT:    1. Acute type 1 respiratory failure.  2. Acute exacerbation of underlying chronic obstructive pulmonary disease.  3. Dyslipidemia.  4. Type 2 diabetes.  5. Schizophrenia.  6. Dementia.    PLAN OF CARE:  The patient admitted to Internal Medicine Service.  Breathing treatments initiated.  Steroids and antibiotics initiated.    The patient will be continued on her home dose of Eliquis.    Code status is full.  Expected length of stay is more than 2 midnights based on plan of care above.    RISKS:  High due the patient's presentation with COPD and acute exacerbation.    DISPOSITION:  Admit to Internal Medicine Service.          TERRENCE HARRISON MD      D:  03/18/2024 05:29:25     T:  03/18/2024 05:51:42     RONAL/JEREMY  Job #:  [id number]     Doc#:  [id number]

## 2024-03-18 NOTE — PROGRESS NOTES
H/p dictation id [id number].  Date of service 03/18/24.  Copd exac.  Type I Rf.  A fib.  Dm II  Schizophrenia.  Dementia.

## 2024-03-18 NOTE — PROGRESS NOTES
4 Eyes Skin Assessment     NAME:  Leslie Farris  YOB: 1950  MEDICAL RECORD NUMBER:  [id number]    The patient is being assessed for  Admission    I agree that at least one RN has performed a thorough Head to Toe Skin Assessment on the patient. ALL assessment sites listed below have been assessed.      Areas assessed by both nurses:    Head, Face, Ears, Shoulders, Back, Chest, Arms, Elbows, Hands, Sacrum. Buttock, Coccyx, Ischium, Legs. Feet and Heels, and Under Medical Devices     Wound 4th toe of right foot, severe red, nonblanchable excoriation to buttocks, alexander area, and inner thighs. Excoriation below right breast, abdominal folds, and backs of knees. Scattered abrasions and bruising.                              Does the Patient have a Wound? Yes wound(s) were present on assessment. LDA wound assessment was Initiated and completed by RN       Jarod Prevention initiated by RN: Yes  Wound Care Orders initiated by RN: No    Pressure Injury (Stage 3,4, Unstageable, DTI, NWPT, and Complex wounds) if present, place Wound referral order by RN under : Yes    New Ostomies, if present place, Ostomy referral order under : No     Nurse 1 eSignature: Electronically signed by Annalisa Clay RN on 3/18/24 at 6:38 AM EDT    **SHARE this note so that the co-signing nurse can place an eSignature**    Nurse 2 eSignature: {Esignature:[id number]}

## 2024-03-18 NOTE — CARE COORDINATION
Case Management Assessment  Initial Evaluation    Date/Time of Evaluation: 3/18/2024 2:12 PM  Assessment Completed by: Dinah Blandon    If patient is discharged prior to next notation, then this note serves as note for discharge by case management.    Patient Name: Leslie Farris                   YOB: 1950  Diagnosis: Dyspnea and respiratory abnormalities [R06.00, R06.89]  Hypoxia [R09.02]  COPD exacerbation (HCC) [J44.1]                   Date / Time: 3/17/2024 10:22 PM    Patient Admission Status: Inpatient   Readmission Risk (Low < 19, Mod (19-27), High > 27): Readmission Risk Score: 25.4    Current PCP: Kristin Pacheco MD  PCP verified by CM? Yes (Tara)    Chart Reviewed: Yes      History Provided by: Patient, Other (see comment) ([facility])  Patient Orientation: Alert and Oriented, Person    Patient Cognition: Alert    Hospitalization in the last 30 days (Readmission):  No    If yes, Readmission Assessment in  Navigator will be completed.    Advance Directives:      Code Status: DNR-CC   Patient's Primary Decision Maker is: Legal Next of Kin    Primary Decision Maker: Saeed Magallanes - Brother/Sister - [phone]    Discharge Planning:    Patient lives with: Other (Comment) (LT) Type of Home: Long-Term Acute Care  Primary Care Giver: Other (Comment) ([facility])  Patient Support Systems include: Other (Comment) ([facility])   Current Financial resources: Medicaid, Medicare  Current community resources: None  Current services prior to admission: Long Term Acute Care            Current DME:              Type of Home Care services:  None    ADLS  Prior functional level: Assistance with the following:, Bathing, Toileting, Dressing, Mobility, Shopping, Housework, Cooking  Current functional level: Assistance with the following:, Bathing, Dressing, Toileting, Mobility, Shopping, Housework, Cooking    PT AM-PAC:   /24  OT AM-PAC:   /24    Family can provide assistance at DC: No  Would you

## 2024-03-19 PROBLEM — F02.C0 SEVERE LATE ONSET ALZHEIMER'S DEMENTIA WITHOUT BEHAVIORAL DISTURBANCE, PSYCHOTIC DISTURBANCE, MOOD DISTURBANCE, OR ANXIETY (HCC): Status: ACTIVE | Noted: 2024-03-19

## 2024-03-19 PROBLEM — G30.1 SEVERE LATE ONSET ALZHEIMER'S DEMENTIA WITHOUT BEHAVIORAL DISTURBANCE, PSYCHOTIC DISTURBANCE, MOOD DISTURBANCE, OR ANXIETY (HCC): Status: ACTIVE | Noted: 2024-03-19

## 2024-03-19 LAB
GLUCOSE BLD-MCNC: 189 MG/DL (ref 70–99)
GLUCOSE BLD-MCNC: 246 MG/DL (ref 70–99)
GLUCOSE BLD-MCNC: 255 MG/DL (ref 70–99)
GLUCOSE BLD-MCNC: 263 MG/DL (ref 70–99)
GLUCOSE BLD-MCNC: 263 MG/DL (ref 70–99)
PERFORMED ON: ABNORMAL

## 2024-03-19 PROCEDURE — 99233 SBSQ HOSP IP/OBS HIGH 50: CPT | Performed by: INTERNAL MEDICINE

## 2024-03-19 PROCEDURE — 6360000002 HC RX W HCPCS: Performed by: INTERNAL MEDICINE

## 2024-03-19 PROCEDURE — 2700000000 HC OXYGEN THERAPY PER DAY

## 2024-03-19 PROCEDURE — 1200000000 HC SEMI PRIVATE

## 2024-03-19 PROCEDURE — 6370000000 HC RX 637 (ALT 250 FOR IP): Performed by: INTERNAL MEDICINE

## 2024-03-19 PROCEDURE — 94761 N-INVAS EAR/PLS OXIMETRY MLT: CPT

## 2024-03-19 PROCEDURE — 6370000000 HC RX 637 (ALT 250 FOR IP)

## 2024-03-19 PROCEDURE — 94640 AIRWAY INHALATION TREATMENT: CPT

## 2024-03-19 PROCEDURE — 2580000003 HC RX 258: Performed by: INTERNAL MEDICINE

## 2024-03-19 RX ORDER — QUETIAPINE FUMARATE 25 MG/1
25 TABLET, FILM COATED ORAL 2 TIMES DAILY
Status: DISCONTINUED | OUTPATIENT
Start: 2024-03-19 | End: 2024-03-20 | Stop reason: HOSPADM

## 2024-03-19 RX ADMIN — QUETIAPINE FUMARATE 25 MG: 25 TABLET ORAL at 14:37

## 2024-03-19 RX ADMIN — GABAPENTIN 100 MG: 100 CAPSULE ORAL at 08:25

## 2024-03-19 RX ADMIN — METOPROLOL TARTRATE 25 MG: 25 TABLET, FILM COATED ORAL at 21:34

## 2024-03-19 RX ADMIN — QUETIAPINE FUMARATE 25 MG: 25 TABLET ORAL at 21:42

## 2024-03-19 RX ADMIN — DILTIAZEM HYDROCHLORIDE 120 MG: 120 CAPSULE, EXTENDED RELEASE ORAL at 08:24

## 2024-03-19 RX ADMIN — INSULIN GLARGINE 10 UNITS: 100 INJECTION, SOLUTION SUBCUTANEOUS at 21:35

## 2024-03-19 RX ADMIN — GABAPENTIN 100 MG: 100 CAPSULE ORAL at 21:35

## 2024-03-19 RX ADMIN — Medication 10 ML: at 08:28

## 2024-03-19 RX ADMIN — INSULIN LISPRO 2 UNITS: 100 INJECTION, SOLUTION INTRAVENOUS; SUBCUTANEOUS at 11:57

## 2024-03-19 RX ADMIN — BUSPIRONE HYDROCHLORIDE 5 MG: 5 TABLET ORAL at 08:24

## 2024-03-19 RX ADMIN — MICONAZOLE NITRATE: 2 OINTMENT TOPICAL at 08:28

## 2024-03-19 RX ADMIN — AZITHROMYCIN 500 MG: 250 TABLET, FILM COATED ORAL at 08:24

## 2024-03-19 RX ADMIN — APIXABAN 5 MG: 5 TABLET, FILM COATED ORAL at 08:24

## 2024-03-19 RX ADMIN — CEFTRIAXONE SODIUM 1000 MG: 1 INJECTION, POWDER, FOR SOLUTION INTRAMUSCULAR; INTRAVENOUS at 08:24

## 2024-03-19 RX ADMIN — INSULIN LISPRO 4 UNITS: 100 INJECTION, SOLUTION INTRAVENOUS; SUBCUTANEOUS at 16:45

## 2024-03-19 RX ADMIN — FLUCONAZOLE 200 MG: 100 TABLET ORAL at 08:24

## 2024-03-19 RX ADMIN — GABAPENTIN 100 MG: 100 CAPSULE ORAL at 13:13

## 2024-03-19 RX ADMIN — PREDNISONE 40 MG: 20 TABLET ORAL at 08:24

## 2024-03-19 RX ADMIN — METOPROLOL TARTRATE 25 MG: 25 TABLET, FILM COATED ORAL at 08:25

## 2024-03-19 RX ADMIN — MICONAZOLE NITRATE: 2 OINTMENT TOPICAL at 21:41

## 2024-03-19 RX ADMIN — IPRATROPIUM BROMIDE AND ALBUTEROL SULFATE 1 DOSE: 2.5; .5 SOLUTION RESPIRATORY (INHALATION) at 08:41

## 2024-03-19 RX ADMIN — IPRATROPIUM BROMIDE AND ALBUTEROL SULFATE 1 DOSE: 2.5; .5 SOLUTION RESPIRATORY (INHALATION) at 19:03

## 2024-03-19 RX ADMIN — APIXABAN 5 MG: 5 TABLET, FILM COATED ORAL at 21:34

## 2024-03-19 RX ADMIN — DESMOPRESSIN ACETATE 40 MG: 0.2 TABLET ORAL at 21:34

## 2024-03-19 ASSESSMENT — COPD QUESTIONNAIRES
QUESTION3_CHESTTIGHTNESS: 3
CAT_TOTALSCORE: 36
QUESTION7_SLEEPQUALITY: 3
QUESTION2_CHESTPHLEGM: 5
QUESTION4_WALKINCLINE: 5
QUESTION1_COUGHFREQUENCY: 5
QUESTION8_ENERGYLEVEL: 5
QUESTION6_LEAVINGHOUSE: 5
QUESTION5_HOMEACTIVITIES: 5

## 2024-03-19 NOTE — PROGRESS NOTES
Progress Note    Admit Date:  3/17/2024    Acute on chronic respiratory failure   COPDae    Subjective:  Ms. Farris seen up in bed, sleeping,hard to arouse , remains confused at baseline with  dementia  Improving hypoxia from 4 to 2 L today     No complaints currently     Objective:   Patient Vitals for the past 4 hrs:   BP Temp Temp src Pulse Resp SpO2   03/19/24 0800 120/79 97.5 °F (36.4 °C) Oral 83 17 91 %          Intake/Output Summary (Last 24 hours) at 3/19/2024 0832  Last data filed at 3/19/2024 0550  Gross per 24 hour   Intake 360 ml   Output 1800 ml   Net -1440 ml       Physical Exam:    Gen: No distress. Alert. Confused  Eyes: PERRL. No sclera icterus. No conjunctival injection.   ENT: No discharge. Pharynx clear.   Neck: No JVD.  Trachea midline.  Resp: No accessory muscle use. No crackles. Minimal wheezing. Overall diminished airflow  CV: Regular rate. Regular rhythm. No murmur.  No rub. No edema.   Peripheral Pulses: +2 palpable, equal bilaterally   GI: Non-tender. Non-distended.  Normal bowel sounds.  Skin: Warm and dry. No nodule on exposed extremities. No rash on exposed extremities.   M/S: No cyanosis. No joint deformity. No clubbing. Contractures to leftsided digits in upper ext  Neuro:  Arousable, demented  At baseline    Scheduled Meds:   apixaban  5 mg Oral BID    atorvastatin  40 mg Oral Nightly    busPIRone  5 mg Oral Daily    dilTIAZem  120 mg Oral Daily    gabapentin  100 mg Oral TID    insulin glargine  10 Units SubCUTAneous Nightly    sodium chloride flush  5-40 mL IntraVENous 2 times per day    cefTRIAXone (ROCEPHIN) IV  1,000 mg IntraVENous Q24H    azithromycin  500 mg Oral Daily    predniSONE  40 mg Oral Daily    ipratropium 0.5 mg-albuterol 2.5 mg  1 Dose Inhalation BID RT    metoprolol tartrate  25 mg Oral BID    miconazole nitrate   Topical BID    fluconazole  200 mg Oral Daily    insulin lispro  0-8 Units SubCUTAneous TID WC    insulin lispro  0-4 Units SubCUTAneous Nightly

## 2024-03-19 NOTE — PLAN OF CARE
Problem: Discharge Planning  Goal: Discharge to home or other facility with appropriate resources  3/19/2024 1002 by Lisha Boudreaux RN  Outcome: Progressing  3/19/2024 1001 by Lisha Boudreaux RN  Outcome: Progressing  Flowsheets (Taken 3/18/2024 2030 by Ledy Garcia RN)  Discharge to home or other facility with appropriate resources: Identify barriers to discharge with patient and caregiver     Problem: Pain  Goal: Verbalizes/displays adequate comfort level or baseline comfort level  3/19/2024 1002 by Lisha Boudreaux RN  Outcome: Progressing  3/19/2024 1001 by Lisha Boudreaux RN  Outcome: Progressing  3/19/2024 0240 by Ledy Garcia RN  Outcome: Progressing     Problem: Chronic Conditions and Co-morbidities  Goal: Patient's chronic conditions and co-morbidity symptoms are monitored and maintained or improved  3/19/2024 1002 by Lisha Boudreaux RN  Outcome: Progressing  3/19/2024 1001 by Lisha Boudreaux RN  Outcome: Progressing  3/19/2024 0240 by Ledy Garcia RN  Outcome: Progressing  Flowsheets (Taken 3/18/2024 2030)  Care Plan - Patient's Chronic Conditions and Co-Morbidity Symptoms are Monitored and Maintained or Improved:   Monitor and assess patient's chronic conditions and comorbid symptoms for stability, deterioration, or improvement   Update acute care plan with appropriate goals if chronic or comorbid symptoms are exacerbated and prevent overall improvement and discharge   Collaborate with multidisciplinary team to address chronic and comorbid conditions and prevent exacerbation or deterioration     Problem: Skin/Tissue Integrity  Goal: Absence of new skin breakdown  Description: 1.  Monitor for areas of redness and/or skin breakdown  2.  Assess vascular access sites hourly  3.  Every 4-6 hours minimum:  Change oxygen saturation probe site  4.  Every 4-6 hours:  If on nasal continuous positive airway pressure, respiratory therapy assess nares and determine need for appliance change or resting period.  3/19/2024  1002 by Janusz, Lisha, RN  Outcome: Progressing  3/19/2024 1001 by Lisha Boudreaux RN  Outcome: Progressing  3/19/2024 0240 by Ledy Garcia RN  Outcome: Progressing     Problem: ABCDS Injury Assessment  Goal: Absence of physical injury  3/19/2024 1002 by Lisha Boudreaux RN  Outcome: Progressing  3/19/2024 1001 by Lisha Boudreaux RN  Outcome: Progressing  3/19/2024 0240 by Ledy Garcia RN  Outcome: Progressing     Problem: Safety - Adult  Goal: Free from fall injury  3/19/2024 1002 by Lisha Boudreaux RN  Outcome: Progressing  3/19/2024 1001 by Lisha Boudreaux RN  Outcome: Progressing  3/19/2024 0240 by Ledy Garcia RN  Outcome: Progressing     Problem: Respiratory - Adult  Goal: Achieves optimal ventilation and oxygenation  Outcome: Progressing     Problem: Cardiovascular - Adult  Goal: Maintains optimal cardiac output and hemodynamic stability  Outcome: Progressing  Goal: Absence of cardiac dysrhythmias or at baseline  Outcome: Progressing

## 2024-03-19 NOTE — FLOWSHEET NOTE
03/19/24 0800   Vital Signs   Temp 97.5 °F (36.4 °C)   Temp Source Oral   Pulse 83   Heart Rate Source Monitor   Respirations 17   /79   MAP (Calculated) 93   BP Location Right lower arm   BP Method Automatic   Patient Position High fowlers   Faces, Legs, Activity, Cry, and Consolability (FLACC)   Face (F) 0   Legs (L) 0   Activity (A) 0   Cry (C) 0   Consolability (C) 0   FLACC Score  0   Opioid-Induced Sedation   POSS Score 1   RASS   Saenz Agitation Sedation Scale (RASS) 0   Oxygen Therapy   SpO2 91 %   O2 Device High flow nasal cannula   O2 Flow Rate (L/min) 4 L/min     Alert and resting in bed.  Resp ee unlabored. Meds given. Nursing asmt completed.  See flow sheet and MAR. No s/s distress noted. Pillow support for positioning. Pressure red mattress in place.  Pure wic in place. No s/s distress noted.  Call light in easy reach.

## 2024-03-19 NOTE — CARE COORDINATION
Reviewed chart, met with pt bedside. Pt remains on IV abx at this time. Plan to return to [facility] at OR. Will continue to monitor.

## 2024-03-19 NOTE — PROGRESS NOTES

## 2024-03-19 NOTE — PLAN OF CARE
Problem: Pain  Goal: Verbalizes/displays adequate comfort level or baseline comfort level  3/19/2024 0240 by Ledy Garcia RN  Outcome: Progressing  3/18/2024 1314 by Mary Ellen Newman RN  Outcome: Progressing     Problem: Chronic Conditions and Co-morbidities  Goal: Patient's chronic conditions and co-morbidity symptoms are monitored and maintained or improved  3/19/2024 0240 by Ledy Garcia RN  Outcome: Progressing  Flowsheets (Taken 3/18/2024 2030)  Care Plan - Patient's Chronic Conditions and Co-Morbidity Symptoms are Monitored and Maintained or Improved:   Monitor and assess patient's chronic conditions and comorbid symptoms for stability, deterioration, or improvement   Update acute care plan with appropriate goals if chronic or comorbid symptoms are exacerbated and prevent overall improvement and discharge   Collaborate with multidisciplinary team to address chronic and comorbid conditions and prevent exacerbation or deterioration  3/18/2024 1314 by Mary Ellen Newman RN  Outcome: Progressing     Problem: Skin/Tissue Integrity  Goal: Absence of new skin breakdown  Description: 1.  Monitor for areas of redness and/or skin breakdown  2.  Assess vascular access sites hourly  3.  Every 4-6 hours minimum:  Change oxygen saturation probe site  4.  Every 4-6 hours:  If on nasal continuous positive airway pressure, respiratory therapy assess nares and determine need for appliance change or resting period.  3/19/2024 0240 by Ledy Garcia RN  Outcome: Progressing  3/18/2024 1314 by Mary Ellen Newman RN  Outcome: Progressing     Problem: ABCDS Injury Assessment  Goal: Absence of physical injury  3/19/2024 0240 by Ledy Garcia RN  Outcome: Progressing  3/18/2024 1314 by Mary Ellen Newman RN  Outcome: Progressing     Problem: Safety - Adult  Goal: Free from fall injury  3/19/2024 0240 by Ledy Garcia RN  Outcome: Progressing  3/18/2024 1314 by Mary Ellen Newman RN  Outcome: Progressing

## 2024-03-19 NOTE — PLAN OF CARE
Patient provided a COPD Educational Folder that includes the following materials:     [x]  InPact.me Booklet: Managing your COPD  [x]  ALA: Getting the Most Out of Medication Delivery Devices  [x]  ALA: My COPD Action Plan  [x]  Better Breathers Club: [facility]   [x]  Smoking Cessation Classes  [x]  Outpatient Spiritual Care Services  [x]  Magnet: Signs of COPD    PATIENT/CAREGIVER TEACHING:   Level of patient/caregiver understanding able to:   [x] Verbalize understanding   [] Demonstrate understanding       [] Teach back        [x] Needs reinforcement     []  Other:     Electronically signed by Lisha Boudreaux RN on 3/19/2024 at 10:04 AM

## 2024-03-19 NOTE — PLAN OF CARE
congestive heart failure  HEART FAILURE CARE PLAN:    Comorbidities Reviewed: Yes   Patient has a past medical history of Acute diastolic congestive heart failure (HCC), Acute diastolic heart failure (HCC), Acute respiratory failure (HCC), Adjustment disorder with mixed anxiety and depressed mood, Allergic rhinitis, Alzheimer's dementia (HCC), Arachnoid cyst, Atrial fibrillation (HCC), CHF (congestive heart failure) ([facility]), Chronic back pain, Constipation, COPD (chronic obstructive pulmonary disease) ([facility]), Diabetes mellitus (HCC), Diskitis, Disseminated superficial actinic porokeratosis (DSAP), Edema, ESBL (extended spectrum beta-lactamase) producing bacteria infection, Hypertension, Hypo-osmolality and hyponatremia, Major depressive disorder, Morbid obesity ([facility]), Muscle weakness, Osteomyelitis of spine ([facility]), Overactive bladder, Schizoaffective disorder ([facility]), Seizures ([facility]), Urinary tract infection without hematuria, and Xerosis cutis.     Weights Reviewed: Yes   Admission weight: 91.8 kg (202 lb 6.4 oz)   Wt Readings from Last 3 Encounters:   03/18/24 91.8 kg (202 lb 6.4 oz)   01/19/24 96.4 kg (212 lb 8 oz)   12/11/23 89.7 kg (197 lb 12.8 oz)     Intake & Output Reviewed: Yes     Intake/Output Summary (Last 24 hours) at 3/19/2024 1003  Last data filed at 3/19/2024 0834  Gross per 24 hour   Intake 420 ml   Output 1800 ml   Net -1380 ml       ECHOCARDIOGRAM Reviewed: Yes   Patient's Ejection Fraction (EF) is greater than 40%     Medications Reviewed: Yes   SCHEDULED HOSPITAL MEDICATIONS:   apixaban  5 mg Oral BID    atorvastatin  40 mg Oral Nightly    busPIRone  5 mg Oral Daily    dilTIAZem  120 mg Oral Daily    gabapentin  100 mg Oral TID    insulin glargine  10 Units SubCUTAneous Nightly    sodium chloride flush  5-40 mL IntraVENous 2 times per day    cefTRIAXone (ROCEPHIN) IV  1,000 mg IntraVENous Q24H    azithromycin  500 mg Oral Daily    predniSONE  40 mg Oral Daily    ipratropium 0.5 mg-albuterol 2.5 mg  1  tablet by mouth 2 times daily 4/28/23   Montserrat Morelos MD   atorvastatin (LIPITOR) 40 MG tablet Take 1 tablet by mouth nightly 4/28/23   Montserrat Morelos MD   acetaminophen (TYLENOL) 650 MG extended release tablet Take 1 tablet by mouth every 8 hours as needed for Fever or Pain    Eliza Reid MD   metoprolol tartrate (LOPRESSOR) 25 MG tablet Take 1 tablet by mouth 2 times daily 4/16/23   Brody Glasgow MD   polyethylene glycol (GLYCOLAX) 17 GM/SCOOP powder Take 17 g by mouth daily as needed (constipation)    Eliza Reid MD   Lactobacillus-Inulin (CULTURELLE DIGESTIVE DAILY PO) Take by mouth in the morning and at bedtime    Eliza Reid MD   busPIRone (BUSPAR) 5 MG tablet Take 1 tablet by mouth daily One tablet daily    Eliza Reid MD   melatonin 3 MG TABS tablet Take 1 tablet by mouth nightly    Eliza Reid MD   Cholecalciferol (VITAMIN D3) 1.25 MG (57622 UT) CAPS Take 1 capsule by mouth every 30 days (Last dose around March 13, 2023 - next due around April 15, 2023)    Eliza Reid MD   bisacodyl (DULCOLAX) 10 MG suppository Place 1 suppository rectally daily as needed for Constipation (no BM for 3 days)    Eliza Reid MD      Diet Reviewed: Yes   ADULT DIET; Regular; 4 carb choices (60 gm/meal)    Goal of Care Reviewed: Yes   Patient and/or Family's stated Goal of Care this Admission: Reduce shortness of breath, increase activity tolerance, better understand heart failure and disease management, be more comfortable, and reduce lower extremity edema prior to discharge.     Electronically signed by Lisha Boudreaux RN on 3/19/2024 at 10:03 AM

## 2024-03-19 NOTE — PROGRESS NOTES
Bedside Mobility Assessment Tool (BMAT):     Assessment Level 1- Sit and Shake    1. From a semi-reclined position, ask patient to sit up and rotate to a seated position at the side of the bed. Can use the bedrail.    2. Ask patient to reach out and grab your hand and shake making sure patient reaches across his/her midline.   Fail- Patient is unable to perform tasks, patient is MOBILITY LEVEL 1.    Assessment Level 2- Stretch and Point   1. With patient in seated position at the side of the bed, have patient place both feet on the floor (or stool) with knees no higher than hips.    2. Ask patient to stretch one leg and straighten the knee, then bend the ankle/flex and point the toes. If appropriate, repeat with the other leg.   Fail- Patient is unable to complete task. Patient is MOBILITY LEVEL 2.     Assessment Level 3- Stand   1. Ask patient to elevate off the bed or chair (seated to standing) using an assistive device (cane, bedrail).    2. Patient should be able to raise buttocks off be and hold for a count of five. May repeat once.   Fail- Patient unable to demonstrate standing stability. Patient is MOBILITY LEVEL 3.     Assessment Level 4- Walk   1. Ask patient to march in place at bedside.    2. Then ask patient to advance step and return each foot. Some medical conditions may render a patient from stepping backwards, use your best clinical judgement.   Fail- Patient not able to complete tasks OR requires use of assistive device. Patient is MOBILITY LEVEL 3.       Mobility Level- 1

## 2024-03-19 NOTE — PROGRESS NOTES
patient contines to yell, scream and hit bedrails and table.  yelling out and continuously using call light.  all needs have been addressed. patinet with nothing consistent when asked what she would like.  Fluids and food given. Kassandra care provided. Warm blanket provided and repositioned. Routine gabapentin given.  No PRN orders.  Perfect served MD.

## 2024-03-19 NOTE — PROGRESS NOTES
RT Inhaler-Nebulizer Bronchodilator Protocol Note    There is a bronchodilator order in the chart from a provider indicating to follow the RT Bronchodilator Protocol and there is an “Initiate RT Inhaler-Nebulizer Bronchodilator Protocol” order as well (see protocol at bottom of note).    CXR Findings:  XR CHEST PORTABLE    Result Date: 3/17/2024  Cardiomegaly with pulmonary vascular congestion.       The findings from the last RT Protocol Assessment were as follows:   History Pulmonary Disease: Chronic pulmonary disease  Respiratory Pattern: Regular pattern and RR 12-20 bpm  Breath Sounds: Slightly diminished and/or crackles  Cough: Strong, spontaneous, non-productive  Indication for Bronchodilator Therapy: Decreased or absent breath sounds  Bronchodilator Assessment Score: 4    Aerosolized bronchodilator medication orders have been revised according to the RT Inhaler-Nebulizer Bronchodilator Protocol below.    Respiratory Therapist to perform RT Therapy Protocol Assessment initially then follow the protocol.  Repeat RT Therapy Protocol Assessment PRN for score 0-3 or on second treatment, BID, and PRN for scores above 3.    No Indications - adjust the frequency to every 6 hours PRN wheezing or bronchospasm, if no treatments needed after 48 hours then discontinue using Per Protocol order mode.     If indication present, adjust the RT bronchodilator orders based on the Bronchodilator Assessment Score as indicated below.  Use Inhaler orders unless patient has one or more of the following: on home nebulizer, not able to hold breath for 10 seconds, is not alert and oriented, cannot activate and use MDI correctly, or respiratory rate 25 breaths per minute or more, then use the equivalent nebulizer order(s) with same Frequency and PRN reasons based on the score.  If a patient is on this medication at home then do not decrease Frequency below that used at home.    0-3 - enter or revise RT bronchodilator order(s) to  equivalent RT Bronchodilator order with Frequency of every 4 hours PRN for wheezing or increased work of breathing using Per Protocol order mode.        4-6 - enter or revise RT Bronchodilator order(s) to two equivalent RT bronchodilator orders with one order with BID Frequency and one order with Frequency of every 4 hours PRN wheezing or increased work of breathing using Per Protocol order mode.        7-10 - enter or revise RT Bronchodilator order(s) to two equivalent RT bronchodilator orders with one order with TID Frequency and one order with Frequency of every 4 hours PRN wheezing or increased work of breathing using Per Protocol order mode.       11-13 - enter or revise RT Bronchodilator order(s) to one equivalent RT bronchodilator order with QID Frequency and an Albuterol order with Frequency of every 4 hours PRN wheezing or increased work of breathing using Per Protocol order mode.      Greater than 13 - enter or revise RT Bronchodilator order(s) to one equivalent RT bronchodilator order with every 4 hours Frequency and an Albuterol order with Frequency of every 2 hours PRN wheezing or increased work of breathing using Per Protocol order mode.     RT to enter RT Home Evaluation for COPD & MDI Assessment order using Per Protocol order mode.    Electronically signed by Annalisa Waller RCP on 3/19/2024 at 8:43 AM

## 2024-03-20 VITALS
HEIGHT: 63 IN | TEMPERATURE: 97.4 F | WEIGHT: 207.9 LBS | RESPIRATION RATE: 17 BRPM | BODY MASS INDEX: 36.84 KG/M2 | DIASTOLIC BLOOD PRESSURE: 82 MMHG | SYSTOLIC BLOOD PRESSURE: 129 MMHG | HEART RATE: 81 BPM | OXYGEN SATURATION: 93 %

## 2024-03-20 LAB
ALBUMIN SERPL-MCNC: 3.4 G/DL (ref 3.4–5)
ALP SERPL-CCNC: 65 U/L (ref 40–129)
ALT SERPL-CCNC: 7 U/L (ref 10–40)
ANION GAP SERPL CALCULATED.3IONS-SCNC: 9 MMOL/L (ref 3–16)
AST SERPL-CCNC: 20 U/L (ref 15–37)
BASOPHILS # BLD: 0 K/UL (ref 0–0.2)
BASOPHILS NFR BLD: 0.6 %
BILIRUB DIRECT SERPL-MCNC: <0.2 MG/DL (ref 0–0.3)
BILIRUB INDIRECT SERPL-MCNC: ABNORMAL MG/DL (ref 0–1)
BILIRUB SERPL-MCNC: 0.4 MG/DL (ref 0–1)
BUN SERPL-MCNC: 28 MG/DL (ref 7–20)
CALCIUM SERPL-MCNC: 8.5 MG/DL (ref 8.3–10.6)
CHLORIDE SERPL-SCNC: 100 MMOL/L (ref 99–110)
CO2 SERPL-SCNC: 29 MMOL/L (ref 21–32)
CREAT SERPL-MCNC: <0.5 MG/DL (ref 0.6–1.2)
DEPRECATED RDW RBC AUTO: 16.8 % (ref 12.4–15.4)
EOSINOPHIL # BLD: 0 K/UL (ref 0–0.6)
EOSINOPHIL NFR BLD: 0.3 %
GFR SERPLBLD CREATININE-BSD FMLA CKD-EPI: >60 ML/MIN/{1.73_M2}
GLUCOSE BLD-MCNC: 159 MG/DL (ref 70–99)
GLUCOSE BLD-MCNC: 243 MG/DL (ref 70–99)
GLUCOSE BLD-MCNC: 342 MG/DL (ref 70–99)
GLUCOSE SERPL-MCNC: 189 MG/DL (ref 70–99)
HCT VFR BLD AUTO: 35.9 % (ref 36–48)
HGB BLD-MCNC: 11.2 G/DL (ref 12–16)
LYMPHOCYTES # BLD: 2.3 K/UL (ref 1–5.1)
LYMPHOCYTES NFR BLD: 28.4 %
MCH RBC QN AUTO: 25.8 PG (ref 26–34)
MCHC RBC AUTO-ENTMCNC: 31.3 G/DL (ref 31–36)
MCV RBC AUTO: 82.5 FL (ref 80–100)
MONOCYTES # BLD: 0.9 K/UL (ref 0–1.3)
MONOCYTES NFR BLD: 10.7 %
NEUTROPHILS # BLD: 4.8 K/UL (ref 1.7–7.7)
NEUTROPHILS NFR BLD: 60 %
PERFORMED ON: ABNORMAL
PLATELET # BLD AUTO: 196 K/UL (ref 135–450)
PMV BLD AUTO: 9.7 FL (ref 5–10.5)
POTASSIUM SERPL-SCNC: 4.6 MMOL/L (ref 3.5–5.1)
PROT SERPL-MCNC: 6.5 G/DL (ref 6.4–8.2)
RBC # BLD AUTO: 4.35 M/UL (ref 4–5.2)
SODIUM SERPL-SCNC: 138 MMOL/L (ref 136–145)
WBC # BLD AUTO: 8 K/UL (ref 4–11)

## 2024-03-20 PROCEDURE — 6370000000 HC RX 637 (ALT 250 FOR IP): Performed by: INTERNAL MEDICINE

## 2024-03-20 PROCEDURE — 6370000000 HC RX 637 (ALT 250 FOR IP)

## 2024-03-20 PROCEDURE — 94640 AIRWAY INHALATION TREATMENT: CPT

## 2024-03-20 PROCEDURE — 80048 BASIC METABOLIC PNL TOTAL CA: CPT

## 2024-03-20 PROCEDURE — 99238 HOSP IP/OBS DSCHRG MGMT 30/<: CPT | Performed by: INTERNAL MEDICINE

## 2024-03-20 PROCEDURE — 94761 N-INVAS EAR/PLS OXIMETRY MLT: CPT

## 2024-03-20 PROCEDURE — 36415 COLL VENOUS BLD VENIPUNCTURE: CPT

## 2024-03-20 PROCEDURE — 2700000000 HC OXYGEN THERAPY PER DAY

## 2024-03-20 PROCEDURE — 85025 COMPLETE CBC W/AUTO DIFF WBC: CPT

## 2024-03-20 PROCEDURE — 2580000003 HC RX 258: Performed by: INTERNAL MEDICINE

## 2024-03-20 PROCEDURE — 80076 HEPATIC FUNCTION PANEL: CPT

## 2024-03-20 PROCEDURE — 6360000002 HC RX W HCPCS: Performed by: INTERNAL MEDICINE

## 2024-03-20 RX ORDER — OXYCODONE AND ACETAMINOPHEN 7.5; 325 MG/1; MG/1
1 TABLET ORAL EVERY 8 HOURS PRN
Qty: 4 TABLET | Refills: 0 | Status: SHIPPED | OUTPATIENT
Start: 2024-03-20 | End: 2024-03-23

## 2024-03-20 RX ORDER — QUETIAPINE FUMARATE 25 MG/1
25 TABLET, FILM COATED ORAL 2 TIMES DAILY
Qty: 60 TABLET | Refills: 3
Start: 2024-03-20

## 2024-03-20 RX ORDER — FLUCONAZOLE 200 MG/1
200 TABLET ORAL DAILY
Qty: 2 TABLET | Refills: 0 | Status: SHIPPED | OUTPATIENT
Start: 2024-03-21 | End: 2024-03-23

## 2024-03-20 RX ORDER — DOXYCYCLINE HYCLATE 100 MG
100 TABLET ORAL 2 TIMES DAILY
Qty: 10 TABLET | Refills: 0
Start: 2024-03-20 | End: 2024-03-25

## 2024-03-20 RX ADMIN — Medication 10 ML: at 08:32

## 2024-03-20 RX ADMIN — DILTIAZEM HYDROCHLORIDE 120 MG: 120 CAPSULE, EXTENDED RELEASE ORAL at 08:31

## 2024-03-20 RX ADMIN — FLUCONAZOLE 200 MG: 100 TABLET ORAL at 08:31

## 2024-03-20 RX ADMIN — CEFTRIAXONE SODIUM 1000 MG: 1 INJECTION, POWDER, FOR SOLUTION INTRAMUSCULAR; INTRAVENOUS at 08:40

## 2024-03-20 RX ADMIN — INSULIN LISPRO 6 UNITS: 100 INJECTION, SOLUTION INTRAVENOUS; SUBCUTANEOUS at 16:36

## 2024-03-20 RX ADMIN — METOPROLOL TARTRATE 25 MG: 25 TABLET, FILM COATED ORAL at 08:31

## 2024-03-20 RX ADMIN — AZITHROMYCIN 500 MG: 250 TABLET, FILM COATED ORAL at 08:31

## 2024-03-20 RX ADMIN — APIXABAN 5 MG: 5 TABLET, FILM COATED ORAL at 08:31

## 2024-03-20 RX ADMIN — INSULIN LISPRO 2 UNITS: 100 INJECTION, SOLUTION INTRAVENOUS; SUBCUTANEOUS at 12:25

## 2024-03-20 RX ADMIN — QUETIAPINE FUMARATE 25 MG: 25 TABLET ORAL at 08:31

## 2024-03-20 RX ADMIN — GABAPENTIN 100 MG: 100 CAPSULE ORAL at 08:31

## 2024-03-20 RX ADMIN — GABAPENTIN 100 MG: 100 CAPSULE ORAL at 14:55

## 2024-03-20 RX ADMIN — IPRATROPIUM BROMIDE AND ALBUTEROL SULFATE 1 DOSE: 2.5; .5 SOLUTION RESPIRATORY (INHALATION) at 07:59

## 2024-03-20 RX ADMIN — PREDNISONE 40 MG: 20 TABLET ORAL at 08:31

## 2024-03-20 RX ADMIN — BUSPIRONE HYDROCHLORIDE 5 MG: 5 TABLET ORAL at 08:31

## 2024-03-20 RX ADMIN — MICONAZOLE NITRATE: 2 OINTMENT TOPICAL at 08:31

## 2024-03-20 NOTE — DISCHARGE INSTR - DIET

## 2024-03-20 NOTE — PLAN OF CARE
Code status discussed with brother Saeed (primary decision maker). Code status explained in full, brother wishes pt to be DNR-CCA.     Code status updated.    Caty Dubon PA-C  03/20/24

## 2024-03-20 NOTE — PLAN OF CARE
Patient provided a COPD Educational Folder that includes the following materials:     [x]  Coreworks Booklet: Managing your COPD  [x]  ALA: Getting the Most Out of Medication Delivery Devices  [x]  ALA: My COPD Action Plan  [x]  Better Breathers Club: [facility]   [x]  Smoking Cessation Classes  [x]  Outpatient Spiritual Care Services  [x]  Magnet: Signs of COPD    PATIENT/CAREGIVER TEACHING:   Level of patient/caregiver understanding able to:   [] Verbalize understanding   [] Demonstrate understanding       [] Teach back        [x] Needs reinforcement     []  Other:     Electronically signed by Lisha Boudreaux RN on 3/20/2024 at 11:15 AM      HEART FAILURE CARE PLAN:    Comorbidities Reviewed: Yes   Patient has a past medical history of Acute diastolic congestive heart failure (HCC), Acute diastolic heart failure (HCC), Acute respiratory failure ([facility]), Adjustment disorder with mixed anxiety and depressed mood, Allergic rhinitis, Alzheimer's dementia ([facility]), Arachnoid cyst, Atrial fibrillation ([facility]), CHF (congestive heart failure) ([facility]), Chronic back pain, Constipation, COPD (chronic obstructive pulmonary disease) ([facility]), Diabetes mellitus ([facility]), Diskitis, Disseminated superficial actinic porokeratosis (DSAP), Edema, ESBL (extended spectrum beta-lactamase) producing bacteria infection, Hypertension, Hypo-osmolality and hyponatremia, Major depressive disorder, Morbid obesity ([facility]), Muscle weakness, Osteomyelitis of spine ([facility]), Overactive bladder, Schizoaffective disorder ([facility]), Seizures ([facility]), Urinary tract infection without hematuria, and Xerosis cutis.     Weights Reviewed: Yes   Admission weight: 91.8 kg (202 lb 6.4 oz)   Wt Readings from Last 3 Encounters:   03/20/24 94.3 kg (207 lb 14.4 oz)   01/19/24 96.4 kg (212 lb 8 oz)   12/11/23 89.7 kg (197 lb 12.8 oz)     Intake & Output Reviewed: Yes     Intake/Output Summary (Last 24 hours) at 3/20/2024 1115  Last data filed at  choices (60 gm/meal); 2000 ml    Goal of Care Reviewed: Yes   Patient and/or Family's stated Goal of Care this Admission: Reduce shortness of breath, increase activity tolerance, better understand heart failure and disease management, be more comfortable, and reduce lower extremity edema prior to discharge.     Electronically signed by Lisha Boudreaux RN on 3/20/2024 at 11:15 AM

## 2024-03-20 NOTE — PROGRESS NOTES
4 Eyes Skin Assessment     NAME:  Leslie Farris  YOB: 1950  MEDICAL RECORD NUMBER:  [id number]    The patient is being assessed for  Transfer to New Unit    I agree that at least one RN has performed a thorough Head to Toe Skin Assessment on the patient. ALL assessment sites listed below have been assessed.      Areas assessed by both nurses:    Head, Face, Ears, Shoulders, Back, Chest, Arms, Elbows, Hands, Sacrum. Buttock, Coccyx, Ischium, Legs. Feet and Heels, and Under Medical Devices       Candida to groin, alexander area, abd panus, buttocks, back of both legs.  Scattered bruising.         Does the Patient have a Wound? Yes wound(s) were present on assessment. LDA wound assessment was Initiated and completed by RN       Jarod Prevention initiated by RN: Yes  Wound Care Orders initiated by RN: Yes    Pressure Injury (Stage 3,4, Unstageable, DTI, NWPT, and Complex wounds) if present, place Wound referral order by RN under : No    New Ostomies, if present place, Ostomy referral order under : No     Nurse 1 eSignature: Electronically signed by Lisha Boudreaux RN on 3/20/24 at 5:06 PM EDT    **SHARE this note so that the co-signing nurse can place an eSignature**    Nurse 2 eSignature: {Esignature:[id number]}

## 2024-03-20 NOTE — CARE COORDINATION
DISCHARGE ORDER  Date/Time 3/20/2024 3:08 PM  Completed by: Esther Chávez RN, Case Management    Patient Name: Leslie Farris    : 1950      Admit order Date and Status:3/18/24 inpt  Noted discharge order. (verify MD's last order for status of admission/Traditional Medicare 3 [place] Inpatient qualifying stay required for SNF)    Confirmed discharge plan with:              Patient:  Yes              When pt confirms DC plan does any support person need to be contacted by CM Yes if yes who Saeed Muro                      Discharge to Facility: [facility]   Facility phone number for staff giving report: [phone]   Pre-certification completed: N/A-LTC   Hospital Exemption Notification (HENS) completed: N/A-LT   Discharge orders and Continuity of Care faxed to facility:  facility will pull from [facility]      Transportation:               Medical Transport explained with choice list offered to pt/family.                Choice:(no preference)  Agency used: [facility]   time:   18:30      Pt/family/Nursing/Facility aware of  time:   Yes Names: Kirstie Jack, Lisha Carver nurse, PT, PT brother Saeed and Esperanza at [facility]  Ambulance form completed:  Yes via roundtrip:      Date Last IMM Given: 3/18/24    Comments:Order for dc noted. Spoke with pt brother who confirms rturn to [facility]. Spoke with Esperanza at [facility] who states pt can return today for LTC. Chart reviewed and no other dc needs identified.    Pt is being d/c'd to Skilled Nursing Facility (SNF)  today. Pt's O2 sats are 93% on RA.    Discharge timeout done with nsg, CM and pt. All discharge needs and concerns addressed.    Discharging nurse to complete OLEG, reconcile AVS, and place final copy with patient's discharge packet. Discharging RN to ensure that written prescriptions for  Level II medications are sent with patient to the facility as per

## 2024-03-20 NOTE — FLOWSHEET NOTE
03/20/24 0815   Vital Signs   Temp 97.8 °F (36.6 °C)   Temp Source Oral   Pulse 93   Heart Rate Source Monitor   Respirations 18   /74   MAP (Calculated) 87   BP Location Right lower arm   BP Method Automatic   Patient Position High fowlers   Faces, Legs, Activity, Cry, and Consolability (FLACC)   Face (F) 0   Legs (L) 0   Activity (A) 0   Cry (C) 0   Consolability (C) 0   FLACC Score  0   Opioid-Induced Sedation   POSS Score 1   RASS   Saenz Agitation Sedation Scale (RASS) 0   Oxygen Therapy   SpO2 94 %   O2 Device Heated high flow cannula   O2 Flow Rate (L/min) 2.5 L/min     Alert and resting in bed. Skin wd. Resp ee unlabored. Meds given. Nrsg asmt completed. See flow sheet and MAR. Pure wic in place.  Draining.  Brief left open.  Call light in easy reach. No s/s distress noted. Bed alarm on.

## 2024-03-20 NOTE — PROGRESS NOTES
Progress Note    Admit Date:  3/17/2024    Acute on chronic respiratory failure   COPDae    Subjective:  Ms. Farris seen up in bed, fully awake, alert and disoriented.   Reports her carrots are soggy and potatoes were burnt for lunch    Denies any new complaints of wheezing or sob       No complaints currently     Objective:   No data found.         Intake/Output Summary (Last 24 hours) at 3/20/2024 0717  Last data filed at 3/20/2024 0624  Gross per 24 hour   Intake 900 ml   Output 2700 ml   Net -1800 ml         Physical Exam:    Gen: No distress. Alert. Confused  Eyes: PERRL. No sclera icterus. No conjunctival injection.   ENT: No discharge. Pharynx clear.   Neck: No JVD.  Trachea midline.  Resp: No accessory muscle use. No crackles. Minimal wheezing. Overall diminished airflow  CV: Regular rate. Regular rhythm. No murmur.  No rub. No edema.   Peripheral Pulses: +2 palpable, equal bilaterally   GI: Non-tender. Non-distended.  Normal bowel sounds.  Skin: Warm and dry. No nodule on exposed extremities. No rash on exposed extremities.   M/S: No cyanosis. No joint deformity. No clubbing. Contractures to left sided digits in upper ext  Neuro:  awake, alert and demented  At baseline    Scheduled Meds:   QUEtiapine  25 mg Oral BID    apixaban  5 mg Oral BID    atorvastatin  40 mg Oral Nightly    busPIRone  5 mg Oral Daily    dilTIAZem  120 mg Oral Daily    gabapentin  100 mg Oral TID    insulin glargine  10 Units SubCUTAneous Nightly    sodium chloride flush  5-40 mL IntraVENous 2 times per day    cefTRIAXone (ROCEPHIN) IV  1,000 mg IntraVENous Q24H    azithromycin  500 mg Oral Daily    predniSONE  40 mg Oral Daily    ipratropium 0.5 mg-albuterol 2.5 mg  1 Dose Inhalation BID RT    metoprolol tartrate  25 mg Oral BID    miconazole nitrate   Topical BID    fluconazole  200 mg Oral Daily    insulin lispro  0-8 Units SubCUTAneous TID WC    insulin lispro  0-4 Units SubCUTAneous Nightly       Continuous Infusions:   sodium  chloride      dextrose         PRN Meds:  sodium chloride flush, sodium chloride, acetaminophen **OR** acetaminophen, ipratropium 0.5 mg-albuterol 2.5 mg, glucose, dextrose bolus **OR** dextrose bolus, glucagon (rDNA), dextrose, miconazole nitrate      Data:  CBC:   Recent Labs     03/17/24 2308 03/20/24 0523   WBC 10.9 8.0   HGB 12.5 11.2*   HCT 39.0 35.9*   MCV 81.9 82.5    196       BMP:   Recent Labs     03/17/24 2308 03/20/24 0523    138   K 4.8 4.6   CL 95* 100   CO2 29 29   BUN 14 28*   CREATININE <0.5* <0.5*         CULTURES  Results       Procedure Component Value Units Date/Time    COVID-19 & Influenza Combo [[id number]] Collected: 03/17/24 2308    Order Status: Completed Specimen: Nasopharyngeal Swab Updated: 03/18/24 0002     SARS-CoV-2 RNA, RT PCR NOT DETECTED     Comment: Not Detected results do not preclude SARS-CoV-2 infection and  should not be used as the sole basis for patient management  decisions.  Results must be combined with clinical observations,  patient history, and epidemiological information.  Testing was performed using [employer] SARS-CoV-2 and Influenza A/B  nucleic acid assay. This test is a multiplex Real-Time Reverse  Transcriptase Polymerase Chain Reaction (RT-PCR)-based in vitro  diagnostic test intended for the qualitative detection of nucleic  acids from SARS-CoV-2, influenza A, and influenza B in nasopharyngeal  and nasal swab specimens for use under the FDA’s Emergency Use  Authorization (EUA) only.    Patient Fact Sheet:  https://www.fda.gov/media/822122/download  Provider Fact Sheet: https://www.fda.gov/media/519822/download  EUA: https://www.fda.gov/media/285536/download  IFU: https://www.fda.gov/media/332746/download    Methodology:  RT-PCR          INFLUENZA A NOT DETECTED     INFLUENZA B NOT DETECTED               RADIOLOGY  CT CHEST PULMONARY EMBOLISM W CONTRAST   Final Result   1. No pulmonary emboli.   2. Chronic emphysematous changes, with small

## 2024-03-20 NOTE — PLAN OF CARE
Problem: Discharge Planning  Goal: Discharge to home or other facility with appropriate resources  Outcome: Progressing     Problem: Pain  Goal: Verbalizes/displays adequate comfort level or baseline comfort level  Outcome: Progressing     Problem: Chronic Conditions and Co-morbidities  Goal: Patient's chronic conditions and co-morbidity symptoms are monitored and maintained or improved  Outcome: Progressing     Problem: Skin/Tissue Integrity  Goal: Absence of new skin breakdown  Description: 1.  Monitor for areas of redness and/or skin breakdown  2.  Assess vascular access sites hourly  3.  Every 4-6 hours minimum:  Change oxygen saturation probe site  4.  Every 4-6 hours:  If on nasal continuous positive airway pressure, respiratory therapy assess nares and determine need for appliance change or resting period.  Outcome: Progressing     Problem: ABCDS Injury Assessment  Goal: Absence of physical injury  Outcome: Progressing     Problem: Safety - Adult  Goal: Free from fall injury  Outcome: Progressing     Problem: Respiratory - Adult  Goal: Achieves optimal ventilation and oxygenation  Outcome: Progressing     Problem: Cardiovascular - Adult  Goal: Maintains optimal cardiac output and hemodynamic stability  Outcome: Progressing  Goal: Absence of cardiac dysrhythmias or at baseline  Outcome: Progressing

## 2024-03-20 NOTE — PROGRESS NOTES
VSS. Skin w/d. Resp ee unlabored. 2L oxygen.  [facility]  Paperwork given with code status.  Patient left via stretcher x2 attendants. No s/s distress noted. LDA removed.

## 2024-04-12 ENCOUNTER — HOSPITAL ENCOUNTER (INPATIENT)
Age: 74
LOS: 2 days | Discharge: NURSING, REHAB OR LONG-TERM CARE | DRG: 190 | End: 2024-04-14
Attending: EMERGENCY MEDICINE | Admitting: INTERNAL MEDICINE
Payer: COMMERCIAL

## 2024-04-12 ENCOUNTER — APPOINTMENT (OUTPATIENT)
Dept: GENERAL RADIOLOGY | Age: 74
DRG: 190 | End: 2024-04-12
Payer: COMMERCIAL

## 2024-04-12 DIAGNOSIS — J96.22 ACUTE ON CHRONIC RESPIRATORY FAILURE WITH HYPOXIA AND HYPERCAPNIA (HCC): Primary | ICD-10-CM

## 2024-04-12 DIAGNOSIS — R65.20 SEVERE SEPSIS (HCC): ICD-10-CM

## 2024-04-12 DIAGNOSIS — J96.21 ACUTE ON CHRONIC RESPIRATORY FAILURE WITH HYPOXIA AND HYPERCAPNIA (HCC): Primary | ICD-10-CM

## 2024-04-12 DIAGNOSIS — J44.1 COPD EXACERBATION (HCC): ICD-10-CM

## 2024-04-12 DIAGNOSIS — A41.9 SEVERE SEPSIS (HCC): ICD-10-CM

## 2024-04-12 PROBLEM — I48.0 PAF (PAROXYSMAL ATRIAL FIBRILLATION) (HCC): Status: ACTIVE | Noted: 2024-04-12

## 2024-04-12 LAB
ALBUMIN SERPL-MCNC: 3.6 G/DL (ref 3.4–5)
ALBUMIN/GLOB SERPL: 1 {RATIO} (ref 1.1–2.2)
ALP SERPL-CCNC: 85 U/L (ref 40–129)
ALT SERPL-CCNC: 13 U/L (ref 10–40)
ANION GAP SERPL CALCULATED.3IONS-SCNC: 8 MMOL/L (ref 3–16)
AST SERPL-CCNC: 30 U/L (ref 15–37)
BASE EXCESS BLDV CALC-SCNC: 1.1 MMOL/L (ref -3–3)
BASE EXCESS BLDV CALC-SCNC: 2.5 MMOL/L (ref -3–3)
BASOPHILS # BLD: 0 K/UL (ref 0–0.2)
BASOPHILS NFR BLD: 0.3 %
BILIRUB SERPL-MCNC: 0.6 MG/DL (ref 0–1)
BUN SERPL-MCNC: 15 MG/DL (ref 7–20)
CALCIUM SERPL-MCNC: 9.7 MG/DL (ref 8.3–10.6)
CHLORIDE SERPL-SCNC: 97 MMOL/L (ref 99–110)
CO2 BLDV-SCNC: 29 MMOL/L
CO2 BLDV-SCNC: 33 MMOL/L
CO2 SERPL-SCNC: 33 MMOL/L (ref 21–32)
COHGB MFR BLDV: 1.6 % (ref 0–1.5)
COHGB MFR BLDV: 1.9 % (ref 0–1.5)
CREAT SERPL-MCNC: <0.5 MG/DL (ref 0.6–1.2)
DEPRECATED RDW RBC AUTO: 16.2 % (ref 12.4–15.4)
EKG DIAGNOSIS: NORMAL
EKG Q-T INTERVAL: 354 MS
EKG QRS DURATION: 86 MS
EKG QTC CALCULATION (BAZETT): 465 MS
EKG R AXIS: 76 DEGREES
EKG T AXIS: 266 DEGREES
EKG VENTRICULAR RATE: 104 BPM
EOSINOPHIL # BLD: 0.2 K/UL (ref 0–0.6)
EOSINOPHIL NFR BLD: 1.8 %
FLUAV RNA RESP QL NAA+PROBE: NOT DETECTED
FLUBV RNA RESP QL NAA+PROBE: NOT DETECTED
GFR SERPLBLD CREATININE-BSD FMLA CKD-EPI: >90 ML/MIN/{1.73_M2}
GLUCOSE BLD-MCNC: 251 MG/DL (ref 70–99)
GLUCOSE SERPL-MCNC: 249 MG/DL (ref 70–99)
HCO3 BLDV-SCNC: 27.3 MMOL/L (ref 23–29)
HCO3 BLDV-SCNC: 31.2 MMOL/L (ref 23–29)
HCT VFR BLD AUTO: 38.3 % (ref 36–48)
HGB BLD-MCNC: 12 G/DL (ref 12–16)
LACTATE BLDV-SCNC: 1.6 MMOL/L (ref 0.4–1.9)
LACTATE BLDV-SCNC: 2.5 MMOL/L (ref 0.4–1.9)
LYMPHOCYTES # BLD: 1.2 K/UL (ref 1–5.1)
LYMPHOCYTES NFR BLD: 9.2 %
MCH RBC QN AUTO: 25.7 PG (ref 26–34)
MCHC RBC AUTO-ENTMCNC: 31.2 G/DL (ref 31–36)
MCV RBC AUTO: 82.3 FL (ref 80–100)
METHGB MFR BLDV: 0.3 %
METHGB MFR BLDV: 0.3 %
MONOCYTES # BLD: 0.8 K/UL (ref 0–1.3)
MONOCYTES NFR BLD: 6.1 %
NEUTROPHILS # BLD: 10.9 K/UL (ref 1.7–7.7)
NEUTROPHILS NFR BLD: 82.6 %
O2 CT VFR BLDV CALC: 17 VOL %
O2 CT VFR BLDV CALC: 18 VOL %
O2 THERAPY: ABNORMAL
O2 THERAPY: ABNORMAL
PCO2 BLDV: 49.5 MMHG (ref 40–50)
PCO2 BLDV: 68.6 MMHG (ref 40–50)
PERFORMED ON: ABNORMAL
PH BLDV: 7.28 [PH] (ref 7.35–7.45)
PH BLDV: 7.36 [PH] (ref 7.35–7.45)
PLATELET # BLD AUTO: 138 K/UL (ref 135–450)
PMV BLD AUTO: 9.8 FL (ref 5–10.5)
PO2 BLDV: 133 MMHG (ref 25–40)
PO2 BLDV: 78.5 MMHG (ref 25–40)
POTASSIUM SERPL-SCNC: 4.9 MMOL/L (ref 3.5–5.1)
PROCALCITONIN SERPL IA-MCNC: 0.02 NG/ML (ref 0–0.15)
PROT SERPL-MCNC: 7.1 G/DL (ref 6.4–8.2)
RBC # BLD AUTO: 4.66 M/UL (ref 4–5.2)
SAO2 % BLDV: 94 %
SAO2 % BLDV: 99 %
SARS-COV-2 RNA RESP QL NAA+PROBE: NOT DETECTED
SODIUM SERPL-SCNC: 138 MMOL/L (ref 136–145)
TROPONIN, HIGH SENSITIVITY: 11 NG/L (ref 0–14)
TROPONIN, HIGH SENSITIVITY: 13 NG/L (ref 0–14)
WBC # BLD AUTO: 13.2 K/UL (ref 4–11)

## 2024-04-12 PROCEDURE — 96374 THER/PROPH/DIAG INJ IV PUSH: CPT

## 2024-04-12 PROCEDURE — 99223 1ST HOSP IP/OBS HIGH 75: CPT | Performed by: INTERNAL MEDICINE

## 2024-04-12 PROCEDURE — 94640 AIRWAY INHALATION TREATMENT: CPT

## 2024-04-12 PROCEDURE — 6370000000 HC RX 637 (ALT 250 FOR IP): Performed by: NURSE PRACTITIONER

## 2024-04-12 PROCEDURE — 71045 X-RAY EXAM CHEST 1 VIEW: CPT

## 2024-04-12 PROCEDURE — 87040 BLOOD CULTURE FOR BACTERIA: CPT

## 2024-04-12 PROCEDURE — 2580000003 HC RX 258: Performed by: EMERGENCY MEDICINE

## 2024-04-12 PROCEDURE — 2000000000 HC ICU R&B

## 2024-04-12 PROCEDURE — 94761 N-INVAS EAR/PLS OXIMETRY MLT: CPT

## 2024-04-12 PROCEDURE — 84484 ASSAY OF TROPONIN QUANT: CPT

## 2024-04-12 PROCEDURE — 93005 ELECTROCARDIOGRAM TRACING: CPT | Performed by: EMERGENCY MEDICINE

## 2024-04-12 PROCEDURE — 2580000003 HC RX 258: Performed by: NURSE PRACTITIONER

## 2024-04-12 PROCEDURE — 84145 PROCALCITONIN (PCT): CPT

## 2024-04-12 PROCEDURE — 2700000000 HC OXYGEN THERAPY PER DAY

## 2024-04-12 PROCEDURE — 94660 CPAP INITIATION&MGMT: CPT

## 2024-04-12 PROCEDURE — 99291 CRITICAL CARE FIRST HOUR: CPT | Performed by: INTERNAL MEDICINE

## 2024-04-12 PROCEDURE — 6360000002 HC RX W HCPCS: Performed by: EMERGENCY MEDICINE

## 2024-04-12 PROCEDURE — 36415 COLL VENOUS BLD VENIPUNCTURE: CPT

## 2024-04-12 PROCEDURE — 6370000000 HC RX 637 (ALT 250 FOR IP): Performed by: EMERGENCY MEDICINE

## 2024-04-12 PROCEDURE — 85025 COMPLETE CBC W/AUTO DIFF WBC: CPT

## 2024-04-12 PROCEDURE — 5A09457 ASSISTANCE WITH RESPIRATORY VENTILATION, 24-96 CONSECUTIVE HOURS, CONTINUOUS POSITIVE AIRWAY PRESSURE: ICD-10-PCS | Performed by: INTERNAL MEDICINE

## 2024-04-12 PROCEDURE — 6360000002 HC RX W HCPCS: Performed by: NURSE PRACTITIONER

## 2024-04-12 PROCEDURE — 93010 ELECTROCARDIOGRAM REPORT: CPT | Performed by: INTERNAL MEDICINE

## 2024-04-12 PROCEDURE — 82803 BLOOD GASES ANY COMBINATION: CPT

## 2024-04-12 PROCEDURE — 80053 COMPREHEN METABOLIC PANEL: CPT

## 2024-04-12 PROCEDURE — 87636 SARSCOV2 & INF A&B AMP PRB: CPT

## 2024-04-12 PROCEDURE — 99285 EMERGENCY DEPT VISIT HI MDM: CPT

## 2024-04-12 PROCEDURE — 2500000003 HC RX 250 WO HCPCS: Performed by: EMERGENCY MEDICINE

## 2024-04-12 PROCEDURE — 83605 ASSAY OF LACTIC ACID: CPT

## 2024-04-12 RX ORDER — LANOLIN ALCOHOL/MO/W.PET/CERES
3 CREAM (GRAM) TOPICAL NIGHTLY
Status: DISCONTINUED | OUTPATIENT
Start: 2024-04-12 | End: 2024-04-14 | Stop reason: HOSPADM

## 2024-04-12 RX ORDER — DILTIAZEM HYDROCHLORIDE 120 MG/1
120 CAPSULE, COATED, EXTENDED RELEASE ORAL DAILY
Status: DISCONTINUED | OUTPATIENT
Start: 2024-04-12 | End: 2024-04-14 | Stop reason: HOSPADM

## 2024-04-12 RX ORDER — BUSPIRONE HYDROCHLORIDE 5 MG/1
5 TABLET ORAL DAILY
Status: DISCONTINUED | OUTPATIENT
Start: 2024-04-12 | End: 2024-04-14 | Stop reason: HOSPADM

## 2024-04-12 RX ORDER — ATORVASTATIN CALCIUM 40 MG/1
40 TABLET, FILM COATED ORAL NIGHTLY
Status: DISCONTINUED | OUTPATIENT
Start: 2024-04-12 | End: 2024-04-14 | Stop reason: HOSPADM

## 2024-04-12 RX ORDER — IPRATROPIUM BROMIDE AND ALBUTEROL SULFATE 2.5; .5 MG/3ML; MG/3ML
1 SOLUTION RESPIRATORY (INHALATION)
Status: DISCONTINUED | OUTPATIENT
Start: 2024-04-12 | End: 2024-04-14

## 2024-04-12 RX ORDER — 0.9 % SODIUM CHLORIDE 0.9 %
30 INTRAVENOUS SOLUTION INTRAVENOUS ONCE
Status: DISCONTINUED | OUTPATIENT
Start: 2024-04-12 | End: 2024-04-14 | Stop reason: HOSPADM

## 2024-04-12 RX ORDER — DIPHENHYDRAMINE HCL 25 MG
25 TABLET ORAL EVERY 6 HOURS PRN
Status: DISCONTINUED | OUTPATIENT
Start: 2024-04-12 | End: 2024-04-14 | Stop reason: HOSPADM

## 2024-04-12 RX ORDER — GABAPENTIN 100 MG/1
100 CAPSULE ORAL 3 TIMES DAILY
Status: DISCONTINUED | OUTPATIENT
Start: 2024-04-12 | End: 2024-04-14 | Stop reason: HOSPADM

## 2024-04-12 RX ORDER — ONDANSETRON 4 MG/1
4 TABLET, ORALLY DISINTEGRATING ORAL EVERY 8 HOURS PRN
Status: DISCONTINUED | OUTPATIENT
Start: 2024-04-12 | End: 2024-04-14 | Stop reason: HOSPADM

## 2024-04-12 RX ORDER — INSULIN LISPRO 100 [IU]/ML
0-4 INJECTION, SOLUTION INTRAVENOUS; SUBCUTANEOUS EVERY 4 HOURS
Status: DISCONTINUED | OUTPATIENT
Start: 2024-04-12 | End: 2024-04-13

## 2024-04-12 RX ORDER — LACTOBACILLUS RHAMNOSUS GG 10B CELL
1 CAPSULE ORAL 2 TIMES DAILY
Status: DISCONTINUED | OUTPATIENT
Start: 2024-04-12 | End: 2024-04-14 | Stop reason: HOSPADM

## 2024-04-12 RX ORDER — IPRATROPIUM BROMIDE AND ALBUTEROL SULFATE 2.5; .5 MG/3ML; MG/3ML
1 SOLUTION RESPIRATORY (INHALATION) ONCE
Status: COMPLETED | OUTPATIENT
Start: 2024-04-12 | End: 2024-04-12

## 2024-04-12 RX ORDER — INSULIN GLARGINE 100 [IU]/ML
10 INJECTION, SOLUTION SUBCUTANEOUS NIGHTLY
Status: DISCONTINUED | OUTPATIENT
Start: 2024-04-12 | End: 2024-04-13

## 2024-04-12 RX ORDER — ONDANSETRON 2 MG/ML
4 INJECTION INTRAMUSCULAR; INTRAVENOUS EVERY 6 HOURS PRN
Status: DISCONTINUED | OUTPATIENT
Start: 2024-04-12 | End: 2024-04-14 | Stop reason: HOSPADM

## 2024-04-12 RX ORDER — ACETAMINOPHEN 650 MG/1
650 SUPPOSITORY RECTAL EVERY 6 HOURS PRN
Status: DISCONTINUED | OUTPATIENT
Start: 2024-04-12 | End: 2024-04-14 | Stop reason: HOSPADM

## 2024-04-12 RX ORDER — ACETAMINOPHEN 325 MG/1
650 TABLET ORAL EVERY 6 HOURS PRN
Status: DISCONTINUED | OUTPATIENT
Start: 2024-04-12 | End: 2024-04-14 | Stop reason: HOSPADM

## 2024-04-12 RX ORDER — POLYETHYLENE GLYCOL 3350 17 G/17G
17 POWDER, FOR SOLUTION ORAL DAILY PRN
Status: DISCONTINUED | OUTPATIENT
Start: 2024-04-12 | End: 2024-04-14 | Stop reason: HOSPADM

## 2024-04-12 RX ORDER — FLUTICASONE PROPIONATE 50 MCG
1 SPRAY, SUSPENSION (ML) NASAL DAILY
Status: DISCONTINUED | OUTPATIENT
Start: 2024-04-12 | End: 2024-04-14 | Stop reason: HOSPADM

## 2024-04-12 RX ORDER — SODIUM CHLORIDE 9 MG/ML
INJECTION, SOLUTION INTRAVENOUS PRN
Status: DISCONTINUED | OUTPATIENT
Start: 2024-04-12 | End: 2024-04-14 | Stop reason: HOSPADM

## 2024-04-12 RX ORDER — SODIUM CHLORIDE 0.9 % (FLUSH) 0.9 %
5-40 SYRINGE (ML) INJECTION PRN
Status: DISCONTINUED | OUTPATIENT
Start: 2024-04-12 | End: 2024-04-14 | Stop reason: HOSPADM

## 2024-04-12 RX ORDER — DEXTROSE MONOHYDRATE 100 MG/ML
INJECTION, SOLUTION INTRAVENOUS CONTINUOUS PRN
Status: DISCONTINUED | OUTPATIENT
Start: 2024-04-12 | End: 2024-04-14 | Stop reason: HOSPADM

## 2024-04-12 RX ORDER — POTASSIUM CHLORIDE 29.8 MG/ML
20 INJECTION INTRAVENOUS PRN
Status: DISCONTINUED | OUTPATIENT
Start: 2024-04-12 | End: 2024-04-14 | Stop reason: HOSPADM

## 2024-04-12 RX ORDER — SODIUM CHLORIDE 0.9 % (FLUSH) 0.9 %
5-40 SYRINGE (ML) INJECTION EVERY 12 HOURS SCHEDULED
Status: DISCONTINUED | OUTPATIENT
Start: 2024-04-12 | End: 2024-04-14 | Stop reason: HOSPADM

## 2024-04-12 RX ORDER — POTASSIUM CHLORIDE 7.45 MG/ML
10 INJECTION INTRAVENOUS PRN
Status: DISCONTINUED | OUTPATIENT
Start: 2024-04-12 | End: 2024-04-14 | Stop reason: HOSPADM

## 2024-04-12 RX ADMIN — GABAPENTIN 100 MG: 100 CAPSULE ORAL at 19:31

## 2024-04-12 RX ADMIN — APIXABAN 5 MG: 5 TABLET, FILM COATED ORAL at 19:30

## 2024-04-12 RX ADMIN — METOPROLOL TARTRATE 25 MG: 25 TABLET, FILM COATED ORAL at 19:30

## 2024-04-12 RX ADMIN — IPRATROPIUM BROMIDE AND ALBUTEROL SULFATE 1 DOSE: 2.5; .5 SOLUTION RESPIRATORY (INHALATION) at 23:41

## 2024-04-12 RX ADMIN — INSULIN GLARGINE 10 UNITS: 100 INJECTION, SOLUTION SUBCUTANEOUS at 19:36

## 2024-04-12 RX ADMIN — VANCOMYCIN HYDROCHLORIDE 2000 MG: 10 INJECTION, POWDER, LYOPHILIZED, FOR SOLUTION INTRAVENOUS at 21:04

## 2024-04-12 RX ADMIN — IPRATROPIUM BROMIDE AND ALBUTEROL SULFATE 1 DOSE: 2.5; .5 SOLUTION RESPIRATORY (INHALATION) at 20:11

## 2024-04-12 RX ADMIN — SODIUM CHLORIDE, PRESERVATIVE FREE 10 ML: 5 INJECTION INTRAVENOUS at 19:31

## 2024-04-12 RX ADMIN — Medication 3 MG: at 19:30

## 2024-04-12 RX ADMIN — ATORVASTATIN CALCIUM 40 MG: 40 TABLET, FILM COATED ORAL at 21:00

## 2024-04-12 RX ADMIN — IPRATROPIUM BROMIDE AND ALBUTEROL SULFATE 1 DOSE: 2.5; .5 SOLUTION RESPIRATORY (INHALATION) at 15:14

## 2024-04-12 RX ADMIN — WATER 125 MG: 1 INJECTION INTRAMUSCULAR; INTRAVENOUS; SUBCUTANEOUS at 15:08

## 2024-04-12 RX ADMIN — CEFEPIME 2000 MG: 2 INJECTION, POWDER, FOR SOLUTION INTRAVENOUS at 17:21

## 2024-04-12 RX ADMIN — INSULIN LISPRO 2 UNITS: 100 INJECTION, SOLUTION INTRAVENOUS; SUBCUTANEOUS at 18:58

## 2024-04-12 NOTE — H&P
hours as needed for Fever or Pain    Eliza Reid MD   metoprolol tartrate (LOPRESSOR) 25 MG tablet Take 1 tablet by mouth 2 times daily 4/16/23   Brody Glasgow MD   polyethylene glycol (GLYCOLAX) 17 GM/SCOOP powder Take 17 g by mouth daily as needed (constipation)    Eliza Reid MD   Lactobacillus-Inulin (CULTURELLE DIGESTIVE DAILY PO) Take by mouth in the morning and at bedtime    Eliza Reid MD   busPIRone (BUSPAR) 5 MG tablet Take 1 tablet by mouth daily One tablet daily    Eliza Reid MD   melatonin 3 MG TABS tablet Take 1 tablet by mouth nightly    Eliza Reid MD   Cholecalciferol (VITAMIN D3) 1.25 MG (77679 UT) CAPS Take 1 capsule by mouth every 30 days (Last dose around March 13, 2023 - next due around April 15, 2023)    Eliza Reid MD   bisacodyl (DULCOLAX) 10 MG suppository Place 1 suppository rectally daily as needed for Constipation (no BM for 3 days)    Eliza Reid MD       Allergies:  Iodides, Sulfa antibiotics, Fish-derived products, Iodine, Molds & smuts, Mushroom extract complex, Onion, Onion extract, Pollen extract, Shellfish allergy, and Turkey-sweet potatoes-peaches [compleat]    Social History:  The patient currently lives in [place]    TOBACCO:   reports that she quit smoking about 20 years ago. She has never used smokeless tobacco.  ETOH:   reports no history of alcohol use.      Family History:   Positive as follows:        Problem Relation Age of Onset    Diabetes Mother     High Blood Pressure Mother     Cancer Mother     Heart Disease Mother     High Blood Pressure Father     Heart Disease Father        REVIEW OF SYSTEMS:       Constitutional: Negative for fever   HENT: Negative for sore throat   Eyes: Negative for redness   Respiratory: +  for dyspnea, cough   Cardiovascular: Negative for chest pain   Gastrointestinal: Negative for vomiting, diarrhea   Genitourinary: Negative for hematuria   Musculoskeletal: Negative

## 2024-04-12 NOTE — CONSULTS
Pulmonary & Critical Care Consultation     Patient is being seen at the request of Dr. Ludwig for a consultation for chest pain with increased work of breathing     HISTORY OF PRESENT ILLNESS: 72 yo female with COPD and CHF on 2 L home oxygen who presented to the ED on 4/12/24 with acute onset shortness of breath with associated dull chest pain.  EMS found the patient 73% on her 2 L and placed her on 6 L.  She improved with inhaled bronchodilators but required BIPAP for hypercapnia     PAST MEDICAL HISTORY:  Past Medical History:   Diagnosis Date    Acute diastolic congestive heart failure (HCC) 9/14/2016    Acute diastolic heart failure (HCC)     Acute respiratory failure (HCC)     Adjustment disorder with mixed anxiety and depressed mood     Allergic rhinitis     Alzheimer's dementia ([facility])     Arachnoid cyst 5/23/2005    Atrial fibrillation (HCC)     CHF (congestive heart failure) ([facility])     Chronic back pain     Constipation     COPD (chronic obstructive pulmonary disease) (HCC)     Diabetes mellitus ([facility])     Diskitis 10/6/2011    Disseminated superficial actinic porokeratosis (DSAP)     Edema     ESBL (extended spectrum beta-lactamase) producing bacteria infection 04/17/2020    Urine Klebsiella    Hypertension     Hypo-osmolality and hyponatremia     Major depressive disorder     Morbid obesity (HCC)     Muscle weakness     Osteomyelitis of spine (HCC) 10/6/2011    Overactive bladder     Schizoaffective disorder ([facility])     Seizures ([facility])     Urinary tract infection without hematuria     Xerosis cutis      PAST SURGICAL HISTORY:  Past Surgical History:   Procedure Laterality Date    IR MIDLINE CATH  10/10/2022    IR MIDLINE CATH [facility]10/2022 [facility]    IR MIDLINE CATH  4/11/2023    IR MIDLINE CATH 4/11/2023 [facility] PROCEDURES    KNEE SURGERY      TONSILLECTOMY      TUBAL LIGATION         FAMILY HISTORY:  family history includes Cancer in her mother; Diabetes in her mother; Heart Disease in

## 2024-04-12 NOTE — ED PROVIDER NOTES
gabapentin (NEURONTIN) 100 MG capsule Take 1 capsule by mouth 3 times daily.      dextromethorphan-quiNIDine (NUEDEXTA) 20-10 MG CAPS per capsule Take 1 capsule by mouth in the morning and at bedtime Indications: Pseudobulbar Affect      apixaban (ELIQUIS) 5 MG TABS tablet Take 1 tablet by mouth 2 times daily 60 tablet     atorvastatin (LIPITOR) 40 MG tablet Take 1 tablet by mouth nightly 30 tablet 3    acetaminophen (TYLENOL) 650 MG extended release tablet Take 1 tablet by mouth every 8 hours as needed for Fever or Pain      metoprolol tartrate (LOPRESSOR) 25 MG tablet Take 1 tablet by mouth 2 times daily 60 tablet 3    polyethylene glycol (GLYCOLAX) 17 GM/SCOOP powder Take 17 g by mouth daily as needed (constipation)      Lactobacillus-Inulin (CULTURELLE DIGESTIVE DAILY PO) Take by mouth in the morning and at bedtime      busPIRone (BUSPAR) 5 MG tablet Take 1 tablet by mouth daily One tablet daily      melatonin 3 MG TABS tablet Take 1 tablet by mouth nightly      Cholecalciferol (VITAMIN D3) 1.25 MG (91289 UT) CAPS Take 1 capsule by mouth every 30 days (Last dose around March 13, 2023 - next due around April 15, 2023)      bisacodyl (DULCOLAX) 10 MG suppository Place 1 suppository rectally daily as needed for Constipation (no BM for 3 days)       Allergies   Allergen Reactions    Iodides Other (See Comments)    Sulfa Antibiotics Other (See Comments)    Fish-Derived Products      Food allergy    Iodine     Molds & Smuts Other (See Comments)     congestion    Mushroom Extract Complex     Onion     Onion Extract     Pollen Extract Other (See Comments)     congestion    Shellfish Allergy     Turkey-Sweet Potatoes-Peaches [Compleat] Other (See Comments)       PHYSICAL EXAM  BP (!) 143/91   Pulse 89   Temp 97.9 °F (36.6 °C) (Oral)   Resp 28   Wt (!) 210.4 kg (463 lb 13.6 oz)   SpO2 100%   BMI 82.17 kg/m²    GENERAL APPEARANCE: Awake and alert. Cooperative.  Significant respiratory distress.  HENT:

## 2024-04-12 NOTE — CONSULTS
Pharmacy to dose IV Vanco:  Please give 2g IV x1 now to provide Gram+ organism coverage to include MRSA.  Continue dosing at 1.5g IV Q12hrs starting in AM and check a Trough 4/14 at 0500.  Pred , Tr 10.9.  Arnaud [facility] PharmD 4/12/2024 7:43 PM

## 2024-04-13 ENCOUNTER — APPOINTMENT (OUTPATIENT)
Dept: GENERAL RADIOLOGY | Age: 74
DRG: 190 | End: 2024-04-13
Payer: COMMERCIAL

## 2024-04-13 PROBLEM — F03.90 DEMENTIA (HCC): Status: ACTIVE | Noted: 2024-03-19

## 2024-04-13 PROBLEM — J20.9 ACUTE BRONCHITIS: Status: ACTIVE | Noted: 2024-04-13

## 2024-04-13 LAB
ANION GAP SERPL CALCULATED.3IONS-SCNC: 11 MMOL/L (ref 3–16)
BASOPHILS # BLD: 0 K/UL (ref 0–0.2)
BASOPHILS NFR BLD: 0.3 %
BUN SERPL-MCNC: 14 MG/DL (ref 7–20)
CALCIUM SERPL-MCNC: 8.6 MG/DL (ref 8.3–10.6)
CHLORIDE SERPL-SCNC: 97 MMOL/L (ref 99–110)
CO2 SERPL-SCNC: 27 MMOL/L (ref 21–32)
CREAT SERPL-MCNC: <0.5 MG/DL (ref 0.6–1.2)
DEPRECATED RDW RBC AUTO: 16.4 % (ref 12.4–15.4)
EOSINOPHIL # BLD: 0 K/UL (ref 0–0.6)
EOSINOPHIL NFR BLD: 0 %
GFR SERPLBLD CREATININE-BSD FMLA CKD-EPI: >90 ML/MIN/{1.73_M2}
GLUCOSE BLD-MCNC: 228 MG/DL (ref 70–99)
GLUCOSE BLD-MCNC: 277 MG/DL (ref 70–99)
GLUCOSE BLD-MCNC: 303 MG/DL (ref 70–99)
GLUCOSE BLD-MCNC: 317 MG/DL (ref 70–99)
GLUCOSE BLD-MCNC: 327 MG/DL (ref 70–99)
GLUCOSE BLD-MCNC: 365 MG/DL (ref 70–99)
GLUCOSE SERPL-MCNC: 235 MG/DL (ref 70–99)
HCT VFR BLD AUTO: 36.8 % (ref 36–48)
HGB BLD-MCNC: 11.7 G/DL (ref 12–16)
LACTATE BLDV-SCNC: 1 MMOL/L (ref 0.4–2)
LYMPHOCYTES # BLD: 0.8 K/UL (ref 1–5.1)
LYMPHOCYTES NFR BLD: 12.3 %
MCH RBC QN AUTO: 25.8 PG (ref 26–34)
MCHC RBC AUTO-ENTMCNC: 31.9 G/DL (ref 31–36)
MCV RBC AUTO: 80.9 FL (ref 80–100)
MONOCYTES # BLD: 0.1 K/UL (ref 0–1.3)
MONOCYTES NFR BLD: 0.9 %
NEUTROPHILS # BLD: 5.6 K/UL (ref 1.7–7.7)
NEUTROPHILS NFR BLD: 86.5 %
PERFORMED ON: ABNORMAL
PLATELET # BLD AUTO: 125 K/UL (ref 135–450)
PMV BLD AUTO: 9.9 FL (ref 5–10.5)
POTASSIUM SERPL-SCNC: 4.1 MMOL/L (ref 3.5–5.1)
RBC # BLD AUTO: 4.55 M/UL (ref 4–5.2)
SODIUM SERPL-SCNC: 135 MMOL/L (ref 136–145)
WBC # BLD AUTO: 6.4 K/UL (ref 4–11)

## 2024-04-13 PROCEDURE — 85025 COMPLETE CBC W/AUTO DIFF WBC: CPT

## 2024-04-13 PROCEDURE — 80048 BASIC METABOLIC PNL TOTAL CA: CPT

## 2024-04-13 PROCEDURE — 6360000002 HC RX W HCPCS: Performed by: NURSE PRACTITIONER

## 2024-04-13 PROCEDURE — 2000000000 HC ICU R&B

## 2024-04-13 PROCEDURE — 71045 X-RAY EXAM CHEST 1 VIEW: CPT

## 2024-04-13 PROCEDURE — 83605 ASSAY OF LACTIC ACID: CPT

## 2024-04-13 PROCEDURE — 99233 SBSQ HOSP IP/OBS HIGH 50: CPT | Performed by: INTERNAL MEDICINE

## 2024-04-13 PROCEDURE — 36415 COLL VENOUS BLD VENIPUNCTURE: CPT

## 2024-04-13 PROCEDURE — 2580000003 HC RX 258: Performed by: NURSE PRACTITIONER

## 2024-04-13 PROCEDURE — 6370000000 HC RX 637 (ALT 250 FOR IP): Performed by: INTERNAL MEDICINE

## 2024-04-13 PROCEDURE — 6370000000 HC RX 637 (ALT 250 FOR IP): Performed by: NURSE PRACTITIONER

## 2024-04-13 PROCEDURE — 83036 HEMOGLOBIN GLYCOSYLATED A1C: CPT

## 2024-04-13 PROCEDURE — 94660 CPAP INITIATION&MGMT: CPT

## 2024-04-13 PROCEDURE — 2700000000 HC OXYGEN THERAPY PER DAY

## 2024-04-13 PROCEDURE — 94640 AIRWAY INHALATION TREATMENT: CPT

## 2024-04-13 PROCEDURE — 94761 N-INVAS EAR/PLS OXIMETRY MLT: CPT

## 2024-04-13 RX ORDER — INSULIN GLARGINE 100 [IU]/ML
10 INJECTION, SOLUTION SUBCUTANEOUS 2 TIMES DAILY
Status: DISCONTINUED | OUTPATIENT
Start: 2024-04-13 | End: 2024-04-14

## 2024-04-13 RX ORDER — INSULIN LISPRO 100 [IU]/ML
0-16 INJECTION, SOLUTION INTRAVENOUS; SUBCUTANEOUS
Status: DISCONTINUED | OUTPATIENT
Start: 2024-04-13 | End: 2024-04-14 | Stop reason: HOSPADM

## 2024-04-13 RX ORDER — INSULIN LISPRO 100 [IU]/ML
0-4 INJECTION, SOLUTION INTRAVENOUS; SUBCUTANEOUS NIGHTLY
Status: DISCONTINUED | OUTPATIENT
Start: 2024-04-13 | End: 2024-04-14 | Stop reason: HOSPADM

## 2024-04-13 RX ADMIN — VANCOMYCIN HYDROCHLORIDE 1500 MG: 10 INJECTION, POWDER, LYOPHILIZED, FOR SOLUTION INTRAVENOUS at 06:10

## 2024-04-13 RX ADMIN — SODIUM CHLORIDE, PRESERVATIVE FREE 10 ML: 5 INJECTION INTRAVENOUS at 07:52

## 2024-04-13 RX ADMIN — WATER 40 MG: 1 INJECTION INTRAMUSCULAR; INTRAVENOUS; SUBCUTANEOUS at 15:58

## 2024-04-13 RX ADMIN — Medication 1 CAPSULE: at 07:49

## 2024-04-13 RX ADMIN — INSULIN LISPRO 12 UNITS: 100 INJECTION, SOLUTION INTRAVENOUS; SUBCUTANEOUS at 15:58

## 2024-04-13 RX ADMIN — ATORVASTATIN CALCIUM 40 MG: 40 TABLET, FILM COATED ORAL at 19:46

## 2024-04-13 RX ADMIN — IPRATROPIUM BROMIDE AND ALBUTEROL SULFATE 1 DOSE: 2.5; .5 SOLUTION RESPIRATORY (INHALATION) at 11:45

## 2024-04-13 RX ADMIN — Medication 3 MG: at 19:47

## 2024-04-13 RX ADMIN — ACETAMINOPHEN 650 MG: 325 TABLET ORAL at 02:22

## 2024-04-13 RX ADMIN — METOPROLOL TARTRATE 25 MG: 25 TABLET, FILM COATED ORAL at 19:47

## 2024-04-13 RX ADMIN — CEFEPIME 2000 MG: 2 INJECTION, POWDER, FOR SOLUTION INTRAVENOUS at 05:21

## 2024-04-13 RX ADMIN — GABAPENTIN 100 MG: 100 CAPSULE ORAL at 19:47

## 2024-04-13 RX ADMIN — IPRATROPIUM BROMIDE AND ALBUTEROL SULFATE 1 DOSE: 2.5; .5 SOLUTION RESPIRATORY (INHALATION) at 08:37

## 2024-04-13 RX ADMIN — IPRATROPIUM BROMIDE AND ALBUTEROL SULFATE 1 DOSE: 2.5; .5 SOLUTION RESPIRATORY (INHALATION) at 20:31

## 2024-04-13 RX ADMIN — INSULIN GLARGINE 10 UNITS: 100 INJECTION, SOLUTION SUBCUTANEOUS at 19:56

## 2024-04-13 RX ADMIN — DIPHENHYDRAMINE HCL 25 MG: 25 TABLET ORAL at 19:46

## 2024-04-13 RX ADMIN — GABAPENTIN 100 MG: 100 CAPSULE ORAL at 07:49

## 2024-04-13 RX ADMIN — IPRATROPIUM BROMIDE AND ALBUTEROL SULFATE 1 DOSE: 2.5; .5 SOLUTION RESPIRATORY (INHALATION) at 23:35

## 2024-04-13 RX ADMIN — INSULIN LISPRO 1 UNITS: 100 INJECTION, SOLUTION INTRAVENOUS; SUBCUTANEOUS at 00:12

## 2024-04-13 RX ADMIN — GABAPENTIN 100 MG: 100 CAPSULE ORAL at 14:35

## 2024-04-13 RX ADMIN — APIXABAN 5 MG: 5 TABLET, FILM COATED ORAL at 07:49

## 2024-04-13 RX ADMIN — WATER 40 MG: 1 INJECTION INTRAMUSCULAR; INTRAVENOUS; SUBCUTANEOUS at 02:22

## 2024-04-13 RX ADMIN — IPRATROPIUM BROMIDE AND ALBUTEROL SULFATE 1 DOSE: 2.5; .5 SOLUTION RESPIRATORY (INHALATION) at 15:20

## 2024-04-13 RX ADMIN — SODIUM CHLORIDE, PRESERVATIVE FREE 10 ML: 5 INJECTION INTRAVENOUS at 19:47

## 2024-04-13 RX ADMIN — Medication 1 CAPSULE: at 19:46

## 2024-04-13 RX ADMIN — INSULIN LISPRO 3 UNITS: 100 INJECTION, SOLUTION INTRAVENOUS; SUBCUTANEOUS at 07:50

## 2024-04-13 RX ADMIN — BUSPIRONE HYDROCHLORIDE 5 MG: 5 TABLET ORAL at 07:49

## 2024-04-13 RX ADMIN — INSULIN LISPRO 3 UNITS: 100 INJECTION, SOLUTION INTRAVENOUS; SUBCUTANEOUS at 05:51

## 2024-04-13 RX ADMIN — METOPROLOL TARTRATE 25 MG: 25 TABLET, FILM COATED ORAL at 07:51

## 2024-04-13 RX ADMIN — DILTIAZEM HYDROCHLORIDE 120 MG: 120 CAPSULE, EXTENDED RELEASE ORAL at 07:49

## 2024-04-13 RX ADMIN — DIPHENHYDRAMINE HCL 25 MG: 25 TABLET ORAL at 02:22

## 2024-04-13 RX ADMIN — INSULIN GLARGINE 10 UNITS: 100 INJECTION, SOLUTION SUBCUTANEOUS at 08:53

## 2024-04-13 RX ADMIN — APIXABAN 5 MG: 5 TABLET, FILM COATED ORAL at 19:47

## 2024-04-13 RX ADMIN — INSULIN LISPRO 16 UNITS: 100 INJECTION, SOLUTION INTRAVENOUS; SUBCUTANEOUS at 13:29

## 2024-04-13 RX ADMIN — ACETAMINOPHEN 650 MG: 325 TABLET ORAL at 19:46

## 2024-04-13 RX ADMIN — FLUTICASONE PROPIONATE 1 SPRAY: 50 SPRAY, METERED NASAL at 07:50

## 2024-04-13 ASSESSMENT — PAIN SCALES - GENERAL
PAINLEVEL_OUTOF10: 8
PAINLEVEL_OUTOF10: 0
PAINLEVEL_OUTOF10: 0

## 2024-04-13 ASSESSMENT — PAIN DESCRIPTION - DESCRIPTORS
DESCRIPTORS: STABBING
DESCRIPTORS: STABBING

## 2024-04-13 ASSESSMENT — PAIN DESCRIPTION - LOCATION
LOCATION: SHOULDER;BACK
LOCATION: BACK

## 2024-04-13 NOTE — PLAN OF CARE
Problem: Discharge Planning  Goal: Discharge to home or other facility with appropriate resources  Outcome: Progressing     Problem: Skin/Tissue Integrity  Goal: Absence of new skin breakdown  Description: 1.  Monitor for areas of redness and/or skin breakdown  2.  Assess vascular access sites hourly  3.  Every 4-6 hours minimum:  Change oxygen saturation probe site  4.  Every 4-6 hours:  If on nasal continuous positive airway pressure, respiratory therapy assess nares and determine need for appliance change or resting period.  Outcome: Progressing  Note: Patient turned/repositioned every 2 hours and as needed to maintain and improve skin integrity.      Problem: Safety - Adult  Goal: Free from fall injury  Outcome: Progressing  Note: Fall precautions in place.

## 2024-04-13 NOTE — PLAN OF CARE
Problem: Skin/Tissue Integrity  Goal: Absence of new skin breakdown  Description: 1.  Monitor for areas of redness and/or skin breakdown  2.  Assess vascular access sites hourly  3.  Every 4-6 hours minimum:  Change oxygen saturation probe site  4.  Every 4-6 hours:  If on nasal continuous positive airway pressure, respiratory therapy assess nares and determine need for appliance change or resting period.  4/13/2024 0816 by Marge Herring RN  Outcome: Progressing

## 2024-04-13 NOTE — DISCHARGE INSTR - COC
34   Wt 94.1 kg (207 lb 6.4 oz)   SpO2 95%   BMI 36.74 kg/m²     Last documented pain score (0-10 scale): Pain Level: 0  Last Weight:   Wt Readings from Last 1 Encounters:   04/13/24 94.1 kg (207 lb 6.4 oz)     Mental Status:  oriented, alert, and Forgetful    IV Access:  - None    Nursing Mobility/ADLs:  Walking   Dependent  Transfer  Dependent  Bathing  Dependent  Dressing  Dependent  Toileting  Dependent  Feeding  Assisted  Med Admin  Dependent  Med Delivery   whole    Wound Care Documentation and Therapy:        Elimination:  Continence:   Bowel: No  Bladder: No  Urinary Catheter: None   Colostomy/Ileostomy/Ileal Conduit: No       Date of Last BM: Prior to admit    Intake/Output Summary (Last 24 hours) at 4/13/2024 1003  Last data filed at 4/13/2024 0815  Gross per 24 hour   Intake 1041 ml   Output 800 ml   Net 241 ml     I/O last 3 completed shifts:  In: 741 [I.V.:41; IV Piggyback:700]  Out: 800 [Urine:800]    Safety Concerns:     Sund[place], At Risk for Falls, and Aspiration Risk    Impairments/Disabilities:      Contractures - BLE    Nutrition Therapy:  Current Nutrition Therapy:   - Oral Diet:  General and Carb Control 4 carbs/meal (1800kcals/day)    Routes of Feeding: Oral  Liquids: Thin Liquids  Daily Fluid Restriction: no  Last Modified Barium Swallow with Video (Video Swallowing Test): not done    Treatments at the Time of Hospital Discharge:   Respiratory Treatments: Duoneb  Oxygen Therapy:  is on oxygen at 4 L/min per nasal cannula.  Ventilator:    - BiPAP   IPAP: 14 cmH20, CPAP/EPAP: 7 cmH2O only when sleeping    Rehab Therapies: none  Weight Bearing Status/Restrictions: No weight bearing restrictions  Other Medical Equipment (for information only, NOT a DME order):    Other Treatments: BiPAP 14/8 HS  COPD Instructions for Daily Management    Patient was treated for acute on chronic Chronic Obstructive Pulmonary Disease (COPD) exacerbation.  Leslie Farris will require the

## 2024-04-14 ENCOUNTER — HOSPITAL ENCOUNTER (EMERGENCY)
Age: 74
Discharge: HOME | End: 2024-04-14
Attending: EMERGENCY MEDICINE
Payer: COMMERCIAL

## 2024-04-14 VITALS
HEART RATE: 119 BPM | OXYGEN SATURATION: 100 % | SYSTOLIC BLOOD PRESSURE: 132 MMHG | TEMPERATURE: 98.5 F | DIASTOLIC BLOOD PRESSURE: 82 MMHG | RESPIRATION RATE: 34 BRPM

## 2024-04-14 VITALS
TEMPERATURE: 97.1 F | RESPIRATION RATE: 28 BRPM | DIASTOLIC BLOOD PRESSURE: 94 MMHG | SYSTOLIC BLOOD PRESSURE: 133 MMHG | HEART RATE: 94 BPM | WEIGHT: 207.4 LBS | OXYGEN SATURATION: 90 % | BODY MASS INDEX: 36.74 KG/M2

## 2024-04-14 DIAGNOSIS — J44.1 COPD EXACERBATION (HCC): Primary | ICD-10-CM

## 2024-04-14 DIAGNOSIS — J96.11 CHRONIC HYPOXIC RESPIRATORY FAILURE (HCC): ICD-10-CM

## 2024-04-14 LAB
ANION GAP SERPL CALCULATED.3IONS-SCNC: 9 MMOL/L (ref 3–16)
BASOPHILS # BLD: 0 K/UL (ref 0–0.2)
BASOPHILS NFR BLD: 0.1 %
BUN SERPL-MCNC: 18 MG/DL (ref 7–20)
CALCIUM SERPL-MCNC: 9.2 MG/DL (ref 8.3–10.6)
CHLORIDE SERPL-SCNC: 98 MMOL/L (ref 99–110)
CO2 SERPL-SCNC: 28 MMOL/L (ref 21–32)
CREAT SERPL-MCNC: <0.5 MG/DL (ref 0.6–1.2)
DEPRECATED RDW RBC AUTO: 16.6 % (ref 12.4–15.4)
EOSINOPHIL # BLD: 0 K/UL (ref 0–0.6)
EOSINOPHIL NFR BLD: 0 %
EST. AVERAGE GLUCOSE BLD GHB EST-MCNC: 171.4 MG/DL
GFR SERPLBLD CREATININE-BSD FMLA CKD-EPI: >90 ML/MIN/{1.73_M2}
GLUCOSE BLD-MCNC: 309 MG/DL (ref 70–99)
GLUCOSE BLD-MCNC: 329 MG/DL (ref 70–99)
GLUCOSE SERPL-MCNC: 336 MG/DL (ref 70–99)
HBA1C MFR BLD: 7.6 %
HCT VFR BLD AUTO: 35.2 % (ref 36–48)
HGB BLD-MCNC: 11.4 G/DL (ref 12–16)
LYMPHOCYTES # BLD: 0.9 K/UL (ref 1–5.1)
LYMPHOCYTES NFR BLD: 10.5 %
MCH RBC QN AUTO: 26.2 PG (ref 26–34)
MCHC RBC AUTO-ENTMCNC: 32.3 G/DL (ref 31–36)
MCV RBC AUTO: 81 FL (ref 80–100)
MONOCYTES # BLD: 0.6 K/UL (ref 0–1.3)
MONOCYTES NFR BLD: 6.5 %
NEUTROPHILS # BLD: 7.3 K/UL (ref 1.7–7.7)
NEUTROPHILS NFR BLD: 82.9 %
PERFORMED ON: ABNORMAL
PERFORMED ON: ABNORMAL
PLATELET # BLD AUTO: 157 K/UL (ref 135–450)
PMV BLD AUTO: 11 FL (ref 5–10.5)
POTASSIUM SERPL-SCNC: 4.2 MMOL/L (ref 3.5–5.1)
RBC # BLD AUTO: 4.35 M/UL (ref 4–5.2)
SODIUM SERPL-SCNC: 135 MMOL/L (ref 136–145)
WBC # BLD AUTO: 8.7 K/UL (ref 4–11)

## 2024-04-14 PROCEDURE — 93005 ELECTROCARDIOGRAM TRACING: CPT | Performed by: EMERGENCY MEDICINE

## 2024-04-14 PROCEDURE — 2700000000 HC OXYGEN THERAPY PER DAY

## 2024-04-14 PROCEDURE — 99239 HOSP IP/OBS DSCHRG MGMT >30: CPT | Performed by: INTERNAL MEDICINE

## 2024-04-14 PROCEDURE — 6370000000 HC RX 637 (ALT 250 FOR IP): Performed by: NURSE PRACTITIONER

## 2024-04-14 PROCEDURE — 6370000000 HC RX 637 (ALT 250 FOR IP): Performed by: INTERNAL MEDICINE

## 2024-04-14 PROCEDURE — 6360000002 HC RX W HCPCS: Performed by: NURSE PRACTITIONER

## 2024-04-14 PROCEDURE — 2580000003 HC RX 258: Performed by: NURSE PRACTITIONER

## 2024-04-14 PROCEDURE — 80048 BASIC METABOLIC PNL TOTAL CA: CPT

## 2024-04-14 PROCEDURE — 99233 SBSQ HOSP IP/OBS HIGH 50: CPT | Performed by: INTERNAL MEDICINE

## 2024-04-14 PROCEDURE — 94761 N-INVAS EAR/PLS OXIMETRY MLT: CPT

## 2024-04-14 PROCEDURE — 36415 COLL VENOUS BLD VENIPUNCTURE: CPT

## 2024-04-14 PROCEDURE — 6370000000 HC RX 637 (ALT 250 FOR IP): Performed by: EMERGENCY MEDICINE

## 2024-04-14 PROCEDURE — 85025 COMPLETE CBC W/AUTO DIFF WBC: CPT

## 2024-04-14 PROCEDURE — 94660 CPAP INITIATION&MGMT: CPT

## 2024-04-14 PROCEDURE — 99284 EMERGENCY DEPT VISIT MOD MDM: CPT

## 2024-04-14 RX ORDER — INSULIN GLARGINE 100 [IU]/ML
20 INJECTION, SOLUTION SUBCUTANEOUS DAILY
Status: DISCONTINUED | OUTPATIENT
Start: 2024-04-14 | End: 2024-04-14 | Stop reason: HOSPADM

## 2024-04-14 RX ORDER — INSULIN GLARGINE 100 [IU]/ML
10 INJECTION, SOLUTION SUBCUTANEOUS NIGHTLY
Status: DISCONTINUED | OUTPATIENT
Start: 2024-04-14 | End: 2024-04-14 | Stop reason: HOSPADM

## 2024-04-14 RX ORDER — IPRATROPIUM BROMIDE AND ALBUTEROL SULFATE 2.5; .5 MG/3ML; MG/3ML
1 SOLUTION RESPIRATORY (INHALATION)
Status: DISCONTINUED | OUTPATIENT
Start: 2024-04-14 | End: 2024-04-14 | Stop reason: HOSPADM

## 2024-04-14 RX ORDER — IPRATROPIUM BROMIDE AND ALBUTEROL SULFATE 2.5; .5 MG/3ML; MG/3ML
1 SOLUTION RESPIRATORY (INHALATION) ONCE
Status: COMPLETED | OUTPATIENT
Start: 2024-04-14 | End: 2024-04-14

## 2024-04-14 RX ORDER — PREDNISONE 20 MG/1
40 TABLET ORAL DAILY
Status: DISCONTINUED | OUTPATIENT
Start: 2024-04-15 | End: 2024-04-14 | Stop reason: HOSPADM

## 2024-04-14 RX ORDER — PREDNISONE 10 MG/1
TABLET ORAL
Qty: 30 TABLET | Refills: 0
Start: 2024-04-14

## 2024-04-14 RX ORDER — INSULIN GLARGINE 100 [IU]/ML
20 INJECTION, SOLUTION SUBCUTANEOUS NIGHTLY
Qty: 10 ML | Refills: 3
Start: 2024-04-14

## 2024-04-14 RX ORDER — IPRATROPIUM BROMIDE AND ALBUTEROL SULFATE 2.5; .5 MG/3ML; MG/3ML
1 SOLUTION RESPIRATORY (INHALATION) EVERY 4 HOURS PRN
Status: DISCONTINUED | OUTPATIENT
Start: 2024-04-14 | End: 2024-04-14 | Stop reason: HOSPADM

## 2024-04-14 RX ORDER — IPRATROPIUM BROMIDE AND ALBUTEROL SULFATE 2.5; .5 MG/3ML; MG/3ML
1 SOLUTION RESPIRATORY (INHALATION)
Status: DISCONTINUED | OUTPATIENT
Start: 2024-04-14 | End: 2024-04-14

## 2024-04-14 RX ORDER — DOXYCYCLINE HYCLATE 100 MG
100 TABLET ORAL 2 TIMES DAILY
Qty: 10 TABLET | Refills: 0
Start: 2024-04-14 | End: 2024-04-19

## 2024-04-14 RX ORDER — INSULIN LISPRO 100 [IU]/ML
0-16 INJECTION, SOLUTION INTRAVENOUS; SUBCUTANEOUS
Qty: 10 ML | Refills: 0
Start: 2024-04-14

## 2024-04-14 RX ADMIN — DILTIAZEM HYDROCHLORIDE 120 MG: 120 CAPSULE, EXTENDED RELEASE ORAL at 08:26

## 2024-04-14 RX ADMIN — INSULIN GLARGINE 20 UNITS: 100 INJECTION, SOLUTION SUBCUTANEOUS at 08:27

## 2024-04-14 RX ADMIN — GABAPENTIN 100 MG: 100 CAPSULE ORAL at 08:26

## 2024-04-14 RX ADMIN — INSULIN LISPRO 12 UNITS: 100 INJECTION, SOLUTION INTRAVENOUS; SUBCUTANEOUS at 08:27

## 2024-04-14 RX ADMIN — ACETAMINOPHEN 650 MG: 325 TABLET ORAL at 11:28

## 2024-04-14 RX ADMIN — INSULIN LISPRO 12 UNITS: 100 INJECTION, SOLUTION INTRAVENOUS; SUBCUTANEOUS at 11:28

## 2024-04-14 RX ADMIN — IPRATROPIUM BROMIDE AND ALBUTEROL SULFATE 1 DOSE: 2.5; .5 SOLUTION RESPIRATORY (INHALATION) at 14:22

## 2024-04-14 RX ADMIN — FLUTICASONE PROPIONATE 1 SPRAY: 50 SPRAY, METERED NASAL at 08:27

## 2024-04-14 RX ADMIN — BUSPIRONE HYDROCHLORIDE 5 MG: 5 TABLET ORAL at 08:26

## 2024-04-14 RX ADMIN — APIXABAN 5 MG: 5 TABLET, FILM COATED ORAL at 08:26

## 2024-04-14 RX ADMIN — SODIUM CHLORIDE, PRESERVATIVE FREE 10 ML: 5 INJECTION INTRAVENOUS at 08:35

## 2024-04-14 RX ADMIN — METOPROLOL TARTRATE 25 MG: 25 TABLET, FILM COATED ORAL at 08:26

## 2024-04-14 RX ADMIN — Medication 1 CAPSULE: at 08:26

## 2024-04-14 RX ADMIN — WATER 40 MG: 1 INJECTION INTRAMUSCULAR; INTRAVENOUS; SUBCUTANEOUS at 04:05

## 2024-04-14 ASSESSMENT — PAIN SCALES - GENERAL
PAINLEVEL_OUTOF10: 8
PAINLEVEL_OUTOF10: 9

## 2024-04-14 ASSESSMENT — PAIN DESCRIPTION - LOCATION
LOCATION: CHEST
LOCATION: GENERALIZED

## 2024-04-14 ASSESSMENT — PAIN - FUNCTIONAL ASSESSMENT
PAIN_FUNCTIONAL_ASSESSMENT: ACTIVITIES ARE NOT PREVENTED
PAIN_FUNCTIONAL_ASSESSMENT: 0-10

## 2024-04-14 ASSESSMENT — PAIN DESCRIPTION - DESCRIPTORS: DESCRIPTORS: ACHING

## 2024-04-14 NOTE — DISCHARGE SUMMARY
detection of nucleic  acids from SARS-CoV-2, influenza A, and influenza B in nasopharyngeal  and nasal swab specimens for use under the FDA’s Emergency Use  Authorization (EUA) only.    Patient Fact Sheet:  https://www.fda.gov/media/662068/download  Provider Fact Sheet: https://www.fda.gov/media/777452/download  EUA: https://www.fda.gov/media/742980/download  IFU: https://www.fda.gov/media/114591/download    Methodology:  RT-PCR          INFLUENZA A NOT DETECTED     INFLUENZA B NOT DETECTED           ECG 4/12/2024  Atrial fibrillation with rapid ventricular responseNonspecific ST abnormalityAbnormal ECGWhen compared with ECG of 17-MAR-2024 23:57,No significant change was foundConfirmed by ALANNA BUI, BRYANT ([id number]) on 4/12/2024 4:36:33 PM        XR CHEST PORTABLE   Final Result   1. No significant change.         XR CHEST PORTABLE   Final Result   Persistent cardiac enlargement and bilateral lower lobe opacities.  Stable   chest without significant interval change.            Pertinent previous results reviewed   Echocardiogram from 4/2023  Summary   Technically difficult examination.   Normal left ventricle size, wall thickness and systolic function with an   estimated ejection fraction of 55%.   Within the limits of the study, no regional wall motion abnormalities noted.   Indeterminate diastolic function.   The right ventricle is normal in size with decreased function.   The left atrium is moderately dilated.   The right atrium is mildly dilated.   The tricuspid valve is normal in structure and function.   MIld tricuspid regurgitation.   Systolic pulmonary artery pressure (SPAP) is normal and estimated at 36 mmHg   (right atrial pressure 3 mmHg).   Irregular heart rate throughout the study.       Discharge Medications     Medication List        START taking these medications      doxycycline hyclate 100 MG tablet  Commonly known as: VIBRA-TABS  Take 1 tablet by mouth 2 times daily for 5 days     insulin lispro 100

## 2024-04-14 NOTE — CARE COORDINATION
Case Management Assessment  Initial Evaluation and Discharge Note    Date/Time of Evaluation: 4/14/2024 10:17 AM  Assessment Completed by: Michelle Selby RN    If patient is discharged prior to next notation, then this note serves as note for discharge by case management.    Patient Name: Leslie Farris                   YOB: 1950  Diagnosis: COPD exacerbation (HCC) [J44.1]  Severe sepsis (HCC) [A41.9, R65.20]  Acute on chronic respiratory failure with hypoxia and hypercapnia (HCC) [J96.21, J96.22]                   Date / Time: 4/12/2024  1:21 PM    Patient Admission Status: Inpatient   Readmission Risk (Low < 19, Mod (19-27), High > 27): Readmission Risk Score: 28.3    Current PCP: Kristin Pacheco MD  PCP verified by ? Yes    Chart Reviewed: Yes      History Provided by: Patient  Patient Orientation: Alert and Oriented    Patient Cognition: Alert    Hospitalization in the last 30 days (Readmission):  Yes    If yes, Readmission Assessment in  Navigator will be completed.    Advance Directives:      Code Status: Full Code   Patient's Primary Decision Maker is: Legal Next of Kin    Primary Decision Maker: Saeed Magallanes - Brother/Sister - [phone]    Discharge Planning:    Patient lives with: Other (Comment) (LTC) Type of Home: Long-Term Care  Primary Care Giver: Other (Comment) (LTC facility)  Patient Support Systems include: Other (Comment) (LTC CC)   Current Financial resources: Medicaid, Medicare  Current community resources: None  Current services prior to admission: Long Term Acute Care            Current DME:              Type of Home Care services:  None    ADLS  Prior functional level: Assistance with the following:, Bathing, Dressing, Toileting, Cooking, Housework, Shopping, Mobility  Current functional level: Assistance with the following:, Bathing, Dressing, Toileting, Cooking, Housework, Shopping, Mobility    PT AM-PAC:   /24  OT AM-PAC:   /24    Family can provide

## 2024-04-14 NOTE — PLAN OF CARE
Problem: Discharge Planning  Goal: Discharge to home or other facility with appropriate resources  4/14/2024 0933 by Mireya Hillman RN  Outcome: Progressing  4/13/2024 2013 by Shawnee Hobbs RN  Outcome: Progressing     Problem: Skin/Tissue Integrity  Goal: Absence of new skin breakdown  Description: 1.  Monitor for areas of redness and/or skin breakdown  2.  Assess vascular access sites hourly  3.  Every 4-6 hours minimum:  Change oxygen saturation probe site  4.  Every 4-6 hours:  If on nasal continuous positive airway pressure, respiratory therapy assess nares and determine need for appliance change or resting period.  4/14/2024 0933 by Mireya Hillman RN  Outcome: Progressing  4/13/2024 2013 by Shawnee Hobbs RN  Outcome: Progressing  Note: Patient turned/repositioned every 2 hours and as needed to maintain and improve skin integrity.      Problem: Safety - Adult  Goal: Free from fall injury  4/14/2024 0933 by Mireya Hillman RN  Outcome: Progressing  4/13/2024 2013 by Shawnee Hobbs RN  Outcome: Progressing  Note: Fall precautions in place.      Problem: Chronic Conditions and Co-morbidities  Goal: Patient's chronic conditions and co-morbidity symptoms are monitored and maintained or improved  4/14/2024 0933 by Mireya Hillman RN  Outcome: Progressing  4/13/2024 2013 by Shawnee Hobbs RN  Outcome: Progressing     Problem: Respiratory - Adult  Goal: Achieves optimal ventilation and oxygenation  4/14/2024 0933 by Mireya Hillman RN  Outcome: Progressing  4/13/2024 2013 by Shawnee Hobbs RN  Outcome: Progressing

## 2024-04-14 NOTE — DISCHARGE INSTRUCTIONS
Continue the antibiotics and steroids you are prescribed after your hospitalization.  You were stable on your home 5 L of oxygen here.

## 2024-04-14 NOTE — PLAN OF CARE
Problem: Discharge Planning  Goal: Discharge to home or other facility with appropriate resources  Outcome: Progressing     Problem: Skin/Tissue Integrity  Goal: Absence of new skin breakdown  Description: 1.  Monitor for areas of redness and/or skin breakdown  2.  Assess vascular access sites hourly  3.  Every 4-6 hours minimum:  Change oxygen saturation probe site  4.  Every 4-6 hours:  If on nasal continuous positive airway pressure, respiratory therapy assess nares and determine need for appliance change or resting period.  4/13/2024 2013 by Shawnee Hobbs, RN  Outcome: Progressing  Note: Patient turned/repositioned every 2 hours and as needed to maintain and improve skin integrity.   4/13/2024 0816 by Marge Herring, RN  Outcome: Progressing     Problem: Safety - Adult  Goal: Free from fall injury  Outcome: Progressing  Note: Fall precautions in place.      Problem: Chronic Conditions and Co-morbidities  Goal: Patient's chronic conditions and co-morbidity symptoms are monitored and maintained or improved  Outcome: Progressing     Problem: Respiratory - Adult  Goal: Achieves optimal ventilation and oxygenation  Outcome: Progressing

## 2024-04-14 NOTE — ED PROVIDER NOTES
°C) (Axillary)   Resp 20   SpO2 100%   GENERAL APPEARANCE: Awake and alert. Cooperative. Morbidly obese.  HEAD: Normocephalic. Atraumatic.  EYES: PERRL. EOM's grossly intact.  ENT: Mucous membranes are moist.   NECK: Supple, trachea midline.   HEART: normal rate, irregular rhythm  LUNGS: Tachyneic, diffuse expiratory wheezing  EXTREMITIES: No peripheral edema. MAEE. No acute deformities.  SKIN: Warm, dry and intact. No acute rashes.   NEUROLOGICAL: Alert and oriented X 3.   PSYCHIATRIC: Normal mood and affect.    LABS  I have reviewed all labs for this visit.   Results for orders placed or performed during the hospital encounter of 04/14/24   EKG 12 Lead   Result Value Ref Range    Ventricular Rate 101 BPM    QRS Duration 86 ms    Q-T Interval 336 ms    QTc Calculation (Bazett) 435 ms    R Axis 63 degrees    T Axis 214 degrees    Diagnosis       Atrial fibrillation with rapid ventricular responseNonspecific ST and T wave abnormalityAbnormal ECGWhen compared with ECG of 12-APR-2024 13:29,No significant change was found       EKG  The Ekg interpreted as interpreted by me:  atrial fibrillation with a rate of 101  Axis is   Normal  QTc is  normal  Intervals and Durations are unremarkable.      No specific ST-T wave changes appreciated.  No evidence of acute ischemia.           RADIOLOGY  X-RAYS: ALL IMAGES INCLUDING PLAIN FILMS, CT, ULTRASOUND AND MRI HAVE BEEN READ BY THE RADIOLOGIST.   No orders to display            I have personally reviewed Xray and Ultrasound images and radiology confirms the interpretation:  none      Medications administered. (Dose and Route):  Duoneb 1 ampoule inhaled        Sepsis:  Is this patient to be included in the SEP-1 Core Measure due to severe sepsis or septic shock?   No   Exclusion criteria - the patient is NOT to be included for SEP-1 Core Measure due to:  Infection is not suspected           ED COURSE/MDM:  Patient seen and evaluated. Here the patient is afebrile with normal

## 2024-04-14 NOTE — CARE COORDINATION
04/14/24 1032   Readmission Assessment   Number of Days since last admission? 8-30 days   Previous Disposition Long Term Care   Who is being Interviewed Caregiver   What was the patient's/caregiver's perception as to why they think they needed to return back to the hospital? Other (Comment)  (shortness of breath)   Did you visit your Primary Care Physician after you left the hospital, before you returned this time? Yes   Did you see a specialist, such as Cardiac, Pulmonary, Orthopedic Physician, etc. after you left the hospital? No   Who advised the patient to return to the hospital? Caregiver   Does the patient report anything that got in the way of taking their medications? No   In our efforts to provide the best possible care to you and others like you, can you think of anything that we could have done to help you after you left the hospital the first time, so that you might not have needed to return so soon? Other (Comment)  (nothing)

## 2024-04-15 LAB
EKG DIAGNOSIS: NORMAL
EKG Q-T INTERVAL: 336 MS
EKG QRS DURATION: 86 MS
EKG QTC CALCULATION (BAZETT): 435 MS
EKG R AXIS: 63 DEGREES
EKG T AXIS: 214 DEGREES
EKG VENTRICULAR RATE: 101 BPM

## 2024-04-15 PROCEDURE — 93010 ELECTROCARDIOGRAM REPORT: CPT | Performed by: INTERNAL MEDICINE

## 2024-04-15 NOTE — PROGRESS NOTES
04/12/24 2010   NIV Type   Equipment Type v60   Mode Bilevel   Mask Type Full face mask   Mask Size Medium   Assessment   Pulse 91   Respirations (!) 37   SpO2 (!) 87 %   Settings/Measurements   IPAP 14 cmH20   CPAP/EPAP 7 cmH2O   Vt (Measured) 448 mL   Rate Ordered 14   FiO2  40 %   Minute Volume (L/min) 13.5 Liters   Mask Leak (lpm) 0 lpm   Patient's Home Machine No   Patient Observation   Observations spo2 91% on 40% bipap       
   04/12/24 2338   NIV Type   Equipment Type v60   Mode Bilevel   Mask Type Full face mask   Mask Size Medium   Assessment   Pulse 80   Respirations 22   SpO2 97 %   Settings/Measurements   IPAP 14 cmH20   CPAP/EPAP 7 cmH2O   Vt (Measured) 428 mL   Rate Ordered 14   FiO2  40 %   Minute Volume (L/min) 13.8 Liters   Mask Leak (lpm) 0 lpm   Patient Observation   Observations spo2 98% on 40% bipap       
   04/13/24 0325   NIV Type   Equipment Type v60   Mode Bilevel   Mask Type Full face mask   Mask Size Medium   Assessment   Pulse 92   Respirations 25   SpO2 97 %   Settings/Measurements   IPAP 14 cmH20   CPAP/EPAP 7 cmH2O   Vt (Measured) 508 mL   Rate Ordered 14   FiO2  40 %   Minute Volume (L/min) 16.2 Liters   Mask Leak (lpm) 0 lpm   Patient Observation   Observations spo2 98% on 40% bipap       
   04/13/24 2000   RT Protocol   History Pulmonary Disease 2   Respiratory pattern 2   Breath sounds 4   Cough 0   Indications for Bronchodilator Therapy Decreased or absent breath sounds   Bronchodilator Assessment Score 8     RT Inhaler-Nebulizer Bronchodilator Protocol Note    There is a bronchodilator order in the chart from a provider indicating to follow the RT Bronchodilator Protocol and there is an “Initiate RT Inhaler-Nebulizer Bronchodilator Protocol” order as well (see protocol at bottom of note).    CXR Findings:  XR CHEST PORTABLE    Result Date: 4/13/2024  1. No significant change.     XR CHEST PORTABLE    Result Date: 4/12/2024  Persistent cardiac enlargement and bilateral lower lobe opacities.  Stable chest without significant interval change.       The findings from the last RT Protocol Assessment were as follows:   History Pulmonary Disease: Chronic pulmonary disease  Respiratory Pattern: Dyspnea on exertion or RR 21-25 bpm  Breath Sounds: Intermittent or unilateral wheezes  Cough: Strong, spontaneous, non-productive  Indication for Bronchodilator Therapy: Decreased or absent breath sounds  Bronchodilator Assessment Score: 8    Aerosolized bronchodilator medication orders have been revised according to the RT Inhaler-Nebulizer Bronchodilator Protocol below.    Respiratory Therapist to perform RT Therapy Protocol Assessment initially then follow the protocol.  Repeat RT Therapy Protocol Assessment PRN for score 0-3 or on second treatment, BID, and PRN for scores above 3.    No Indications - adjust the frequency to every 6 hours PRN wheezing or bronchospasm, if no treatments needed after 48 hours then discontinue using Per Protocol order mode.     If indication present, adjust the RT bronchodilator orders based on the Bronchodilator Assessment Score as indicated below.  Use Inhaler orders unless patient has one or more of the following: on home nebulizer, not able to hold breath for 10 seconds, is 
   04/13/24 2330   NIV Type   Equipment Type v60   Mode Bilevel   Mask Type Full face mask   Mask Size Medium   Assessment   Pulse (!) 106   Respirations (!) 38   Settings/Measurements   IPAP 14 cmH20   CPAP/EPAP 7 cmH2O   Vt (Measured) 505 mL   Rate Ordered 14   FiO2  40 %   Minute Volume (L/min) 10 Liters   Mask Leak (lpm) 0 lpm   Patient's Home Machine No   Patient Observation   Observations spo2 94% on 40% bipap       
   04/14/24 0342   NIV Type   Equipment Type v60   Mode Bilevel   Mask Type Full face mask   Mask Size Medium   Assessment   Pulse (!) 105   Respirations 28   Settings/Measurements   IPAP 14 cmH20   CPAP/EPAP 7 cmH2O   Vt (Measured) 469 mL   Rate Ordered 14   FiO2  40 %   Minute Volume (L/min) 12.8 Liters   Mask Leak (lpm) 0 lpm   Patient Observation   Observations spo2 98% on 40% bipap       
4 Eyes Skin Assessment     NAME:  Leslie Farris  YOB: 1950  MEDICAL RECORD NUMBER:  [id number]    The patient is being assessed for  Shift Handoff    I agree that at least one RN has performed a thorough Head to Toe Skin Assessment on the patient. ALL assessment sites listed below have been assessed.      Areas assessed by both nurses:    Head, Face, Ears, Shoulders, Back, Chest, Arms, Elbows, Hands, Sacrum. Buttock, Coccyx, Ischium, Legs. Feet and Heels, and Under Medical Devices         Does the Patient have a Wound? Yes wound(s) were present on assessment. LDA wound assessment was Initiated and completed by RN       Jarod Prevention initiated by RN: Yes  Wound Care Orders initiated by RN: No                    Pressure Injury (Stage 3,4, Unstageable, DTI, NWPT, and Complex wounds) if present, place Wound referral order by RN under : Yes    New Ostomies, if present place, Ostomy referral order under : No     Nurse 1 eSignature: Electronically signed by Shawnee Hobbs RN on 4/13/24 at 6:24 AM EDT    **SHARE this note so that the co-signing nurse can place an eSignature**    Nurse 2 eSignature: Electronically signed by Renetta Marvin RN on 4/13/24 at 6:27 AM EDT     Patient is not able to demonstrated the ability to move from a reclining position to an upright position within the recliner. Patient is confused, demented and /or unable to follow instruction.    
4 Eyes Skin Assessment     NAME:  Leslie Farris  YOB: 1950  MEDICAL RECORD NUMBER:  [id number]    The patient is being assessed for  Admission    I agree that at least one RN has performed a thorough Head to Toe Skin Assessment on the patient. ALL assessment sites listed below have been assessed.      Areas assessed by both nurses:    Head, Face, Ears, Shoulders, Back, Chest, Arms, Elbows, Hands, Sacrum. Buttock, Coccyx, Ischium, Legs. Feet and Heels, and Under Medical Devices    Redness/excoriation to buttocks, back, behind bilateral knees, neck, alexander area   Stage III R toes                        Does the Patient have a Wound? Yes wound(s) were present on assessment. LDA wound assessment was Initiated and completed by VISHAL Olivera Prevention initiated by RN: Yes  Wound Care Orders initiated by RN: Yes    Pressure Injury (Stage 3,4, Unstageable, DTI, NWPT, and Complex wounds) if present, place Wound referral order by RN under : Yes    New Ostomies, if present place, Ostomy referral order under : No     Nurse 1 eSignature: Electronically signed by Emil Ricardo RN on 4/12/24 at 6:30 PM EDT    **SHARE this note so that the co-signing nurse can place an eSignature**    Nurse 2 eSignature: Electronically signed by Liyah Shepherd RN on 4/12/24 at 6:36 PM EDT   
4 Eyes Skin Assessment     NAME:  Leslie Farris  YOB: 1950  MEDICAL RECORD NUMBER:  [id number]    The patient is being assessed for  Shift Handoff    I agree that at least one RN has performed a thorough Head to Toe Skin Assessment on the patient. ALL assessment sites listed below have been assessed.      Areas assessed by both nurses:    Head, Face, Ears, Shoulders, Back, Chest, Arms, Elbows, Hands, Sacrum. Buttock, Coccyx, Ischium, Legs. Feet and Heels, and Under Medical Devices         Does the Patient have a Wound? Yes wound(s) were present on assessment. LDA wound assessment was Initiated and completed by RN                      Jarod Prevention initiated by RN: Yes  Wound Care Orders initiated by RN: No    Pressure Injury (Stage 3,4, Unstageable, DTI, NWPT, and Complex wounds) if present, place Wound referral order by RN under : Yes    New Ostomies, if present place, Ostomy referral order under : No     Nurse 1 eSignature: Electronically signed by Shawnee Hobbs RN on 4/14/24 at 7:00 AM EDT    **SHARE this note so that the co-signing nurse can place an eSignature**    Nurse 2 eSignature: Electronically signed by Chantelle Siegel RN on 4/14/24 at 7:27 AM EDT     Patient is not able to demonstrated the ability to move from a reclining position to an upright position within the recliner. Patient is confused, demented and /or unable to follow instruction.     
A weight change just came across [facility] for this patient.  Looks like someone originally entered her weight in pounds as kilograms and the vancomycin dosing was based on this erroneous weight. Dose of 1500 mg is hanging now, but should be ok to run the remainder in.  Patient weight corrected from 210.4 kg to 94.1 kg.  Will obtain a vancomycin trough before tonight's dose and adjust vancomycin from there.   
Bedside report given to Shawnee RODGERS. POC transferred. VISHAL Sofia    
Patient awake in bed. Assessment completed and medications given per MAR. VS as charted in flowsheet. Pt currently on 4L O2 via NC. Patient denies any further needs at this time. Call light in reach and bed in lowest position.   
Patient care assumed, assessment completed as charted. Patient resting in bed, in no apparent distress. Continues on BiPAP, respirations even and easy. No needs assessed at this time. Will monitor.   
Patient care assumed, assessment completed as charted. Patient resting in bed, in no apparent distress. SpO2 96%. No needs assessed at this time. Will monitor.  
Patient educated on discharge instructions as well as new medications use, dosage, administration and possible side effects. Patient verified knowledge. IV removed without difficulty and dry dressing in place. Telemetry monitor removed. Pt left facility in stable condition to Skilled nursing facility with all of their personal belongings.     Discharging to Facility/ Agency     Name: [facility] ([facility])  Address: [address] [place] [address] [place], [address]  Phone:[phone]  Fax: [phone]     Report called to Carline RODGERS at [facility] at this time.   Report given to transport at this time.   Writer attempted to call Patient brother talat to inform him that patient was being discharged to return to [facility] however he did not answer.   
Patient placed on bipap for the hs on settings of 14/7 40%  
Patient report given to VISHAL Bird. Patient resting comfortably at present. Care transferred.   
Patient report given to VISHAL Hillman. Patient resting comfortably at present. Used BiPAP overnight, and 3L/HFNC during breaks. Care transferred.      
Placed pt on bipap.       04/12/24 4424   NIV Type   $NIV $Daily Charge   Equipment Type 60   Mode Bilevel   Mask Type Full face mask   Mask Size Medium   Assessment   Pulse 89   Respirations 28   SpO2 100 %   Level of Consciousness 0   Comfort Level Good   Using Accessory Muscles No   Mask Compliance Good   Skin Assessment Clean, dry, & intact   Skin Protection for O2 Device Yes   Breath Sounds   Breath Sounds Bilateral Diminished   Settings/Measurements   PIP Observed 16 cm H20   IPAP 14 cmH20   CPAP/EPAP 7 cmH2O   Vt (Measured) 427 mL   Rate Ordered 28   FiO2  60 %   Minute Volume (L/min) 14.9 Liters   Mask Leak (lpm) 0 lpm   Patient's Home Machine No   Alarm Settings   Alarms On Y   Low Pressure (cmH2O) 5 cmH2O   High Pressure (cmH2O) 40 cmH2O   Apnea (secs) 20 secs   RR Low (bpm) 14   RR High (bpm) 45 br/min       
Pt admitted to ICU bed 10. On Bipap. Mental status back to baseline. Hx dementia, repetitive in verbiage, and short term memory. VSS. IV lines and monitors checked. Multiple skin issues, document in 4eyes assessment.     Dr Schwarz at the bedside. Okay to come of Bipap unless VBG is worsening. Also able to order diet. Bipap at night if patient able to tolerate. No other needs. Brother updated via phone. Will continue to monitor.   
Pt has significant moisture associated excoriation to coccyx/sacrum, perineum, inner thighs, groin, purwick in place, no brief, wound care consulted, cream and powder applied, to keep area dry. Bilateral heel protectors in place and changed. Right foot 4th and 5th toes place/ulceration MD is aware, offloaded pt legs, and feet. With pillows, turned q 2 hours, pt educated on importance of turning, no learning observed.   
Pt was discharged earlier today.  [facility] called.  Reviewed Pt's I/O's with [facility]'s RN.  No other questions at this time.    
Pulmonary & Critical Care Medicine ICU Progress Note    CC: Increased work of breathing and worsening hypoxemia    Events of Last 24 hours:   BiPAP overnight    Vascular lines: IV: Peripheral    MV: BiPAP     / / /FiO2 : 40 %  No results for input(s): \"PHART\", \"KPC9JDS\", \"PO2ART\" in the last 72 hours.    IV:   sodium chloride      dextrose         Vitals:  Blood pressure (!) 141/103, pulse 96, temperature 96.8 °F (36 °C), temperature source Temporal, resp. rate (!) 36, weight 94.1 kg (207 lb 6.4 oz), SpO2 92 %.  on BiPAP  Temp  Av.5 °F (36.4 °C)  Min: 96.8 °F (36 °C)  Max: 98.3 °F (36.8 °C)    Intake/Output Summary (Last 24 hours) at 2024 0805  Last data filed at 2024 0600  Gross per 24 hour   Intake 1970 ml   Output 1700 ml   Net 270 ml     PE:  General:  NAD  Resp: No crackles. No wheezing.  Poor air movement   CV: S1, S2. +edema  GI: NT, ND, +BS  Skin: Warm and dry.    Neuro: PERRL. Alert and oriented to person and place, good memory for her conversation with me yesterday     Scheduled Meds:   ipratropium 0.5 mg-albuterol 2.5 mg  1 Dose Inhalation 4x Daily RT    insulin lispro  0-16 Units SubCUTAneous TID WC    insulin lispro  0-4 Units SubCUTAneous Nightly    insulin glargine  10 Units SubCUTAneous BID    doxycycline (VIBRAMYCIN) IV  100 mg IntraVENous Once    sodium chloride  30 mL/kg (Ideal) IntraVENous Once    apixaban  5 mg Oral BID    atorvastatin  40 mg Oral Nightly    busPIRone  5 mg Oral Daily    dilTIAZem  120 mg Oral Daily    fluticasone  1 spray Each Nostril Daily    gabapentin  100 mg Oral TID    lactobacillus  1 capsule Oral BID    melatonin  3 mg Oral Nightly    metoprolol tartrate  25 mg Oral BID    dextromethorphan-quiNIDine  1 capsule Oral BID    sodium chloride flush  5-40 mL IntraVENous 2 times per day    methylPREDNISolone  40 mg IntraVENous Q12H       Data:  CBC:   Recent Labs     24  1440 24  0113 24  0452   WBC 13.2* 6.4 8.7   HGB 12.0 11.7* 11.4*   HCT 
RT Inhaler-Nebulizer Bronchodilator Protocol Note    There is a bronchodilator order in the chart from a provider indicating to follow the RT Bronchodilator Protocol and there is an “Initiate RT Inhaler-Nebulizer Bronchodilator Protocol” order as well (see protocol at bottom of note).    CXR Findings:  XR CHEST PORTABLE    Result Date: 4/13/2024  1. No significant change.     XR CHEST PORTABLE    Result Date: 4/12/2024  Persistent cardiac enlargement and bilateral lower lobe opacities.  Stable chest without significant interval change.       The findings from the last RT Protocol Assessment were as follows:   History Pulmonary Disease: (P) Chronic pulmonary disease  Respiratory Pattern: (P) Dyspnea on exertion or RR 21-25 bpm  Breath Sounds: (P) Slightly diminished and/or crackles  Cough: (P) Strong, spontaneous, non-productive  Indication for Bronchodilator Therapy: (P) Decreased or absent breath sounds  Bronchodilator Assessment Score: (P) 6    Aerosolized bronchodilator medication orders have been revised according to the RT Inhaler-Nebulizer Bronchodilator Protocol below.    Respiratory Therapist to perform RT Therapy Protocol Assessment initially then follow the protocol.  Repeat RT Therapy Protocol Assessment PRN for score 0-3 or on second treatment, BID, and PRN for scores above 3.    No Indications - adjust the frequency to every 6 hours PRN wheezing or bronchospasm, if no treatments needed after 48 hours then discontinue using Per Protocol order mode.     If indication present, adjust the RT bronchodilator orders based on the Bronchodilator Assessment Score as indicated below.  Use Inhaler orders unless patient has one or more of the following: on home nebulizer, not able to hold breath for 10 seconds, is not alert and oriented, cannot activate and use MDI correctly, or respiratory rate 25 breaths per minute or more, then use the equivalent nebulizer order(s) with same Frequency and PRN reasons based on the 
RT Inhaler-Nebulizer Bronchodilator Protocol Note    There is a bronchodilator order in the chart from a provider indicating to follow the RT Bronchodilator Protocol and there is an “Initiate RT Inhaler-Nebulizer Bronchodilator Protocol” order as well (see protocol at bottom of note).    CXR Findings:  XR CHEST PORTABLE    Result Date: 4/13/2024  1. No significant change.     XR CHEST PORTABLE    Result Date: 4/12/2024  Persistent cardiac enlargement and bilateral lower lobe opacities.  Stable chest without significant interval change.       The findings from the last RT Protocol Assessment were as follows:   History Pulmonary Disease: Chronic pulmonary disease  Respiratory Pattern: Dyspnea on exertion or RR 21-25 bpm  Breath Sounds: Intermittent or unilateral wheezes  Cough: Strong, spontaneous, non-productive  Indication for Bronchodilator Therapy: Decreased or absent breath sounds  Bronchodilator Assessment Score: 8    Aerosolized bronchodilator medication orders have been revised according to the RT Inhaler-Nebulizer Bronchodilator Protocol below.    Respiratory Therapist to perform RT Therapy Protocol Assessment initially then follow the protocol.  Repeat RT Therapy Protocol Assessment PRN for score 0-3 or on second treatment, BID, and PRN for scores above 3.    No Indications - adjust the frequency to every 6 hours PRN wheezing or bronchospasm, if no treatments needed after 48 hours then discontinue using Per Protocol order mode.     If indication present, adjust the RT bronchodilator orders based on the Bronchodilator Assessment Score as indicated below.  Use Inhaler orders unless patient has one or more of the following: on home nebulizer, not able to hold breath for 10 seconds, is not alert and oriented, cannot activate and use MDI correctly, or respiratory rate 25 breaths per minute or more, then use the equivalent nebulizer order(s) with same Frequency and PRN reasons based on the score.  If a patient is 
Vancomycin Day 2  Current Dosin mg q12h    Recent Labs     24  1412 24  0113   CREATININE <0.5* <0.5*     Estimated Creatinine Clearance: 177 mL/min (based on SCr of 0.5 mg/dL).  Recent Labs     24  1440 24  0113   WBC 13.2* 6.4     Vanc trough ordered for tomorrow @ 0500.  Continue same until then.      Sarah Barragan RPH 2024 6:02 AM   
extremities.  Lymph: No cervical LAD. No supraclavicular LAD.   M/S: No cyanosis. No joint deformity. No clubbing.     Neuro: Awake. Follows commands. Positive pupils/gag/corneals. Normal pain response.  Psych: Oriented to person, place, time. No anxiety or agitation.     Medications:  Scheduled Meds:   insulin lispro  0-16 Units SubCUTAneous TID WC    insulin lispro  0-4 Units SubCUTAneous Nightly    insulin glargine  10 Units SubCUTAneous BID    doxycycline (VIBRAMYCIN) IV  100 mg IntraVENous Once    sodium chloride  30 mL/kg (Ideal) IntraVENous Once    apixaban  5 mg Oral BID    atorvastatin  40 mg Oral Nightly    busPIRone  5 mg Oral Daily    dilTIAZem  120 mg Oral Daily    fluticasone  1 spray Each Nostril Daily    gabapentin  100 mg Oral TID    lactobacillus  1 capsule Oral BID    melatonin  3 mg Oral Nightly    metoprolol tartrate  25 mg Oral BID    dextromethorphan-quiNIDine  1 capsule Oral BID    sodium chloride flush  5-40 mL IntraVENous 2 times per day    ipratropium 0.5 mg-albuterol 2.5 mg  1 Dose Inhalation Q4H WA RT    methylPREDNISolone  40 mg IntraVENous Q12H    cefepime  2,000 mg IntraVENous Q12H    vancomycin  1,500 mg IntraVENous Q12H       PRN Meds:  diphenhydrAMINE, sodium chloride flush, sodium chloride, potassium chloride **OR** potassium chloride, ondansetron **OR** ondansetron, polyethylene glycol, acetaminophen **OR** acetaminophen, glucose, dextrose bolus **OR** dextrose bolus, dextrose    Results:  CBC:   Recent Labs     04/12/24  1440 04/13/24  0113   WBC 13.2* 6.4   HGB 12.0 11.7*   HCT 38.3 36.8   MCV 82.3 80.9    125*     BMP:   Recent Labs     04/12/24  1412 04/13/24  0113    135*   K 4.9 4.1   CL 97* 97*   CO2 33* 27   BUN 15 14   CREATININE <0.5* <0.5*     LIVER PROFILE:   Recent Labs     04/12/24  1412   AST 30   ALT 13   BILITOT 0.6   ALKPHOS 85     Recent Labs     04/12/24  1440   PROCAL 0.02      Cultures:    Results       Procedure Component Value Units Date/Time 
respiratory rate 25 breaths per minute or more, then use the equivalent nebulizer order(s) with same Frequency and PRN reasons based on the score.  If a patient is on this medication at home then do not decrease Frequency below that used at home.    0-3 - enter or revise RT bronchodilator order(s) to equivalent RT Bronchodilator order with Frequency of every 4 hours PRN for wheezing or increased work of breathing using Per Protocol order mode.        4-6 - enter or revise RT Bronchodilator order(s) to two equivalent RT bronchodilator orders with one order with BID Frequency and one order with Frequency of every 4 hours PRN wheezing or increased work of breathing using Per Protocol order mode.        7-10 - enter or revise RT Bronchodilator order(s) to two equivalent RT bronchodilator orders with one order with TID Frequency and one order with Frequency of every 4 hours PRN wheezing or increased work of breathing using Per Protocol order mode.       11-13 - enter or revise RT Bronchodilator order(s) to one equivalent RT bronchodilator order with QID Frequency and an Albuterol order with Frequency of every 4 hours PRN wheezing or increased work of breathing using Per Protocol order mode.      Greater than 13 - enter or revise RT Bronchodilator order(s) to one equivalent RT bronchodilator order with every 4 hours Frequency and an Albuterol order with Frequency of every 2 hours PRN wheezing or increased work of breathing using Per Protocol order mode.       Electronically signed by Lucien Rodriguez RCP on 4/12/2024 at 8:16 PM  
Bactroban  Okay for PCU    CHF and COPD make the patient higher risk for morbidity and mortality & results in need for ongoing testing and treatment.

## 2024-04-17 LAB
BACTERIA BLD CULT ORG #2: NORMAL
BACTERIA BLD CULT: NORMAL

## 2024-04-24 ENCOUNTER — TELEPHONE (OUTPATIENT)
Dept: PULMONOLOGY | Age: 74
End: 2024-04-24

## 2024-04-24 ENCOUNTER — OFFICE VISIT (OUTPATIENT)
Dept: PULMONOLOGY | Age: 74
End: 2024-04-24
Payer: COMMERCIAL

## 2024-04-24 VITALS
DIASTOLIC BLOOD PRESSURE: 80 MMHG | HEART RATE: 112 BPM | SYSTOLIC BLOOD PRESSURE: 122 MMHG | OXYGEN SATURATION: 97 % | RESPIRATION RATE: 24 BRPM

## 2024-04-24 DIAGNOSIS — R91.1 PULMONARY NODULE: Primary | ICD-10-CM

## 2024-04-24 DIAGNOSIS — J43.9 ACUTE EXACERBATION OF EMPHYSEMA (HCC): ICD-10-CM

## 2024-04-24 PROCEDURE — 3079F DIAST BP 80-89 MM HG: CPT | Performed by: INTERNAL MEDICINE

## 2024-04-24 PROCEDURE — 1123F ACP DISCUSS/DSCN MKR DOCD: CPT | Performed by: INTERNAL MEDICINE

## 2024-04-24 PROCEDURE — 99214 OFFICE O/P EST MOD 30 MIN: CPT | Performed by: INTERNAL MEDICINE

## 2024-04-24 PROCEDURE — 3074F SYST BP LT 130 MM HG: CPT | Performed by: INTERNAL MEDICINE

## 2024-04-24 RX ORDER — LORAZEPAM 0.5 MG/1
0.5 TABLET ORAL NIGHTLY PRN
COMMUNITY

## 2024-04-24 RX ORDER — BUDESONIDE AND FORMOTEROL FUMARATE DIHYDRATE 160; 4.5 UG/1; UG/1
2 AEROSOL RESPIRATORY (INHALATION) 2 TIMES DAILY
Qty: 10.2 G | Refills: 5 | Status: SHIPPED | OUTPATIENT
Start: 2024-04-24

## 2024-04-24 RX ORDER — PREDNISONE 10 MG/1
TABLET ORAL
Qty: 30 TABLET | Refills: 0 | Status: SHIPPED | OUTPATIENT
Start: 2024-04-24 | End: 2024-05-06

## 2024-04-24 RX ORDER — OXYCODONE AND ACETAMINOPHEN 7.5; 325 MG/1; MG/1
1 TABLET ORAL EVERY 4 HOURS PRN
COMMUNITY

## 2024-04-24 RX ORDER — IPRATROPIUM BROMIDE AND ALBUTEROL SULFATE 2.5; .5 MG/3ML; MG/3ML
1 SOLUTION RESPIRATORY (INHALATION) EVERY 6 HOURS PRN
COMMUNITY
End: 2024-04-24

## 2024-04-24 RX ORDER — IPRATROPIUM BROMIDE AND ALBUTEROL SULFATE 2.5; .5 MG/3ML; MG/3ML
SOLUTION RESPIRATORY (INHALATION)
Qty: 360 ML | Refills: 6 | Status: SHIPPED | OUTPATIENT
Start: 2024-04-24

## 2024-04-24 NOTE — PROGRESS NOTES
air entry.   GI: Non-tender. Non-distended. No hernia.   Skin: Warm and dry. No nodule on exposed extremities.   Lymph: No cervical LAD. No supraclavicular LAD.   M/S: No cyanosis. No joint deformity. No clubbing.   Neuro: Awake. Alert. Moves all four extremities.   Psych: Oriented x 3. No anxiety.           DATA reviewed by me:   CT chest 3/17/2024 imaging reviewed by me and showed  1. No pulmonary emboli.  2. Chronic emphysematous changes, with small airways disease and small  left-sided pleural effusion.  No consolidation or other acute process.  3. Unchanged subcentimeter pulmonary nodule in the right upper lobe for which  no additional follow-up is necessary dating back to 2023.  4. Other similar findings as detailed above for which no additional follow-up  necessary.       Echo 4/21/2023 EF 55%, SPAP 36, indeterminate diastolic function    Assessment:       Pulmonary emphysema with acute exacerbation  RUL 7 mm nodule stable from 2023.  Most recent CT 3/17/2024.  Probably granuloma.  Will document 2 years stability.  CAD, CHF, A-fib on [facility] resident    Plan:       PFTs and 6MW  Continue O2.  Advised to titrate O2 using her pulse oximeter- target O2 sat 90-92%.    Prednisone taper  DuoNeb every 4 hours as needed  Symbicort  CT chest in 1 year  ER for admission if fails to improve

## 2024-04-29 ENCOUNTER — APPOINTMENT (OUTPATIENT)
Dept: ULTRASOUND IMAGING | Age: 74
End: 2024-04-29
Payer: COMMERCIAL

## 2024-04-29 ENCOUNTER — APPOINTMENT (OUTPATIENT)
Dept: GENERAL RADIOLOGY | Age: 74
End: 2024-04-29
Payer: COMMERCIAL

## 2024-04-29 ENCOUNTER — HOSPITAL ENCOUNTER (EMERGENCY)
Age: 74
Discharge: HOME | End: 2024-04-30
Attending: STUDENT IN AN ORGANIZED HEALTH CARE EDUCATION/TRAINING PROGRAM
Payer: COMMERCIAL

## 2024-04-29 ENCOUNTER — APPOINTMENT (OUTPATIENT)
Dept: CT IMAGING | Age: 74
End: 2024-04-29
Payer: COMMERCIAL

## 2024-04-29 DIAGNOSIS — K81.9 CHOLECYSTITIS: ICD-10-CM

## 2024-04-29 DIAGNOSIS — R00.1 BRADYCARDIA: Primary | ICD-10-CM

## 2024-04-29 LAB
ALBUMIN SERPL-MCNC: 3.7 G/DL (ref 3.4–5)
ALBUMIN/GLOB SERPL: 1.5 {RATIO} (ref 1.1–2.2)
ALP SERPL-CCNC: 65 U/L (ref 40–129)
ALT SERPL-CCNC: 18 U/L (ref 10–40)
ANION GAP SERPL CALCULATED.3IONS-SCNC: 8 MMOL/L (ref 3–16)
AST SERPL-CCNC: 26 U/L (ref 15–37)
BASE EXCESS BLDV CALC-SCNC: 2.8 MMOL/L (ref -3–3)
BASOPHILS # BLD: 0 K/UL (ref 0–0.2)
BASOPHILS NFR BLD: 0.5 %
BILIRUB SERPL-MCNC: 0.3 MG/DL (ref 0–1)
BUN SERPL-MCNC: 24 MG/DL (ref 7–20)
CALCIUM SERPL-MCNC: 9.3 MG/DL (ref 8.3–10.6)
CHLORIDE SERPL-SCNC: 93 MMOL/L (ref 99–110)
CO2 BLDV-SCNC: 33 MMOL/L
CO2 SERPL-SCNC: 33 MMOL/L (ref 21–32)
COHGB MFR BLDV: 1.1 % (ref 0–1.5)
CREAT SERPL-MCNC: 0.7 MG/DL (ref 0.6–1.2)
DEPRECATED RDW RBC AUTO: 15.8 % (ref 12.4–15.4)
EOSINOPHIL # BLD: 0 K/UL (ref 0–0.6)
EOSINOPHIL NFR BLD: 0.1 %
GFR SERPLBLD CREATININE-BSD FMLA CKD-EPI: 90 ML/MIN/{1.73_M2}
GLUCOSE SERPL-MCNC: 321 MG/DL (ref 70–99)
HCO3 BLDV-SCNC: 30.6 MMOL/L (ref 23–29)
HCT VFR BLD AUTO: 37.9 % (ref 36–48)
HGB BLD-MCNC: 11.8 G/DL (ref 12–16)
INR PPP: 1.19 (ref 0.85–1.15)
LIPASE SERPL-CCNC: 18 U/L (ref 13–60)
LYMPHOCYTES # BLD: 0.9 K/UL (ref 1–5.1)
LYMPHOCYTES NFR BLD: 9.3 %
MCH RBC QN AUTO: 25.9 PG (ref 26–34)
MCHC RBC AUTO-ENTMCNC: 31 G/DL (ref 31–36)
MCV RBC AUTO: 83.5 FL (ref 80–100)
METHGB MFR BLDV: 0.3 %
MONOCYTES # BLD: 0.1 K/UL (ref 0–1.3)
MONOCYTES NFR BLD: 1.1 %
NEUTROPHILS # BLD: 8.5 K/UL (ref 1.7–7.7)
NEUTROPHILS NFR BLD: 89 %
NT-PROBNP SERPL-MCNC: 2636 PG/ML (ref 0–449)
O2 CT VFR BLDV CALC: 12 VOL %
O2 THERAPY: ABNORMAL
PCO2 BLDV: 62.2 MMHG (ref 40–50)
PH BLDV: 7.31 [PH] (ref 7.35–7.45)
PLATELET # BLD AUTO: 155 K/UL (ref 135–450)
PMV BLD AUTO: 10 FL (ref 5–10.5)
PO2 BLDV: 36.5 MMHG (ref 25–40)
POTASSIUM SERPL-SCNC: 5.4 MMOL/L (ref 3.5–5.1)
PROT SERPL-MCNC: 6.1 G/DL (ref 6.4–8.2)
PROTHROMBIN TIME: 15.3 SEC (ref 11.9–14.9)
RBC # BLD AUTO: 4.54 M/UL (ref 4–5.2)
SAO2 % BLDV: 63 %
SODIUM SERPL-SCNC: 134 MMOL/L (ref 136–145)
TROPONIN, HIGH SENSITIVITY: 12 NG/L (ref 0–14)
TSH SERPL DL<=0.005 MIU/L-ACNC: 0.56 UIU/ML (ref 0.27–4.2)
WBC # BLD AUTO: 9.6 K/UL (ref 4–11)

## 2024-04-29 PROCEDURE — 71045 X-RAY EXAM CHEST 1 VIEW: CPT

## 2024-04-29 PROCEDURE — 80053 COMPREHEN METABOLIC PANEL: CPT

## 2024-04-29 PROCEDURE — 83880 ASSAY OF NATRIURETIC PEPTIDE: CPT

## 2024-04-29 PROCEDURE — 36415 COLL VENOUS BLD VENIPUNCTURE: CPT

## 2024-04-29 PROCEDURE — 93005 ELECTROCARDIOGRAM TRACING: CPT | Performed by: STUDENT IN AN ORGANIZED HEALTH CARE EDUCATION/TRAINING PROGRAM

## 2024-04-29 PROCEDURE — 6370000000 HC RX 637 (ALT 250 FOR IP): Performed by: STUDENT IN AN ORGANIZED HEALTH CARE EDUCATION/TRAINING PROGRAM

## 2024-04-29 PROCEDURE — 84443 ASSAY THYROID STIM HORMONE: CPT

## 2024-04-29 PROCEDURE — 84484 ASSAY OF TROPONIN QUANT: CPT

## 2024-04-29 PROCEDURE — 85025 COMPLETE CBC W/AUTO DIFF WBC: CPT

## 2024-04-29 PROCEDURE — 99285 EMERGENCY DEPT VISIT HI MDM: CPT

## 2024-04-29 PROCEDURE — 82803 BLOOD GASES ANY COMBINATION: CPT

## 2024-04-29 PROCEDURE — 76705 ECHO EXAM OF ABDOMEN: CPT

## 2024-04-29 PROCEDURE — 71250 CT THORAX DX C-: CPT

## 2024-04-29 PROCEDURE — 83690 ASSAY OF LIPASE: CPT

## 2024-04-29 PROCEDURE — 85610 PROTHROMBIN TIME: CPT

## 2024-04-29 PROCEDURE — 96374 THER/PROPH/DIAG INJ IV PUSH: CPT

## 2024-04-29 PROCEDURE — 6360000002 HC RX W HCPCS: Performed by: STUDENT IN AN ORGANIZED HEALTH CARE EDUCATION/TRAINING PROGRAM

## 2024-04-29 RX ORDER — DROPERIDOL 2.5 MG/ML
1.25 INJECTION, SOLUTION INTRAMUSCULAR; INTRAVENOUS ONCE
Status: COMPLETED | OUTPATIENT
Start: 2024-04-29 | End: 2024-04-29

## 2024-04-29 RX ORDER — AMOXICILLIN AND CLAVULANATE POTASSIUM 875; 125 MG/1; MG/1
1 TABLET, FILM COATED ORAL 2 TIMES DAILY
Qty: 20 TABLET | Refills: 0 | Status: SHIPPED | OUTPATIENT
Start: 2024-04-29 | End: 2024-05-09

## 2024-04-29 RX ORDER — AMOXICILLIN AND CLAVULANATE POTASSIUM 875; 125 MG/1; MG/1
1 TABLET, FILM COATED ORAL ONCE
Status: COMPLETED | OUTPATIENT
Start: 2024-04-29 | End: 2024-04-29

## 2024-04-29 RX ADMIN — DROPERIDOL 1.25 MG: 2.5 INJECTION, SOLUTION INTRAMUSCULAR; INTRAVENOUS at 21:34

## 2024-04-29 RX ADMIN — AMOXICILLIN AND CLAVULANATE POTASSIUM 1 TABLET: 875; 125 TABLET, FILM COATED ORAL at 22:29

## 2024-04-29 NOTE — ED NOTES
1808-Call placed to Cardiology for consult placed by Dr. Foote.   1811-Call back received from Cardiology  Dr. Lomax spoke with Dr. Foote regarding consult.

## 2024-04-29 NOTE — PROGRESS NOTES
(with meals)   Yes Eliza Reid MD   gabapentin (NEURONTIN) 100 MG capsule Take 1 capsule by mouth 3 times daily.   Yes Eliza Reid MD   apixaban (ELIQUIS) 5 MG TABS tablet Take 1 tablet by mouth 2 times daily 4/28/23  Yes Montserrat Morelos MD   atorvastatin (LIPITOR) 40 MG tablet Take 1 tablet by mouth nightly 4/28/23  Yes Montserrat Morelos MD   acetaminophen (TYLENOL) 650 MG extended release tablet Take 1 tablet by mouth every 8 hours as needed for Fever or Pain   Yes Eliza Reid MD   metoprolol tartrate (LOPRESSOR) 25 MG tablet Take 1 tablet by mouth 2 times daily 4/16/23  Yes Brody Glasgow MD   polyethylene glycol (GLYCOLAX) 17 GM/SCOOP powder Take 17 g by mouth daily as needed (constipation)   Yes Eliza Reid MD   busPIRone (BUSPAR) 5 MG tablet Take 1 tablet by mouth daily One tablet daily   Yes Eliza Reid MD   melatonin 3 MG TABS tablet Take 1 tablet by mouth nightly   Yes Eliza Reid MD   Cholecalciferol (VITAMIN D3) 1.25 MG (40434 UT) CAPS Take 1 capsule by mouth every 30 days (Last dose around March 13, 2023 - next due around April 15, 2023)   Yes Eliza Reid MD   bisacodyl (DULCOLAX) 10 MG suppository Place 1 suppository rectally daily as needed for Constipation (no BM for 3 days)   Yes Eliza Reid MD   budesonide-formoterol (SYMBICORT) 160-4.5 MCG/ACT AERO Inhale 2 puffs into the lungs 2 times daily 4/24/24   Edenilson Alvarez MD   dextromethorphan-quiNIDine (NUEDEXTA) 20-10 MG CAPS per capsule Take 1 capsule by mouth in the morning and at bedtime Indications: Pseudobulbar Affect  Patient not taking: Reported on 4/30/2024 7/7/23   Eliza Reid MD        CURRENT Medications:  No current facility-administered medications for this visit.      Allergies:  Iodides, Sulfa antibiotics, Fish oil, Fish-derived products, Iodine, Molds & smuts, Mushroom extract complex, Onion, Onion extract, Pollen extract, Shellfish

## 2024-04-30 ENCOUNTER — OFFICE VISIT (OUTPATIENT)
Dept: CARDIOLOGY CLINIC | Age: 74
End: 2024-04-30
Payer: COMMERCIAL

## 2024-04-30 VITALS
HEIGHT: 62 IN | BODY MASS INDEX: 37.85 KG/M2 | OXYGEN SATURATION: 99 % | HEART RATE: 73 BPM | SYSTOLIC BLOOD PRESSURE: 128 MMHG | DIASTOLIC BLOOD PRESSURE: 68 MMHG

## 2024-04-30 VITALS
HEART RATE: 102 BPM | HEIGHT: 62 IN | RESPIRATION RATE: 18 BRPM | TEMPERATURE: 98.5 F | DIASTOLIC BLOOD PRESSURE: 98 MMHG | OXYGEN SATURATION: 98 % | BODY MASS INDEX: 38.09 KG/M2 | SYSTOLIC BLOOD PRESSURE: 139 MMHG | WEIGHT: 207 LBS

## 2024-04-30 DIAGNOSIS — E66.01 SEVERE OBESITY (BMI 35.0-39.9) WITH COMORBIDITY (HCC): ICD-10-CM

## 2024-04-30 DIAGNOSIS — R07.89 OTHER CHEST PAIN: ICD-10-CM

## 2024-04-30 DIAGNOSIS — E78.2 MIXED HYPERLIPIDEMIA: ICD-10-CM

## 2024-04-30 DIAGNOSIS — I10 BENIGN ESSENTIAL HTN: ICD-10-CM

## 2024-04-30 DIAGNOSIS — I48.21 PERMANENT ATRIAL FIBRILLATION (HCC): Primary | ICD-10-CM

## 2024-04-30 LAB
EKG DIAGNOSIS: NORMAL
EKG Q-T INTERVAL: 432 MS
EKG QRS DURATION: 84 MS
EKG QTC CALCULATION (BAZETT): 432 MS
EKG R AXIS: 77 DEGREES
EKG T AXIS: -13 DEGREES
EKG VENTRICULAR RATE: 60 BPM

## 2024-04-30 PROCEDURE — 3074F SYST BP LT 130 MM HG: CPT | Performed by: INTERNAL MEDICINE

## 2024-04-30 PROCEDURE — 93010 ELECTROCARDIOGRAM REPORT: CPT | Performed by: INTERNAL MEDICINE

## 2024-04-30 PROCEDURE — 3078F DIAST BP <80 MM HG: CPT | Performed by: INTERNAL MEDICINE

## 2024-04-30 PROCEDURE — 99214 OFFICE O/P EST MOD 30 MIN: CPT | Performed by: INTERNAL MEDICINE

## 2024-04-30 PROCEDURE — 1123F ACP DISCUSS/DSCN MKR DOCD: CPT | Performed by: INTERNAL MEDICINE

## 2024-04-30 RX ORDER — ALBUTEROL SULFATE 2.5 MG/3ML
2.5 SOLUTION RESPIRATORY (INHALATION) EVERY 6 HOURS PRN
COMMUNITY

## 2024-04-30 ASSESSMENT — PAIN DESCRIPTION - LOCATION: LOCATION: LEG

## 2024-04-30 ASSESSMENT — PAIN DESCRIPTION - PAIN TYPE: TYPE: CHRONIC PAIN

## 2024-04-30 ASSESSMENT — PAIN DESCRIPTION - ORIENTATION: ORIENTATION: LEFT;RIGHT

## 2024-04-30 ASSESSMENT — PAIN SCALES - GENERAL: PAINLEVEL_OUTOF10: 3

## 2024-04-30 ASSESSMENT — PAIN - FUNCTIONAL ASSESSMENT: PAIN_FUNCTIONAL_ASSESSMENT: 0-10

## 2024-04-30 NOTE — PATIENT INSTRUCTIONS
PLAN:  Patient has permanent Atrial Fibrillation.  This is not a new diagnosis.    Patient will always be in atrial fibrillation.  Can have varying rates.  This is not concerning.    When patient is in pain or sick this can make her heart rates vary as well.  This is a normal occurrence with atrial fibrillation.   2.    No change in her medications today  3.    Further evaluation for her stomach/abdominal pain/nausea and GERD need further evaluation.

## 2024-04-30 NOTE — DISCHARGE INSTRUCTIONS
Return nearest ED if she has heart rate less than 40, severe chest pain, fevers, other concerning symptoms.  She should not take her diltiazem or metoprolol until she follows up with her cardiologist.

## 2024-04-30 NOTE — ED NOTES
Patient screaming out for assistance.  RN walked into room and found patient oxygen low 80s.  Oxygen in nares but not hooked up to wall oxygen and bed tank empty.  RN  turned oxygen on at 2 liters per nc and oxygen came up appropriately.  Pt pleasant but wants someone at bedside at all times.  RN explained that this RN would be back to check on her eery 30 minutes.  Pt v/reyes michelle

## 2024-04-30 NOTE — ED PROVIDER NOTES
Carboxyhemoglobin 1.1 0.0 - 1.5 %    MetHgb, Jeffery 0.3 <1.5 %    TC02 (Calc), Jeffery 33 Not Established mmol/L    O2 Content, Jeffery 12 Not Established VOL %    O2 Therapy Unknown    Troponin   Result Value Ref Range    Troponin, High Sensitivity 12 0 - 14 ng/L   BNP   Result Value Ref Range    Pro-BNP 2,636 (H) 0 - 449 pg/mL   Protime-INR   Result Value Ref Range    Protime 15.3 (H) 11.9 - 14.9 sec    INR 1.19 (H) 0.85 - 1.15   TSH with Reflex   Result Value Ref Range    TSH 0.56 0.27 - 4.20 uIU/mL   EKG 12 Lead   Result Value Ref Range    Ventricular Rate 60 BPM    QRS Duration 84 ms    Q-T Interval 432 ms    QTc Calculation (Bazett) 432 ms    R Axis 77 degrees    T Axis -13 degrees    Diagnosis       Atrial fibrillationST & T wave abnormality, consider inferior ischemiaAbnormal ECGWhen compared with ECG of 14-APR-2024 14:10,Vent. rate has decreased BY  41 BPMST now depressed in Inferior leadsNon-specific change in ST segment in Lateral leads       ECG  The Ekg interpreted by me shows  Atrial fibrillation with a ventricular rate of 60 bpm.  Normal axis.  No acute injury pattern.  QRS 84, QTc 432.  No significant change from prior EKG dated 4/14/2024    RADIOLOGY  US ABDOMEN LIMITED Specify organ? GALLBLADDER   Final Result   Cholelithiasis with mild gallbladder wall thickening but negative sonographic   Painting's sign.  If there is still concern for acute cholecystitis, a HIDA   scan can be performed for further evaluation.         CT CHEST ABDOMEN PELVIS WO CONTRAST Additional Contrast? None   Preliminary Result   1. Small bilateral pleural effusions and lower lobe atelectatic changes.   2. Stable right upper lobe and right middle lobe pulmonary nodules.   3. Severe constipation and stool impaction in the rectum.   4. Gallbladder wall thickening and stones suspicious for possible acute   cholecystitis.   5. Mild anasarca.   6. Stable thoracic and lumbar compression injuries.      RECOMMENDATIONS:   Right upper quadrant

## 2024-05-05 ENCOUNTER — APPOINTMENT (OUTPATIENT)
Dept: GENERAL RADIOLOGY | Age: 74
End: 2024-05-05
Payer: COMMERCIAL

## 2024-05-05 ENCOUNTER — APPOINTMENT (OUTPATIENT)
Dept: CT IMAGING | Age: 74
End: 2024-05-05
Payer: COMMERCIAL

## 2024-05-05 ENCOUNTER — HOSPITAL ENCOUNTER (INPATIENT)
Age: 74
LOS: 4 days | Discharge: NURSING, REHAB OR LONG-TERM CARE | End: 2024-05-10
Attending: STUDENT IN AN ORGANIZED HEALTH CARE EDUCATION/TRAINING PROGRAM | Admitting: INTERNAL MEDICINE
Payer: COMMERCIAL

## 2024-05-05 DIAGNOSIS — G89.29 CHRONIC PAIN OF BOTH KNEES: ICD-10-CM

## 2024-05-05 DIAGNOSIS — M25.561 CHRONIC PAIN OF BOTH KNEES: ICD-10-CM

## 2024-05-05 DIAGNOSIS — R06.89 DYSPNEA AND RESPIRATORY ABNORMALITIES: ICD-10-CM

## 2024-05-05 DIAGNOSIS — R07.9 CHEST PAIN, UNSPECIFIED TYPE: Primary | ICD-10-CM

## 2024-05-05 DIAGNOSIS — J96.01 ACUTE RESPIRATORY FAILURE WITH HYPOXIA (HCC): ICD-10-CM

## 2024-05-05 DIAGNOSIS — R06.00 DYSPNEA AND RESPIRATORY ABNORMALITIES: ICD-10-CM

## 2024-05-05 DIAGNOSIS — F41.1 GAD (GENERALIZED ANXIETY DISORDER): ICD-10-CM

## 2024-05-05 DIAGNOSIS — I50.9 CONGESTIVE HEART FAILURE, UNSPECIFIED HF CHRONICITY, UNSPECIFIED HEART FAILURE TYPE (HCC): ICD-10-CM

## 2024-05-05 DIAGNOSIS — M25.562 CHRONIC PAIN OF BOTH KNEES: ICD-10-CM

## 2024-05-05 LAB
ALBUMIN SERPL-MCNC: 3.5 G/DL (ref 3.4–5)
ALBUMIN/GLOB SERPL: 1.5 {RATIO} (ref 1.1–2.2)
ALP SERPL-CCNC: 58 U/L (ref 40–129)
ALT SERPL-CCNC: 11 U/L (ref 10–40)
ANION GAP SERPL CALCULATED.3IONS-SCNC: 14 MMOL/L (ref 3–16)
AST SERPL-CCNC: 16 U/L (ref 15–37)
BASE EXCESS BLDV CALC-SCNC: 1.7 MMOL/L (ref -3–3)
BASOPHILS # BLD: 0 K/UL (ref 0–0.2)
BASOPHILS NFR BLD: 0.2 %
BILIRUB SERPL-MCNC: 0.5 MG/DL (ref 0–1)
BUN SERPL-MCNC: 19 MG/DL (ref 7–20)
CALCIUM SERPL-MCNC: 9.2 MG/DL (ref 8.3–10.6)
CHLORIDE SERPL-SCNC: 96 MMOL/L (ref 99–110)
CO2 BLDV-SCNC: 28 MMOL/L
CO2 SERPL-SCNC: 27 MMOL/L (ref 21–32)
COHGB MFR BLDV: 2 % (ref 0–1.5)
CREAT SERPL-MCNC: <0.5 MG/DL (ref 0.6–1.2)
DEPRECATED RDW RBC AUTO: 16.6 % (ref 12.4–15.4)
EKG DIAGNOSIS: NORMAL
EKG Q-T INTERVAL: 368 MS
EKG QRS DURATION: 80 MS
EKG QTC CALCULATION (BAZETT): 442 MS
EKG R AXIS: 77 DEGREES
EKG T AXIS: -82 DEGREES
EKG VENTRICULAR RATE: 87 BPM
EOSINOPHIL # BLD: 0 K/UL (ref 0–0.6)
EOSINOPHIL NFR BLD: 0 %
FLUAV RNA RESP QL NAA+PROBE: NOT DETECTED
FLUBV RNA RESP QL NAA+PROBE: NOT DETECTED
GFR SERPLBLD CREATININE-BSD FMLA CKD-EPI: >90 ML/MIN/{1.73_M2}
GLUCOSE BLD-MCNC: 198 MG/DL (ref 70–99)
GLUCOSE SERPL-MCNC: 337 MG/DL (ref 70–99)
HCO3 BLDV-SCNC: 26.4 MMOL/L (ref 23–29)
HCT VFR BLD AUTO: 37.8 % (ref 36–48)
HGB BLD-MCNC: 11.6 G/DL (ref 12–16)
LACTATE BLDV-SCNC: 3.4 MMOL/L (ref 0.4–1.9)
LACTATE BLDV-SCNC: 4.1 MMOL/L (ref 0.4–1.9)
LIPASE SERPL-CCNC: 14 U/L (ref 13–60)
LYMPHOCYTES # BLD: 0.8 K/UL (ref 1–5.1)
LYMPHOCYTES NFR BLD: 7.4 %
MCH RBC QN AUTO: 25.7 PG (ref 26–34)
MCHC RBC AUTO-ENTMCNC: 30.6 G/DL (ref 31–36)
MCV RBC AUTO: 84.1 FL (ref 80–100)
METHGB MFR BLDV: 0.3 %
MONOCYTES # BLD: 0.2 K/UL (ref 0–1.3)
MONOCYTES NFR BLD: 2.1 %
NEUTROPHILS # BLD: 9.8 K/UL (ref 1.7–7.7)
NEUTROPHILS NFR BLD: 90.3 %
NT-PROBNP SERPL-MCNC: 2862 PG/ML (ref 0–449)
O2 CT VFR BLDV CALC: 17 VOL %
O2 THERAPY: ABNORMAL
PCO2 BLDV: 41.6 MMHG (ref 40–50)
PERFORMED ON: ABNORMAL
PH BLDV: 7.42 [PH] (ref 7.35–7.45)
PLATELET # BLD AUTO: 173 K/UL (ref 135–450)
PMV BLD AUTO: 10.3 FL (ref 5–10.5)
PO2 BLDV: 85.8 MMHG (ref 25–40)
POTASSIUM SERPL-SCNC: 3.1 MMOL/L (ref 3.5–5.1)
POTASSIUM SERPL-SCNC: 5.4 MMOL/L (ref 3.5–5.1)
PROT SERPL-MCNC: 5.8 G/DL (ref 6.4–8.2)
RBC # BLD AUTO: 4.5 M/UL (ref 4–5.2)
SAO2 % BLDV: 97 %
SARS-COV-2 RNA RESP QL NAA+PROBE: NOT DETECTED
SODIUM SERPL-SCNC: 137 MMOL/L (ref 136–145)
TROPONIN, HIGH SENSITIVITY: 10 NG/L (ref 0–14)
TROPONIN, HIGH SENSITIVITY: 9 NG/L (ref 0–14)
WBC # BLD AUTO: 10.9 K/UL (ref 4–11)

## 2024-05-05 PROCEDURE — 82803 BLOOD GASES ANY COMBINATION: CPT

## 2024-05-05 PROCEDURE — 71260 CT THORAX DX C+: CPT

## 2024-05-05 PROCEDURE — 84484 ASSAY OF TROPONIN QUANT: CPT

## 2024-05-05 PROCEDURE — 6370000000 HC RX 637 (ALT 250 FOR IP): Performed by: NURSE PRACTITIONER

## 2024-05-05 PROCEDURE — 74177 CT ABD & PELVIS W/CONTRAST: CPT

## 2024-05-05 PROCEDURE — 96374 THER/PROPH/DIAG INJ IV PUSH: CPT

## 2024-05-05 PROCEDURE — 84132 ASSAY OF SERUM POTASSIUM: CPT

## 2024-05-05 PROCEDURE — 71045 X-RAY EXAM CHEST 1 VIEW: CPT

## 2024-05-05 PROCEDURE — 93005 ELECTROCARDIOGRAM TRACING: CPT | Performed by: NURSE PRACTITIONER

## 2024-05-05 PROCEDURE — 36415 COLL VENOUS BLD VENIPUNCTURE: CPT

## 2024-05-05 PROCEDURE — 96375 TX/PRO/DX INJ NEW DRUG ADDON: CPT

## 2024-05-05 PROCEDURE — 80053 COMPREHEN METABOLIC PANEL: CPT

## 2024-05-05 PROCEDURE — 83880 ASSAY OF NATRIURETIC PEPTIDE: CPT

## 2024-05-05 PROCEDURE — 83605 ASSAY OF LACTIC ACID: CPT

## 2024-05-05 PROCEDURE — 85025 COMPLETE CBC W/AUTO DIFF WBC: CPT

## 2024-05-05 PROCEDURE — 99285 EMERGENCY DEPT VISIT HI MDM: CPT

## 2024-05-05 PROCEDURE — 93010 ELECTROCARDIOGRAM REPORT: CPT | Performed by: INTERNAL MEDICINE

## 2024-05-05 PROCEDURE — 83690 ASSAY OF LIPASE: CPT

## 2024-05-05 PROCEDURE — 6360000002 HC RX W HCPCS: Performed by: NURSE PRACTITIONER

## 2024-05-05 PROCEDURE — 6360000004 HC RX CONTRAST MEDICATION: Performed by: NURSE PRACTITIONER

## 2024-05-05 PROCEDURE — 2580000003 HC RX 258: Performed by: NURSE PRACTITIONER

## 2024-05-05 PROCEDURE — 93005 ELECTROCARDIOGRAM TRACING: CPT | Performed by: STUDENT IN AN ORGANIZED HEALTH CARE EDUCATION/TRAINING PROGRAM

## 2024-05-05 PROCEDURE — 87636 SARSCOV2 & INF A&B AMP PRB: CPT

## 2024-05-05 RX ORDER — IPRATROPIUM BROMIDE AND ALBUTEROL SULFATE 2.5; .5 MG/3ML; MG/3ML
1 SOLUTION RESPIRATORY (INHALATION) ONCE
Status: COMPLETED | OUTPATIENT
Start: 2024-05-05 | End: 2024-05-05

## 2024-05-05 RX ORDER — DIPHENHYDRAMINE HYDROCHLORIDE 50 MG/ML
25 INJECTION INTRAMUSCULAR; INTRAVENOUS ONCE
Status: COMPLETED | OUTPATIENT
Start: 2024-05-05 | End: 2024-05-05

## 2024-05-05 RX ADMIN — DIPHENHYDRAMINE HYDROCHLORIDE 25 MG: 50 INJECTION INTRAMUSCULAR; INTRAVENOUS at 19:53

## 2024-05-05 RX ADMIN — WATER 125 MG: 1 INJECTION INTRAMUSCULAR; INTRAVENOUS; SUBCUTANEOUS at 18:44

## 2024-05-05 RX ADMIN — IPRATROPIUM BROMIDE AND ALBUTEROL SULFATE 1 DOSE: .5; 2.5 SOLUTION RESPIRATORY (INHALATION) at 18:45

## 2024-05-05 RX ADMIN — IOPAMIDOL 80 ML: 755 INJECTION, SOLUTION INTRAVENOUS at 21:39

## 2024-05-05 RX ADMIN — IPRATROPIUM BROMIDE AND ALBUTEROL SULFATE 1 DOSE: .5; 2.5 SOLUTION RESPIRATORY (INHALATION) at 19:51

## 2024-05-05 ASSESSMENT — PAIN - FUNCTIONAL ASSESSMENT
PAIN_FUNCTIONAL_ASSESSMENT: 0-10
PAIN_FUNCTIONAL_ASSESSMENT: PREVENTS OR INTERFERES SOME ACTIVE ACTIVITIES AND ADLS

## 2024-05-05 ASSESSMENT — PAIN SCALES - GENERAL: PAINLEVEL_OUTOF10: 6

## 2024-05-05 ASSESSMENT — PAIN DESCRIPTION - PAIN TYPE: TYPE: ACUTE PAIN

## 2024-05-05 ASSESSMENT — PAIN DESCRIPTION - LOCATION: LOCATION: CHEST

## 2024-05-05 ASSESSMENT — PAIN DESCRIPTION - DESCRIPTORS: DESCRIPTORS: THROBBING

## 2024-05-05 NOTE — ED PROVIDER NOTES
amoxicillin-clavulanate (AUGMENTIN) 875-125 MG per tablet Take 1 tablet by mouth 2 times daily for 10 days  Qty: 20 tablet, Refills: 0      benzocaine (ORAJEL) 20 % GEL mucosal gel Take by mouth 2 times daily as needed for Pain      Lactobacillus Rhamnosus, GG, (CULTURELLE PO) Take by mouth      LORazepam (ATIVAN) 0.5 MG tablet Take 1 tablet by mouth nightly as needed for Anxiety.      oxyCODONE-acetaminophen (PERCOCET) 7.5-325 MG per tablet Take 1 tablet by mouth every 4 hours as needed for Pain.      ipratropium 0.5 mg-albuterol 2.5 mg (DUONEB) 0.5-2.5 (3) MG/3ML SOLN nebulizer solution Inhale 3 mL into the lungs via nebulization 2-4 times/day.  Qty: 360 mL, Refills: 6      budesonide-formoterol (SYMBICORT) 160-4.5 MCG/ACT AERO Inhale 2 puffs into the lungs 2 times daily  Qty: 10.2 g, Refills: 5      insulin glargine (LANTUS) 100 UNIT/ML injection vial Inject 20 Units into the skin nightly  Qty: 10 mL, Refills: 3      insulin lispro (HUMALOG) 100 UNIT/ML SOLN injection vial Inject 0-16 Units into the skin 3 times daily (with meals)  Qty: 10 mL, Refills: 0      diphenhydrAMINE (BENADRYL) 25 MG tablet Take 1 tablet by mouth every 6 hours as needed (redness or itching)      dilTIAZem (CARDIZEM CD) 120 MG extended release capsule Take 1 capsule by mouth daily Hold for SBP < 110 or HR < 65      senna (SENOKOT) 8.6 MG tablet Take 2 tablets by mouth 2 times daily Hold for loose stool or diarrhea      ondansetron (ZOFRAN-ODT) 4 MG disintegrating tablet Take 1 tablet by mouth every 6 hours as needed for Nausea or Vomiting      fluticasone (FLONASE) 50 MCG/ACT nasal spray 1 spray by Each Nostril route daily      metFORMIN (GLUCOPHAGE) 500 MG tablet Take 1 tablet by mouth 2 times daily (with meals)      gabapentin (NEURONTIN) 100 MG capsule Take 1 capsule by mouth 3 times daily.      dextromethorphan-quiNIDine (NUEDEXTA) 20-10 MG CAPS per capsule Take 1 capsule by mouth in the morning and at bedtime Indications:  1 mL injection (40 mg IntraVENous Given 5/6/24 2042)   LORazepam (ATIVAN) tablet 0.5 mg (0.5 mg Oral Given 5/6/24 2101)   doxycycline (VIBRAMYCIN) 100 mg in sodium chloride 0.9 % 100 mL IVPB (0 mg IntraVENous Stopped 5/6/24 2349)   sodium phosphate (FLEET) rectal enema 1 enema (has no administration in time range)   methylPREDNISolone sodium succ (SOLU-MEDROL) 125 mg in sterile water 2 mL injection (125 mg IntraVENous Given 5/5/24 1844)   ipratropium 0.5 mg-albuterol 2.5 mg (DUONEB) nebulizer solution 1 Dose (1 Dose Inhalation Given 5/5/24 1845)   ipratropium 0.5 mg-albuterol 2.5 mg (DUONEB) nebulizer solution 1 Dose (1 Dose Inhalation Given 5/5/24 1951)   diphenhydrAMINE (BENADRYL) injection 25 mg (25 mg IntraVENous Given 5/5/24 1953)   iopamidol (ISOVUE-370) 76 % injection 80 mL (80 mLs IntraVENous Given 5/5/24 2139)   potassium bicarb-citric acid (EFFER-K) effervescent tablet 40 mEq (40 mEq Oral Given 5/6/24 0107)   furosemide (LASIX) injection 20 mg (20 mg IntraVENous Given 5/6/24 0107)             Is this patient to be included in the SEP-1 Core Measure due to severe sepsis or septic shock?   No   Exclusion criteria - the patient is NOT to be included for SEP-1 Core Measure due to:  May have criteria for sepsis, but does not meet criteria for severe sepsis or septic shock        Chronic Conditions affecting care:    has a past medical history of Acute diastolic congestive heart failure (HCC) (9/14/2016), Acute diastolic heart failure (HCC), Acute respiratory failure (HCC), Adjustment disorder with mixed anxiety and depressed mood, Allergic rhinitis, Alzheimer's dementia ([facility]), Arachnoid cyst (5/23/2005), Atrial fibrillation ([facility]), CHF (congestive heart failure) ([facility]), Chronic back pain, Constipation, COPD (chronic obstructive pulmonary disease) ([facility]), Diabetes mellitus ([facility]), Diskitis (10/6/2011), Disseminated superficial actinic porokeratosis (DSAP), Edema, ESBL (extended spectrum beta-lactamase) producing

## 2024-05-06 PROBLEM — J43.9 ACUTE EXACERBATION OF EMPHYSEMA (HCC): Status: ACTIVE | Noted: 2024-05-06

## 2024-05-06 PROBLEM — J90 PLEURAL EFFUSION: Status: ACTIVE | Noted: 2024-05-06

## 2024-05-06 PROBLEM — I50.9 ACUTE CONGESTIVE HEART FAILURE (HCC): Status: ACTIVE | Noted: 2024-05-06

## 2024-05-06 PROBLEM — J44.1 ACUTE EXACERBATION OF EMPHYSEMA: Status: ACTIVE | Noted: 2024-05-06

## 2024-05-06 PROBLEM — R73.9 HYPERGLYCEMIA: Status: ACTIVE | Noted: 2024-05-06

## 2024-05-06 PROBLEM — I50.9 DECOMPENSATED HEART FAILURE (HCC): Status: ACTIVE | Noted: 2024-05-06

## 2024-05-06 LAB
ANION GAP SERPL CALCULATED.3IONS-SCNC: 13 MMOL/L (ref 3–16)
BASE EXCESS BLDV CALC-SCNC: 6.4 MMOL/L (ref -3–3)
BASOPHILS # BLD: 0 K/UL (ref 0–0.2)
BASOPHILS NFR BLD: 0.3 %
BUN SERPL-MCNC: 17 MG/DL (ref 7–20)
CALCIUM SERPL-MCNC: 9.2 MG/DL (ref 8.3–10.6)
CHLORIDE SERPL-SCNC: 90 MMOL/L (ref 99–110)
CO2 BLDV-SCNC: 32 MMOL/L
CO2 SERPL-SCNC: 29 MMOL/L (ref 21–32)
COHGB MFR BLDV: 0.9 % (ref 0–1.5)
CREAT SERPL-MCNC: <0.5 MG/DL (ref 0.6–1.2)
DEPRECATED RDW RBC AUTO: 15.9 % (ref 12.4–15.4)
EKG DIAGNOSIS: NORMAL
EKG Q-T INTERVAL: 356 MS
EKG QRS DURATION: 82 MS
EKG QTC CALCULATION (BAZETT): 435 MS
EKG R AXIS: 74 DEGREES
EKG T AXIS: -89 DEGREES
EKG VENTRICULAR RATE: 90 BPM
EOSINOPHIL # BLD: 0 K/UL (ref 0–0.6)
EOSINOPHIL NFR BLD: 0 %
GFR SERPLBLD CREATININE-BSD FMLA CKD-EPI: >90 ML/MIN/{1.73_M2}
GLUCOSE BLD-MCNC: 148 MG/DL (ref 70–99)
GLUCOSE BLD-MCNC: 232 MG/DL (ref 70–99)
GLUCOSE BLD-MCNC: 287 MG/DL (ref 70–99)
GLUCOSE BLD-MCNC: 287 MG/DL (ref 70–99)
GLUCOSE SERPL-MCNC: 213 MG/DL (ref 70–99)
HCO3 BLDV-SCNC: 30.7 MMOL/L (ref 23–29)
HCT VFR BLD AUTO: 38.1 % (ref 36–48)
HGB BLD-MCNC: 11.9 G/DL (ref 12–16)
LYMPHOCYTES # BLD: 0.6 K/UL (ref 1–5.1)
LYMPHOCYTES NFR BLD: 10.5 %
MCH RBC QN AUTO: 25.7 PG (ref 26–34)
MCHC RBC AUTO-ENTMCNC: 31.3 G/DL (ref 31–36)
MCV RBC AUTO: 82.2 FL (ref 80–100)
METHGB MFR BLDV: 0.3 %
MONOCYTES # BLD: 0.2 K/UL (ref 0–1.3)
MONOCYTES NFR BLD: 2.9 %
NEUTROPHILS # BLD: 4.9 K/UL (ref 1.7–7.7)
NEUTROPHILS NFR BLD: 86.3 %
O2 CT VFR BLDV CALC: 17 VOL %
O2 THERAPY: ABNORMAL
PCO2 BLDV: 42.9 MMHG (ref 40–50)
PERFORMED ON: ABNORMAL
PH BLDV: 7.47 [PH] (ref 7.35–7.45)
PLATELET # BLD AUTO: 147 K/UL (ref 135–450)
PMV BLD AUTO: 9.8 FL (ref 5–10.5)
PO2 BLDV: 137.5 MMHG (ref 25–40)
POTASSIUM SERPL-SCNC: 3.7 MMOL/L (ref 3.5–5.1)
RBC # BLD AUTO: 4.63 M/UL (ref 4–5.2)
SAO2 % BLDV: 99 %
SODIUM SERPL-SCNC: 132 MMOL/L (ref 136–145)
WBC # BLD AUTO: 5.7 K/UL (ref 4–11)

## 2024-05-06 PROCEDURE — 2500000003 HC RX 250 WO HCPCS: Performed by: INTERNAL MEDICINE

## 2024-05-06 PROCEDURE — 93010 ELECTROCARDIOGRAM REPORT: CPT | Performed by: STUDENT IN AN ORGANIZED HEALTH CARE EDUCATION/TRAINING PROGRAM

## 2024-05-06 PROCEDURE — 5A09357 ASSISTANCE WITH RESPIRATORY VENTILATION, LESS THAN 24 CONSECUTIVE HOURS, CONTINUOUS POSITIVE AIRWAY PRESSURE: ICD-10-PCS | Performed by: INTERNAL MEDICINE

## 2024-05-06 PROCEDURE — 2700000000 HC OXYGEN THERAPY PER DAY

## 2024-05-06 PROCEDURE — 6360000002 HC RX W HCPCS: Performed by: INTERNAL MEDICINE

## 2024-05-06 PROCEDURE — 94640 AIRWAY INHALATION TREATMENT: CPT

## 2024-05-06 PROCEDURE — 2580000003 HC RX 258: Performed by: INTERNAL MEDICINE

## 2024-05-06 PROCEDURE — 99223 1ST HOSP IP/OBS HIGH 75: CPT | Performed by: INTERNAL MEDICINE

## 2024-05-06 PROCEDURE — 6370000000 HC RX 637 (ALT 250 FOR IP): Performed by: INTERNAL MEDICINE

## 2024-05-06 PROCEDURE — 94660 CPAP INITIATION&MGMT: CPT

## 2024-05-06 PROCEDURE — 94761 N-INVAS EAR/PLS OXIMETRY MLT: CPT

## 2024-05-06 PROCEDURE — 85025 COMPLETE CBC W/AUTO DIFF WBC: CPT

## 2024-05-06 PROCEDURE — 36415 COLL VENOUS BLD VENIPUNCTURE: CPT

## 2024-05-06 PROCEDURE — 80048 BASIC METABOLIC PNL TOTAL CA: CPT

## 2024-05-06 PROCEDURE — 2000000000 HC ICU R&B

## 2024-05-06 PROCEDURE — 6360000002 HC RX W HCPCS: Performed by: STUDENT IN AN ORGANIZED HEALTH CARE EDUCATION/TRAINING PROGRAM

## 2024-05-06 PROCEDURE — 82803 BLOOD GASES ANY COMBINATION: CPT

## 2024-05-06 PROCEDURE — 6370000000 HC RX 637 (ALT 250 FOR IP): Performed by: STUDENT IN AN ORGANIZED HEALTH CARE EDUCATION/TRAINING PROGRAM

## 2024-05-06 RX ORDER — IPRATROPIUM BROMIDE AND ALBUTEROL SULFATE 2.5; .5 MG/3ML; MG/3ML
1 SOLUTION RESPIRATORY (INHALATION)
Status: DISCONTINUED | OUTPATIENT
Start: 2024-05-06 | End: 2024-05-10 | Stop reason: HOSPADM

## 2024-05-06 RX ORDER — LORAZEPAM 0.5 MG/1
0.5 TABLET ORAL NIGHTLY PRN
Status: DISCONTINUED | OUTPATIENT
Start: 2024-05-06 | End: 2024-05-10 | Stop reason: HOSPADM

## 2024-05-06 RX ORDER — BISACODYL 10 MG
10 SUPPOSITORY, RECTAL RECTAL DAILY PRN
Status: DISCONTINUED | OUTPATIENT
Start: 2024-05-06 | End: 2024-05-10 | Stop reason: HOSPADM

## 2024-05-06 RX ORDER — ACETAMINOPHEN 325 MG/1
650 TABLET ORAL EVERY 6 HOURS PRN
Status: DISCONTINUED | OUTPATIENT
Start: 2024-05-06 | End: 2024-05-10 | Stop reason: HOSPADM

## 2024-05-06 RX ORDER — MAGNESIUM SULFATE IN WATER 40 MG/ML
2000 INJECTION, SOLUTION INTRAVENOUS PRN
Status: DISCONTINUED | OUTPATIENT
Start: 2024-05-06 | End: 2024-05-10 | Stop reason: HOSPADM

## 2024-05-06 RX ORDER — INSULIN GLARGINE 100 [IU]/ML
20 INJECTION, SOLUTION SUBCUTANEOUS NIGHTLY
Status: DISCONTINUED | OUTPATIENT
Start: 2024-05-06 | End: 2024-05-07

## 2024-05-06 RX ORDER — SODIUM CHLORIDE 0.9 % (FLUSH) 0.9 %
5-40 SYRINGE (ML) INJECTION EVERY 12 HOURS SCHEDULED
Status: DISCONTINUED | OUTPATIENT
Start: 2024-05-06 | End: 2024-05-10 | Stop reason: HOSPADM

## 2024-05-06 RX ORDER — DILTIAZEM HYDROCHLORIDE 120 MG/1
120 CAPSULE, COATED, EXTENDED RELEASE ORAL DAILY
Status: DISCONTINUED | OUTPATIENT
Start: 2024-05-06 | End: 2024-05-10 | Stop reason: HOSPADM

## 2024-05-06 RX ORDER — FUROSEMIDE 10 MG/ML
20 INJECTION INTRAMUSCULAR; INTRAVENOUS 2 TIMES DAILY
Status: DISCONTINUED | OUTPATIENT
Start: 2024-05-06 | End: 2024-05-08

## 2024-05-06 RX ORDER — INSULIN LISPRO 100 [IU]/ML
0-8 INJECTION, SOLUTION INTRAVENOUS; SUBCUTANEOUS
Status: DISCONTINUED | OUTPATIENT
Start: 2024-05-06 | End: 2024-05-07

## 2024-05-06 RX ORDER — SODIUM CHLORIDE 0.9 % (FLUSH) 0.9 %
5-40 SYRINGE (ML) INJECTION PRN
Status: DISCONTINUED | OUTPATIENT
Start: 2024-05-06 | End: 2024-05-10 | Stop reason: HOSPADM

## 2024-05-06 RX ORDER — FUROSEMIDE 10 MG/ML
20 INJECTION INTRAMUSCULAR; INTRAVENOUS ONCE
Status: COMPLETED | OUTPATIENT
Start: 2024-05-06 | End: 2024-05-06

## 2024-05-06 RX ORDER — LANOLIN ALCOHOL/MO/W.PET/CERES
3 CREAM (GRAM) TOPICAL NIGHTLY
Status: DISCONTINUED | OUTPATIENT
Start: 2024-05-06 | End: 2024-05-10 | Stop reason: HOSPADM

## 2024-05-06 RX ORDER — IPRATROPIUM BROMIDE AND ALBUTEROL SULFATE 2.5; .5 MG/3ML; MG/3ML
1 SOLUTION RESPIRATORY (INHALATION)
Status: DISCONTINUED | OUTPATIENT
Start: 2024-05-06 | End: 2024-05-06

## 2024-05-06 RX ORDER — ONDANSETRON 4 MG/1
4 TABLET, ORALLY DISINTEGRATING ORAL EVERY 8 HOURS PRN
Status: DISCONTINUED | OUTPATIENT
Start: 2024-05-06 | End: 2024-05-10 | Stop reason: HOSPADM

## 2024-05-06 RX ORDER — ONDANSETRON 2 MG/ML
4 INJECTION INTRAMUSCULAR; INTRAVENOUS EVERY 6 HOURS PRN
Status: DISCONTINUED | OUTPATIENT
Start: 2024-05-06 | End: 2024-05-10 | Stop reason: HOSPADM

## 2024-05-06 RX ORDER — ATORVASTATIN CALCIUM 40 MG/1
40 TABLET, FILM COATED ORAL NIGHTLY
Status: DISCONTINUED | OUTPATIENT
Start: 2024-05-06 | End: 2024-05-10 | Stop reason: HOSPADM

## 2024-05-06 RX ORDER — SENNOSIDES 8.6 MG
650 CAPSULE ORAL EVERY 8 HOURS PRN
Status: DISCONTINUED | OUTPATIENT
Start: 2024-05-06 | End: 2024-05-06 | Stop reason: SDUPTHER

## 2024-05-06 RX ORDER — SODIUM CHLORIDE 9 MG/ML
INJECTION, SOLUTION INTRAVENOUS PRN
Status: DISCONTINUED | OUTPATIENT
Start: 2024-05-06 | End: 2024-05-10 | Stop reason: HOSPADM

## 2024-05-06 RX ORDER — ACETAMINOPHEN 650 MG/1
650 SUPPOSITORY RECTAL EVERY 6 HOURS PRN
Status: DISCONTINUED | OUTPATIENT
Start: 2024-05-06 | End: 2024-05-10 | Stop reason: HOSPADM

## 2024-05-06 RX ORDER — POLYETHYLENE GLYCOL 3350 17 G/17G
17 POWDER, FOR SOLUTION ORAL DAILY PRN
Status: DISCONTINUED | OUTPATIENT
Start: 2024-05-06 | End: 2024-05-09 | Stop reason: DRUGHIGH

## 2024-05-06 RX ORDER — OXYCODONE AND ACETAMINOPHEN 7.5; 325 MG/1; MG/1
1 TABLET ORAL EVERY 4 HOURS PRN
Status: DISCONTINUED | OUTPATIENT
Start: 2024-05-06 | End: 2024-05-10 | Stop reason: HOSPADM

## 2024-05-06 RX ORDER — BUDESONIDE 0.5 MG/2ML
0.5 INHALANT ORAL
Status: DISCONTINUED | OUTPATIENT
Start: 2024-05-06 | End: 2024-05-06

## 2024-05-06 RX ORDER — ENOXAPARIN SODIUM 100 MG/ML
40 INJECTION SUBCUTANEOUS DAILY
Status: DISCONTINUED | OUTPATIENT
Start: 2024-05-06 | End: 2024-05-06 | Stop reason: ALTCHOICE

## 2024-05-06 RX ORDER — IPRATROPIUM BROMIDE AND ALBUTEROL SULFATE 2.5; .5 MG/3ML; MG/3ML
1 SOLUTION RESPIRATORY (INHALATION) EVERY 4 HOURS PRN
Status: DISCONTINUED | OUTPATIENT
Start: 2024-05-06 | End: 2024-05-10 | Stop reason: HOSPADM

## 2024-05-06 RX ORDER — DEXMEDETOMIDINE HYDROCHLORIDE 4 UG/ML
.1-1.5 INJECTION, SOLUTION INTRAVENOUS CONTINUOUS
Status: DISCONTINUED | OUTPATIENT
Start: 2024-05-06 | End: 2024-05-07

## 2024-05-06 RX ORDER — INSULIN LISPRO 100 [IU]/ML
0-4 INJECTION, SOLUTION INTRAVENOUS; SUBCUTANEOUS NIGHTLY
Status: DISCONTINUED | OUTPATIENT
Start: 2024-05-06 | End: 2024-05-07

## 2024-05-06 RX ORDER — POTASSIUM CHLORIDE 7.45 MG/ML
10 INJECTION INTRAVENOUS PRN
Status: DISCONTINUED | OUTPATIENT
Start: 2024-05-06 | End: 2024-05-10 | Stop reason: HOSPADM

## 2024-05-06 RX ORDER — POTASSIUM CHLORIDE 20 MEQ/1
40 TABLET, EXTENDED RELEASE ORAL PRN
Status: DISCONTINUED | OUTPATIENT
Start: 2024-05-06 | End: 2024-05-10 | Stop reason: HOSPADM

## 2024-05-06 RX ORDER — GABAPENTIN 100 MG/1
100 CAPSULE ORAL 3 TIMES DAILY
Status: DISCONTINUED | OUTPATIENT
Start: 2024-05-06 | End: 2024-05-10 | Stop reason: HOSPADM

## 2024-05-06 RX ORDER — ENEMA 19; 7 G/133ML; G/133ML
1 ENEMA RECTAL
Status: ACTIVE | OUTPATIENT
Start: 2024-05-06 | End: 2024-05-07

## 2024-05-06 RX ORDER — ERGOCALCIFEROL 1.25 MG/1
50000 CAPSULE ORAL
Status: DISCONTINUED | OUTPATIENT
Start: 2024-05-14 | End: 2024-05-10 | Stop reason: HOSPADM

## 2024-05-06 RX ORDER — BUDESONIDE 0.5 MG/2ML
0.5 INHALANT ORAL
Status: DISCONTINUED | OUTPATIENT
Start: 2024-05-06 | End: 2024-05-10 | Stop reason: HOSPADM

## 2024-05-06 RX ORDER — BUSPIRONE HYDROCHLORIDE 5 MG/1
5 TABLET ORAL DAILY
Status: DISCONTINUED | OUTPATIENT
Start: 2024-05-06 | End: 2024-05-10 | Stop reason: HOSPADM

## 2024-05-06 RX ORDER — ONDANSETRON 4 MG/1
4 TABLET, ORALLY DISINTEGRATING ORAL EVERY 6 HOURS PRN
Status: DISCONTINUED | OUTPATIENT
Start: 2024-05-06 | End: 2024-05-06 | Stop reason: SDUPTHER

## 2024-05-06 RX ORDER — POLYETHYLENE GLYCOL 3350 17 G/17G
17 POWDER, FOR SOLUTION ORAL DAILY PRN
Status: DISCONTINUED | OUTPATIENT
Start: 2024-05-06 | End: 2024-05-06 | Stop reason: SDUPTHER

## 2024-05-06 RX ADMIN — FUROSEMIDE 20 MG: 10 INJECTION, SOLUTION INTRAMUSCULAR; INTRAVENOUS at 08:50

## 2024-05-06 RX ADMIN — BUSPIRONE HYDROCHLORIDE 5 MG: 5 TABLET ORAL at 08:50

## 2024-05-06 RX ADMIN — WATER 40 MG: 1 INJECTION INTRAMUSCULAR; INTRAVENOUS; SUBCUTANEOUS at 20:42

## 2024-05-06 RX ADMIN — POTASSIUM BICARBONATE 40 MEQ: 782 TABLET, EFFERVESCENT ORAL at 01:07

## 2024-05-06 RX ADMIN — FUROSEMIDE 20 MG: 10 INJECTION, SOLUTION INTRAMUSCULAR; INTRAVENOUS at 18:19

## 2024-05-06 RX ADMIN — GABAPENTIN 100 MG: 100 CAPSULE ORAL at 20:42

## 2024-05-06 RX ADMIN — APIXABAN 5 MG: 5 TABLET, FILM COATED ORAL at 08:50

## 2024-05-06 RX ADMIN — WATER 40 MG: 1 INJECTION INTRAMUSCULAR; INTRAVENOUS; SUBCUTANEOUS at 03:58

## 2024-05-06 RX ADMIN — OXYCODONE HYDROCHLORIDE AND ACETAMINOPHEN 1 TABLET: 7.5; 325 TABLET ORAL at 21:01

## 2024-05-06 RX ADMIN — DOXYCYCLINE 100 MG: 100 INJECTION, POWDER, LYOPHILIZED, FOR SOLUTION INTRAVENOUS at 22:49

## 2024-05-06 RX ADMIN — Medication 3 MG: at 20:42

## 2024-05-06 RX ADMIN — MUPIROCIN: 20 OINTMENT TOPICAL at 20:42

## 2024-05-06 RX ADMIN — INSULIN LISPRO 4 UNITS: 100 INJECTION, SOLUTION INTRAVENOUS; SUBCUTANEOUS at 11:37

## 2024-05-06 RX ADMIN — DOXYCYCLINE 100 MG: 100 INJECTION, POWDER, LYOPHILIZED, FOR SOLUTION INTRAVENOUS at 10:50

## 2024-05-06 RX ADMIN — ATORVASTATIN CALCIUM 40 MG: 40 TABLET, FILM COATED ORAL at 20:42

## 2024-05-06 RX ADMIN — DILTIAZEM HYDROCHLORIDE 120 MG: 120 CAPSULE, EXTENDED RELEASE ORAL at 08:50

## 2024-05-06 RX ADMIN — IPRATROPIUM BROMIDE AND ALBUTEROL SULFATE 1 DOSE: 2.5; .5 SOLUTION RESPIRATORY (INHALATION) at 19:11

## 2024-05-06 RX ADMIN — FUROSEMIDE 20 MG: 10 INJECTION, SOLUTION INTRAMUSCULAR; INTRAVENOUS at 01:07

## 2024-05-06 RX ADMIN — PIPERACILLIN AND TAZOBACTAM 3375 MG: 3; .375 INJECTION, POWDER, LYOPHILIZED, FOR SOLUTION INTRAVENOUS at 04:13

## 2024-05-06 RX ADMIN — INSULIN GLARGINE 20 UNITS: 100 INJECTION, SOLUTION SUBCUTANEOUS at 21:01

## 2024-05-06 RX ADMIN — METOPROLOL TARTRATE 25 MG: 25 TABLET, FILM COATED ORAL at 20:42

## 2024-05-06 RX ADMIN — METOPROLOL TARTRATE 25 MG: 25 TABLET, FILM COATED ORAL at 08:50

## 2024-05-06 RX ADMIN — IPRATROPIUM BROMIDE AND ALBUTEROL SULFATE 1 DOSE: 2.5; .5 SOLUTION RESPIRATORY (INHALATION) at 08:29

## 2024-05-06 RX ADMIN — GABAPENTIN 100 MG: 100 CAPSULE ORAL at 14:23

## 2024-05-06 RX ADMIN — Medication 0.2 MCG/KG/HR: at 02:57

## 2024-05-06 RX ADMIN — MUPIROCIN: 20 OINTMENT TOPICAL at 08:49

## 2024-05-06 RX ADMIN — SODIUM CHLORIDE: 9 INJECTION, SOLUTION INTRAVENOUS at 04:10

## 2024-05-06 RX ADMIN — GABAPENTIN 100 MG: 100 CAPSULE ORAL at 08:50

## 2024-05-06 RX ADMIN — INSULIN LISPRO 4 UNITS: 100 INJECTION, SOLUTION INTRAVENOUS; SUBCUTANEOUS at 08:50

## 2024-05-06 RX ADMIN — Medication 1.2 MCG/KG/HR: at 09:37

## 2024-05-06 RX ADMIN — APIXABAN 5 MG: 5 TABLET, FILM COATED ORAL at 20:42

## 2024-05-06 RX ADMIN — APIXABAN 5 MG: 5 TABLET, FILM COATED ORAL at 03:57

## 2024-05-06 RX ADMIN — Medication 10 ML: at 08:51

## 2024-05-06 RX ADMIN — Medication 10 ML: at 20:43

## 2024-05-06 RX ADMIN — LORAZEPAM 0.5 MG: 0.5 TABLET ORAL at 21:01

## 2024-05-06 RX ADMIN — BUDESONIDE 500 MCG: 0.5 SUSPENSION RESPIRATORY (INHALATION) at 08:28

## 2024-05-06 RX ADMIN — WATER 40 MG: 1 INJECTION INTRAMUSCULAR; INTRAVENOUS; SUBCUTANEOUS at 08:50

## 2024-05-06 RX ADMIN — METOPROLOL TARTRATE 25 MG: 25 TABLET, FILM COATED ORAL at 03:57

## 2024-05-06 RX ADMIN — BUDESONIDE 500 MCG: 0.5 SUSPENSION RESPIRATORY (INHALATION) at 19:11

## 2024-05-06 ASSESSMENT — PAIN DESCRIPTION - ORIENTATION
ORIENTATION: LOWER
ORIENTATION: LOWER

## 2024-05-06 ASSESSMENT — PAIN DESCRIPTION - LOCATION
LOCATION: BACK
LOCATION: BACK

## 2024-05-06 ASSESSMENT — PAIN DESCRIPTION - PAIN TYPE: TYPE: ACUTE PAIN

## 2024-05-06 ASSESSMENT — PAIN DESCRIPTION - FREQUENCY: FREQUENCY: CONTINUOUS

## 2024-05-06 ASSESSMENT — PAIN DESCRIPTION - DESCRIPTORS
DESCRIPTORS: ACHING;DISCOMFORT
DESCRIPTORS: ACHING

## 2024-05-06 ASSESSMENT — PAIN SCALES - GENERAL
PAINLEVEL_OUTOF10: 0
PAINLEVEL_OUTOF10: 7
PAINLEVEL_OUTOF10: 7
PAINLEVEL_OUTOF10: 0
PAINLEVEL_OUTOF10: 7

## 2024-05-06 ASSESSMENT — PAIN DESCRIPTION - ONSET: ONSET: PROGRESSIVE

## 2024-05-06 ASSESSMENT — PAIN - FUNCTIONAL ASSESSMENT
PAIN_FUNCTIONAL_ASSESSMENT: ACTIVITIES ARE NOT PREVENTED
PAIN_FUNCTIONAL_ASSESSMENT: PREVENTS OR INTERFERES SOME ACTIVE ACTIVITIES AND ADLS

## 2024-05-06 NOTE — PROGRESS NOTES
RT Inhaler-Nebulizer Bronchodilator Protocol Note    There is a bronchodilator order in the chart from a provider indicating to follow the RT Bronchodilator Protocol and there is an “Initiate RT Inhaler-Nebulizer Bronchodilator Protocol” order as well (see protocol at bottom of note).    CXR Findings:  XR CHEST PORTABLE    Result Date: 5/5/2024  Cardiomegaly with pulmonary vascular congestion/interstitial edema and probable small bilateral pleural effusions.       The findings from the last RT Protocol Assessment were as follows:   History Pulmonary Disease: Chronic pulmonary disease  Respiratory Pattern: Mild dyspnea at rest, irregular pattern, or RR 21-25 bpm  Breath Sounds: Inspiratory and expiratory or bilateral wheezing and/or rhonchi  Cough: Strong, spontaneous, non-productive  Indication for Bronchodilator Therapy:    Bronchodilator Assessment Score: 12    Aerosolized bronchodilator medication orders have been revised according to the RT Inhaler-Nebulizer Bronchodilator Protocol below.    Respiratory Therapist to perform RT Therapy Protocol Assessment initially then follow the protocol.  Repeat RT Therapy Protocol Assessment PRN for score 0-3 or on second treatment, BID, and PRN for scores above 3.    No Indications - adjust the frequency to every 6 hours PRN wheezing or bronchospasm, if no treatments needed after 48 hours then discontinue using Per Protocol order mode.     If indication present, adjust the RT bronchodilator orders based on the Bronchodilator Assessment Score as indicated below.  Use Inhaler orders unless patient has one or more of the following: on home nebulizer, not able to hold breath for 10 seconds, is not alert and oriented, cannot activate and use MDI correctly, or respiratory rate 25 breaths per minute or more, then use the equivalent nebulizer order(s) with same Frequency and PRN reasons based on the score.  If a patient is on this medication at home then do not decrease Frequency  below that used at home.    0-3 - enter or revise RT bronchodilator order(s) to equivalent RT Bronchodilator order with Frequency of every 4 hours PRN for wheezing or increased work of breathing using Per Protocol order mode.        4-6 - enter or revise RT Bronchodilator order(s) to two equivalent RT bronchodilator orders with one order with BID Frequency and one order with Frequency of every 4 hours PRN wheezing or increased work of breathing using Per Protocol order mode.        7-10 - enter or revise RT Bronchodilator order(s) to two equivalent RT bronchodilator orders with one order with TID Frequency and one order with Frequency of every 4 hours PRN wheezing or increased work of breathing using Per Protocol order mode.       11-13 - enter or revise RT Bronchodilator order(s) to one equivalent RT bronchodilator order with QID Frequency and an Albuterol order with Frequency of every 4 hours PRN wheezing or increased work of breathing using Per Protocol order mode.      Greater than 13 - enter or revise RT Bronchodilator order(s) to one equivalent RT bronchodilator order with every 4 hours Frequency and an Albuterol order with Frequency of every 2 hours PRN wheezing or increased work of breathing using Per Protocol order mode.       Electronically signed by Meek Beltre RCP on 5/6/2024 at 5:49 AM

## 2024-05-06 NOTE — PROGRESS NOTES
4 Eyes Skin Assessment     NAME:  Leslie Farris  YOB: 1950  MEDICAL RECORD NUMBER:  [id number]    The patient is being assessed for  Admission    I agree that at least one RN has performed a thorough Head to Toe Skin Assessment on the patient. ALL assessment sites listed below have been assessed.      Areas assessed by both nurses:    Head, Face, Ears, Shoulders, Back, Chest, Arms, Elbows, Hands, Sacrum. Buttock, Coccyx, Ischium, Legs. Feet and Heels, and Under Medical Devices                       Does the Patient have a Wound? Yes wound(s) were present on assessment. LDA wound assessment was Initiated and completed by RN       Jarod Prevention initiated by RN: Yes  Wound Care Orders initiated by RN: Yes    Pressure Injury (Stage 3,4, Unstageable, DTI, NWPT, and Complex wounds) if present, place Wound referral order by RN under : No    New Ostomies, if present place, Ostomy referral order under : No     Nurse 1 eSignature: Electronically signed by Anastasiia Rivas RN on 5/6/24 at 5:00 AM EDT    **SHARE this note so that the co-signing nurse can place an eSignature**    Nurse 2 eSignature: Electronically signed by FUENTES SANDOVAL RN on 5/6/24 at 5:03 AM EDT

## 2024-05-06 NOTE — PROGRESS NOTES
05/06/24 1900   RT Protocol   History Pulmonary Disease 2   Respiratory pattern 2   Breath sounds 2   Cough 0   Indications for Bronchodilator Therapy On home bronchodilators   Bronchodilator Assessment Score 6     RT Inhaler-Nebulizer Bronchodilator Protocol Note    There is a bronchodilator order in the chart from a provider indicating to follow the RT Bronchodilator Protocol and there is an “Initiate RT Inhaler-Nebulizer Bronchodilator Protocol” order as well (see protocol at bottom of note).    CXR Findings:  XR CHEST PORTABLE    Result Date: 5/5/2024  Cardiomegaly with pulmonary vascular congestion/interstitial edema and probable small bilateral pleural effusions.       The findings from the last RT Protocol Assessment were as follows:   History Pulmonary Disease: Chronic pulmonary disease  Respiratory Pattern: Dyspnea on exertion or RR 21-25 bpm  Breath Sounds: Slightly diminished and/or crackles  Cough: Strong, spontaneous, non-productive  Indication for Bronchodilator Therapy: On home bronchodilators  Bronchodilator Assessment Score: 6    Aerosolized bronchodilator medication orders have been revised according to the RT Inhaler-Nebulizer Bronchodilator Protocol below.    Respiratory Therapist to perform RT Therapy Protocol Assessment initially then follow the protocol.  Repeat RT Therapy Protocol Assessment PRN for score 0-3 or on second treatment, BID, and PRN for scores above 3.    No Indications - adjust the frequency to every 6 hours PRN wheezing or bronchospasm, if no treatments needed after 48 hours then discontinue using Per Protocol order mode.     If indication present, adjust the RT bronchodilator orders based on the Bronchodilator Assessment Score as indicated below.  Use Inhaler orders unless patient has one or more of the following: on home nebulizer, not able to hold breath for 10 seconds, is not alert and oriented, cannot activate and use MDI correctly, or respiratory rate 25 breaths per

## 2024-05-06 NOTE — PLAN OF CARE
(HUMALOG) 100 UNIT/ML SOLN injection vial Inject 0-16 Units into the skin 3 times daily (with meals) 4/14/24   Lay Ludwig MD   diphenhydrAMINE (BENADRYL) 25 MG tablet Take 1 tablet by mouth every 6 hours as needed (redness or itching)    Eliza Reid MD   dilTIAZem (CARDIZEM CD) 120 MG extended release capsule Take 1 capsule by mouth daily Hold for SBP < 110 or HR < 65    Eliza Reid MD   senna (SENOKOT) 8.6 MG tablet Take 2 tablets by mouth 2 times daily Hold for loose stool or diarrhea    Eliza Reid MD   ondansetron (ZOFRAN-ODT) 4 MG disintegrating tablet Take 1 tablet by mouth every 6 hours as needed for Nausea or Vomiting    Eliza Reid MD   fluticasone (FLONASE) 50 MCG/ACT nasal spray 1 spray by Each Nostril route daily 1/19/24   Marisabel Maciel MD   metFORMIN (GLUCOPHAGE) 500 MG tablet Take 1 tablet by mouth 2 times daily (with meals)    Eliza Reid MD   gabapentin (NEURONTIN) 100 MG capsule Take 1 capsule by mouth 3 times daily.    ProviderEliza MD   dextromethorphan-quiNIDine (NUEDEXTA) 20-10 MG CAPS per capsule Take 1 capsule by mouth in the morning and at bedtime Indications: Pseudobulbar Affect  Patient not taking: Reported on 4/30/2024 7/7/23   Eliza Reid MD   apixaban (ELIQUIS) 5 MG TABS tablet Take 1 tablet by mouth 2 times daily 4/28/23   Montserrat Morelos MD   atorvastatin (LIPITOR) 40 MG tablet Take 1 tablet by mouth nightly 4/28/23   Montserrat Morelos MD   acetaminophen (TYLENOL) 650 MG extended release tablet Take 1 tablet by mouth every 8 hours as needed for Fever or Pain    Eliza Reid MD   metoprolol tartrate (LOPRESSOR) 25 MG tablet Take 1 tablet by mouth 2 times daily 4/16/23   Brody Glasgow MD   polyethylene glycol (GLYCOLAX) 17 GM/SCOOP powder Take 17 g by mouth daily as needed (constipation)    Eliza Reid MD   busPIRone (BUSPAR) 5 MG tablet Take 1 tablet by mouth daily One  tablet daily    Eliza Reid MD   melatonin 3 MG TABS tablet Take 1 tablet by mouth nightly    Eliza Reid MD   Cholecalciferol (VITAMIN D3) 1.25 MG (56498 UT) CAPS Take 1 capsule by mouth every 30 days (Last dose around March 13, 2023 - next due around April 15, 2023)    Eliza Reid MD   bisacodyl (DULCOLAX) 10 MG suppository Place 1 suppository rectally daily as needed for Constipation (no BM for 3 days)    Eliza Reid MD      Diet Reviewed: Yes   Diet NPO    Goal of Care Reviewed: Yes   Patient and/or Family's stated Goal of Care this Admission: Reduce shortness of breath, increase activity tolerance, better understand heart failure and disease management, be more comfortable, and reduce lower extremity edema prior to discharge.     Electronically signed by Anastasiia Rivas RN on 5/6/2024 at 4:56 AM

## 2024-05-06 NOTE — PROGRESS NOTES
Recd pt from [facility]  report received pt transported to ICU via stretcher on monitor on PAP. Pt demanding and yelling  questions answered on precedex drip

## 2024-05-06 NOTE — DISCHARGE INSTRUCTIONS
exertion is being expended.    Elements of Energy Conservation:   Prioritize/Plan: Decide what needs to be done today, and what can wait for a later date, write to do lists, plan ahead to avoid extra trips, and gather supplies and equipment needed before starting an activity.   Position: Avoid tiring and awkward posture that may impair breathing.  Pace: Slow and steady pace, never rushing!  Pursed lip breathing.  Pursed Lip Breathing: \"Smell the roses, blow out the candles\".

## 2024-05-06 NOTE — CARE COORDINATION
Case Management Assessment  Initial Evaluation    Date/Time of Evaluation: 5/6/2024 2:01 PM  Assessment Completed by: Carol Triana RN    If patient is discharged prior to next notation, then this note serves as note for discharge by case management.    Patient Name: Leslie Farris                   YOB: 1950  Diagnosis: Dyspnea and respiratory abnormalities [R06.00, R06.89]  Acute respiratory failure with hypoxia (HCC) [J96.01]  Chest pain, unspecified type [R07.9]  Decompensated heart failure (HCC) [I50.9]                   Date / Time: 5/5/2024  6:10 PM    Patient Admission Status: Inpatient   Readmission Risk (Low < 19, Mod (19-27), High > 27): Readmission Risk Score: 28.3    Current PCP: Kristin Pacheco MD  PCP verified by CM? Yes (Kristin Pacheco MD)    Chart Reviewed: Yes      History Provided by: Medical Record, Other (see comment) (Staff at [facility])  Patient Orientation: Alert and Oriented, Self    Patient Cognition: Alert    Hospitalization in the last 30 days (Readmission):  Yes    If yes, Readmission Assessment in  Navigator will be completed.    Advance Directives:      Code Status: Full Code   Patient's Primary Decision Maker is: Patient Declined (Legal Next of Kin Remains as Decision Maker)    Primary Decision Maker: Saeed Magallanes - Brother/Sister - [phone]    Discharge Planning:    Patient lives with: Other (Comment) (Staff at [facility]) Type of Home: Long-Term Care ([facility])  Primary Care Giver: Other (Comment) (Staff at [facility])  Patient Support Systems include: Family Members, Other (Comment) (Staff at [facility])   Current Financial resources: Medicaid  Current community resources: ECF/Home Care (Resident at [facility])  Current services prior to admission: Extended Care Facility, Oxygen Therapy (On AVAP)            Current DME:              Type of Home Care services:  None    ADLS  Prior functional level: Assistance with the following:, Bathing, Dressing, Toileting, Cooking,  choice list with basic dialogue that supports the patient's individualized plan of care/goals and shares the quality data associated with the providers was provided to:     Patient Representative Name:       The Patient and/or Patient Representative Agree with the Discharge Plan?      Carol Triana RN  Case Management Department  Ph: [phone]

## 2024-05-06 NOTE — ED PROVIDER NOTES
I independently reviewed the ECG as follows:    A-fib at a rate of 90 without ectopy.  Normal axis.  Unable to calculate ELIZABETH otherwise normal intervals.  No evidence of acute ischemia.    Please reference my attending note if I had further involvement in the care of this patient otherwise I did not participate in the care of this patient beyond evaluation of this ECG.  Please reference the KERRY's documentation for further details.    I independently performed a history and physical on Leslie Farris.     I have discussed the case with the KERRY/resident at 2300 and approve / take responsibility for the initial management plan and anticipated disposition as documented below.     In summary the patient presents with chest pain and shortness of breath.  She has a complex medical history including a history of hypoxic respiratory failure recommended to wear 2 L/min of supplemental nasal cannula oxygen as needed.  Here she has required 6 L/min.  On my evaluation the patient is afebrile hemodynamically stable.  She is however quite dyspneic with some mild increased work of breathing and tachypnea.  She is achieving oxygen saturations around 94% even with increased supplementation.  Her breath sounds are globally diminished no clear wheezing rhonchi or rales.  Her abdomen is without peritoneal signs though she describes some tenderness particular the right upper quadrant.  Of note she was recently evaluated for abdominal pain with concern for a cholecystitis attempt to manage medically with antibiotics and had plan for outpatient follow-up to general surgery.  She has not yet achieved this follow-up.  Here she does not appear with other markers of an ongoing biliary condition no nausea vomiting or other symptoms.  Her extremities are warm and well-perfused.  She has some peripheral edema in the lower extremities    Overall I favor the patient is suffering a hypoxic respiratory failure secondary to a degree of pulmonary

## 2024-05-06 NOTE — PROGRESS NOTES
05/06/24 0213   NIV Type   $NIV $Daily Charge   Equipment Type v60   Mode Bilevel   Mask Type Full face mask   Mask Size Small   Assessment   Pulse 98   Settings/Measurements   IPAP 12 cmH20   CPAP/EPAP 5 cmH2O   Vt (Measured) 512 mL   Rate Ordered 16   FiO2  30 %   Minute Volume (L/min) 23.3 Liters   Mask Leak (lpm) 0 lpm   Patient's Home Machine No   Patient Observation   Observations spo2 99% on 30% bipap

## 2024-05-06 NOTE — DISCHARGE INSTR - COC
Continuity of Care Form    Patient Name: Leslie Farris   :  1950  MRN:  [id number]    Admit date:  2024  Discharge date:  05/10/2024    Code Status Order: Full Code   Advance Directives:     Admitting Physician:  Marisabel Maciel MD  PCP: Kristin Pacheco MD    Discharging Nurse: VISHAL Corbett  Discharging Hospital Unit/Room#: 3013/3013-01  Discharging Unit Phone Number: [phone]    Emergency Contact:   Extended Emergency Contact Information  Primary Emergency Contact: husam marquez  Mobile Phone: [phone]  Relation: Other Relative  Preferred language: English   needed? No  Secondary Emergency Contact: Suresh Hill  Mobile Phone: [phone]  Relation: Niece/Nephew   needed? No    Past Surgical History:  Past Surgical History:   Procedure Laterality Date    IR MIDLINE CATH  10/10/2022    IR MIDLINE CATH 10/10/2022 [facility] PROC[facility]    IR MIDLINE CATH  2023    IR MIDLINE CATH 2023 [facility]    KNEE SURGERY      TONSILLECTOMY      TUBAL LIGATION         Immunization History:   Immunization History   Administered Date(s) Administered    COVID-19, PFIZER Bivalent, DO NOT Dilute, (age 12y+), IM, 30 mcg/0.3 mL 2023    COVID-19, PFIZER GRAY top, DO NOT Dilute, (age 12 y+), IM, 30 mcg/0.3 mL 2022    COVID-19, PFIZER PURPLE top, DILUTE for use, (age 12 y+), 30mcg/0.3mL 2020, 2021, 2021    Influenza Virus Vaccine 2014, 10/20/2019    Influenza, FLUARIX, FLULAVAL, FLUZONE (age 6 mo+) AND AFLURIA, (age 3 y+), PF, 0.5mL 2016    Pneumococcal, PCV-13, PREVNAR 13, (age 6w+), IM, 0.5mL 2016       Active Problems:  Patient Active Problem List   Diagnosis Code    Seizure disorder, grand mal ([facility]) G40.409    DM (diabetes mellitus) ([facility]) E11.9    Lumbar facet arthropathy M47.816    Disc degeneration, lumbar M51.36    Class 1 obesity in adult E66.9    Hypokalemia E87.6    Atrial fibrillation with RVR ([facility]) I48.91     symptoms of and report to MD:  Worsening Heart Failure: sudden weight gain, shortness of breath, lower extremity or general edema/swelling, abdominal bloating/swelling, inability to lie flat, intolerance to usual activity, or cough (especially at night). Report these finding even if no increase in weight.  Dehydration:  having difficulty or a decrease in urination, dizziness, worsening fatigue, or new onset/worsening of generalized weakness.     Please continue a LOW SODIUM diet and LIMIT fluid intake to 48 - 64 ounces ( 1.5 - 2 liters) per day.   Call Facility MD} with any questions or concerns.   Please continue heart failure education to patient and family/support system.  See After Visit Summary for hospital follow up appointment details.  Consider Spiritual Care Referral for support and/or completion of advance directives:   [facility] Spiritual Care Services: [phone]  Consider: having the facility MD complete required 7 day follow up.    COPD Instructions for Daily Management    Patient was treated for chronic Chronic Obstructive Pulmonary Disease (COPD) exacerbation.  Leslie Farris will require the following:  Please monitor for signs and symptoms of acute COPD exacerbation:   More coughing than usual  Wheezing  Increased mucus or a change in mucus color  Medications not helping or having to use them more often  + Worsening shortness of breath  Lower pulse oximetry levels with prescribed oxygen.   Call Facility MD with any questions or concerns.   Use Spacer/Chamber with inhaler.   Please continue COPD education to patient and family/support system.  See After Visit Summary for hospital follow up appointment details.  Consider Spiritual Care Referral for support and/or completion of advance directives:   [facility] Care Services: [phone]  Consider: having the facility MD complete required 7 day follow up.    Patient's personal belongings (please select

## 2024-05-06 NOTE — CONSULTS
Pulmonary & Critical Care Consultation Note    Patient is being seen at the request of Leilani Millan MD  for a consultation for BiPAP    HISTORY OF PRESENT ILLNESS:   73 years old with history of COPD nursing home resident presented with shortness of breath.  Found to be in respiratory distress placed on BiPAP admitted to the ICU for further evaluation.  Sat 84% RA off BiPAP, now requiring 5L. Chest imaging revealed vascular congestion.  Underlying dementia poor historian limited to obtain further HPI.   BIPAP 12/5 30%   Precedex 1.2 mcg/kg/hr for agitation       PAST MEDICAL HISTORY:  Past Medical History:   Diagnosis Date    Acute diastolic congestive heart failure (HCC) 9/14/2016    Acute diastolic heart failure (HCC)     Acute respiratory failure (HCC)     Adjustment disorder with mixed anxiety and depressed mood     Allergic rhinitis     Alzheimer's dementia ([facility])     Arachnoid cyst 5/23/2005    Atrial fibrillation ([facility])     CHF (congestive heart failure) ([facility])     Chronic back pain     Constipation     COPD (chronic obstructive pulmonary disease) ([facility])     Diabetes mellitus ([facility])     Diskitis 10/6/2011    Disseminated superficial actinic porokeratosis ([facility])     Edema     ESBL (extended spectrum beta-lactamase) producing bacteria infection 04/17/2020    Urine Klebsiella    Hypertension     Hypo-osmolality and hyponatremia     Major depressive disorder     Morbid obesity ([facility])     Muscle weakness     Osteomyelitis of spine (HCC) 10/6/2011    Overactive bladder     Schizoaffective disorder ([facility])     Seizures ([facility])     Urinary tract infection without hematuria     Xerosis cutis      PAST SURGICAL HISTORY:  Past Surgical History:   Procedure Laterality Date    IR MIDLINE CATH  10/10/2022    IR MIDLINE CATH 10/10/2022 [facility]    IR MIDLINE CATH  4/11/2023    IR MIDLINE CATH 4/11/2023 [facility]    KNEE SURGERY      TONSILLECTOMY      TUBAL LIGATION         FAMILY HISTORY:  family

## 2024-05-06 NOTE — PROGRESS NOTES
Care rounds completed with Dr. Alvarez and multidisciplinary team. Reviewed labs, meds, VS, assessment, & plan of care for today. See dictated note and new orders for details. New orders received.

## 2024-05-06 NOTE — PROGRESS NOTES
Shift assessment, completed, see flow sheet. Pt is alert and oriented to self with some confusion. Pt yells out while awake.    VSS. Respirations are easy, even, and unlabored. Bilateral lung sounds are diminished throughout. Pt currently on 5L via HFNC. Pt remains NPO.     PIV x2 remain in place and patent with sites and dressings C/D/I. Purewick remains in place and patent.     Call light within reach. Bed in lowest position. Bed alarm on. Pt denies any further needs at this time.

## 2024-05-06 NOTE — H&P
V2.0  History and Physical      Name:  Leslie Farris /Age/Sex: 1950  (73 y.o. female)   MRN & CSN:  [id number] & [id number] Encounter Date/Time: 2024 1:54 AM EDT   Location:   PCP: Kristin Pacheco MD       Hospital Day: 2    Assessment and Plan:   Leslie Farris is a 73 y.o. female with a pmh of COPD, chronic respiratory failure, diabetes, Alzheimer's, major depression, schizoaffective disorder, paroxysmal A-fib who presents with Decompensated heart failure (HCC)    Hospital Problems             Last Modified POA    * (Principal) Decompensated heart failure (HCC) 2024 Yes   Acute cholecystitis  COPD with exacerbation  Acute respiratory failure  Diabetes with hyperglycemia  Alzheimer's dementia  Hyperkalemia  Lactic acidosis    Plan:  Will start the patient on BiPAP  and get an ABG in an hour post initiation  Start Zosyn Q8  Will hold off on IV fluids at this time  Continue with Lasix 20 mg IV twice daily  Continue to monitor electrolytes.  Resume Lantus and sliding scale insulin  Start methylprednisone 40 every 8, start budesonide nebs every 12  Will consult general surgery on account of acute cholecystitis as well    Disposition:   Current Living situation: Nursing home  Expected Disposition: Nursing  Estimated D/C: 3 days   DVT Prophylaxis [] Lovenox, []  Heparin, [] SCDs, [] Ambulation,  [x] Eliquis, [] Xarelto, [] Coumadin   Code Status Full Code   Surrogate Decision Maker/ POA      Personally reviewed Lab Studies and Imaging       History from:     patient, electronic medical record    History of Present Illness:     Chief Complaint: Shortness of breath  Leslie Farris is a 73 y.o. female with pmh of COPD chronic respiratory failure on 2 L of oxygen, diabetes, Alzheimer's dementia, major depressive disorder, schizoaffective disorder, paroxysmal A-fib who resides in a nursing home and was brought in for shortness of breath.  Patient is a poor historian.  She had  PROVIDED HISTORY: Reason for exam:->Abdominal pain. Recent diagnosis of cholecystitis.  Lactic of 4. Additional Contrast?->None Decision Support Exception - unselect if not a suspected or confirmed emergency medical condition->Emergency Medical Condition (MA) Reason for Exam: abdo pain, cholecystitis; ORDERING SYSTEM PROVIDED HISTORY: shortness of breath, chest pain, TECHNOLOGIST PROVIDED HISTORY: Reason for exam:->shortness of breath, chest pain, Additional Contrast?->1 Reason for Exam: sob, chest pain, r/o PE FINDINGS: CHEST PULMONARY ARTERIES:  Mild dilation of the pulmonary trunk but not out of proportion the ascending thoracic aorta.  Slight dilation of intrapulmonary branches out of proportion with accompanying bronchi.  Adequately opacified for evaluation.  No filling defects consistent with emboli. MEDIASTINUM:  Mild cardiomegaly.  Borderline right ventricular hypertrophy. Mild concentric left ventricular hypertrophy.  No flattening of the interventricular septum.  No pericardial effusion.  Mild coronary atherosclerotic calcifications.  Mild systemic atherosclerosis.  Reflux of the contrast bolus into the azygos arch.  No mediastinal nor hilar lymphadenopathy. LUNGS/PLEURA:  Dependent secretions extending from the trachea into the right mainstem bronchus.  Mild bronchial wall thickening.  At least mild centrilobular emphysema.  Minimal smooth interlobular septal thickening near the apices and bases.  Unchanged 0.7 cm x 0.5 cm noncalcified solid nodule in the apical segment of the right upper lobe (series 3 image 28).  Unchanged perifissural lymph nodes along the right minor and left major fissures. Passive atelectasis bilaterally.  Small bilateral pleural effusions.  No pneumothoraces. SOFT TISSUES/BONES:   Reflux of the contrast bolus into venous collaterals about the right shoulder.  Minimal subcutaneous edema.  No supraclavicular nor axillary lymphadenopathy.  Diffuse bony demineralization consistent

## 2024-05-06 NOTE — PROGRESS NOTES
RT Inhaler-Nebulizer Bronchodilator Protocol Note    There is a bronchodilator order in the chart from a provider indicating to follow the RT Bronchodilator Protocol and there is an “Initiate RT Inhaler-Nebulizer Bronchodilator Protocol” order as well (see protocol at bottom of note).    CXR Findings:  XR CHEST PORTABLE    Result Date: 5/5/2024  Cardiomegaly with pulmonary vascular congestion/interstitial edema and probable small bilateral pleural effusions.       The findings from the last RT Protocol Assessment were as follows:   History Pulmonary Disease: (P) Chronic pulmonary disease  Respiratory Pattern: (P) Dyspnea on exertion or RR 21-25 bpm  Breath Sounds: (P) Slightly diminished and/or crackles  Cough: (P) Strong, spontaneous, non-productive  Indication for Bronchodilator Therapy: (P) On home bronchodilators  Bronchodilator Assessment Score: (P) 6    Aerosolized bronchodilator medication orders have been revised according to the RT Inhaler-Nebulizer Bronchodilator Protocol below.    Respiratory Therapist to perform RT Therapy Protocol Assessment initially then follow the protocol.  Repeat RT Therapy Protocol Assessment PRN for score 0-3 or on second treatment, BID, and PRN for scores above 3.    No Indications - adjust the frequency to every 6 hours PRN wheezing or bronchospasm, if no treatments needed after 48 hours then discontinue using Per Protocol order mode.     If indication present, adjust the RT bronchodilator orders based on the Bronchodilator Assessment Score as indicated below.  Use Inhaler orders unless patient has one or more of the following: on home nebulizer, not able to hold breath for 10 seconds, is not alert and oriented, cannot activate and use MDI correctly, or respiratory rate 25 breaths per minute or more, then use the equivalent nebulizer order(s) with same Frequency and PRN reasons based on the score.  If a patient is on this medication at home then do not decrease Frequency below  that used at home.    0-3 - enter or revise RT bronchodilator order(s) to equivalent RT Bronchodilator order with Frequency of every 4 hours PRN for wheezing or increased work of breathing using Per Protocol order mode.        4-6 - enter or revise RT Bronchodilator order(s) to two equivalent RT bronchodilator orders with one order with BID Frequency and one order with Frequency of every 4 hours PRN wheezing or increased work of breathing using Per Protocol order mode.        7-10 - enter or revise RT Bronchodilator order(s) to two equivalent RT bronchodilator orders with one order with TID Frequency and one order with Frequency of every 4 hours PRN wheezing or increased work of breathing using Per Protocol order mode.       11-13 - enter or revise RT Bronchodilator order(s) to one equivalent RT bronchodilator order with QID Frequency and an Albuterol order with Frequency of every 4 hours PRN wheezing or increased work of breathing using Per Protocol order mode.      Greater than 13 - enter or revise RT Bronchodilator order(s) to one equivalent RT bronchodilator order with every 4 hours Frequency and an Albuterol order with Frequency of every 2 hours PRN wheezing or increased work of breathing using Per Protocol order mode.         Electronically signed by Jessica Noble RCP on 5/6/2024 at 9:22 AM

## 2024-05-07 PROBLEM — J43.9 ACUTE EXACERBATION OF EMPHYSEMA (HCC): Status: ACTIVE | Noted: 2024-05-07

## 2024-05-07 PROBLEM — J44.1 COPD WITH ACUTE EXACERBATION (HCC): Status: ACTIVE | Noted: 2024-05-06

## 2024-05-07 PROBLEM — J44.1 ACUTE EXACERBATION OF EMPHYSEMA: Status: ACTIVE | Noted: 2024-05-07

## 2024-05-07 LAB
ANION GAP SERPL CALCULATED.3IONS-SCNC: 12 MMOL/L (ref 3–16)
BASOPHILS # BLD: 0 K/UL (ref 0–0.2)
BASOPHILS NFR BLD: 0.1 %
BUN SERPL-MCNC: 28 MG/DL (ref 7–20)
CALCIUM SERPL-MCNC: 9.1 MG/DL (ref 8.3–10.6)
CHLORIDE SERPL-SCNC: 92 MMOL/L (ref 99–110)
CO2 SERPL-SCNC: 33 MMOL/L (ref 21–32)
CREAT SERPL-MCNC: 0.7 MG/DL (ref 0.6–1.2)
DEPRECATED RDW RBC AUTO: 16.4 % (ref 12.4–15.4)
EOSINOPHIL # BLD: 0 K/UL (ref 0–0.6)
EOSINOPHIL NFR BLD: 0 %
GFR SERPLBLD CREATININE-BSD FMLA CKD-EPI: 90 ML/MIN/{1.73_M2}
GLUCOSE BLD-MCNC: 156 MG/DL (ref 70–99)
GLUCOSE BLD-MCNC: 221 MG/DL (ref 70–99)
GLUCOSE BLD-MCNC: 252 MG/DL (ref 70–99)
GLUCOSE BLD-MCNC: 335 MG/DL (ref 70–99)
GLUCOSE SERPL-MCNC: 417 MG/DL (ref 70–99)
HCT VFR BLD AUTO: 38.8 % (ref 36–48)
HGB BLD-MCNC: 12.3 G/DL (ref 12–16)
LYMPHOCYTES # BLD: 0.5 K/UL (ref 1–5.1)
LYMPHOCYTES NFR BLD: 5.3 %
MCH RBC QN AUTO: 25.7 PG (ref 26–34)
MCHC RBC AUTO-ENTMCNC: 31.7 G/DL (ref 31–36)
MCV RBC AUTO: 81.2 FL (ref 80–100)
MONOCYTES # BLD: 0.6 K/UL (ref 0–1.3)
MONOCYTES NFR BLD: 5.8 %
NEUTROPHILS # BLD: 8.7 K/UL (ref 1.7–7.7)
NEUTROPHILS NFR BLD: 88.8 %
PERFORMED ON: ABNORMAL
PLATELET # BLD AUTO: 181 K/UL (ref 135–450)
PMV BLD AUTO: 9.9 FL (ref 5–10.5)
POTASSIUM SERPL-SCNC: 3.8 MMOL/L (ref 3.5–5.1)
RBC # BLD AUTO: 4.77 M/UL (ref 4–5.2)
SODIUM SERPL-SCNC: 137 MMOL/L (ref 136–145)
WBC # BLD AUTO: 9.8 K/UL (ref 4–11)

## 2024-05-07 PROCEDURE — 2700000000 HC OXYGEN THERAPY PER DAY

## 2024-05-07 PROCEDURE — 80048 BASIC METABOLIC PNL TOTAL CA: CPT

## 2024-05-07 PROCEDURE — 94640 AIRWAY INHALATION TREATMENT: CPT

## 2024-05-07 PROCEDURE — 94660 CPAP INITIATION&MGMT: CPT

## 2024-05-07 PROCEDURE — 94761 N-INVAS EAR/PLS OXIMETRY MLT: CPT

## 2024-05-07 PROCEDURE — 6370000000 HC RX 637 (ALT 250 FOR IP): Performed by: INTERNAL MEDICINE

## 2024-05-07 PROCEDURE — 85025 COMPLETE CBC W/AUTO DIFF WBC: CPT

## 2024-05-07 PROCEDURE — 2500000003 HC RX 250 WO HCPCS: Performed by: INTERNAL MEDICINE

## 2024-05-07 PROCEDURE — 6360000002 HC RX W HCPCS: Performed by: INTERNAL MEDICINE

## 2024-05-07 PROCEDURE — 36415 COLL VENOUS BLD VENIPUNCTURE: CPT

## 2024-05-07 PROCEDURE — 2580000003 HC RX 258: Performed by: INTERNAL MEDICINE

## 2024-05-07 PROCEDURE — 2000000000 HC ICU R&B

## 2024-05-07 PROCEDURE — 99291 CRITICAL CARE FIRST HOUR: CPT | Performed by: INTERNAL MEDICINE

## 2024-05-07 PROCEDURE — 99233 SBSQ HOSP IP/OBS HIGH 50: CPT | Performed by: INTERNAL MEDICINE

## 2024-05-07 RX ORDER — INSULIN GLARGINE 100 [IU]/ML
15 INJECTION, SOLUTION SUBCUTANEOUS 2 TIMES DAILY
Status: DISCONTINUED | OUTPATIENT
Start: 2024-05-07 | End: 2024-05-10 | Stop reason: HOSPADM

## 2024-05-07 RX ORDER — DEXTROSE MONOHYDRATE 100 MG/ML
INJECTION, SOLUTION INTRAVENOUS CONTINUOUS PRN
Status: DISCONTINUED | OUTPATIENT
Start: 2024-05-07 | End: 2024-05-10 | Stop reason: HOSPADM

## 2024-05-07 RX ORDER — SENNA AND DOCUSATE SODIUM 50; 8.6 MG/1; MG/1
2 TABLET, FILM COATED ORAL 2 TIMES DAILY
Status: DISCONTINUED | OUTPATIENT
Start: 2024-05-07 | End: 2024-05-10 | Stop reason: HOSPADM

## 2024-05-07 RX ORDER — INSULIN LISPRO 100 [IU]/ML
0-4 INJECTION, SOLUTION INTRAVENOUS; SUBCUTANEOUS NIGHTLY
Status: DISCONTINUED | OUTPATIENT
Start: 2024-05-07 | End: 2024-05-10 | Stop reason: HOSPADM

## 2024-05-07 RX ORDER — GLUCAGON 1 MG/ML
1 KIT INJECTION PRN
Status: DISCONTINUED | OUTPATIENT
Start: 2024-05-07 | End: 2024-05-10 | Stop reason: HOSPADM

## 2024-05-07 RX ORDER — INSULIN LISPRO 100 [IU]/ML
0-16 INJECTION, SOLUTION INTRAVENOUS; SUBCUTANEOUS
Status: DISCONTINUED | OUTPATIENT
Start: 2024-05-07 | End: 2024-05-10 | Stop reason: HOSPADM

## 2024-05-07 RX ADMIN — METOPROLOL TARTRATE 25 MG: 25 TABLET, FILM COATED ORAL at 21:19

## 2024-05-07 RX ADMIN — GABAPENTIN 100 MG: 100 CAPSULE ORAL at 08:55

## 2024-05-07 RX ADMIN — WATER 40 MG: 1 INJECTION INTRAMUSCULAR; INTRAVENOUS; SUBCUTANEOUS at 08:55

## 2024-05-07 RX ADMIN — FUROSEMIDE 20 MG: 10 INJECTION, SOLUTION INTRAMUSCULAR; INTRAVENOUS at 19:29

## 2024-05-07 RX ADMIN — SENNOSIDES AND DOCUSATE SODIUM 2 TABLET: 50; 8.6 TABLET ORAL at 21:19

## 2024-05-07 RX ADMIN — INSULIN LISPRO 8 UNITS: 100 INJECTION, SOLUTION INTRAVENOUS; SUBCUTANEOUS at 12:18

## 2024-05-07 RX ADMIN — DOXYCYCLINE 100 MG: 100 INJECTION, POWDER, LYOPHILIZED, FOR SOLUTION INTRAVENOUS at 12:14

## 2024-05-07 RX ADMIN — INSULIN GLARGINE 15 UNITS: 100 INJECTION, SOLUTION SUBCUTANEOUS at 21:19

## 2024-05-07 RX ADMIN — LORAZEPAM 0.5 MG: 0.5 TABLET ORAL at 21:30

## 2024-05-07 RX ADMIN — FUROSEMIDE 20 MG: 10 INJECTION, SOLUTION INTRAMUSCULAR; INTRAVENOUS at 08:55

## 2024-05-07 RX ADMIN — MUPIROCIN: 20 OINTMENT TOPICAL at 21:18

## 2024-05-07 RX ADMIN — BUSPIRONE HYDROCHLORIDE 5 MG: 5 TABLET ORAL at 08:55

## 2024-05-07 RX ADMIN — APIXABAN 5 MG: 5 TABLET, FILM COATED ORAL at 21:19

## 2024-05-07 RX ADMIN — ATORVASTATIN CALCIUM 40 MG: 40 TABLET, FILM COATED ORAL at 21:19

## 2024-05-07 RX ADMIN — Medication 3 MG: at 21:19

## 2024-05-07 RX ADMIN — IPRATROPIUM BROMIDE AND ALBUTEROL SULFATE 1 DOSE: 2.5; .5 SOLUTION RESPIRATORY (INHALATION) at 19:59

## 2024-05-07 RX ADMIN — IPRATROPIUM BROMIDE AND ALBUTEROL SULFATE 1 DOSE: 2.5; .5 SOLUTION RESPIRATORY (INHALATION) at 08:05

## 2024-05-07 RX ADMIN — Medication 10 ML: at 21:18

## 2024-05-07 RX ADMIN — DILTIAZEM HYDROCHLORIDE 120 MG: 120 CAPSULE, EXTENDED RELEASE ORAL at 08:55

## 2024-05-07 RX ADMIN — GABAPENTIN 100 MG: 100 CAPSULE ORAL at 21:19

## 2024-05-07 RX ADMIN — BUDESONIDE 500 MCG: 0.5 SUSPENSION RESPIRATORY (INHALATION) at 19:59

## 2024-05-07 RX ADMIN — INSULIN LISPRO 6 UNITS: 100 INJECTION, SOLUTION INTRAVENOUS; SUBCUTANEOUS at 08:55

## 2024-05-07 RX ADMIN — APIXABAN 5 MG: 5 TABLET, FILM COATED ORAL at 08:55

## 2024-05-07 RX ADMIN — OXYCODONE HYDROCHLORIDE AND ACETAMINOPHEN 1 TABLET: 7.5; 325 TABLET ORAL at 21:30

## 2024-05-07 RX ADMIN — BUDESONIDE 500 MCG: 0.5 SUSPENSION RESPIRATORY (INHALATION) at 08:06

## 2024-05-07 RX ADMIN — METOPROLOL TARTRATE 25 MG: 25 TABLET, FILM COATED ORAL at 08:55

## 2024-05-07 RX ADMIN — MUPIROCIN: 20 OINTMENT TOPICAL at 08:55

## 2024-05-07 RX ADMIN — DOXYCYCLINE 100 MG: 100 INJECTION, POWDER, LYOPHILIZED, FOR SOLUTION INTRAVENOUS at 22:30

## 2024-05-07 ASSESSMENT — PAIN SCALES - GENERAL
PAINLEVEL_OUTOF10: 6

## 2024-05-07 ASSESSMENT — PAIN DESCRIPTION - PAIN TYPE: TYPE: ACUTE PAIN

## 2024-05-07 ASSESSMENT — PAIN DESCRIPTION - FREQUENCY: FREQUENCY: CONTINUOUS

## 2024-05-07 ASSESSMENT — PAIN DESCRIPTION - DESCRIPTORS
DESCRIPTORS: ACHING
DESCRIPTORS: ACHING

## 2024-05-07 ASSESSMENT — PAIN - FUNCTIONAL ASSESSMENT
PAIN_FUNCTIONAL_ASSESSMENT: PREVENTS OR INTERFERES SOME ACTIVE ACTIVITIES AND ADLS
PAIN_FUNCTIONAL_ASSESSMENT: ACTIVITIES ARE NOT PREVENTED

## 2024-05-07 ASSESSMENT — PAIN DESCRIPTION - LOCATION
LOCATION: ABDOMEN
LOCATION: ABDOMEN

## 2024-05-07 ASSESSMENT — PAIN DESCRIPTION - ORIENTATION
ORIENTATION: LEFT
ORIENTATION: LEFT

## 2024-05-07 ASSESSMENT — PAIN DESCRIPTION - ONSET: ONSET: PROGRESSIVE

## 2024-05-07 NOTE — PROGRESS NOTES
4 Eyes Skin Assessment     NAME:  Leslie Farris  YOB: 1950  MEDICAL RECORD NUMBER:  [id number]    The patient is being assessed for  Shift Handoff    I agree that at least one RN has performed a thorough Head to Toe Skin Assessment on the patient. ALL assessment sites listed below have been assessed.      Areas assessed by both nurses:    Head, Face, Ears, Shoulders, Back, Chest, Arms, Elbows, Hands, Sacrum. Buttock, Coccyx, Ischium, Legs. Feet and Heels, and Under Medical Devices         Does the Patient have a Wound? Yes wound(s) were present on assessment. LDA wound assessment was Initiated and completed by RN                     Jarod Prevention initiated by RN: Yes  Wound Care Orders initiated by RN: Yes    Pressure Injury (Stage 3,4, Unstageable, DTI, NWPT, and Complex wounds) if present, place Wound referral order by RN under : No    New Ostomies, if present place, Ostomy referral order under : No     Nurse 1 eSignature: Electronically signed by Caroline Bee RN on 5/7/24 at 2:59 AM EDT    **SHARE this note so that the co-signing nurse can place an eSignature**    Nurse 2 eSignature: Electronically signed by Marianne George RN on 5/7/24 at 3:01 AM EDT

## 2024-05-07 NOTE — PROGRESS NOTES
RT Nebulizer Bronchodilator Protocol Note    There is a bronchodilator order in the chart from a provider indicating to follow the RT Bronchodilator Protocol and there is an “Initiate RT Bronchodilator Protocol” order as well (see protocol at bottom of note).    CXR Findings:  XR CHEST PORTABLE    Result Date: 5/5/2024  Cardiomegaly with pulmonary vascular congestion/interstitial edema and probable small bilateral pleural effusions.       The findings from the last RT Protocol Assessment were as follows:  Smoking: Chronic pulmonary disease  Respiratory Pattern: Dyspnea on exertion or RR 21-25 bpm  Breath Sounds: Slightly diminished and/or crackles  Cough: Strong, spontaneous, non-productive  Indication for Bronchodilator Therapy: On home bronchodilators  Bronchodilator Assessment Score: 6    Aerosolized bronchodilator medication orders have been revised according to the RT Nebulizer Bronchodilator Protocol below.    Respiratory Therapist to perform RT Therapy Protocol Assessment initially then follow the protocol.  Repeat RT Therapy Protocol Assessment PRN for score 0-3 or on second treatment, BID, and PRN for scores above 3.    No Indications - adjust the frequency to every 6 hours PRN wheezing or bronchospasm, if no treatments needed after 48 hours then discontinue using Per Protocol order mode.     If indication present, adjust the RT bronchodilator orders based on the Bronchodilator Assessment Score as indicated below.  If a patient is on this medication at home then do not decrease Frequency below that used at home.    0-3 - enter or revise RT bronchodilator order(s) to equivalent RT Bronchodilator order with Frequency of every 4 hours PRN for wheezing or increased work of breathing using Per Protocol order mode.       4-6 - enter or revise RT Bronchodilator order(s) to two equivalent RT bronchodilator orders with one order with BID Frequency and one order with Frequency of every 4 hours PRN wheezing or

## 2024-05-07 NOTE — PROGRESS NOTES
Reassessment done,patient is alert and oriented X 4,she is yelling out frequently.  She is on oxygen via NC at 5l/min.  She has contractures on the lower extremities and LUE.  She has an external catheter for urine output.  Bed is at its lowest level,call light within reach,will continue to monitor patient.

## 2024-05-07 NOTE — PROGRESS NOTES
05/07/24 0332   NIV Type   $NIV $Daily Charge   Equipment Type v60   Mode Bilevel   Mask Type Full face mask   Mask Size Small   Assessment   Pulse 91   Respirations (!) 38   SpO2 94 %   Comfort Level Fair   Using Accessory Muscles Yes   Mask Compliance Fair   Skin Protection for O2 Device Yes   Orientation Middle   Location Nose   Settings/Measurements   IPAP 12 cmH20   CPAP/EPAP 5 cmH2O   Vt (Measured) 680 mL   Rate Ordered 16   FiO2  30 %   Minute Volume (L/min) 17.6 Liters   Mask Leak (lpm) 0 lpm   Patient's Home Machine No   Alarm Settings   Alarms On Y   Low Pressure (cmH2O) 8 cmH2O   High Pressure (cmH2O) 40 cmH2O   Apnea (secs) 20 secs   RR Low (bpm) 12   RR High (bpm) 50 br/min

## 2024-05-07 NOTE — PROGRESS NOTES
Purewick not functioning, pt soaking bed. Changed and repositioned. Purewick now functioning.   Pt yelling and is very agitated by being woken up. States \"leave me alone\", \"help me\", \"you are too loud\".

## 2024-05-07 NOTE — PROGRESS NOTES
Pulmonary & Critical Care Medicine ICU Progress Note    CC: Respiratory failure    Events of Last 24 hours:   BiPAP overnight   O2 8 ---> 5 L when off BiPAP     Vascular lines: IV: PIVs         / / /FiO2 : 30 %  No results for input(s): \"PHART\", \"[id number]\", \"PO2ART\" in the last 72 hours.    IV:   sodium chloride Stopped (24 0413)    dexmedeTOMIDine HCl in NaCl Stopped (24 1805)       Vitals:  Blood pressure 138/82, pulse (!) 101, temperature 98.3 °F (36.8 °C), temperature source Axillary, resp. rate 22, height 1.575 m (5' 2\"), weight 84.7 kg (186 lb 11.2 oz), SpO2 94 %.  on 5LPM   Temp  Av.3 °F (36.8 °C)  Min: 98 °F (36.7 °C)  Max: 98.5 °F (36.9 °C)    Intake/Output Summary (Last 24 hours) at 2024 0729  Last data filed at 2024 0523  Gross per 24 hour   Intake 801.46 ml   Output 1800 ml   Net -998.54 ml     Gen: No distress.   Eyes: PERRL. No sclera icterus. No conjunctival injection.   ENT: No discharge. Pharynx clear.   Neck: Trachea midline. No obvious mass.    Resp: + accessory muscle use. Few crackles. No wheezes. No rhonchi. No dullness on percussion.  CV: Regular rate. Regular rhythm. No murmur or rub. 1-2 + LE edema.  GI: Non-tender. Non-distended. No hernia.   Skin: Warm and dry. No nodule on exposed extremities.   Lymph: No cervical LAD. No supraclavicular LAD.   M/S: No cyanosis. No joint deformity. No clubbing.   Neuro: Awake. Alert. Moves all four extremities.   Psych: Oriented x 2. No anxiety.     Scheduled Meds:   sodium chloride flush  5-40 mL IntraVENous 2 times per day    apixaban  5 mg Oral BID    atorvastatin  40 mg Oral Nightly    busPIRone  5 mg Oral Daily    [START ON 2024] vitamin D  50,000 Units Oral Q30 Days    dilTIAZem  120 mg Oral Daily    gabapentin  100 mg Oral TID    insulin glargine  20 Units SubCUTAneous Nightly    melatonin  3 mg Oral Nightly    metoprolol tartrate  25 mg Oral BID    furosemide  20 mg IntraVENous BID    insulin lispro  0-8 Units  dedicated follow-up   is recommended.         ASSESSMENT:  Acute hypoxemic respiratory failure  Pulmonary emphysema with acute exacerbation  Hyperglycemia  Acute CHF  Pleural effusion - favor CHF   Alzheimer's dementia  Lactic acidosis  CAD, CHF, A-fib on Eliquis  RUL 6 mm nodule stable since 4/4/23   Abnormal CT abdomen Cholelithiasis, large rectal stool volume   [facility] resident  Agitation        PLAN:  Noninvasive positive pressure ventilation per my orders-BiPAP 12/5   Supplemental oxygen to maintain SaO2 >92%; wean as tolerated  Intensive inhaled bronchodilator therapy  D/C Precedex   Change IV solumedrol 40 mg IV daily   Doxy D#2/5  Lasix 20 mg IV Q12 hrs   Acapella and Mucinex  Speech pathology evaluation  PT OT  Watch pleural effusion   Home medications were addressed   Surgery following   BM regiment - senna   Enema and watch BMs  Blood sugar control increase Lantus 15 BID and ISS, with goal 150-180  DVT prophylaxis: Eliquis   MRSA prophylaxis: Bactroban      Total critical care time caring for this patient with life threatening, unstable organ failure, including direct patient contact, management of life support systems, review of data including imaging and labs, discussions with other team members and physicians is 32 minutes, excluding procedures.

## 2024-05-07 NOTE — PROGRESS NOTES
Shift assessment done,patient is alert and oriented X 4,she yells out a lot.  She  is on oxygen via NC at 5l/min,has diminished breath sounds bilaterally.  She is on a regular diet.  Bilateral lower extremities are contracted and rigid.  She has an external catheter for urine output.  Bed is at its lowest level,call light within reach,will continue to monitor patient.

## 2024-05-07 NOTE — PROGRESS NOTES
Admit: 2024    Name:  Leslie Farris  Room:  [facility]3013-  MRN:    [id number]    Critical Care Daily Progress Note for 2024   Admitted with acute hypoxic respiratory failure and acute COPD exacerbation  Interval History:   BiPAP overnight   Currently on high flow nasal cannula 8 L/min  Alert and oriented this morning  Scheduled Meds:   sodium chloride flush  5-40 mL IntraVENous 2 times per day    apixaban  5 mg Oral BID    atorvastatin  40 mg Oral Nightly    busPIRone  5 mg Oral Daily    [START ON 2024] vitamin D  50,000 Units Oral Q30 Days    dilTIAZem  120 mg Oral Daily    gabapentin  100 mg Oral TID    insulin glargine  20 Units SubCUTAneous Nightly    melatonin  3 mg Oral Nightly    metoprolol tartrate  25 mg Oral BID    furosemide  20 mg IntraVENous BID    insulin lispro  0-8 Units SubCUTAneous TID WC    insulin lispro  0-4 Units SubCUTAneous Nightly    budesonide  0.5 mg Nebulization BID RT    mupirocin   Each Nostril BID    ipratropium 0.5 mg-albuterol 2.5 mg  1 Dose Inhalation BID RT    methylPREDNISolone  40 mg IntraVENous Q12H    doxycycline (VIBRAMYCIN) IV  100 mg IntraVENous Q12H       Continuous Infusions:   sodium chloride Stopped (24 0413)    dexmedeTOMIDine HCl in NaCl Stopped (24 0356)       PRN Meds:  sodium chloride flush, sodium chloride, potassium chloride **OR** potassium alternative oral replacement **OR** potassium chloride, magnesium sulfate, ondansetron **OR** ondansetron, polyethylene glycol, acetaminophen **OR** acetaminophen, bisacodyl, oxyCODONE-acetaminophen, ipratropium 0.5 mg-albuterol 2.5 mg, LORazepam, sodium phosphate                  Objective:     Temp  Av.3 °F (36.8 °C)  Min: 98 °F (36.7 °C)  Max: 98.5 °F (36.9 °C)  Pulse  Av  Min: 80  Max: 114  BP  Min: 103/72  Max: 141/93  SpO2  Av.3 %  Min: 88 %  Max: 98 %  FiO2   Av %  Min: 30 %  Max: 30 %  No data found.      Intake/Output Summary (Last 24 hours) at 2024 0820  Last data

## 2024-05-07 NOTE — PROGRESS NOTES
Reassessment done,patient is on BIPAP 12/5,FIO2-30%,she keeps taking it off but she is redirectable.  She yells out a lot.  She has an external catheter for urine output.  No changes in reassessment.  Bed is at its lowest level,call light within reach,will continue to monitor patient.

## 2024-05-07 NOTE — PLAN OF CARE
Problem: Discharge Planning  Goal: Discharge to home or other facility with appropriate resources  Outcome: Progressing     Problem: Pain  Goal: Verbalizes/displays adequate comfort level or baseline comfort level  Outcome: Progressing     Problem: Safety - Adult  Goal: Free from fall injury  Outcome: Progressing     Problem: Skin/Tissue Integrity  Goal: Absence of new skin breakdown  Description: 1.  Monitor for areas of redness and/or skin breakdown  2.  Assess vascular access sites hourly  3.  Every 4-6 hours minimum:  Change oxygen saturation probe site  4.  Every 4-6 hours:  If on nasal continuous positive airway pressure, respiratory therapy assess nares and determine need for appliance change or resting period.  Outcome: Progressing     Problem: Chronic Conditions and Co-morbidities  Goal: Patient's chronic conditions and co-morbidity symptoms are monitored and maintained or improved  Outcome: Progressing     Problem: Respiratory - Adult  Goal: Achieves optimal ventilation and oxygenation  Outcome: Progressing

## 2024-05-07 NOTE — PROGRESS NOTES
HEART FAILURE CARE PLAN:    Comorbidities Reviewed: Yes   Patient has a past medical history of Acute diastolic congestive heart failure (HCC), Acute diastolic heart failure (HCC), Acute respiratory failure (HCC), Adjustment disorder with mixed anxiety and depressed mood, Allergic rhinitis, Alzheimer's dementia (HCC), Arachnoid cyst, Atrial fibrillation (HCC), CHF (congestive heart failure) (HCC), Chronic back pain, Constipation, COPD (chronic obstructive pulmonary disease) (HCC), Diabetes mellitus (HCC), Diskitis, Disseminated superficial actinic porokeratosis (DSAP), Edema, ESBL (extended spectrum beta-lactamase) producing bacteria infection, Hypertension, Hypo-osmolality and hyponatremia, Major depressive disorder, Morbid obesity ([facility]), Muscle weakness, Osteomyelitis of spine ([facility]), Overactive bladder, Schizoaffective disorder (HCC), Seizures ([facility]), Urinary tract infection without hematuria, and Xerosis cutis.     Weights Reviewed: Yes   Admission weight: 93.9 kg (207 lb)   Wt Readings from Last 3 Encounters:   05/06/24 89 kg (196 lb 1.6 oz)   04/30/24 93.9 kg (207 lb)   04/13/24 94.1 kg (207 lb 6.4 oz)     Intake & Output Reviewed: Yes     Intake/Output Summary (Last 24 hours) at 5/7/2024 0259  Last data filed at 5/7/2024 0000  Gross per 24 hour   Intake 551.61 ml   Output 3100 ml   Net -2548.39 ml       ECHOCARDIOGRAM Reviewed: Yes   Patient's Ejection Fraction (EF) is greater than 40%     Medications Reviewed: Yes   SCHEDULED HOSPITAL MEDICATIONS:   sodium chloride flush  5-40 mL IntraVENous 2 times per day    apixaban  5 mg Oral BID    atorvastatin  40 mg Oral Nightly    busPIRone  5 mg Oral Daily    [START ON 5/14/2024] vitamin D  50,000 Units Oral Q30 Days    dilTIAZem  120 mg Oral Daily    gabapentin  100 mg Oral TID    insulin glargine  20 Units SubCUTAneous Nightly    melatonin  3 mg Oral Nightly    metoprolol tartrate  25 mg Oral BID    furosemide  20 mg IntraVENous BID    insulin lispro  0-8 Units  SubCUTAneous TID WC    insulin lispro  0-4 Units SubCUTAneous Nightly    budesonide  0.5 mg Nebulization BID RT    mupirocin   Each Nostril BID    ipratropium 0.5 mg-albuterol 2.5 mg  1 Dose Inhalation BID RT    methylPREDNISolone  40 mg IntraVENous Q12H    doxycycline (VIBRAMYCIN) IV  100 mg IntraVENous Q12H     HOME MEDICATIONS:  Prior to Admission medications    Medication Sig Start Date End Date Taking? Authorizing Provider   albuterol (PROVENTIL) (2.5 MG/3ML) 0.083% nebulizer solution Inhale 3 mLs into the lungs every 6 hours as needed    Eliza Reid MD   OXYGEN Inhale 2 L into the lungs    Eliza Reid MD   amoxicillin-clavulanate (AUGMENTIN) 875-125 MG per tablet Take 1 tablet by mouth 2 times daily for 10 days 4/29/24 5/9/24  Kaleb Foote DO   benzocaine (ORAJEL) 20 % GEL mucosal gel Take by mouth 2 times daily as needed for Pain    Eliza Reid MD   Lactobacillus Rhamnosus, GG, (CULTURELLE PO) Take by mouth    Eliza Reid MD   LORazepam (ATIVAN) 0.5 MG tablet Take 1 tablet by mouth nightly as needed for Anxiety.    Eliza Reid MD   oxyCODONE-acetaminophen (PERCOCET) 7.5-325 MG per tablet Take 1 tablet by mouth every 4 hours as needed for Pain.    Eliza Reid MD   ipratropium 0.5 mg-albuterol 2.5 mg (DUONEB) 0.5-2.5 (3) MG/3ML SOLN nebulizer solution Inhale 3 mL into the lungs via nebulization 2-4 times/day. 4/24/24   Edenilson Alvarez MD   budesonide-formoterol (SYMBICORT) 160-4.5 MCG/ACT AERO Inhale 2 puffs into the lungs 2 times daily 4/24/24   Edenilson Alvarez MD   insulin glargine (LANTUS) 100 UNIT/ML injection vial Inject 20 Units into the skin nightly 4/14/24   aLy Ludwig MD   insulin lispro (HUMALOG) 100 UNIT/ML SOLN injection vial Inject 0-16 Units into the skin 3 times daily (with meals) 4/14/24   Lay Ludwig MD   diphenhydrAMINE (BENADRYL) 25 MG tablet Take 1 tablet by mouth every 6 hours as needed (redness or

## 2024-05-08 LAB
ANION GAP SERPL CALCULATED.3IONS-SCNC: 9 MMOL/L (ref 3–16)
BASOPHILS # BLD: 0 K/UL (ref 0–0.2)
BASOPHILS NFR BLD: 0.4 %
BUN SERPL-MCNC: 26 MG/DL (ref 7–20)
CALCIUM SERPL-MCNC: 8.8 MG/DL (ref 8.3–10.6)
CHLORIDE SERPL-SCNC: 92 MMOL/L (ref 99–110)
CO2 SERPL-SCNC: 34 MMOL/L (ref 21–32)
CREAT SERPL-MCNC: <0.5 MG/DL (ref 0.6–1.2)
DEPRECATED RDW RBC AUTO: 16.1 % (ref 12.4–15.4)
EOSINOPHIL # BLD: 0 K/UL (ref 0–0.6)
EOSINOPHIL NFR BLD: 0 %
GFR SERPLBLD CREATININE-BSD FMLA CKD-EPI: >90 ML/MIN/{1.73_M2}
GLUCOSE BLD-MCNC: 198 MG/DL (ref 70–99)
GLUCOSE BLD-MCNC: 243 MG/DL (ref 70–99)
GLUCOSE BLD-MCNC: 299 MG/DL (ref 70–99)
GLUCOSE SERPL-MCNC: 122 MG/DL (ref 70–99)
HCT VFR BLD AUTO: 37.5 % (ref 36–48)
HGB BLD-MCNC: 11.8 G/DL (ref 12–16)
LYMPHOCYTES # BLD: 1.6 K/UL (ref 1–5.1)
LYMPHOCYTES NFR BLD: 14.3 %
MCH RBC QN AUTO: 25.6 PG (ref 26–34)
MCHC RBC AUTO-ENTMCNC: 31.4 G/DL (ref 31–36)
MCV RBC AUTO: 81.5 FL (ref 80–100)
MONOCYTES # BLD: 1.1 K/UL (ref 0–1.3)
MONOCYTES NFR BLD: 9.7 %
NEUTROPHILS # BLD: 8.6 K/UL (ref 1.7–7.7)
NEUTROPHILS NFR BLD: 75.6 %
PERFORMED ON: ABNORMAL
PLATELET # BLD AUTO: 192 K/UL (ref 135–450)
PMV BLD AUTO: 9.5 FL (ref 5–10.5)
POTASSIUM SERPL-SCNC: 3.6 MMOL/L (ref 3.5–5.1)
RBC # BLD AUTO: 4.6 M/UL (ref 4–5.2)
SODIUM SERPL-SCNC: 135 MMOL/L (ref 136–145)
WBC # BLD AUTO: 11.3 K/UL (ref 4–11)

## 2024-05-08 PROCEDURE — 99291 CRITICAL CARE FIRST HOUR: CPT | Performed by: INTERNAL MEDICINE

## 2024-05-08 PROCEDURE — 85025 COMPLETE CBC W/AUTO DIFF WBC: CPT

## 2024-05-08 PROCEDURE — 6360000002 HC RX W HCPCS: Performed by: INTERNAL MEDICINE

## 2024-05-08 PROCEDURE — 2000000000 HC ICU R&B

## 2024-05-08 PROCEDURE — 94640 AIRWAY INHALATION TREATMENT: CPT

## 2024-05-08 PROCEDURE — 2580000003 HC RX 258: Performed by: INTERNAL MEDICINE

## 2024-05-08 PROCEDURE — 2500000003 HC RX 250 WO HCPCS: Performed by: INTERNAL MEDICINE

## 2024-05-08 PROCEDURE — 94761 N-INVAS EAR/PLS OXIMETRY MLT: CPT

## 2024-05-08 PROCEDURE — 6370000000 HC RX 637 (ALT 250 FOR IP): Performed by: INTERNAL MEDICINE

## 2024-05-08 PROCEDURE — 36415 COLL VENOUS BLD VENIPUNCTURE: CPT

## 2024-05-08 PROCEDURE — 2700000000 HC OXYGEN THERAPY PER DAY

## 2024-05-08 PROCEDURE — 80048 BASIC METABOLIC PNL TOTAL CA: CPT

## 2024-05-08 PROCEDURE — 99233 SBSQ HOSP IP/OBS HIGH 50: CPT | Performed by: INTERNAL MEDICINE

## 2024-05-08 PROCEDURE — 94660 CPAP INITIATION&MGMT: CPT

## 2024-05-08 RX ORDER — DICYCLOMINE HCL 20 MG
20 TABLET ORAL 4 TIMES DAILY PRN
Status: DISCONTINUED | OUTPATIENT
Start: 2024-05-08 | End: 2024-05-10 | Stop reason: HOSPADM

## 2024-05-08 RX ORDER — POTASSIUM CHLORIDE 750 MG/1
30 TABLET, EXTENDED RELEASE ORAL ONCE
Status: COMPLETED | OUTPATIENT
Start: 2024-05-08 | End: 2024-05-08

## 2024-05-08 RX ORDER — FUROSEMIDE 10 MG/ML
20 INJECTION INTRAMUSCULAR; INTRAVENOUS ONCE
Status: COMPLETED | OUTPATIENT
Start: 2024-05-08 | End: 2024-05-08

## 2024-05-08 RX ORDER — DOXYCYCLINE HYCLATE 100 MG
100 TABLET ORAL EVERY 12 HOURS SCHEDULED
Status: DISCONTINUED | OUTPATIENT
Start: 2024-05-08 | End: 2024-05-10 | Stop reason: HOSPADM

## 2024-05-08 RX ORDER — FUROSEMIDE 10 MG/ML
40 INJECTION INTRAMUSCULAR; INTRAVENOUS 2 TIMES DAILY
Status: DISCONTINUED | OUTPATIENT
Start: 2024-05-08 | End: 2024-05-10 | Stop reason: HOSPADM

## 2024-05-08 RX ADMIN — BUDESONIDE 500 MCG: 0.5 SUSPENSION RESPIRATORY (INHALATION) at 19:33

## 2024-05-08 RX ADMIN — APIXABAN 5 MG: 5 TABLET, FILM COATED ORAL at 20:22

## 2024-05-08 RX ADMIN — GABAPENTIN 100 MG: 100 CAPSULE ORAL at 09:29

## 2024-05-08 RX ADMIN — WATER 40 MG: 1 INJECTION INTRAMUSCULAR; INTRAVENOUS; SUBCUTANEOUS at 09:29

## 2024-05-08 RX ADMIN — FUROSEMIDE 40 MG: 10 INJECTION, SOLUTION INTRAMUSCULAR; INTRAVENOUS at 18:28

## 2024-05-08 RX ADMIN — DILTIAZEM HYDROCHLORIDE 120 MG: 120 CAPSULE, EXTENDED RELEASE ORAL at 09:29

## 2024-05-08 RX ADMIN — INSULIN GLARGINE 15 UNITS: 100 INJECTION, SOLUTION SUBCUTANEOUS at 20:22

## 2024-05-08 RX ADMIN — FUROSEMIDE 20 MG: 10 INJECTION, SOLUTION INTRAMUSCULAR; INTRAVENOUS at 09:29

## 2024-05-08 RX ADMIN — IPRATROPIUM BROMIDE AND ALBUTEROL SULFATE 1 DOSE: 2.5; .5 SOLUTION RESPIRATORY (INHALATION) at 19:33

## 2024-05-08 RX ADMIN — GABAPENTIN 100 MG: 100 CAPSULE ORAL at 20:21

## 2024-05-08 RX ADMIN — SENNOSIDES AND DOCUSATE SODIUM 2 TABLET: 50; 8.6 TABLET ORAL at 09:29

## 2024-05-08 RX ADMIN — SENNOSIDES AND DOCUSATE SODIUM 2 TABLET: 50; 8.6 TABLET ORAL at 20:22

## 2024-05-08 RX ADMIN — ATORVASTATIN CALCIUM 40 MG: 40 TABLET, FILM COATED ORAL at 20:22

## 2024-05-08 RX ADMIN — IPRATROPIUM BROMIDE AND ALBUTEROL SULFATE 1 DOSE: 2.5; .5 SOLUTION RESPIRATORY (INHALATION) at 07:29

## 2024-05-08 RX ADMIN — DOXYCYCLINE 100 MG: 100 INJECTION, POWDER, LYOPHILIZED, FOR SOLUTION INTRAVENOUS at 10:00

## 2024-05-08 RX ADMIN — Medication 10 ML: at 20:22

## 2024-05-08 RX ADMIN — LORAZEPAM 0.5 MG: 0.5 TABLET ORAL at 23:43

## 2024-05-08 RX ADMIN — Medication 10 ML: at 09:29

## 2024-05-08 RX ADMIN — DOXYCYCLINE HYCLATE 100 MG: 100 TABLET, COATED ORAL at 20:21

## 2024-05-08 RX ADMIN — POTASSIUM CHLORIDE 30 MEQ: 750 TABLET, EXTENDED RELEASE ORAL at 10:31

## 2024-05-08 RX ADMIN — BUDESONIDE 500 MCG: 0.5 SUSPENSION RESPIRATORY (INHALATION) at 07:29

## 2024-05-08 RX ADMIN — INSULIN LISPRO 8 UNITS: 100 INJECTION, SOLUTION INTRAVENOUS; SUBCUTANEOUS at 12:02

## 2024-05-08 RX ADMIN — BUSPIRONE HYDROCHLORIDE 5 MG: 5 TABLET ORAL at 09:29

## 2024-05-08 RX ADMIN — OXYCODONE HYDROCHLORIDE AND ACETAMINOPHEN 1 TABLET: 7.5; 325 TABLET ORAL at 21:43

## 2024-05-08 RX ADMIN — MUPIROCIN: 20 OINTMENT TOPICAL at 09:29

## 2024-05-08 RX ADMIN — APIXABAN 5 MG: 5 TABLET, FILM COATED ORAL at 09:29

## 2024-05-08 RX ADMIN — FUROSEMIDE 20 MG: 10 INJECTION, SOLUTION INTRAMUSCULAR; INTRAVENOUS at 10:31

## 2024-05-08 RX ADMIN — INSULIN GLARGINE 15 UNITS: 100 INJECTION, SOLUTION SUBCUTANEOUS at 09:30

## 2024-05-08 RX ADMIN — GABAPENTIN 100 MG: 100 CAPSULE ORAL at 12:02

## 2024-05-08 RX ADMIN — METOPROLOL TARTRATE 25 MG: 25 TABLET, FILM COATED ORAL at 09:29

## 2024-05-08 RX ADMIN — METOPROLOL TARTRATE 25 MG: 25 TABLET, FILM COATED ORAL at 20:22

## 2024-05-08 RX ADMIN — Medication 3 MG: at 21:43

## 2024-05-08 RX ADMIN — MUPIROCIN: 20 OINTMENT TOPICAL at 20:22

## 2024-05-08 ASSESSMENT — PAIN SCALES - GENERAL: PAINLEVEL_OUTOF10: 6

## 2024-05-08 ASSESSMENT — PAIN DESCRIPTION - LOCATION: LOCATION: BACK

## 2024-05-08 ASSESSMENT — PAIN DESCRIPTION - DESCRIPTORS: DESCRIPTORS: ACHING

## 2024-05-08 NOTE — PROGRESS NOTES
Reassessment done,patient is on BIPAP 12/5,FIO2-30% and is tolerating it.  She has an external catheter for urine output,urine noted to be yellow in colour.  Bed is at its lowest level,call light within reach,will continue to monitor patient.

## 2024-05-08 NOTE — PROGRESS NOTES
Shift assessment done,patient is awake,alert and oriented X 4.She keeps yelling out and at times not redirectable.  She is on oxygen via HFNC at 9l/min,has diminished breath sounds bilaterally.  She is on a regular diet and tolerates oral fluids well.  She has an external catheter for urine output,urine is yellow in colour.  Bed is at its lowest level,call light within reach,will continue to monitor patient.

## 2024-05-08 NOTE — PROGRESS NOTES
05/08/24 0316   NIV Type   Equipment Type v60   Mode Bilevel   Mask Type Full face mask   Mask Size Small   Assessment   Pulse 99   Respirations 26   SpO2 96 %   Comfort Level Good   Using Accessory Muscles No   Mask Compliance Good   Skin Protection for O2 Device Yes   Orientation Middle   Location Nose   Breath Sounds   Breath Sounds Bilateral Diminished   Settings/Measurements   IPAP 12 cmH20   CPAP/EPAP 5 cmH2O   Vt (Measured) 512 mL   Rate Ordered 16   FiO2  30 %   Minute Volume (L/min) 11.3 Liters   Mask Leak (lpm) 5 lpm   Patient's Home Machine No   Alarm Settings   Alarms On Y   Low Pressure (cmH2O) 8 cmH2O   High Pressure (cmH2O) 40 cmH2O   Apnea (secs) 20 secs   RR Low (bpm) 12   RR High (bpm) 50 br/min

## 2024-05-08 NOTE — PROGRESS NOTES
05/07/24 2000   RT Protocol   History Pulmonary Disease 2   Respiratory pattern 2   Breath sounds 2   Cough 0   Indications for Bronchodilator Therapy On home bronchodilators   Bronchodilator Assessment Score 6     RT Inhaler-Nebulizer Bronchodilator Protocol Note    There is a bronchodilator order in the chart from a provider indicating to follow the RT Bronchodilator Protocol and there is an “Initiate RT Inhaler-Nebulizer Bronchodilator Protocol” order as well (see protocol at bottom of note).    CXR Findings:  No results found.    The findings from the last RT Protocol Assessment were as follows:   History Pulmonary Disease: Chronic pulmonary disease  Respiratory Pattern: Dyspnea on exertion or RR 21-25 bpm  Breath Sounds: Slightly diminished and/or crackles  Cough: Strong, spontaneous, non-productive  Indication for Bronchodilator Therapy: On home bronchodilators  Bronchodilator Assessment Score: 6    Aerosolized bronchodilator medication orders have been revised according to the RT Inhaler-Nebulizer Bronchodilator Protocol below.    Respiratory Therapist to perform RT Therapy Protocol Assessment initially then follow the protocol.  Repeat RT Therapy Protocol Assessment PRN for score 0-3 or on second treatment, BID, and PRN for scores above 3.    No Indications - adjust the frequency to every 6 hours PRN wheezing or bronchospasm, if no treatments needed after 48 hours then discontinue using Per Protocol order mode.     If indication present, adjust the RT bronchodilator orders based on the Bronchodilator Assessment Score as indicated below.  Use Inhaler orders unless patient has one or more of the following: on home nebulizer, not able to hold breath for 10 seconds, is not alert and oriented, cannot activate and use MDI correctly, or respiratory rate 25 breaths per minute or more, then use the equivalent nebulizer order(s) with same Frequency and PRN reasons based on the score.  If a patient is on this

## 2024-05-08 NOTE — PROGRESS NOTES
Pulmonary & Critical Care Medicine ICU Progress Note    CC: Respiratory failure    Events of Last 24 hours:   BiPAP overnight 12/ 30%   O2 8 -->5---> 9 L when off BiPAP   Labor breathing       Vascular lines: IV: PIVs         / / /FiO2 : 30 %  No results for input(s): \"PHART\", \"LYG8MHX\", \"PO2ART\" in the last 72 hours.    IV:   dextrose      sodium chloride Stopped (24 0413)       Vitals:  Blood pressure 137/79, pulse 94, temperature 97.6 °F (36.4 °C), temperature source Axillary, resp. rate 25, height 1.575 m (5' 2\"), weight 89.1 kg (196 lb 8 oz), SpO2 95 %.  on 9 LPM   Temp  Av.2 °F (36.2 °C)  Min: 96.7 °F (35.9 °C)  Max: 97.6 °F (36.4 °C)    Intake/Output Summary (Last 24 hours) at 2024 0738  Last data filed at 2024 0531  Gross per 24 hour   Intake 414.74 ml   Output 1500 ml   Net -1085.26 ml     Gen: + distress.   Eyes: PERRL. No sclera icterus. No conjunctival injection.   ENT: No discharge. Pharynx clear.   Neck: Trachea midline. No obvious mass.    Resp: + accessory muscle use. Few crackles. No wheezes. No rhonchi. No dullness on percussion.  CV: Regular rate. Regular rhythm. No murmur or rub. 1 + LE edema.  GI: Non-tender. Non-distended. No hernia.   Skin: Warm and dry. No nodule on exposed extremities.   Lymph: No cervical LAD. No supraclavicular LAD.   M/S: No cyanosis. No joint deformity. No clubbing.   Neuro: Awake. Alert. Moves all four extremities.   Psych: Oriented x 2. No anxiety.     Scheduled Meds:   insulin glargine  15 Units SubCUTAneous BID    insulin lispro  0-16 Units SubCUTAneous TID WC    insulin lispro  0-4 Units SubCUTAneous Nightly    sennosides-docusate sodium  2 tablet Oral BID    methylPREDNISolone  40 mg IntraVENous Q24H    sodium chloride flush  5-40 mL IntraVENous 2 times per day    apixaban  5 mg Oral BID    atorvastatin  40 mg Oral Nightly    busPIRone  5 mg Oral Daily    [START ON 2024] vitamin D  50,000 Units Oral Q30 Days    dilTIAZem  120 mg Oral Daily

## 2024-05-08 NOTE — PROGRESS NOTES
Shift assessment, completed, see flow sheet. Pt is alert and oriented to self and situation intermittently. Following commands.     VSS. Respirations are easy, even, and unlabored. Bilateral lung sounds are diminished throughout. Pt remains on 9L via HFNC, tolerating well.      PIV x2 remain in place and patent with sites and dressings C/D/I. External catheter remains in place and patent.     Call light within reach. Bed in lowest position. Bed alarm on. Pt denies any further needs at this time.

## 2024-05-08 NOTE — PROGRESS NOTES
Admit: 2024    Name:  Leslie Farris  Room:  [facility]3013-  MRN:    [id number]    Critical Care Daily Progress Note for 2024   Admitted with acute hypoxic respiratory failure and acute COPD exacerbation  Interval History:   BiPAP overnight   Currently on high flow nasal cannula 9 L/min  Alert and oriented this morning  C/o abd pain  Scheduled Meds:   insulin glargine  15 Units SubCUTAneous BID    insulin lispro  0-16 Units SubCUTAneous TID WC    insulin lispro  0-4 Units SubCUTAneous Nightly    sennosides-docusate sodium  2 tablet Oral BID    methylPREDNISolone  40 mg IntraVENous Q24H    sodium chloride flush  5-40 mL IntraVENous 2 times per day    apixaban  5 mg Oral BID    atorvastatin  40 mg Oral Nightly    busPIRone  5 mg Oral Daily    [START ON 2024] vitamin D  50,000 Units Oral Q30 Days    dilTIAZem  120 mg Oral Daily    gabapentin  100 mg Oral TID    melatonin  3 mg Oral Nightly    metoprolol tartrate  25 mg Oral BID    furosemide  20 mg IntraVENous BID    budesonide  0.5 mg Nebulization BID RT    mupirocin   Each Nostril BID    ipratropium 0.5 mg-albuterol 2.5 mg  1 Dose Inhalation BID RT    doxycycline (VIBRAMYCIN) IV  100 mg IntraVENous Q12H       Continuous Infusions:   dextrose      sodium chloride Stopped (24 0413)       PRN Meds:  glucose, dextrose bolus **OR** dextrose bolus, glucagon (rDNA), dextrose, sodium chloride flush, sodium chloride, potassium chloride **OR** potassium alternative oral replacement **OR** potassium chloride, magnesium sulfate, ondansetron **OR** ondansetron, polyethylene glycol, acetaminophen **OR** acetaminophen, bisacodyl, oxyCODONE-acetaminophen, ipratropium 0.5 mg-albuterol 2.5 mg, LORazepam                  Objective:     Temp  Av.2 °F (36.2 °C)  Min: 96.7 °F (35.9 °C)  Max: 97.6 °F (36.4 °C)  Pulse  Av  Min: 78  Max: 108  BP  Min: 122/75  Max: 147/92  SpO2  Av.1 %  Min: 74 %  Max: 100 %  FiO2   Av %  Min: 30 %  Max: 30 %  Patient

## 2024-05-08 NOTE — PROGRESS NOTES
RT Nebulizer Bronchodilator Protocol Note    There is a bronchodilator order in the chart from a provider indicating to follow the RT Bronchodilator Protocol and there is an “Initiate RT Bronchodilator Protocol” order as well (see protocol at bottom of note).    CXR Findings:  No results found.    The findings from the last RT Protocol Assessment were as follows:  Smoking: (P) Chronic pulmonary disease  Respiratory Pattern: (P) Dyspnea on exertion or RR 21-25 bpm  Breath Sounds: (P) Slightly diminished and/or crackles  Cough: (P) Strong, spontaneous, non-productive  Indication for Bronchodilator Therapy: (P) On home bronchodilators  Bronchodilator Assessment Score: (P) 6    Aerosolized bronchodilator medication orders have been revised according to the RT Nebulizer Bronchodilator Protocol below.    Respiratory Therapist to perform RT Therapy Protocol Assessment initially then follow the protocol.  Repeat RT Therapy Protocol Assessment PRN for score 0-3 or on second treatment, BID, and PRN for scores above 3.    No Indications - adjust the frequency to every 6 hours PRN wheezing or bronchospasm, if no treatments needed after 48 hours then discontinue using Per Protocol order mode.     If indication present, adjust the RT bronchodilator orders based on the Bronchodilator Assessment Score as indicated below.  If a patient is on this medication at home then do not decrease Frequency below that used at home.    0-3 - enter or revise RT bronchodilator order(s) to equivalent RT Bronchodilator order with Frequency of every 4 hours PRN for wheezing or increased work of breathing using Per Protocol order mode.       4-6 - enter or revise RT Bronchodilator order(s) to two equivalent RT bronchodilator orders with one order with BID Frequency and one order with Frequency of every 4 hours PRN wheezing or increased work of breathing using Per Protocol order mode.         7-10 - enter or revise RT Bronchodilator order(s) to two

## 2024-05-08 NOTE — PROGRESS NOTES
05/07/24 2344   NIV Type   NIV Started/Stopped On   Equipment Type v60   Mode Bilevel   Mask Type Full face mask   Mask Size Small   Assessment   Pulse 100   Respirations (!) 34   SpO2 95 %   Comfort Level Good   Using Accessory Muscles No   Mask Compliance Good   Skin Protection for O2 Device Yes   Orientation Middle   Location Nose   Settings/Measurements   IPAP 12 cmH20   CPAP/EPAP 5 cmH2O   Vt (Measured) 455 mL   Rate Ordered 16   FiO2  30 %   Minute Volume (L/min) 15.7 Liters   Mask Leak (lpm) 0 lpm   Patient's Home Machine No   Alarm Settings   Alarms On Y   Low Pressure (cmH2O) 8 cmH2O   High Pressure (cmH2O) 40 cmH2O   Apnea (secs) 20 secs   RR Low (bpm) 12   RR High (bpm) 50 br/min

## 2024-05-08 NOTE — PROGRESS NOTES
RT Inhaler-Nebulizer Bronchodilator Protocol Note    There is a bronchodilator order in the chart from a provider indicating to follow the RT Bronchodilator Protocol and there is an “Initiate RT Inhaler-Nebulizer Bronchodilator Protocol” order as well (see protocol at bottom of note).    CXR Findings:  No results found.    The findings from the last RT Protocol Assessment were as follows:   History Pulmonary Disease: Chronic pulmonary disease  Respiratory Pattern: Dyspnea on exertion or RR 21-25 bpm  Breath Sounds: Slightly diminished and/or crackles  Cough: Strong, spontaneous, non-productive  Indication for Bronchodilator Therapy: On home bronchodilators  Bronchodilator Assessment Score: 6    Aerosolized bronchodilator medication orders have been revised according to the RT Inhaler-Nebulizer Bronchodilator Protocol below.    Respiratory Therapist to perform RT Therapy Protocol Assessment initially then follow the protocol.  Repeat RT Therapy Protocol Assessment PRN for score 0-3 or on second treatment, BID, and PRN for scores above 3.    No Indications - adjust the frequency to every 6 hours PRN wheezing or bronchospasm, if no treatments needed after 48 hours then discontinue using Per Protocol order mode.     If indication present, adjust the RT bronchodilator orders based on the Bronchodilator Assessment Score as indicated below.  Use Inhaler orders unless patient has one or more of the following: on home nebulizer, not able to hold breath for 10 seconds, is not alert and oriented, cannot activate and use MDI correctly, or respiratory rate 25 breaths per minute or more, then use the equivalent nebulizer order(s) with same Frequency and PRN reasons based on the score.  If a patient is on this medication at home then do not decrease Frequency below that used at home.    0-3 - enter or revise RT bronchodilator order(s) to equivalent RT Bronchodilator order with Frequency of every 4 hours PRN for wheezing or  increased work of breathing using Per Protocol order mode.        4-6 - enter or revise RT Bronchodilator order(s) to two equivalent RT bronchodilator orders with one order with BID Frequency and one order with Frequency of every 4 hours PRN wheezing or increased work of breathing using Per Protocol order mode.        7-10 - enter or revise RT Bronchodilator order(s) to two equivalent RT bronchodilator orders with one order with TID Frequency and one order with Frequency of every 4 hours PRN wheezing or increased work of breathing using Per Protocol order mode.       11-13 - enter or revise RT Bronchodilator order(s) to one equivalent RT bronchodilator order with QID Frequency and an Albuterol order with Frequency of every 4 hours PRN wheezing or increased work of breathing using Per Protocol order mode.      Greater than 13 - enter or revise RT Bronchodilator order(s) to one equivalent RT bronchodilator order with every 4 hours Frequency and an Albuterol order with Frequency of every 2 hours PRN wheezing or increased work of breathing using Per Protocol order mode.       Electronically signed by Meek Beltre RCP on 5/8/2024 at 7:39 PM

## 2024-05-08 NOTE — PROGRESS NOTES
HEART FAILURE CARE PLAN:    Comorbidities Reviewed: Yes   Patient has a past medical history of Acute diastolic congestive heart failure (HCC), Acute diastolic heart failure (HCC), Acute respiratory failure (HCC), Adjustment disorder with mixed anxiety and depressed mood, Allergic rhinitis, Alzheimer's dementia (HCC), Arachnoid cyst, Atrial fibrillation (HCC), CHF (congestive heart failure) (HCC), Chronic back pain, Constipation, COPD (chronic obstructive pulmonary disease) (HCC), Diabetes mellitus (HCC), Diskitis, Disseminated superficial actinic porokeratosis (DSAP), Edema, ESBL (extended spectrum beta-lactamase) producing bacteria infection, Hypertension, Hypo-osmolality and hyponatremia, Major depressive disorder, Morbid obesity (HCC), Muscle weakness, Osteomyelitis of spine ([facility]), Overactive bladder, Schizoaffective disorder (HCC), Seizures ([facility]), Urinary tract infection without hematuria, and Xerosis cutis.     Weights Reviewed: Yes   Admission weight: 93.9 kg (207 lb)   Wt Readings from Last 3 Encounters:   05/07/24 84.7 kg (186 lb 11.2 oz)   04/30/24 93.9 kg (207 lb)   04/13/24 94.1 kg (207 lb 6.4 oz)     Intake & Output Reviewed: Yes     Intake/Output Summary (Last 24 hours) at 5/7/2024 2324  Last data filed at 5/7/2024 2115  Gross per 24 hour   Intake 917.01 ml   Output 2200 ml   Net -1282.99 ml       ECHOCARDIOGRAM Reviewed: Yes   Patient's Ejection Fraction (EF) is greater than 40%     Medications Reviewed: Yes   SCHEDULED HOSPITAL MEDICATIONS:   insulin glargine  15 Units SubCUTAneous BID    insulin lispro  0-16 Units SubCUTAneous TID     insulin lispro  0-4 Units SubCUTAneous Nightly    sennosides-docusate sodium  2 tablet Oral BID    [START ON 5/8/2024] methylPREDNISolone  40 mg IntraVENous Q24H    [id number]        sodium chloride flush  5-40 mL IntraVENous 2 times per day    apixaban  5 mg Oral BID    atorvastatin  40 mg Oral Nightly    busPIRone  5 mg Oral Daily    [START ON 5/14/2024] vitamin D

## 2024-05-08 NOTE — PROGRESS NOTES
4 Eyes Skin Assessment     NAME:  Leslie Farris  YOB: 1950  MEDICAL RECORD NUMBER:  [id number]    The patient is being assessed for  Shift Handoff    I agree that at least one RN has performed a thorough Head to Toe Skin Assessment on the patient. ALL assessment sites listed below have been assessed.      Areas assessed by both nurses:    Head, Face, Ears, Shoulders, Back, Chest, Arms, Elbows, Hands, Sacrum. Buttock, Coccyx, Ischium, Legs. Feet and Heels, and Under Medical Devices         Does the Patient have a Wound?Yes                      Jarod Prevention initiated by RN: Yes  Wound Care Orders initiated by RN: Yes    Pressure Injury (Stage 3,4, Unstageable, DTI, NWPT, and Complex wounds) if present, place Wound referral order by RN under : No    New Ostomies, if present place, Ostomy referral order under : No     Nurse 1 eSignature: Electronically signed by Caroline Bee RN on 5/7/24 at 11:29 PM EDT    **SHARE this note so that the co-signing nurse can place an eSignature**    Nurse 2 eSignature: Electronically signed by Renetta Marvin RN on 5/8/24 at 6:45 AM EDT

## 2024-05-08 NOTE — PROGRESS NOTES
Reassessment done,patient is still yelling out,on BIPAP 12/5,FIO2-30%.  She has an external catheter for urine output.  Vitals stable,denies any further assistance.

## 2024-05-09 ENCOUNTER — APPOINTMENT (OUTPATIENT)
Age: 74
End: 2024-05-09
Attending: INTERNAL MEDICINE
Payer: COMMERCIAL

## 2024-05-09 ENCOUNTER — APPOINTMENT (OUTPATIENT)
Dept: GENERAL RADIOLOGY | Age: 74
End: 2024-05-09
Payer: COMMERCIAL

## 2024-05-09 LAB
ANION GAP SERPL CALCULATED.3IONS-SCNC: 12 MMOL/L (ref 3–16)
BASOPHILS # BLD: 0.1 K/UL (ref 0–0.2)
BASOPHILS NFR BLD: 0.5 %
BUN SERPL-MCNC: 26 MG/DL (ref 7–20)
CALCIUM SERPL-MCNC: 8.7 MG/DL (ref 8.3–10.6)
CHLORIDE SERPL-SCNC: 91 MMOL/L (ref 99–110)
CO2 SERPL-SCNC: 32 MMOL/L (ref 21–32)
CREAT SERPL-MCNC: <0.5 MG/DL (ref 0.6–1.2)
DEPRECATED RDW RBC AUTO: 15.6 % (ref 12.4–15.4)
ECHO AV PEAK GRADIENT: 6 MMHG
ECHO AV PEAK VELOCITY: 1.2 M/S
ECHO BSA: 1.95 M2
ECHO MV E VELOCITY: 0.79 M/S
ECHO PV MAX VELOCITY: 0.7 M/S
ECHO PV PEAK GRADIENT: 2 MMHG
ECHO TV PEAK GRADIENT: 1 MMHG
EOSINOPHIL # BLD: 0 K/UL (ref 0–0.6)
EOSINOPHIL NFR BLD: 0.1 %
GFR SERPLBLD CREATININE-BSD FMLA CKD-EPI: >90 ML/MIN/{1.73_M2}
GLUCOSE BLD-MCNC: 161 MG/DL (ref 70–99)
GLUCOSE BLD-MCNC: 196 MG/DL (ref 70–99)
GLUCOSE BLD-MCNC: 279 MG/DL (ref 70–99)
GLUCOSE BLD-MCNC: 293 MG/DL (ref 70–99)
GLUCOSE SERPL-MCNC: 186 MG/DL (ref 70–99)
HCT VFR BLD AUTO: 36.6 % (ref 36–48)
HGB BLD-MCNC: 11.9 G/DL (ref 12–16)
LYMPHOCYTES # BLD: 1.7 K/UL (ref 1–5.1)
LYMPHOCYTES NFR BLD: 17.1 %
MCH RBC QN AUTO: 25.9 PG (ref 26–34)
MCHC RBC AUTO-ENTMCNC: 32.4 G/DL (ref 31–36)
MCV RBC AUTO: 80.1 FL (ref 80–100)
MONOCYTES # BLD: 1.1 K/UL (ref 0–1.3)
MONOCYTES NFR BLD: 11.2 %
NEUTROPHILS # BLD: 7 K/UL (ref 1.7–7.7)
NEUTROPHILS NFR BLD: 71.1 %
PERFORMED ON: ABNORMAL
PLATELET # BLD AUTO: 174 K/UL (ref 135–450)
PMV BLD AUTO: 8.5 FL (ref 5–10.5)
POTASSIUM SERPL-SCNC: 3.6 MMOL/L (ref 3.5–5.1)
RBC # BLD AUTO: 4.57 M/UL (ref 4–5.2)
SODIUM SERPL-SCNC: 135 MMOL/L (ref 136–145)
WBC # BLD AUTO: 9.8 K/UL (ref 4–11)

## 2024-05-09 PROCEDURE — 93306 TTE W/DOPPLER COMPLETE: CPT

## 2024-05-09 PROCEDURE — 6360000002 HC RX W HCPCS: Performed by: INTERNAL MEDICINE

## 2024-05-09 PROCEDURE — 2060000000 HC ICU INTERMEDIATE R&B

## 2024-05-09 PROCEDURE — 6370000000 HC RX 637 (ALT 250 FOR IP): Performed by: INTERNAL MEDICINE

## 2024-05-09 PROCEDURE — 36415 COLL VENOUS BLD VENIPUNCTURE: CPT

## 2024-05-09 PROCEDURE — 2580000003 HC RX 258: Performed by: INTERNAL MEDICINE

## 2024-05-09 PROCEDURE — 94761 N-INVAS EAR/PLS OXIMETRY MLT: CPT

## 2024-05-09 PROCEDURE — 74018 RADEX ABDOMEN 1 VIEW: CPT

## 2024-05-09 PROCEDURE — 99233 SBSQ HOSP IP/OBS HIGH 50: CPT | Performed by: INTERNAL MEDICINE

## 2024-05-09 PROCEDURE — 94640 AIRWAY INHALATION TREATMENT: CPT

## 2024-05-09 PROCEDURE — 93306 TTE W/DOPPLER COMPLETE: CPT | Performed by: INTERNAL MEDICINE

## 2024-05-09 PROCEDURE — 80048 BASIC METABOLIC PNL TOTAL CA: CPT

## 2024-05-09 PROCEDURE — 2700000000 HC OXYGEN THERAPY PER DAY

## 2024-05-09 PROCEDURE — 94660 CPAP INITIATION&MGMT: CPT

## 2024-05-09 PROCEDURE — 85025 COMPLETE CBC W/AUTO DIFF WBC: CPT

## 2024-05-09 RX ORDER — POLYETHYLENE GLYCOL 3350 17 G/17G
17 POWDER, FOR SOLUTION ORAL DAILY
Status: DISCONTINUED | OUTPATIENT
Start: 2024-05-09 | End: 2024-05-10 | Stop reason: HOSPADM

## 2024-05-09 RX ORDER — PREDNISONE 20 MG/1
40 TABLET ORAL DAILY
Status: DISCONTINUED | OUTPATIENT
Start: 2024-05-10 | End: 2024-05-10

## 2024-05-09 RX ORDER — POTASSIUM CHLORIDE 750 MG/1
20 TABLET, EXTENDED RELEASE ORAL ONCE
Status: COMPLETED | OUTPATIENT
Start: 2024-05-09 | End: 2024-05-09

## 2024-05-09 RX ADMIN — GABAPENTIN 100 MG: 100 CAPSULE ORAL at 08:52

## 2024-05-09 RX ADMIN — IPRATROPIUM BROMIDE AND ALBUTEROL SULFATE 1 DOSE: 2.5; .5 SOLUTION RESPIRATORY (INHALATION) at 08:47

## 2024-05-09 RX ADMIN — INSULIN GLARGINE 15 UNITS: 100 INJECTION, SOLUTION SUBCUTANEOUS at 20:02

## 2024-05-09 RX ADMIN — DOXYCYCLINE HYCLATE 100 MG: 100 TABLET, COATED ORAL at 08:52

## 2024-05-09 RX ADMIN — ATORVASTATIN CALCIUM 40 MG: 40 TABLET, FILM COATED ORAL at 20:02

## 2024-05-09 RX ADMIN — FUROSEMIDE 40 MG: 10 INJECTION, SOLUTION INTRAMUSCULAR; INTRAVENOUS at 08:52

## 2024-05-09 RX ADMIN — LORAZEPAM 0.5 MG: 0.5 TABLET ORAL at 23:28

## 2024-05-09 RX ADMIN — POTASSIUM CHLORIDE 20 MEQ: 750 TABLET, EXTENDED RELEASE ORAL at 09:47

## 2024-05-09 RX ADMIN — APIXABAN 5 MG: 5 TABLET, FILM COATED ORAL at 08:52

## 2024-05-09 RX ADMIN — IPRATROPIUM BROMIDE AND ALBUTEROL SULFATE 1 DOSE: 2.5; .5 SOLUTION RESPIRATORY (INHALATION) at 20:19

## 2024-05-09 RX ADMIN — SENNOSIDES AND DOCUSATE SODIUM 2 TABLET: 50; 8.6 TABLET ORAL at 20:02

## 2024-05-09 RX ADMIN — MUPIROCIN: 20 OINTMENT TOPICAL at 20:02

## 2024-05-09 RX ADMIN — OXYCODONE HYDROCHLORIDE AND ACETAMINOPHEN 1 TABLET: 7.5; 325 TABLET ORAL at 21:24

## 2024-05-09 RX ADMIN — BUDESONIDE 500 MCG: 0.5 SUSPENSION RESPIRATORY (INHALATION) at 20:19

## 2024-05-09 RX ADMIN — APIXABAN 5 MG: 5 TABLET, FILM COATED ORAL at 20:02

## 2024-05-09 RX ADMIN — Medication 10 ML: at 08:53

## 2024-05-09 RX ADMIN — DILTIAZEM HYDROCHLORIDE 120 MG: 120 CAPSULE, EXTENDED RELEASE ORAL at 08:52

## 2024-05-09 RX ADMIN — BUDESONIDE 500 MCG: 0.5 SUSPENSION RESPIRATORY (INHALATION) at 08:47

## 2024-05-09 RX ADMIN — WATER 40 MG: 1 INJECTION INTRAMUSCULAR; INTRAVENOUS; SUBCUTANEOUS at 08:53

## 2024-05-09 RX ADMIN — MUPIROCIN: 20 OINTMENT TOPICAL at 08:51

## 2024-05-09 RX ADMIN — Medication 3 MG: at 21:24

## 2024-05-09 RX ADMIN — POTASSIUM CHLORIDE 20 MEQ: 750 TABLET, EXTENDED RELEASE ORAL at 08:51

## 2024-05-09 RX ADMIN — GABAPENTIN 100 MG: 100 CAPSULE ORAL at 20:02

## 2024-05-09 RX ADMIN — FUROSEMIDE 40 MG: 10 INJECTION, SOLUTION INTRAMUSCULAR; INTRAVENOUS at 16:23

## 2024-05-09 RX ADMIN — METOPROLOL TARTRATE 25 MG: 25 TABLET, FILM COATED ORAL at 20:02

## 2024-05-09 RX ADMIN — GABAPENTIN 100 MG: 100 CAPSULE ORAL at 14:00

## 2024-05-09 RX ADMIN — SENNOSIDES AND DOCUSATE SODIUM 2 TABLET: 50; 8.6 TABLET ORAL at 08:53

## 2024-05-09 RX ADMIN — DOXYCYCLINE HYCLATE 100 MG: 100 TABLET, COATED ORAL at 20:02

## 2024-05-09 RX ADMIN — BUSPIRONE HYDROCHLORIDE 5 MG: 5 TABLET ORAL at 08:53

## 2024-05-09 RX ADMIN — Medication 10 ML: at 20:03

## 2024-05-09 RX ADMIN — INSULIN LISPRO 8 UNITS: 100 INJECTION, SOLUTION INTRAVENOUS; SUBCUTANEOUS at 17:18

## 2024-05-09 RX ADMIN — METOPROLOL TARTRATE 25 MG: 25 TABLET, FILM COATED ORAL at 08:53

## 2024-05-09 RX ADMIN — POLYETHYLENE GLYCOL 3350 17 G: 17 POWDER, FOR SOLUTION ORAL at 16:23

## 2024-05-09 RX ADMIN — INSULIN GLARGINE 15 UNITS: 100 INJECTION, SOLUTION SUBCUTANEOUS at 08:52

## 2024-05-09 ASSESSMENT — PAIN DESCRIPTION - DESCRIPTORS: DESCRIPTORS: ACHING

## 2024-05-09 ASSESSMENT — PAIN SCALES - GENERAL
PAINLEVEL_OUTOF10: 0
PAINLEVEL_OUTOF10: 7

## 2024-05-09 ASSESSMENT — PAIN DESCRIPTION - LOCATION: LOCATION: BACK

## 2024-05-09 NOTE — PLAN OF CARE
HEART FAILURE CARE PLAN:    Comorbidities Reviewed: Yes   Patient has a past medical history of Acute diastolic congestive heart failure (HCC), Acute diastolic heart failure (HCC), Acute respiratory failure (HCC), Adjustment disorder with mixed anxiety and depressed mood, Allergic rhinitis, Alzheimer's dementia (HCC), Arachnoid cyst, Atrial fibrillation (HCC), CHF (congestive heart failure) (HCC), Chronic back pain, Constipation, COPD (chronic obstructive pulmonary disease) (HCC), Diabetes mellitus (HCC), Diskitis, Disseminated superficial actinic porokeratosis (DSAP), Edema, ESBL (extended spectrum beta-lactamase) producing bacteria infection, Hypertension, Hypo-osmolality and hyponatremia, Major depressive disorder, Morbid obesity ([facility]), Muscle weakness, Osteomyelitis of spine ([facility]), Overactive bladder, Schizoaffective disorder ([facility]), Seizures ([facility]), Urinary tract infection without hematuria, and Xerosis cutis.     Weights Reviewed: Yes   Admission weight: 93.9 kg (207 lb)   Wt Readings from Last 3 Encounters:   05/08/24 89.1 kg (196 lb 8 oz)   04/30/24 93.9 kg (207 lb)   04/13/24 94.1 kg (207 lb 6.4 oz)     Intake & Output Reviewed: Yes     Intake/Output Summary (Last 24 hours) at 5/8/2024 2042  Last data filed at 5/8/2024 2030  Gross per 24 hour   Intake 1079.19 ml   Output 2200 ml   Net -1120.81 ml       ECHOCARDIOGRAM Reviewed: Yes   Patient's Ejection Fraction (EF) is greater than 40%     Medications Reviewed: Yes   SCHEDULED HOSPITAL MEDICATIONS:   furosemide  40 mg IntraVENous BID    doxycycline hyclate  100 mg Oral 2 times per day    insulin glargine  15 Units SubCUTAneous BID    insulin lispro  0-16 Units SubCUTAneous TID     insulin lispro  0-4 Units SubCUTAneous Nightly    sennosides-docusate sodium  2 tablet Oral BID    methylPREDNISolone  40 mg IntraVENous Q24H    sodium chloride flush  5-40 mL IntraVENous 2 times per day    apixaban  5 mg Oral BID    atorvastatin  40 mg Oral Nightly    busPIRone  5

## 2024-05-09 NOTE — PROGRESS NOTES
Shift assessment, completed, see flow sheet. Pt is alert and oriented to person and place intermittently. Calls out frequently.      On 5L HFNC. AFIB 98, /98, SpO2 96%. Respirations are easy, even, and unlabored. Bilateral lung sounds diminished. PIV X 2 WNL and SL    External catheter in place. Pt incontinent of urine, bathed and replaced catheter.     Pt refusing Q 2 turns at this time stating \"I just want to stay on my back\"    Call light within reach. Bed in lowest position. Bed alarm on.

## 2024-05-09 NOTE — PROGRESS NOTES
05/08/24 2300   NIV Type   NIV Started/Stopped (S)  On   Equipment Type v60   Mode Bilevel   Mask Type Full face mask   Mask Size Small   Assessment   Pulse 100   Respirations 12   /78   SpO2 97 %   Settings/Measurements   IPAP 12 cmH20   CPAP/EPAP 5 cmH2O   Vt (Measured) 508 mL   Rate Ordered 16   FiO2  30 %   I Time/ I Time % 0.8 s   Minute Volume (L/min) 10.6 Liters   Mask Leak (lpm) 14 lpm   Patient's Home Machine No   Alarm Settings   Alarms On Y   Low Pressure (cmH2O) 8 cmH2O   High Pressure (cmH2O) 40 cmH2O   Delay Alarm 20 sec(s)   RR Low (bpm) 16   RR High (bpm) 50 br/min

## 2024-05-09 NOTE — PROGRESS NOTES
Shift assessment, completed, see flow sheet. Pt is alert and oriented to self and situation intermittently.    VSS. Respirations are easy, even, and unlabored. Bilateral lung sounds are diminished throughout. Pt currently on 4L 02 via HFNC, tolerating well.      PIV x2 remain in place and patent with sites and dressings C/D/I. External catheter remains in place and patent.      Call light within reach. Bed in lowest position. Bed alarm on. Pt denies any further needs at this time.

## 2024-05-09 NOTE — CARE COORDINATION
Spoke with Shavon for follow-up. Patient back to baseline on 02. Complaining of stomach pain. KUB today. Patient impacted. Shavon gave the patient and enema and the patient was able to clear stool.     If patient does not discharge tomorrow will probably be Saturday at the latest.

## 2024-05-09 NOTE — PROGRESS NOTES
Admit: 2024    Name:  Leslie Farris  Room:  [facility]-  MRN:    [id number]    Critical Care Daily Progress Note for 2024   Admitted with acute hypoxic respiratory failure and acute COPD exacerbation  Interval History:   BiPAP overnight   Continues to be on BiPAP aduring the day  Alert and oriented this morning  C/o persistent abd pain  Scheduled Meds:   furosemide  40 mg IntraVENous BID    doxycycline hyclate  100 mg Oral 2 times per day    insulin glargine  15 Units SubCUTAneous BID    insulin lispro  0-16 Units SubCUTAneous TID WC    insulin lispro  0-4 Units SubCUTAneous Nightly    sennosides-docusate sodium  2 tablet Oral BID    methylPREDNISolone  40 mg IntraVENous Q24H    sodium chloride flush  5-40 mL IntraVENous 2 times per day    apixaban  5 mg Oral BID    atorvastatin  40 mg Oral Nightly    busPIRone  5 mg Oral Daily    [START ON 2024] vitamin D  50,000 Units Oral Q30 Days    dilTIAZem  120 mg Oral Daily    gabapentin  100 mg Oral TID    melatonin  3 mg Oral Nightly    metoprolol tartrate  25 mg Oral BID    budesonide  0.5 mg Nebulization BID RT    mupirocin   Each Nostril BID    ipratropium 0.5 mg-albuterol 2.5 mg  1 Dose Inhalation BID RT       Continuous Infusions:   dextrose      sodium chloride Stopped (24 0413)       PRN Meds:  dicyclomine, glucose, dextrose bolus **OR** dextrose bolus, glucagon (rDNA), dextrose, sodium chloride flush, sodium chloride, potassium chloride **OR** potassium alternative oral replacement **OR** potassium chloride, magnesium sulfate, ondansetron **OR** ondansetron, polyethylene glycol, acetaminophen **OR** acetaminophen, bisacodyl, oxyCODONE-acetaminophen, ipratropium 0.5 mg-albuterol 2.5 mg, LORazepam                  Objective:     Temp  Av.8 °F (36.6 °C)  Min: 96.9 °F (36.1 °C)  Max: 98.6 °F (37 °C)  Pulse  Av.3  Min: 72  Max: 107  BP  Min: 89/64  Max: 144/104  SpO2  Av %  Min: 91 %  Max: 100 %  FiO2   Av %  Min: 30 %  Max:  atrium is moderately dilated.   The right atrium is mildly dilated.   The tricuspid valve is normal in structure and function.   MIld tricuspid regurgitation.   Systolic pulmonary artery pressure (SPAP) is normal and estimated at 36 mmHg   (right atrial pressure 3 mmHg).   Irregular heart rate throughout the study.    Labs and imaging reviewed    Assessment & Plan:     Patient Active Problem List    Diagnosis Date Noted    Persistent atrial fibrillation ([facility]) 11/07/2016    ESBL (extended spectrum beta-lactamase) producing bacteria infection 11/07/2022    Hypernatremia 11/04/2022    Septic shock ([facility]) 11/03/2022    Hyperkalemia 10/10/2022    Bacteremia 10/08/2022    Providencia bacteremia 10/07/2022    Permanent atrial fibrillation ([facility]) 10/07/2022    Sepsis secondary to UTI ([facility]) 10/06/2022    Altered mental status 10/06/2022    FABIO (acute kidney injury) ([facility]) 10/06/2022    Urinary tract infection with hematuria 10/06/2022    Chronic bilateral low back pain with bilateral sciatica 10/21/2018    Acute on chronic hypoxic respiratory failure ([facility]) 10/21/2018    Dementia with behavioral disturbance ([facility]) 10/21/2018    LORENZO (generalized anxiety disorder) 10/21/2018    Hoarding disorder with poor insight 10/21/2018    Hypertensive heart and kidney disease with chronic diastolic congestive heart failure and stage 2 chronic kidney disease ([facility]) 10/21/2018    Schizoaffective disorder, depressive type ([facility]) 10/21/2018    Cervical herniated disc 05/23/2005    Acute exacerbation of emphysema ([facility]) 05/07/2024    Decompensated heart failure ([facility]) 05/06/2024    Hyperglycemia 05/06/2024    Pleural effusion 05/06/2024    Acute congestive heart failure ([facility]) 05/06/2024    COPD with acute exacerbation ([facility]) 05/06/2024    Acute bronchitis 04/13/2024    PAF (paroxysmal atrial fibrillation) ([facility]) 04/12/2024    Dementia ([facility]) 03/19/2024    Acute hypoxic respiratory failure ([facility]) 01/17/2024    Pulmonary nodule 01/17/2024    Acute on chronic

## 2024-05-09 NOTE — PROGRESS NOTES
05/09/24 0315   NIV Type   NIV Started/Stopped On   Equipment Type v60   Mode Bilevel   Mask Type Full face mask   Mask Size Small   Assessment   Pulse 90   Respirations 16   SpO2 97 %   Settings/Measurements   IPAP 12 cmH20   CPAP/EPAP 5 cmH2O   Vt (Measured) 567 mL   Rate Ordered 16   FiO2  30 %   I Time/ I Time % 0.8 s   Minute Volume (L/min) 8 Liters   Mask Leak (lpm) 14 lpm   Patient's Home Machine No

## 2024-05-09 NOTE — PROGRESS NOTES
Reassessment completed, see flow sheet. BL lungs diminished.  Pt continues to be restless & anxious, PRN nightly ativan given. Remains on BiPAP 12/5 and 30%    All ICU Lines and monitoring remain in place. Bed locked in lowest position. Call light within reach.

## 2024-05-09 NOTE — CONSULTS
Xerosis cutis         Past Surgical History:  Past Surgical History:   Procedure Laterality Date    IR MIDLINE CATH  10/10/2022    IR MIDLINE CATH 10/10/2022 [facility] PROCEDURES    IR MIDLINE CATH  4/11/2023    IR MIDLINE CATH 4/11/2023 [facility] SPECIAL PROCEDURES    KNEE SURGERY      TONSILLECTOMY      TUBAL LIGATION          Historical Medications:  Prior to Visit Medications    Medication Sig Taking? Authorizing Provider   albuterol (PROVENTIL) (2.5 MG/3ML) 0.083% nebulizer solution Inhale 3 mLs into the lungs every 6 hours as needed  Eliza Reid MD   OXYGEN Inhale 2 L into the lungs  ProviderEliza MD   amoxicillin-clavulanate (AUGMENTIN) 875-125 MG per tablet Take 1 tablet by mouth 2 times daily for 10 days  Kaleb Foote DO   benzocaine (ORAJEL) 20 % GEL mucosal gel Take by mouth 2 times daily as needed for Pain  Eliza Reid MD   Lactobacillus Rhamnosus, GG, (CULTURELLE PO) Take by mouth  Eliza Reid MD   LORazepam (ATIVAN) 0.5 MG tablet Take 1 tablet by mouth nightly as needed for Anxiety.  ProviderEliza MD   oxyCODONE-acetaminophen (PERCOCET) 7.5-325 MG per tablet Take 1 tablet by mouth every 4 hours as needed for Pain.  Eliza Reid MD   ipratropium 0.5 mg-albuterol 2.5 mg (DUONEB) 0.5-2.5 (3) MG/3ML SOLN nebulizer solution Inhale 3 mL into the lungs via nebulization 2-4 times/day.  Edenilson Alvarez MD   budesonide-formoterol (SYMBICORT) 160-4.5 MCG/ACT AERO Inhale 2 puffs into the lungs 2 times daily  Edenilson Alvarez MD   insulin glargine (LANTUS) 100 UNIT/ML injection vial Inject 20 Units into the skin nightly  Lay Ludwig MD   insulin lispro (HUMALOG) 100 UNIT/ML SOLN injection vial Inject 0-16 Units into the skin 3 times daily (with meals)  Lay Ludwig MD   diphenhydrAMINE (BENADRYL) 25 MG tablet Take 1 tablet by mouth every 6 hours as needed (redness or itching)  Eliza Reid MD   dilTIAZem (CARDIZEM CD) 120 MG  finding.  Impression: Stable pattern of dense stool in the colon previously described as probable  fecal impaction.  No evidence of bowel obstruction proximally.       Labs:   Recent Labs     05/07/24  0506 05/08/24  0502 05/09/24  0503   WBC 9.8 11.3* 9.8   HGB 12.3 11.8* 11.9*    192 174   BUN 28* 26* 26*   CREATININE 0.7 <0.5* <0.5*    135* 135*   K 3.8 3.6 3.6        Assessment:    Hospital Problems             Last Modified POA    * (Principal) Decompensated heart failure (HCC) 5/6/2024 Yes    Acute on chronic diastolic congestive heart failure (HCC) 5/7/2024 Yes    Hyperglycemia 5/6/2024 Yes    Pleural effusion 5/6/2024 Yes    Acute congestive heart failure (HCC) 5/6/2024 Yes    COPD with acute exacerbation (HCC) 5/7/2024 Yes    Acute exacerbation of emphysema (HCC) 5/7/2024 Yes     73 year old female with COPD exacerbation, abdominal pain with stool burden on abdominal xray:    Plan:  Agree with aggressive bowel regimen, constipation likely causing pain.  Will order enema today along with miralax.  Suggest patient be started on regular linzess for long standing constipation.  Consider colonoscopy if no improvement in abdominal pain despite laxatives.  Supportive care      Cherelle Araiza MD    [facility]    [phone]. Also available via Perfect Serve    Please note that some or all of this record was generated using voice recognition software. If there are any questions about the content of this document, please contact the author as some errors in translation may have occurred.

## 2024-05-09 NOTE — PROGRESS NOTES
Consult sent to [facility] to Dr. Araiza for consult Electronically signed by Brook Zaragoza on 5/9/2024 at 9:06 AM

## 2024-05-09 NOTE — PROGRESS NOTES
Care rounds completed with Dr. Alvarez and multidisciplinary team. Reviewed labs, meds, VS, assessment, & plan of care for today. See dictated note and new orders for details. New orders received.    Pt may transfer out of unit if/when bed is available.

## 2024-05-09 NOTE — PROGRESS NOTES
RT Inhaler-Nebulizer Bronchodilator Protocol Note    There is a bronchodilator order in the chart from a provider indicating to follow the RT Bronchodilator Protocol and there is an “Initiate RT Inhaler-Nebulizer Bronchodilator Protocol” order as well (see protocol at bottom of note).    CXR Findings:  No results found.    The findings from the last RT Protocol Assessment were as follows:   History Pulmonary Disease: (P) Chronic pulmonary disease  Respiratory Pattern: (P) Dyspnea on exertion or RR 21-25 bpm  Breath Sounds: (P) Slightly diminished and/or crackles  Cough: (P) Strong, spontaneous, non-productive  Indication for Bronchodilator Therapy: (P) On home bronchodilators  Bronchodilator Assessment Score: (P) 6    Aerosolized bronchodilator medication orders have been revised according to the RT Inhaler-Nebulizer Bronchodilator Protocol below.    Respiratory Therapist to perform RT Therapy Protocol Assessment initially then follow the protocol.  Repeat RT Therapy Protocol Assessment PRN for score 0-3 or on second treatment, BID, and PRN for scores above 3.    No Indications - adjust the frequency to every 6 hours PRN wheezing or bronchospasm, if no treatments needed after 48 hours then discontinue using Per Protocol order mode.     If indication present, adjust the RT bronchodilator orders based on the Bronchodilator Assessment Score as indicated below.  Use Inhaler orders unless patient has one or more of the following: on home nebulizer, not able to hold breath for 10 seconds, is not alert and oriented, cannot activate and use MDI correctly, or respiratory rate 25 breaths per minute or more, then use the equivalent nebulizer order(s) with same Frequency and PRN reasons based on the score.  If a patient is on this medication at home then do not decrease Frequency below that used at home.    0-3 - enter or revise RT bronchodilator order(s) to equivalent RT Bronchodilator order with Frequency of every 4 hours PRN  for wheezing or increased work of breathing using Per Protocol order mode.        4-6 - enter or revise RT Bronchodilator order(s) to two equivalent RT bronchodilator orders with one order with BID Frequency and one order with Frequency of every 4 hours PRN wheezing or increased work of breathing using Per Protocol order mode.        7-10 - enter or revise RT Bronchodilator order(s) to two equivalent RT bronchodilator orders with one order with TID Frequency and one order with Frequency of every 4 hours PRN wheezing or increased work of breathing using Per Protocol order mode.       11-13 - enter or revise RT Bronchodilator order(s) to one equivalent RT bronchodilator order with QID Frequency and an Albuterol order with Frequency of every 4 hours PRN wheezing or increased work of breathing using Per Protocol order mode.      Greater than 13 - enter or revise RT Bronchodilator order(s) to one equivalent RT bronchodilator order with every 4 hours Frequency and an Albuterol order with Frequency of every 2 hours PRN wheezing or increased work of breathing using Per Protocol order mode.         Electronically signed by Jessica Noble RCP on 5/9/2024 at 8:50 AM

## 2024-05-09 NOTE — PROGRESS NOTES
Dr. Hatfield at bedside. Pt states her \"belly hurts still.\" New orders received for GI consult and KUB. Last noted BM for pt was on Monday.    K+ was 3.6 this AM, with giving scheduled lasix BID today, order given for 20meq K+ for this AM.

## 2024-05-09 NOTE — PROGRESS NOTES
Pt taken off of BIPAP and placed on 4LNC. Pt is 100% and tolerating well. Continuing to monitor pt.

## 2024-05-09 NOTE — PROGRESS NOTES
Gave pt soap suds enema per GI orders from today. Enema noted to be effective, XXL soft and formed brown colored stool noted. Pt stated she had relief of her stomach pain. Kassandra care provided following enema, patricia replaced. VSS.

## 2024-05-09 NOTE — PROGRESS NOTES
Pulmonary & Critical Care Medicine ICU Progress Note    CC: Respiratory failure    Events of Last 24 hours:   BiPAP overnight 12/ 30%   O2 8 -->5---> 9---> 4 L when off BiPAP   BM     Vascular lines: IV: PIVs         / / /FiO2 : 30 %  No results for input(s): \"PHART\", \"VJV0DJH\", \"PO2ART\" in the last 72 hours.    IV:   dextrose      sodium chloride Stopped (24 5713)       Vitals:  Blood pressure 122/87, pulse 86, temperature 97.2 °F (36.2 °C), temperature source Temporal, resp. rate 17, height 1.575 m (5' 2\"), weight 87.3 kg (192 lb 8 oz), SpO2 98 %.  on 9 LPM   Temp  Av.8 °F (36.6 °C)  Min: 96.9 °F (36.1 °C)  Max: 98.6 °F (37 °C)    Intake/Output Summary (Last 24 hours) at 2024 0651  Last data filed at 2024 0642  Gross per 24 hour   Intake 1182 ml   Output 2100 ml   Net -918 ml     Gen: + distress.   Eyes: PERRL. No sclera icterus. No conjunctival injection.   ENT: No discharge. Pharynx clear.   Neck: Trachea midline. No obvious mass.    Resp: + accessory muscle use. Few crackles. No wheezes. No rhonchi. No dullness on percussion.  CV: Regular rate. Regular rhythm. No murmur or rub. 1 + LE edema.  GI: Non-tender. Non-distended. No hernia.   Skin: Warm and dry. No nodule on exposed extremities.   Lymph: No cervical LAD. No supraclavicular LAD.   M/S: No cyanosis. No joint deformity. No clubbing.   Neuro: Awake. Alert. Moves all four extremities.   Psych: Oriented x 2. No anxiety.     Scheduled Meds:   furosemide  40 mg IntraVENous BID    doxycycline hyclate  100 mg Oral 2 times per day    insulin glargine  15 Units SubCUTAneous BID    insulin lispro  0-16 Units SubCUTAneous TID WC    insulin lispro  0-4 Units SubCUTAneous Nightly    sennosides-docusate sodium  2 tablet Oral BID    methylPREDNISolone  40 mg IntraVENous Q24H    sodium chloride flush  5-40 mL IntraVENous 2 times per day    apixaban  5 mg Oral BID    atorvastatin  40 mg Oral Nightly    busPIRone  5 mg Oral Daily    [START ON

## 2024-05-10 ENCOUNTER — APPOINTMENT (OUTPATIENT)
Dept: GENERAL RADIOLOGY | Age: 74
End: 2024-05-10
Payer: COMMERCIAL

## 2024-05-10 VITALS
WEIGHT: 192 LBS | DIASTOLIC BLOOD PRESSURE: 52 MMHG | HEIGHT: 62 IN | SYSTOLIC BLOOD PRESSURE: 95 MMHG | OXYGEN SATURATION: 93 % | TEMPERATURE: 97.7 F | RESPIRATION RATE: 18 BRPM | BODY MASS INDEX: 35.33 KG/M2 | HEART RATE: 94 BPM

## 2024-05-10 LAB
ANION GAP SERPL CALCULATED.3IONS-SCNC: 13 MMOL/L (ref 3–16)
BASOPHILS # BLD: 0 K/UL (ref 0–0.2)
BASOPHILS NFR BLD: 0.1 %
BUN SERPL-MCNC: 27 MG/DL (ref 7–20)
CALCIUM SERPL-MCNC: 8.8 MG/DL (ref 8.3–10.6)
CHLORIDE SERPL-SCNC: 91 MMOL/L (ref 99–110)
CO2 SERPL-SCNC: 31 MMOL/L (ref 21–32)
CREAT SERPL-MCNC: <0.5 MG/DL (ref 0.6–1.2)
DEPRECATED RDW RBC AUTO: 15.9 % (ref 12.4–15.4)
EOSINOPHIL # BLD: 0 K/UL (ref 0–0.6)
EOSINOPHIL NFR BLD: 0.1 %
GFR SERPLBLD CREATININE-BSD FMLA CKD-EPI: >90 ML/MIN/{1.73_M2}
GLUCOSE BLD-MCNC: 156 MG/DL (ref 70–99)
GLUCOSE BLD-MCNC: 275 MG/DL (ref 70–99)
GLUCOSE BLD-MCNC: 289 MG/DL (ref 70–99)
GLUCOSE SERPL-MCNC: 221 MG/DL (ref 70–99)
HCT VFR BLD AUTO: 37.5 % (ref 36–48)
HGB BLD-MCNC: 11.9 G/DL (ref 12–16)
LYMPHOCYTES # BLD: 1.9 K/UL (ref 1–5.1)
LYMPHOCYTES NFR BLD: 14.4 %
MCH RBC QN AUTO: 25.5 PG (ref 26–34)
MCHC RBC AUTO-ENTMCNC: 31.7 G/DL (ref 31–36)
MCV RBC AUTO: 80.3 FL (ref 80–100)
MONOCYTES # BLD: 1.4 K/UL (ref 0–1.3)
MONOCYTES NFR BLD: 10.6 %
NEUTROPHILS # BLD: 10.1 K/UL (ref 1.7–7.7)
NEUTROPHILS NFR BLD: 74.8 %
PERFORMED ON: ABNORMAL
PLATELET # BLD AUTO: 178 K/UL (ref 135–450)
PMV BLD AUTO: 9.7 FL (ref 5–10.5)
POTASSIUM SERPL-SCNC: 3.7 MMOL/L (ref 3.5–5.1)
RBC # BLD AUTO: 4.68 M/UL (ref 4–5.2)
SODIUM SERPL-SCNC: 135 MMOL/L (ref 136–145)
WBC # BLD AUTO: 13.5 K/UL (ref 4–11)

## 2024-05-10 PROCEDURE — 6370000000 HC RX 637 (ALT 250 FOR IP): Performed by: INTERNAL MEDICINE

## 2024-05-10 PROCEDURE — 2700000000 HC OXYGEN THERAPY PER DAY

## 2024-05-10 PROCEDURE — 99233 SBSQ HOSP IP/OBS HIGH 50: CPT | Performed by: INTERNAL MEDICINE

## 2024-05-10 PROCEDURE — 94761 N-INVAS EAR/PLS OXIMETRY MLT: CPT

## 2024-05-10 PROCEDURE — 94660 CPAP INITIATION&MGMT: CPT

## 2024-05-10 PROCEDURE — 71045 X-RAY EXAM CHEST 1 VIEW: CPT

## 2024-05-10 PROCEDURE — 6360000002 HC RX W HCPCS: Performed by: INTERNAL MEDICINE

## 2024-05-10 PROCEDURE — 94010 BREATHING CAPACITY TEST: CPT

## 2024-05-10 PROCEDURE — 2580000003 HC RX 258: Performed by: INTERNAL MEDICINE

## 2024-05-10 PROCEDURE — 85025 COMPLETE CBC W/AUTO DIFF WBC: CPT

## 2024-05-10 PROCEDURE — 94640 AIRWAY INHALATION TREATMENT: CPT

## 2024-05-10 PROCEDURE — 80048 BASIC METABOLIC PNL TOTAL CA: CPT

## 2024-05-10 PROCEDURE — 99239 HOSP IP/OBS DSCHRG MGMT >30: CPT | Performed by: INTERNAL MEDICINE

## 2024-05-10 PROCEDURE — 36415 COLL VENOUS BLD VENIPUNCTURE: CPT

## 2024-05-10 RX ORDER — OXYCODONE AND ACETAMINOPHEN 7.5; 325 MG/1; MG/1
1 TABLET ORAL EVERY 6 HOURS PRN
Qty: 8 TABLET | Refills: 0 | Status: SHIPPED | OUTPATIENT
Start: 2024-05-10 | End: 2024-05-12

## 2024-05-10 RX ORDER — SODIUM CHLORIDE 0.9 % (FLUSH) 0.9 %
5-40 SYRINGE (ML) INJECTION EVERY 12 HOURS SCHEDULED
Status: DISCONTINUED | OUTPATIENT
Start: 2024-05-10 | End: 2024-05-10 | Stop reason: CLARIF

## 2024-05-10 RX ORDER — SODIUM CHLORIDE 9 MG/ML
INJECTION, SOLUTION INTRAVENOUS PRN
Status: DISCONTINUED | OUTPATIENT
Start: 2024-05-10 | End: 2024-05-10 | Stop reason: CLARIF

## 2024-05-10 RX ORDER — PREDNISONE 20 MG/1
20 TABLET ORAL DAILY
Status: DISCONTINUED | OUTPATIENT
Start: 2024-05-11 | End: 2024-05-10 | Stop reason: HOSPADM

## 2024-05-10 RX ORDER — SODIUM CHLORIDE 0.9 % (FLUSH) 0.9 %
5-40 SYRINGE (ML) INJECTION PRN
Status: DISCONTINUED | OUTPATIENT
Start: 2024-05-10 | End: 2024-05-10 | Stop reason: CLARIF

## 2024-05-10 RX ORDER — LORAZEPAM 0.5 MG/1
0.5 TABLET ORAL NIGHTLY PRN
Qty: 2 TABLET | Refills: 0 | Status: SHIPPED | OUTPATIENT
Start: 2024-05-10 | End: 2024-05-12

## 2024-05-10 RX ORDER — SODIUM CHLORIDE 9 MG/ML
INJECTION, SOLUTION INTRAVENOUS CONTINUOUS
Status: DISCONTINUED | OUTPATIENT
Start: 2024-05-10 | End: 2024-05-10 | Stop reason: CLARIF

## 2024-05-10 RX ORDER — FUROSEMIDE 40 MG/1
40 TABLET ORAL 2 TIMES DAILY
Qty: 60 TABLET | Refills: 0
Start: 2024-05-10

## 2024-05-10 RX ORDER — PREDNISONE 10 MG/1
TABLET ORAL
Qty: 9 TABLET | Refills: 0
Start: 2024-05-10 | End: 2024-05-22

## 2024-05-10 RX ADMIN — INSULIN LISPRO 8 UNITS: 100 INJECTION, SOLUTION INTRAVENOUS; SUBCUTANEOUS at 13:23

## 2024-05-10 RX ADMIN — Medication 10 ML: at 13:18

## 2024-05-10 RX ADMIN — DOXYCYCLINE HYCLATE 100 MG: 100 TABLET, COATED ORAL at 09:14

## 2024-05-10 RX ADMIN — PREDNISONE 40 MG: 20 TABLET ORAL at 09:15

## 2024-05-10 RX ADMIN — GABAPENTIN 100 MG: 100 CAPSULE ORAL at 09:14

## 2024-05-10 RX ADMIN — MUPIROCIN: 20 OINTMENT TOPICAL at 09:15

## 2024-05-10 RX ADMIN — FUROSEMIDE 40 MG: 10 INJECTION, SOLUTION INTRAMUSCULAR; INTRAVENOUS at 09:15

## 2024-05-10 RX ADMIN — ACETAMINOPHEN 650 MG: 325 TABLET ORAL at 17:28

## 2024-05-10 RX ADMIN — BUSPIRONE HYDROCHLORIDE 5 MG: 5 TABLET ORAL at 09:14

## 2024-05-10 RX ADMIN — APIXABAN 5 MG: 5 TABLET, FILM COATED ORAL at 09:14

## 2024-05-10 RX ADMIN — LINACLOTIDE 145 MCG: 145 CAPSULE, GELATIN COATED ORAL at 06:40

## 2024-05-10 RX ADMIN — DILTIAZEM HYDROCHLORIDE 120 MG: 120 CAPSULE, EXTENDED RELEASE ORAL at 09:14

## 2024-05-10 RX ADMIN — BUDESONIDE 500 MCG: 0.5 SUSPENSION RESPIRATORY (INHALATION) at 08:49

## 2024-05-10 RX ADMIN — METOPROLOL TARTRATE 25 MG: 25 TABLET, FILM COATED ORAL at 09:21

## 2024-05-10 RX ADMIN — INSULIN GLARGINE 15 UNITS: 100 INJECTION, SOLUTION SUBCUTANEOUS at 09:15

## 2024-05-10 RX ADMIN — SENNOSIDES AND DOCUSATE SODIUM 2 TABLET: 50; 8.6 TABLET ORAL at 09:14

## 2024-05-10 RX ADMIN — POLYETHYLENE GLYCOL 3350 17 G: 17 POWDER, FOR SOLUTION ORAL at 09:14

## 2024-05-10 RX ADMIN — IPRATROPIUM BROMIDE AND ALBUTEROL SULFATE 1 DOSE: 2.5; .5 SOLUTION RESPIRATORY (INHALATION) at 08:49

## 2024-05-10 RX ADMIN — INSULIN LISPRO 8 UNITS: 100 INJECTION, SOLUTION INTRAVENOUS; SUBCUTANEOUS at 17:06

## 2024-05-10 RX ADMIN — GABAPENTIN 100 MG: 100 CAPSULE ORAL at 13:18

## 2024-05-10 ASSESSMENT — COPD QUESTIONNAIRES
CAT_TOTALSCORE: 25
QUESTION2_CHESTPHLEGM: 1
QUESTION1_COUGHFREQUENCY: 1
QUESTION4_WALKINCLINE: 5
QUESTION3_CHESTTIGHTNESS: 2
QUESTION5_HOMEACTIVITIES: 5
QUESTION6_LEAVINGHOUSE: 5
QUESTION7_SLEEPQUALITY: 3
QUESTION8_ENERGYLEVEL: 3

## 2024-05-10 ASSESSMENT — PAIN DESCRIPTION - LOCATION: LOCATION: BACK;KNEE

## 2024-05-10 ASSESSMENT — PAIN DESCRIPTION - DESCRIPTORS: DESCRIPTORS: ACHING

## 2024-05-10 ASSESSMENT — PAIN DESCRIPTION - ORIENTATION: ORIENTATION: RIGHT

## 2024-05-10 NOTE — CARE COORDINATION
DISCHARGE ORDER  Date/Time 5/10/2024 1:12 PM  Completed by: Rashawn Lambert RN, Case Management    Patient Name: Leslie Farris    : 1950      Admit order Date and Status:ip   Noted discharge order. (verify MD's last order for status of admission/Traditional Medicare [place] Inpatient qualifying stay required for SNF)    Confirmed discharge plan with:              Patient:  Yes              When pt confirms DC plan does any support person need to be contacted by CM No                Discharge to Facility: [facility]   Facility phone number for staff giving report: [phone]   Pre-certification completed: na   Hospital Exemption Notification (HENS) completed: na   Discharge orders and Continuity of Care faxed to facility:  [facility]      Transportation:               Medical Transport explained with choice list offered to pt/family.                Choice:(no preference)  Agency used: [facility] [phone]   time:   1700      Pt/family/Nursing/Facility aware of  time:   Yes Names: leslie muniz, veronicavishal  Ambulance form completed:  roundtrip:      Date Last IMM Given: 5/10    Comments: pt to dc to [facility] today. Pt is agreeable. [facility] aware that pt is returning. LVM for Augustina about pt returning to [facility]    Pt is being d/c'd to [facility] today. Pt's O2 sats are 96% on 3 liters.    Discharge timeout done with rn, cm, pt. All discharge needs and concerns addressed.    Discharging nurse to complete OLEG, reconcile AVS, and place final copy with patient's discharge packet. Discharging RN to ensure that written prescriptions for  Level II medications are sent with patient to the facility as per protocol.

## 2024-05-10 NOTE — PROGRESS NOTES
Care rounds completed with Dr. Alvarez and multidisciplinary team. Reviewed labs, meds, VS, assessment, & plan of care for today.  -Obtain CXR today.  -ok to D/C  Perfect serve sent to Dr HUMPHRIES.     See dictated note and new orders for details.

## 2024-05-10 NOTE — PROGRESS NOTES
Shift assessment completed, see flow sheet.   Pt transferred from ICU.  Pt arrived in stable condition A /O    SpO2 96%. Respirations are easy, even, and unlabored.   Bilateral lung sounds diminished on 3L.     VSS  Afib on the monitor  PIV, WNL     All lines and monitoring devices in place. external Moraes is patent and secured.  .  Bed in lowest position with wheels locked. No needs expressed at this time. Will continue to monitor.

## 2024-05-10 NOTE — DISCHARGE SUMMARY
Name:  Leslie Farris  Room:  3013/3013-01  MRN:    [id number]    Discharge Summary      This discharge summary is in conjunction with a complete physical exam done on the day of discharge.      Discharging Physician: NATI EDGAR MD      Admit: 5/5/2024  Discharge:  5/10/2024     Diagnoses this Admission    Principal Problem:    Decompensated heart failure (HCC)  Active Problems:    Acute on chronic diastolic congestive heart failure (HCC)    Hyperglycemia    Pleural effusion    Acute congestive heart failure (HCC)    COPD with acute exacerbation (HCC)    Acute exacerbation of emphysema (HCC)  Resolved Problems:    * No resolved hospital problems. *          Procedures (Please Review Full Report for Details)      Consults    IP CONSULT TO RESPIRATORY CARE  IP CONSULT TO PULMONOLOGY  IP CONSULT TO GI      HPI:  Leslie Farris is a 73 y.o. female with pmh of COPD chronic respiratory failure on 2 L of oxygen, diabetes, Alzheimer's dementia, major depressive disorder, schizoaffective disorder, paroxysmal A-fib who resides in a nursing home and was brought in for shortness of breath.  Patient is a poor historian.  She had recently been evaluated in the ED for abdominal pain and there were concerns for acute cholecystitis.  Patient came in respiratory distress she tells me she is supposed to be on oxygen but she uses it is needed in the nursing home.  In the ED she had a chest x-ray done which was consistent with small pleural effusions bilaterally and pulmonary vascular congestion BNP was elevated.  Reviewing the medical records reveals prior echo in April 2023 which showed an EF of 55%.  I could not elicit a history of abdominal pain in this patient.       Physical Exam at Discharge:  BP (!) 118/94   Pulse 96   Temp 97.5 °F (36.4 °C) (Temporal)   Resp 23   Ht 1.575 m (5' 2\")   Wt 87.1 kg (192 lb)   SpO2 95%   BMI 35.12 kg/m²   General:  Awake, alert and oriented. Appears to be not in any      amoxicillin-clavulanate 875-125 MG per tablet  Commonly known as: AUGMENTIN     dextromethorphan-quiNIDine 20-10 MG Caps per capsule  Commonly known as: NUEDEXTA     diphenhydrAMINE 25 MG tablet  Commonly known as: BENADRYL     ondansetron 4 MG disintegrating tablet  Commonly known as: ZOFRAN-ODT               Where to Get Your Medications        You can get these medications from any pharmacy    Bring a paper prescription for each of these medications  LORazepam 0.5 MG tablet  oxyCODONE-acetaminophen 7.5-325 MG per tablet       Information about where to get these medications is not yet available    Ask your nurse or doctor about these medications  furosemide 40 MG tablet  linaclotide 145 MCG capsule  predniSONE 10 MG tablet           Discharge Condition/Location: Stable to NH     Follow Up:  Follow up with PCP.    Total time spent on discharge is 35 minutes      NATI EDGAR MD 5/10/2024 11:02 AM

## 2024-05-10 NOTE — PROGRESS NOTES
Pulmonary & Critical Care Medicine ICU Progress Note    CC: Respiratory failure    Events of Last 24 hours:   BiPAP overnight  30%   O2 8 -->5---> 9---> 4--> 2 L when off BiPAP - uses 2L at home   Large BM     Vascular lines: IV: PIVs         / / /FiO2 : 30 %  No results for input(s): \"PHART\", \"NUP7AFC\", \"PO2ART\" in the last 72 hours.    IV:   dextrose      sodium chloride Stopped (24 3535)       Vitals:  Blood pressure 125/86, pulse 79, temperature 97 °F (36.1 °C), temperature source Temporal, resp. rate 19, height 1.575 m (5' 2\"), weight 87.1 kg (192 lb), SpO2 98 %.  on 2 LPM   Temp  Av °F (36.1 °C)  Min: 96.9 °F (36.1 °C)  Max: 97 °F (36.1 °C)    Intake/Output Summary (Last 24 hours) at 5/10/2024 0755  Last data filed at 5/10/2024 0400  Gross per 24 hour   Intake 1222 ml   Output 2500 ml   Net -1278 ml     Gen: No distress.   Eyes: PERRL. No sclera icterus. No conjunctival injection.   ENT: No discharge. Pharynx clear.   Neck: Trachea midline. No obvious mass.    Resp: No accessory muscle use. Few crackles. No wheezes. No rhonchi. No dullness on percussion.  CV: Regular rate. Regular rhythm. No murmur or rub. 1 + LE edema.  GI: Non-tender. Non-distended. No hernia.   Skin: Warm and dry. No nodule on exposed extremities.   Lymph: No cervical LAD. No supraclavicular LAD.   M/S: No cyanosis. No joint deformity. No clubbing.   Neuro: Awake. Alert. Moves all four extremities.   Psych: Oriented x 2. No anxiety.     Scheduled Meds:   predniSONE  40 mg Oral Daily    polyethylene glycol  17 g Oral Daily    linaclotide  145 mcg Oral QAM AC    furosemide  40 mg IntraVENous BID    doxycycline hyclate  100 mg Oral 2 times per day    insulin glargine  15 Units SubCUTAneous BID    insulin lispro  0-16 Units SubCUTAneous TID WC    insulin lispro  0-4 Units SubCUTAneous Nightly    sennosides-docusate sodium  2 tablet Oral BID    sodium chloride flush  5-40 mL IntraVENous 2 times per day    apixaban  5 mg Oral BID  findings   suggestive of stercoral proctitis.   9. Additional incidental findings as above for which no dedicated follow-up   is recommended.         Echo 5/9    Left Ventricle: Low normal left ventricular systolic function with a visually estimated EF of 50 - 55%. Left ventricle size is normal. Unable to assess wall thickness. Normal wall motion. Grade I diastolic dysfunction with normal LAP.    Mitral Valve: Mildly thickened leaflet, at the anterior leaflet.    Image quality is technically difficult.           ASSESSMENT:  Acute hypoxemic respiratory failure  Pulmonary emphysema with acute exacerbation  Hyperglycemia  Acute CHF  Small bilateral pleural effusion - favor CHF   Alzheimer's dementia  Lactic acidosis  CAD, CHF, A-fib on Eliquis  RUL 6 mm nodule stable since 4/4/23   Abnormal CT abdomen Cholelithiasis, large rectal stool volume   NH resident  Agitation        PLAN:  BiPAP QHS and PRN during the day BiPAP 12/5   Supplemental oxygen to maintain SaO2 >92%; wean as tolerated  CXR today   Intensive inhaled bronchodilator therapy  Prednisone taper --> 20 mg   Doxy D#5/5  Diuresis   Acapella and Mucinex  Speech pathology evaluation  PT OT  Home medications were addressed   BM regiment - Senna   Blood sugar control increase Lantus 15 BID and ISS, with goal 150-180  DVT prophylaxis: Eliquis   MRSA prophylaxis: Bactroban  Discussed with internal medicine   PCU

## 2024-05-10 NOTE — PROGRESS NOTES
4 Eyes Skin Assessment     NAME:  Leslie Farris  YOB: 1950  MEDICAL RECORD NUMBER:  [id number]    The patient is being assessed for  Other Shift Assessment/Handoff    I agree that at least one RN has performed a thorough Head to Toe Skin Assessment on the patient. ALL assessment sites listed below have been assessed.      Areas assessed by both nurses:    Head, Face, Ears, Shoulders, Back, Chest, Arms, Elbows, Hands, Sacrum. Buttock, Coccyx, Ischium, Legs. Feet and Heels, and Under Medical Devices                         Does the Patient have a Wound? No noted wound(s)       Jarod Prevention initiated by RN: Yes  Wound Care Orders initiated by RN: No    Pressure Injury (Stage 3,4, Unstageable, DTI, NWPT, and Complex wounds) if present, place Wound referral order by RN under : No    New Ostomies, if present place, Ostomy referral order under : No     Nurse 1 eSignature: Electronically signed by Renetta Marvin RN on 5/10/24 at 7:24 AM EDT    **SHARE this note so that the co-signing nurse can place an eSignature**    Nurse 2 eSignature: {Esignature:[id number]}

## 2024-05-10 NOTE — PROGRESS NOTES
Progress Note    Patient Leslie Farris  MRN: [id number]  YOB: 1950 Age: 73 y.o. Sex: female  Room: [place] [id number]-       Admitting Physician: Marisabel Maciel MD   Date of Admission: 5/5/2024  6:10 PM   Primary Care Physician: Kristin Pacheco MD     Subjective:  Leslie Farris was seen and examined. We are following for abdominal pain.  -- Patient states abdominal pain is improved.  One bowel movement reported yesterday.  Patient states no bowel movement so far today.  Patient repeatedly shouting that she is hungry despite actively eating.  She describes no nausea or vomiting.    ROS:  Constitutional: Denies fever, no change in appetite  Respiratory: Denies cough or shortness of breath  Cardiovascular: Denies chest pain or edema    Objective:  Vital Signs:   Vitals:    05/10/24 1215   BP: 113/82   Pulse: 83   Resp: 18   Temp: 97.3 °F (36.3 °C)   SpO2: 96%         Physical Exam:  Constitutional: Alert and oriented x 4. No acute distress.   HEENT: Sclera anicteric, mucosal membranes moist  Cardiovascular: Regular rate and rhythm.  No murmurs.  Respiratory: Respirations nonlabored, no crepitus  GI: Abdomen nondistended, soft, and nontender.  Normal active bowel sounds.  No masses palpable.   Rectal: Deferred  Musculoskeletal:  No pitting edema of the lower legs.  Neurological: Gross memory appears intact.      Intake/Output:    Intake/Output Summary (Last 24 hours) at 5/10/2024 1530  Last data filed at 5/10/2024 1313  Gross per 24 hour   Intake 1146 ml   Output 2800 ml   Net -1654 ml        Current Medications:  Current Facility-Administered Medications   Medication Dose Route Frequency Provider Last Rate Last Admin    perflutren lipid microspheres (DEFINITY) injection 1.5 mL  1.5 mL IntraVENous ONCE PRN Edenilson Alvarez MD        [START ON 5/11/2024] predniSONE (DELTASONE) tablet 20 mg  20 mg Oral Daily Edenilson Alvarez MD        polyethylene glycol (GLYCOLAX) packet 17 g  17 g Oral Daily   10 mEq IntraVENous PRN Leilani Millan MD        magnesium sulfate 2000 mg in 50 mL IVPB premix  2,000 mg IntraVENous PRN Leilani Millan MD        ondansetron (ZOFRAN-ODT) disintegrating tablet 4 mg  4 mg Oral Q8H PRN Leilani Millan MD        Or    ondansetron (ZOFRAN) injection 4 mg  4 mg IntraVENous Q6H PRN Leilani Millan MD        acetaminophen (TYLENOL) tablet 650 mg  650 mg Oral Q6H PRN Leilani Millan MD        Or    acetaminophen (TYLENOL) suppository 650 mg  650 mg Rectal Q6H PRN Leilani Millan MD        apixaban (ELIQUIS) tablet 5 mg  5 mg Oral BID Leilani Millan MD   5 mg at 05/10/24 0914    atorvastatin (LIPITOR) tablet 40 mg  40 mg Oral Nightly Leilani Millan MD   40 mg at 05/09/24 2002    bisacodyl (DULCOLAX) suppository 10 mg  10 mg Rectal Daily PRN Leilani Millan MD        busPIRone (BUSPAR) tablet 5 mg  5 mg Oral Daily Leilani Millan MD   5 mg at 05/10/24 0914    [START ON 5/14/2024] vitamin D (ERGOCALCIFEROL) capsule 50,000 Units  50,000 Units Oral Q30 Days Leilani Millan MD        dilTIAZem (CARDIZEM CD) extended release capsule 120 mg  120 mg Oral Daily Leilani Millan MD   120 mg at 05/10/24 0914    gabapentin (NEURONTIN) capsule 100 mg  100 mg Oral TID Leilani Millan MD   100 mg at 05/10/24 1318    melatonin tablet 3 mg  3 mg Oral Nightly Leilani Millan MD   3 mg at 05/09/24 2124    metoprolol tartrate (LOPRESSOR) tablet 25 mg  25 mg Oral BID Leilani Millan MD   25 mg at 05/10/24 0921    oxyCODONE-acetaminophen (PERCOCET) 7.5-325 MG per tablet 1 tablet  1 tablet Oral Q4H PRN Leilani Millan MD   1 tablet at 05/09/24 2124    budesonide (PULMICORT) nebulizer suspension 500 mcg  0.5 mg Nebulization BID RT Marisabel Maciel MD   500 mcg at 05/10/24 0849    mupirocin (BACTROBAN) 2 % ointment   Each Nostril BID Edenilson Alvarez MD   Given at 05/10/24 0915    ipratropium 0.5 mg-albuterol 2.5 mg (DUONEB) nebulizer solution 1 Dose  1 Dose Inhalation BID RT Edenilson Alvarez MD   1 Dose at 05/10/24

## 2024-05-10 NOTE — DISCHARGE INSTR - DIET

## 2024-05-10 NOTE — PROGRESS NOTES
Bedside report given to Chelle RODGERS. Pt to be transferred to   via Bed. Tele monitoring continued.  D/C pending. Pt denies any needs. 4 eyes completed on transfer.

## 2024-05-10 NOTE — PROGRESS NOTES
Reassessment completed. Vss. No changes from previous assessment.     Temp-97.0  HR-81 RR-23 BP-125/86 SpO2-96%     Pt on 2L of continuous oxygen. Breaths easy, even, unlabored.     Ext. Catheter remains in place.     Pt. Turned, Call light within reach.

## 2024-05-10 NOTE — CARE COORDINATION
Reviewed chart, met with pt bedside. Plan to return to [facility] when medically stable. Pt to be transferred out of ICU today. Will continue to monitor.

## 2024-05-10 NOTE — PROGRESS NOTES
Admit: 2024    Name:  Leslie Farris  Room:  [facility]-  MRN:    [id number]    Critical Care Daily Progress Note for 5/10/2024   Admitted with acute hypoxic respiratory failure and acute COPD exacerbation  Interval History:   BiPAP overnight   On 3 L of O2  Alert and oriented this morning  Had BM yesterday after enema yesterday  Scheduled Meds:   predniSONE  40 mg Oral Daily    polyethylene glycol  17 g Oral Daily    linaclotide  145 mcg Oral QAM AC    furosemide  40 mg IntraVENous BID    doxycycline hyclate  100 mg Oral 2 times per day    insulin glargine  15 Units SubCUTAneous BID    insulin lispro  0-16 Units SubCUTAneous TID WC    insulin lispro  0-4 Units SubCUTAneous Nightly    sennosides-docusate sodium  2 tablet Oral BID    sodium chloride flush  5-40 mL IntraVENous 2 times per day    apixaban  5 mg Oral BID    atorvastatin  40 mg Oral Nightly    busPIRone  5 mg Oral Daily    [START ON 2024] vitamin D  50,000 Units Oral Q30 Days    dilTIAZem  120 mg Oral Daily    gabapentin  100 mg Oral TID    melatonin  3 mg Oral Nightly    metoprolol tartrate  25 mg Oral BID    budesonide  0.5 mg Nebulization BID RT    mupirocin   Each Nostril BID    ipratropium 0.5 mg-albuterol 2.5 mg  1 Dose Inhalation BID RT       Continuous Infusions:   dextrose      sodium chloride Stopped (24 0413)       PRN Meds:  dicyclomine, glucose, dextrose bolus **OR** dextrose bolus, glucagon (rDNA), dextrose, sodium chloride flush, sodium chloride, potassium chloride **OR** potassium alternative oral replacement **OR** potassium chloride, magnesium sulfate, ondansetron **OR** ondansetron, acetaminophen **OR** acetaminophen, bisacodyl, oxyCODONE-acetaminophen, ipratropium 0.5 mg-albuterol 2.5 mg, LORazepam                  Objective:     Temp  Av °F (36.1 °C)  Min: 96.9 °F (36.1 °C)  Max: 97 °F (36.1 °C)  Pulse  Av.5  Min: 78  Max: 104  BP  Min: 98/73  Max: 126/95  SpO2  Av.4 %  Min: 93 %  Max: 100 %  FiO2    HTN     Chronic obstructive pulmonary disease ([facility])     Atrial fibrillation with RVR ([facility])     Class 1 obesity in adult 01/27/2016    Hypokalemia 01/27/2016    Lumbar facet arthropathy 12/10/2014    Disc degeneration, lumbar 12/10/2014    DM (diabetes mellitus) ([facility]) 01/13/2014    Seizure disorder, grand mal ([facility]) 01/12/2014     Acute hypoxic respiratory failure secondary to acute COPD exacerbation  Placed on BiPAP  BiPAP overnight  Now on 3 L     Acute COPD exacerbation  Continue Solu-Medrol--> prednisone , inhaled bronchodilators  Continue doxycycline 5/5   Sputum cultures  Acapella and Mucinex    Acute on chronic diastolic congestive heart failure  Increase lasix to 40 bid     Persistent atrial fibrillation  Continue Cardizem, metoprolol and Eliquis  Controlled ventricular rate    Type 2 diabetes  Continue Lantus and sliding scale insulin    Rectal impaction with stercoral proctitis  Dulcolax suppository    Abd pain  CT abd and pelvis- no acute findings other than large rectal stool volume consistent with impaction   Abd exam benign  Bentyl prn  Abdominal x-ray today-dense stool in the colon  GI consult  Large BM after enema yesterday    Full code  Carb control diet  Eliquis for DVT prophylaxis    Dc to NH today    NATI EDGAR MD

## 2024-05-10 NOTE — PROGRESS NOTES
Shift assessment was completed (see flow sheet). Pt is A/O with periods of Confusion/ disconnected speech.    Pt yells out with being touched/ repositioned. Complains of feeling cold- though warm blankets applied. Yells \"I'm hungry\" while eating.     Respirations are even, unlabored, with diminished sounds. On 2L O2 via HFNC.  Scheduled medications to follow- whole with Water.     Pt is a Feed.     Infusing:  N/A    Pt has contractures and is refusing to reposition.     Encouraged and educated on importance of sitting >30 degrees while eating/ drinking and repositioning every 2 hours.     Call light within reach. Bed in lowest position. Bed alarm on.     Patient is not able to demonstrated the ability to move from a reclining position to an upright position within the recliner. Patient is confused, demented and /or unable to follow instruction.

## 2024-05-10 NOTE — PROGRESS NOTES
Shift assessment, completed, see flow sheet. Pt is alert and oriented to person, time, and situation intermittently.    On baseline O2 of 2L NC. Afib 94, /79, SpO2 97%. Respirations are easy, even, and unlabored. Bilateral lung sounds diminished. PIV x 2 WNL and SL     External catheter in place, pt incontinent of urine. Pt bathed, repositioned and catheter replaced.     Call light within reach. Bed in lowest position. Bed alarm on.

## 2024-05-10 NOTE — PROGRESS NOTES
05/10/24 0200   NIV Type   NIV Started/Stopped (S)  On   Equipment Type v60   Mode Bilevel   Mask Type Full face mask   Mask Size Small   Assessment   Pulse 93   Respirations 17   SpO2 98 %   Settings/Measurements   IPAP 12 cmH20   CPAP/EPAP 5 cmH2O   Vt (Measured) 390 mL   Rate Ordered 16   FiO2  30 %   I Time/ I Time % 0.8 s   Minute Volume (L/min) 8 Liters   Mask Leak (lpm) 0 lpm   Patient's Home Machine No   Alarm Settings   Alarms On Y   Low Pressure (cmH2O) 8 cmH2O   High Pressure (cmH2O) 40 cmH2O   Delay Alarm 20 sec(s)   RR Low (bpm) 16   RR High (bpm) 50 br/min

## 2024-05-10 NOTE — PLAN OF CARE
times per day    apixaban  5 mg Oral BID    atorvastatin  40 mg Oral Nightly    busPIRone  5 mg Oral Daily    [START ON 5/14/2024] vitamin D  50,000 Units Oral Q30 Days    dilTIAZem  120 mg Oral Daily    gabapentin  100 mg Oral TID    melatonin  3 mg Oral Nightly    metoprolol tartrate  25 mg Oral BID    budesonide  0.5 mg Nebulization BID RT    mupirocin   Each Nostril BID    ipratropium 0.5 mg-albuterol 2.5 mg  1 Dose Inhalation BID RT     HOME MEDICATIONS:  Prior to Admission medications    Medication Sig Start Date End Date Taking? Authorizing Provider   albuterol (PROVENTIL) (2.5 MG/3ML) 0.083% nebulizer solution Inhale 3 mLs into the lungs every 6 hours as needed    Eliza Reid MD   OXYGEN Inhale 2 L into the lungs    ProviderEliza MD   amoxicillin-clavulanate (AUGMENTIN) 875-125 MG per tablet Take 1 tablet by mouth 2 times daily for 10 days 4/29/24 5/9/24  Kaleb Foote DO   benzocaine (ORAJEL) 20 % GEL mucosal gel Take by mouth 2 times daily as needed for Pain    Eliza Reid MD   Lactobacillus Rhamnosus, GG, (CULTURELLE PO) Take by mouth    Eliza Reid MD   LORazepam (ATIVAN) 0.5 MG tablet Take 1 tablet by mouth nightly as needed for Anxiety.    ProviderEliza MD   oxyCODONE-acetaminophen (PERCOCET) 7.5-325 MG per tablet Take 1 tablet by mouth every 4 hours as needed for Pain.    ProviderEliza MD   ipratropium 0.5 mg-albuterol 2.5 mg (DUONEB) 0.5-2.5 (3) MG/3ML SOLN nebulizer solution Inhale 3 mL into the lungs via nebulization 2-4 times/day. 4/24/24   Edenilson Alvarez MD   budesonide-formoterol (SYMBICORT) 160-4.5 MCG/ACT AERO Inhale 2 puffs into the lungs 2 times daily 4/24/24   Edenilson Alvarez MD   insulin glargine (LANTUS) 100 UNIT/ML injection vial Inject 20 Units into the skin nightly 4/14/24   Lay Ludwig MD   insulin lispro (HUMALOG) 100 UNIT/ML SOLN injection vial Inject 0-16 Units into the skin 3 times daily (with meals) 4/14/24    MD Eliza   Cholecalciferol (VITAMIN D3) 1.25 MG (97807 UT) CAPS Take 1 capsule by mouth every 30 days (Last dose around March 13, 2023 - next due around April 15, 2023)    ProviderEliza MD   bisacodyl (DULCOLAX) 10 MG suppository Place 1 suppository rectally daily as needed for Constipation (no BM for 3 days)    ProviderEliza MD      Diet Reviewed: Yes   ADULT DIET; Easy to Chew; 3 carb choices (45 gm/meal)    Goal of Care Reviewed: Yes   Patient and/or Family's stated Goal of Care this Admission: Reduce shortness of breath, increase activity tolerance, better understand heart failure and disease management, be more comfortable, and reduce lower extremity edema prior to discharge.     Electronically signed by Katrina Colón RN on 5/9/2024 at 8:21 PM

## 2024-05-10 NOTE — PLAN OF CARE
Problem: Chronic Conditions and Co-morbidities  Goal: Patient's chronic conditions and co-morbidity symptoms are monitored and maintained or improved  Outcome: Progressing  Flowsheets (Taken 5/10/2024 0850 by Juliane De Los Santos RN)  Care Plan - Patient's Chronic Conditions and Co-Morbidity Symptoms are Monitored and Maintained or Improved: Update acute care plan with appropriate goals if chronic or comorbid symptoms are exacerbated and prevent overall improvement and discharge  HEART FAILURE CARE PLAN:    Comorbidities Reviewed: Yes   Patient has a past medical history of Acute diastolic congestive heart failure (HCC), Acute diastolic heart failure (HCC), Acute respiratory failure (HCC), Adjustment disorder with mixed anxiety and depressed mood, Allergic rhinitis, Alzheimer's dementia (HCC), Arachnoid cyst, Atrial fibrillation (HCC), CHF (congestive heart failure) (HCC), Chronic back pain, Constipation, COPD (chronic obstructive pulmonary disease) (HCC), Diabetes mellitus (HCC), Diskitis, Disseminated superficial actinic porokeratosis (DSAP), Edema, ESBL (extended spectrum beta-lactamase) producing bacteria infection, Hypertension, Hypo-osmolality and hyponatremia, Major depressive disorder, Morbid obesity (HCC), Muscle weakness, Osteomyelitis of spine (HCC), Overactive bladder, Schizoaffective disorder (HCC), Seizures (HCC), Urinary tract infection without hematuria, and Xerosis cutis.     Weights Reviewed: Yes   Admission weight: 93.9 kg (207 lb)   Wt Readings from Last 3 Encounters:   05/09/24 87.1 kg (192 lb)   04/30/24 93.9 kg (207 lb)   04/13/24 94.1 kg (207 lb 6.4 oz)     Intake & Output Reviewed: Yes     Intake/Output Summary (Last 24 hours) at 5/10/2024 1437  Last data filed at 5/10/2024 1313  Gross per 24 hour   Intake 1146 ml   Output 2800 ml   Net -1654 ml       ECHOCARDIOGRAM Reviewed: Yes   Patient's Ejection Fraction (EF) is less than or equal to 40%. Discuss HFrEF Guideline Directed Medical Therapy  0.083% nebulizer solution Inhale 3 mLs into the lungs every 6 hours as needed    Eliza Reid MD   OXYGEN Inhale 2 L into the lungs    Eliza Reid MD   benzocaine (ORAJEL) 20 % GEL mucosal gel Take by mouth 2 times daily as needed for Pain    Eliza Reid MD   Lactobacillus Rhamnosus, GG, (CULTURELLE PO) Take by mouth    Eliza Reid MD   ipratropium 0.5 mg-albuterol 2.5 mg (DUONEB) 0.5-2.5 (3) MG/3ML SOLN nebulizer solution Inhale 3 mL into the lungs via nebulization 2-4 times/day. 4/24/24   Edenilson Alvarez MD   budesonide-formoterol (SYMBICORT) 160-4.5 MCG/ACT AERO Inhale 2 puffs into the lungs 2 times daily 4/24/24   Edenilson Alvarez MD   insulin glargine (LANTUS) 100 UNIT/ML injection vial Inject 20 Units into the skin nightly 4/14/24   Lay Ludwig MD   insulin lispro (HUMALOG) 100 UNIT/ML SOLN injection vial Inject 0-16 Units into the skin 3 times daily (with meals) 4/14/24   Lay Ludwig MD   dilTIAZem (CARDIZEM CD) 120 MG extended release capsule Take 1 capsule by mouth daily Hold for SBP < 110 or HR < 65    Eliza Reid MD   senna (SENOKOT) 8.6 MG tablet Take 2 tablets by mouth 2 times daily Hold for loose stool or diarrhea    Eliza Reid MD   fluticasone (FLONASE) 50 MCG/ACT nasal spray 1 spray by Each Nostril route daily 1/19/24   Marisabel Maciel MD   metFORMIN (GLUCOPHAGE) 500 MG tablet Take 1 tablet by mouth 2 times daily (with meals)    Eliza Reid MD   gabapentin (NEURONTIN) 100 MG capsule Take 1 capsule by mouth 3 times daily.    Eliza Reid MD   apixaban (ELIQUIS) 5 MG TABS tablet Take 1 tablet by mouth 2 times daily 4/28/23   Montserrat Morelos MD   atorvastatin (LIPITOR) 40 MG tablet Take 1 tablet by mouth nightly 4/28/23   Montserrat Morelos MD   acetaminophen (TYLENOL) 650 MG extended release tablet Take 1 tablet by mouth every 8 hours as needed for Fever or Pain    Provider, MD Eliza

## 2024-05-10 NOTE — PROGRESS NOTES
Patient educated on discharge instructions as well as new medications use, dosage, administration and possible side effects.  Patient verified knowledge. IV removed without difficulty and dry dressing in place. Telemetry monitor removed and returned to [facility]. Pt left facility in stable condition to [facility] with all of their personal belongings.

## 2024-05-10 NOTE — PROGRESS NOTES
Reassessment completed. Vss. No changes from previous assessment.      Pt continues on 2L of continuous oxygen. Breaths easy, even, unlabored.      Ext. Catheter remains in place.      Pt. Refused turn, Call light within reach.

## 2024-05-10 NOTE — PROGRESS NOTES
Pt given tylenol pt calls out with multiple complaints of pain, neck,knee, and tongue. Pt was repositioned with no relief.  Pt has been yelling out with c/o hunger while eating.  Pt is able to self feed and is eating.

## 2024-05-11 NOTE — PROGRESS NOTES
Patient provided a COPD Educational Folder that includes the following materials:     [x]  Pageflakes Booklet: Managing your COPD  [x]  ALA: Getting the Most Out of Medication Delivery Devices  [x]  ALA: My COPD Action Plan  [x]  Better [employer]reathers [employer]: [facility]   [x]  Smoking Cessation Classes  [x]  Outpatient Spiritual Care Services  [x]  Magnet: Signs of COPD    PATIENT/CAREGIVER TEACHING:   Level of patient/caregiver understanding able to:   [x] Verbalize understanding   [] Demonstrate understanding       [] Teach back        [] Needs reinforcement     []  Other:     Electronically signed by Karishma Lui RN on 5/9/2024 at 1:00 PM

## 2024-05-29 ENCOUNTER — INITIAL CONSULT (OUTPATIENT)
Dept: SURGERY | Age: 74
End: 2024-05-29
Payer: COMMERCIAL

## 2024-05-29 DIAGNOSIS — K80.20 CALCULUS OF GALLBLADDER WITHOUT CHOLECYSTITIS WITHOUT OBSTRUCTION: Primary | ICD-10-CM

## 2024-05-29 PROCEDURE — 99204 OFFICE O/P NEW MOD 45 MIN: CPT | Performed by: SURGERY

## 2024-05-29 PROCEDURE — 1124F ACP DISCUSS-NO DSCNMKR DOCD: CPT | Performed by: SURGERY

## 2024-07-10 ENCOUNTER — PREP FOR PROCEDURE (OUTPATIENT)
Dept: PULMONOLOGY | Age: 74
End: 2024-07-10

## 2024-07-10 DIAGNOSIS — R91.8 LUNG NODULES: Primary | ICD-10-CM

## 2024-09-03 ENCOUNTER — HOSPITAL ENCOUNTER (OUTPATIENT)
Age: 74
Setting detail: SPECIMEN
Discharge: HOME | End: 2024-09-03
Payer: COMMERCIAL

## 2024-09-03 LAB
ALBUMIN SERPL-MCNC: 3.8 G/DL (ref 3.4–5)
ALBUMIN/GLOB SERPL: 1.4 {RATIO} (ref 1.1–2.2)
ALP SERPL-CCNC: 94 U/L (ref 40–129)
ALT SERPL-CCNC: 7 U/L (ref 10–40)
ANION GAP SERPL CALCULATED.3IONS-SCNC: 7 MMOL/L (ref 3–16)
AST SERPL-CCNC: 12 U/L (ref 15–37)
BILIRUB SERPL-MCNC: 0.4 MG/DL (ref 0–1)
BUN SERPL-MCNC: 18 MG/DL (ref 7–20)
CALCIUM SERPL-MCNC: 9.6 MG/DL (ref 8.3–10.6)
CHLORIDE SERPL-SCNC: 92 MMOL/L (ref 99–110)
CO2 SERPL-SCNC: 39 MMOL/L (ref 21–32)
CREAT SERPL-MCNC: <0.5 MG/DL (ref 0.6–1.2)
DEPRECATED RDW RBC AUTO: 15 % (ref 12.4–15.4)
GFR SERPLBLD CREATININE-BSD FMLA CKD-EPI: >90 ML/MIN/{1.73_M2}
GLUCOSE SERPL-MCNC: 256 MG/DL (ref 70–99)
HCT VFR BLD AUTO: 36.8 % (ref 36–48)
HGB BLD-MCNC: 11.4 G/DL (ref 12–16)
MCH RBC QN AUTO: 25.4 PG (ref 26–34)
MCHC RBC AUTO-ENTMCNC: 30.9 G/DL (ref 31–36)
MCV RBC AUTO: 82.1 FL (ref 80–100)
NT-PROBNP SERPL-MCNC: 1398 PG/ML (ref 0–449)
PLATELET # BLD AUTO: 160 K/UL (ref 135–450)
PMV BLD AUTO: 10.2 FL (ref 5–10.5)
POTASSIUM SERPL-SCNC: 4.6 MMOL/L (ref 3.5–5.1)
PROT SERPL-MCNC: 6.6 G/DL (ref 6.4–8.2)
RBC # BLD AUTO: 4.48 M/UL (ref 4–5.2)
SODIUM SERPL-SCNC: 138 MMOL/L (ref 136–145)
WBC # BLD AUTO: 7.9 K/UL (ref 4–11)

## 2024-09-03 PROCEDURE — 85027 COMPLETE CBC AUTOMATED: CPT

## 2024-09-03 PROCEDURE — 83880 ASSAY OF NATRIURETIC PEPTIDE: CPT

## 2024-09-03 PROCEDURE — 80053 COMPREHEN METABOLIC PANEL: CPT

## 2024-09-18 LAB
C DIFFICILE TOXIN, EIA: NORMAL
COMMENT: NORMAL

## 2024-09-24 LAB — COMMENT: NORMAL

## 2024-09-30 NOTE — THERAPY EVALUATION
Music Therapy  Individual Services/Consult & Assessment  Facility/Department: [address] [place] [address]        NAME: Juan Sales  : 46  MRN: [id number]  ROOM: [place] [address]  ADMISSION DATE: 11/3/22       Referral Source: Nursing    Type of Session: Evaluation, Individual    Patient was received: in pt room    Others present: none     Treatment Objectives: Increase: sensory stimulation, environmental engagement  Decrease: social isolation    Music Therapy Interventions/Techniques:  Live music, Counseling, Contingent music    Behavioral Observation:  Initial affect displayed: Flat/Neutral  Ending affect displayed: Flat/Neutral    Physiological Responses:  Heart Rate:   Respiration: even, labored  Oxygen Saturation: consistent, 92-95    Self-Report:  Initial Pain Score: N/A Ending Pain Score: N/A  Initial Anxiety Level: N/A Ending Anxiety Level: N/A  Initial Relaxation Level: N/A Ending Relaxation Level: N/A    Communication Responses: No observable attempts to communicate  Patient Verbalized: N/A  Motor Responses: orientation to sound source, visual tracking  Behavioral Responses: none observed  Musical Responses: attuned respiration to music stimuli, adjusted with shifts in tempo and contingency     Notes: MIKO met with pt per nursing request. During session, pt adjusted her breathing to match that of music therapist, breathing in time with music and breaks between lyrics, mimicking singing along without verbal/vocal content. Pt adjusted breathing as music therapist adjusted tempo, continuing entrainment through completion of procedure, withholding respiration when music therapist created contingency through pause in music. No observable communication response for yes/no questions, either/or choices. Will follow up for continued services as appropriate.      Louis Bone MM, MIKO  Board-Certified Music Therapist  Psychoeducation Specialist
The patient is a 62y Male complaining of back pain general.

## 2024-10-10 LAB
BASOPHILS ABSOLUTE: 0.1 K/UL (ref 0–0.3)
BASOPHILS RELATIVE PERCENT: 0.5 % (ref 0–2)
BUN / CREAT RATIO: 32 (ref 7–25)
BUN BLDV-MCNC: 16 MG/DL (ref 3–29)
CALCIUM SERPL-MCNC: 9.5 MG/DL (ref 8.5–10.5)
CHLORIDE BLD-SCNC: 92 MEQ/L (ref 96–110)
CO2: 40 MEQ/L (ref 19–32)
CREAT SERPL-MCNC: 0.5 MG/DL (ref 0.5–1.2)
DIFFERENTIAL COUNT: ABNORMAL
EOSINOPHILS ABSOLUTE: 0.2 K/UL (ref 0–0.5)
EOSINOPHILS RELATIVE PERCENT: 2.5 % (ref 0–5)
ESTIMATED GLOMERULAR FILTRATION RATE CREATININE EQUATION: 98 MLS/MIN/1.73M2
FASTING STATUS: ABNORMAL
GLUCOSE BLD-MCNC: 188 MG/DL (ref 70–99)
HCT VFR BLD CALC: 37.3 % (ref 34–49)
HEMOGLOBIN: 11.2 G/DL (ref 11.2–15.7)
IMMATURE GRANS (ABS): 0 K/UL (ref 0–0.1)
IMMATURE GRANULOCYTES %: 0.4 %
LYMPHOCYTES ABSOLUTE: 1.3 K/UL (ref 0.9–4.1)
LYMPHOCYTES RELATIVE PERCENT: 13.9 % (ref 14–51)
MCH RBC QN AUTO: 25.9 PG (ref 26–34)
MCHC RBC AUTO-ENTMCNC: 30 G/DL (ref 30.7–35.5)
MCV RBC AUTO: 86.3 FL (ref 80–100)
MONOCYTES ABSOLUTE: 0.4 K/UL (ref 0.2–1)
MONOCYTES RELATIVE PERCENT: 3.9 % (ref 4–12)
NEUTROPHILS ABSOLUTE: 7.3 K/UL (ref 1.8–7.5)
NEUTROPHILS RELATIVE PERCENT: 78.8 % (ref 42–80)
PDW BLD-RTO: 12.8 %
PLATELET # BLD: 156 K/UL (ref 140–400)
PMV BLD AUTO: 12.3 FL (ref 7.2–11.7)
POTASSIUM SERPL-SCNC: 4.2 MEQ/L (ref 3.4–5.3)
RBC # BLD: 4.32 M/UL (ref 3.95–5.26)
RETICULOCYTE ABSOLUTE COUNT: 0 /100 WBC
SODIUM BLD-SCNC: 144 MEQ/L (ref 135–148)
WBC # BLD: 9.2 K/UL (ref 3.5–10.9)

## 2024-10-16 LAB
BUN / CREAT RATIO: 42 (ref 7–25)
BUN BLDV-MCNC: 25 MG/DL (ref 3–29)
CALCIUM SERPL-MCNC: 9.3 MG/DL (ref 8.5–10.5)
CHLORIDE BLD-SCNC: 92 MEQ/L (ref 96–110)
CO2: 38 MEQ/L (ref 19–32)
CREAT SERPL-MCNC: 0.6 MG/DL (ref 0.5–1.2)
ESTIMATED GLOMERULAR FILTRATION RATE CREATININE EQUATION: 94 MLS/MIN/1.73M2
FASTING STATUS: ABNORMAL
GLUCOSE BLD-MCNC: 180 MG/DL (ref 70–99)
HCT VFR BLD CALC: 36.3 % (ref 34–49)
HEMOGLOBIN: 11.1 G/DL (ref 11.2–15.7)
MCH RBC QN AUTO: 25.8 PG (ref 26–34)
MCHC RBC AUTO-ENTMCNC: 30.6 G/DL (ref 30.7–35.5)
MCV RBC AUTO: 84.2 FL (ref 80–100)
PDW BLD-RTO: 13.8 %
PLATELET # BLD: 151 K/UL (ref 140–400)
PMV BLD AUTO: 12.4 FL (ref 7.2–11.7)
POTASSIUM SERPL-SCNC: 4 MEQ/L (ref 3.4–5.3)
RBC # BLD: 4.31 M/UL (ref 3.95–5.26)
SODIUM BLD-SCNC: 140 MEQ/L (ref 135–148)
WBC # BLD: 8.5 K/UL (ref 3.5–10.9)

## 2024-10-24 LAB
BUN / CREAT RATIO: NORMAL (ref 7–25)
BUN BLDV-MCNC: NORMAL MG/DL (ref 3–29)
CALCIUM SERPL-MCNC: NORMAL MG/DL (ref 8.5–10.5)
CHLORIDE BLD-SCNC: NORMAL MEQ/L (ref 96–110)
CO2: NORMAL MEQ/L (ref 19–32)
CREAT SERPL-MCNC: NORMAL MG/DL (ref 0.5–1.2)
DIFFERENTIAL COUNT: NORMAL
ESTIMATED GLOMERULAR FILTRATION RATE CREATININE EQUATION: NORMAL MLS/MIN/1.73M2
FASTING STATUS: NORMAL
GLUCOSE BLD-MCNC: NORMAL MG/DL (ref 70–99)
HCT VFR BLD CALC: NORMAL % (ref 34–49)
HEMOGLOBIN: NORMAL G/DL (ref 11.2–15.7)
MCH RBC QN AUTO: NORMAL PG (ref 26–34)
MCHC RBC AUTO-ENTMCNC: NORMAL G/DL (ref 30.7–35.5)
MCV RBC AUTO: NORMAL FL (ref 80–100)
PDW BLD-RTO: NORMAL %
PLATELET # BLD: NORMAL K/UL (ref 140–400)
PLATELET COMMENT: NORMAL
PMV BLD AUTO: NORMAL FL (ref 7.2–11.7)
POTASSIUM SERPL-SCNC: NORMAL MEQ/L (ref 3.4–5.3)
RBC # BLD: NORMAL M/UL (ref 3.95–5.26)
RBC COMMENT: NORMAL
SODIUM BLD-SCNC: NORMAL MEQ/L (ref 135–148)
WBC # BLD: NORMAL K/UL (ref 3.5–10.9)
WBC COMMENT: NORMAL

## 2024-11-11 LAB
A/G RATIO: 1.5 RATIO (ref 0.8–2.6)
ALBUMIN: 3.7 G/DL (ref 3.5–5.2)
ALP BLD-CCNC: 64 U/L (ref 23–144)
ALT SERPL-CCNC: 7 U/L (ref 0–60)
AST SERPL-CCNC: 9 U/L (ref 0–55)
B-TYPE NATRIURETIC PEPTIDE: 1682 PG/ML (ref 0–125)
BILIRUB SERPL-MCNC: 0.2 MG/DL (ref 0–1.2)
BUN / CREAT RATIO: 30 (ref 7–25)
BUN BLDV-MCNC: 15 MG/DL (ref 3–29)
CALCIUM SERPL-MCNC: 9.2 MG/DL (ref 8.5–10.5)
CHLORIDE BLD-SCNC: 95 MEQ/L (ref 96–110)
CHOLESTEROL, TOTAL: 107 MG/DL
CO2: 35 MEQ/L (ref 19–32)
CREAT SERPL-MCNC: 0.5 MG/DL (ref 0.5–1.2)
ESTIMATED GLOMERULAR FILTRATION RATE CREATININE EQUATION: 98 MLS/MIN/1.73M2
FASTING STATUS: ABNORMAL
GLOBULIN: 2.4 G/DL (ref 1.9–3.6)
GLUCOSE BLD-MCNC: 202 MG/DL (ref 70–99)
HBA1C MFR BLD: 7.8 %
HCT VFR BLD CALC: 37.1 % (ref 34–49)
HDLC SERPL-MCNC: 53 MG/DL
HEMOGLOBIN: 11.1 G/DL (ref 11.2–15.7)
LDL CHOLESTEROL: 39 MG/DL
MCH RBC QN AUTO: 25.7 PG (ref 26–34)
MCHC RBC AUTO-ENTMCNC: 29.9 G/DL (ref 30.7–35.5)
MCV RBC AUTO: 85.9 FL (ref 80–100)
PDW BLD-RTO: 13.5 %
PLATELET # BLD: 167 K/UL (ref 140–400)
PMV BLD AUTO: 13 FL (ref 7.2–11.7)
POTASSIUM SERPL-SCNC: 3.3 MEQ/L (ref 3.4–5.3)
RBC # BLD: 4.32 M/UL (ref 3.95–5.26)
SODIUM BLD-SCNC: 141 MEQ/L (ref 135–148)
TOTAL PROTEIN: 6.1 G/DL (ref 6–8.3)
TRIGL SERPL-MCNC: 73 MG/DL
TROPONIN T: 15 NG/L (ref 0–14)
VLDLC SERPL CALC-MCNC: 15 MG/DL (ref 4–38)
WBC # BLD: 10 K/UL (ref 3.5–10.9)

## 2024-11-15 LAB
VITAMIN D 25-HYDROXY: 38 NG/ML (ref 30–100)
VITAMIN D2, 25 HYDROXY: <4 NG/ML
VITAMIN D3, 1,25 (OH)2: 38 NG/ML

## 2024-11-20 LAB
A/G RATIO: 1.3 RATIO (ref 0.8–2.6)
ALBUMIN: 3.6 G/DL (ref 3.5–5.2)
ALP BLD-CCNC: 75 U/L (ref 23–144)
ALT SERPL-CCNC: 6 U/L (ref 0–60)
AST SERPL-CCNC: 10 U/L (ref 0–55)
B-TYPE NATRIURETIC PEPTIDE: 909 PG/ML (ref 0–125)
BASOPHILS ABSOLUTE: 0 K/UL (ref 0–0.3)
BASOPHILS RELATIVE PERCENT: 0.3 % (ref 0–2)
BILIRUB SERPL-MCNC: 0.3 MG/DL (ref 0–1.2)
BUN / CREAT RATIO: 28 (ref 7–25)
BUN BLDV-MCNC: 17 MG/DL (ref 3–29)
CALCIUM SERPL-MCNC: 9.1 MG/DL (ref 8.5–10.5)
CHLORIDE BLD-SCNC: 95 MEQ/L (ref 96–110)
CHOLESTEROL, TOTAL: 107 MG/DL
CO2: 31 MEQ/L (ref 19–32)
CREAT SERPL-MCNC: 0.6 MG/DL (ref 0.5–1.2)
DIFFERENTIAL COUNT: ABNORMAL
EOSINOPHILS ABSOLUTE: 0.2 K/UL (ref 0–0.5)
EOSINOPHILS RELATIVE PERCENT: 1.3 % (ref 0–5)
ESTIMATED AVERAGE GLUCOSE: 211 MG/DL
ESTIMATED GLOMERULAR FILTRATION RATE CREATININE EQUATION: 94 MLS/MIN/1.73M2
FASTING STATUS: ABNORMAL
GLOBULIN: 2.8 G/DL (ref 1.9–3.6)
GLUCOSE BLD-MCNC: 103 MG/DL (ref 70–99)
HBA1C MFR BLD: 8.1 %
HCT VFR BLD CALC: 39.1 % (ref 34–49)
HDLC SERPL-MCNC: 44 MG/DL
HEMOGLOBIN: 11.9 G/DL (ref 11.2–15.7)
IMMATURE GRANS (ABS): 0.2 K/UL (ref 0–0.1)
IMMATURE GRANULOCYTES %: 1.3 %
LDL CHOLESTEROL: 42 MG/DL
LYMPHOCYTES ABSOLUTE: 3.1 K/UL (ref 0.9–4.1)
LYMPHOCYTES RELATIVE PERCENT: 24.3 % (ref 14–51)
MCH RBC QN AUTO: 25.7 PG (ref 26–34)
MCHC RBC AUTO-ENTMCNC: 30.4 G/DL (ref 30.7–35.5)
MCV RBC AUTO: 84.4 FL (ref 80–100)
MONOCYTES ABSOLUTE: 0.8 K/UL (ref 0.2–1)
MONOCYTES RELATIVE PERCENT: 6.5 % (ref 4–12)
NEUTROPHILS ABSOLUTE: 8.5 K/UL (ref 1.8–7.5)
NEUTROPHILS RELATIVE PERCENT: 66.3 % (ref 42–80)
PDW BLD-RTO: 13.9 %
PLATELET # BLD: 266 K/UL (ref 140–400)
PMV BLD AUTO: 11.6 FL (ref 7.2–11.7)
POTASSIUM SERPL-SCNC: 4.2 MEQ/L (ref 3.4–5.3)
RBC # BLD: 4.63 M/UL (ref 3.95–5.26)
RETICULOCYTE ABSOLUTE COUNT: 0 /100 WBC
SODIUM BLD-SCNC: 140 MEQ/L (ref 135–148)
TOTAL PROTEIN: 6.4 G/DL (ref 6–8.3)
TRIGL SERPL-MCNC: 107 MG/DL
TROPONIN T: 16 NG/L (ref 0–14)
VITAMIN D 25-HYDROXY: 47 NG/ML (ref 30–100)
VLDLC SERPL CALC-MCNC: 21 MG/DL (ref 4–38)
WBC # BLD: 12.8 K/UL (ref 3.5–10.9)

## 2024-12-12 ENCOUNTER — APPOINTMENT (OUTPATIENT)
Dept: CT IMAGING | Age: 74
DRG: 264 | End: 2024-12-12
Payer: COMMERCIAL

## 2024-12-12 ENCOUNTER — APPOINTMENT (OUTPATIENT)
Dept: GENERAL RADIOLOGY | Age: 74
DRG: 264 | End: 2024-12-12
Payer: COMMERCIAL

## 2024-12-12 ENCOUNTER — HOSPITAL ENCOUNTER (INPATIENT)
Age: 74
LOS: 6 days | Discharge: NURSING, REHAB OR LONG-TERM CARE | DRG: 264 | End: 2024-12-18
Attending: EMERGENCY MEDICINE | Admitting: INTERNAL MEDICINE
Payer: COMMERCIAL

## 2024-12-12 DIAGNOSIS — F41.9 ANXIETY: ICD-10-CM

## 2024-12-12 DIAGNOSIS — M31.30 NECROTIZING GRANULOMATOUS INFLAMMATION OF LUNG (HCC): ICD-10-CM

## 2024-12-12 DIAGNOSIS — K59.00 CONSTIPATION, UNSPECIFIED CONSTIPATION TYPE: ICD-10-CM

## 2024-12-12 DIAGNOSIS — I48.91 ATRIAL FIBRILLATION WITH RVR (HCC): ICD-10-CM

## 2024-12-12 DIAGNOSIS — M51.369 DEGENERATION OF INTERVERTEBRAL DISC OF LUMBAR REGION, UNSPECIFIED WHETHER PAIN PRESENT: ICD-10-CM

## 2024-12-12 DIAGNOSIS — K62.5 RECTAL BLEEDING: Primary | ICD-10-CM

## 2024-12-12 LAB
ABO + RH BLD: NORMAL
ALBUMIN SERPL-MCNC: 3.1 G/DL (ref 3.4–5)
ALBUMIN/GLOB SERPL: 0.8 {RATIO} (ref 1.1–2.2)
ALP SERPL-CCNC: 108 U/L (ref 40–129)
ALT SERPL-CCNC: <5 U/L (ref 10–40)
ANION GAP SERPL CALCULATED.3IONS-SCNC: 12 MMOL/L (ref 3–16)
APTT BLD: 41.6 SEC (ref 22.1–36.4)
AST SERPL-CCNC: 13 U/L (ref 15–37)
BACTERIA URNS QL MICRO: ABNORMAL /HPF
BASOPHILS # BLD: 0.1 K/UL (ref 0–0.2)
BASOPHILS NFR BLD: 0.5 %
BILIRUB SERPL-MCNC: 0.4 MG/DL (ref 0–1)
BILIRUB UR QL STRIP.AUTO: NEGATIVE
BLD GP AB SCN SERPL QL: NORMAL
BUN SERPL-MCNC: 21 MG/DL (ref 7–20)
CALCIUM SERPL-MCNC: 8.7 MG/DL (ref 8.3–10.6)
CHLORIDE SERPL-SCNC: 94 MMOL/L (ref 99–110)
CLARITY UR: CLEAR
CO2 SERPL-SCNC: 32 MMOL/L (ref 21–32)
COLOR UR: YELLOW
CREAT SERPL-MCNC: 0.4 MG/DL (ref 0.6–1.2)
DEPRECATED RDW RBC AUTO: 15.7 % (ref 12.4–15.4)
EKG DIAGNOSIS: NORMAL
EKG Q-T INTERVAL: 324 MS
EKG QRS DURATION: 96 MS
EKG QTC CALCULATION (BAZETT): 493 MS
EKG R AXIS: 73 DEGREES
EKG T AXIS: 268 DEGREES
EKG VENTRICULAR RATE: 139 BPM
EOSINOPHIL # BLD: 0 K/UL (ref 0–0.6)
EOSINOPHIL NFR BLD: 0.3 %
EPI CELLS #/AREA URNS HPF: ABNORMAL /HPF (ref 0–5)
FLUAV RNA RESP QL NAA+PROBE: NOT DETECTED
FLUBV RNA RESP QL NAA+PROBE: NOT DETECTED
GFR SERPLBLD CREATININE-BSD FMLA CKD-EPI: >90 ML/MIN/{1.73_M2}
GLUCOSE BLD-MCNC: 163 MG/DL (ref 70–99)
GLUCOSE BLD-MCNC: 204 MG/DL (ref 70–99)
GLUCOSE BLD-MCNC: 321 MG/DL (ref 70–99)
GLUCOSE SERPL-MCNC: 154 MG/DL (ref 70–99)
GLUCOSE UR STRIP.AUTO-MCNC: NEGATIVE MG/DL
HCT VFR BLD AUTO: 32.9 % (ref 36–48)
HCT VFR BLD AUTO: 38.2 % (ref 36–48)
HCT VFR BLD AUTO: 38.7 % (ref 36–48)
HEMOCCULT STL QL: ABNORMAL
HGB BLD-MCNC: 10.4 G/DL (ref 12–16)
HGB BLD-MCNC: 11.7 G/DL (ref 12–16)
HGB BLD-MCNC: 12 G/DL (ref 12–16)
HGB UR QL STRIP.AUTO: ABNORMAL
INR PPP: 1.74 (ref 0.85–1.15)
KETONES UR STRIP.AUTO-MCNC: ABNORMAL MG/DL
LACTATE BLDV-SCNC: 0.5 MMOL/L (ref 0.4–2)
LACTATE BLDV-SCNC: 1.3 MMOL/L (ref 0.4–2)
LEUKOCYTE ESTERASE UR QL STRIP.AUTO: ABNORMAL
LYMPHOCYTES # BLD: 1.7 K/UL (ref 1–5.1)
LYMPHOCYTES NFR BLD: 13.1 %
MAGNESIUM SERPL-MCNC: 1.62 MG/DL (ref 1.8–2.4)
MCH RBC QN AUTO: 25.2 PG (ref 26–34)
MCHC RBC AUTO-ENTMCNC: 30.9 G/DL (ref 31–36)
MCV RBC AUTO: 81.6 FL (ref 80–100)
MONOCYTES # BLD: 0.9 K/UL (ref 0–1.3)
MONOCYTES NFR BLD: 7 %
MUCOUS THREADS #/AREA URNS LPF: ABNORMAL /LPF
NEUTROPHILS # BLD: 10.2 K/UL (ref 1.7–7.7)
NEUTROPHILS NFR BLD: 79.1 %
NITRITE UR QL STRIP.AUTO: NEGATIVE
NT-PROBNP SERPL-MCNC: 2255 PG/ML (ref 0–449)
PERFORMED ON: ABNORMAL
PH UR STRIP.AUTO: 6 [PH] (ref 5–8)
PLATELET # BLD AUTO: 330 K/UL (ref 135–450)
PMV BLD AUTO: 8.3 FL (ref 5–10.5)
POTASSIUM SERPL-SCNC: 3.4 MMOL/L (ref 3.5–5.1)
PROCALCITONIN SERPL IA-MCNC: 0.04 NG/ML (ref 0–0.15)
PROT SERPL-MCNC: 7 G/DL (ref 6.4–8.2)
PROT UR STRIP.AUTO-MCNC: NEGATIVE MG/DL
PROTHROMBIN TIME: 20.5 SEC (ref 11.9–14.9)
RBC # BLD AUTO: 4.75 M/UL (ref 4–5.2)
RBC #/AREA URNS HPF: ABNORMAL /HPF (ref 0–4)
REASON FOR REJECTION: NORMAL
REJECTED TEST: NORMAL
SARS-COV-2 RNA RESP QL NAA+PROBE: NOT DETECTED
SODIUM SERPL-SCNC: 138 MMOL/L (ref 136–145)
SP GR UR STRIP.AUTO: <=1.005 (ref 1–1.03)
TROPONIN, HIGH SENSITIVITY: 10 NG/L (ref 0–14)
TROPONIN, HIGH SENSITIVITY: 12 NG/L (ref 0–14)
TROPONIN, HIGH SENSITIVITY: 9 NG/L (ref 0–14)
TSH SERPL DL<=0.005 MIU/L-ACNC: 0.41 UIU/ML (ref 0.27–4.2)
UA COMPLETE W REFLEX CULTURE PNL UR: ABNORMAL
UA DIPSTICK W REFLEX MICRO PNL UR: YES
URN SPEC COLLECT METH UR: ABNORMAL
UROBILINOGEN UR STRIP-ACNC: 0.2 E.U./DL
WBC # BLD AUTO: 12.9 K/UL (ref 4–11)
WBC #/AREA URNS HPF: ABNORMAL /HPF (ref 0–5)

## 2024-12-12 PROCEDURE — 6360000002 HC RX W HCPCS: Performed by: EMERGENCY MEDICINE

## 2024-12-12 PROCEDURE — 71250 CT THORAX DX C-: CPT

## 2024-12-12 PROCEDURE — 87636 SARSCOV2 & INF A&B AMP PRB: CPT

## 2024-12-12 PROCEDURE — 86850 RBC ANTIBODY SCREEN: CPT

## 2024-12-12 PROCEDURE — 2700000000 HC OXYGEN THERAPY PER DAY

## 2024-12-12 PROCEDURE — 85014 HEMATOCRIT: CPT

## 2024-12-12 PROCEDURE — 84145 PROCALCITONIN (PCT): CPT

## 2024-12-12 PROCEDURE — 82270 OCCULT BLOOD FECES: CPT

## 2024-12-12 PROCEDURE — 87186 SC STD MICRODIL/AGAR DIL: CPT

## 2024-12-12 PROCEDURE — 87086 URINE CULTURE/COLONY COUNT: CPT

## 2024-12-12 PROCEDURE — 93010 ELECTROCARDIOGRAM REPORT: CPT | Performed by: INTERNAL MEDICINE

## 2024-12-12 PROCEDURE — 86900 BLOOD TYPING SEROLOGIC ABO: CPT

## 2024-12-12 PROCEDURE — 2500000003 HC RX 250 WO HCPCS: Performed by: EMERGENCY MEDICINE

## 2024-12-12 PROCEDURE — 81001 URINALYSIS AUTO W/SCOPE: CPT

## 2024-12-12 PROCEDURE — 94761 N-INVAS EAR/PLS OXIMETRY MLT: CPT

## 2024-12-12 PROCEDURE — 96375 TX/PRO/DX INJ NEW DRUG ADDON: CPT

## 2024-12-12 PROCEDURE — 6360000002 HC RX W HCPCS

## 2024-12-12 PROCEDURE — 85025 COMPLETE CBC W/AUTO DIFF WBC: CPT

## 2024-12-12 PROCEDURE — 94640 AIRWAY INHALATION TREATMENT: CPT

## 2024-12-12 PROCEDURE — 87449 NOS EACH ORGANISM AG IA: CPT

## 2024-12-12 PROCEDURE — 2580000003 HC RX 258: Performed by: EMERGENCY MEDICINE

## 2024-12-12 PROCEDURE — 6370000000 HC RX 637 (ALT 250 FOR IP)

## 2024-12-12 PROCEDURE — 87641 MR-STAPH DNA AMP PROBE: CPT

## 2024-12-12 PROCEDURE — 85730 THROMBOPLASTIN TIME PARTIAL: CPT

## 2024-12-12 PROCEDURE — 99223 1ST HOSP IP/OBS HIGH 75: CPT | Performed by: INTERNAL MEDICINE

## 2024-12-12 PROCEDURE — 99285 EMERGENCY DEPT VISIT HI MDM: CPT

## 2024-12-12 PROCEDURE — 87088 URINE BACTERIA CULTURE: CPT

## 2024-12-12 PROCEDURE — 85610 PROTHROMBIN TIME: CPT

## 2024-12-12 PROCEDURE — 93005 ELECTROCARDIOGRAM TRACING: CPT | Performed by: EMERGENCY MEDICINE

## 2024-12-12 PROCEDURE — 85018 HEMOGLOBIN: CPT

## 2024-12-12 PROCEDURE — 2580000003 HC RX 258

## 2024-12-12 PROCEDURE — 80053 COMPREHEN METABOLIC PANEL: CPT

## 2024-12-12 PROCEDURE — 83605 ASSAY OF LACTIC ACID: CPT

## 2024-12-12 PROCEDURE — 83735 ASSAY OF MAGNESIUM: CPT

## 2024-12-12 PROCEDURE — 36415 COLL VENOUS BLD VENIPUNCTURE: CPT

## 2024-12-12 PROCEDURE — 86901 BLOOD TYPING SEROLOGIC RH(D): CPT

## 2024-12-12 PROCEDURE — 83036 HEMOGLOBIN GLYCOSYLATED A1C: CPT

## 2024-12-12 PROCEDURE — 84443 ASSAY THYROID STIM HORMONE: CPT

## 2024-12-12 PROCEDURE — 96361 HYDRATE IV INFUSION ADD-ON: CPT

## 2024-12-12 PROCEDURE — 84484 ASSAY OF TROPONIN QUANT: CPT

## 2024-12-12 PROCEDURE — 6370000000 HC RX 637 (ALT 250 FOR IP): Performed by: EMERGENCY MEDICINE

## 2024-12-12 PROCEDURE — 74174 CTA ABD&PLVS W/CONTRAST: CPT

## 2024-12-12 PROCEDURE — 6360000004 HC RX CONTRAST MEDICATION: Performed by: EMERGENCY MEDICINE

## 2024-12-12 PROCEDURE — 99223 1ST HOSP IP/OBS HIGH 75: CPT

## 2024-12-12 PROCEDURE — 2060000000 HC ICU INTERMEDIATE R&B

## 2024-12-12 PROCEDURE — 87040 BLOOD CULTURE FOR BACTERIA: CPT

## 2024-12-12 PROCEDURE — 83880 ASSAY OF NATRIURETIC PEPTIDE: CPT

## 2024-12-12 PROCEDURE — 96365 THER/PROPH/DIAG IV INF INIT: CPT

## 2024-12-12 RX ORDER — SODIUM CHLORIDE 9 MG/ML
INJECTION, SOLUTION INTRAVENOUS PRN
Status: DISCONTINUED | OUTPATIENT
Start: 2024-12-12 | End: 2024-12-18 | Stop reason: HOSPADM

## 2024-12-12 RX ORDER — IOPAMIDOL 755 MG/ML
100 INJECTION, SOLUTION INTRAVASCULAR
Status: COMPLETED | OUTPATIENT
Start: 2024-12-12 | End: 2024-12-12

## 2024-12-12 RX ORDER — DIPHENHYDRAMINE HCL 25 MG
50 TABLET ORAL ONCE
Status: COMPLETED | OUTPATIENT
Start: 2024-12-12 | End: 2024-12-12

## 2024-12-12 RX ORDER — BUDESONIDE AND FORMOTEROL FUMARATE DIHYDRATE 160; 4.5 UG/1; UG/1
2 AEROSOL RESPIRATORY (INHALATION) 2 TIMES DAILY
Status: DISCONTINUED | OUTPATIENT
Start: 2024-12-12 | End: 2024-12-18 | Stop reason: HOSPADM

## 2024-12-12 RX ORDER — LORAZEPAM 1 MG/1
1 TABLET ORAL
Status: ON HOLD | COMMUNITY
End: 2024-12-18

## 2024-12-12 RX ORDER — PANTOPRAZOLE SODIUM 40 MG/10ML
80 INJECTION, POWDER, LYOPHILIZED, FOR SOLUTION INTRAVENOUS ONCE
Status: COMPLETED | OUTPATIENT
Start: 2024-12-12 | End: 2024-12-12

## 2024-12-12 RX ORDER — LACTULOSE 10 G/15ML
20 SOLUTION ORAL EVERY OTHER DAY
COMMUNITY
Start: 2024-12-08

## 2024-12-12 RX ORDER — ACETAMINOPHEN 325 MG/1
650 TABLET ORAL EVERY 6 HOURS PRN
Status: DISCONTINUED | OUTPATIENT
Start: 2024-12-12 | End: 2024-12-18 | Stop reason: HOSPADM

## 2024-12-12 RX ORDER — ONDANSETRON 4 MG/1
4 TABLET, FILM COATED ORAL EVERY 6 HOURS PRN
COMMUNITY

## 2024-12-12 RX ORDER — LORAZEPAM 1 MG/1
1 TABLET ORAL NIGHTLY
Status: DISCONTINUED | OUTPATIENT
Start: 2024-12-12 | End: 2024-12-18 | Stop reason: HOSPADM

## 2024-12-12 RX ORDER — ATORVASTATIN CALCIUM 10 MG/1
20 TABLET, FILM COATED ORAL NIGHTLY
Status: DISCONTINUED | OUTPATIENT
Start: 2024-12-12 | End: 2024-12-18 | Stop reason: HOSPADM

## 2024-12-12 RX ORDER — VENLAFAXINE HYDROCHLORIDE 75 MG/1
75 CAPSULE, EXTENDED RELEASE ORAL DAILY
Status: DISCONTINUED | OUTPATIENT
Start: 2024-12-12 | End: 2024-12-18 | Stop reason: HOSPADM

## 2024-12-12 RX ORDER — CLOTRIMAZOLE 1 %
1 CREAM (GRAM) TOPICAL 2 TIMES DAILY
COMMUNITY

## 2024-12-12 RX ORDER — METOPROLOL TARTRATE 25 MG/1
25 TABLET, FILM COATED ORAL 2 TIMES DAILY
Status: DISCONTINUED | OUTPATIENT
Start: 2024-12-12 | End: 2024-12-13

## 2024-12-12 RX ORDER — PREDNISONE 10 MG/1
10 TABLET ORAL DAILY
COMMUNITY
Start: 2024-04-20

## 2024-12-12 RX ORDER — INSULIN GLARGINE 100 [IU]/ML
15 INJECTION, SOLUTION SUBCUTANEOUS DAILY
Status: DISCONTINUED | OUTPATIENT
Start: 2024-12-12 | End: 2024-12-15

## 2024-12-12 RX ORDER — METOPROLOL TARTRATE 25 MG/1
25 TABLET, FILM COATED ORAL 2 TIMES DAILY
Status: DISCONTINUED | OUTPATIENT
Start: 2024-12-12 | End: 2024-12-12

## 2024-12-12 RX ORDER — LORAZEPAM 0.5 MG/1
0.5 TABLET ORAL
Status: ON HOLD | COMMUNITY
Start: 2024-12-05 | End: 2024-12-18

## 2024-12-12 RX ORDER — INSULIN LISPRO 100 [IU]/ML
0-4 INJECTION, SOLUTION INTRAVENOUS; SUBCUTANEOUS
Status: DISCONTINUED | OUTPATIENT
Start: 2024-12-12 | End: 2024-12-18 | Stop reason: HOSPADM

## 2024-12-12 RX ORDER — LACTULOSE 10 G/15ML
20 SOLUTION ORAL EVERY OTHER DAY
Status: DISCONTINUED | OUTPATIENT
Start: 2024-12-13 | End: 2024-12-14

## 2024-12-12 RX ORDER — ACETAMINOPHEN 650 MG/1
650 SUPPOSITORY RECTAL EVERY 6 HOURS PRN
Status: DISCONTINUED | OUTPATIENT
Start: 2024-12-12 | End: 2024-12-18 | Stop reason: HOSPADM

## 2024-12-12 RX ORDER — IPRATROPIUM BROMIDE AND ALBUTEROL SULFATE 2.5; .5 MG/3ML; MG/3ML
1 SOLUTION RESPIRATORY (INHALATION)
Status: DISCONTINUED | OUTPATIENT
Start: 2024-12-12 | End: 2024-12-16

## 2024-12-12 RX ORDER — OXYCODONE AND ACETAMINOPHEN 7.5; 325 MG/1; MG/1
1 TABLET ORAL EVERY 6 HOURS PRN
Status: ON HOLD | COMMUNITY
Start: 2024-11-26 | End: 2024-12-18

## 2024-12-12 RX ORDER — BISACODYL 10 MG
10 SUPPOSITORY, RECTAL RECTAL DAILY PRN
Status: DISCONTINUED | OUTPATIENT
Start: 2024-12-12 | End: 2024-12-18 | Stop reason: HOSPADM

## 2024-12-12 RX ORDER — VENLAFAXINE HYDROCHLORIDE 75 MG/1
75 CAPSULE, EXTENDED RELEASE ORAL DAILY
COMMUNITY
Start: 2024-10-26

## 2024-12-12 RX ORDER — MAGNESIUM SULFATE 1 G/100ML
1000 INJECTION INTRAVENOUS ONCE
Status: COMPLETED | OUTPATIENT
Start: 2024-12-12 | End: 2024-12-12

## 2024-12-12 RX ORDER — FUROSEMIDE 40 MG/1
40 TABLET ORAL DAILY
Status: DISCONTINUED | OUTPATIENT
Start: 2024-12-13 | End: 2024-12-18 | Stop reason: HOSPADM

## 2024-12-12 RX ORDER — FUROSEMIDE 40 MG/1
40 TABLET ORAL DAILY
Status: DISCONTINUED | OUTPATIENT
Start: 2024-12-12 | End: 2024-12-12

## 2024-12-12 RX ORDER — VANCOMYCIN 1.75 G/350ML
1250 INJECTION, SOLUTION INTRAVENOUS EVERY 12 HOURS
Status: DISCONTINUED | OUTPATIENT
Start: 2024-12-13 | End: 2024-12-13

## 2024-12-12 RX ORDER — INSULIN GLARGINE 100 [IU]/ML
30 INJECTION, SOLUTION SUBCUTANEOUS DAILY
COMMUNITY
Start: 2024-11-18

## 2024-12-12 RX ORDER — LORAZEPAM 0.5 MG/1
0.5 TABLET ORAL 2 TIMES DAILY PRN
Status: DISCONTINUED | OUTPATIENT
Start: 2024-12-12 | End: 2024-12-18 | Stop reason: HOSPADM

## 2024-12-12 RX ORDER — SODIUM CHLORIDE 0.9 % (FLUSH) 0.9 %
5-40 SYRINGE (ML) INJECTION EVERY 12 HOURS SCHEDULED
Status: DISCONTINUED | OUTPATIENT
Start: 2024-12-12 | End: 2024-12-18 | Stop reason: HOSPADM

## 2024-12-12 RX ORDER — POLYETHYLENE GLYCOL 3350 17 G/17G
17 POWDER, FOR SOLUTION ORAL 2 TIMES DAILY
Status: COMPLETED | OUTPATIENT
Start: 2024-12-12 | End: 2024-12-14

## 2024-12-12 RX ORDER — OXYCODONE AND ACETAMINOPHEN 7.5; 325 MG/1; MG/1
1 TABLET ORAL EVERY 6 HOURS PRN
Status: DISCONTINUED | OUTPATIENT
Start: 2024-12-12 | End: 2024-12-18 | Stop reason: HOSPADM

## 2024-12-12 RX ORDER — GABAPENTIN 100 MG/1
100 CAPSULE ORAL 3 TIMES DAILY
Status: DISCONTINUED | OUTPATIENT
Start: 2024-12-12 | End: 2024-12-18 | Stop reason: HOSPADM

## 2024-12-12 RX ORDER — SODIUM CHLORIDE 0.9 % (FLUSH) 0.9 %
5-40 SYRINGE (ML) INJECTION PRN
Status: DISCONTINUED | OUTPATIENT
Start: 2024-12-12 | End: 2024-12-18 | Stop reason: HOSPADM

## 2024-12-12 RX ORDER — GLUCAGON 1 MG/ML
1 KIT INJECTION PRN
Status: DISCONTINUED | OUTPATIENT
Start: 2024-12-12 | End: 2024-12-18 | Stop reason: HOSPADM

## 2024-12-12 RX ORDER — 0.9 % SODIUM CHLORIDE 0.9 %
1000 INTRAVENOUS SOLUTION INTRAVENOUS ONCE
Status: COMPLETED | OUTPATIENT
Start: 2024-12-12 | End: 2024-12-12

## 2024-12-12 RX ORDER — ONDANSETRON 4 MG/1
4 TABLET, ORALLY DISINTEGRATING ORAL EVERY 8 HOURS PRN
Status: DISCONTINUED | OUTPATIENT
Start: 2024-12-12 | End: 2024-12-18 | Stop reason: HOSPADM

## 2024-12-12 RX ORDER — PREDNISONE 10 MG/1
10 TABLET ORAL DAILY
Status: DISCONTINUED | OUTPATIENT
Start: 2024-12-13 | End: 2024-12-18 | Stop reason: HOSPADM

## 2024-12-12 RX ORDER — DILTIAZEM HYDROCHLORIDE 5 MG/ML
20 INJECTION INTRAVENOUS ONCE
Status: DISCONTINUED | OUTPATIENT
Start: 2024-12-12 | End: 2024-12-12

## 2024-12-12 RX ORDER — ONDANSETRON 2 MG/ML
4 INJECTION INTRAMUSCULAR; INTRAVENOUS EVERY 6 HOURS PRN
Status: DISCONTINUED | OUTPATIENT
Start: 2024-12-12 | End: 2024-12-18 | Stop reason: HOSPADM

## 2024-12-12 RX ORDER — ALBUTEROL SULFATE 0.83 MG/ML
2.5 SOLUTION RESPIRATORY (INHALATION) EVERY 6 HOURS PRN
Status: DISCONTINUED | OUTPATIENT
Start: 2024-12-12 | End: 2024-12-18 | Stop reason: HOSPADM

## 2024-12-12 RX ORDER — DEXTROSE MONOHYDRATE 100 MG/ML
INJECTION, SOLUTION INTRAVENOUS CONTINUOUS PRN
Status: DISCONTINUED | OUTPATIENT
Start: 2024-12-12 | End: 2024-12-18 | Stop reason: HOSPADM

## 2024-12-12 RX ADMIN — GABAPENTIN 100 MG: 100 CAPSULE ORAL at 20:36

## 2024-12-12 RX ADMIN — PIPERACILLIN AND TAZOBACTAM 3375 MG: 3; .375 INJECTION, POWDER, LYOPHILIZED, FOR SOLUTION INTRAVENOUS; PARENTERAL at 11:13

## 2024-12-12 RX ADMIN — POLYETHYLENE GLYCOL (3350) 17 G: 17 POWDER, FOR SOLUTION ORAL at 20:39

## 2024-12-12 RX ADMIN — METOPROLOL TARTRATE 25 MG: 25 TABLET, FILM COATED ORAL at 17:56

## 2024-12-12 RX ADMIN — INSULIN LISPRO 1 UNITS: 100 INJECTION, SOLUTION INTRAVENOUS; SUBCUTANEOUS at 17:56

## 2024-12-12 RX ADMIN — GABAPENTIN 100 MG: 100 CAPSULE ORAL at 17:56

## 2024-12-12 RX ADMIN — DILTIAZEM HYDROCHLORIDE 5 MG/HR: 5 INJECTION, SOLUTION INTRAVENOUS at 09:50

## 2024-12-12 RX ADMIN — ATORVASTATIN CALCIUM 20 MG: 10 TABLET, FILM COATED ORAL at 20:39

## 2024-12-12 RX ADMIN — IPRATROPIUM BROMIDE AND ALBUTEROL SULFATE 1 DOSE: 2.5; .5 SOLUTION RESPIRATORY (INHALATION) at 18:59

## 2024-12-12 RX ADMIN — INSULIN LISPRO 3 UNITS: 100 INJECTION, SOLUTION INTRAVENOUS; SUBCUTANEOUS at 20:40

## 2024-12-12 RX ADMIN — BUDESONIDE AND FORMOTEROL FUMARATE DIHYDRATE 2 PUFF: 160; 4.5 AEROSOL RESPIRATORY (INHALATION) at 18:59

## 2024-12-12 RX ADMIN — MAGNESIUM SULFATE IN DEXTROSE 1000 MG: 10 INJECTION, SOLUTION INTRAVENOUS at 12:37

## 2024-12-12 RX ADMIN — VENLAFAXINE HYDROCHLORIDE 75 MG: 75 CAPSULE, EXTENDED RELEASE ORAL at 17:56

## 2024-12-12 RX ADMIN — CEFEPIME 2000 MG: 2 INJECTION, POWDER, FOR SOLUTION INTRAVENOUS at 16:59

## 2024-12-12 RX ADMIN — Medication: at 17:09

## 2024-12-12 RX ADMIN — DIPHENHYDRAMINE HCL 50 MG: 25 TABLET ORAL at 08:10

## 2024-12-12 RX ADMIN — PANTOPRAZOLE SODIUM 40 MG: 40 INJECTION, POWDER, FOR SOLUTION INTRAVENOUS at 14:15

## 2024-12-12 RX ADMIN — INSULIN GLARGINE 15 UNITS: 100 INJECTION, SOLUTION SUBCUTANEOUS at 20:40

## 2024-12-12 RX ADMIN — VANCOMYCIN HYDROCHLORIDE 2000 MG: 1 INJECTION, POWDER, LYOPHILIZED, FOR SOLUTION INTRAVENOUS at 14:26

## 2024-12-12 RX ADMIN — SODIUM CHLORIDE 1000 ML: 0.9 INJECTION, SOLUTION INTRAVENOUS at 08:24

## 2024-12-12 RX ADMIN — Medication 3 MG: at 20:39

## 2024-12-12 RX ADMIN — SODIUM CHLORIDE, PRESERVATIVE FREE 10 ML: 5 INJECTION INTRAVENOUS at 20:51

## 2024-12-12 RX ADMIN — PREDNISONE 50 MG: 5 TABLET ORAL at 08:10

## 2024-12-12 RX ADMIN — LORAZEPAM 1 MG: 1 TABLET ORAL at 20:40

## 2024-12-12 RX ADMIN — PANTOPRAZOLE SODIUM 80 MG: 40 INJECTION, POWDER, FOR SOLUTION INTRAVENOUS at 08:21

## 2024-12-12 RX ADMIN — MAGNESIUM SULFATE IN DEXTROSE 1000 MG: 10 INJECTION, SOLUTION INTRAVENOUS at 17:48

## 2024-12-12 RX ADMIN — IOPAMIDOL 100 ML: 755 INJECTION, SOLUTION INTRAVENOUS at 09:05

## 2024-12-12 ASSESSMENT — LIFESTYLE VARIABLES: HOW OFTEN DO YOU HAVE A DRINK CONTAINING ALCOHOL: NEVER

## 2024-12-12 NOTE — FLOWSHEET NOTE
12/12/24 1225   Vital Signs   Temp 98.4 °F (36.9 °C)   Temp Source Oral   Pulse 94   Heart Rate Source Monitor   Respirations 14   /70   MAP (Calculated) 85   BP Location Right upper arm   BP Method Automatic   Patient Position Semi fowlers   Oxygen Therapy   SpO2 100 %   Pulse Oximeter Device Mode Intermittent   O2 Flow Rate (L/min) 4 L/min   Height and Weight   Weight - Scale 97.8 kg (215 lb 9 oz)   BMI (Calculated) 0     Patient arrived to room  320 from ED. Vitals are stable. Patient oriented to room. Four eyes completed. Patient denies further needs. Call light within reach.

## 2024-12-12 NOTE — H&P
Hospital Medicine History & Physical      PCP: Kristin Pacheco MD    Date of Admission: 12/12/2024    Date of Service: Pt seen/examined on 12/12/2024     Chief Complaint:    Chief Complaint   Patient presents with    Rectal Bleeding     Pt to ED from [facility] with CC of rectal bleeding.  EMS states that they were told that bleeding started yesterday.  EMS states that pt is at baseline mental status.         History Of Present Illness:      The patient is a 74 y.o. female with pmhx of COPD, chronic respiratory failure, dementia, CAD, atrial fibrillation, CHF who presented to [facility] ED from [facility] for rectal bleeding. Patient has dementia and is very poor historian, unable to contribute to history at all. Just complains of pain all over and answers yes to everything. Is only alert to self.   Reportedly she had dark red blood in her brief and has been complaining of abdominal pain. Has not had bowel movement in 2 days.   She is on eliquis BID.     Past Medical History:        Diagnosis Date    Acute diastolic congestive heart failure (HCC) 9/14/2016    Acute diastolic heart failure (HCC)     Acute respiratory failure     Adjustment disorder with mixed anxiety and depressed mood     Allergic rhinitis     Alzheimer's dementia ([facility])     Arachnoid cyst 5/23/2005    Atrial fibrillation ([facility])     CHF (congestive heart failure) ([facility])     Chronic back pain     Constipation     COPD (chronic obstructive pulmonary disease) ([facility])     Diabetes mellitus ([facility])     Diskitis 10/6/2011    Disseminated superficial actinic porokeratosis (DSAP)     Edema     ESBL (extended spectrum beta-lactamase) producing bacteria infection 04/17/2020    Urine Klebsiella    Hypertension     Hypo-osmolality and hyponatremia     Major depressive disorder     Morbid obesity     Muscle weakness     Osteomyelitis of spine 10/6/2011    Overactive bladder     Schizoaffective disorder ([facility])     Seizures ([facility])     Urinary tract infection

## 2024-12-12 NOTE — CONSULTS
Edenilson Alvarez MD   budesonide-formoterol (SYMBICORT) 160-4.5 MCG/ACT AERO Inhale 2 puffs into the lungs 2 times daily  Edenilson Alvarez MD   insulin glargine (LANTUS) 100 UNIT/ML injection vial Inject 20 Units into the skin nightly  Lay Ludwig MD   insulin lispro (HUMALOG) 100 UNIT/ML SOLN injection vial Inject 0-16 Units into the skin 3 times daily (with meals)  Lay Ludwig MD   dilTIAZem (CARDIZEM CD) 120 MG extended release capsule Take 1 capsule by mouth daily Hold for SBP < 110 or HR < 65  Eliza Reid MD   senna (SENOKOT) 8.6 MG tablet Take 2 tablets by mouth 2 times daily Hold for loose stool or diarrhea  Eliza Reid MD   fluticasone (FLONASE) 50 MCG/ACT nasal spray 1 spray by Each Nostril route daily  Marisabel Maciel MD   metFORMIN (GLUCOPHAGE) 500 MG tablet Take 1 tablet by mouth 2 times daily (with meals)  Eliza Reid MD   gabapentin (NEURONTIN) 100 MG capsule Take 1 capsule by mouth 3 times daily.  Eliza Reid MD   apixaban (ELIQUIS) 5 MG TABS tablet Take 1 tablet by mouth 2 times daily  Montserrat Morelos MD   atorvastatin (LIPITOR) 40 MG tablet Take 1 tablet by mouth nightly  Montserrat Morelos MD   acetaminophen (TYLENOL) 650 MG extended release tablet Take 1 tablet by mouth every 8 hours as needed for Fever or Pain  Eliza Reid MD   metoprolol tartrate (LOPRESSOR) 25 MG tablet Take 1 tablet by mouth 2 times daily  Brody Glasgow MD   polyethylene glycol (GLYCOLAX) 17 GM/SCOOP powder Take 17 g by mouth daily as needed (constipation)  Eliza Reid MD   busPIRone (BUSPAR) 5 MG tablet Take 1 tablet by mouth daily One tablet daily  Eliza Reid MD   melatonin 3 MG TABS tablet Take 1 tablet by mouth nightly  Eliza Reid MD   Cholecalciferol (VITAMIN D3) 1.25 MG (59625 UT) CAPS Take 1 capsule by mouth every 30 days (Last dose around March 13, 2023 - next due around April 15, 2023)  [place]  distension of the distal sigmoid colon up to 11.8  cm.  CT CHEST WO CONTRAST  Narrative: EXAMINATION:  CT OF THE CHEST WITHOUT CONTRAST 12/12/2024 9:00 am    TECHNIQUE:  CT of the chest was performed without the administration of intravenous  contrast. Multiplanar reformatted images are provided for review. Automated  exposure control, iterative reconstruction, and/or weight based adjustment of  the mA/kV was utilized to reduce the radiation dose to as low as reasonably  achievable.    COMPARISON:  CTA chest May 5, 2024    HISTORY:  ORDERING SYSTEM PROVIDED HISTORY: chest pain  TECHNOLOGIST PROVIDED HISTORY:  Reason for exam:->chest pain  Decision Support Exception - unselect if not a suspected or confirmed  emergency medical condition->Emergency Medical Condition (MA)  Reason for Exam: chest pain    FINDINGS:  Mediastinum: Heart is mildly enlarged similar to prior examination in there  is moderate multivessel coronary artery calcification.  Multiple  subcentimeter mediastinal lymph nodes which have increased in number compared  to prior examination.  No central pulmonary embolus.  Scattered calcified  plaque in the thoracic aorta and proximal great vessel with no dissection.  1.7 cm peripherally calcified inferior thyroid nodule.  Hyperdensity within  the distal esophagus likely representing recently ingested pill.    Lungs/pleura: Interval development of 4.8 x 3.0 cm centrally necrotic  consolidation in the medial aspect of the right lower lobe.  Relatively  unchanged small right pleural effusion.  Stable 7 mm nodule in the right  upper lobe on image 38 of series 3.  Stable pulmonary emphysema.  No  pneumothorax.    Upper Abdomen: Multiple small gallstones.  Partially visualized hernia  involving the right lateral chest wall.    Soft Tissues/Bones: Multilevel degenerative changes of the spine with no  evidence of acute osseous abnormality.  T4 vertebra plana and T7 compression  fracture with 60% height loss

## 2024-12-12 NOTE — ED PROVIDER NOTES
[facility]     EMERGENCY DEPARTMENT ENCOUNTER            Pt Name: Leslie Farris   MRN: [id number]   Birthdate 1950   Date of evaluation: 12/12/2024   Provider: Dinah Gray MD   PCP: Kristin Pacheco MD   Note Started: 8:01 AM EST 12/12/24          CHIEF COMPLAINT     Chief Complaint   Patient presents with    Rectal Bleeding     Pt to ED from [facility] with CC of rectal bleeding.  EMS states that they were told that bleeding started yesterday.  EMS states that pt is at baseline mental status.             HISTORY OF PRESENT ILLNESS:   History from : EMS   Limitations to history : Altered Mental Status     Leslie Farris is a 74 y.o. female who presents with dark red blood in her briefs, no stool output since 1210, complaints of abdominal pain.  Patient is at her baseline mental status per nursing facility with frequent vocalizations and confusion.  She is on Eliquis 5 mg twice daily.  She has history of atrial fibrillation.  Patient cannot provide history given mental status but does express discomfort when examining abdomen.    Nursing Notes were all reviewed and agreed with, or any disagreements were addressed in the HPI.     REVIEW OF SYSTEMS :    Positives and Pertinent negatives as per HPI.      MEDICAL HISTORY   has a past medical history of Acute diastolic congestive heart failure (HCC) (9/14/2016), Acute diastolic heart failure (HCC), Acute respiratory failure, Adjustment disorder with mixed anxiety and depressed mood, Allergic rhinitis, Alzheimer's dementia (HCC), Arachnoid cyst (5/23/2005), Atrial fibrillation (HCC), CHF (congestive heart failure) ([facility]), Chronic back pain, Constipation, COPD (chronic obstructive pulmonary disease) (HCC), Diabetes mellitus (HCC), Diskitis (10/6/2011), Disseminated superficial actinic porokeratosis (DSAP), Edema, ESBL (extended spectrum beta-lactamase) producing bacteria infection (04/17/2020), Hypertension, Hypo-osmolality

## 2024-12-12 NOTE — CONSULTS
Pharmacy Note  Vancomycin Consult    Leslie Farris is a 74 y.o. female started on Vancomycin for CAP; consult received from YOSHI Segovia to manage therapy. Also receiving the following antibiotics: cefepime.    Allergies:  Iodides, Sulfa antibiotics, Fish oil, Fish-derived products, Iodine, Molds & smuts, Mushroom extract complex, Onion, Onion extract, Pollen extract, and Shellfish allergy       Recent Labs     12/12/24  0800   CREATININE 0.4*       Recent Labs     12/12/24  0800   WBC 12.9*       Estimated Creatinine Clearance: 131 mL/min (A) (based on SCr of 0.4 mg/dL (L)).    No intake or output data in the 24 hours ending 12/12/24 1312    Wt Readings from Last 1 Encounters:   12/12/24 97.8 kg (215 lb 9 oz)         Body mass index is 39.43 kg/m².    Loading dose (critically ill or in ICU, require dialysis or renal replacement therapy): Vancomycin 25 mg/kg IVPB x 1 (maximum 2500 mg).  Maintenance dose: 10-20 mg/kg (maximum: 2000 mg/dose and 4500 mg/day) starting at the next dosing interval determined by renal function  Pulse dose: fluctuating renal function, FABIO, ESRD  CRRT: 7.5-10 mg/kg q12h   Goal Vancomycin trough: 15-20 mcg/mL   Goal Vancomycin AUC: 400-600     Assessment/Plan:  Will initiate Vancomycin with a one time loading dose of 2000 mg x1, followed by 1250 mg IV every 12 hours. Calculated Vancomycin AUC = 525 mg/L*h with an estimated steady-state vancomycin trough = 14 mcg/mL. Vancomycin level ordered for 12/13 @ 1300. Timing of trough level will be determined based on culture results, renal function, and clinical response.     Thank you for the consult.

## 2024-12-12 NOTE — ED NOTES
Extra sheets removed from under pt.  Dirty depends removed and replaced with clean chux.  Purewick remains in place.  Pillows in place under pt's legs bilaterally.

## 2024-12-12 NOTE — PLAN OF CARE
Discussed code status with brother Saeed- patient is DNR-CCA   Ok for cardioversion   But No defibrillation     YOSHI Tripp  12/12/24  5:04 PM

## 2024-12-12 NOTE — PROGRESS NOTES
Patient with contrast dye allergy.  CT needs to be obtained due to abdominal pain and distention with blood in stool.  Patient given prednisone.  Will wait 4 hours prior to scan.  Will administer Benadryl 1 hour prior to scan.  Discussed plan with imaging and nursing staff.

## 2024-12-12 NOTE — PLAN OF CARE
A fib RVR on cardizem gtt     Possible GI bleed- CTA non-acute   Hemoccult +  Hgb stable    Necrotizing pna vs pulmonary mass    IV cefepime and vanc     Pulm, cards, GI consult     Hold eliquis for now     PCU admit     YOSHI Tripp  12/12/24  10:57 AM

## 2024-12-12 NOTE — CARE COORDINATION
Case Management Assessment  Initial Evaluation    Date/Time of Evaluation: 12/12/2024 1:36 PM  Assessment Completed by: Rashawn Lambert RN    If patient is discharged prior to next notation, then this note serves as note for discharge by case management.    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 Yes    Chart Reviewed: Yes      History Provided by: Medical Record, Other (see comment) (cc)  Patient Orientation: Unable to Assess    Patient Cognition: Dementia / Early Alzheimer's    Hospitalization in the last 30 days (Readmission):  No    If yes, Readmission Assessment in  Navigator will be completed.    Advance Directives:      Code Status: DNR-CCA   Patient's Primary Decision Maker is: Legal Next of Kin    Primary Decision Maker: Saeed Magallanes - Brother/Sister - [phone]    Discharge Planning:    Patient lives with: Other (Comment) (bncc) Type of Home: Long-Term Care  Primary Care Giver: Other (Comment) (cc)  Patient Support Systems include: Other (Comment), Family Members (staff)   Current Financial resources: Medicaid  Current community resources: None  Current services prior to admission: Extended Care Facility            Current DME:              Type of Home Care services:  None    ADLS  Prior functional level: Assistance with the following:, Bathing, Dressing, Toileting, Feeding, Cooking, Housework, Shopping, Mobility  Current functional level: Assistance with the following:, Bathing, Dressing, Toileting, Feeding, Cooking, Housework,  Patient Representative Agree with the Discharge Plan?      Rashawn Lambert RN  Case Management Department  Ph: [phone] Fax: [phone]

## 2024-12-12 NOTE — PROGRESS NOTES
Patient was able to answer appropriately questions about location and self, but reverted to yelling same word and not responding shortly after

## 2024-12-12 NOTE — CONSULTS
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 patient w medical history notable for hypertension, chronic diastolic HF, permanent atrial fibrillation, anemia, COPD, dementia, uncontrolled Diabetes mellitus type 2, anxiety, schizoaffective disorder, who presented to the hospital from NH with complaints of melena. Cardiology consulted for atrial fibrillation with RVR.    Pt follows with me in office - last evaluated 4/2024. Dilt/metop for atrial fibrillation rate control + eliquis for AC. Lasix 40 mg oral BID. Failed to follow up in 6 months.    ED course/Vital signs on presentation: presenting EKG showed atrial fibrillation with RVR, , Inferior TWI, anterior ST&T wave abnormality. CT chest showed interval development of 4.8 x 3.0 centrally necrotic area of consolidation RLL -necrotizing PNA vs. Mass. CAC noted, mild cardiomegaly as well as Lymphadenopathy. CT abd showed massive stool burden with distention distal sigmoid colon up to ~12 cm. Labs show troponin WNL, BNP 2255, mild WBC.    The patient is evaluated with RN at bedside this AM. She notes heart pounding in her chest yesterday. This has resolved. Lying flat. No shortness of breath. On home 4L. No edema. No abd distention. She denies chest pain/pressure/heaviness. Denies dizziness. Happy that she has a diet. Notes taking meds here. Unclear if she was declining meds possibly at [facility]. She is not fond of this facility.        Past Medical History:   has a past

## 2024-12-12 NOTE — FLOWSHEET NOTE
12/12/24 1740   Vital Signs   Temp 98.2 °F (36.8 °C)   Temp Source Oral   Pulse (!) 103   Heart Rate Source Monitor   Respirations 14   /82   MAP (Calculated) 98   BP Location Right upper arm   BP Method Automatic   Patient Position Semi fowlers   Oxygen Therapy   SpO2 99 %   Pulse Oximeter Device Mode Intermittent   O2 Device Nasal cannula     Patient remains stable. Patient continues to yell the same word. Heart rate is bouncing between 100 and 150. Cardizem increased to 7.5 mL/hr per Cardiology. Metoprolol given. BP stable. Enema given. Call light within reach.

## 2024-12-12 NOTE — CONSULTS
Reason for referral and CC: abnormal CT scan    HISTORY OF PRESENT ILLNESS: 74-year-old female with a history of Alzheimer's dementia presented from her nursing home with melena.  CT showed very distended rectum with impaction.  She is seen by GI who recommended enemas and believes the GI bleeding is likely due to the constipation.  Her workup also showed a left lower lobe lung consolidation with some cavitary components that could represent a chronic infection versus lung cancer.  The patient is not able to provide any history.  She was also found to be in A-fib RVR    Past Medical History:   Diagnosis Date    Acute diastolic congestive heart failure (HCC) 9/14/2016    Acute diastolic heart failure (HCC)     Acute respiratory failure     Adjustment disorder with mixed anxiety and depressed mood     Allergic rhinitis     Alzheimer's dementia (HCC)     Arachnoid cyst 5/23/2005    Atrial fibrillation (HCC)     CHF (congestive heart failure) (HCC)     Chronic back pain     Constipation     COPD (chronic obstructive pulmonary disease) (HCC)     Diabetes mellitus (HCC)     Diskitis 10/6/2011    Disseminated superficial actinic porokeratosis (DSAP)     Edema     ESBL (extended spectrum beta-lactamase) producing bacteria infection 04/17/2020    Urine Klebsiella    Hypertension     Hypo-osmolality and hyponatremia     Major depressive disorder     Morbid obesity     Muscle weakness     Osteomyelitis of spine 10/6/2011    Overactive bladder     Schizoaffective disorder (HCC)     Seizures (HCC)     Urinary tract infection without hematuria     Xerosis cutis      Past Surgical History:   Procedure Laterality Date    IR MIDLINE CATH  10/10/2022    [place] [facility]    IR MIDLINE CATH  4/11/2023    [place] [facility] [place]    KNEE SURGERY      TONSILLECTOMY      TUBAL LIGATION       Family History  family history includes Cancer in her mother; Diabetes in her mother;  diastolic dysfunction  Alzheimer dementia    PLAN:  Supplemental oxygen to maintain SaO2 >92%; wean as tolerated    Currently on a Diltiazem gtt  GI following  Eliquis was held today  The patient is not stable for bronchoscopy given her atrial fibrillation with rapid response on diltiazem drip and ongoing GI bleed  I would recommend bronchoscopy when the patient stabilizes  Continue Vanco cefepime  Respiratory culture if able    Thank you Dena Lovell DO for this consult

## 2024-12-13 LAB
ANION GAP SERPL CALCULATED.3IONS-SCNC: 11 MMOL/L (ref 3–16)
BASOPHILS # BLD: 0 K/UL (ref 0–0.2)
BASOPHILS NFR BLD: 0.2 %
BUN SERPL-MCNC: 11 MG/DL (ref 7–20)
CALCIUM SERPL-MCNC: 7.8 MG/DL (ref 8.3–10.6)
CHLORIDE SERPL-SCNC: 99 MMOL/L (ref 99–110)
CO2 SERPL-SCNC: 28 MMOL/L (ref 21–32)
CREAT SERPL-MCNC: 0.3 MG/DL (ref 0.6–1.2)
DEPRECATED RDW RBC AUTO: 15.8 % (ref 12.4–15.4)
EOSINOPHIL # BLD: 0 K/UL (ref 0–0.6)
EOSINOPHIL NFR BLD: 0.1 %
EST. AVERAGE GLUCOSE BLD GHB EST-MCNC: 162.8 MG/DL
GFR SERPLBLD CREATININE-BSD FMLA CKD-EPI: >90 ML/MIN/{1.73_M2}
GLUCOSE BLD-MCNC: 143 MG/DL (ref 70–99)
GLUCOSE BLD-MCNC: 183 MG/DL (ref 70–99)
GLUCOSE BLD-MCNC: 231 MG/DL (ref 70–99)
GLUCOSE BLD-MCNC: 249 MG/DL (ref 70–99)
GLUCOSE SERPL-MCNC: 156 MG/DL (ref 70–99)
HBA1C MFR BLD: 7.3 %
HCT VFR BLD AUTO: 31.1 % (ref 36–48)
HCT VFR BLD AUTO: 32.4 % (ref 36–48)
HCT VFR BLD AUTO: 33.9 % (ref 36–48)
HCT VFR BLD AUTO: 36 % (ref 36–48)
HGB BLD-MCNC: 10.2 G/DL (ref 12–16)
HGB BLD-MCNC: 10.5 G/DL (ref 12–16)
HGB BLD-MCNC: 11 G/DL (ref 12–16)
HGB BLD-MCNC: 9.9 G/DL (ref 12–16)
IRON SATN MFR SERPL: 9 % (ref 15–50)
IRON SERPL-MCNC: 21 UG/DL (ref 37–145)
LEGIONELLA AG UR QL: NORMAL
LYMPHOCYTES # BLD: 1.4 K/UL (ref 1–5.1)
LYMPHOCYTES NFR BLD: 15 %
MAGNESIUM SERPL-MCNC: 1.8 MG/DL (ref 1.8–2.4)
MCH RBC QN AUTO: 25.6 PG (ref 26–34)
MCHC RBC AUTO-ENTMCNC: 30.5 G/DL (ref 31–36)
MCV RBC AUTO: 83.8 FL (ref 80–100)
MONOCYTES # BLD: 0.8 K/UL (ref 0–1.3)
MONOCYTES NFR BLD: 8.8 %
MRSA DNA SPEC QL NAA+PROBE: NORMAL
NEUTROPHILS # BLD: 7.3 K/UL (ref 1.7–7.7)
NEUTROPHILS NFR BLD: 75.9 %
PERFORMED ON: ABNORMAL
PLATELET # BLD AUTO: 240 K/UL (ref 135–450)
PMV BLD AUTO: 8.2 FL (ref 5–10.5)
POTASSIUM SERPL-SCNC: 3.1 MMOL/L (ref 3.5–5.1)
RBC # BLD AUTO: 4.3 M/UL (ref 4–5.2)
S PNEUM AG UR QL: NORMAL
SODIUM SERPL-SCNC: 138 MMOL/L (ref 136–145)
TIBC SERPL-MCNC: 241 UG/DL (ref 260–445)
VANCOMYCIN TROUGH SERPL-MCNC: 20.5 UG/ML (ref 10–20)
WBC # BLD AUTO: 9.6 K/UL (ref 4–11)

## 2024-12-13 PROCEDURE — 97530 THERAPEUTIC ACTIVITIES: CPT

## 2024-12-13 PROCEDURE — 2580000003 HC RX 258

## 2024-12-13 PROCEDURE — 94660 CPAP INITIATION&MGMT: CPT

## 2024-12-13 PROCEDURE — 6370000000 HC RX 637 (ALT 250 FOR IP): Performed by: INTERNAL MEDICINE

## 2024-12-13 PROCEDURE — 85014 HEMATOCRIT: CPT

## 2024-12-13 PROCEDURE — 97162 PT EVAL MOD COMPLEX 30 MIN: CPT

## 2024-12-13 PROCEDURE — 6370000000 HC RX 637 (ALT 250 FOR IP)

## 2024-12-13 PROCEDURE — 83550 IRON BINDING TEST: CPT

## 2024-12-13 PROCEDURE — 6360000002 HC RX W HCPCS: Performed by: INTERNAL MEDICINE

## 2024-12-13 PROCEDURE — 36415 COLL VENOUS BLD VENIPUNCTURE: CPT

## 2024-12-13 PROCEDURE — 2500000003 HC RX 250 WO HCPCS

## 2024-12-13 PROCEDURE — 80048 BASIC METABOLIC PNL TOTAL CA: CPT

## 2024-12-13 PROCEDURE — 83735 ASSAY OF MAGNESIUM: CPT

## 2024-12-13 PROCEDURE — 83540 ASSAY OF IRON: CPT

## 2024-12-13 PROCEDURE — 80202 ASSAY OF VANCOMYCIN: CPT

## 2024-12-13 PROCEDURE — 85025 COMPLETE CBC W/AUTO DIFF WBC: CPT

## 2024-12-13 PROCEDURE — 99233 SBSQ HOSP IP/OBS HIGH 50: CPT | Performed by: INTERNAL MEDICINE

## 2024-12-13 PROCEDURE — 2060000000 HC ICU INTERMEDIATE R&B

## 2024-12-13 PROCEDURE — 2700000000 HC OXYGEN THERAPY PER DAY

## 2024-12-13 PROCEDURE — 94640 AIRWAY INHALATION TREATMENT: CPT

## 2024-12-13 PROCEDURE — 94761 N-INVAS EAR/PLS OXIMETRY MLT: CPT

## 2024-12-13 PROCEDURE — 6360000002 HC RX W HCPCS

## 2024-12-13 PROCEDURE — 97166 OT EVAL MOD COMPLEX 45 MIN: CPT

## 2024-12-13 PROCEDURE — 85018 HEMOGLOBIN: CPT

## 2024-12-13 RX ORDER — POTASSIUM CHLORIDE 1500 MG/1
40 TABLET, EXTENDED RELEASE ORAL PRN
Status: DISCONTINUED | OUTPATIENT
Start: 2024-12-13 | End: 2024-12-18 | Stop reason: HOSPADM

## 2024-12-13 RX ORDER — MAGNESIUM SULFATE IN WATER 40 MG/ML
2000 INJECTION, SOLUTION INTRAVENOUS PRN
Status: DISCONTINUED | OUTPATIENT
Start: 2024-12-13 | End: 2024-12-18 | Stop reason: HOSPADM

## 2024-12-13 RX ORDER — LEVOFLOXACIN 5 MG/ML
750 INJECTION, SOLUTION INTRAVENOUS EVERY 24 HOURS
Status: DISCONTINUED | OUTPATIENT
Start: 2024-12-13 | End: 2024-12-14

## 2024-12-13 RX ORDER — METOPROLOL TARTRATE 50 MG
50 TABLET ORAL 2 TIMES DAILY
Status: DISCONTINUED | OUTPATIENT
Start: 2024-12-13 | End: 2024-12-14

## 2024-12-13 RX ORDER — VANCOMYCIN 1 G/200ML
1000 INJECTION, SOLUTION INTRAVENOUS EVERY 12 HOURS
Status: DISCONTINUED | OUTPATIENT
Start: 2024-12-13 | End: 2024-12-13

## 2024-12-13 RX ORDER — POTASSIUM CHLORIDE 7.45 MG/ML
10 INJECTION INTRAVENOUS PRN
Status: DISCONTINUED | OUTPATIENT
Start: 2024-12-13 | End: 2024-12-18 | Stop reason: HOSPADM

## 2024-12-13 RX ADMIN — BUDESONIDE AND FORMOTEROL FUMARATE DIHYDRATE 2 PUFF: 160; 4.5 AEROSOL RESPIRATORY (INHALATION) at 07:48

## 2024-12-13 RX ADMIN — LACTULOSE 20 G: 10 SOLUTION ORAL at 09:26

## 2024-12-13 RX ADMIN — METOPROLOL TARTRATE 50 MG: 50 TABLET, FILM COATED ORAL at 20:24

## 2024-12-13 RX ADMIN — PREDNISONE 10 MG: 10 TABLET ORAL at 09:26

## 2024-12-13 RX ADMIN — GABAPENTIN 100 MG: 100 CAPSULE ORAL at 20:24

## 2024-12-13 RX ADMIN — IPRATROPIUM BROMIDE AND ALBUTEROL SULFATE 1 DOSE: 2.5; .5 SOLUTION RESPIRATORY (INHALATION) at 07:48

## 2024-12-13 RX ADMIN — ATORVASTATIN CALCIUM 20 MG: 10 TABLET, FILM COATED ORAL at 20:24

## 2024-12-13 RX ADMIN — BUDESONIDE AND FORMOTEROL FUMARATE DIHYDRATE 2 PUFF: 160; 4.5 AEROSOL RESPIRATORY (INHALATION) at 19:48

## 2024-12-13 RX ADMIN — LORAZEPAM 0.5 MG: 0.5 TABLET ORAL at 22:02

## 2024-12-13 RX ADMIN — CEFEPIME 2000 MG: 2 INJECTION, POWDER, FOR SOLUTION INTRAVENOUS at 00:38

## 2024-12-13 RX ADMIN — SODIUM CHLORIDE, PRESERVATIVE FREE 10 ML: 5 INJECTION INTRAVENOUS at 20:25

## 2024-12-13 RX ADMIN — POLYETHYLENE GLYCOL (3350) 17 G: 17 POWDER, FOR SOLUTION ORAL at 20:24

## 2024-12-13 RX ADMIN — PANTOPRAZOLE SODIUM 40 MG: 40 INJECTION, POWDER, FOR SOLUTION INTRAVENOUS at 00:46

## 2024-12-13 RX ADMIN — INSULIN LISPRO 1 UNITS: 100 INJECTION, SOLUTION INTRAVENOUS; SUBCUTANEOUS at 12:07

## 2024-12-13 RX ADMIN — METOPROLOL TARTRATE 25 MG: 25 TABLET, FILM COATED ORAL at 09:35

## 2024-12-13 RX ADMIN — LINACLOTIDE 290 MCG: 145 CAPSULE, GELATIN COATED ORAL at 09:27

## 2024-12-13 RX ADMIN — FUROSEMIDE 40 MG: 40 TABLET ORAL at 09:26

## 2024-12-13 RX ADMIN — GABAPENTIN 100 MG: 100 CAPSULE ORAL at 12:07

## 2024-12-13 RX ADMIN — PANTOPRAZOLE SODIUM 40 MG: 40 INJECTION, POWDER, FOR SOLUTION INTRAVENOUS at 12:03

## 2024-12-13 RX ADMIN — INSULIN GLARGINE 15 UNITS: 100 INJECTION, SOLUTION SUBCUTANEOUS at 20:24

## 2024-12-13 RX ADMIN — DILTIAZEM HYDROCHLORIDE 7.5 MG/HR: 5 INJECTION, SOLUTION INTRAVENOUS at 04:51

## 2024-12-13 RX ADMIN — Medication 3 MG: at 20:24

## 2024-12-13 RX ADMIN — INSULIN LISPRO 1 UNITS: 100 INJECTION, SOLUTION INTRAVENOUS; SUBCUTANEOUS at 20:24

## 2024-12-13 RX ADMIN — LEVOFLOXACIN 750 MG: 5 INJECTION, SOLUTION INTRAVENOUS at 14:57

## 2024-12-13 RX ADMIN — IPRATROPIUM BROMIDE AND ALBUTEROL SULFATE 1 DOSE: 2.5; .5 SOLUTION RESPIRATORY (INHALATION) at 11:27

## 2024-12-13 RX ADMIN — GABAPENTIN 100 MG: 100 CAPSULE ORAL at 09:26

## 2024-12-13 RX ADMIN — POLYETHYLENE GLYCOL (3350) 17 G: 17 POWDER, FOR SOLUTION ORAL at 09:33

## 2024-12-13 RX ADMIN — IPRATROPIUM BROMIDE AND ALBUTEROL SULFATE 1 DOSE: 2.5; .5 SOLUTION RESPIRATORY (INHALATION) at 15:18

## 2024-12-13 RX ADMIN — VANCOMYCIN 1250 MG: 1.75 INJECTION, SOLUTION INTRAVENOUS at 03:43

## 2024-12-13 RX ADMIN — IPRATROPIUM BROMIDE AND ALBUTEROL SULFATE 1 DOSE: 2.5; .5 SOLUTION RESPIRATORY (INHALATION) at 19:47

## 2024-12-13 RX ADMIN — INSULIN LISPRO 1 UNITS: 100 INJECTION, SOLUTION INTRAVENOUS; SUBCUTANEOUS at 16:57

## 2024-12-13 RX ADMIN — POTASSIUM BICARBONATE 40 MEQ: 782 TABLET, EFFERVESCENT ORAL at 12:49

## 2024-12-13 RX ADMIN — CEFEPIME 2000 MG: 2 INJECTION, POWDER, FOR SOLUTION INTRAVENOUS at 06:40

## 2024-12-13 RX ADMIN — SODIUM CHLORIDE, PRESERVATIVE FREE 10 ML: 5 INJECTION INTRAVENOUS at 09:26

## 2024-12-13 RX ADMIN — LORAZEPAM 1 MG: 1 TABLET ORAL at 20:24

## 2024-12-13 RX ADMIN — VENLAFAXINE HYDROCHLORIDE 75 MG: 75 CAPSULE, EXTENDED RELEASE ORAL at 09:26

## 2024-12-13 RX ADMIN — OXYCODONE HYDROCHLORIDE AND ACETAMINOPHEN 1 TABLET: 7.5; 325 TABLET ORAL at 22:02

## 2024-12-13 ASSESSMENT — PAIN SCALES - GENERAL: PAINLEVEL_OUTOF10: 6

## 2024-12-13 ASSESSMENT — PAIN DESCRIPTION - LOCATION: LOCATION: GENERALIZED

## 2024-12-13 ASSESSMENT — PAIN DESCRIPTION - DESCRIPTORS: DESCRIPTORS: ACHING

## 2024-12-13 NOTE — FLOWSHEET NOTE
12/13/24 0726   Vital Signs   Temp 98.8 °F (37.1 °C)   Temp Source Oral   Pulse 92   Heart Rate Source Monitor   Respirations 21   BP (!) 105/58   MAP (Calculated) 74   BP Location Right lower arm   BP Method Automatic   Patient Position Supine     Assessment & vital signs completed. Morning meds held until seen by Cardiology. Pt denies any further needs at this time. Call light within reach, pt is stable.

## 2024-12-13 NOTE — PROGRESS NOTES
INPATIENT PROGRESS NOTE        IDENTIFYING DATA/REASON FOR CONSULTATION   PATIENT:  Leslie Farris  MRN:  [id number]  ADMIT DATE: 12/12/2024  TIME OF EVALUATION: 12/13/2024 9:08 AM  HOSPITAL STAY:   LOS: 1 day   CONSULTING PHYSICIAN: Dena Lovell DO   REASON FOR CONSULTATION: GI bleed    Subjective:    Patient seen in follow-up. She is a difficult historian.  She reports having a bowel movement yesterday.  Does report ongoing abdominal pain.    MEDICATIONS   SCHEDULED:  sodium chloride flush, 5-40 mL, 2 times per day  pantoprazole (PROTONIX) 40 mg in sodium chloride (PF) 0.9 % 10 mL injection, 40 mg, Q12H  insulin lispro, 0-4 Units, 4x Daily AC & HS  cefepime, 2,000 mg, q8h  vancomycin, 1,250 mg, Q12H  polyethylene glycol, 17 g, BID  linaclotide, 290 mcg, QAM AC  budesonide-formoterol, 2 puff, BID  predniSONE, 10 mg, Daily  [Held by provider] apixaban, 5 mg, BID  atorvastatin, 20 mg, Nightly  gabapentin, 100 mg, TID  insulin glargine, 15 Units, Daily  ipratropium 0.5 mg-albuterol 2.5 mg, 1 Dose, 4x Daily RT  lactulose, 20 g, Every Other Day  venlafaxine, 75 mg, Daily  melatonin, 3 mg, Nightly  LORazepam, 1 mg, Nightly  metoprolol tartrate, 25 mg, BID  furosemide, 40 mg, Daily      FLUIDS/DRIPS:     dilTIAZem 125 mg in sodium chloride 0.9 % 125 mL infusion 7.5 mg/hr (12/13/24 0451)    sodium chloride      dextrose       PRNs: sodium chloride flush, 5-40 mL, PRN  sodium chloride, , PRN  ondansetron, 4 mg, Q8H PRN   Or  ondansetron, 4 mg, Q6H PRN  acetaminophen, 650 mg, Q6H PRN   Or  acetaminophen, 650 mg, Q6H PRN  glucose, 4 tablet, PRN  dextrose bolus, 125 mL, PRN   Or  dextrose bolus, 250 mL, PRN  glucagon (rDNA), 1 mg, PRN  dextrose, , Continuous PRN  bisacodyl, 10 mg, Daily PRN  albuterol, 2.5 mg, Q6H PRN  oxyCODONE-acetaminophen, 1 tablet, Q6H PRN  LORazepam, 0.5 mg, BID PRN      ALLERGIES:    Allergies   Allergen Reactions    Iodides Other (See Comments)    Sulfa Antibiotics Other (See Comments)    Fish Oil       Stable small right pleural effusion.      1.7 cm right thyroid nodule.  Ultrasound could be performed for further   evaluation when clinically appropriate.      Other chronic findings as above         CTA ABDOMEN PELVIS W WO CONTRAST   Final Result   Negative CTA for active GI bleed.      Massive stool burden with distension of the distal sigmoid colon up to 11.8   cm.             Endoscopy      ASSESSMENT AND RECOMMENDATIONS   Leslie Farris is a 74 y.o. female with PMH of CHF, COPD, A fib, Alzheimer's dementia, schizoaffective disorder who presents with abdominal pain and maroon stools since yesterday, found to have A fib with RVR and CT concerning for large stool burden and dilated sigmoid colon to 11.8cm.       Plan:  -Continue bowel regimen:  Linzess to 290mcg daily  Miralax to BID then decrease to once daily once bowel movements begin  Fiber supplement  -Risks of endoscopy outweigh benefits at this time due to comorbidities so will hold off at this time.     If you have any questions or need any further information, please feel free to contact us [phone] or reach out via [employer].  Thank you for allowing us to participate in the care of Leslie Farris.    The note was completed using Dragon voice recognition transcription. Every effort was made to ensure accuracy; however, inadvertent transcription errors may be present despite my best efforts to edit errors.    Cindi Martinez PA-C

## 2024-12-13 NOTE — PROGRESS NOTES
This RN changed dose of Cardizem to 5 mL/hr per Dr. Cruz request. Diet order placed for patient, and dietary notified.

## 2024-12-13 NOTE — PROGRESS NOTES
[facility] Progress Note    Daily Progress Note for 2024 10:43 AM /0320-01  Leslie Farris : 1950 Age: 74 y.o. Sex: female  Length of Stay:  1    Interval History:      CC: F/U Rectal Bleeding (Pt to ED from [facility] with CC of rectal bleeding.  EMS states that they were told that bleeding started yesterday.  EMS states that pt is at baseline mental status.)    Subjective:       No distress. She says her stomach hurts. CT showed massive stool burden.     Objective:     Vitals:    24 0243 24 0413 24 0726 24 0749   BP:  121/71 (!) 105/58    Pulse:  85 92    Resp:  20 21    Temp:  98.5 °F (36.9 °C) 98.8 °F (37.1 °C)    TempSrc:  Oral Oral    SpO2:  98% 99% 99%   Weight: 97.5 kg (214 lb 14.4 oz)             Intake/Output Summary (Last 24 hours) at 2024 1043  Last data filed at 2024 0722  Gross per 24 hour   Intake 0 ml   Output --   Net 0 ml     Body mass index is 39.31 kg/m².    Physical Exam:  General: Cooperative, pleasant/Ill appearing, on  Nasal cannula  HEENT:  Head: normocephalic,atraumatic, anicteric sclera, clear conjunctiva  Neck: Normal size, Jugular venous pulsations: normal  Respiratory:unlabored breathing, clear to auscultation with no crackles, wheezes rhonchi  Heart: Regular rate and rhythm, S1, S2-normal, No murmurs  Abdomen: soft, nondistended, nontender, normoactive bowel sounds,  Neurological/Psych: Alert and oriented times three, no focal neurological deficits, Mood and affect appropriate.  Skin: No obvious rashes    Extremities:  no edema, Pedal pulses 2+ bilaterally    Scheduled Medications:  sodium chloride flush, 5-40 mL, 2 times per day  pantoprazole (PROTONIX) 40 mg in sodium chloride (PF) 0.9 % 10 mL injection, 40 mg, Q12H  insulin lispro, 0-4 Units, 4x Daily AC & HS  cefepime, 2,000 mg, q8h  vancomycin, 1,250 mg, Q12H  polyethylene glycol, 17 g, BID  linaclotide, 290 mcg, QAM AC  budesonide-formoterol,    #Chronic diastolic HF   -last echo as above  -no s/s of decompensation   -continue lasix 40 daily, did not reorder 40 BID for now      #Pyuria   -urine culture pending  -on antibiotics as above   - Cx prelim E.coli     #Thyroid nodule  -needs outpatient thyroid US      #Severe dementia   #Depression   #Schizoaffective disorder   -continue prn ativan   -effexor      #Chronic pain  -on gabapentin and oxycodone prn      #Generalized weakness   #BLE and left hand contractures -chronic   -PT/OT      #Hx of seizures   -not on anti-epileptic meds         Note above makes patient higher risk for morbidity and mortality requiring testing and treatment.         DVT Prophylaxis: SCDs     Management discussed with RN,     Electronically signed by: Dena Lovell DO, 12/13/2024 10:43 AM

## 2024-12-13 NOTE — PROGRESS NOTES
Pharmacy Vancomycin Consult     Vancomycin Day: 2  Current Dosinmg q12h  Current indication: CAP    Temp max:      Recent Labs     24  0800 24  0523   BUN 21* 11   CREATININE 0.4* 0.3*   WBC 12.9* 9.6       Intake/Output Summary (Last 24 hours) at 2024 1254  Last data filed at 2024 1128  Gross per 24 hour   Intake 200 ml   Output --   Net 200 ml     Culture Date      Source                       Results                    MRSA                      Nares negative    Ht Readings from Last 1 Encounters:   24 1.575 m (5' 2\")        Wt Readings from Last 1 Encounters:   24 97.5 kg (214 lb 14.4 oz)       Body mass index is 39.31 kg/m².    Estimated Creatinine Clearance: 174 mL/min (A) (based on SCr of 0.3 mg/dL (L)).    Level 20.5 ()    Assessment/Plan:  Will lower dose to 1gm q12h, level tomorrow, Pred   Suggest D/C MRSA negative  Chrissy Bah Pharm D :57 PM  .

## 2024-12-13 NOTE — PROGRESS NOTES
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]  Constipation, unspecified constipation type [K59.00]  Admit Date:  12/12/2024  Precautions/Restrictions/WB Status/ Lines/ Wounds/ Oxygen: Fall risk, Bed/chair alarm, Lines (IV, Supplemental O2 (3.5L), and external catheter), Confusion, Telemetry, Continuous pulse oximetry, and Isolation Precautions: Contact; contractures present L wrist and BLE     Pt seen for cotreatment this date due to patient safety, patient endurance, complexity of condition, and acute illness/injury    Treatment Time: 08:29-09:02  Treatment Number:  1  Timed Code Treatment Minutes: 23 minutes  Total Treatment Minutes: 33 minutes    Patient Goals for Therapy: \"I want a Pepsi\"          Discharge Recommendations: LTC without skilled therapy  DME needs for discharge: Defer to facility       Therapy recommendations for staff:   Assist of 2 for repositioning in bed    History of Present Illness: Per admission H&P from YOSHI Tripp on 12/12  \"The patient is a 74 y.o. female with pmhx of COPD, chronic respiratory failure, dementia, CAD, atrial fibrillation, CHF who presented to [facility] ED from [facility] for rectal bleeding. Patient has dementia and is very poor historian, unable to contribute to history at all. Just complains of pain all over and answers yes to everything. Is only alert to self.   Reportedly she had dark red blood in her brief and has been complaining of abdominal pain. Has not had bowel movement in 2 days.   She is on eliquis BID.\"    Preadmission Environment:   Pt lives with                                         24/7 assist available ; [facility]  Pt uses ronnie lift for transfers at baseline. Pt reports she sends a lot of time in bed and gets out  in bed and gets out of bed to wheelchair about 4x/week.    AM-PAC Score: AM-PAC Inpatient Daily Activity Raw Score: 7     Subjective:  Patient lying reclined in bed with no family present.   Pt agreeable to this OT session. Pt verbalizing \"ow\" repetitively throughout session secondary to abdominal pain and fixated on wanting a Pepsi.    Cognition:    A&O Person   Able to follow 1 step commands    Pain:   Yes  Location: abdominal region  Ratin /10  Pain Medicine Status: RN notified    Activity Tolerance:   Pt completed therapy session with pain     BP (mmHg) HR (bpm) SpO2 (%) on 3.5L Comments   Supine at rest    Vitals machine unable to obtain   Seated at EOB       Standing       End of session         Objective:  Does this pt have an acute or acute on chronic diagnosis of CHF? No    Upper Extremity ROM:    AROM WFL: No  UE ROM limitations: left wrist contracture    Dominant Hand: Right    Upper Extremity Strength:    BUE strength impaired but not formally assessed w/ MMT    Upper Extremity Sensation:    NT    Coordination:  NT    Tone:  Not Tested    Balance:  Sitting:    Not Tested  Standing:    Not Tested    Bed Mobility:   Supine to Sit:    Not Tested  Sit to Supine:   Not Tested  Rolling:   Not Tested  Scooting in sitting: Not Tested  Scooting in supine:  Not Tested    Transfers:    Sit to stand:    Not Tested  Stand to sit:    Not Tested  Bed to chair:     Not Tested  Bed/ chair to standard toilet:  Not Tested  Bed/chair to BSC:   Not Tested  Functional Mobility:   Not Tested    Comment: Patient deferred performing bed mobility, EOB activity, and OOB activity on this date.    ADLs:  Dressing:      UE:   Not Tested  LE:    Not Tested    Bathing:    UE:  Not Tested  LE:  Not Tested    Eating:   Not Tested    Toileting:  Not Tested    Grooming/hygiene: Max A  to wash face with wash cloth and wash left hand    Ther Ex / Activities Initiated:   Wrist flex/ext:  x15  Elbow flex/ext x15  Shoulder flex/ext:

## 2024-12-13 NOTE — PROGRESS NOTES
12/12/24 2326   NIV Type   NIV Started/Stopped (S)  On   Equipment Type v60   Mode Bilevel   Mask Type Full face mask   Mask Size Small   Settings/Measurements   IPAP 12 cmH20   CPAP/EPAP 5 cmH2O   Vt (Measured) 370 mL   Rate Ordered 14   FiO2  35 %   I Time/ I Time % 1 s   Minute Volume (L/min) 8 Liters   Mask Leak (lpm) 1 lpm   Patient's Home Machine No   Alarm Settings   Alarms On Y   Low Pressure (cmH2O) 5 cmH2O   High Pressure (cmH2O) 35 cmH2O   Delay Alarm 20 sec(s)   RR Low (bpm) 14   RR High (bpm) 40 br/min

## 2024-12-13 NOTE — PROGRESS NOTES
Pulmonary Progress Note  CC: lung mass    Subjective:  Hgb stable  Pt not able to give history      EXAM: /65   Pulse (!) 106   Temp 97.6 °F (36.4 °C) (Oral)   Resp 18   Wt 97.5 kg (214 lb 14.4 oz)   SpO2 96%   BMI 39.31 kg/m²  on 4L  Constitutional:  No acute distress.   Eyes: PERRL. Conjunctivae anicteric.   ENT: Normal nose. Normal tongue.    Neck:  Trachea is midline.   Respiratory: No accessory muscle usage.  decreased breath sounds. No wheezes. No rales. No Rhonchi.  Cardiovascular: Normal S1S2. No digit clubbing. No digit cyanosis. No LE edema.   Psychiatric: No anxiety or Agitation. Alert and Oriented to person only    Scheduled Meds:   sodium chloride flush  5-40 mL IntraVENous 2 times per day    pantoprazole (PROTONIX) 40 mg in sodium chloride (PF) 0.9 % 10 mL injection  40 mg IntraVENous Q12H    insulin lispro  0-4 Units SubCUTAneous 4x Daily AC & HS    cefepime  2,000 mg IntraVENous q8h    vancomycin  1,250 mg IntraVENous Q12H    polyethylene glycol  17 g Oral BID    linaclotide  290 mcg Oral QAM AC    budesonide-formoterol  2 puff Inhalation BID    predniSONE  10 mg Oral Daily    [Held by provider] apixaban  5 mg Oral BID    atorvastatin  20 mg Oral Nightly    gabapentin  100 mg Oral TID    insulin glargine  15 Units SubCUTAneous Daily    ipratropium 0.5 mg-albuterol 2.5 mg  1 Dose Inhalation 4x Daily RT    lactulose  20 g Oral Every Other Day    venlafaxine  75 mg Oral Daily    melatonin  3 mg Oral Nightly    LORazepam  1 mg Oral Nightly    metoprolol tartrate  25 mg Oral BID    furosemide  40 mg Oral Daily     Continuous Infusions:   dilTIAZem 125 mg in sodium chloride 0.9 % 125 mL infusion 5 mg/hr (12/13/24 0956)    sodium chloride      dextrose       PRN Meds:  potassium chloride **OR** potassium alternative oral replacement **OR** potassium chloride, magnesium sulfate, sodium phosphate 15 mmol in sodium chloride 0.9 % 250 mL IVPB, sodium chloride flush, sodium chloride, ondansetron  Bipap  Currently on a Diltiazem gtt  GI following  Hold Eliquis   The patient is not stable for bronchoscopy given her atrial fibrillation with rapid response on diltiazem drip and ongoing GI bleed  I would recommend bronchoscopy when the patient stabilizes  Saying there is always negative and will stop medicine.  Change cefepime to Levaquin to provide better anaerobic coverage.  Respiratory culture if able

## 2024-12-13 NOTE — PROGRESS NOTES
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]  Constipation, unspecified constipation type [K59.00]  Admit Date:  12/12/2024  Precautions/Restrictions/WB Status/ Lines/ Wounds/ Oxygen: Fall risk, Bed/chair alarm, Lines (IV, Supplemental O2 (3.5L), and external catheter), Confusion, Telemetry, Continuous pulse oximetry, and Isolation Precautions: Contact; contractures present L wrist and BLE      Pt seen for cotreatment this date due to patient safety, patient endurance, acute illness/injury, and staff recommendation    Treatment Time:  566-809  Treatment Number:  1   Timed Code Treatment Minutes: 23 minutes  Total Treatment Minutes:  33  minutes    Patient Stated Goals for Therapy: \" I want a pepsi! \"          Discharge Recommendations: Return to LTC without skilled therapy  DME needs for discharge: Defer to facility       Therapy recommendation for EMS Transport: requires transport by cot due to pt needs lift equipment for safe transfers, pt needs A x 2 for safe transfers, and pt with poor sitting balance/tolerance    Therapy recommendations for staff:   Assist of 2 for repositioning in bed    History of Present Illness:   Per H&P on 12/12/24 from YOSHI Tripp:    \"The patient is a 74 y.o. female with pmhx of COPD, chronic respiratory failure, dementia, CAD, atrial fibrillation, CHF who presented to [facility] ED from [facility] for rectal bleeding. Patient has dementia and is very poor historian, unable to contribute to history at all. Just complains of pain all over and answers yes to everything. Is only alert to self.   Reportedly she had dark red blood in her brief and has been complaining of abdominal pain. Has not had bowel movement in 2 days.   She is on eliquis BID.

## 2024-12-13 NOTE — PLAN OF CARE
Problem: Discharge Planning  Goal: Discharge to home or other facility with appropriate resources  Outcome: Progressing  Flowsheets (Taken 12/12/2024 1740 by Dasha Tuttle, RN)  Discharge to home or other facility with appropriate resources: Identify barriers to discharge with patient and caregiver     Problem: Chronic Conditions and Co-morbidities  Goal: Patient's chronic conditions and co-morbidity symptoms are monitored and maintained or improved  Outcome: Progressing  Flowsheets (Taken 12/12/2024 1740 by Dasha Tuttle, RN)  Care Plan - Patient's Chronic Conditions and Co-Morbidity Symptoms are Monitored and Maintained or Improved: Monitor and assess patient's chronic conditions and comorbid symptoms for stability, deterioration, or improvement     Problem: Safety - Adult  Goal: Free from fall injury  Outcome: Progressing     Problem: Skin/Tissue Integrity  Goal: Absence of new skin breakdown  Description: 1.  Monitor for areas of redness and/or skin breakdown  2.  Assess vascular access sites hourly  3.  Every 4-6 hours minimum:  Change oxygen saturation probe site  4.  Every 4-6 hours:  If on nasal continuous positive airway pressure, respiratory therapy assess nares and determine need for appliance change or resting period.  Outcome: Progressing     Problem: Respiratory - Adult  Goal: Achieves optimal ventilation and oxygenation  Outcome: Progressing     Problem: Cardiovascular - Adult  Goal: Maintains optimal cardiac output and hemodynamic stability  Outcome: Progressing  Goal: Absence of cardiac dysrhythmias or at baseline  Outcome: Progressing     Problem: Skin/Tissue Integrity - Adult  Goal: Skin integrity remains intact  Outcome: Progressing  Flowsheets (Taken 12/12/2024 1740 by Dahsa Tuttle, RN)  Skin Integrity Remains Intact:   Monitor for areas of redness and/or skin breakdown   Every 4-6 hours minimum: Change oxygen saturation probe site     Problem: Gastrointestinal - Adult  Goal:

## 2024-12-14 LAB
ANION GAP SERPL CALCULATED.3IONS-SCNC: 8 MMOL/L (ref 3–16)
BASOPHILS # BLD: 0 K/UL (ref 0–0.2)
BASOPHILS NFR BLD: 0.4 %
BUN SERPL-MCNC: 9 MG/DL (ref 7–20)
CALCIUM SERPL-MCNC: 7.8 MG/DL (ref 8.3–10.6)
CHLORIDE SERPL-SCNC: 102 MMOL/L (ref 99–110)
CO2 SERPL-SCNC: 31 MMOL/L (ref 21–32)
CREAT SERPL-MCNC: 0.4 MG/DL (ref 0.6–1.2)
DEPRECATED RDW RBC AUTO: 15.6 % (ref 12.4–15.4)
EOSINOPHIL # BLD: 0 K/UL (ref 0–0.6)
EOSINOPHIL NFR BLD: 0.6 %
GFR SERPLBLD CREATININE-BSD FMLA CKD-EPI: >90 ML/MIN/{1.73_M2}
GLUCOSE BLD-MCNC: 100 MG/DL (ref 70–99)
GLUCOSE BLD-MCNC: 149 MG/DL (ref 70–99)
GLUCOSE BLD-MCNC: 170 MG/DL (ref 70–99)
GLUCOSE BLD-MCNC: 179 MG/DL (ref 70–99)
GLUCOSE BLD-MCNC: 93 MG/DL (ref 70–99)
GLUCOSE SERPL-MCNC: 85 MG/DL (ref 70–99)
HCT VFR BLD AUTO: 28 % (ref 36–48)
HCT VFR BLD AUTO: 30.7 % (ref 36–48)
HCT VFR BLD AUTO: 33 % (ref 36–48)
HGB BLD-MCNC: 10.3 G/DL (ref 12–16)
HGB BLD-MCNC: 8.8 G/DL (ref 12–16)
HGB BLD-MCNC: 9.8 G/DL (ref 12–16)
LYMPHOCYTES # BLD: 1.6 K/UL (ref 1–5.1)
LYMPHOCYTES NFR BLD: 19.3 %
MAGNESIUM SERPL-MCNC: 1.71 MG/DL (ref 1.8–2.4)
MCH RBC QN AUTO: 25.6 PG (ref 26–34)
MCHC RBC AUTO-ENTMCNC: 32 G/DL (ref 31–36)
MCV RBC AUTO: 80 FL (ref 80–100)
MONOCYTES # BLD: 0.7 K/UL (ref 0–1.3)
MONOCYTES NFR BLD: 8.8 %
NEUTROPHILS # BLD: 5.9 K/UL (ref 1.7–7.7)
NEUTROPHILS NFR BLD: 70.9 %
PERFORMED ON: ABNORMAL
PERFORMED ON: NORMAL
PLATELET # BLD AUTO: 242 K/UL (ref 135–450)
PMV BLD AUTO: 8 FL (ref 5–10.5)
POTASSIUM SERPL-SCNC: 3.1 MMOL/L (ref 3.5–5.1)
RBC # BLD AUTO: 3.84 M/UL (ref 4–5.2)
SODIUM SERPL-SCNC: 141 MMOL/L (ref 136–145)
WBC # BLD AUTO: 8.4 K/UL (ref 4–11)

## 2024-12-14 PROCEDURE — 6370000000 HC RX 637 (ALT 250 FOR IP): Performed by: INTERNAL MEDICINE

## 2024-12-14 PROCEDURE — 85018 HEMOGLOBIN: CPT

## 2024-12-14 PROCEDURE — 2060000000 HC ICU INTERMEDIATE R&B

## 2024-12-14 PROCEDURE — 99232 SBSQ HOSP IP/OBS MODERATE 35: CPT | Performed by: STUDENT IN AN ORGANIZED HEALTH CARE EDUCATION/TRAINING PROGRAM

## 2024-12-14 PROCEDURE — 6360000002 HC RX W HCPCS

## 2024-12-14 PROCEDURE — 94640 AIRWAY INHALATION TREATMENT: CPT

## 2024-12-14 PROCEDURE — 94761 N-INVAS EAR/PLS OXIMETRY MLT: CPT

## 2024-12-14 PROCEDURE — 99232 SBSQ HOSP IP/OBS MODERATE 35: CPT | Performed by: INTERNAL MEDICINE

## 2024-12-14 PROCEDURE — 6360000002 HC RX W HCPCS: Performed by: INTERNAL MEDICINE

## 2024-12-14 PROCEDURE — 85025 COMPLETE CBC W/AUTO DIFF WBC: CPT

## 2024-12-14 PROCEDURE — 2700000000 HC OXYGEN THERAPY PER DAY

## 2024-12-14 PROCEDURE — 36415 COLL VENOUS BLD VENIPUNCTURE: CPT

## 2024-12-14 PROCEDURE — 80048 BASIC METABOLIC PNL TOTAL CA: CPT

## 2024-12-14 PROCEDURE — 99233 SBSQ HOSP IP/OBS HIGH 50: CPT | Performed by: INTERNAL MEDICINE

## 2024-12-14 PROCEDURE — 83735 ASSAY OF MAGNESIUM: CPT

## 2024-12-14 PROCEDURE — 6370000000 HC RX 637 (ALT 250 FOR IP)

## 2024-12-14 PROCEDURE — 6370000000 HC RX 637 (ALT 250 FOR IP): Performed by: STUDENT IN AN ORGANIZED HEALTH CARE EDUCATION/TRAINING PROGRAM

## 2024-12-14 PROCEDURE — 94660 CPAP INITIATION&MGMT: CPT

## 2024-12-14 PROCEDURE — 85014 HEMATOCRIT: CPT

## 2024-12-14 PROCEDURE — 2580000003 HC RX 258

## 2024-12-14 RX ORDER — METOPROLOL TARTRATE 50 MG
50 TABLET ORAL 3 TIMES DAILY
Status: DISCONTINUED | OUTPATIENT
Start: 2024-12-14 | End: 2024-12-14

## 2024-12-14 RX ORDER — POLYETHYLENE GLYCOL 3350 17 G/17G
17 POWDER, FOR SOLUTION ORAL DAILY
Status: DISPENSED | OUTPATIENT
Start: 2024-12-14 | End: 2024-12-18

## 2024-12-14 RX ORDER — METOPROLOL TARTRATE 50 MG
50 TABLET ORAL 2 TIMES DAILY
Status: DISCONTINUED | OUTPATIENT
Start: 2024-12-14 | End: 2024-12-18 | Stop reason: HOSPADM

## 2024-12-14 RX ORDER — MEROPENEM AND SODIUM CHLORIDE 1 G/50ML
1000 INJECTION, SOLUTION INTRAVENOUS EVERY 8 HOURS
Status: DISCONTINUED | OUTPATIENT
Start: 2024-12-14 | End: 2024-12-18 | Stop reason: ALTCHOICE

## 2024-12-14 RX ORDER — METOPROLOL TARTRATE 1 MG/ML
5 INJECTION, SOLUTION INTRAVENOUS EVERY 6 HOURS PRN
Status: DISCONTINUED | OUTPATIENT
Start: 2024-12-14 | End: 2024-12-18 | Stop reason: HOSPADM

## 2024-12-14 RX ADMIN — VENLAFAXINE HYDROCHLORIDE 75 MG: 75 CAPSULE, EXTENDED RELEASE ORAL at 08:29

## 2024-12-14 RX ADMIN — Medication 3 MG: at 21:31

## 2024-12-14 RX ADMIN — BUDESONIDE AND FORMOTEROL FUMARATE DIHYDRATE 2 PUFF: 160; 4.5 AEROSOL RESPIRATORY (INHALATION) at 07:30

## 2024-12-14 RX ADMIN — OXYCODONE HYDROCHLORIDE AND ACETAMINOPHEN 1 TABLET: 7.5; 325 TABLET ORAL at 08:30

## 2024-12-14 RX ADMIN — PANTOPRAZOLE SODIUM 40 MG: 40 INJECTION, POWDER, FOR SOLUTION INTRAVENOUS at 04:25

## 2024-12-14 RX ADMIN — PREDNISONE 10 MG: 10 TABLET ORAL at 08:29

## 2024-12-14 RX ADMIN — LORAZEPAM 0.5 MG: 0.5 TABLET ORAL at 13:43

## 2024-12-14 RX ADMIN — METOPROLOL TARTRATE 50 MG: 50 TABLET, FILM COATED ORAL at 10:37

## 2024-12-14 RX ADMIN — MEROPENEM AND SODIUM CHLORIDE 1000 MG: 1 INJECTION, SOLUTION INTRAVENOUS at 17:11

## 2024-12-14 RX ADMIN — METOPROLOL TARTRATE 50 MG: 50 TABLET, FILM COATED ORAL at 21:31

## 2024-12-14 RX ADMIN — GABAPENTIN 100 MG: 100 CAPSULE ORAL at 21:31

## 2024-12-14 RX ADMIN — IPRATROPIUM BROMIDE AND ALBUTEROL SULFATE 1 DOSE: 2.5; .5 SOLUTION RESPIRATORY (INHALATION) at 15:02

## 2024-12-14 RX ADMIN — GABAPENTIN 100 MG: 100 CAPSULE ORAL at 08:30

## 2024-12-14 RX ADMIN — MEROPENEM AND SODIUM CHLORIDE 1000 MG: 1 INJECTION, SOLUTION INTRAVENOUS at 08:36

## 2024-12-14 RX ADMIN — SODIUM CHLORIDE, PRESERVATIVE FREE 10 ML: 5 INJECTION INTRAVENOUS at 21:32

## 2024-12-14 RX ADMIN — BUDESONIDE AND FORMOTEROL FUMARATE DIHYDRATE 2 PUFF: 160; 4.5 AEROSOL RESPIRATORY (INHALATION) at 20:14

## 2024-12-14 RX ADMIN — PANTOPRAZOLE SODIUM 40 MG: 40 INJECTION, POWDER, FOR SOLUTION INTRAVENOUS at 13:44

## 2024-12-14 RX ADMIN — IPRATROPIUM BROMIDE AND ALBUTEROL SULFATE 1 DOSE: 2.5; .5 SOLUTION RESPIRATORY (INHALATION) at 11:07

## 2024-12-14 RX ADMIN — SODIUM CHLORIDE, PRESERVATIVE FREE 10 ML: 5 INJECTION INTRAVENOUS at 08:31

## 2024-12-14 RX ADMIN — IPRATROPIUM BROMIDE AND ALBUTEROL SULFATE 1 DOSE: 2.5; .5 SOLUTION RESPIRATORY (INHALATION) at 07:30

## 2024-12-14 RX ADMIN — ATORVASTATIN CALCIUM 20 MG: 10 TABLET, FILM COATED ORAL at 21:31

## 2024-12-14 RX ADMIN — IPRATROPIUM BROMIDE AND ALBUTEROL SULFATE 1 DOSE: 2.5; .5 SOLUTION RESPIRATORY (INHALATION) at 20:14

## 2024-12-14 RX ADMIN — INSULIN GLARGINE 15 UNITS: 100 INJECTION, SOLUTION SUBCUTANEOUS at 21:31

## 2024-12-14 RX ADMIN — POTASSIUM BICARBONATE 40 MEQ: 782 TABLET, EFFERVESCENT ORAL at 08:27

## 2024-12-14 RX ADMIN — LORAZEPAM 1 MG: 1 TABLET ORAL at 21:31

## 2024-12-14 RX ADMIN — LINACLOTIDE 290 MCG: 145 CAPSULE, GELATIN COATED ORAL at 04:27

## 2024-12-14 RX ADMIN — POLYETHYLENE GLYCOL (3350) 17 G: 17 POWDER, FOR SOLUTION ORAL at 08:27

## 2024-12-14 RX ADMIN — GABAPENTIN 100 MG: 100 CAPSULE ORAL at 13:43

## 2024-12-14 ASSESSMENT — PAIN SCALES - GENERAL
PAINLEVEL_OUTOF10: 7
PAINLEVEL_OUTOF10: 1
PAINLEVEL_OUTOF10: 7
PAINLEVEL_OUTOF10: 4

## 2024-12-14 ASSESSMENT — PAIN DESCRIPTION - LOCATION
LOCATION: GENERALIZED
LOCATION: GENERALIZED

## 2024-12-14 NOTE — PROGRESS NOTES
has a diet. Notes taking meds here. Unclear if she was declining meds possibly at [facility]. She is not fond of this facility.        Past Medical History:   has a past medical history of Acute diastolic congestive heart failure (HCC), Acute diastolic heart failure (HCC), Acute respiratory failure, Adjustment disorder with mixed anxiety and depressed mood, Allergic rhinitis, Alzheimer's dementia (HCC), Arachnoid cyst, Atrial fibrillation (HCC), CHF (congestive heart failure) (HCC), Chronic back pain, Constipation, COPD (chronic obstructive pulmonary disease) (HCC), Diabetes mellitus (HCC), Diskitis, Disseminated superficial actinic porokeratosis (DSAP), Edema, ESBL (extended spectrum beta-lactamase) producing bacteria infection, Hypertension, Hypo-osmolality and hyponatremia, Major depressive disorder, Morbid obesity, Muscle weakness, Osteomyelitis of spine, Overactive bladder, Schizoaffective disorder (HCC), Seizures ([facility]), Urinary tract infection without hematuria, and Xerosis cutis.    Surgical History:   has a past surgical history that includes Tubal ligation; knee surgery; Tonsillectomy; IR MIDLINE CATH (10/10/2022); and IR MIDLINE CATH (4/11/2023).     Social History:   reports that she quit smoking about 20 years ago. Her smoking use included cigarettes. She has never used smokeless tobacco. She reports that she does not drink alcohol and does not use drugs.     Family History:  family history includes Cancer in her mother; Diabetes in her mother; Heart Disease in her father and mother; High Blood Pressure in her father and mother.      Home Medications:  Were reviewed and are listed in nursing record and/or below  Prior to Admission medications    Medication Sig Start Date End Date Taking? Authorizing Provider   BASAGLAR KWIKPEN 100 UNIT/ML injection pen Inject 30 Units into the skin daily 11/18/24  Yes Provider, MD Eliza   lactulose (CHRONULAC) 10 GM/15ML solution Take 30 mLs by mouth every other day  MD        CURRENT Medications:  meropenem (MERREM) 1000 mg in sodium chloride 0.9% 50 mL (duplex), Q8H  potassium chloride (KLOR-CON M) extended release tablet 40 mEq, PRN   Or  potassium bicarb-citric acid (EFFER-K) effervescent tablet 40 mEq, PRN   Or  potassium chloride 10 mEq/100 mL IVPB (Peripheral Line), PRN  magnesium sulfate 2000 mg in 50 mL IVPB premix, PRN  sodium phosphate 15 mmol in sodium chloride 0.9 % 250 mL IVPB, PRN  metoprolol tartrate (LOPRESSOR) tablet 50 mg, BID  dilTIAZem 125 mg in sodium chloride 0.9 % 125 mL infusion, Continuous  sodium chloride flush 0.9 % injection 5-40 mL, 2 times per day  sodium chloride flush 0.9 % injection 5-40 mL, PRN  0.9 % sodium chloride infusion, PRN  ondansetron (ZOFRAN-ODT) disintegrating tablet 4 mg, Q8H PRN   Or  ondansetron (ZOFRAN) injection 4 mg, Q6H PRN  acetaminophen (TYLENOL) tablet 650 mg, Q6H PRN   Or  acetaminophen (TYLENOL) suppository 650 mg, Q6H PRN  pantoprazole (PROTONIX) 40 mg in sodium chloride (PF) 0.9 % 10 mL injection, Q12H  glucose chewable tablet 16 g, PRN  dextrose bolus 10% 125 mL, PRN   Or  dextrose bolus 10% 250 mL, PRN  glucagon injection 1 mg, PRN  dextrose 10 % infusion, Continuous PRN  insulin lispro (HUMALOG,ADMELOG) injection vial 0-4 Units, 4x Daily AC & HS  bisacodyl (DULCOLAX) suppository 10 mg, Daily PRN  linaclotide (LINZESS) capsule 290 mcg, QAM AC  budesonide-formoterol (SYMBICORT) 160-4.5 MCG/ACT inhaler 2 puff, BID  predniSONE (DELTASONE) tablet 10 mg, Daily  albuterol (PROVENTIL) (2.5 MG/3ML) 0.083% nebulizer solution 2.5 mg, Q6H PRN  [Held by provider] apixaban (ELIQUIS) tablet 5 mg, BID  atorvastatin (LIPITOR) tablet 20 mg, Nightly  gabapentin (NEURONTIN) capsule 100 mg, TID  insulin glargine (LANTUS) injection vial 15 Units, Daily  ipratropium 0.5 mg-albuterol 2.5 mg (DUONEB) nebulizer solution 1 Dose, 4x Daily RT  lactulose (CHRONULAC) 10 GM/15ML solution 20 g, Every Other Day  oxyCODONE-acetaminophen (PERCOCET)

## 2024-12-14 NOTE — PROGRESS NOTES
RT Inhaler-Nebulizer Bronchodilator Protocol Note    There is a bronchodilator order in the chart from a provider indicating to follow the RT Bronchodilator Protocol and there is an “Initiate RT Inhaler-Nebulizer Bronchodilator Protocol” order as well (see protocol at bottom of note).    CXR Findings:  No results found.    The findings from the last RT Protocol Assessment were as follows:   History Pulmonary Disease: (P) Chronic pulmonary disease  Respiratory Pattern: (P) Regular pattern and RR 12-20 bpm  Breath Sounds: (P) Slightly diminished and/or crackles  Cough: (P) Strong, spontaneous, non-productive  Indication for Bronchodilator Therapy: (P) Decreased or absent breath sounds  Bronchodilator Assessment Score: (P) 4    Aerosolized bronchodilator medication orders have been revised according to the RT Inhaler-Nebulizer Bronchodilator Protocol below.    Respiratory Therapist to perform RT Therapy Protocol Assessment initially then follow the protocol.  Repeat RT Therapy Protocol Assessment PRN for score 0-3 or on second treatment, BID, and PRN for scores above 3.    No Indications - adjust the frequency to every 6 hours PRN wheezing or bronchospasm, if no treatments needed after 48 hours then discontinue using Per Protocol order mode.     If indication present, adjust the RT bronchodilator orders based on the Bronchodilator Assessment Score as indicated below.  Use Inhaler orders unless patient has one or more of the following: on home nebulizer, not able to hold breath for 10 seconds, is not alert and oriented, cannot activate and use MDI correctly, or respiratory rate 25 breaths per minute or more, then use the equivalent nebulizer order(s) with same Frequency and PRN reasons based on the score.  If a patient is on this medication at home then do not decrease Frequency below that used at home.    0-3 - enter or revise RT bronchodilator order(s) to equivalent RT Bronchodilator order with Frequency of every 4  hours PRN for wheezing or increased work of breathing using Per Protocol order mode.        4-6 - enter or revise RT Bronchodilator order(s) to two equivalent RT bronchodilator orders with one order with BID Frequency and one order with Frequency of every 4 hours PRN wheezing or increased work of breathing using Per Protocol order mode.        7-10 - enter or revise RT Bronchodilator order(s) to two equivalent RT bronchodilator orders with one order with TID Frequency and one order with Frequency of every 4 hours PRN wheezing or increased work of breathing using Per Protocol order mode.       11-13 - enter or revise RT Bronchodilator order(s) to one equivalent RT bronchodilator order with QID Frequency and an Albuterol order with Frequency of every 4 hours PRN wheezing or increased work of breathing using Per Protocol order mode.      Greater than 13 - enter or revise RT Bronchodilator order(s) to one equivalent RT bronchodilator order with every 4 hours Frequency and an Albuterol order with Frequency of every 2 hours PRN wheezing or increased work of breathing using Per Protocol order mode.     RT to enter RT Home Evaluation for COPD & MDI Assessment order using Per Protocol order mode.    Electronically signed by Janet Del Rio RCP on 12/14/2024 at 7:33 AM

## 2024-12-14 NOTE — PROGRESS NOTES
10 mg, Daily  [Held by provider] apixaban, 5 mg, BID  atorvastatin, 20 mg, Nightly  gabapentin, 100 mg, TID  insulin glargine, 15 Units, Daily  ipratropium 0.5 mg-albuterol 2.5 mg, 1 Dose, 4x Daily RT  lactulose, 20 g, Every Other Day  venlafaxine, 75 mg, Daily  melatonin, 3 mg, Nightly  LORazepam, 1 mg, Nightly  furosemide, 40 mg, Daily        PRN Medications:  potassium chloride, 40 mEq, PRN   Or  potassium alternative oral replacement, 40 mEq, PRN   Or  potassium chloride, 10 mEq, PRN  magnesium sulfate, 2,000 mg, PRN  sodium phosphate 15 mmol in sodium chloride 0.9 % 250 mL IVPB, 15 mmol, PRN  sodium chloride flush, 5-40 mL, PRN  sodium chloride, , PRN  ondansetron, 4 mg, Q8H PRN   Or  ondansetron, 4 mg, Q6H PRN  acetaminophen, 650 mg, Q6H PRN   Or  acetaminophen, 650 mg, Q6H PRN  glucose, 4 tablet, PRN  dextrose bolus, 125 mL, PRN   Or  dextrose bolus, 250 mL, PRN  glucagon (rDNA), 1 mg, PRN  dextrose, , Continuous PRN  bisacodyl, 10 mg, Daily PRN  albuterol, 2.5 mg, Q6H PRN  oxyCODONE-acetaminophen, 1 tablet, Q6H PRN  LORazepam, 0.5 mg, BID PRN          Data Review:      Laboratory Data Reviewed:    CBC:  Lab Results   Component Value Date    WBC 8.4 12/14/2024    HGB 9.8 (L) 12/14/2024    HCT 30.7 (L) 12/14/2024    MCV 80.0 12/14/2024     12/14/2024         Basic Metabolic Panel  Lab Results   Component Value Date/Time     12/14/2024 05:07 AM     12/14/2024 05:07 AM    CO2 31 12/14/2024 05:07 AM    GLUCOSE 85 12/14/2024 05:07 AM    BUN 9 12/14/2024 05:07 AM    CREATININE 0.4 12/14/2024 05:07 AM       HEPATIC PANEL   Lab Results   Component Value Date/Time    AST 13 12/12/2024 08:00 AM    ALT <5 12/12/2024 08:00 AM       Lab Results   Component Value Date    CKTOTAL 57 01/11/2020    TROPONINI <0.01 04/08/2023       Lab Results   Component Value Date/Time    INR 1.74 12/12/2024 08:00 AM    INR 1.19 04/29/2024 04:36 PM    INR 1.19 04/21/2023 02:10 AM       Test Review:        Radiology  reviewed:     CT CHEST WO CONTRAST   Final Result   Interval development of 4.8 x 3.0 cm centrally necrotic area of consolidation   in the right lower lung which could represent necrotizing pneumonia or   pulmonary mass.      Stable 7 mm right upper lobe pulmonary nodule.      Stable pulmonary emphysema      Stable small right pleural effusion.      1.7 cm right thyroid nodule.  Ultrasound could be performed for further   evaluation when clinically appropriate.      Other chronic findings as above         CTA ABDOMEN PELVIS W WO CONTRAST   Final Result   Negative CTA for active GI bleed.      Massive stool burden with distension of the distal sigmoid colon up to 11.8   cm.             Microbiology: Cultures reviewed.   Urine culture - E. Coli ESBL    Lab Results   Component Value Date    LABA1C 7.3 12/12/2024      Body mass index is 39.31 kg/m².       Impression/Plan:        ASSESSMENT/PLAN:    #Atrial fibrillation with RVR   #EKG changes  with ST depression and T wave inversions   #Secondary hypercoagulable state   -on cardizem, metoprolol at home  -on eliquis for AC-currently holding   -trop negative x 2   -on cardizem gtt  -continue PO metoprolol   -TSH pending - wnl  -cardiology consulted, most recent echo as above      #Possible GI bleed/Melena   #Abdominal pain   #Severe constipation with distension of the sigmoid colon  -hemoccult +  -CTA negative for active bleed  -Hgb is stable   -hold eliquis for now   -IV PPI  -soap suds enema given, s/p disimpaction  -linzess   -prn suppository   -miralax BID  -on lactulose   -GI consulted, discussed with GI, she is started on a bowel regimen, too high risk for colonoscopy at this time.      #Necrotizing pneumonia vs pulmonary mass   #RUL pulmonary nodule   #COPD without AE  #Leukocytosis   #Acute on chronic respiratory failure   -wears up to 3 L O2 at home, requiring 4 L   -wean as tolerated  -prn inhalers  -started IV vancomycin and cefepime empirically - switched to

## 2024-12-14 NOTE — PROGRESS NOTES
RT Inhaler-Nebulizer Bronchodilator Protocol Note    There is a bronchodilator order in the chart from a provider indicating to follow the RT Bronchodilator Protocol and there is an “Initiate RT Inhaler-Nebulizer Bronchodilator Protocol” order as well (see protocol at bottom of note).    CXR Findings:  No results found.    The findings from the last RT Protocol Assessment were as follows:   History Pulmonary Disease: (P) Chronic pulmonary disease  Respiratory Pattern: (P) Regular pattern and RR 12-20 bpm  Breath Sounds: (P) Slightly diminished and/or crackles  Cough: (P) Strong, spontaneous, non-productive  Indication for Bronchodilator Therapy: (P) Decreased or absent breath sounds  Bronchodilator Assessment Score: (P) 4    Aerosolized bronchodilator medication orders have been revised according to the RT Inhaler-Nebulizer Bronchodilator Protocol below.    Respiratory Therapist to perform RT Therapy Protocol Assessment initially then follow the protocol.  Repeat RT Therapy Protocol Assessment PRN for score 0-3 or on second treatment, BID, and PRN for scores above 3.    No Indications - adjust the frequency to every 6 hours PRN wheezing or bronchospasm, if no treatments needed after 48 hours then discontinue using Per Protocol order mode.     If indication present, adjust the RT bronchodilator orders based on the Bronchodilator Assessment Score as indicated below.  Use Inhaler orders unless patient has one or more of the following: on home nebulizer, not able to hold breath for 10 seconds, is not alert and oriented, cannot activate and use MDI correctly, or respiratory rate 25 breaths per minute or more, then use the equivalent nebulizer order(s) with same Frequency and PRN reasons based on the score.  If a patient is on this medication at home then do not decrease Frequency below that used at home.    0-3 - enter or revise RT bronchodilator order(s) to equivalent RT Bronchodilator order with Frequency of every 4  hours PRN for wheezing or increased work of breathing using Per Protocol order mode.        4-6 - enter or revise RT Bronchodilator order(s) to two equivalent RT bronchodilator orders with one order with BID Frequency and one order with Frequency of every 4 hours PRN wheezing or increased work of breathing using Per Protocol order mode.        7-10 - enter or revise RT Bronchodilator order(s) to two equivalent RT bronchodilator orders with one order with TID Frequency and one order with Frequency of every 4 hours PRN wheezing or increased work of breathing using Per Protocol order mode.       11-13 - enter or revise RT Bronchodilator order(s) to one equivalent RT bronchodilator order with QID Frequency and an Albuterol order with Frequency of every 4 hours PRN wheezing or increased work of breathing using Per Protocol order mode.      Greater than 13 - enter or revise RT Bronchodilator order(s) to one equivalent RT bronchodilator order with every 4 hours Frequency and an Albuterol order with Frequency of every 2 hours PRN wheezing or increased work of breathing using Per Protocol order mode.       Electronically signed by Liz Mac RCP on 12/13/2024 at 7:50 PM

## 2024-12-14 NOTE — FLOWSHEET NOTE
12/13/24 1908   Handoff   Communication Given Shift Handoff   Handoff Given To VISHAL Rdz   Handoff Received From VISHAL Velez   Handoff Communication Face to Face   Time Handoff Given 1908   End of Shift Check Performed Yes     EOS report given to VISHAL Rdz. Pt in bed, call light within reach. Pt is stable, care is transferred.

## 2024-12-14 NOTE — PROGRESS NOTES
Pulmonary Progress Note  CC: lung mass    Subjective:  Hgb stable  Pt not able to give history      EXAM: BP (!) 133/103   Pulse (!) 121   Temp 97.6 °F (36.4 °C) (Oral)   Resp 16   Wt 97.5 kg (214 lb 14.4 oz)   SpO2 96%   BMI 39.31 kg/m²  on 4L  Constitutional:  No acute distress.   Eyes: PERRL. Conjunctivae anicteric.   ENT: Normal nose. Normal tongue.    Neck:  Trachea is midline.   Respiratory: No accessory muscle usage.  decreased breath sounds. No wheezes. No rales. No Rhonchi.  Cardiovascular: Normal S1S2. No digit clubbing. No digit cyanosis. No LE edema.   Psychiatric: No anxiety or Agitation. Alert and Oriented to person only    Scheduled Meds:   meropenem  1,000 mg IntraVENous Q8H    metoprolol tartrate  50 mg Oral BID    sodium chloride flush  5-40 mL IntraVENous 2 times per day    pantoprazole (PROTONIX) 40 mg in sodium chloride (PF) 0.9 % 10 mL injection  40 mg IntraVENous Q12H    insulin lispro  0-4 Units SubCUTAneous 4x Daily AC & HS    linaclotide  290 mcg Oral QAM AC    budesonide-formoterol  2 puff Inhalation BID    predniSONE  10 mg Oral Daily    [Held by provider] apixaban  5 mg Oral BID    atorvastatin  20 mg Oral Nightly    gabapentin  100 mg Oral TID    insulin glargine  15 Units SubCUTAneous Daily    ipratropium 0.5 mg-albuterol 2.5 mg  1 Dose Inhalation 4x Daily RT    lactulose  20 g Oral Every Other Day    venlafaxine  75 mg Oral Daily    melatonin  3 mg Oral Nightly    LORazepam  1 mg Oral Nightly    furosemide  40 mg Oral Daily     Continuous Infusions:   dilTIAZem 125 mg in sodium chloride 0.9 % 125 mL infusion 2.5 mg/hr (12/13/24 1503)    sodium chloride      dextrose       PRN Meds:  potassium chloride **OR** potassium alternative oral replacement **OR** potassium chloride, magnesium sulfate, sodium phosphate 15 mmol in sodium chloride 0.9 % 250 mL IVPB, sodium chloride flush, sodium chloride, ondansetron **OR** ondansetron, acetaminophen **OR** acetaminophen, glucose, dextrose

## 2024-12-14 NOTE — PROGRESS NOTES
Progress Note    Patient Leslie Farris  MRN: [id number]  YOB: 1950 Age: 74 y.o. Sex: female  Room: [place] [address]       Admitting Physician: Wilfrido Hatfield MD   Date of Admission: 12/12/2024  7:37 AM   Primary Care Physician: Kristin Pacheco MD     Subjective:  Leslie Farris was seen and examined. We are following for constipation and rectal bleeding.  -- She reports bowel movement today.   -- Nurse reports she had large bowel movement today that was non bloody    ROS:  Constitutional: Denies fever, no change in appetite  Respiratory: Denies cough or shortness of breath  Cardiovascular: Denies chest pain or edema    Objective:  Vital Signs:   Vitals:    12/14/24 0815   BP: (!) 96/52   Pulse: (!) 108   Resp: 18   Temp: 97.6 °F (36.4 °C)   SpO2: 97%         Physical Exam:  Constitutional: Alert and oriented x 3. Repeats \"help me do it\" over and over but is able to answer some questions.   Respiratory: Respirations nonlabored, no crepitus  GI: Abdomen nondistended, soft, and nontender.    Neurological: No focal deficits noted. No asterixis.  Psych: Perseverates. Pulled at her IV then began repeating \"hurts\"    Intake/Output:    Intake/Output Summary (Last 24 hours) at 12/14/2024 0930  Last data filed at 12/14/2024 0921  Gross per 24 hour   Intake 842 ml   Output 1600 ml   Net -758 ml        Current Medications:  Current Facility-Administered Medications   Medication Dose Route Frequency Provider Last Rate Last Admin    meropenem (MERREM) 1000 mg in sodium chloride 0.9% 50 mL (duplex)  1,000 mg IntraVENous Q8H Black, Dena,  mL/hr at 12/14/24 0836 1,000 mg at 12/14/24 0836    potassium chloride (KLOR-CON M) extended release tablet 40 mEq  40 mEq Oral PRN Black, Dena, DO        Or    potassium bicarb-citric acid (EFFER-K) effervescent tablet 40 mEq  40 mEq Oral PRN Black, Dena, DO   40 mEq at 12/14/24 0827    Or    potassium chloride 10 mEq/100 mL IVPB (Peripheral Line)  is on hold. She is not a candidate for endoscopy due to comorbidities. If she needs to go back on Eliquis would be reasonable to monitor for overt bleeding before discharge.    GI will sign off. Please do not hesitate to reconsult for change in clinical status.    Jody Mcfadden MD    [facility]    [phone]. Also available via [employer]

## 2024-12-14 NOTE — PROGRESS NOTES
Shift assessment complete, morning medications given, patient restless with complaints of pain, pain medication given along with replacement potassium, holding furosemide and metoprolol for BP systolic of 96, patient constantly repeating date of birth while assessing patient, able to ingest breakfast but continued to talk through the process, will continue to monitor. Lucero Galan RN

## 2024-12-15 LAB
ALBUMIN SERPL-MCNC: 2.6 G/DL (ref 3.4–5)
ALBUMIN/GLOB SERPL: 0.9 {RATIO} (ref 1.1–2.2)
ALP SERPL-CCNC: 75 U/L (ref 40–129)
ALT SERPL-CCNC: <5 U/L (ref 10–40)
ANION GAP SERPL CALCULATED.3IONS-SCNC: 10 MMOL/L (ref 3–16)
AST SERPL-CCNC: 14 U/L (ref 15–37)
BACTERIA UR CULT: ABNORMAL
BASOPHILS # BLD: 0 K/UL (ref 0–0.2)
BASOPHILS NFR BLD: 0.4 %
BILIRUB SERPL-MCNC: 0.3 MG/DL (ref 0–1)
BUN SERPL-MCNC: 8 MG/DL (ref 7–20)
CALCIUM SERPL-MCNC: 7.9 MG/DL (ref 8.3–10.6)
CHLORIDE SERPL-SCNC: 102 MMOL/L (ref 99–110)
CO2 SERPL-SCNC: 27 MMOL/L (ref 21–32)
CREAT SERPL-MCNC: 0.3 MG/DL (ref 0.6–1.2)
DEPRECATED RDW RBC AUTO: 15.8 % (ref 12.4–15.4)
EOSINOPHIL # BLD: 0.1 K/UL (ref 0–0.6)
EOSINOPHIL NFR BLD: 1 %
GFR SERPLBLD CREATININE-BSD FMLA CKD-EPI: >90 ML/MIN/{1.73_M2}
GLUCOSE BLD-MCNC: 150 MG/DL (ref 70–99)
GLUCOSE BLD-MCNC: 200 MG/DL (ref 70–99)
GLUCOSE BLD-MCNC: 208 MG/DL (ref 70–99)
GLUCOSE BLD-MCNC: 78 MG/DL (ref 70–99)
GLUCOSE BLD-MCNC: 80 MG/DL (ref 70–99)
GLUCOSE SERPL-MCNC: 65 MG/DL (ref 70–99)
HCT VFR BLD AUTO: 30.9 % (ref 36–48)
HCT VFR BLD AUTO: 37.8 % (ref 36–48)
HGB BLD-MCNC: 11.7 G/DL (ref 12–16)
HGB BLD-MCNC: 9.8 G/DL (ref 12–16)
LYMPHOCYTES # BLD: 2 K/UL (ref 1–5.1)
LYMPHOCYTES NFR BLD: 22.5 %
MCH RBC QN AUTO: 25.6 PG (ref 26–34)
MCHC RBC AUTO-ENTMCNC: 31 G/DL (ref 31–36)
MCV RBC AUTO: 82.6 FL (ref 80–100)
MONOCYTES # BLD: 0.8 K/UL (ref 0–1.3)
MONOCYTES NFR BLD: 9 %
NEUTROPHILS # BLD: 6.1 K/UL (ref 1.7–7.7)
NEUTROPHILS NFR BLD: 67.1 %
ORGANISM: ABNORMAL
PERFORMED ON: ABNORMAL
PERFORMED ON: NORMAL
PERFORMED ON: NORMAL
PLATELET # BLD AUTO: 200 K/UL (ref 135–450)
PMV BLD AUTO: 8.2 FL (ref 5–10.5)
POTASSIUM SERPL-SCNC: 3.6 MMOL/L (ref 3.5–5.1)
PROT SERPL-MCNC: 5.4 G/DL (ref 6.4–8.2)
RBC # BLD AUTO: 4.57 M/UL (ref 4–5.2)
REASON FOR REJECTION: NORMAL
REJECTED TEST: NORMAL
SODIUM SERPL-SCNC: 139 MMOL/L (ref 136–145)
WBC # BLD AUTO: 9.1 K/UL (ref 4–11)

## 2024-12-15 PROCEDURE — 6370000000 HC RX 637 (ALT 250 FOR IP): Performed by: INTERNAL MEDICINE

## 2024-12-15 PROCEDURE — 99232 SBSQ HOSP IP/OBS MODERATE 35: CPT | Performed by: INTERNAL MEDICINE

## 2024-12-15 PROCEDURE — 36415 COLL VENOUS BLD VENIPUNCTURE: CPT

## 2024-12-15 PROCEDURE — 85025 COMPLETE CBC W/AUTO DIFF WBC: CPT

## 2024-12-15 PROCEDURE — 6360000002 HC RX W HCPCS: Performed by: INTERNAL MEDICINE

## 2024-12-15 PROCEDURE — 6370000000 HC RX 637 (ALT 250 FOR IP)

## 2024-12-15 PROCEDURE — 6360000002 HC RX W HCPCS

## 2024-12-15 PROCEDURE — 80053 COMPREHEN METABOLIC PANEL: CPT

## 2024-12-15 PROCEDURE — 2580000003 HC RX 258

## 2024-12-15 PROCEDURE — 99233 SBSQ HOSP IP/OBS HIGH 50: CPT | Performed by: INTERNAL MEDICINE

## 2024-12-15 PROCEDURE — 2700000000 HC OXYGEN THERAPY PER DAY

## 2024-12-15 PROCEDURE — 85018 HEMOGLOBIN: CPT

## 2024-12-15 PROCEDURE — 85014 HEMATOCRIT: CPT

## 2024-12-15 PROCEDURE — 94761 N-INVAS EAR/PLS OXIMETRY MLT: CPT

## 2024-12-15 PROCEDURE — 2060000000 HC ICU INTERMEDIATE R&B

## 2024-12-15 PROCEDURE — 94640 AIRWAY INHALATION TREATMENT: CPT

## 2024-12-15 PROCEDURE — 6370000000 HC RX 637 (ALT 250 FOR IP): Performed by: STUDENT IN AN ORGANIZED HEALTH CARE EDUCATION/TRAINING PROGRAM

## 2024-12-15 RX ORDER — INSULIN GLARGINE 100 [IU]/ML
10 INJECTION, SOLUTION SUBCUTANEOUS DAILY
Status: DISCONTINUED | OUTPATIENT
Start: 2024-12-15 | End: 2024-12-18 | Stop reason: HOSPADM

## 2024-12-15 RX ORDER — DILTIAZEM HYDROCHLORIDE 120 MG/1
120 CAPSULE, COATED, EXTENDED RELEASE ORAL DAILY
Status: DISCONTINUED | OUTPATIENT
Start: 2024-12-15 | End: 2024-12-18 | Stop reason: HOSPADM

## 2024-12-15 RX ADMIN — PREDNISONE 10 MG: 10 TABLET ORAL at 07:45

## 2024-12-15 RX ADMIN — VENLAFAXINE HYDROCHLORIDE 75 MG: 75 CAPSULE, EXTENDED RELEASE ORAL at 07:45

## 2024-12-15 RX ADMIN — MEROPENEM AND SODIUM CHLORIDE 1000 MG: 1 INJECTION, SOLUTION INTRAVENOUS at 15:48

## 2024-12-15 RX ADMIN — SODIUM CHLORIDE, PRESERVATIVE FREE 10 ML: 5 INJECTION INTRAVENOUS at 20:53

## 2024-12-15 RX ADMIN — IPRATROPIUM BROMIDE AND ALBUTEROL SULFATE 1 DOSE: 2.5; .5 SOLUTION RESPIRATORY (INHALATION) at 07:37

## 2024-12-15 RX ADMIN — LORAZEPAM 0.5 MG: 0.5 TABLET ORAL at 10:56

## 2024-12-15 RX ADMIN — GABAPENTIN 100 MG: 100 CAPSULE ORAL at 20:52

## 2024-12-15 RX ADMIN — DILTIAZEM HYDROCHLORIDE 120 MG: 120 CAPSULE, COATED, EXTENDED RELEASE ORAL at 09:53

## 2024-12-15 RX ADMIN — MEROPENEM AND SODIUM CHLORIDE 1000 MG: 1 INJECTION, SOLUTION INTRAVENOUS at 07:49

## 2024-12-15 RX ADMIN — INSULIN GLARGINE 10 UNITS: 100 INJECTION, SOLUTION SUBCUTANEOUS at 20:52

## 2024-12-15 RX ADMIN — GABAPENTIN 100 MG: 100 CAPSULE ORAL at 07:44

## 2024-12-15 RX ADMIN — FUROSEMIDE 40 MG: 40 TABLET ORAL at 07:45

## 2024-12-15 RX ADMIN — IPRATROPIUM BROMIDE AND ALBUTEROL SULFATE 1 DOSE: 2.5; .5 SOLUTION RESPIRATORY (INHALATION) at 19:44

## 2024-12-15 RX ADMIN — METOPROLOL TARTRATE 50 MG: 50 TABLET, FILM COATED ORAL at 07:45

## 2024-12-15 RX ADMIN — PANTOPRAZOLE SODIUM 40 MG: 40 INJECTION, POWDER, FOR SOLUTION INTRAVENOUS at 12:05

## 2024-12-15 RX ADMIN — BUDESONIDE AND FORMOTEROL FUMARATE DIHYDRATE 2 PUFF: 160; 4.5 AEROSOL RESPIRATORY (INHALATION) at 19:44

## 2024-12-15 RX ADMIN — ATORVASTATIN CALCIUM 20 MG: 10 TABLET, FILM COATED ORAL at 20:52

## 2024-12-15 RX ADMIN — METOPROLOL TARTRATE 50 MG: 50 TABLET, FILM COATED ORAL at 20:52

## 2024-12-15 RX ADMIN — Medication 3 MG: at 20:52

## 2024-12-15 RX ADMIN — INSULIN LISPRO 2 UNITS: 100 INJECTION, SOLUTION INTRAVENOUS; SUBCUTANEOUS at 12:05

## 2024-12-15 RX ADMIN — LORAZEPAM 1 MG: 1 TABLET ORAL at 20:52

## 2024-12-15 RX ADMIN — OXYCODONE HYDROCHLORIDE AND ACETAMINOPHEN 1 TABLET: 7.5; 325 TABLET ORAL at 07:59

## 2024-12-15 RX ADMIN — GABAPENTIN 100 MG: 100 CAPSULE ORAL at 15:37

## 2024-12-15 RX ADMIN — MEROPENEM AND SODIUM CHLORIDE 1000 MG: 1 INJECTION, SOLUTION INTRAVENOUS at 01:12

## 2024-12-15 RX ADMIN — BUDESONIDE AND FORMOTEROL FUMARATE DIHYDRATE 2 PUFF: 160; 4.5 AEROSOL RESPIRATORY (INHALATION) at 07:37

## 2024-12-15 RX ADMIN — PANTOPRAZOLE SODIUM 40 MG: 40 INJECTION, POWDER, FOR SOLUTION INTRAVENOUS at 01:06

## 2024-12-15 RX ADMIN — IPRATROPIUM BROMIDE AND ALBUTEROL SULFATE 1 DOSE: 2.5; .5 SOLUTION RESPIRATORY (INHALATION) at 11:01

## 2024-12-15 RX ADMIN — INSULIN LISPRO 2 UNITS: 100 INJECTION, SOLUTION INTRAVENOUS; SUBCUTANEOUS at 15:43

## 2024-12-15 RX ADMIN — OXYCODONE HYDROCHLORIDE AND ACETAMINOPHEN 1 TABLET: 7.5; 325 TABLET ORAL at 15:36

## 2024-12-15 ASSESSMENT — PAIN SCALES - GENERAL
PAINLEVEL_OUTOF10: 6
PAINLEVEL_OUTOF10: 0
PAINLEVEL_OUTOF10: 10
PAINLEVEL_OUTOF10: 10
PAINLEVEL_OUTOF10: 0
PAINLEVEL_OUTOF10: 4

## 2024-12-15 ASSESSMENT — PAIN DESCRIPTION - LOCATION
LOCATION: ABDOMEN
LOCATION: ABDOMEN

## 2024-12-15 ASSESSMENT — PAIN DESCRIPTION - DESCRIPTORS: DESCRIPTORS: ACHING

## 2024-12-15 NOTE — PROGRESS NOTES
PCP:  Marisa Santiago MD   Denies known Latex allergy or symptoms of Latex sensitivity.   Medications reviewed with patient:  No changes made.  Tobacco use verified.  Medical and Surgical history reviewed:  No changes made.      Preferred Pharmacy:    [id number] - [place], [place] - [address] AT [facility] [address]  [address]  [facility] [address]  Phone: [phone] Fax: [phone]    [id number] - [place], [place] [id number]  [id number]  [id number]  [facility] [address]  Phone: [phone] Fax: [phone]    [facility] #[id number] - [place], [place] - [address]  [address]  [facility] [address]  Phone: [phone] Fax: [phone]    [place], [place] - [address]  [address] [address]  Phone: [phone] Fax: [phone]        Refills needed?  no  Letter for work/school needed?  no    Chief Complaint   Patient presents with    Office Visit     FU/ RIGHT SHOULDER FX / LOV 12/21/23                 RT Inhaler-Nebulizer Bronchodilator Protocol Note    There is a bronchodilator order in the chart from a provider indicating to follow the RT Bronchodilator Protocol and there is an “Initiate RT Inhaler-Nebulizer Bronchodilator Protocol” order as well (see protocol at bottom of note).    CXR Findings:  No results found.    The findings from the last RT Protocol Assessment were as follows:   History Pulmonary Disease: (P) Chronic pulmonary disease  Respiratory Pattern: (P) Regular pattern and RR 12-20 bpm  Breath Sounds: (P) Slightly diminished and/or crackles  Cough: (P) Strong, spontaneous, non-productive  Indication for Bronchodilator Therapy: (P) Decreased or absent breath sounds  Bronchodilator Assessment Score: (P) 4    Aerosolized bronchodilator medication orders have been revised according to the RT Inhaler-Nebulizer Bronchodilator Protocol below.    Respiratory Therapist to perform RT Therapy Protocol Assessment initially then follow the protocol.  Repeat RT Therapy Protocol Assessment PRN for score 0-3 or on second treatment, BID, and PRN for scores above 3.    No Indications - adjust the frequency to every 6 hours PRN wheezing or bronchospasm, if no treatments needed after 48 hours then discontinue using Per Protocol order mode.     If indication present, adjust the RT bronchodilator orders based on the Bronchodilator Assessment Score as indicated below.  Use Inhaler orders unless patient has one or more of the following: on home nebulizer, not able to hold breath for 10 seconds, is not alert and oriented, cannot activate and use MDI correctly, or respiratory rate 25 breaths per minute or more, then use the equivalent nebulizer order(s) with same Frequency and PRN reasons based on the score.  If a patient is on this medication at home then do not decrease Frequency below that used at home.    0-3 - enter or revise RT bronchodilator order(s) to equivalent RT Bronchodilator order with Frequency of every 4

## 2024-12-15 NOTE — PROGRESS NOTES
Shift report given to Kajal at bedside. Patient care handed off in stable condition at this time. Lucero Galan RN

## 2024-12-15 NOTE — PROGRESS NOTES
HS  linaclotide, 290 mcg, QAM AC  budesonide-formoterol, 2 puff, BID  predniSONE, 10 mg, Daily  [Held by provider] apixaban, 5 mg, BID  atorvastatin, 20 mg, Nightly  gabapentin, 100 mg, TID  ipratropium 0.5 mg-albuterol 2.5 mg, 1 Dose, 4x Daily RT  venlafaxine, 75 mg, Daily  melatonin, 3 mg, Nightly  LORazepam, 1 mg, Nightly  furosemide, 40 mg, Daily        PRN Medications:  metoprolol, 5 mg, Q6H PRN  potassium chloride, 40 mEq, PRN   Or  potassium alternative oral replacement, 40 mEq, PRN   Or  potassium chloride, 10 mEq, PRN  magnesium sulfate, 2,000 mg, PRN  sodium phosphate 15 mmol in sodium chloride 0.9 % 250 mL IVPB, 15 mmol, PRN  sodium chloride flush, 5-40 mL, PRN  sodium chloride, , PRN  ondansetron, 4 mg, Q8H PRN   Or  ondansetron, 4 mg, Q6H PRN  acetaminophen, 650 mg, Q6H PRN   Or  acetaminophen, 650 mg, Q6H PRN  glucose, 4 tablet, PRN  dextrose bolus, 125 mL, PRN   Or  dextrose bolus, 250 mL, PRN  glucagon (rDNA), 1 mg, PRN  dextrose, , Continuous PRN  bisacodyl, 10 mg, Daily PRN  albuterol, 2.5 mg, Q6H PRN  oxyCODONE-acetaminophen, 1 tablet, Q6H PRN  LORazepam, 0.5 mg, BID PRN          Data Review:      Laboratory Data Reviewed:    CBC:  Lab Results   Component Value Date    WBC 8.4 12/14/2024    HGB 9.8 (L) 12/15/2024    HCT 30.9 (L) 12/15/2024    MCV 80.0 12/14/2024     12/14/2024         Basic Metabolic Panel  Lab Results   Component Value Date/Time     12/14/2024 05:07 AM     12/14/2024 05:07 AM    CO2 31 12/14/2024 05:07 AM    GLUCOSE 85 12/14/2024 05:07 AM    BUN 9 12/14/2024 05:07 AM    CREATININE 0.4 12/14/2024 05:07 AM       HEPATIC PANEL   Lab Results   Component Value Date/Time    AST 13 12/12/2024 08:00 AM    ALT <5 12/12/2024 08:00 AM       Lab Results   Component Value Date    CKTOTAL 57 01/11/2020    TROPONINI <0.01 04/08/2023       Lab Results   Component Value Date/Time    INR 1.74 12/12/2024 08:00 AM    INR 1.19 04/29/2024 04:36 PM    INR 1.19 04/21/2023 02:10 AM        Test Review:        Radiology reviewed:     CT CHEST WO CONTRAST   Final Result   Interval development of 4.8 x 3.0 cm centrally necrotic area of consolidation   in the right lower lung which could represent necrotizing pneumonia or   pulmonary mass.      Stable 7 mm right upper lobe pulmonary nodule.      Stable pulmonary emphysema      Stable small right pleural effusion.      1.7 cm right thyroid nodule.  Ultrasound could be performed for further   evaluation when clinically appropriate.      Other chronic findings as above         CTA ABDOMEN PELVIS W WO CONTRAST   Final Result   Negative CTA for active GI bleed.      Massive stool burden with distension of the distal sigmoid colon up to 11.8   cm.             Microbiology: Cultures reviewed.   Urine culture - E. Coli ESBL    Lab Results   Component Value Date    LABA1C 7.3 12/12/2024      Body mass index is 38.9 kg/m².       Impression/Plan:        ASSESSMENT/PLAN:    #Atrial fibrillation with RVR   #EKG changes  with ST depression and T wave inversions   #Secondary hypercoagulable state   -on cardizem, metoprolol at home  -on eliquis for AC-currently holding   -trop negative x 2   -on cardizem gtt  -continue PO metoprolol   -TSH pending - wnl  -cardiology consulted, most recent echo as above      #Possible GI bleed/Melena   #Abdominal pain   #Severe constipation with distension of the sigmoid colon  -hemoccult +  -CTA negative for active bleed  -Hgb is stable   -hold eliquis for now   -IV PPI  -soap suds enema given, s/p disimpaction  -linzess   -prn suppository   -miralax BID  -on lactulose   -GI consulted, discussed with GI, she is started on a bowel regimen, too high risk for colonoscopy at this time.      #Necrotizing pneumonia vs pulmonary mass   #RUL pulmonary nodule   #COPD without AE  #Leukocytosis   #Acute on chronic respiratory failure   -wears up to 3 L O2 at home, requiring 4 L   -wean as tolerated  -prn inhalers  -started IV vancomycin and

## 2024-12-15 NOTE — PROGRESS NOTES
Pulmonary Progress Note  CC: lung mass    Subjective:  Hgb stable  HR better controlled      EXAM: BP (!) 155/132   Pulse 94   Temp 98.7 °F (37.1 °C) (Oral)   Resp 16   Wt 96.5 kg (212 lb 11.2 oz)   SpO2 98%   BMI 38.90 kg/m²  on 4L  Constitutional:  No acute distress.   Eyes: PERRL. Conjunctivae anicteric.   ENT: Normal nose. Normal tongue.    Neck:  Trachea is midline.   Respiratory: No accessory muscle usage.  decreased breath sounds. No wheezes. No rales. No Rhonchi.  Cardiovascular: Normal S1S2. No digit clubbing. No digit cyanosis. No LE edema.   Psychiatric: No anxiety or Agitation. Alert and Oriented to person only    Scheduled Meds:   insulin glargine  10 Units SubCUTAneous Daily    dilTIAZem  120 mg Oral Daily    meropenem  1,000 mg IntraVENous Q8H    metoprolol tartrate  50 mg Oral BID    polyethylene glycol  17 g Oral Daily    sodium chloride flush  5-40 mL IntraVENous 2 times per day    pantoprazole (PROTONIX) 40 mg in sodium chloride (PF) 0.9 % 10 mL injection  40 mg IntraVENous Q12H    insulin lispro  0-4 Units SubCUTAneous 4x Daily AC & HS    linaclotide  290 mcg Oral QAM AC    budesonide-formoterol  2 puff Inhalation BID    predniSONE  10 mg Oral Daily    [Held by provider] apixaban  5 mg Oral BID    atorvastatin  20 mg Oral Nightly    gabapentin  100 mg Oral TID    ipratropium 0.5 mg-albuterol 2.5 mg  1 Dose Inhalation 4x Daily RT    venlafaxine  75 mg Oral Daily    melatonin  3 mg Oral Nightly    LORazepam  1 mg Oral Nightly    furosemide  40 mg Oral Daily     Continuous Infusions:   sodium chloride      dextrose       PRN Meds:  metoprolol, potassium chloride **OR** potassium alternative oral replacement **OR** potassium chloride, magnesium sulfate, sodium phosphate 15 mmol in sodium chloride 0.9 % 250 mL IVPB, sodium chloride flush, sodium chloride, ondansetron **OR** ondansetron, acetaminophen **OR** acetaminophen, glucose, dextrose bolus **OR** dextrose bolus, glucagon (rDNA), dextrose,

## 2024-12-15 NOTE — PROGRESS NOTES
[facility]          Cardiology                                                                Progress Note    Admission date:  2024    Subjective:   CC rectal bleeding    HPI pt reports persistent abdominal pain despite bowel movements. HPI difficult to elicit.    Vitals:  Blood pressure 119/80, pulse 92, temperature 97.9 °F (36.6 °C), temperature source Axillary, resp. rate 18, weight 96.5 kg (212 lb 11.2 oz), SpO2 98%.  Temp  Av.3 °F (36.8 °C)  Min: 97.9 °F (36.6 °C)  Max: 98.6 °F (37 °C)  Pulse  Av.5  Min: 89  Max: 121  BP  Min: 103/68  Max: 133/103  SpO2  Av.1 %  Min: 96 %  Max: 100 %  FiO2   Av %  Min: 35 %  Max: 35 %    24 hour I/O    Intake/Output Summary (Last 24 hours) at 12/15/2024 0820  Last data filed at 12/15/2024 0754  Gross per 24 hour   Intake 402 ml   Output 1100 ml   Net -698 ml       Objective:     Telemetry monitor: AF with good HR control    Physical Exam:  General Appearance:  appears agitated with perseverant speech \"help me please\"  HEENT: pupils equal and reactive, no scleral  icterus  Skin:  Warm and dry  Heart:  irregular, normal apex, S1 and S2 normal  Lungs:  Clear anteriorly, no wheezing  Abd: obese, soft, no clear tenderness  Extremities:  No edema, no cyanosis  Psych: agitated      Lab Review     Renal Profile:   Lab Results   Component Value Date/Time    CREATININE 0.4 2024 05:07 AM    BUN 9 2024 05:07 AM     2024 05:07 AM    K 3.1 2024 05:07 AM     2024 05:07 AM    CO2 31 2024 05:07 AM     CBC:    Lab Results   Component Value Date/Time    WBC 9.1 12/15/2024 07:26 AM    RBC 4.57 12/15/2024 07:26 AM    HGB 11.7 12/15/2024 07:26 AM    HCT 37.8 12/15/2024 07:26 AM    MCV 82.6 12/15/2024 07:26 AM    RDW 15.8 12/15/2024 07:26 AM     12/15/2024 07:26 AM     BNP:    Lab Results   Component Value Date/Time     2024 05:41 AM     Fasting Lipid Panel:    Lab Results    Component Value Date/Time    CHOL 107 11/20/2024 05:41 AM    HDL 44 11/20/2024 05:41 AM    TRIG 107 11/20/2024 05:41 AM     Cardiac Enzymes:  CK/MbTroponin  Lab Results   Component Value Date/Time    CKTOTAL 57 01/11/2020 03:58 AM    TROPONINI <0.01 04/08/2023 11:09 AM     PT/ INR   Lab Results   Component Value Date/Time    INR 1.74 12/12/2024 08:00 AM    INR 1.19 04/29/2024 04:36 PM    INR 1.19 04/21/2023 02:10 AM    PROTIME 20.5 12/12/2024 08:00 AM    PROTIME 15.3 04/29/2024 04:36 PM    PROTIME 15.1 04/21/2023 02:10 AM     PTT No components found for: \"PTT\"   Lab Results   Component Value Date/Time    MG 1.71 12/14/2024 05:07 AM      Lab Results   Component Value Date/Time    TSH 0.56 04/29/2024 04:36 PM       Assessment:     Melena- thought related to constipation. Now having BM's, but no apparent symptom relief.  AF- HR adequate on low dose IV diltiazem.  dCHF- appears stable  Cavitary lung mass- evaluation in progress      Plan:      Transition from IV to PO diltiazem  Resume AC for stroke prophylaxis when stable  Bronchoscopy planned    Abran Chatterjee MD

## 2024-12-15 NOTE — PLAN OF CARE
Problem: Discharge Planning  Goal: Discharge to home or other facility with appropriate resources  Outcome: Progressing     Problem: Chronic Conditions and Co-morbidities  Goal: Patient's chronic conditions and co-morbidity symptoms are monitored and maintained or improved  Outcome: Progressing     Problem: Safety - Adult  Goal: Free from fall injury  Outcome: Progressing     Problem: Skin/Tissue Integrity  Goal: Absence of new skin breakdown  Description: 1.  Monitor for areas of redness and/or skin breakdown  2.  Assess vascular access sites hourly  3.  Every 4-6 hours minimum:  Change oxygen saturation probe site  4.  Every 4-6 hours:  If on nasal continuous positive airway pressure, respiratory therapy assess nares and determine need for appliance change or resting period.  Outcome: Progressing     Problem: Respiratory - Adult  Goal: Achieves optimal ventilation and oxygenation  Outcome: Progressing     Problem: Cardiovascular - Adult  Goal: Maintains optimal cardiac output and hemodynamic stability  Outcome: Progressing  Goal: Absence of cardiac dysrhythmias or at baseline  Outcome: Progressing     Problem: Skin/Tissue Integrity - Adult  Goal: Skin integrity remains intact  Outcome: Progressing     Problem: Gastrointestinal - Adult  Goal: Maintains or returns to baseline bowel function  Outcome: Progressing     Problem: Metabolic/Fluid and Electrolytes - Adult  Goal: Electrolytes maintained within normal limits  Outcome: Progressing     Problem: Pain  Goal: Verbalizes/displays adequate comfort level or baseline comfort level  Outcome: Progressing

## 2024-12-15 NOTE — PROGRESS NOTES
Shift assessment complete, morning medications given, patient resting with complaints of pain in abdomen rated 10 out of 10, pain medication given, will continue to monitor. Lucero Galan RN

## 2024-12-15 NOTE — FLOWSHEET NOTE
12/14/24 1936   Vital Signs   Temp 98.4 °F (36.9 °C)   Temp Source Oral   Pulse (!) 105   Heart Rate Source Monitor   Respirations 18   /68   MAP (Calculated) 80   BP Method Automatic   Patient Position Semi fowlers   Oxygen Therapy   SpO2 99 %   O2 Device Nasal cannula   O2 Flow Rate (L/min) 4 L/min

## 2024-12-16 ENCOUNTER — ANESTHESIA EVENT (OUTPATIENT)
Dept: ENDOSCOPY | Age: 74
DRG: 264 | End: 2024-12-16
Payer: COMMERCIAL

## 2024-12-16 PROBLEM — I50.32 CHRONIC DIASTOLIC CHF (CONGESTIVE HEART FAILURE) (HCC): Status: ACTIVE | Noted: 2024-12-16

## 2024-12-16 PROBLEM — J96.11 CHRONIC HYPOXEMIC RESPIRATORY FAILURE: Status: ACTIVE | Noted: 2024-12-16

## 2024-12-16 PROBLEM — R93.89 ABNORMAL CT OF THE CHEST: Status: ACTIVE | Noted: 2024-12-16

## 2024-12-16 LAB
ANION GAP SERPL CALCULATED.3IONS-SCNC: 9 MMOL/L (ref 3–16)
BACTERIA BLD CULT ORG #2: NORMAL
BACTERIA BLD CULT: NORMAL
BUN SERPL-MCNC: 7 MG/DL (ref 7–20)
CALCIUM SERPL-MCNC: 7.5 MG/DL (ref 8.3–10.6)
CHLORIDE SERPL-SCNC: 99 MMOL/L (ref 99–110)
CO2 SERPL-SCNC: 31 MMOL/L (ref 21–32)
CREAT SERPL-MCNC: 0.3 MG/DL (ref 0.6–1.2)
DEPRECATED RDW RBC AUTO: 15.5 % (ref 12.4–15.4)
GFR SERPLBLD CREATININE-BSD FMLA CKD-EPI: >90 ML/MIN/{1.73_M2}
GLUCOSE BLD-MCNC: 104 MG/DL (ref 70–99)
GLUCOSE BLD-MCNC: 234 MG/DL (ref 70–99)
GLUCOSE BLD-MCNC: 244 MG/DL (ref 70–99)
GLUCOSE BLD-MCNC: 87 MG/DL (ref 70–99)
GLUCOSE BLD-MCNC: 93 MG/DL (ref 70–99)
GLUCOSE SERPL-MCNC: 92 MG/DL (ref 70–99)
HCT VFR BLD AUTO: 30.7 % (ref 36–48)
HGB BLD-MCNC: 9.9 G/DL (ref 12–16)
INR PPP: 1.06 (ref 0.85–1.15)
MAGNESIUM SERPL-MCNC: 1.58 MG/DL (ref 1.8–2.4)
MCH RBC QN AUTO: 25.7 PG (ref 26–34)
MCHC RBC AUTO-ENTMCNC: 32.3 G/DL (ref 31–36)
MCV RBC AUTO: 79.6 FL (ref 80–100)
PERFORMED ON: ABNORMAL
PERFORMED ON: NORMAL
PERFORMED ON: NORMAL
PLATELET # BLD AUTO: 191 K/UL (ref 135–450)
PMV BLD AUTO: 8.4 FL (ref 5–10.5)
POTASSIUM SERPL-SCNC: 3.4 MMOL/L (ref 3.5–5.1)
PROTHROMBIN TIME: 14 SEC (ref 11.9–14.9)
RBC # BLD AUTO: 3.86 M/UL (ref 4–5.2)
SODIUM SERPL-SCNC: 139 MMOL/L (ref 136–145)
WBC # BLD AUTO: 9.9 K/UL (ref 4–11)

## 2024-12-16 PROCEDURE — 85027 COMPLETE CBC AUTOMATED: CPT

## 2024-12-16 PROCEDURE — 6360000002 HC RX W HCPCS: Performed by: INTERNAL MEDICINE

## 2024-12-16 PROCEDURE — 99231 SBSQ HOSP IP/OBS SF/LOW 25: CPT | Performed by: INTERNAL MEDICINE

## 2024-12-16 PROCEDURE — 6360000002 HC RX W HCPCS

## 2024-12-16 PROCEDURE — 6370000000 HC RX 637 (ALT 250 FOR IP)

## 2024-12-16 PROCEDURE — 85610 PROTHROMBIN TIME: CPT

## 2024-12-16 PROCEDURE — 94640 AIRWAY INHALATION TREATMENT: CPT

## 2024-12-16 PROCEDURE — 6370000000 HC RX 637 (ALT 250 FOR IP): Performed by: STUDENT IN AN ORGANIZED HEALTH CARE EDUCATION/TRAINING PROGRAM

## 2024-12-16 PROCEDURE — 6370000000 HC RX 637 (ALT 250 FOR IP): Performed by: INTERNAL MEDICINE

## 2024-12-16 PROCEDURE — 05HD33Z INSERTION OF INFUSION DEVICE INTO RIGHT CEPHALIC VEIN, PERCUTANEOUS APPROACH: ICD-10-PCS | Performed by: INTERNAL MEDICINE

## 2024-12-16 PROCEDURE — 94761 N-INVAS EAR/PLS OXIMETRY MLT: CPT

## 2024-12-16 PROCEDURE — 83735 ASSAY OF MAGNESIUM: CPT

## 2024-12-16 PROCEDURE — 2500000003 HC RX 250 WO HCPCS: Performed by: STUDENT IN AN ORGANIZED HEALTH CARE EDUCATION/TRAINING PROGRAM

## 2024-12-16 PROCEDURE — 80048 BASIC METABOLIC PNL TOTAL CA: CPT

## 2024-12-16 PROCEDURE — 2060000000 HC ICU INTERMEDIATE R&B

## 2024-12-16 PROCEDURE — 2580000003 HC RX 258

## 2024-12-16 PROCEDURE — 99233 SBSQ HOSP IP/OBS HIGH 50: CPT | Performed by: INTERNAL MEDICINE

## 2024-12-16 PROCEDURE — 76937 US GUIDE VASCULAR ACCESS: CPT

## 2024-12-16 PROCEDURE — 99232 SBSQ HOSP IP/OBS MODERATE 35: CPT | Performed by: INTERNAL MEDICINE

## 2024-12-16 PROCEDURE — C1751 CATH, INF, PER/CENT/MIDLINE: HCPCS

## 2024-12-16 PROCEDURE — 36410 VNPNXR 3YR/> PHY/QHP DX/THER: CPT

## 2024-12-16 PROCEDURE — 2700000000 HC OXYGEN THERAPY PER DAY

## 2024-12-16 RX ORDER — IPRATROPIUM BROMIDE AND ALBUTEROL SULFATE 2.5; .5 MG/3ML; MG/3ML
1 SOLUTION RESPIRATORY (INHALATION)
Status: DISCONTINUED | OUTPATIENT
Start: 2024-12-16 | End: 2024-12-18 | Stop reason: HOSPADM

## 2024-12-16 RX ADMIN — FUROSEMIDE 40 MG: 40 TABLET ORAL at 16:36

## 2024-12-16 RX ADMIN — MEROPENEM AND SODIUM CHLORIDE 1000 MG: 1 INJECTION, SOLUTION INTRAVENOUS at 23:49

## 2024-12-16 RX ADMIN — GABAPENTIN 100 MG: 100 CAPSULE ORAL at 21:35

## 2024-12-16 RX ADMIN — IPRATROPIUM BROMIDE AND ALBUTEROL SULFATE 1 DOSE: 2.5; .5 SOLUTION RESPIRATORY (INHALATION) at 07:42

## 2024-12-16 RX ADMIN — MEROPENEM AND SODIUM CHLORIDE 1000 MG: 1 INJECTION, SOLUTION INTRAVENOUS at 11:39

## 2024-12-16 RX ADMIN — BUDESONIDE AND FORMOTEROL FUMARATE DIHYDRATE 2 PUFF: 160; 4.5 AEROSOL RESPIRATORY (INHALATION) at 07:42

## 2024-12-16 RX ADMIN — MAGNESIUM SULFATE HEPTAHYDRATE 2000 MG: 40 INJECTION, SOLUTION INTRAVENOUS at 07:01

## 2024-12-16 RX ADMIN — INSULIN LISPRO 1 UNITS: 100 INJECTION, SOLUTION INTRAVENOUS; SUBCUTANEOUS at 16:37

## 2024-12-16 RX ADMIN — IPRATROPIUM BROMIDE AND ALBUTEROL SULFATE 1 DOSE: 2.5; .5 SOLUTION RESPIRATORY (INHALATION) at 20:37

## 2024-12-16 RX ADMIN — PREDNISONE 10 MG: 10 TABLET ORAL at 16:36

## 2024-12-16 RX ADMIN — DILTIAZEM HYDROCHLORIDE 120 MG: 120 CAPSULE, COATED, EXTENDED RELEASE ORAL at 16:36

## 2024-12-16 RX ADMIN — METOPROLOL TARTRATE 5 MG: 1 INJECTION, SOLUTION INTRAVENOUS at 11:32

## 2024-12-16 RX ADMIN — INSULIN LISPRO 1 UNITS: 100 INJECTION, SOLUTION INTRAVENOUS; SUBCUTANEOUS at 21:36

## 2024-12-16 RX ADMIN — MEROPENEM AND SODIUM CHLORIDE 1000 MG: 1 INJECTION, SOLUTION INTRAVENOUS at 00:21

## 2024-12-16 RX ADMIN — LORAZEPAM 1 MG: 1 TABLET ORAL at 21:35

## 2024-12-16 RX ADMIN — ATORVASTATIN CALCIUM 20 MG: 10 TABLET, FILM COATED ORAL at 21:35

## 2024-12-16 RX ADMIN — Medication 3 MG: at 21:37

## 2024-12-16 RX ADMIN — SODIUM CHLORIDE, PRESERVATIVE FREE 10 ML: 5 INJECTION INTRAVENOUS at 21:36

## 2024-12-16 RX ADMIN — VENLAFAXINE HYDROCHLORIDE 75 MG: 75 CAPSULE, EXTENDED RELEASE ORAL at 16:36

## 2024-12-16 RX ADMIN — INSULIN GLARGINE 10 UNITS: 100 INJECTION, SOLUTION SUBCUTANEOUS at 21:35

## 2024-12-16 RX ADMIN — METOPROLOL TARTRATE 50 MG: 50 TABLET, FILM COATED ORAL at 21:34

## 2024-12-16 RX ADMIN — PANTOPRAZOLE SODIUM 40 MG: 40 INJECTION, POWDER, FOR SOLUTION INTRAVENOUS at 00:09

## 2024-12-16 RX ADMIN — ONDANSETRON 4 MG: 4 TABLET, ORALLY DISINTEGRATING ORAL at 16:39

## 2024-12-16 RX ADMIN — LORAZEPAM 0.5 MG: 0.5 TABLET ORAL at 16:36

## 2024-12-16 RX ADMIN — GABAPENTIN 100 MG: 100 CAPSULE ORAL at 16:36

## 2024-12-16 RX ADMIN — MEROPENEM AND SODIUM CHLORIDE 1000 MG: 1 INJECTION, SOLUTION INTRAVENOUS at 16:50

## 2024-12-16 RX ADMIN — POTASSIUM CHLORIDE 40 MEQ: 1500 TABLET, EXTENDED RELEASE ORAL at 16:39

## 2024-12-16 RX ADMIN — BUDESONIDE AND FORMOTEROL FUMARATE DIHYDRATE 2 PUFF: 160; 4.5 AEROSOL RESPIRATORY (INHALATION) at 20:37

## 2024-12-16 RX ADMIN — PANTOPRAZOLE SODIUM 40 MG: 40 INJECTION, POWDER, FOR SOLUTION INTRAVENOUS at 16:37

## 2024-12-16 NOTE — PROGRESS NOTES
Shift report given to nurse Kajal at bedside. Patient care handed off in stable condition at this time. . Lucero Galan RN

## 2024-12-16 NOTE — PROGRESS NOTES
Handoff report given to Jamal RODGERS.  Patient is in stable condition and has no needs at this time. Call light is in reach and bed is in the lowest position.  Care is transferred at this time.

## 2024-12-16 NOTE — PROGRESS NOTES
[facility]    Daily Progress Note for 2024 6:40 AM /0320-01  Leslie Farris : 1950 Age: 74 y.o. Sex: female  Length of Stay:  4    Interval History:        This is my first encounter with the patient.  Chart reviewed briefly.      CC: F/U Rectal Bleeding (Pt to ED from [facility] with CC of rectal bleeding.  EMS states that they were told that bleeding started yesterday.  EMS states that pt is at baseline mental status.)    Subjective:       She is NPO for bronchoscopy this am.    Objective:     Vitals:    12/15/24 1923 12/15/24 1946 24 0340 24 0402   BP: 126/84  131/82    Pulse: 86  78    Resp: 20  18    Temp: 96.8 °F (36 °C)  97.8 °F (36.6 °C)    TempSrc: Axillary  Axillary    SpO2: 99% 98% 98%    Weight:    96.8 kg (213 lb 8 oz)          Intake/Output Summary (Last 24 hours) at 2024 0640  Last data filed at 12/15/2024 1810  Gross per 24 hour   Intake 1146 ml   Output 750 ml   Net 396 ml     Body mass index is 39.05 kg/m².    Physical Exam:  General: Cooperative, pleasant/Ill appearing, on  Nasal cannula  HEENT:  Head: normocephalic,atraumatic, anicteric sclera, clear conjunctiva  Neck: Normal size, Jugular venous pulsations: normal  Respiratory:unlabored breathing, clear to auscultation with no crackles, wheezes rhonchi  Heart: irregular rate and rhythm, S1, S2-normal, No murmurs  Abdomen: soft, nondistended, nontender, normoactive bowel sounds,  Neurological/Psych: Alert, disoriented, no focal neurological deficits,   Skin: No obvious rashes    Extremities: no edema, Pedal pulses 2+ bilaterally    Scheduled Medications:  insulin glargine, 10 Units, Daily  dilTIAZem, 120 mg, Daily  meropenem, 1,000 mg, Q8H  metoprolol tartrate, 50 mg, BID  polyethylene glycol, 17 g, Daily  sodium chloride flush, 5-40 mL, 2 times per day  pantoprazole (PROTONIX) 40 mg in sodium chloride (PF) 0.9 % 10 mL injection, 40 mg, Q12H  insulin lispro, 0-4

## 2024-12-16 NOTE — CARE COORDINATION
Pt to return to [facility]. Pt is a bedhold. OK to return when medically stable. Will follow

## 2024-12-16 NOTE — FLOWSHEET NOTE
12/15/24 1923   Vital Signs   Temp 96.8 °F (36 °C)   Temp Source Axillary   Pulse 86   Heart Rate Source Monitor   Respirations 20   /84   MAP (Calculated) 98   BP Location Left lower arm   BP Method Automatic   Patient Position High fowlers   Oxygen Therapy   SpO2 99 %   O2 Device Nasal cannula   O2 Flow Rate (L/min) 4 L/min

## 2024-12-16 NOTE — PROGRESS NOTES
Pulmonary Progress Note  CC: lung mass    Subjective:    Appears comfortable   O2 trending down, 3 L  BiPAP overnight      EXAM: /83   Pulse 94   Temp 98.3 °F (36.8 °C) (Axillary)   Resp 19   Wt 96.8 kg (213 lb 8 oz)   SpO2 100%   BMI 39.05 kg/m²  on 3L  Constitutional:  No acute distress.   Eyes: PERRL. Conjunctivae anicteric.   ENT: Normal nose. Normal tongue.    Neck:  Trachea is midline.   Respiratory: No accessory muscle usage.  decreased breath sounds. No wheezes. No rales. No Rhonchi.  Cardiovascular: Normal S1S2. No digit clubbing. No digit cyanosis. No LE edema.   Psychiatric: No anxiety or Agitation. Alert and Oriented to person only    Scheduled Meds:   insulin glargine  10 Units SubCUTAneous Daily    dilTIAZem  120 mg Oral Daily    meropenem  1,000 mg IntraVENous Q8H    metoprolol tartrate  50 mg Oral BID    polyethylene glycol  17 g Oral Daily    sodium chloride flush  5-40 mL IntraVENous 2 times per day    pantoprazole (PROTONIX) 40 mg in sodium chloride (PF) 0.9 % 10 mL injection  40 mg IntraVENous Q12H    insulin lispro  0-4 Units SubCUTAneous 4x Daily AC & HS    linaclotide  290 mcg Oral QAM AC    budesonide-formoterol  2 puff Inhalation BID    predniSONE  10 mg Oral Daily    [Held by provider] apixaban  5 mg Oral BID    atorvastatin  20 mg Oral Nightly    gabapentin  100 mg Oral TID    ipratropium 0.5 mg-albuterol 2.5 mg  1 Dose Inhalation 4x Daily RT    venlafaxine  75 mg Oral Daily    melatonin  3 mg Oral Nightly    LORazepam  1 mg Oral Nightly    furosemide  40 mg Oral Daily     Continuous Infusions:   sodium chloride      dextrose       PRN Meds:  metoprolol, potassium chloride **OR** potassium alternative oral replacement **OR** potassium chloride, magnesium sulfate, sodium phosphate 15 mmol in sodium chloride 0.9 % 250 mL IVPB, sodium chloride flush, sodium chloride, ondansetron **OR** ondansetron, acetaminophen **OR** acetaminophen, glucose, dextrose bolus **OR** dextrose bolus,  glucagon (rDNA), dextrose, bisacodyl, albuterol, oxyCODONE-acetaminophen, LORazepam    Labs:  CBC:   Recent Labs     12/14/24  0507 12/14/24  1058 12/15/24  0002 12/15/24  0726 12/16/24  0455   WBC 8.4  --   --  9.1 9.9   HGB 9.8*   < > 9.8* 11.7* 9.9*   HCT 30.7*   < > 30.9* 37.8 30.7*   MCV 80.0  --   --  82.6 79.6*     --   --  200 191    < > = values in this interval not displayed.     BMP:   Recent Labs     12/14/24  0507 12/15/24  0726 12/16/24  0455    139 139   K 3.1* 3.6 3.4*    102 99   CO2 31 27 31   BUN 9 8 7   CREATININE 0.4* 0.3* 0.3*       Cultures:  12/12 BC NGTD  12/12 UC Escherichia coli ESBL      Films:  CT chest 12/12/2024 imaging reviewed by me and showed  Interval development of 4.8 x 3.0 cm centrally necrotic area of consolidation   in the right lower lung which could represent necrotizing pneumonia or   pulmonary mass.   Stable 7 mm right upper lobe pulmonary nodule.   Stable pulmonary emphysema   Stable small right pleural effusion.   1.7 cm right thyroid nodule  Other chronic findings as above      ASSESSMENT:  Abnormal CT chest 12/12/2024 with RLL consolidation about 3 x 5 cm with area of central cavity or necrosis: The etiology is not clear could represent infection or malignancy  COPD  Chronic hypoxemic respiratory failure on 3 L O2  Afib RVR  Chronic diastolic CHF  Lower GI bleed suspected due to severe rectal stool impaction  Alzheimer dementia  Pt has a bipap at the nursing home 12/5. I could not find a dx of FILEMON, although high risk for FILEMON. It may be for COPD. Will continue her baseline settings.      PLAN:  Supplemental oxygen to maintain SaO2 >92%; wean as tolerated    Continue home BiPAP.  Inhaled bronchodilators  Prednisone 10 mg daily  Meropenem day #3.  Completed 2 days of vancomycin, Levaquin and cefepime.  Cardiology and GI following  Bronchoscopy with RLL BAL/biopsy, plan in a.m.  [employer]

## 2024-12-16 NOTE — PROGRESS NOTES
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 Lay Ludwig  Indication for Insertion: Limited Access    Please change all IV tubing prior to use. Arrow Midlines can stay in place up to 28 days. Flush each lumen per protocol to maximize performance of line. Midline CAN be used for blood sampling, change needleless connector after sampling and use pulsatile flush to clear blood in catheter.     Lab Results   Component Value Date    INR 1.06 12/16/2024    PROTIME 14.0 12/16/2024     Lab Results   Component Value Date    WBC 9.9 12/16/2024    HGB 9.9 (L) 12/16/2024    HCT 30.7 (L) 12/16/2024     12/16/2024     Lab Results   Component Value Date    CREATININE 0.3 (L) 12/16/2024    BUN 7 12/16/2024    GFR 94 11/20/2024       Catheter Insertion Date- 12/16/2024   Catheter Brand- Arrow Antimicrobial/Antithrombogenic   Lot Number- [id number]   Lumen-Single    Insertion Site- Right Cephalic  Vein Diameter- 0.33 cm  Georgian Size- 4.5  Catheter Length- 15 cm  Exposed Catheter Length- 0cm   Easy insertion- Yes  Able to Aspirate Blood- Yes  Easy Flush- Yes    Midline insertion successful- Yes  Ultrasound- yes    Okay To Use Midline- \"Yes        Electronically signed by Marge Del Real, RN, RN on 12/16/2024 at 9:33 AM

## 2024-12-16 NOTE — PROGRESS NOTES
[facility] Daily Progress Note      Admit Date:  12/12/2024    Subjective:  Ms. Farris is seen for afib  Currently she is having personal care  with three staff members helping.  Denies chest pain  she is on O2 2 L/min  Having bronchoscopy today.  [place] as per initial consult by Dr Anthony Nelson TOLU Farris is a 74 y.o. patient w medical history notable for hypertension, chronic diastolic HF, permanent atrial fibrillation, anemia, COPD, dementia, uncontrolled Diabetes mellitus type 2, anxiety, schizoaffective disorder, who presented to the hospital from NH with complaints of melena. Cardiology consulted for atrial fibrillation with RVR.   Pt follows with me in office - last evaluated 4/2024. Dilt/metop for atrial fibrillation rate control + eliquis for AC. Lasix 40 mg oral BID. Failed to follow up in 6 months.   ED course/Vital signs on presentation: presenting EKG showed atrial fibrillation with RVR, , Inferior TWI, anterior ST&T wave abnormality. CT chest showed interval development of 4.8 x 3.0 centrally necrotic area of consolidation RLL -necrotizing PNA vs. Mass. CAC noted, mild cardiomegaly as well as Lymphadenopathy. CT abd showed massive stool burden with distention distal sigmoid colon up to ~12 cm. Labs show troponin WNL, BNP 2255, mild WBC.  ROS:  12 point ROS negative in all areas as listed below except as in [place]  Generalized disocomfort  Constitutional, EENT, Cardiovascular, pulmonary, GI, , Musculoskeletal, skin, neurological, hematological, endocrine, Psychiatric    Past Medical History:   Diagnosis Date    Acute diastolic congestive heart failure (HCC) 9/14/2016    Acute diastolic heart failure (HCC)     Acute respiratory failure     Adjustment disorder with mixed anxiety and depressed mood     Allergic rhinitis     Alzheimer's dementia (HCC)     Arachnoid cyst 5/23/2005    Atrial fibrillation (HCC)     CHF (congestive heart failure) (HCC)     Chronic back pain

## 2024-12-17 ENCOUNTER — ANESTHESIA (OUTPATIENT)
Dept: ENDOSCOPY | Age: 74
DRG: 264 | End: 2024-12-17
Payer: COMMERCIAL

## 2024-12-17 PROBLEM — R91.8 LUNG MASS: Status: ACTIVE | Noted: 2024-12-17

## 2024-12-17 LAB
ANION GAP SERPL CALCULATED.3IONS-SCNC: 8 MMOL/L (ref 3–16)
APPEARANCE BRONCH: ABNORMAL
BUN SERPL-MCNC: 7 MG/DL (ref 7–20)
CALCIUM SERPL-MCNC: 7.5 MG/DL (ref 8.3–10.6)
CHLORIDE SERPL-SCNC: 101 MMOL/L (ref 99–110)
CLOT SPEC QL: ABNORMAL
CO2 SERPL-SCNC: 30 MMOL/L (ref 21–32)
COLOR BRONCH: ABNORMAL
CREAT SERPL-MCNC: 0.4 MG/DL (ref 0.6–1.2)
DEPRECATED RDW RBC AUTO: 15.8 % (ref 12.4–15.4)
GFR SERPLBLD CREATININE-BSD FMLA CKD-EPI: >90 ML/MIN/{1.73_M2}
GLUCOSE BLD-MCNC: 129 MG/DL (ref 70–99)
GLUCOSE BLD-MCNC: 137 MG/DL (ref 70–99)
GLUCOSE BLD-MCNC: 193 MG/DL (ref 70–99)
GLUCOSE BLD-MCNC: 256 MG/DL (ref 70–99)
GLUCOSE SERPL-MCNC: 166 MG/DL (ref 70–99)
HCT VFR BLD AUTO: 31.4 % (ref 36–48)
HGB BLD-MCNC: 10.2 G/DL (ref 12–16)
LYMPHOCYTES NFR BRONCH MANUAL: 6 % (ref 5–10)
MACROPHAGES NFR BRONCH MANUAL: 6 % (ref 90–95)
MAGNESIUM SERPL-MCNC: 1.67 MG/DL (ref 1.8–2.4)
MCH RBC QN AUTO: 25.5 PG (ref 26–34)
MCHC RBC AUTO-ENTMCNC: 32.3 G/DL (ref 31–36)
MCV RBC AUTO: 78.7 FL (ref 80–100)
NEUTROPHILS NFR BRONCH MANUAL: 88 % (ref 5–10)
NUC CELL # BRONCH MANUAL: 121 /CUMM
PERFORMED ON: ABNORMAL
PLATELET # BLD AUTO: 218 K/UL (ref 135–450)
PMV BLD AUTO: 8.7 FL (ref 5–10.5)
POTASSIUM SERPL-SCNC: 3.5 MMOL/L (ref 3.5–5.1)
RBC # BLD AUTO: 3.99 M/UL (ref 4–5.2)
RBC # FLD MANUAL: 8000 /CUMM
RSV SOURCE: NORMAL
SODIUM SERPL-SCNC: 139 MMOL/L (ref 136–145)
TOTAL CELLS COUNTED BRONCH: 100
WBC # BLD AUTO: 9.6 K/UL (ref 4–11)

## 2024-12-17 PROCEDURE — 87070 CULTURE OTHR SPECIMN AEROBIC: CPT

## 2024-12-17 PROCEDURE — 0B9F8ZX DRAINAGE OF RIGHT LOWER LUNG LOBE, VIA NATURAL OR ARTIFICIAL OPENING ENDOSCOPIC, DIAGNOSTIC: ICD-10-PCS | Performed by: INTERNAL MEDICINE

## 2024-12-17 PROCEDURE — 87632 RESP VIRUS 6-11 TARGETS: CPT

## 2024-12-17 PROCEDURE — 88184 FLOWCYTOMETRY/ TC 1 MARKER: CPT

## 2024-12-17 PROCEDURE — 83735 ASSAY OF MAGNESIUM: CPT

## 2024-12-17 PROCEDURE — 88177 CYTP FNA EVAL EA ADDL: CPT

## 2024-12-17 PROCEDURE — 7100000001 HC PACU RECOVERY - ADDTL 15 MIN: Performed by: INTERNAL MEDICINE

## 2024-12-17 PROCEDURE — 85027 COMPLETE CBC AUTOMATED: CPT

## 2024-12-17 PROCEDURE — 80048 BASIC METABOLIC PNL TOTAL CA: CPT

## 2024-12-17 PROCEDURE — 2580000003 HC RX 258: Performed by: ANESTHESIOLOGY

## 2024-12-17 PROCEDURE — 7100000000 HC PACU RECOVERY - FIRST 15 MIN: Performed by: INTERNAL MEDICINE

## 2024-12-17 PROCEDURE — 87015 SPECIMEN INFECT AGNT CONCNTJ: CPT

## 2024-12-17 PROCEDURE — 87205 SMEAR GRAM STAIN: CPT

## 2024-12-17 PROCEDURE — 88172 CYTP DX EVAL FNA 1ST EA SITE: CPT

## 2024-12-17 PROCEDURE — 6360000002 HC RX W HCPCS: Performed by: NURSE ANESTHETIST, CERTIFIED REGISTERED

## 2024-12-17 PROCEDURE — 6370000000 HC RX 637 (ALT 250 FOR IP)

## 2024-12-17 PROCEDURE — 88305 TISSUE EXAM BY PATHOLOGIST: CPT

## 2024-12-17 PROCEDURE — 3609011200 HC BRONCHOSCOPY W/TRANSBRONCL NDL ASPIR BX EA ADDL LOBE: Performed by: INTERNAL MEDICINE

## 2024-12-17 PROCEDURE — 2709999900 HC NON-CHARGEABLE SUPPLY: Performed by: INTERNAL MEDICINE

## 2024-12-17 PROCEDURE — 2060000000 HC ICU INTERMEDIATE R&B

## 2024-12-17 PROCEDURE — 2500000003 HC RX 250 WO HCPCS: Performed by: NURSE ANESTHETIST, CERTIFIED REGISTERED

## 2024-12-17 PROCEDURE — 99232 SBSQ HOSP IP/OBS MODERATE 35: CPT | Performed by: INTERNAL MEDICINE

## 2024-12-17 PROCEDURE — 31624 DX BRONCHOSCOPE/LAVAGE: CPT | Performed by: INTERNAL MEDICINE

## 2024-12-17 PROCEDURE — 88312 SPECIAL STAINS GROUP 1: CPT

## 2024-12-17 PROCEDURE — 3700000000 HC ANESTHESIA ATTENDED CARE: Performed by: INTERNAL MEDICINE

## 2024-12-17 PROCEDURE — 88185 FLOWCYTOMETRY/TC ADD-ON: CPT

## 2024-12-17 PROCEDURE — 88112 CYTOPATH CELL ENHANCE TECH: CPT

## 2024-12-17 PROCEDURE — 2720000010 HC SURG SUPPLY STERILE: Performed by: INTERNAL MEDICINE

## 2024-12-17 PROCEDURE — 6360000002 HC RX W HCPCS: Performed by: INTERNAL MEDICINE

## 2024-12-17 PROCEDURE — 3603165200 HC BRNCHSC EBUS GUIDED SAMPL 1/2 NODE STATION/STRUX: Performed by: INTERNAL MEDICINE

## 2024-12-17 PROCEDURE — 89051 BODY FLUID CELL COUNT: CPT

## 2024-12-17 PROCEDURE — 99233 SBSQ HOSP IP/OBS HIGH 50: CPT | Performed by: INTERNAL MEDICINE

## 2024-12-17 PROCEDURE — 94660 CPAP INITIATION&MGMT: CPT

## 2024-12-17 PROCEDURE — 87631 RESP VIRUS 3-5 TARGETS: CPT

## 2024-12-17 PROCEDURE — 87252 VIRUS INOCULATION TISSUE: CPT

## 2024-12-17 PROCEDURE — 2580000003 HC RX 258

## 2024-12-17 PROCEDURE — 31652 BRONCH EBUS SAMPLNG 1/2 NODE: CPT | Performed by: INTERNAL MEDICINE

## 2024-12-17 PROCEDURE — 2700000000 HC OXYGEN THERAPY PER DAY

## 2024-12-17 PROCEDURE — 6370000000 HC RX 637 (ALT 250 FOR IP): Performed by: INTERNAL MEDICINE

## 2024-12-17 PROCEDURE — 6370000000 HC RX 637 (ALT 250 FOR IP): Performed by: STUDENT IN AN ORGANIZED HEALTH CARE EDUCATION/TRAINING PROGRAM

## 2024-12-17 PROCEDURE — 3700000001 HC ADD 15 MINUTES (ANESTHESIA): Performed by: INTERNAL MEDICINE

## 2024-12-17 PROCEDURE — 07B74ZX EXCISION OF THORAX LYMPHATIC, PERCUTANEOUS ENDOSCOPIC APPROACH, DIAGNOSTIC: ICD-10-PCS | Performed by: INTERNAL MEDICINE

## 2024-12-17 PROCEDURE — 3609010800 HC BRONCHOSCOPY ALVEOLAR LAVAGE: Performed by: INTERNAL MEDICINE

## 2024-12-17 PROCEDURE — 88173 CYTOPATH EVAL FNA REPORT: CPT

## 2024-12-17 PROCEDURE — 2500000003 HC RX 250 WO HCPCS

## 2024-12-17 PROCEDURE — 87206 SMEAR FLUORESCENT/ACID STAI: CPT

## 2024-12-17 PROCEDURE — 87116 MYCOBACTERIA CULTURE: CPT

## 2024-12-17 PROCEDURE — 94761 N-INVAS EAR/PLS OXIMETRY MLT: CPT

## 2024-12-17 PROCEDURE — 94640 AIRWAY INHALATION TREATMENT: CPT

## 2024-12-17 PROCEDURE — 87102 FUNGUS ISOLATION CULTURE: CPT

## 2024-12-17 PROCEDURE — 6360000002 HC RX W HCPCS

## 2024-12-17 PROCEDURE — 31654 BRONCH EBUS IVNTJ PERPH LES: CPT | Performed by: INTERNAL MEDICINE

## 2024-12-17 PROCEDURE — 87081 CULTURE SCREEN ONLY: CPT

## 2024-12-17 RX ORDER — PANTOPRAZOLE SODIUM 40 MG/1
40 TABLET, DELAYED RELEASE ORAL
Status: DISCONTINUED | OUTPATIENT
Start: 2024-12-18 | End: 2024-12-18 | Stop reason: HOSPADM

## 2024-12-17 RX ORDER — SODIUM CHLORIDE 9 MG/ML
INJECTION, SOLUTION INTRAVENOUS PRN
Status: DISCONTINUED | OUTPATIENT
Start: 2024-12-17 | End: 2024-12-17 | Stop reason: HOSPADM

## 2024-12-17 RX ORDER — NALOXONE HYDROCHLORIDE 0.4 MG/ML
INJECTION, SOLUTION INTRAMUSCULAR; INTRAVENOUS; SUBCUTANEOUS PRN
Status: DISCONTINUED | OUTPATIENT
Start: 2024-12-17 | End: 2024-12-17 | Stop reason: HOSPADM

## 2024-12-17 RX ORDER — OXYCODONE HYDROCHLORIDE 5 MG/1
5 TABLET ORAL PRN
Status: DISCONTINUED | OUTPATIENT
Start: 2024-12-17 | End: 2024-12-17 | Stop reason: HOSPADM

## 2024-12-17 RX ORDER — PROPOFOL 10 MG/ML
INJECTION, EMULSION INTRAVENOUS
Status: DISCONTINUED | OUTPATIENT
Start: 2024-12-17 | End: 2024-12-17 | Stop reason: SDUPTHER

## 2024-12-17 RX ORDER — LIDOCAINE HYDROCHLORIDE 20 MG/ML
INJECTION, SOLUTION INFILTRATION; PERINEURAL
Status: DISCONTINUED | OUTPATIENT
Start: 2024-12-17 | End: 2024-12-17 | Stop reason: SDUPTHER

## 2024-12-17 RX ORDER — ROCURONIUM BROMIDE 10 MG/ML
INJECTION, SOLUTION INTRAVENOUS
Status: DISCONTINUED | OUTPATIENT
Start: 2024-12-17 | End: 2024-12-17 | Stop reason: SDUPTHER

## 2024-12-17 RX ORDER — SODIUM CHLORIDE, SODIUM LACTATE, POTASSIUM CHLORIDE, CALCIUM CHLORIDE 600; 310; 30; 20 MG/100ML; MG/100ML; MG/100ML; MG/100ML
INJECTION, SOLUTION INTRAVENOUS CONTINUOUS
Status: DISCONTINUED | OUTPATIENT
Start: 2024-12-17 | End: 2024-12-17 | Stop reason: HOSPADM

## 2024-12-17 RX ORDER — ONDANSETRON 2 MG/ML
INJECTION INTRAMUSCULAR; INTRAVENOUS
Status: DISCONTINUED | OUTPATIENT
Start: 2024-12-17 | End: 2024-12-17 | Stop reason: SDUPTHER

## 2024-12-17 RX ORDER — ONDANSETRON 2 MG/ML
4 INJECTION INTRAMUSCULAR; INTRAVENOUS
Status: DISCONTINUED | OUTPATIENT
Start: 2024-12-17 | End: 2024-12-17 | Stop reason: HOSPADM

## 2024-12-17 RX ORDER — SODIUM CHLORIDE 0.9 % (FLUSH) 0.9 %
5-40 SYRINGE (ML) INJECTION PRN
Status: DISCONTINUED | OUTPATIENT
Start: 2024-12-17 | End: 2024-12-17 | Stop reason: HOSPADM

## 2024-12-17 RX ORDER — FAMOTIDINE 10 MG/ML
INJECTION, SOLUTION INTRAVENOUS
Status: DISCONTINUED | OUTPATIENT
Start: 2024-12-17 | End: 2024-12-17 | Stop reason: SDUPTHER

## 2024-12-17 RX ORDER — SODIUM CHLORIDE 0.9 % (FLUSH) 0.9 %
5-40 SYRINGE (ML) INJECTION EVERY 12 HOURS SCHEDULED
Status: DISCONTINUED | OUTPATIENT
Start: 2024-12-17 | End: 2024-12-17 | Stop reason: HOSPADM

## 2024-12-17 RX ORDER — OXYCODONE HYDROCHLORIDE 5 MG/1
10 TABLET ORAL PRN
Status: DISCONTINUED | OUTPATIENT
Start: 2024-12-17 | End: 2024-12-17 | Stop reason: HOSPADM

## 2024-12-17 RX ORDER — MEPERIDINE HYDROCHLORIDE 25 MG/ML
12.5 INJECTION INTRAMUSCULAR; INTRAVENOUS; SUBCUTANEOUS EVERY 5 MIN PRN
Status: DISCONTINUED | OUTPATIENT
Start: 2024-12-17 | End: 2024-12-17 | Stop reason: HOSPADM

## 2024-12-17 RX ADMIN — APIXABAN 5 MG: 5 TABLET, FILM COATED ORAL at 20:47

## 2024-12-17 RX ADMIN — MEROPENEM AND SODIUM CHLORIDE 1000 MG: 1 INJECTION, SOLUTION INTRAVENOUS at 15:11

## 2024-12-17 RX ADMIN — PREDNISONE 10 MG: 10 TABLET ORAL at 09:54

## 2024-12-17 RX ADMIN — PANTOPRAZOLE SODIUM 40 MG: 40 INJECTION, POWDER, FOR SOLUTION INTRAVENOUS at 00:19

## 2024-12-17 RX ADMIN — PHENYLEPHRINE HYDROCHLORIDE 80 MCG: 10 INJECTION INTRAVENOUS at 07:48

## 2024-12-17 RX ADMIN — MEROPENEM AND SODIUM CHLORIDE 1000 MG: 1 INJECTION, SOLUTION INTRAVENOUS at 10:01

## 2024-12-17 RX ADMIN — POLYETHYLENE GLYCOL (3350) 17 G: 17 POWDER, FOR SOLUTION ORAL at 09:55

## 2024-12-17 RX ADMIN — PHENYLEPHRINE HYDROCHLORIDE 80 MCG: 10 INJECTION INTRAVENOUS at 07:54

## 2024-12-17 RX ADMIN — ROCURONIUM BROMIDE 50 MG: 10 INJECTION, SOLUTION INTRAVENOUS at 07:07

## 2024-12-17 RX ADMIN — FUROSEMIDE 40 MG: 40 TABLET ORAL at 09:53

## 2024-12-17 RX ADMIN — SUGAMMADEX 200 MG: 100 INJECTION, SOLUTION INTRAVENOUS at 08:00

## 2024-12-17 RX ADMIN — GABAPENTIN 100 MG: 100 CAPSULE ORAL at 15:01

## 2024-12-17 RX ADMIN — OXYCODONE HYDROCHLORIDE AND ACETAMINOPHEN 1 TABLET: 7.5; 325 TABLET ORAL at 09:54

## 2024-12-17 RX ADMIN — FAMOTIDINE 20 MG: 10 INJECTION, SOLUTION INTRAVENOUS at 07:47

## 2024-12-17 RX ADMIN — LORAZEPAM 1 MG: 1 TABLET ORAL at 20:47

## 2024-12-17 RX ADMIN — OXYCODONE HYDROCHLORIDE AND ACETAMINOPHEN 1 TABLET: 7.5; 325 TABLET ORAL at 20:47

## 2024-12-17 RX ADMIN — INSULIN GLARGINE 10 UNITS: 100 INJECTION, SOLUTION SUBCUTANEOUS at 20:46

## 2024-12-17 RX ADMIN — METOPROLOL TARTRATE 50 MG: 50 TABLET, FILM COATED ORAL at 20:47

## 2024-12-17 RX ADMIN — GABAPENTIN 100 MG: 100 CAPSULE ORAL at 09:55

## 2024-12-17 RX ADMIN — IPRATROPIUM BROMIDE AND ALBUTEROL SULFATE 1 DOSE: 2.5; .5 SOLUTION RESPIRATORY (INHALATION) at 21:32

## 2024-12-17 RX ADMIN — ONDANSETRON 4 MG: 2 INJECTION INTRAMUSCULAR; INTRAVENOUS at 07:06

## 2024-12-17 RX ADMIN — INSULIN LISPRO 1 UNITS: 100 INJECTION, SOLUTION INTRAVENOUS; SUBCUTANEOUS at 20:46

## 2024-12-17 RX ADMIN — ATORVASTATIN CALCIUM 20 MG: 10 TABLET, FILM COATED ORAL at 20:47

## 2024-12-17 RX ADMIN — VENLAFAXINE HYDROCHLORIDE 75 MG: 75 CAPSULE, EXTENDED RELEASE ORAL at 09:55

## 2024-12-17 RX ADMIN — SUGAMMADEX 200 MG: 100 INJECTION, SOLUTION INTRAVENOUS at 07:55

## 2024-12-17 RX ADMIN — LIDOCAINE HYDROCHLORIDE 100 MG: 20 INJECTION, SOLUTION INFILTRATION; PERINEURAL at 07:07

## 2024-12-17 RX ADMIN — PROPOFOL 100 MG: 10 INJECTION, EMULSION INTRAVENOUS at 07:07

## 2024-12-17 RX ADMIN — INSULIN LISPRO 2 UNITS: 100 INJECTION, SOLUTION INTRAVENOUS; SUBCUTANEOUS at 17:49

## 2024-12-17 RX ADMIN — SODIUM CHLORIDE: 0.9 INJECTION, SOLUTION INTRAVENOUS at 06:58

## 2024-12-17 RX ADMIN — GABAPENTIN 100 MG: 100 CAPSULE ORAL at 20:47

## 2024-12-17 RX ADMIN — Medication 3 MG: at 20:46

## 2024-12-17 RX ADMIN — LORAZEPAM 0.5 MG: 0.5 TABLET ORAL at 15:05

## 2024-12-17 RX ADMIN — SODIUM CHLORIDE, PRESERVATIVE FREE 10 ML: 5 INJECTION INTRAVENOUS at 20:48

## 2024-12-17 RX ADMIN — MEROPENEM AND SODIUM CHLORIDE 1000 MG: 1 INJECTION, SOLUTION INTRAVENOUS at 23:56

## 2024-12-17 ASSESSMENT — PAIN DESCRIPTION - ORIENTATION: ORIENTATION: RIGHT;LEFT

## 2024-12-17 ASSESSMENT — PAIN SCALES - GENERAL
PAINLEVEL_OUTOF10: 4
PAINLEVEL_OUTOF10: 8
PAINLEVEL_OUTOF10: 5

## 2024-12-17 ASSESSMENT — ENCOUNTER SYMPTOMS: SHORTNESS OF BREATH: 1

## 2024-12-17 ASSESSMENT — PAIN - FUNCTIONAL ASSESSMENT
PAIN_FUNCTIONAL_ASSESSMENT: 0-10
PAIN_FUNCTIONAL_ASSESSMENT: 0-10

## 2024-12-17 ASSESSMENT — LIFESTYLE VARIABLES: SMOKING_STATUS: 0

## 2024-12-17 ASSESSMENT — PAIN DESCRIPTION - LOCATION: LOCATION: ARM;SHOULDER;LEG

## 2024-12-17 NOTE — PROGRESS NOTES
([facility]) 12/16/2024    Abnormal CT of the chest 12/16/2024    Rectal bleeding 12/12/2024    Necrotizing granulomatous inflammation of lung ([facility]) 12/12/2024    Acute exacerbation of emphysema ([facility]) 05/07/2024    Decompensated heart failure ([facility]) 05/06/2024    Hyperglycemia 05/06/2024    Pleural effusion 05/06/2024    Acute congestive heart failure ([facility]) 05/06/2024    COPD with acute exacerbation ([facility]) 05/06/2024    Acute bronchitis 04/13/2024    PAF (paroxysmal atrial fibrillation) ([facility]) 04/12/2024    Dementia ([facility]) 03/19/2024    Acute hypoxic respiratory failure 01/17/2024    Pulmonary nodule 01/17/2024    Acute on chronic respiratory failure with hypoxia and hypercapnia 12/08/2023    Cellulitis of right lower extremity 12/08/2023    HCAP (healthcare-associated pneumonia) 12/08/2023    COPD exacerbation ([facility]) 12/08/2023    Septicemia ([facility]) 12/08/2023    Pneumonia of left lower lobe due to infectious organism 12/08/2023    Community acquired pneumonia 09/05/2023    Thrombocytopenia ([facility]) 07/08/2023    Chest pain 07/07/2023    Respiratory failure 07/07/2023    Acute encephalopathy 07/07/2023    Anemia 06/03/2023    Hypoglycemia 06/03/2023    Hypothermia 06/03/2023    Pneumonia of both lower lobes due to methicillin resistant Staphylococcus aureus (MRSA) ([facility]) 04/26/2023    Proteus infection 04/26/2023    Pulmonary infiltrates 04/24/2023    Elevated brain natriuretic peptide (BNP) level 04/24/2023    Right lower lobe lung mass 04/24/2023    Pleural effusion, bilateral 04/24/2023    Uncontrolled type 2 diabetes mellitus with hyperglycemia ([facility]) 04/24/2023    NSTEMI (non-ST elevated myocardial infarction) ([facility]) 04/21/2023    COVID-19 04/21/2023    Acute respiratory failure with hypoxia 04/11/2023    Coronary artery disease involving native coronary artery of native heart without angina pectoris 04/10/2023    Uncontrolled diabetes mellitus with hypoglycemia ([facility]) 04/07/2023    Metabolic encephalopathy 04/07/2023    Diabetic

## 2024-12-17 NOTE — ANESTHESIA PRE PROCEDURE
Department of Anesthesiology  Preprocedure Note       Name:  Leslie Farris   Age:  74 y.o.  :  1950                                          MRN:  [id number]         Date:  2024      Surgeon: Surgeon(s):  Edenilson Alvarez MD    Procedure: Procedure(s):  EBUS NF W/ANES. (7:00)    Medications prior to admission:   Prior to Admission medications    Medication Sig Start Date End Date Taking? Authorizing Provider   BASAGLAR KWIKPEN 100 UNIT/ML injection pen Inject 30 Units into the skin daily 24  Yes Eliza Reid MD   lactulose (CHRONULAC) 10 GM/15ML solution Take 30 mLs by mouth every other day 24  Yes Eliza Reid MD   ATIVAN 1 MG tablet Take 1 tablet by mouth Every Day. Max Daily Amount: 1 mg 24 Yes Eliza Reid MD   LORazepam (ATIVAN) 0.5 MG tablet Take 1 tablet by mouth. 24  Yes Eliza Reid MD   oxyCODONE-acetaminophen (PERCOCET) 7.5-325 MG per tablet Take 1 tablet by mouth every 6 hours as needed for Pain. Max Daily Amount: 4 tablets 24  Yes Eliza Reid MD   predniSONE (DELTASONE) 10 MG tablet Take 1 tablet by mouth daily For Copd 24  Yes Eliza Reid MD   venlafaxine (EFFEXOR XR) 75 MG extended release capsule Take 1 capsule by mouth daily 10/26/24  Yes Eliza Reid MD   LORazepam (ATIVAN) 1 MG tablet Take 1 tablet by mouth nightly. Max Daily Amount: 1 mg   Yes Eliza Reid MD   clotrimazole (LOTRIMIN) 1 % cream Apply 1 application  topically 2 times daily Apply topically 2 times daily. To right lateral neck for Rash   Yes Eliza Redi MD   ondansetron (ZOFRAN) 4 MG tablet Take 1 tablet by mouth every 6 hours as needed for Nausea or Vomiting   Yes Eliza Reid MD   linaclotide (LINZESS) 145 MCG capsule Take 1 capsule by mouth every morning (before breakfast) 24   Wilfrido Hatfield MD   furosemide (LASIX) 40 MG tablet Take 1 tablet by mouth 2 times daily

## 2024-12-17 NOTE — PROGRESS NOTES
12/16/24 2328   NIV Type   NIV Started/Stopped (S)  On   Equipment Type v60   Mode Bilevel   Mask Type Full face mask   Mask Size Small   Assessment   Pulse 98   Respirations 24   SpO2 98 %   Settings/Measurements   IPAP 12 cmH20   CPAP/EPAP 5 cmH2O   Vt (Measured) 626 mL   Rate Ordered 14   FiO2  35 %   Minute Volume (L/min) 15 Liters   Mask Leak (lpm) 14 lpm   Patient's Home Machine No   Alarm Settings   Alarms On Y

## 2024-12-17 NOTE — PROCEDURES
PROCEDURE: Fiberoptic bronchoscopy with endobronchial ultrasound-guided biopsy of lymph nodes    DESCRIPTION OF PROCEDURE: Informed consent was obtained from the patient after explaining the risks and benefits. A time out was taken. Total intravenous anesthesia was kindly provided by the anesthetist.     The scope was passed with ease via the ET tube. Direct visualization of the lymph nodes was obtained using endobronchial ultrasound (EBUS) guidance. A complete ultrasound lymph node exam was performed for lymph node stations 2, 4, 7, 10 and 11.  The following lymph nodes were subjected to EBUS guided biopsy using standard technique and in the following order:    1.  Subcarinal (approximately 1.2 in short axis)  2.  RLL mass visualized with US with some central necrosis (approximately 3 in short axis)      Subsequently, a standard bronchoscope was used to perform a complete airway inspection.  Patent airways with no evidence of endobronchial lesion.  Signs of extrinsic compression in RLL subsegments. Minimal clear secretions.    1.  Washings were obtained from throughout the airways.  3.  A bronchoalveolar lavage was obtained from the RLL      The patient tolerated the procedure well. Estimated blood loss was less than 5 ml. Recovery will be per the anesthesia team.      FOLLOW UP:  is with me in approximately three to five days.  Patient is instructed to call with concerns and if follow up has not already been scheduled.  In the event of severe symptoms or if the patient is unable to reach my office, instructions are given to proceed to the emergency department.

## 2024-12-17 NOTE — PROGRESS NOTES
Pulmonary Progress Note  CC: lung mass    Subjective:    Appears comfortable.   O2 trending down, 3 L  BiPAP overnight      EXAM: /75   Pulse 73   Temp 98 °F (36.7 °C) (Temporal)   Resp 16   Wt 96.8 kg (213 lb 8 oz)   SpO2 100%   BMI 39.05 kg/m²  on 3L  Constitutional:  No acute distress.   Eyes: PERRL. Conjunctivae anicteric.   ENT: Normal nose. Normal tongue.    Neck:  Trachea is midline.   Respiratory: No accessory muscle usage.  decreased breath sounds. No wheezes. No rales. No Rhonchi.  Cardiovascular: Normal S1S2. No digit clubbing. No digit cyanosis. No LE edema.   Psychiatric: No anxiety or Agitation. Alert and Oriented to person only    Scheduled Meds:   famotidine (PEPCID) injection  20 mg IntraVENous Once    sodium chloride flush  5-40 mL IntraVENous 2 times per day    ipratropium 0.5 mg-albuterol 2.5 mg  1 Dose Inhalation BID RT    insulin glargine  10 Units SubCUTAneous Daily    dilTIAZem  120 mg Oral Daily    meropenem  1,000 mg IntraVENous Q8H    metoprolol tartrate  50 mg Oral BID    polyethylene glycol  17 g Oral Daily    sodium chloride flush  5-40 mL IntraVENous 2 times per day    pantoprazole (PROTONIX) 40 mg in sodium chloride (PF) 0.9 % 10 mL injection  40 mg IntraVENous Q12H    insulin lispro  0-4 Units SubCUTAneous 4x Daily AC & HS    linaclotide  290 mcg Oral QAM AC    budesonide-formoterol  2 puff Inhalation BID    predniSONE  10 mg Oral Daily    [Held by provider] apixaban  5 mg Oral BID    atorvastatin  20 mg Oral Nightly    gabapentin  100 mg Oral TID    venlafaxine  75 mg Oral Daily    melatonin  3 mg Oral Nightly    LORazepam  1 mg Oral Nightly    furosemide  40 mg Oral Daily     Continuous Infusions:   lactated ringers      sodium chloride      sodium chloride      dextrose       PRN Meds:  sodium chloride flush, sodium chloride, metoprolol, potassium chloride **OR** potassium alternative oral replacement **OR** potassium chloride, magnesium sulfate, sodium phosphate 15

## 2024-12-17 NOTE — PROGRESS NOTES
12/17/24 0318   NIV Type   Equipment Type V60   Mode Bilevel   Mask Type Full face mask   Mask Size Medium   Assessment   Pulse 83   Respirations 24   SpO2 100 %   Settings/Measurements   IPAP 12 cmH20   CPAP/EPAP 5 cmH2O   Vt (Measured) 597 mL   Rate Ordered 14   FiO2  35 %   Minute Volume (L/min) 14.6 Liters   Mask Leak (lpm) 19 lpm   Patient's Home Machine No   Alarm Settings   Alarms On Y

## 2024-12-17 NOTE — PROGRESS NOTES
Pt in stable condition, going back to the floor. [facility] called and they are able to see the pt on their monitor

## 2024-12-17 NOTE — PROGRESS NOTES
HEART FAILURE CARE PLAN:    Comorbidities Reviewed: Yes   Patient has a past medical history of Acute diastolic congestive heart failure (HCC), Acute diastolic heart failure (HCC), Acute respiratory failure, Adjustment disorder with mixed anxiety and depressed mood, Allergic rhinitis, Alzheimer's dementia (HCC), Arachnoid cyst, Atrial fibrillation (HCC), CHF (congestive heart failure) (HCC), Chronic back pain, Constipation, COPD (chronic obstructive pulmonary disease) (HCC), Diabetes mellitus (HCC), Diskitis, Disseminated superficial actinic porokeratosis (DSAP), Edema, ESBL (extended spectrum beta-lactamase) producing bacteria infection, Hypertension, Hypo-osmolality and hyponatremia, Major depressive disorder, Morbid obesity, Muscle weakness, Osteomyelitis of spine, Overactive bladder, Schizoaffective disorder (HCC), Seizures ([facility]), Urinary tract infection without hematuria, and Xerosis cutis.     Weights Reviewed: Yes   Admission weight: 97.8 kg (215 lb 9 oz)   Wt Readings from Last 3 Encounters:   12/16/24 96.8 kg (213 lb 8 oz)   05/09/24 87.1 kg (192 lb)   04/30/24 93.9 kg (207 lb)     Intake & Output Reviewed: Yes     Intake/Output Summary (Last 24 hours) at 12/17/2024 0145  Last data filed at 12/16/2024 2320  Gross per 24 hour   Intake 480 ml   Output 2750 ml   Net -2270 ml       ECHOCARDIOGRAM Reviewed: Yes   Patient's Ejection Fraction (EF) is greater than 40%     Medications Reviewed: Yes   SCHEDULED HOSPITAL MEDICATIONS:   ipratropium 0.5 mg-albuterol 2.5 mg  1 Dose Inhalation BID RT    insulin glargine  10 Units SubCUTAneous Daily    dilTIAZem  120 mg Oral Daily    meropenem  1,000 mg IntraVENous Q8H    metoprolol tartrate  50 mg Oral BID    polyethylene glycol  17 g Oral Daily    sodium chloride flush  5-40 mL IntraVENous 2 times per day    pantoprazole (PROTONIX) 40 mg in sodium chloride (PF) 0.9 % 10 mL injection  40 mg IntraVENous Q12H    insulin lispro  0-4 Units SubCUTAneous 4x Daily AC & HS     MG tablet Take 1 tablet by mouth 2 times daily 5/10/24   Wilfrido Hatfield MD   albuterol (PROVENTIL) (2.5 MG/3ML) 0.083% nebulizer solution Inhale 3 mLs into the lungs every 6 hours as needed    Eliza Reid MD   OXYGEN Inhale 2 L into the lungs    Eliza Reid MD   benzocaine (ORAJEL) 20 % GEL mucosal gel Take by mouth 2 times daily as needed for Pain    Eliza Reid MD   Lactobacillus Rhamnosus, GG, (CULTURELLE PO) Take 1 capsule by mouth in the morning and 1 capsule in the evening.    Eliza Reid MD   ipratropium 0.5 mg-albuterol 2.5 mg (DUONEB) 0.5-2.5 (3) MG/3ML SOLN nebulizer solution Inhale 3 mL into the lungs via nebulization 2-4 times/day. 4/24/24   Edenilson Alvarez MD   budesonide-formoterol (SYMBICORT) 160-4.5 MCG/ACT AERO Inhale 2 puffs into the lungs 2 times daily 4/24/24   Edenilson Alvarez MD   dilTIAZem (CARDIZEM CD) 120 MG extended release capsule Take 1 capsule by mouth daily Hold for SBP < 110 or HR < 65    Eliaz Reid MD   senna (SENOKOT) 8.6 MG tablet Take 2 tablets by mouth 2 times daily Hold for loose stool or diarrhea    Eliza Reid MD   fluticasone (FLONASE) 50 MCG/ACT nasal spray 1 spray by Each Nostril route daily 1/19/24   Marisabel Maciel MD   metFORMIN (GLUCOPHAGE) 500 MG tablet Take 1 tablet by mouth 2 times daily (with meals)    Eliza Reid MD   gabapentin (NEURONTIN) 100 MG capsule Take 1 capsule by mouth 3 times daily.    Eliza Reid MD   apixaban (ELIQUIS) 5 MG TABS tablet Take 1 tablet by mouth 2 times daily 4/28/23   Montserrat Morelos MD   atorvastatin (LIPITOR) 40 MG tablet Take 1 tablet by mouth nightly  Patient taking differently: Take 0.5 tablets by mouth nightly 4/28/23   Montserrat Morelos MD   acetaminophen (TYLENOL) 650 MG extended release tablet Take 1 tablet by mouth every 8 hours as needed for Fever or Pain    Provider, MD Eliza   metoprolol tartrate (LOPRESSOR) 25 MG tablet Take 1

## 2024-12-17 NOTE — PROGRESS NOTES
[facility] Progress Note    Daily Progress Note for 2024 10:42 AM /0320-01  Leslie Farris : 1950 Age: 74 y.o. Sex: female  Length of Stay:  5    Interval History:            CC: F/U Rectal Bleeding (Pt to ED from [facility] with CC of rectal bleeding.  EMS states that they were told that bleeding started yesterday.  EMS states that pt is at baseline mental status.)    Subjective:       Bronchoscopy was done today. Feeling better. No fever. BP is low normal but she is asymptomatic.     Objective:     Vitals:    24 0815 24 0820 24 0836 24 0953   BP: (!) 89/60 95/62 95/68    Pulse: 71 82     Resp:    Temp: 98.1 °F (36.7 °C) 98.1 °F (36.7 °C)     TempSrc: Temporal Temporal     SpO2: 94% 96%     Weight:              Intake/Output Summary (Last 24 hours) at 2024 1042  Last data filed at 2024 0807  Gross per 24 hour   Intake 980 ml   Output 2755 ml   Net -1775 ml     Body mass index is 39.05 kg/m².    Physical Exam:  General: Cooperative, pleasant/Ill appearing, on  Nasal cannula  HEENT:  Head: normocephalic,atraumatic, anicteric sclera, clear conjunctiva  Neck: Normal size, Jugular venous pulsations: normal  Respiratory:unlabored breathing, clear to auscultation with no crackles, wheezes rhonchi  Heart: irregular rate and rhythm, S1, S2-normal, No murmurs  Abdomen: soft, nondistended, nontender, normoactive bowel sounds,  Neurological/Psych: Alert, disoriented, no focal neurological deficits,   Skin: No obvious rashes    Extremities: no edema, Pedal pulses 2+ bilaterally    Scheduled Medications:  ipratropium 0.5 mg-albuterol 2.5 mg, 1 Dose, BID RT  insulin glargine, 10 Units, Daily  dilTIAZem, 120 mg, Daily  meropenem, 1,000 mg, Q8H  metoprolol tartrate, 50 mg, BID  polyethylene glycol, 17 g, Daily  sodium chloride flush, 5-40 mL, 2 times per day  pantoprazole (PROTONIX) 40 mg in sodium chloride (PF) 0.9 % 10 mL injection,

## 2024-12-17 NOTE — H&P
Fiberoptic bronchoscopy history:     Nursing History and Physical reviewed and agreed upon:  For additional findings, please see my notes in EPIC.      Patient is allergic to iodides, sulfa antibiotics, fish oil, fish-derived products, iodine, molds & smuts, mushroom extract complex (do not select), onion, onion extract, pollen extract, and shellfish allergy.    Past Medical History:   Diagnosis Date    Acute diastolic congestive heart failure (HCC) 9/14/2016    Acute diastolic heart failure (formerly [facility])     Acute respiratory failure     Adjustment disorder with mixed anxiety and depressed mood     Allergic rhinitis     Alzheimer's dementia (formerly [facility])     Arachnoid cyst 5/23/2005    Atrial fibrillation (formerly [facility])     CHF (congestive heart failure) (formerly [facility])     Chronic back pain     Constipation     COPD (chronic obstructive pulmonary disease) (formerly [facility])     Diabetes mellitus (formerly [facility])     Diskitis 10/6/2011    Disseminated superficial actinic porokeratosis (DSAP)     Edema     ESBL (extended spectrum beta-lactamase) producing bacteria infection 04/17/2020    Urine Klebsiella    Hypertension     Hypo-osmolality and hyponatremia     Major depressive disorder     Morbid obesity     Muscle weakness     Osteomyelitis of spine 10/6/2011    Overactive bladder     Schizoaffective disorder (formerly [facility])     Seizures (formerly [facility])     Urinary tract infection without hematuria     Xerosis cutis        Past Surgical History:   Procedure Laterality Date    IR MIDLINE CATH  10/10/2022    IR MIDLINE C[place] [facility]    IR MIDLINE CATH  4/11/2023    IR MIDLINE [place] [facility] [place]    KNEE SURGERY      TONSILLECTOMY      TUBAL LIGATION         Allergies   Allergen Reactions    Iodides Other (See Comments)    Sulfa Antibiotics Other (See Comments)    Fish Oil Other (See Comments)    Fish-Derived Products      Food allergy    Iodine Other (See Comments)    Molds & Smuts Other (See Comments)     congestion    Mushroom Extract Complex (Do

## 2024-12-17 NOTE — ANESTHESIA POSTPROCEDURE EVALUATION
Department of Anesthesiology  Postprocedure Note    Patient: Leslie Farris  MRN: [id number]  YOB: 1950  Date of evaluation: 12/17/2024    Procedure Summary       Date: 12/17/24 Room / Location: [address]    Anesthesia Start: 0658 Anesthesia Stop: 0811    Procedure: EBUS NF W/ANES. (7:00) Diagnosis:       Lung mass      (Lung mass [R91.8])    Surgeons: Edenilson Alvarez MD Responsible Provider: Parrish Jeffers MD    Anesthesia Type: general ASA Status: 3            Anesthesia Type: No value filed.    Gwen Phase I: Gwen Score: 9    Gwen Phase II:      Anesthesia Post Evaluation    Patient location during evaluation: bedside  Patient participation: complete - patient participated  Level of consciousness: awake and confused (AS PREOP, DEMENTIA)  Airway patency: patent  Nausea & Vomiting: no nausea  Cardiovascular status: hemodynamically stable  Respiratory status: acceptable  Hydration status: euvolemic  Pain management: adequate      Vitals:    12/17/24 0631 12/17/24 0810 12/17/24 0815 12/17/24 0820   BP: 129/75 (!) 89/63 (!) 89/60 95/62   Pulse: 73 82 71 82   Resp: 16  20 17   Temp: 98 °F (36.7 °C) 98.1 °F (36.7 °C) 98.1 °F (36.7 °C) 98.1 °F (36.7 °C)   TempSrc: Temporal Temporal Temporal Temporal   SpO2: 100% 92% 94% 96%   Weight:          No notable events documented.

## 2024-12-17 NOTE — PLAN OF CARE
Problem: Discharge Planning  Goal: Discharge to home or other facility with appropriate resources  Outcome: Progressing     Problem: Chronic Conditions and Co-morbidities  Goal: Patient's chronic conditions and co-morbidity symptoms are monitored and maintained or improved  Outcome: Progressing     Problem: Safety - Adult  Goal: Free from fall injury  Outcome: Progressing     Problem: Skin/Tissue Integrity  Goal: Absence of new skin breakdown  Description: 1.  Monitor for areas of redness and/or skin breakdown  2.  Assess vascular access sites hourly  3.  Every 4-6 hours minimum:  Change oxygen saturation probe site  4.  Every 4-6 hours:  If on nasal continuous positive airway pressure, respiratory therapy assess nares and determine need for appliance change or resting period.  Outcome: Progressing     Problem: Respiratory - Adult  Goal: Achieves optimal ventilation and oxygenation  Outcome: Progressing     Problem: Cardiovascular - Adult  Goal: Maintains optimal cardiac output and hemodynamic stability  Outcome: Progressing  Goal: Absence of cardiac dysrhythmias or at baseline  Outcome: Progressing     Problem: Skin/Tissue Integrity - Adult  Goal: Skin integrity remains intact  Outcome: Progressing  Flowsheets (Taken 12/17/2024 0146)  Skin Integrity Remains Intact:   Monitor for areas of redness and/or skin breakdown   Assess vascular access sites hourly   Every 4-6 hours minimum: Change oxygen saturation probe site   Every 4-6 hours: If on nasal continuous positive airway pressure, respiratory therapy assesses nares and determine need for appliance change or resting period     Problem: Gastrointestinal - Adult  Goal: Maintains or returns to baseline bowel function  Outcome: Progressing     Problem: Metabolic/Fluid and Electrolytes - Adult  Goal: Electrolytes maintained within normal limits  Outcome: Progressing     Problem: Pain  Goal: Verbalizes/displays adequate comfort level or baseline comfort  level  Outcome: Progressing

## 2024-12-17 NOTE — FLOWSHEET NOTE
12/16/24 2030   Vital Signs   Temp 98.4 °F (36.9 °C)   Temp Source Oral   Pulse 98   Respirations 20   /67   MAP (Calculated) 79   Oxygen Therapy   SpO2 97 %   O2 Device Nasal cannula   O2 Flow Rate (L/min) 3 L/min     Shift assessment completed. Scheduled meds given per MAR.  Vital signs stable and in no visible distress. A/O x 3.  Denies any needs at this time.  Call light within reach.

## 2024-12-18 VITALS
SYSTOLIC BLOOD PRESSURE: 110 MMHG | WEIGHT: 213.3 LBS | TEMPERATURE: 98 F | HEART RATE: 79 BPM | DIASTOLIC BLOOD PRESSURE: 75 MMHG | BODY MASS INDEX: 39.01 KG/M2 | OXYGEN SATURATION: 96 % | RESPIRATION RATE: 18 BRPM

## 2024-12-18 LAB
ACID FAST STN SPEC QL: NORMAL
ACID FAST STN SPEC QL: NORMAL
ANION GAP SERPL CALCULATED.3IONS-SCNC: 6 MMOL/L (ref 3–16)
BUN SERPL-MCNC: 10 MG/DL (ref 7–20)
CALCIUM SERPL-MCNC: 7.6 MG/DL (ref 8.3–10.6)
CHLORIDE SERPL-SCNC: 101 MMOL/L (ref 99–110)
CO2 SERPL-SCNC: 32 MMOL/L (ref 21–32)
CREAT SERPL-MCNC: 0.4 MG/DL (ref 0.6–1.2)
DEPRECATED RDW RBC AUTO: 15.8 % (ref 12.4–15.4)
GFR SERPLBLD CREATININE-BSD FMLA CKD-EPI: >90 ML/MIN/{1.73_M2}
GLUCOSE BLD-MCNC: 128 MG/DL (ref 70–99)
GLUCOSE SERPL-MCNC: 135 MG/DL (ref 70–99)
HCT VFR BLD AUTO: 31.4 % (ref 36–48)
HGB BLD-MCNC: 9.8 G/DL (ref 12–16)
LOEFFLER MB STN SPEC: NORMAL
LOEFFLER MB STN SPEC: NORMAL
MAGNESIUM SERPL-MCNC: 1.72 MG/DL (ref 1.8–2.4)
MCH RBC QN AUTO: 25 PG (ref 26–34)
MCHC RBC AUTO-ENTMCNC: 31.4 G/DL (ref 31–36)
MCV RBC AUTO: 79.7 FL (ref 80–100)
PERFORMED ON: ABNORMAL
PLATELET # BLD AUTO: 208 K/UL (ref 135–450)
PMV BLD AUTO: 8.6 FL (ref 5–10.5)
POTASSIUM SERPL-SCNC: 3.4 MMOL/L (ref 3.5–5.1)
RBC # BLD AUTO: 3.94 M/UL (ref 4–5.2)
SODIUM SERPL-SCNC: 139 MMOL/L (ref 136–145)
WBC # BLD AUTO: 9.8 K/UL (ref 4–11)

## 2024-12-18 PROCEDURE — 94640 AIRWAY INHALATION TREATMENT: CPT

## 2024-12-18 PROCEDURE — 6370000000 HC RX 637 (ALT 250 FOR IP): Performed by: INTERNAL MEDICINE

## 2024-12-18 PROCEDURE — 85027 COMPLETE CBC AUTOMATED: CPT

## 2024-12-18 PROCEDURE — 6370000000 HC RX 637 (ALT 250 FOR IP): Performed by: STUDENT IN AN ORGANIZED HEALTH CARE EDUCATION/TRAINING PROGRAM

## 2024-12-18 PROCEDURE — 2580000003 HC RX 258: Performed by: INTERNAL MEDICINE

## 2024-12-18 PROCEDURE — 2700000000 HC OXYGEN THERAPY PER DAY

## 2024-12-18 PROCEDURE — 6370000000 HC RX 637 (ALT 250 FOR IP)

## 2024-12-18 PROCEDURE — 94761 N-INVAS EAR/PLS OXIMETRY MLT: CPT

## 2024-12-18 PROCEDURE — 83735 ASSAY OF MAGNESIUM: CPT

## 2024-12-18 PROCEDURE — 2500000003 HC RX 250 WO HCPCS

## 2024-12-18 PROCEDURE — 99233 SBSQ HOSP IP/OBS HIGH 50: CPT | Performed by: INTERNAL MEDICINE

## 2024-12-18 PROCEDURE — 80048 BASIC METABOLIC PNL TOTAL CA: CPT

## 2024-12-18 PROCEDURE — 99239 HOSP IP/OBS DSCHRG MGMT >30: CPT | Performed by: INTERNAL MEDICINE

## 2024-12-18 PROCEDURE — 6360000002 HC RX W HCPCS: Performed by: INTERNAL MEDICINE

## 2024-12-18 PROCEDURE — 94660 CPAP INITIATION&MGMT: CPT

## 2024-12-18 RX ORDER — OXYCODONE AND ACETAMINOPHEN 7.5; 325 MG/1; MG/1
1 TABLET ORAL EVERY 6 HOURS PRN
Qty: 12 TABLET | Refills: 0 | Status: SHIPPED | OUTPATIENT
Start: 2024-12-18 | End: 2024-12-21

## 2024-12-18 RX ORDER — PANTOPRAZOLE SODIUM 40 MG/1
40 TABLET, DELAYED RELEASE ORAL
Qty: 30 TABLET | Refills: 3
Start: 2024-12-19

## 2024-12-18 RX ORDER — FUROSEMIDE 40 MG/1
40 TABLET ORAL DAILY
Qty: 60 TABLET | Refills: 0
Start: 2024-12-18

## 2024-12-18 RX ORDER — POTASSIUM CHLORIDE 1500 MG/1
40 TABLET, EXTENDED RELEASE ORAL ONCE
Status: COMPLETED | OUTPATIENT
Start: 2024-12-18 | End: 2024-12-18

## 2024-12-18 RX ORDER — METOPROLOL TARTRATE 50 MG
50 TABLET ORAL 2 TIMES DAILY
DISCHARGE
Start: 2024-12-18

## 2024-12-18 RX ORDER — LORAZEPAM 0.5 MG/1
0.5 TABLET ORAL 2 TIMES DAILY PRN
Qty: 6 TABLET | Refills: 0 | Status: SHIPPED | OUTPATIENT
Start: 2024-12-18 | End: 2024-12-21

## 2024-12-18 RX ORDER — LORAZEPAM 1 MG/1
1 TABLET ORAL EVERY EVENING
Qty: 3 TABLET | Refills: 0 | Status: SHIPPED | OUTPATIENT
Start: 2024-12-18 | End: 2024-12-21

## 2024-12-18 RX ORDER — MAGNESIUM SULFATE 1 G/100ML
1000 INJECTION INTRAVENOUS ONCE
Status: COMPLETED | OUTPATIENT
Start: 2024-12-18 | End: 2024-12-18

## 2024-12-18 RX ADMIN — LINACLOTIDE 290 MCG: 145 CAPSULE, GELATIN COATED ORAL at 07:58

## 2024-12-18 RX ADMIN — SODIUM CHLORIDE, PRESERVATIVE FREE 10 ML: 5 INJECTION INTRAVENOUS at 08:07

## 2024-12-18 RX ADMIN — IPRATROPIUM BROMIDE AND ALBUTEROL SULFATE 1 DOSE: 2.5; .5 SOLUTION RESPIRATORY (INHALATION) at 08:10

## 2024-12-18 RX ADMIN — MAGNESIUM SULFATE HEPTAHYDRATE 1000 MG: 10 INJECTION, SOLUTION INTRAVENOUS at 08:04

## 2024-12-18 RX ADMIN — BUDESONIDE AND FORMOTEROL FUMARATE DIHYDRATE 2 PUFF: 160; 4.5 AEROSOL RESPIRATORY (INHALATION) at 08:10

## 2024-12-18 RX ADMIN — VENLAFAXINE HYDROCHLORIDE 75 MG: 75 CAPSULE, EXTENDED RELEASE ORAL at 08:00

## 2024-12-18 RX ADMIN — ERTAPENEM SODIUM 1000 MG: 1 INJECTION INTRAMUSCULAR; INTRAVENOUS at 08:41

## 2024-12-18 RX ADMIN — FUROSEMIDE 40 MG: 40 TABLET ORAL at 08:00

## 2024-12-18 RX ADMIN — DILTIAZEM HYDROCHLORIDE 120 MG: 120 CAPSULE, COATED, EXTENDED RELEASE ORAL at 08:00

## 2024-12-18 RX ADMIN — METOPROLOL TARTRATE 50 MG: 50 TABLET, FILM COATED ORAL at 07:59

## 2024-12-18 RX ADMIN — POTASSIUM CHLORIDE 40 MEQ: 1500 TABLET, EXTENDED RELEASE ORAL at 08:00

## 2024-12-18 RX ADMIN — PANTOPRAZOLE SODIUM 40 MG: 40 TABLET, DELAYED RELEASE ORAL at 06:25

## 2024-12-18 RX ADMIN — OXYCODONE HYDROCHLORIDE AND ACETAMINOPHEN 1 TABLET: 7.5; 325 TABLET ORAL at 06:24

## 2024-12-18 RX ADMIN — GABAPENTIN 100 MG: 100 CAPSULE ORAL at 07:59

## 2024-12-18 RX ADMIN — PREDNISONE 10 MG: 10 TABLET ORAL at 08:00

## 2024-12-18 RX ADMIN — APIXABAN 5 MG: 5 TABLET, FILM COATED ORAL at 08:00

## 2024-12-18 NOTE — FLOWSHEET NOTE
12/17/24 2025   Vital Signs   Temp 98.2 °F (36.8 °C)   Temp Source Oral   Pulse 71   Heart Rate Source Monitor   Respirations 18   /60   MAP (Calculated) 74   BP Method Automatic   Patient Position Semi fowlers   Oxygen Therapy   SpO2 99 %   O2 Device Nasal cannula   O2 Flow Rate (L/min) 3 L/min     Shift assessment completed. Scheduled meds given per MAR.  Vital signs stable and in no visible distress. Alert to self.  Call light within reach.

## 2024-12-18 NOTE — CARE COORDINATION
DC order noted. Pt to return to [facility] as she is LTC and a bedhold. Family notified, RN notified, pt is confused. Facility aware. Transport arranged.     IMM 12/18    O2 3 liters 97%

## 2024-12-18 NOTE — CONSULTS
PHARMACY TO DOSE INVANZ    Dx:  UTI w/E coli ESBL  CrCl = 130 ml/min    Invanz 1000 mg IVPB x 1 prior to discharge.    KRYSTLE MatthewPh.12/18/20247:39 AM

## 2024-12-18 NOTE — PLAN OF CARE
Problem: Discharge Planning  Goal: Discharge to home or other facility with appropriate resources  12/18/2024 1000 by Goyo Mariano RN  Outcome: Completed  12/18/2024 1000 by Goyo Mariano RN  Outcome: Progressing  12/17/2024 2300 by Jamal Andrade RN  Outcome: Progressing     Problem: Chronic Conditions and Co-morbidities  Goal: Patient's chronic conditions and co-morbidity symptoms are monitored and maintained or improved  12/18/2024 1000 by Goyo Mariano RN  Outcome: Completed  12/18/2024 1000 by Goyo Mariano RN  Outcome: Progressing  12/17/2024 2300 by Jamal Andrade RN  Outcome: Progressing     Problem: Safety - Adult  Goal: Free from fall injury  12/18/2024 1000 by Goyo Mariano RN  Outcome: Completed  12/18/2024 1000 by Goyo Mariano RN  Outcome: Progressing  12/17/2024 2300 by Jamal Andrade RN  Outcome: Progressing     Problem: Skin/Tissue Integrity  Goal: Absence of new skin breakdown  Description: 1.  Monitor for areas of redness and/or skin breakdown  2.  Assess vascular access sites hourly  3.  Every 4-6 hours minimum:  Change oxygen saturation probe site  4.  Every 4-6 hours:  If on nasal continuous positive airway pressure, respiratory therapy assess nares and determine need for appliance change or resting period.  12/18/2024 1000 by Goyo Mariano RN  Outcome: Completed  12/18/2024 1000 by Goyo Mariano RN  Outcome: Progressing  12/17/2024 2300 by Jamal Andrade RN  Outcome: Progressing     Problem: Respiratory - Adult  Goal: Achieves optimal ventilation and oxygenation  12/18/2024 1000 by Goyo Mariano RN  Outcome: Completed  12/17/2024 2300 by Jamal Andrade RN  Outcome: Progressing     Problem: Cardiovascular - Adult  Goal: Maintains optimal cardiac output and hemodynamic stability  12/18/2024 1000 by Goyo Mariano RN  Outcome: Completed  12/17/2024 2300 by Jamal Andrade RN  Outcome: Progressing  Goal: Absence of cardiac dysrhythmias or at

## 2024-12-18 NOTE — PROGRESS NOTES
HEART FAILURE CARE PLAN:    Comorbidities Reviewed: Yes   Patient has a past medical history of Acute diastolic congestive heart failure (HCC), Acute diastolic heart failure (HCC), Acute respiratory failure, Adjustment disorder with mixed anxiety and depressed mood, Allergic rhinitis, Alzheimer's dementia (HCC), Arachnoid cyst, Atrial fibrillation (HCC), CHF (congestive heart failure) (HCC), Chronic back pain, Constipation, COPD (chronic obstructive pulmonary disease) (HCC), Diabetes mellitus (HCC), Diskitis, Disseminated superficial actinic porokeratosis (DSAP), Edema, ESBL (extended spectrum beta-lactamase) producing bacteria infection, Hypertension, Hypo-osmolality and hyponatremia, Major depressive disorder, Morbid obesity, Muscle weakness, Osteomyelitis of spine, Overactive bladder, Schizoaffective disorder (formerly [facility]), Seizures (formerly [facility]), Urinary tract infection without hematuria, and Xerosis cutis.     Weights Reviewed: Yes   Admission weight: 97.8 kg (215 lb 9 oz)   Wt Readings from Last 3 Encounters:   12/16/24 96.8 kg (213 lb 8 oz)   05/09/24 87.1 kg (192 lb)   04/30/24 93.9 kg (207 lb)     Intake & Output Reviewed: Yes     Intake/Output Summary (Last 24 hours) at 12/17/2024 2306  Last data filed at 12/17/2024 2025  Gross per 24 hour   Intake 1342 ml   Output 2005 ml   Net -663 ml       ECHOCARDIOGRAM Reviewed: Yes   Patient's Ejection Fraction (EF) is greater than 40%     Medications Reviewed: Yes   SCHEDULED HOSPITAL MEDICATIONS:   [START ON 12/18/2024] pantoprazole  40 mg Oral QAM AC    ipratropium 0.5 mg-albuterol 2.5 mg  1 Dose Inhalation BID RT    insulin glargine  10 Units SubCUTAneous Daily    dilTIAZem  120 mg Oral Daily    meropenem  1,000 mg IntraVENous Q8H    metoprolol tartrate  50 mg Oral BID    polyethylene glycol  17 g Oral Daily    sodium chloride flush  5-40 mL IntraVENous 2 times per day    insulin lispro  0-4 Units SubCUTAneous 4x Daily AC & HS    linaclotide  290 mcg Oral QAM AC      Wilfrido Hatfield MD   albuterol (PROVENTIL) (2.5 MG/3ML) 0.083% nebulizer solution Inhale 3 mLs into the lungs every 6 hours as needed    Eliza Reid MD   OXYGEN Inhale 2 L into the lungs    Eliza Reid MD   benzocaine (ORAJEL) 20 % GEL mucosal gel Take by mouth 2 times daily as needed for Pain    Eliza Reid MD   Lactobacillus Rhamnosus, GG, (CULTURELLE PO) Take 1 capsule by mouth in the morning and 1 capsule in the evening.    Eliza Reid MD   ipratropium 0.5 mg-albuterol 2.5 mg (DUONEB) 0.5-2.5 (3) MG/3ML SOLN nebulizer solution Inhale 3 mL into the lungs via nebulization 2-4 times/day. 4/24/24   Edenilson Alvarez MD   budesonide-formoterol (SYMBICORT) 160-4.5 MCG/ACT AERO Inhale 2 puffs into the lungs 2 times daily 4/24/24   Edenilson Alvarez MD   dilTIAZem (CARDIZEM CD) 120 MG extended release capsule Take 1 capsule by mouth daily Hold for SBP < 110 or HR < 65    Eliza Reid MD   senna (SENOKOT) 8.6 MG tablet Take 2 tablets by mouth 2 times daily Hold for loose stool or diarrhea    Eliza Reid MD   fluticasone (FLONASE) 50 MCG/ACT nasal spray 1 spray by Each Nostril route daily 1/19/24   Marisabel Maciel MD   metFORMIN (GLUCOPHAGE) 500 MG tablet Take 1 tablet by mouth 2 times daily (with meals)    Eliza Reid MD   gabapentin (NEURONTIN) 100 MG capsule Take 1 capsule by mouth 3 times daily.    Eliza Reid MD   apixaban (ELIQUIS) 5 MG TABS tablet Take 1 tablet by mouth 2 times daily 4/28/23   Montserrat Morelos MD   atorvastatin (LIPITOR) 40 MG tablet Take 1 tablet by mouth nightly  Patient taking differently: Take 0.5 tablets by mouth nightly 4/28/23   Montserrat Morelos MD   acetaminophen (TYLENOL) 650 MG extended release tablet Take 1 tablet by mouth every 8 hours as needed for Fever or Pain    Eliza Reid MD   metoprolol tartrate (LOPRESSOR) 25 MG tablet Take 1 tablet by mouth 2 times daily  Patient taking

## 2024-12-18 NOTE — PROGRESS NOTES
RT Inhaler-Nebulizer Bronchodilator Protocol Note    There is a bronchodilator order in the chart from a provider indicating to follow the RT Bronchodilator Protocol and there is an “Initiate RT Inhaler-Nebulizer Bronchodilator Protocol” order as well (see protocol at bottom of note).    CXR Findings:  No results found.    The findings from the last RT Protocol Assessment were as follows:   History Pulmonary Disease: Chronic pulmonary disease  Respiratory Pattern: Dyspnea on exertion or RR 21-25 bpm  Breath Sounds: Slightly diminished and/or crackles  Cough: Strong, spontaneous, non-productive  Indication for Bronchodilator Therapy: Decreased or absent breath sounds  Bronchodilator Assessment Score: 6    Aerosolized bronchodilator medication orders have been revised according to the RT Inhaler-Nebulizer Bronchodilator Protocol below.    Respiratory Therapist to perform RT Therapy Protocol Assessment initially then follow the protocol.  Repeat RT Therapy Protocol Assessment PRN for score 0-3 or on second treatment, BID, and PRN for scores above 3.    No Indications - adjust the frequency to every 6 hours PRN wheezing or bronchospasm, if no treatments needed after 48 hours then discontinue using Per Protocol order mode.     If indication present, adjust the RT bronchodilator orders based on the Bronchodilator Assessment Score as indicated below.  Use Inhaler orders unless patient has one or more of the following: on home nebulizer, not able to hold breath for 10 seconds, is not alert and oriented, cannot activate and use MDI correctly, or respiratory rate 25 breaths per minute or more, then use the equivalent nebulizer order(s) with same Frequency and PRN reasons based on the score.  If a patient is on this medication at home then do not decrease Frequency below that used at home.    0-3 - enter or revise RT bronchodilator order(s) to equivalent RT Bronchodilator order with Frequency of every 4 hours PRN for wheezing  or increased work of breathing using Per Protocol order mode.        4-6 - enter or revise RT Bronchodilator order(s) to two equivalent RT bronchodilator orders with one order with BID Frequency and one order with Frequency of every 4 hours PRN wheezing or increased work of breathing using Per Protocol order mode.        7-10 - enter or revise RT Bronchodilator order(s) to two equivalent RT bronchodilator orders with one order with TID Frequency and one order with Frequency of every 4 hours PRN wheezing or increased work of breathing using Per Protocol order mode.       11-13 - enter or revise RT Bronchodilator order(s) to one equivalent RT bronchodilator order with QID Frequency and an Albuterol order with Frequency of every 4 hours PRN wheezing or increased work of breathing using Per Protocol order mode.      Greater than 13 - enter or revise RT Bronchodilator order(s) to one equivalent RT bronchodilator order with every 4 hours Frequency and an Albuterol order with Frequency of every 2 hours PRN wheezing or increased work of breathing using Per Protocol order mode.       Electronically signed by Megan Stephen RCP on 12/17/2024 at 10:23 PM

## 2024-12-18 NOTE — FLOWSHEET NOTE
12/18/24 0709   Vital Signs   Temp 98 °F (36.7 °C)   Temp Source Axillary   Pulse 80   Heart Rate Source Monitor   Respirations 18   BP (!) 85/56  (bp cuff was too small)   MAP (Calculated) 66   BP Location Left Upper Arm   BP Method Automatic   Patient Position Semi fowlers   Oxygen Therapy   SpO2 97 %   O2 Device Nasal cannula   O2 Flow Rate (L/min) 3 L/min     B/p on recheck with proper cuff 110/75. Pt. Resting in bed. Call light in reach. Shift assessment completed see flow sheet. Denies any needs at this time. Will continue to monitor.

## 2024-12-18 NOTE — DISCHARGE SUMMARY
changed:   when to take this  reasons to take this  Another medication with the same name was removed. Continue taking this medication, and follow the directions you see here.     * LORazepam 1 MG tablet  Commonly known as: ATIVAN  Take 1 tablet by mouth every evening for 3 days. Max Daily Amount: 1 mg  What changed:   when to take this  Another medication with the same name was removed. Continue taking this medication, and follow the directions you see here.     metoprolol tartrate 50 MG tablet  Commonly known as: LOPRESSOR  Take 1 tablet by mouth 2 times daily  What changed:   medication strength  how much to take           * This list has 2 medication(s) that are the same as other medications prescribed for you. Read the directions carefully, and ask your doctor or other care provider to review them with you.                CONTINUE taking these medications      albuterol (2.5 MG/3ML) 0.083% nebulizer solution  Commonly known as: PROVENTIL     apixaban 5 MG Tabs tablet  Commonly known as: ELIQUIS  Take 1 tablet by mouth 2 times daily     atorvastatin 40 MG tablet  Commonly known as: LIPITOR  Take 1 tablet by mouth nightly     Basaglar KwikPen 100 UNIT/ML injection pen  Generic drug: insulin glargine     benzocaine 20 % Gel mucosal gel  Commonly known as: ORAJEL     bisacodyl 10 MG suppository  Commonly known as: DULCOLAX     budesonide-formoterol 160-4.5 MCG/ACT Aero  Commonly known as: Symbicort  Inhale 2 puffs into the lungs 2 times daily     clotrimazole 1 % cream  Commonly known as: LOTRIMIN     CULTURELLE PO     dilTIAZem 120 MG extended release capsule  Commonly known as: CARDIZEM CD     fluticasone 50 MCG/ACT nasal spray  Commonly known as: FLONASE  1 spray by Each Nostril route daily     gabapentin 100 MG capsule  Commonly known as: NEURONTIN     ipratropium 0.5 mg-albuterol 2.5 mg 0.5-2.5 (3) MG/3ML Soln nebulizer solution  Commonly known as: DUONEB  Inhale 3 mL into the lungs via nebulization 2-4

## 2024-12-18 NOTE — PROGRESS NOTES
Pulmonary Progress Note  CC: lung mass    Subjective:    Appears comfortable  O2 trending down, 3 L  BiPAP overnight      EXAM: /75   Pulse 80   Temp 98 °F (36.7 °C) (Axillary)   Resp 18   Wt 96.8 kg (213 lb 4.8 oz)   SpO2 97%   BMI 39.01 kg/m²  on 3L  Constitutional:  No acute distress.   Eyes: PERRL. Conjunctivae anicteric.   ENT: Normal nose. Normal tongue.    Neck:  Trachea is midline.   Respiratory: No accessory muscle usage.  decreased breath sounds. No wheezes. No rales. No Rhonchi.  Cardiovascular: Normal S1S2. No digit clubbing. No digit cyanosis. No LE edema.   Psychiatric: No anxiety or Agitation. Alert and Oriented to person only    Scheduled Meds:   potassium chloride  40 mEq Oral Once    magnesium sulfate  1,000 mg IntraVENous Once    ertapenem (INVanz) IV  1,000 mg IntraVENous Once    pantoprazole  40 mg Oral QAM AC    ipratropium 0.5 mg-albuterol 2.5 mg  1 Dose Inhalation BID RT    insulin glargine  10 Units SubCUTAneous Daily    dilTIAZem  120 mg Oral Daily    metoprolol tartrate  50 mg Oral BID    polyethylene glycol  17 g Oral Daily    sodium chloride flush  5-40 mL IntraVENous 2 times per day    insulin lispro  0-4 Units SubCUTAneous 4x Daily AC & HS    linaclotide  290 mcg Oral QAM AC    budesonide-formoterol  2 puff Inhalation BID    predniSONE  10 mg Oral Daily    apixaban  5 mg Oral BID    atorvastatin  20 mg Oral Nightly    gabapentin  100 mg Oral TID    venlafaxine  75 mg Oral Daily    melatonin  3 mg Oral Nightly    LORazepam  1 mg Oral Nightly    furosemide  40 mg Oral Daily     Continuous Infusions:   sodium chloride      dextrose       PRN Meds:  metoprolol, potassium chloride **OR** potassium alternative oral replacement **OR** potassium chloride, magnesium sulfate, sodium phosphate 15 mmol in sodium chloride 0.9 % 250 mL IVPB, sodium chloride flush, sodium chloride, ondansetron **OR** ondansetron, acetaminophen **OR** acetaminophen, glucose, dextrose bolus **OR** dextrose    Resume Eliquis

## 2024-12-18 NOTE — PROGRESS NOTES
RT Inhaler-Nebulizer Bronchodilator Protocol Note    There is a bronchodilator order in the chart from a provider indicating to follow the RT Bronchodilator Protocol and there is an “Initiate RT Inhaler-Nebulizer Bronchodilator Protocol” order as well (see protocol at bottom of note).    CXR Findings:  No results found.    The findings from the last RT Protocol Assessment were as follows:   History Pulmonary Disease: (P) Chronic pulmonary disease  Respiratory Pattern: (P) Dyspnea on exertion or RR 21-25 bpm  Breath Sounds: (P) Slightly diminished and/or crackles  Cough: (P) Strong, spontaneous, non-productive  Indication for Bronchodilator Therapy: (P) Decreased or absent breath sounds  Bronchodilator Assessment Score: (P) 6    Aerosolized bronchodilator medication orders have been revised according to the RT Inhaler-Nebulizer Bronchodilator Protocol below.    Respiratory Therapist to perform RT Therapy Protocol Assessment initially then follow the protocol.  Repeat RT Therapy Protocol Assessment PRN for score 0-3 or on second treatment, BID, and PRN for scores above 3.    No Indications - adjust the frequency to every 6 hours PRN wheezing or bronchospasm, if no treatments needed after 48 hours then discontinue using Per Protocol order mode.     If indication present, adjust the RT bronchodilator orders based on the Bronchodilator Assessment Score as indicated below.  Use Inhaler orders unless patient has one or more of the following: on home nebulizer, not able to hold breath for 10 seconds, is not alert and oriented, cannot activate and use MDI correctly, or respiratory rate 25 breaths per minute or more, then use the equivalent nebulizer order(s) with same Frequency and PRN reasons based on the score.  If a patient is on this medication at home then do not decrease Frequency below that used at home.    0-3 - enter or revise RT bronchodilator order(s) to equivalent RT Bronchodilator order with Frequency of every 4  hours PRN for wheezing or increased work of breathing using Per Protocol order mode.        4-6 - enter or revise RT Bronchodilator order(s) to two equivalent RT bronchodilator orders with one order with BID Frequency and one order with Frequency of every 4 hours PRN wheezing or increased work of breathing using Per Protocol order mode.        7-10 - enter or revise RT Bronchodilator order(s) to two equivalent RT bronchodilator orders with one order with TID Frequency and one order with Frequency of every 4 hours PRN wheezing or increased work of breathing using Per Protocol order mode.       11-13 - enter or revise RT Bronchodilator order(s) to one equivalent RT bronchodilator order with QID Frequency and an Albuterol order with Frequency of every 4 hours PRN wheezing or increased work of breathing using Per Protocol order mode.      Greater than 13 - enter or revise RT Bronchodilator order(s) to one equivalent RT bronchodilator order with every 4 hours Frequency and an Albuterol order with Frequency of every 2 hours PRN wheezing or increased work of breathing using Per Protocol order mode.     RT to enter RT Home Evaluation for COPD & MDI Assessment order using Per Protocol order mode.    Electronically signed by Janet Del Rio RCP on 12/18/2024 at 8:12 AM

## 2024-12-18 NOTE — DISCHARGE INSTR - COC
Immunization History:   Immunization History   Administered Date(s) Administered    COVID-19, PFIZER Bivalent, DO NOT Dilute, (age 12y+), IM, 30 mcg/0.3 mL 06/28/2023    COVID-19, PFIZER GRAY top, DO NOT Dilute, (age 12 y+), IM, 30 mcg/0.3 mL 05/27/2022    COVID-19, PFIZER PURPLE top, DILUTE for use, (age 12 y+), 30mcg/0.3mL 12/22/2020, 01/12/2021, 11/02/2021    Influenza Virus Vaccine 01/13/2014, 10/20/2019    Influenza, FLUARIX, FLULAVAL, FLUZONE (age 6 mo+) and AFLURIA, (age 3 y+), Quadv PF, 0.5mL 11/08/2016    Pneumococcal, PCV-13, PREVNAR 13, (age 6w+), IM, 0.5mL 11/08/2016       Active Problems:  Patient Active Problem List   Diagnosis Code    Seizure disorder, grand mal ([facility]) G40.409    DM (diabetes mellitus) ([facility]) E11.9    Lumbar facet arthropathy M47.816    Disc degeneration, lumbar M51.369    Class 1 obesity in adult E66.811    Hypokalemia E87.6    Atrial fibrillation with RVR ([facility]) I48.91    Benign essential HTN I10    Chronic obstructive pulmonary disease ([facility]) J44.9    Dyspnea and respiratory abnormalities R06.00, R06.89    Acute on chronic diastolic congestive heart failure ([facility]) I50.33    Acute hypoxemic respiratory failure J96.01    Persistent atrial fibrillation ([facility]) I48.19    Chronic pain of both knees M25.561, M25.562, G89.29    Bilateral primary osteoarthritis of knee M17.0    Encephalopathy G93.40    Sepsis secondary to UTI ([facility]) A41.9, N39.0    Altered mental status R41.82    FABIO (acute kidney injury) ([facility]) N17.9    Urinary tract infection with hematuria N39.0, R31.9    Cervical herniated disc M50.20    Chronic bilateral low back pain with bilateral sciatica M54.42, M54.41, G89.29    Acute on chronic hypoxic respiratory failure J96.21    Dementia with behavioral disturbance ([facility]) F03.918    LORENZO (generalized anxiety disorder) F41.1    Hoarding disorder with poor insight F42.3    Hypertensive heart and kidney disease with chronic diastolic congestive heart failure and stage 2 chronic  I48.0    Acute bronchitis J20.9    Decompensated heart failure ([facility]) I50.9    Hyperglycemia R73.9    Pleural effusion J90    Acute congestive heart failure ([facility]) I50.9    COPD with acute exacerbation ([facility]) J44.1    Acute exacerbation of emphysema ([facility]) J43.9    Rectal bleeding K62.5    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    Chronic hypoxemic respiratory failure J96.11    Chronic diastolic CHF (congestive heart failure) ([facility]) I50.32    Abnormal CT of the chest R93.89    Lung mass R91.8       Isolation/Infection:   Isolation            Contact          Patient Infection Status       Infection Onset Added Last Indicated Last Indicated By Review Planned Expiration Resolved Resolved By    MRSA 10/06/22 10/07/22 01/17/24 MRSA DNA Probe, Nasal        ESBL (Extended Spectrum Beta Lactamase) 04/14/20 04/17/20 12/12/24 Culture, Urine        MDRO (multi-drug resistant organism) 04/14/20 04/17/20 04/14/20 Culture, Urine        Resolved    COVID-19 04/16/23 04/16/23 04/16/23 COVID-19, Rapid   04/24/23 Katarzyna Forde, VISHAL    ESBL (Extended Spectrum Beta Lactamase) 02/27/19 03/01/19 02/27/19 Urine Culture   01/12/20 Angel Mcfadden, VISHAL    Last urine culture - for ESBL                       Nurse Assessment:  Last Vital Signs: /75   Pulse 80   Temp 98 °F (36.7 °C) (Axillary)   Resp 18   Wt 96.8 kg (213 lb 4.8 oz)   SpO2 97%   BMI 39.01 kg/m²     Last documented pain score (0-10 scale): Pain Level: 5  Last Weight:   Wt Readings from Last 1 Encounters:   12/18/24 96.8 kg (213 lb 4.8 oz)     Mental Status:  alert and yells out randomly follow directions     IV Access:  - None    Nursing Mobility/ADLs:  Walking   Dependent  Transfer  Dependent  Bathing  Dependent  Dressing  Dependent  Toileting  Dependent  Feeding  Assisted  Med Admin  Assisted  Med Delivery   whole    Wound Care Documentation and Therapy:        Elimination:  Continence:   Bowel: No  Bladder: No  Urinary Catheter: None

## 2024-12-18 NOTE — PROGRESS NOTES
12/17/24 2310   NIV Type   NIV Started/Stopped (S)  On   Mode Bilevel   Mask Type Full face mask   Mask Size Medium   Assessment   Pulse 91   Respirations 17   SpO2 96 %   Settings/Measurements   IPAP 12 cmH20   CPAP/EPAP 5 cmH2O   Vt (Measured) 714 mL   Rate Ordered 14   FiO2  35 %   Minute Volume (L/min) 12 Liters   Mask Leak (lpm) 4 lpm   Patient's Home Machine No   Alarm Settings   Alarms On Y

## 2024-12-18 NOTE — PROGRESS NOTES
Report Called to Lisha RODGERS at [facility] and pt. In stable condition awaiting transport.

## 2024-12-19 LAB
ADENOVIRUS PCR: NOT DETECTED
BACTERIA SPEC RESP CULT: NORMAL
BACTERIA SPEC RESP CULT: NORMAL
GRAM STN SPEC: NORMAL
GRAM STN SPEC: NORMAL
HUMAN METAPNEUMOVIRUS PCR: NOT DETECTED
INFLUENZA A: NOT DETECTED
INFLUENZA B: NOT DETECTED
PARAINFLUENZA 1 PCR: NOT DETECTED
PARAINFLUENZA 2 PCR: NOT DETECTED
PARAINFLUENZA 3 PCR: NOT DETECTED
PARAINFLUENZA 4 PCR: NOT DETECTED
RHINO/ENTEROVIRUS PCR: NOT DETECTED
RSV BY PCR: NOT DETECTED
RSV SOURCE: NORMAL

## 2024-12-20 LAB
BACTERIA SPEC AEROBE CULT: NORMAL
GRAM STN SPEC: NORMAL
PRELIMINARY: NORMAL
PRELIMINARY: NORMAL

## 2024-12-24 LAB
A/G RATIO: 1.1 RATIO (ref 0.8–2.6)
ALBUMIN: 2.5 G/DL (ref 3.5–5.2)
ALP BLD-CCNC: 121 U/L (ref 23–144)
ALT SERPL-CCNC: 10 U/L (ref 0–60)
AST SERPL-CCNC: 12 U/L (ref 0–55)
BILIRUB SERPL-MCNC: 0.2 MG/DL (ref 0–1.2)
BUN / CREAT RATIO: 12 (ref 7–25)
BUN BLDV-MCNC: 6 MG/DL (ref 3–29)
CALCIUM SERPL-MCNC: 8.2 MG/DL (ref 8.5–10.5)
CHLORIDE BLD-SCNC: 100 MEQ/L (ref 96–110)
CO2: 32 MEQ/L (ref 19–32)
CREAT SERPL-MCNC: 0.5 MG/DL (ref 0.5–1.2)
ESTIMATED GLOMERULAR FILTRATION RATE CREATININE EQUATION: 97 MLS/MIN/1.73M2
FASTING STATUS: ABNORMAL
GLOBULIN: 2.3 G/DL (ref 1.9–3.6)
GLUCOSE BLD-MCNC: 156 MG/DL (ref 70–99)
HCT VFR BLD CALC: 30.5 % (ref 34–49)
HEMOGLOBIN: 9 G/DL (ref 11.2–15.7)
MCH RBC QN AUTO: 24.7 PG (ref 26–34)
MCHC RBC AUTO-ENTMCNC: 29.5 G/DL (ref 30.7–35.5)
MCV RBC AUTO: 83.6 FL (ref 80–100)
PDW BLD-RTO: 13.8 %
PLATELET # BLD: 220 K/UL (ref 140–400)
PMV BLD AUTO: 11.6 FL (ref 7.2–11.7)
POTASSIUM SERPL-SCNC: 4.1 MEQ/L (ref 3.4–5.3)
RBC # BLD: 3.65 M/UL (ref 3.95–5.26)
SODIUM BLD-SCNC: 140 MEQ/L (ref 135–148)
TOTAL PROTEIN: 4.8 G/DL (ref 6–8.3)
WBC # BLD: 8.4 K/UL (ref 3.5–10.9)

## 2024-12-26 LAB
FINAL REPORT: NORMAL
PRELIMINARY: NORMAL

## 2024-12-30 LAB
FINAL REPORT: NORMAL
FUNGUS SPEC CULT: NORMAL
FUNGUS SPEC CULT: NORMAL
LOEFFLER MB STN SPEC: NORMAL
LOEFFLER MB STN SPEC: NORMAL
PRELIMINARY: NORMAL

## 2024-12-31 LAB
ACID FAST STN SPEC QL: NORMAL
ACID FAST STN SPEC QL: NORMAL
MYCOBACTERIUM SPEC CULT: NORMAL
MYCOBACTERIUM SPEC CULT: NORMAL

## 2025-01-06 LAB
FUNGUS SPEC CULT: NORMAL
FUNGUS SPEC CULT: NORMAL
LOEFFLER MB STN SPEC: NORMAL
LOEFFLER MB STN SPEC: NORMAL

## 2025-01-22 ENCOUNTER — TELEPHONE (OUTPATIENT)
Dept: PULMONOLOGY | Age: 75
End: 2025-01-22

## 2025-01-22 NOTE — TELEPHONE ENCOUNTER
Patient did not show for Follow up  with Kim on 1/22/25.    Reason:  No Show    This is patient's first no show.  Patient was ano show on: 01/2/25.      Patient did not reschedule.  Reschedule date:  N/A      Spoke with PT Brother, Informed him Pt did  not show for Appointment.  Brother was going to call the nursing home to reschedule appt

## 2025-01-31 ENCOUNTER — TELEPHONE (OUTPATIENT)
Dept: PULMONOLOGY | Age: 75
End: 2025-01-31

## 2025-01-31 NOTE — TELEPHONE ENCOUNTER
LVM with [facility], where patient is located, to reschedule PFT/6MW before pt's appointment with Dr. Alvarez on February 06, 2025.

## 2025-01-31 NOTE — TELEPHONE ENCOUNTER
Patient was suppose to get a PFT/6MW test done before upcoming appointment on February 06, 2025. Patient is currently in [facility]. Pt's nurse called to clarify that this patient is now bed-bound. Please advise.

## 2025-02-01 LAB
BACTERIA, URINE: ABNORMAL /HPF
BILIRUBIN, URINE: NEGATIVE MG/DL
CLARITY, UA: ABNORMAL
COLOR, UA: YELLOW
EPITHELIAL CELLS, UA: ABNORMAL /HPF (ref 0–5)
ERYTHROCYTES URINE: ABNORMAL /HPF (ref 0–2)
GLUCOSE URINE: 200 MG/DL
HYALINE CASTS: ABNORMAL /LPF (ref 0–5)
KETONES, URINE: NEGATIVE MG/DL
LEUKOCYTE CLUMPS IN URINE BY AUTOMATED COUNT: ABNORMAL (ref 0–1)
LEUKOCYTE ESTERASE, URINE: ABNORMAL
LEUKOCYTES, UA: ABNORMAL /HPF (ref 0–5)
NITRITE, URINE: NEGATIVE
PH, URINE: 5.5 (ref 4.5–8)
PROTEIN, URINE: NEGATIVE MG/DL
SPECIFIC GRAVITY UA: 1.02 (ref 1–1.03)
URINE HGB: NEGATIVE MG/DL
UROBILINOGEN, URINE: <2 MG/DL (ref 0–2)
YEAST, URINE: PRESENT /HPF

## 2025-02-03 NOTE — TELEPHONE ENCOUNTER
Patient is already schedule for CT and OV. I put in patient chart the [facility] and the phone number where patient is now living and should be reached.

## 2025-02-06 ENCOUNTER — HOSPITAL ENCOUNTER (OUTPATIENT)
Dept: CT IMAGING | Age: 75
Discharge: HOME | End: 2025-02-06
Attending: INTERNAL MEDICINE
Payer: COMMERCIAL

## 2025-02-06 DIAGNOSIS — R91.1 PULMONARY NODULE: ICD-10-CM

## 2025-02-06 DIAGNOSIS — J43.9 ACUTE EXACERBATION OF EMPHYSEMA (HCC): ICD-10-CM

## 2025-02-06 PROCEDURE — 71250 CT THORAX DX C-: CPT

## 2025-03-05 ENCOUNTER — HOSPITAL ENCOUNTER (OUTPATIENT)
Age: 75
Setting detail: SPECIMEN
Discharge: HOME | End: 2025-03-05
Payer: COMMERCIAL

## 2025-03-05 LAB
ALBUMIN SERPL-MCNC: 2.9 G/DL (ref 3.4–5)
ALBUMIN/GLOB SERPL: 1.2 {RATIO} (ref 1.1–2.2)
ALP SERPL-CCNC: 68 U/L (ref 40–129)
ALT SERPL-CCNC: 9 U/L (ref 10–40)
ANION GAP SERPL CALCULATED.3IONS-SCNC: 9 MMOL/L (ref 3–16)
AST SERPL-CCNC: 12 U/L (ref 15–37)
BASOPHILS # BLD: 0 K/UL (ref 0–0.2)
BASOPHILS NFR BLD: 0.4 %
BILIRUB SERPL-MCNC: <0.2 MG/DL (ref 0–1)
BUN SERPL-MCNC: 13 MG/DL (ref 7–20)
CALCIUM SERPL-MCNC: 7.7 MG/DL (ref 8.3–10.6)
CHLORIDE SERPL-SCNC: 98 MMOL/L (ref 99–110)
CO2 SERPL-SCNC: 32 MMOL/L (ref 21–32)
CREAT SERPL-MCNC: 0.5 MG/DL (ref 0.6–1.2)
DEPRECATED RDW RBC AUTO: 17 % (ref 12.4–15.4)
EOSINOPHIL # BLD: 0.2 K/UL (ref 0–0.6)
EOSINOPHIL NFR BLD: 2.3 %
GFR SERPLBLD CREATININE-BSD FMLA CKD-EPI: >90 ML/MIN/{1.73_M2}
GLUCOSE SERPL-MCNC: 182 MG/DL (ref 70–99)
HCT VFR BLD AUTO: 32.9 % (ref 36–48)
HGB BLD-MCNC: 10 G/DL (ref 12–16)
LYMPHOCYTES # BLD: 2.3 K/UL (ref 1–5.1)
LYMPHOCYTES NFR BLD: 23.4 %
MCH RBC QN AUTO: 23.6 PG (ref 26–34)
MCHC RBC AUTO-ENTMCNC: 30.5 G/DL (ref 31–36)
MCV RBC AUTO: 77.4 FL (ref 80–100)
MONOCYTES # BLD: 0.8 K/UL (ref 0–1.3)
MONOCYTES NFR BLD: 8 %
NEUTROPHILS # BLD: 6.5 K/UL (ref 1.7–7.7)
NEUTROPHILS NFR BLD: 65.9 %
PLATELET # BLD AUTO: 206 K/UL (ref 135–450)
PMV BLD AUTO: 10 FL (ref 5–10.5)
POTASSIUM SERPL-SCNC: 3.4 MMOL/L (ref 3.5–5.1)
PROT SERPL-MCNC: 5.3 G/DL (ref 6.4–8.2)
RBC # BLD AUTO: 4.25 M/UL (ref 4–5.2)
SODIUM SERPL-SCNC: 139 MMOL/L (ref 136–145)
WBC # BLD AUTO: 9.9 K/UL (ref 4–11)

## 2025-03-05 PROCEDURE — 80053 COMPREHEN METABOLIC PANEL: CPT

## 2025-03-05 PROCEDURE — 85025 COMPLETE CBC W/AUTO DIFF WBC: CPT

## 2025-04-02 ENCOUNTER — OFFICE VISIT (OUTPATIENT)
Dept: PULMONOLOGY | Age: 75
End: 2025-04-02
Payer: COMMERCIAL

## 2025-04-02 VITALS
OXYGEN SATURATION: 98 % | BODY MASS INDEX: 32.17 KG/M2 | HEIGHT: 66 IN | WEIGHT: 200.2 LBS | DIASTOLIC BLOOD PRESSURE: 72 MMHG | HEART RATE: 60 BPM | RESPIRATION RATE: 20 BRPM | SYSTOLIC BLOOD PRESSURE: 126 MMHG

## 2025-04-02 DIAGNOSIS — J43.9 PULMONARY EMPHYSEMA, UNSPECIFIED EMPHYSEMA TYPE (HCC): ICD-10-CM

## 2025-04-02 DIAGNOSIS — R91.1 PULMONARY NODULE: Primary | ICD-10-CM

## 2025-04-02 DIAGNOSIS — R93.89 ABNORMAL CT OF THE CHEST: ICD-10-CM

## 2025-04-02 PROCEDURE — 3074F SYST BP LT 130 MM HG: CPT | Performed by: INTERNAL MEDICINE

## 2025-04-02 PROCEDURE — 1123F ACP DISCUSS/DSCN MKR DOCD: CPT | Performed by: INTERNAL MEDICINE

## 2025-04-02 PROCEDURE — 3078F DIAST BP <80 MM HG: CPT | Performed by: INTERNAL MEDICINE

## 2025-04-02 PROCEDURE — 99214 OFFICE O/P EST MOD 30 MIN: CPT | Performed by: INTERNAL MEDICINE

## 2025-04-02 RX ORDER — SODIUM PHOSPHATE, DIBASIC AND SODIUM PHOSPHATE, MONOBASIC 7; 19 G/230ML; G/230ML
ENEMA RECTAL
COMMUNITY

## 2025-04-02 RX ORDER — OXYCODONE AND ACETAMINOPHEN 7.5; 325 MG/1; MG/1
TABLET ORAL
COMMUNITY

## 2025-04-02 RX ORDER — LORAZEPAM 1 MG/1
TABLET ORAL
COMMUNITY
Start: 2025-03-23

## 2025-04-02 NOTE — PROGRESS NOTES
(PROVENTIL) (2.5 MG/3ML) 0.083% nebulizer solution, Inhale 3 mLs into the lungs every 6 hours as needed, Disp: , Rfl:     OXYGEN, Inhale 2 L into the lungs, Disp: , Rfl:     benzocaine (ORAJEL) 20 % GEL mucosal gel, Take by mouth 2 times daily as needed for Pain, Disp: , Rfl:     Lactobacillus Rhamnosus, GG, (CULTURELLE PO), Take 1 capsule by mouth in the morning and 1 capsule in the evening., Disp: , Rfl:     ipratropium 0.5 mg-albuterol 2.5 mg (DUONEB) 0.5-2.5 (3) MG/3ML SOLN nebulizer solution, Inhale 3 mL into the lungs via nebulization 2-4 times/day., Disp: 360 mL, Rfl: 6    budesonide-formoterol (SYMBICORT) 160-4.5 MCG/ACT AERO, Inhale 2 puffs into the lungs 2 times daily, Disp: 10.2 g, Rfl: 5    dilTIAZem (CARDIZEM CD) 120 MG extended release capsule, Take 1 capsule by mouth daily Hold for SBP < 110 or HR < 65, Disp: , Rfl:     senna (SENOKOT) 8.6 MG tablet, Take 2 tablets by mouth 2 times daily Hold for loose stool or diarrhea, Disp: , Rfl:     fluticasone (FLONASE) 50 MCG/ACT nasal spray, 1 spray by Each Nostril route daily, Disp: , Rfl:     metFORMIN (GLUCOPHAGE) 500 MG tablet, Take 1 tablet by mouth 2 times daily (with meals), Disp: , Rfl:     gabapentin (NEURONTIN) 100 MG capsule, Take 1 capsule by mouth 3 times daily., Disp: , Rfl:     apixaban (ELIQUIS) 5 MG TABS tablet, Take 1 tablet by mouth 2 times daily, Disp: 60 tablet, Rfl:     atorvastatin (LIPITOR) 40 MG tablet, Take 1 tablet by mouth nightly (Patient taking differently: Take 0.5 tablets by mouth nightly), Disp: 30 tablet, Rfl: 3    polyethylene glycol (GLYCOLAX) 17 GM/SCOOP powder, Take 17 g by mouth daily as needed (constipation), Disp: , Rfl:     melatonin 3 MG TABS tablet, Take 1 tablet by mouth nightly, Disp: , Rfl:     Cholecalciferol (VITAMIN D3) 1.25 MG (18780 UT) CAPS, Take 1 capsule by mouth every 30 days (Last dose around March 13, 2023 - next due around April 15, 2023), Disp: , Rfl:     bisacodyl (DULCOLAX) 10 MG suppository, Place

## 2025-05-12 LAB
A/G RATIO: 1.1 RATIO (ref 0.8–2.6)
ALBUMIN: 3.3 G/DL (ref 3.5–5.2)
ALP BLD-CCNC: 90 U/L (ref 23–144)
ALT SERPL-CCNC: 6 U/L (ref 0–60)
AST SERPL-CCNC: 10 U/L (ref 0–55)
B-TYPE NATRIURETIC PEPTIDE: 1579 PG/ML (ref 0–125)
BILIRUB SERPL-MCNC: 0.2 MG/DL (ref 0–1.2)
BUN / CREAT RATIO: 20 (ref 7–25)
BUN BLDV-MCNC: 12 MG/DL (ref 3–29)
CALCIUM SERPL-MCNC: 8.8 MG/DL (ref 8.5–10.5)
CHLORIDE BLD-SCNC: 99 MEQ/L (ref 96–110)
CHOLESTEROL, TOTAL: 115 MG/DL
CO2: 28 MEQ/L (ref 19–32)
CREAT SERPL-MCNC: 0.6 MG/DL (ref 0.5–1.2)
ESTIMATED AVERAGE GLUCOSE: 304 MG/DL
ESTIMATED GLOMERULAR FILTRATION RATE CREATININE EQUATION: 93 MLS/MIN/1.73M2
FASTING STATUS: ABNORMAL
GLOBULIN: 3 G/DL (ref 1.9–3.6)
GLUCOSE BLD-MCNC: 244 MG/DL (ref 70–99)
HBA1C MFR BLD: 10.7 %
HDLC SERPL-MCNC: 50 MG/DL
LDL CHOLESTEROL: 42 MG/DL
POTASSIUM SERPL-SCNC: 3.5 MEQ/L (ref 3.4–5.3)
SODIUM BLD-SCNC: 140 MEQ/L (ref 135–148)
TOTAL PROTEIN: 6.3 G/DL (ref 6–8.3)
TRIGL SERPL-MCNC: 116 MG/DL
TROPONIN T: 14 NG/L (ref 0–14)
VLDLC SERPL CALC-MCNC: 23 MG/DL (ref 4–38)

## 2025-05-13 LAB
BASOPHILS ABSOLUTE: 0.1 K/UL (ref 0–0.3)
BASOPHILS RELATIVE PERCENT: 0.6 % (ref 0–2)
DIFFERENTIAL COUNT: ABNORMAL
EOSINOPHILS ABSOLUTE: 0.2 K/UL (ref 0–0.5)
EOSINOPHILS RELATIVE PERCENT: 1.6 % (ref 0–5)
HCT VFR BLD CALC: 36.5 % (ref 34–49)
HEMOGLOBIN: 10.1 G/DL (ref 11.2–15.7)
LYMPHOCYTES ABSOLUTE: 2.9 K/UL (ref 0.9–4.1)
LYMPHOCYTES RELATIVE PERCENT: 26.5 % (ref 14–51)
MCH RBC QN AUTO: 21.8 PG (ref 26–34)
MCHC RBC AUTO-ENTMCNC: 27.7 G/DL (ref 30.7–35.5)
MCV RBC AUTO: 78.7 FL (ref 80–100)
MONOCYTES ABSOLUTE: 0.9 K/UL (ref 0.2–1)
MONOCYTES RELATIVE PERCENT: 7.9 % (ref 4–12)
NEUTROPHILS ABSOLUTE: 6.9 K/UL (ref 1.8–7.5)
NEUTROPHILS RELATIVE PERCENT: 63.4 % (ref 42–80)
PDW BLD-RTO: 14.4 %
PLATELET # BLD: 291 K/UL (ref 140–400)
PLATELET ESTIMATE: ABNORMAL
PMV BLD AUTO: 11.4 FL (ref 7.2–11.7)
RBC # BLD: 4.64 M/UL (ref 3.95–5.26)
RBC # BLD: ABNORMAL 10*6/UL
VITAMIN D 25-HYDROXY: 42 NG/ML (ref 30–100)
WBC # BLD: 11 K/UL (ref 3.5–10.9)

## 2025-05-29 LAB
BASOPHILS ABSOLUTE: 0.1 K/UL (ref 0–0.3)
BASOPHILS RELATIVE PERCENT: 0.6 % (ref 0–2)
DIFFERENTIAL COUNT: ABNORMAL
EOSINOPHILS ABSOLUTE: 0.1 K/UL (ref 0–0.5)
EOSINOPHILS RELATIVE PERCENT: 0.7 % (ref 0–5)
HCT VFR BLD CALC: 40.9 % (ref 34–49)
HEMOGLOBIN: 11.6 G/DL (ref 11.2–15.7)
LYMPHOCYTES ABSOLUTE: 1.6 K/UL (ref 0.9–4.1)
LYMPHOCYTES RELATIVE PERCENT: 10.2 % (ref 14–51)
MCH RBC QN AUTO: 21.6 PG (ref 26–34)
MCHC RBC AUTO-ENTMCNC: 28.4 G/DL (ref 30.7–35.5)
MCV RBC AUTO: 76.3 FL (ref 80–100)
MONOCYTES ABSOLUTE: 1 K/UL (ref 0.2–1)
MONOCYTES RELATIVE PERCENT: 6.2 % (ref 4–12)
NEUTROPHILS ABSOLUTE: 13.1 K/UL (ref 1.8–7.5)
NEUTROPHILS RELATIVE PERCENT: 82.3 % (ref 42–80)
PDW BLD-RTO: 14.8 %
PLATELET # BLD: 282 K/UL (ref 140–400)
PMV BLD AUTO: 12 FL (ref 7.2–11.7)
RBC # BLD: 5.36 M/UL (ref 3.95–5.26)
RBC # BLD: ABNORMAL 10*6/UL
WBC # BLD: 16.1 K/UL (ref 3.5–10.9)

## 2025-06-05 ENCOUNTER — APPOINTMENT (OUTPATIENT)
Dept: GENERAL RADIOLOGY | Age: 75
DRG: 871 | End: 2025-06-05
Payer: COMMERCIAL

## 2025-06-05 ENCOUNTER — HOSPITAL ENCOUNTER (INPATIENT)
Age: 75
LOS: 2 days | Discharge: NURSING, REHAB OR LONG-TERM CARE | DRG: 871 | End: 2025-06-07
Attending: EMERGENCY MEDICINE | Admitting: INTERNAL MEDICINE
Payer: COMMERCIAL

## 2025-06-05 ENCOUNTER — APPOINTMENT (OUTPATIENT)
Dept: CT IMAGING | Age: 75
DRG: 871 | End: 2025-06-05
Payer: COMMERCIAL

## 2025-06-05 DIAGNOSIS — I50.9 CONGESTIVE HEART FAILURE, UNSPECIFIED HF CHRONICITY, UNSPECIFIED HEART FAILURE TYPE (HCC): ICD-10-CM

## 2025-06-05 DIAGNOSIS — I50.23 ACUTE ON CHRONIC SYSTOLIC CONGESTIVE HEART FAILURE (HCC): Primary | ICD-10-CM

## 2025-06-05 PROBLEM — I50.33 ACUTE ON CHRONIC HEART FAILURE WITH NORMAL EJECTION FRACTION (HCC): Status: ACTIVE | Noted: 2025-06-05

## 2025-06-05 LAB
ALBUMIN SERPL-MCNC: 3 G/DL (ref 3.4–5)
ALBUMIN/GLOB SERPL: 0.9 {RATIO} (ref 1.1–2.2)
ALP SERPL-CCNC: 71 U/L (ref 40–129)
ALT SERPL-CCNC: 8 U/L (ref 10–40)
ANION GAP SERPL CALCULATED.3IONS-SCNC: 15 MMOL/L (ref 3–16)
ANISOCYTOSIS BLD QL SMEAR: ABNORMAL
AST SERPL-CCNC: 16 U/L (ref 15–37)
BASE EXCESS BLDV CALC-SCNC: 6.4 MMOL/L (ref -3–3)
BASE EXCESS BLDV CALC-SCNC: 7.7 MMOL/L (ref -3–3)
BASOPHILS # BLD: 0.1 K/UL (ref 0–0.2)
BASOPHILS NFR BLD: 0.6 %
BILIRUB SERPL-MCNC: <0.2 MG/DL (ref 0–1)
BUN SERPL-MCNC: 13 MG/DL (ref 7–20)
CALCIUM SERPL-MCNC: 8.1 MG/DL (ref 8.3–10.6)
CHLORIDE SERPL-SCNC: 96 MMOL/L (ref 99–110)
CO2 BLDV-SCNC: 30 MMOL/L
CO2 BLDV-SCNC: 33 MMOL/L
CO2 SERPL-SCNC: 28 MMOL/L (ref 21–32)
COHGB MFR BLDV: 1.8 % (ref 0–1.5)
COHGB MFR BLDV: 2.9 % (ref 0–1.5)
CREAT SERPL-MCNC: 0.4 MG/DL (ref 0.6–1.2)
DEPRECATED RDW RBC AUTO: 17 % (ref 12.4–15.4)
EOSINOPHIL # BLD: 0.1 K/UL (ref 0–0.6)
EOSINOPHIL NFR BLD: 0.8 %
GFR SERPLBLD CREATININE-BSD FMLA CKD-EPI: >90 ML/MIN/{1.73_M2}
GLUCOSE BLD-MCNC: 241 MG/DL (ref 70–99)
GLUCOSE SERPL-MCNC: 181 MG/DL (ref 70–99)
HCO3 BLDV-SCNC: 29.2 MMOL/L (ref 23–29)
HCO3 BLDV-SCNC: 31.8 MMOL/L (ref 23–29)
HCT VFR BLD AUTO: 33.8 % (ref 36–48)
HGB BLD-MCNC: 10 G/DL (ref 12–16)
HYPOCHROMIA BLD QL SMEAR: ABNORMAL
LYMPHOCYTES # BLD: 1.4 K/UL (ref 1–5.1)
LYMPHOCYTES NFR BLD: 9.6 %
MAGNESIUM SERPL-MCNC: 2.05 MG/DL (ref 1.8–2.4)
MCH RBC QN AUTO: 21.4 PG (ref 26–34)
MCHC RBC AUTO-ENTMCNC: 29.6 G/DL (ref 31–36)
MCV RBC AUTO: 72.2 FL (ref 80–100)
METHGB MFR BLDV: 0.3 %
METHGB MFR BLDV: 0.3 %
MONOCYTES # BLD: 0.8 K/UL (ref 0–1.3)
MONOCYTES NFR BLD: 5.8 %
NEUTROPHILS # BLD: 12.2 K/UL (ref 1.7–7.7)
NEUTROPHILS NFR BLD: 83.2 %
NT-PROBNP SERPL-MCNC: 3031 PG/ML (ref 0–449)
O2 THERAPY: ABNORMAL
O2 THERAPY: ABNORMAL
OVALOCYTES BLD QL SMEAR: ABNORMAL
PCO2 BLDV: 30.3 MMHG (ref 40–50)
PCO2 BLDV: 50.1 MMHG (ref 40–50)
PERFORMED ON: ABNORMAL
PH BLDV: 7.42 [PH] (ref 7.35–7.45)
PH BLDV: 7.6 [PH] (ref 7.35–7.45)
PLATELET # BLD AUTO: 215 K/UL (ref 135–450)
PLATELET BLD QL SMEAR: ADEQUATE
PMV BLD AUTO: 8.3 FL (ref 5–10.5)
PO2 BLDV: 111.7 MMHG (ref 25–40)
PO2 BLDV: 33.9 MMHG (ref 25–40)
POLYCHROMASIA BLD QL SMEAR: ABNORMAL
POTASSIUM SERPL-SCNC: 3.6 MMOL/L (ref 3.5–5.1)
POTASSIUM SERPL-SCNC: 4 MMOL/L (ref 3.5–5.1)
PROT SERPL-MCNC: 6.4 G/DL (ref 6.4–8.2)
RBC # BLD AUTO: 4.69 M/UL (ref 4–5.2)
SAO2 % BLDV: 65 %
SAO2 % BLDV: 99 %
SLIDE REVIEW: ABNORMAL
SODIUM SERPL-SCNC: 139 MMOL/L (ref 136–145)
TROPONIN, HIGH SENSITIVITY: 10 NG/L (ref 0–14)
TROPONIN, HIGH SENSITIVITY: 8 NG/L (ref 0–14)
WBC # BLD AUTO: 14.6 K/UL (ref 4–11)

## 2025-06-05 PROCEDURE — 99285 EMERGENCY DEPT VISIT HI MDM: CPT

## 2025-06-05 PROCEDURE — 6370000000 HC RX 637 (ALT 250 FOR IP): Performed by: INTERNAL MEDICINE

## 2025-06-05 PROCEDURE — 71045 X-RAY EXAM CHEST 1 VIEW: CPT

## 2025-06-05 PROCEDURE — 84132 ASSAY OF SERUM POTASSIUM: CPT

## 2025-06-05 PROCEDURE — 6360000002 HC RX W HCPCS: Performed by: NURSE PRACTITIONER

## 2025-06-05 PROCEDURE — 80053 COMPREHEN METABOLIC PANEL: CPT

## 2025-06-05 PROCEDURE — 2700000000 HC OXYGEN THERAPY PER DAY

## 2025-06-05 PROCEDURE — 83735 ASSAY OF MAGNESIUM: CPT

## 2025-06-05 PROCEDURE — 93005 ELECTROCARDIOGRAM TRACING: CPT | Performed by: EMERGENCY MEDICINE

## 2025-06-05 PROCEDURE — 71250 CT THORAX DX C-: CPT

## 2025-06-05 PROCEDURE — 83880 ASSAY OF NATRIURETIC PEPTIDE: CPT

## 2025-06-05 PROCEDURE — 84484 ASSAY OF TROPONIN QUANT: CPT

## 2025-06-05 PROCEDURE — 85025 COMPLETE CBC W/AUTO DIFF WBC: CPT

## 2025-06-05 PROCEDURE — 82803 BLOOD GASES ANY COMBINATION: CPT

## 2025-06-05 PROCEDURE — 2500000003 HC RX 250 WO HCPCS: Performed by: INTERNAL MEDICINE

## 2025-06-05 PROCEDURE — 94761 N-INVAS EAR/PLS OXIMETRY MLT: CPT

## 2025-06-05 PROCEDURE — 2060000000 HC ICU INTERMEDIATE R&B

## 2025-06-05 RX ORDER — POTASSIUM CHLORIDE 7.45 MG/ML
10 INJECTION INTRAVENOUS PRN
Status: DISCONTINUED | OUTPATIENT
Start: 2025-06-05 | End: 2025-06-07 | Stop reason: HOSPADM

## 2025-06-05 RX ORDER — ACETAMINOPHEN 325 MG/1
650 TABLET ORAL EVERY 6 HOURS PRN
Status: DISCONTINUED | OUTPATIENT
Start: 2025-06-05 | End: 2025-06-07 | Stop reason: HOSPADM

## 2025-06-05 RX ORDER — SODIUM CHLORIDE 0.9 % (FLUSH) 0.9 %
5-40 SYRINGE (ML) INJECTION EVERY 12 HOURS SCHEDULED
Status: DISCONTINUED | OUTPATIENT
Start: 2025-06-05 | End: 2025-06-07 | Stop reason: HOSPADM

## 2025-06-05 RX ORDER — FUROSEMIDE 10 MG/ML
40 INJECTION INTRAMUSCULAR; INTRAVENOUS ONCE
Status: COMPLETED | OUTPATIENT
Start: 2025-06-05 | End: 2025-06-05

## 2025-06-05 RX ORDER — POLYETHYLENE GLYCOL 3350 17 G/17G
17 POWDER, FOR SOLUTION ORAL DAILY PRN
Status: DISCONTINUED | OUTPATIENT
Start: 2025-06-05 | End: 2025-06-07 | Stop reason: HOSPADM

## 2025-06-05 RX ORDER — ACETAMINOPHEN 650 MG/1
650 SUPPOSITORY RECTAL EVERY 6 HOURS PRN
Status: DISCONTINUED | OUTPATIENT
Start: 2025-06-05 | End: 2025-06-07 | Stop reason: HOSPADM

## 2025-06-05 RX ORDER — INSULIN LISPRO 100 [IU]/ML
0-4 INJECTION, SOLUTION INTRAVENOUS; SUBCUTANEOUS
Status: DISCONTINUED | OUTPATIENT
Start: 2025-06-05 | End: 2025-06-07 | Stop reason: HOSPADM

## 2025-06-05 RX ORDER — SODIUM CHLORIDE 0.9 % (FLUSH) 0.9 %
5-40 SYRINGE (ML) INJECTION PRN
Status: DISCONTINUED | OUTPATIENT
Start: 2025-06-05 | End: 2025-06-07 | Stop reason: HOSPADM

## 2025-06-05 RX ORDER — INSULIN GLARGINE 100 [IU]/ML
30 INJECTION, SOLUTION SUBCUTANEOUS NIGHTLY
Status: DISCONTINUED | OUTPATIENT
Start: 2025-06-05 | End: 2025-06-07 | Stop reason: HOSPADM

## 2025-06-05 RX ORDER — INSULIN LISPRO 100 [IU]/ML
0.08 INJECTION, SOLUTION INTRAVENOUS; SUBCUTANEOUS
Status: DISCONTINUED | OUTPATIENT
Start: 2025-06-06 | End: 2025-06-07 | Stop reason: HOSPADM

## 2025-06-05 RX ORDER — ONDANSETRON 2 MG/ML
4 INJECTION INTRAMUSCULAR; INTRAVENOUS EVERY 6 HOURS PRN
Status: DISCONTINUED | OUTPATIENT
Start: 2025-06-05 | End: 2025-06-07 | Stop reason: HOSPADM

## 2025-06-05 RX ORDER — GLUCAGON 1 MG/ML
1 KIT INJECTION PRN
Status: DISCONTINUED | OUTPATIENT
Start: 2025-06-05 | End: 2025-06-07 | Stop reason: HOSPADM

## 2025-06-05 RX ORDER — SODIUM CHLORIDE 9 MG/ML
INJECTION, SOLUTION INTRAVENOUS PRN
Status: DISCONTINUED | OUTPATIENT
Start: 2025-06-05 | End: 2025-06-07 | Stop reason: HOSPADM

## 2025-06-05 RX ORDER — ONDANSETRON 4 MG/1
4 TABLET, ORALLY DISINTEGRATING ORAL EVERY 8 HOURS PRN
Status: DISCONTINUED | OUTPATIENT
Start: 2025-06-05 | End: 2025-06-07 | Stop reason: HOSPADM

## 2025-06-05 RX ORDER — ENOXAPARIN SODIUM 100 MG/ML
40 INJECTION SUBCUTANEOUS DAILY
Status: DISCONTINUED | OUTPATIENT
Start: 2025-06-06 | End: 2025-06-06

## 2025-06-05 RX ORDER — POTASSIUM CHLORIDE 1500 MG/1
40 TABLET, EXTENDED RELEASE ORAL PRN
Status: DISCONTINUED | OUTPATIENT
Start: 2025-06-05 | End: 2025-06-07 | Stop reason: HOSPADM

## 2025-06-05 RX ORDER — MAGNESIUM SULFATE IN WATER 40 MG/ML
2000 INJECTION, SOLUTION INTRAVENOUS PRN
Status: DISCONTINUED | OUTPATIENT
Start: 2025-06-05 | End: 2025-06-07 | Stop reason: HOSPADM

## 2025-06-05 RX ORDER — DEXTROSE MONOHYDRATE 100 MG/ML
INJECTION, SOLUTION INTRAVENOUS CONTINUOUS PRN
Status: DISCONTINUED | OUTPATIENT
Start: 2025-06-05 | End: 2025-06-07 | Stop reason: HOSPADM

## 2025-06-05 RX ORDER — FUROSEMIDE 10 MG/ML
40 INJECTION INTRAMUSCULAR; INTRAVENOUS 2 TIMES DAILY
Status: DISCONTINUED | OUTPATIENT
Start: 2025-06-06 | End: 2025-06-07

## 2025-06-05 RX ADMIN — INSULIN LISPRO 1 UNITS: 100 INJECTION, SOLUTION INTRAVENOUS; SUBCUTANEOUS at 23:37

## 2025-06-05 RX ADMIN — SODIUM CHLORIDE, PRESERVATIVE FREE 10 ML: 5 INJECTION INTRAVENOUS at 23:37

## 2025-06-05 RX ADMIN — FUROSEMIDE 40 MG: 10 INJECTION, SOLUTION INTRAMUSCULAR; INTRAVENOUS at 23:37

## 2025-06-05 RX ADMIN — INSULIN GLARGINE 30 UNITS: 100 INJECTION, SOLUTION SUBCUTANEOUS at 23:36

## 2025-06-05 RX ADMIN — Medication 3 MG: at 23:37

## 2025-06-05 NOTE — ED PROVIDER NOTES
I personally saw the patient and made/approved the management plan and take responsibility for the patient management.    History: 74-year-old female with behavioral services who lives in nursing home as well as COPD who presents with chest pain and shortness of breath.     Exam: Elderly  female.  Mildly tachycardic rate.  Crackles bilateral lung bases.  Increased work of breathing.    MDM: Patient with acute on chronic congestive heart failure.  Patient diuresed and admitted for further care.      For further details of this patient's emergency department encounter, please see the advanced practice provider's documentation.    FINAL IMPRESSION      1. Acute on chronic systolic congestive heart failure (HCC)             Dimitry Edwards MD  06/05/25 [id number]

## 2025-06-06 LAB
ALBUMIN SERPL-MCNC: 3.1 G/DL (ref 3.4–5)
ALBUMIN/GLOB SERPL: 0.9 {RATIO} (ref 1.1–2.2)
ALP SERPL-CCNC: 76 U/L (ref 40–129)
ALT SERPL-CCNC: 6 U/L (ref 10–40)
ANION GAP SERPL CALCULATED.3IONS-SCNC: 16 MMOL/L (ref 3–16)
AST SERPL-CCNC: 17 U/L (ref 15–37)
BASOPHILS # BLD: 0.1 K/UL (ref 0–0.2)
BASOPHILS NFR BLD: 0.4 %
BILIRUB SERPL-MCNC: <0.2 MG/DL (ref 0–1)
BUN SERPL-MCNC: 11 MG/DL (ref 7–20)
CALCIUM SERPL-MCNC: 8.3 MG/DL (ref 8.3–10.6)
CHLORIDE SERPL-SCNC: 96 MMOL/L (ref 99–110)
CO2 SERPL-SCNC: 28 MMOL/L (ref 21–32)
CREAT SERPL-MCNC: 0.4 MG/DL (ref 0.6–1.2)
CRP SERPL-MCNC: 41.2 MG/L (ref 0–5.1)
DEPRECATED RDW RBC AUTO: 16.8 % (ref 12.4–15.4)
EKG DIAGNOSIS: NORMAL
EKG Q-T INTERVAL: 352 MS
EKG QRS DURATION: 88 MS
EKG QTC CALCULATION (BAZETT): 454 MS
EKG R AXIS: 80 DEGREES
EKG T AXIS: 250 DEGREES
EKG VENTRICULAR RATE: 100 BPM
EOSINOPHIL # BLD: 0.1 K/UL (ref 0–0.6)
EOSINOPHIL NFR BLD: 0.7 %
EST. AVERAGE GLUCOSE BLD GHB EST-MCNC: 228.8 MG/DL
FERRITIN SERPL IA-MCNC: 61.5 NG/ML (ref 15–150)
FOLATE SERPL-MCNC: 19 NG/ML (ref 4.78–24.2)
GFR SERPLBLD CREATININE-BSD FMLA CKD-EPI: >90 ML/MIN/{1.73_M2}
GLUCOSE BLD-MCNC: 187 MG/DL (ref 70–99)
GLUCOSE BLD-MCNC: 271 MG/DL (ref 70–99)
GLUCOSE BLD-MCNC: 86 MG/DL (ref 70–99)
GLUCOSE BLD-MCNC: 92 MG/DL (ref 70–99)
GLUCOSE SERPL-MCNC: 72 MG/DL (ref 70–99)
HBA1C MFR BLD: 9.6 %
HCT VFR BLD AUTO: 33.1 % (ref 36–48)
HGB BLD-MCNC: 10.1 G/DL (ref 12–16)
IRON SATN MFR SERPL: 6 % (ref 15–50)
IRON SERPL-MCNC: 23 UG/DL (ref 37–145)
LYMPHOCYTES # BLD: 2.7 K/UL (ref 1–5.1)
LYMPHOCYTES NFR BLD: 19.3 %
MAGNESIUM SERPL-MCNC: 1.63 MG/DL (ref 1.8–2.4)
MCH RBC QN AUTO: 21.5 PG (ref 26–34)
MCHC RBC AUTO-ENTMCNC: 30.4 G/DL (ref 31–36)
MCV RBC AUTO: 70.6 FL (ref 80–100)
MONOCYTES # BLD: 0.8 K/UL (ref 0–1.3)
MONOCYTES NFR BLD: 5.7 %
NEUTROPHILS # BLD: 10.5 K/UL (ref 1.7–7.7)
NEUTROPHILS NFR BLD: 73.9 %
PERFORMED ON: ABNORMAL
PERFORMED ON: ABNORMAL
PERFORMED ON: NORMAL
PERFORMED ON: NORMAL
PLATELET # BLD AUTO: 234 K/UL (ref 135–450)
PMV BLD AUTO: 8.7 FL (ref 5–10.5)
POTASSIUM SERPL-SCNC: 3.4 MMOL/L (ref 3.5–5.1)
PROCALCITONIN SERPL IA-MCNC: 0.03 NG/ML (ref 0–0.15)
PROT SERPL-MCNC: 6.6 G/DL (ref 6.4–8.2)
RBC # BLD AUTO: 4.69 M/UL (ref 4–5.2)
SODIUM SERPL-SCNC: 140 MMOL/L (ref 136–145)
TIBC SERPL-MCNC: 380 UG/DL (ref 260–445)
VIT B12 SERPL-MCNC: 468 PG/ML (ref 211–911)
WBC # BLD AUTO: 14.2 K/UL (ref 4–11)

## 2025-06-06 PROCEDURE — 83550 IRON BINDING TEST: CPT

## 2025-06-06 PROCEDURE — 82746 ASSAY OF FOLIC ACID SERUM: CPT

## 2025-06-06 PROCEDURE — 85025 COMPLETE CBC W/AUTO DIFF WBC: CPT

## 2025-06-06 PROCEDURE — 99233 SBSQ HOSP IP/OBS HIGH 50: CPT | Performed by: INTERNAL MEDICINE

## 2025-06-06 PROCEDURE — 2060000000 HC ICU INTERMEDIATE R&B

## 2025-06-06 PROCEDURE — 6360000002 HC RX W HCPCS: Performed by: INTERNAL MEDICINE

## 2025-06-06 PROCEDURE — 93010 ELECTROCARDIOGRAM REPORT: CPT | Performed by: INTERNAL MEDICINE

## 2025-06-06 PROCEDURE — 99223 1ST HOSP IP/OBS HIGH 75: CPT | Performed by: INTERNAL MEDICINE

## 2025-06-06 PROCEDURE — 83735 ASSAY OF MAGNESIUM: CPT

## 2025-06-06 PROCEDURE — 84145 PROCALCITONIN (PCT): CPT

## 2025-06-06 PROCEDURE — 82607 VITAMIN B-12: CPT

## 2025-06-06 PROCEDURE — 80053 COMPREHEN METABOLIC PANEL: CPT

## 2025-06-06 PROCEDURE — 6370000000 HC RX 637 (ALT 250 FOR IP): Performed by: INTERNAL MEDICINE

## 2025-06-06 PROCEDURE — 36415 COLL VENOUS BLD VENIPUNCTURE: CPT

## 2025-06-06 PROCEDURE — 94640 AIRWAY INHALATION TREATMENT: CPT

## 2025-06-06 PROCEDURE — 83540 ASSAY OF IRON: CPT

## 2025-06-06 PROCEDURE — 94761 N-INVAS EAR/PLS OXIMETRY MLT: CPT

## 2025-06-06 PROCEDURE — 82728 ASSAY OF FERRITIN: CPT

## 2025-06-06 PROCEDURE — 86140 C-REACTIVE PROTEIN: CPT

## 2025-06-06 PROCEDURE — 2580000003 HC RX 258: Performed by: INTERNAL MEDICINE

## 2025-06-06 PROCEDURE — 2700000000 HC OXYGEN THERAPY PER DAY

## 2025-06-06 PROCEDURE — 83036 HEMOGLOBIN GLYCOSYLATED A1C: CPT

## 2025-06-06 PROCEDURE — 2500000003 HC RX 250 WO HCPCS: Performed by: INTERNAL MEDICINE

## 2025-06-06 RX ORDER — IPRATROPIUM BROMIDE AND ALBUTEROL SULFATE 2.5; .5 MG/3ML; MG/3ML
1 SOLUTION RESPIRATORY (INHALATION)
Status: DISCONTINUED | OUTPATIENT
Start: 2025-06-06 | End: 2025-06-07 | Stop reason: HOSPADM

## 2025-06-06 RX ORDER — MICONAZOLE NITRATE 2 G/100G
CREAM TOPICAL 2 TIMES DAILY
Status: DISCONTINUED | OUTPATIENT
Start: 2025-06-06 | End: 2025-06-07 | Stop reason: HOSPADM

## 2025-06-06 RX ORDER — LORAZEPAM 1 MG/1
1 TABLET ORAL EVERY EVENING
Status: DISCONTINUED | OUTPATIENT
Start: 2025-06-06 | End: 2025-06-06

## 2025-06-06 RX ORDER — METOPROLOL TARTRATE 50 MG
50 TABLET ORAL 2 TIMES DAILY
Status: DISCONTINUED | OUTPATIENT
Start: 2025-06-06 | End: 2025-06-06

## 2025-06-06 RX ORDER — LACTULOSE 10 G/15ML
20 SOLUTION ORAL EVERY OTHER DAY
Status: DISCONTINUED | OUTPATIENT
Start: 2025-06-06 | End: 2025-06-07 | Stop reason: HOSPADM

## 2025-06-06 RX ORDER — LEVOFLOXACIN 500 MG/1
500 TABLET, FILM COATED ORAL EVERY 24 HOURS
Status: DISCONTINUED | OUTPATIENT
Start: 2025-06-06 | End: 2025-06-07 | Stop reason: HOSPADM

## 2025-06-06 RX ORDER — GABAPENTIN 100 MG/1
100 CAPSULE ORAL 3 TIMES DAILY
Status: DISCONTINUED | OUTPATIENT
Start: 2025-06-06 | End: 2025-06-07 | Stop reason: HOSPADM

## 2025-06-06 RX ORDER — SENNOSIDES 8.6 MG/1
2 TABLET ORAL 2 TIMES DAILY
Status: DISCONTINUED | OUTPATIENT
Start: 2025-06-06 | End: 2025-06-07 | Stop reason: HOSPADM

## 2025-06-06 RX ORDER — DILTIAZEM HYDROCHLORIDE 120 MG/1
120 CAPSULE, COATED, EXTENDED RELEASE ORAL DAILY
Status: DISCONTINUED | OUTPATIENT
Start: 2025-06-06 | End: 2025-06-07 | Stop reason: HOSPADM

## 2025-06-06 RX ORDER — OXYCODONE AND ACETAMINOPHEN 7.5; 325 MG/1; MG/1
1 TABLET ORAL EVERY 6 HOURS PRN
Refills: 0 | Status: DISCONTINUED | OUTPATIENT
Start: 2025-06-06 | End: 2025-06-07 | Stop reason: HOSPADM

## 2025-06-06 RX ORDER — PANTOPRAZOLE SODIUM 40 MG/1
40 TABLET, DELAYED RELEASE ORAL
Status: DISCONTINUED | OUTPATIENT
Start: 2025-06-06 | End: 2025-06-07 | Stop reason: HOSPADM

## 2025-06-06 RX ORDER — IPRATROPIUM BROMIDE AND ALBUTEROL SULFATE 2.5; .5 MG/3ML; MG/3ML
1 SOLUTION RESPIRATORY (INHALATION) EVERY 4 HOURS PRN
Status: DISCONTINUED | OUTPATIENT
Start: 2025-06-06 | End: 2025-06-07 | Stop reason: HOSPADM

## 2025-06-06 RX ORDER — BUDESONIDE AND FORMOTEROL FUMARATE DIHYDRATE 160; 4.5 UG/1; UG/1
2 AEROSOL RESPIRATORY (INHALATION) 2 TIMES DAILY
Status: DISCONTINUED | OUTPATIENT
Start: 2025-06-06 | End: 2025-06-07 | Stop reason: HOSPADM

## 2025-06-06 RX ORDER — METHOCARBAMOL 500 MG/1
1000 TABLET, FILM COATED ORAL 3 TIMES DAILY
COMMUNITY

## 2025-06-06 RX ORDER — ATORVASTATIN CALCIUM 40 MG/1
40 TABLET, FILM COATED ORAL NIGHTLY
Status: DISCONTINUED | OUTPATIENT
Start: 2025-06-06 | End: 2025-06-06 | Stop reason: DRUGHIGH

## 2025-06-06 RX ORDER — PREDNISONE 10 MG/1
10 TABLET ORAL
Status: DISCONTINUED | OUTPATIENT
Start: 2025-06-06 | End: 2025-06-07 | Stop reason: HOSPADM

## 2025-06-06 RX ORDER — ATORVASTATIN CALCIUM 10 MG/1
20 TABLET, FILM COATED ORAL NIGHTLY
Status: DISCONTINUED | OUTPATIENT
Start: 2025-06-06 | End: 2025-06-07 | Stop reason: HOSPADM

## 2025-06-06 RX ORDER — METOPROLOL TARTRATE 50 MG
50 TABLET ORAL DAILY
Status: DISCONTINUED | OUTPATIENT
Start: 2025-06-06 | End: 2025-06-06

## 2025-06-06 RX ORDER — ACETAMINOPHEN 325 MG/1
650 TABLET ORAL EVERY 8 HOURS PRN
COMMUNITY

## 2025-06-06 RX ORDER — LORAZEPAM 1 MG/1
1 TABLET ORAL EVERY EVENING
Status: DISCONTINUED | OUTPATIENT
Start: 2025-06-06 | End: 2025-06-07 | Stop reason: HOSPADM

## 2025-06-06 RX ORDER — VENLAFAXINE HYDROCHLORIDE 75 MG/1
75 CAPSULE, EXTENDED RELEASE ORAL DAILY
Status: DISCONTINUED | OUTPATIENT
Start: 2025-06-06 | End: 2025-06-07 | Stop reason: HOSPADM

## 2025-06-06 RX ADMIN — LORAZEPAM 1 MG: 1 TABLET ORAL at 16:42

## 2025-06-06 RX ADMIN — LORAZEPAM 1 MG: 1 TABLET ORAL at 02:44

## 2025-06-06 RX ADMIN — METOPROLOL TARTRATE 50 MG: 50 TABLET, FILM COATED ORAL at 10:45

## 2025-06-06 RX ADMIN — INSULIN LISPRO 1 UNITS: 100 INJECTION, SOLUTION INTRAVENOUS; SUBCUTANEOUS at 16:42

## 2025-06-06 RX ADMIN — INSULIN LISPRO 1 UNITS: 100 INJECTION, SOLUTION INTRAVENOUS; SUBCUTANEOUS at 19:52

## 2025-06-06 RX ADMIN — SENNOSIDES 17.2 MG: 8.6 TABLET, FILM COATED ORAL at 02:19

## 2025-06-06 RX ADMIN — Medication 3 MG: at 19:52

## 2025-06-06 RX ADMIN — DILTIAZEM HYDROCHLORIDE 120 MG: 120 CAPSULE, COATED, EXTENDED RELEASE ORAL at 10:45

## 2025-06-06 RX ADMIN — APIXABAN 5 MG: 5 TABLET, FILM COATED ORAL at 19:53

## 2025-06-06 RX ADMIN — AZITHROMYCIN MONOHYDRATE 500 MG: 500 INJECTION, POWDER, LYOPHILIZED, FOR SOLUTION INTRAVENOUS at 02:48

## 2025-06-06 RX ADMIN — GABAPENTIN 100 MG: 100 CAPSULE ORAL at 02:46

## 2025-06-06 RX ADMIN — MICONAZOLE NITRATE: 20 CREAM TOPICAL at 22:13

## 2025-06-06 RX ADMIN — POTASSIUM BICARBONATE 40 MEQ: 782 TABLET, EFFERVESCENT ORAL at 10:46

## 2025-06-06 RX ADMIN — APIXABAN 5 MG: 5 TABLET, FILM COATED ORAL at 02:19

## 2025-06-06 RX ADMIN — APIXABAN 5 MG: 5 TABLET, FILM COATED ORAL at 10:45

## 2025-06-06 RX ADMIN — ATORVASTATIN CALCIUM 20 MG: 40 TABLET, FILM COATED ORAL at 02:19

## 2025-06-06 RX ADMIN — GABAPENTIN 100 MG: 100 CAPSULE ORAL at 15:15

## 2025-06-06 RX ADMIN — Medication 2 PUFF: at 20:17

## 2025-06-06 RX ADMIN — LEVOFLOXACIN 500 MG: 500 TABLET, FILM COATED ORAL at 10:49

## 2025-06-06 RX ADMIN — SENNOSIDES 17.2 MG: 8.6 TABLET, FILM COATED ORAL at 19:53

## 2025-06-06 RX ADMIN — VENLAFAXINE HYDROCHLORIDE 75 MG: 75 CAPSULE, EXTENDED RELEASE ORAL at 10:46

## 2025-06-06 RX ADMIN — METOPROLOL TARTRATE 75 MG: 25 TABLET, FILM COATED ORAL at 19:53

## 2025-06-06 RX ADMIN — INSULIN LISPRO 7 UNITS: 100 INJECTION, SOLUTION INTRAVENOUS; SUBCUTANEOUS at 16:42

## 2025-06-06 RX ADMIN — GABAPENTIN 100 MG: 100 CAPSULE ORAL at 19:53

## 2025-06-06 RX ADMIN — EMPAGLIFLOZIN 10 MG: 10 TABLET, FILM COATED ORAL at 15:15

## 2025-06-06 RX ADMIN — SODIUM CHLORIDE, PRESERVATIVE FREE 10 ML: 5 INJECTION INTRAVENOUS at 21:21

## 2025-06-06 RX ADMIN — CEFTRIAXONE SODIUM 2000 MG: 2 INJECTION, POWDER, FOR SOLUTION INTRAMUSCULAR; INTRAVENOUS at 02:15

## 2025-06-06 RX ADMIN — MAGNESIUM SULFATE HEPTAHYDRATE 2000 MG: 40 INJECTION, SOLUTION INTRAVENOUS at 09:01

## 2025-06-06 RX ADMIN — IPRATROPIUM BROMIDE AND ALBUTEROL SULFATE 1 DOSE: .5; 2.5 SOLUTION RESPIRATORY (INHALATION) at 20:17

## 2025-06-06 RX ADMIN — IPRATROPIUM BROMIDE AND ALBUTEROL SULFATE 1 DOSE: .5; 2.5 SOLUTION RESPIRATORY (INHALATION) at 08:05

## 2025-06-06 RX ADMIN — Medication 2 PUFF: at 08:06

## 2025-06-06 RX ADMIN — ATORVASTATIN CALCIUM 20 MG: 40 TABLET, FILM COATED ORAL at 19:52

## 2025-06-06 RX ADMIN — PREDNISONE 10 MG: 10 TABLET ORAL at 10:46

## 2025-06-06 RX ADMIN — FUROSEMIDE 40 MG: 10 INJECTION, SOLUTION INTRAMUSCULAR; INTRAVENOUS at 09:03

## 2025-06-06 RX ADMIN — INSULIN GLARGINE 30 UNITS: 100 INJECTION, SOLUTION SUBCUTANEOUS at 19:52

## 2025-06-06 RX ADMIN — OXYCODONE HYDROCHLORIDE AND ACETAMINOPHEN 1 TABLET: 7.5; 325 TABLET ORAL at 20:10

## 2025-06-06 RX ADMIN — GABAPENTIN 100 MG: 100 CAPSULE ORAL at 10:45

## 2025-06-06 ASSESSMENT — PAIN SCALES - GENERAL
PAINLEVEL_OUTOF10: 0
PAINLEVEL_OUTOF10: 4

## 2025-06-06 ASSESSMENT — PAIN DESCRIPTION - ORIENTATION: ORIENTATION: RIGHT;LEFT

## 2025-06-06 ASSESSMENT — PAIN DESCRIPTION - LOCATION: LOCATION: KNEE

## 2025-06-06 NOTE — PROGRESS NOTES
RT Inhaler-Nebulizer Bronchodilator Protocol Note    There is a bronchodilator order in the chart from a provider indicating to follow the RT Bronchodilator Protocol and there is an “Initiate RT Inhaler-Nebulizer Bronchodilator Protocol” order as well (see protocol at bottom of note).    CXR Findings:  XR CHEST PORTABLE  Result Date: 6/5/2025  1. Opacification in the right lung base, in keeping with underlying effusion, atelectasis and or pneumonia. 2. Cardiomegaly.       The findings from the last RT Protocol Assessment were as follows:   History Pulmonary Disease: (P) Chronic pulmonary disease  Respiratory Pattern: (P) Dyspnea on exertion or RR 21-25 bpm  Breath Sounds: (P) Slightly diminished and/or crackles  Cough: (P) Strong, spontaneous, non-productive  Indication for Bronchodilator Therapy: (P) Decreased or absent breath sounds  Bronchodilator Assessment Score: (P) 6    Aerosolized bronchodilator medication orders have been revised according to the RT Inhaler-Nebulizer Bronchodilator Protocol below.    Respiratory Therapist to perform RT Therapy Protocol Assessment initially then follow the protocol.  Repeat RT Therapy Protocol Assessment PRN for score 0-3 or on second treatment, BID, and PRN for scores above 3.    No Indications - adjust the frequency to every 6 hours PRN wheezing or bronchospasm, if no treatments needed after 48 hours then discontinue using Per Protocol order mode.     If indication present, adjust the RT bronchodilator orders based on the Bronchodilator Assessment Score as indicated below.  Use Inhaler orders unless patient has one or more of the following: on home nebulizer, not able to hold breath for 10 seconds, is not alert and oriented, cannot activate and use MDI correctly, or respiratory rate 25 breaths per minute or more, then use the equivalent nebulizer order(s) with same Frequency and PRN reasons based on the score.  If a patient is on this medication at home then do not

## 2025-06-06 NOTE — DISCHARGE INSTRUCTIONS
Heart Failure Resources:  Heart Failure Interactive Workbook:  Go to https://[email or web address] LogicitalBocom.Audyssey/publication/?a=294664 for a Free Heart Failure Interactive Workbook provided by The American [employer] Association. This interactive workbook will provide information on Healthier Living with Heart Failure. Please copy and paste link into search bar. Use your mouse to scroll through the pages.    HF Gaston dennys:   Heart Failure Free smart phone dennys available for iPhone and Android download. Use your phone to track sodium intake, fluid intake, symptoms, and weight.     Low Sodium Diet / Recipes:  Go to www.[employer].Ganipara website for “renal” diet which is Low Sodium! [employer] is a dialysis company, but this website offers free seasonal cookbooks. Each quarter, they will release 25-30 new recipes with a breakdown of calories, sodium, and glucose. You can also go to [email or web address]/recipes website for free recipes.     Discharge Instruction Video:  Scan the QR code below with your camera and click the [email or web address] link to open the video and watch educational information on Heart Failure and Medications from one of our nurses.   [email or web address]    Home Exercise Program:   Identification of Green/Yellow/Red zones:  You should be able to identify when you feel good (green zone), if you have 1-2 symptoms of HF (yellow zone), or if you are in need of medical attention (red zone).  In your CHF education folder you were provided a “stop light tool” to outline this information.     We want to you to rate your exertion levels:    Our therapy team has discussed means of identification with you such as the \"Patsy scale.\"  The Patsy rating scale ranges from 6 to 20, where 6 means \"no exertion at all\" and 20 means \"maximal exertion.\" The goal is to use this to gauge how much effort it is taking for you to do your normal daily tasks.   You should be able to recognize when too much exertion is

## 2025-06-06 NOTE — CARE COORDINATION
Case Management Assessment  Initial Evaluation    Date/Time of Evaluation: 6/6/2025 8:24 AM  Assessment Completed by: Bettye Lambert RN    If patient is discharged prior to next notation, then this note serves as note for discharge by case management.    Patient Name: Leslie Farris                   YOB: 1950  Diagnosis: Acute on chronic systolic congestive heart failure (HCC) [I50.23]  Acute on chronic heart failure with normal ejection fraction (HCC) [I50.33]                   Date / Time: 6/5/2025  2:00 PM    Patient Admission Status: Inpatient   Readmission Risk (Low < 19, Mod (19-27), High > 27): Readmission Risk Score: 19.2    Current PCP: Kristin Pacheco MD  PCP verified by CM? Yes    Chart Reviewed: Yes      History Provided by: Patient  Patient Orientation: Alert and Oriented    Patient Cognition: Alert    Hospitalization in the last 30 days (Readmission):  No    If yes, Readmission Assessment in CM Navigator will be completed.    Advance Directives:      Code Status: Full Code   Patient's Primary Decision Maker is: Legal Next of Kin    Primary Decision Maker: Saeed Magallanes - Brother/Sister - [phone]    Discharge Planning:    Patient lives with: Other (Comment) ([facility]) Type of Home: Long-Term Care  Primary Care Giver: Other (Comment) ([facility])  Patient Support Systems include: Children, Family Members, Other (Comment) (BN[facility])   Current Financial resources: Medicaid  Current community resources: None  Current services prior to admission: Oxygen Therapy (supposed to wear supplemental o2)            Current DME:              Type of Home Care services:  None    ADLS  Prior functional level: Assistance with the following:, Bathing, Dressing, Toileting, Cooking, Housework, Shopping, Mobility  Current functional level: Assistance with the following:, Bathing, Dressing, Toileting, Cooking, Housework, Shopping, Mobility    PT AM-PAC:   /24  OT AM-PAC:   /24    Family can provide assistance at

## 2025-06-06 NOTE — PROGRESS NOTES
Progress Note    Admit Date:  6/5/2025    Subjective:  Ms. Farris lying in bed, sleeping.  Unable to provide much history due to baseline dementia.  Per nursing staff, patient confused, agitated, calling out overnight.    Objective:   /85   Pulse (!) 110   Temp 97.7 °F (36.5 °C) (Oral)   Resp 20   Ht 1.702 m (5' 7\")   Wt 99.8 kg (220 lb)   SpO2 94%   BMI 34.46 kg/m²        Intake/Output Summary (Last 24 hours) at 6/6/2025 0746  Last data filed at 6/6/2025 0608  Gross per 24 hour   Intake 30 ml   Output 1800 ml   Net -1770 ml       Physical Exam: limited exam 2/2 dementia and trouble following commands.  Gen: No distress. Somnolent. Confused. Obese.  Eyes: PERRL. No sclera icterus. No conjunctival injection.   ENT: No discharge. Pharynx clear.   Neck: No JVD.  No Carotid Bruit. Trachea midline.  Resp: On 2L O2 via NC. Lying flat. Lung exam limited 2/2 body habitus and inability to sit up for exam due to sedation.No accessory muscle use.   CV: Irregularly irregular. No murmur.  No rub. No edema.   Capillary Refill: Brisk,< 3 seconds   Peripheral Pulses: +2 palpable, equal bilaterally   GI: Non-tender. Non-distended. No masses. No organomegaly. Normal bowel sounds. No hernia.   Skin: Warm and dry. No nodule on exposed extremities. No rash on exposed extremities.   M/S: No cyanosis. No joint deformity. No clubbing.   Neuro: Baseline dementia. Grossly nonfocal    Psych: No anxiety or agitation.         Medications:  cefTRIAXone (ROCEPHIN) IV, 2,000 mg, Q24H  azithromycin, 500 mg, Q24H  apixaban, 5 mg, BID  budesonide-formoterol, 2 puff, BID  dilTIAZem, 120 mg, Daily  metoprolol tartrate, 50 mg, Daily  gabapentin, 100 mg, TID  lactulose, 20 g, Every Other Day  predniSONE, 10 mg, Daily with breakfast  pantoprazole, 40 mg, QAM AC  senna, 2 tablet, BID  venlafaxine, 75 mg, Daily  atorvastatin, 20 mg, Nightly  LORazepam, 1 mg, QPM  ipratropium 0.5 mg-albuterol 2.5 mg, 1 Dose, BID RT  sodium chloride flush,

## 2025-06-06 NOTE — ED NOTES
Leslie Farris is a 74 y.o. female admitted for  Principal Problem:    Acute on chronic heart failure with normal ejection fraction (HCC)  Resolved Problems:    * No resolved hospital problems. *  .   Patient SNF LTC via EMS transportation with   Chief Complaint   Patient presents with    Chest Pain     EMS called out for CP and SOB, patient from [facility].   .  Patient is alert and Person, Place, Time, and Situation  Patient's baseline mobility: Baseline Mobility: Bed bound   Code Status: Prior   Cardiac Rhythm: Cardiac Rhythm: Atrial fib  O2 Flow Rate (L/min): 2 L/min  Is patient on baseline Oxygen: yes,  how many Liters3:   Abnormal Assessment Findings:     Isolation: None      NIH Score:    C-SSRS: Risk of Suicide: No Risk  Bedside swallow:        Active LDA's:    Patient admitted with a loya: no If the loya is chronic was it exchanged:NA  Reason for loya:   Patient admitted with Central Line:  NA . PICC line placement confirmed: YES OR NO:[id number]}   Reason for Central line:   Was central line Inserted from an outside facility:        Family/Caregiver Present no Any Concerns: no   Restraints   Sitter no         Vitals: MEWS Score: 2    Vitals:    06/05/25 1752 06/05/25 1753 06/05/25 1757 06/05/25 1815   BP:   126/80    Pulse: (!) 103 88 81 96   Resp: 20 23 20 22   Temp:   98.1 °F (36.7 °C)    TempSrc:   Oral    SpO2: 99% 100% 98% 100%       Last documented pain score (0-10 scale)    Pain medication administered No.    Pertinent or High Risk Medications/Drips: No.    Pending Blood Product Administration: no    Abnormal labs:   Abnormal Labs Reviewed   CBC WITH AUTO DIFFERENTIAL - Abnormal; Notable for the following components:       Result Value    WBC 14.6 (*)     Hemoglobin 10.0 (*)     Hematocrit 33.8 (*)     MCV 72.2 (*)     MCH 21.4 (*)     MCHC 29.6 (*)     RDW 17.0 (*)     Neutrophils Absolute 12.2 (*)     Anisocytosis 1+ (*)     Polychromasia 1+ (*)     Hypochromia Occasional (*)     Ovalocytes  Occasional (*)     All other components within normal limits   COMPREHENSIVE METABOLIC PANEL - Abnormal; Notable for the following components:    Chloride 96 (*)     Glucose 181 (*)     Creatinine 0.4 (*)     Calcium 8.1 (*)     Albumin 3.0 (*)     Albumin/Globulin Ratio 0.9 (*)     ALT 8 (*)     All other components within normal limits   BLOOD GAS, VENOUS - Abnormal; Notable for the following components:    pH, Jeffery 7.602 (*)     pCO2, Jeffery 30.3 (*)     PO2, Jeffery 111.7 (*)     HCO3, Venous 29.2 (*)     Base Excess, Jeffery 7.7 (*)     Carboxyhemoglobin 2.9 (*)     All other components within normal limits    Narrative:     CALL  [place]  [facility] tel. [phone],  Chemistry results called to and read back by Sarah Copeland RN, 06/05/2025  15:32, by POOL   BRAIN NATRIURETIC PEPTIDE - Abnormal; Notable for the following components:    NT Pro-BNP 3,031 (*)     All other components within normal limits   BLOOD GAS, VENOUS - Abnormal; Notable for the following components:    pCO2, Jeffery 50.1 (*)     HCO3, Venous 31.8 (*)     Base Excess, Jeffery 6.4 (*)     Carboxyhemoglobin 1.8 (*)     All other components within normal limits     Critical values: yes  Intervention for critical value(s):     Abnormal Imaging: yes,  CT scan            You may also review the ED PT Care Timeline found under the Summary Tab, ED Encounter Summary, Timeline Reports, ED Patient Care Timeline.     Recommendation    Pending orders/Uncompleted orders to hand off:      Additional Comments:   If any further questions, please call Sending RN

## 2025-06-06 NOTE — PROGRESS NOTES
Admission assessment completed. Scheduled medications given per MAR. VSS 2 liter NC, A/O to self, place, and situation, patient constantly yelling out and seems very impulsive. Patient does not appear in distress and denies any needs at this time. Call light in reach, will monitor, bed alarm on.       Patient admitted to room *** from ***. Patient oriented to room, call light, bed rails, phone, lights and bathroom. Patient instructed about the schedule of the day including: vital sign frequency, lab draws, possible tests, frequency of MD and staff rounds, daily weights, I &O's and prescribed diet. *** Telemetry box in place, patient aware of placement and reason. Bed locked, in lowest position, side rails up 2/4, call light within reach.        Recliner Assessment  Patient {Demonstrate:[id number]}       4 Eyes Skin Assessment     NAME:  Leslie Farris  YOB: 1950  MEDICAL RECORD NUMBER:  [id number]    The patient is being assessed for  Admission    I agree that at least one RN has performed a thorough Head to Toe Skin Assessment on the patient. ALL assessment sites listed below have been assessed.      Areas assessed by both nurses:    Head, Face, Ears, Shoulders, Back, Chest, Arms, Elbows, Hands, Sacrum. Buttock, Coccyx, Ischium, Legs. Feet and Heels, and Under Medical Devices     Redness alexander area.     Does the Patient have a Wound? No noted wound(s)       Jarod Prevention initiated by RN: No  Wound Care Orders initiated by RN: No    Pressure Injury (Stage 3,4, Unstageable, DTI, NWPT, and Complex wounds) if present, place Wound referral order by RN under : No    New Ostomies, if present place, Ostomy referral order under : No     Nurse 1 eSignature: Electronically signed by Marisa Rojas RN on 6/6/25 at 1:31 AM EDT    **SHARE this note so that the co-signing nurse can place an eSignature**    Nurse 2 eSignature:

## 2025-06-06 NOTE — CONSULTS
[facility]   CONSULTATION  [phone]        Reason for Consultation/Chief Complaint: \"I have been having HA and stomach cramps.\"  Cardiology consulted for HF, inferior ischemia on EKG per SHARRON Fong DO  Patient last seen by ANTONINO Cruz MD on 4/30/24    History of Present Illness:    Leslie Farris is a 74 y.o. patient who [place] [facility] with complaints of chest pain.  PMH HTN, chronic diastolic HF, permanent afib on eliquis, anemia, COPD, dementia, uncontrolled DM type 2, anxiety, schizoaffective disorder. Prior testing: Niya nuc 4/18/2016 EF=48% and neg for ischemia. Echo 5/9/24 EF=50-55% (EF 55% in 4/23); Grade I DD normal LAP; mild MV thickening  Patient arrived by EMS from [facility] with c/o chest pain and worsening sob over several days. Note she only tells me c/o HA and stomach cramps. History likely unreliable. Admission Testing:  EKG noted Afib 100bpm; ST and T wave abnormality (HR decreased from 139 in 12/24). CXR noted opacification right lung base; mild CM. CT chest CHF findings IS markings, bilateral pleural effusion; coronary artery calcifications; LABS: , K 3.6, 3.4, Mg 1.63; BUN/Cr 13/0.4, Pro-BNP 3,031, ALT 8, AST 16, H/H 10.1/33.8 and Palomo 10 and last 8. CRP 41; Lasix IV BID was started. Weight 220# (noted d/c 213# prior). Patient with no c/o CP, SOB, palpitations, dizziness, edema, or orthopnea/PND. I have been asked to provide consultation regarding further management and testing.    Past Medical History:   has a past medical history of Acute diastolic congestive heart failure (HCC), Acute diastolic heart failure (HCC), Acute respiratory failure (HCC), Adjustment disorder with mixed anxiety and depressed mood, Allergic rhinitis, Alzheimer's dementia (HCC), Arachnoid cyst, Atrial fibrillation (HCC), CHF (congestive heart failure) (HCC), Chronic back pain, Constipation, COPD (chronic obstructive pulmonary disease) (HCC), Diabetes mellitus (HCC), Diskitis, Disseminated  8.3 06/06/2025 04:56 AM    BILITOT <0.2 06/06/2025 04:56 AM    ALKPHOS 76 06/06/2025 04:56 AM    AST 17 06/06/2025 04:56 AM    ALT 6 06/06/2025 04:56 AM     PT/INR:  No results found for: \"PTINR\"  Lab Results   Component Value Date    CKTOTAL 57 01/11/2020    TROPONINI <0.01 04/08/2023       EKG:  I have reviewed EKG with the following interpretation:  Impression:  See HPI    Assessment: Leslie Farris is a 74 y.o. patient who presented to [facility] 6/5/25 with c/o CP.  PMH HTN, chronic diastolic HF, permanent afib on eliquis, anemia, COPD, dementia, uncontrolled DM type 2, anxiety, schizoaffective disorder. Prior testing: Niya nuc 4/18/2016 EF=48% and neg for ischemia. Echo 5/9/24 EF=50-55% (EF 55% in 4/23); Grade I DD normal LAP; mild MV thickening  Patient arrived by EMS from [facility] with c/o chest pain and worsening sob over several days. Note she only tells me c/o HA and stomach cramps. History likely unreliable. Admission Testing:  EKG noted Afib 100bpm; ST and T wave abnormality (HR decreased from 139 in 12/24). CXR noted opacification right lung base; mild CM. CT chest CHF findings IS markings, bilateral pleural effusion; coronary artery calcifications; LABS: , K 3.6, 3.4, Mg 1.63; BUN/Cr 13/0.4, Pro-BNP 3,031, ALT 8, AST 16, H/H 10.1/33.8 and Palomo 10 and last 8. CRP 41; Lasix IV BID was started. Weight 220# (noted d/c 213# prior).   Diagnosis acute on chronic diastolic CHF and permanent afib in older female with multiple medical problems.    Recs:  Continue IV lasix 40 BID one more day and then switch to po regimen.   Increase metoprolol tartrate from 50 to 75 BID for better HR control. Noting episodes of rapid afib which could cause CHF decompensation.  Add jardiance 10 qd for GDMT.   Replete K+ and Mg+ appropriately. Both low now  Abx for possible PNA per IM team  Continue eliquis 5 BID, lipitor 20 qd, cardizem 120 qd  Cancel ECHO. Multiple studies same EF and will not change mgt    Note acute on chronic

## 2025-06-06 NOTE — PROGRESS NOTES
RT Inhaler-Nebulizer Bronchodilator Protocol Note    There is a bronchodilator order in the chart from a provider indicating to follow the RT Bronchodilator Protocol and there is an “Initiate RT Inhaler-Nebulizer Bronchodilator Protocol” order as well (see protocol at bottom of note).    CXR Findings:  XR CHEST PORTABLE  Result Date: 6/5/2025  1. Opacification in the right lung base, in keeping with underlying effusion, atelectasis and or pneumonia. 2. Cardiomegaly.       The findings from the last RT Protocol Assessment were as follows:   History Pulmonary Disease: Chronic pulmonary disease  Respiratory Pattern: Dyspnea on exertion or RR 21-25 bpm  Breath Sounds: Slightly diminished and/or crackles  Cough: Strong, spontaneous, non-productive  Indication for Bronchodilator Therapy: Decreased or absent breath sounds  Bronchodilator Assessment Score: 6    Aerosolized bronchodilator medication orders have been revised according to the RT Inhaler-Nebulizer Bronchodilator Protocol below.    Respiratory Therapist to perform RT Therapy Protocol Assessment initially then follow the protocol.  Repeat RT Therapy Protocol Assessment PRN for score 0-3 or on second treatment, BID, and PRN for scores above 3.    No Indications - adjust the frequency to every 6 hours PRN wheezing or bronchospasm, if no treatments needed after 48 hours then discontinue using Per Protocol order mode.     If indication present, adjust the RT bronchodilator orders based on the Bronchodilator Assessment Score as indicated below.  Use Inhaler orders unless patient has one or more of the following: on home nebulizer, not able to hold breath for 10 seconds, is not alert and oriented, cannot activate and use MDI correctly, or respiratory rate 25 breaths per minute or more, then use the equivalent nebulizer order(s) with same Frequency and PRN reasons based on the score.  If a patient is on this medication at home then do not decrease Frequency below that  used at home.    0-3 - enter or revise RT bronchodilator order(s) to equivalent RT Bronchodilator order with Frequency of every 4 hours PRN for wheezing or increased work of breathing using Per Protocol order mode.        4-6 - enter or revise RT Bronchodilator order(s) to two equivalent RT bronchodilator orders with one order with BID Frequency and one order with Frequency of every 4 hours PRN wheezing or increased work of breathing using Per Protocol order mode.        7-10 - enter or revise RT Bronchodilator order(s) to two equivalent RT bronchodilator orders with one order with TID Frequency and one order with Frequency of every 4 hours PRN wheezing or increased work of breathing using Per Protocol order mode.       11-13 - enter or revise RT Bronchodilator order(s) to one equivalent RT bronchodilator order with QID Frequency and an Albuterol order with Frequency of every 4 hours PRN wheezing or increased work of breathing using Per Protocol order mode.      Greater than 13 - enter or revise RT Bronchodilator order(s) to one equivalent RT bronchodilator order with every 4 hours Frequency and an Albuterol order with Frequency of every 2 hours PRN wheezing or increased work of breathing using Per Protocol order mode.     Electronically signed by Tiffany Parker RCP on 6/6/2025 at 3:09 AM

## 2025-06-06 NOTE — H&P
[facility] Medicine History & Physical    V 5.1    Date of Admission: 6/5/2025    Date of Service:  Pt seen/examined on 6/5/25 at 2050    [x]Admitted to Inpatient with expected LOS greater than two midnights due to medical therapy.  []Placed in Observation status.    Assessment/Plan:      Acute on Chronic Heart Failure with Preserved EF  Acute on Chronic Hypoxic Respiratory Failure  Patient presents with worsening shortness of breath  States this has been going on for several days  She does have associated orthopnea however no chest pain  CT chest was completed which shows congestive heart failure, bilateral pleural effusion  EKG shows atrial fibrillation, heart rate 100, diffuse ST depressions that are not significant  TTE 5/24 with LVEF 50 to 55%, grade 1 diastolic dysfunction  BNP on arrival 3031.  Troponin negative  Suspect the patient does have component of heart failure as a cause of her dyspnea  We will start her on Lasix 40 mg IV twice daily  Cardiac monitoring  Titrate oxygen to SpO2 92%  Monitor intake and output  Plan for echocardiogram in the    ?Pneumonia   Suspected  Patient with worsening shortness of breath, cough  She does state that she has had fevers at home of 102  CT chest generally shows pulmonary edema however hard to exclude infectious process  Will check a procalcitonin in the morning  Start the patient on Rocephin and azithromycin    Chronic problems  A fib - Chronic, on cardizem, metoprolol, eliquis. Plan to continue when reconciled   Diabetes mellitus type 2 -hold p.o. meds, basal and SSI  Dementia -some behavioral disturbances however she does communicate appropriately  Seizure disorder -does not appear to be on home meds for this    Discussed management and the need for Hospitalization of the patient w/ the Emergency Department Provider: Dr. Edwards     Chief Admission Complaint: Shortness of breath    Presenting Admission History:      74 y.o. female who presented to [facility]

## 2025-06-06 NOTE — PROGRESS NOTES
Progress Note    Admit Date:  6/5/2025    Subjective:  Ms. Farris lying in bed, sleeping.  Unable to provide much history due to baseline dementia.  Per nursing staff, patient confused, agitated, calling out overnight.    6/6- diuresing well. Weight during last admission -at the time of discharge was 213 lbs. On her baseline oxygen. HR between 90-11 bpm.      Objective:   /68   Pulse (!) 107   Temp 98.2 °F (36.8 °C) (Axillary)   Resp 20   Ht 1.702 m (5' 7\")   Wt 99.8 kg (220 lb)   SpO2 99%   BMI 34.46 kg/m²        Intake/Output Summary (Last 24 hours) at 6/6/2025 0911  Last data filed at 6/6/2025 0900  Gross per 24 hour   Intake 30 ml   Output 1900 ml   Net -1870 ml       Physical Exam: limited exam 2/2 dementia and trouble following commands.  Gen: No distress. Somnolent. Confused. Obese.  Eyes: PERRL. No sclera icterus. No conjunctival injection.   ENT: No discharge. Pharynx clear.   Neck: No JVD.  No Carotid Bruit. Trachea midline.  Resp: On 2L O2 via NC. Lying flat. Lung exam limited 2/2 body habitus and inability to sit up for exam due to sedation.No accessory muscle use.   CV: Irregularly irregular. No murmur.  No rub. No edema.   Capillary Refill: Brisk,< 3 seconds   Peripheral Pulses: +2 palpable, equal bilaterally   GI: Non-tender. Non-distended. No masses. No organomegaly. Normal bowel sounds. No hernia.   Skin: Warm and dry. No nodule on exposed extremities. No rash on exposed extremities.   M/S: No cyanosis. No joint deformity. No clubbing.   Neuro: Baseline dementia. Grossly nonfocal    Psych: No anxiety or agitation.         Medications:  cefTRIAXone (ROCEPHIN) IV, 2,000 mg, Q24H  azithromycin, 500 mg, Q24H  apixaban, 5 mg, BID  budesonide-formoterol, 2 puff, BID  dilTIAZem, 120 mg, Daily  metoprolol tartrate, 50 mg, Daily  gabapentin, 100 mg, TID  lactulose, 20 g, Every Other Day  predniSONE, 10 mg, Daily with breakfast  pantoprazole, 40 mg, QAM AC  senna, 2 tablet, BID  venlafaxine,  75 mg, Daily  atorvastatin, 20 mg, Nightly  LORazepam, 1 mg, QPM  ipratropium 0.5 mg-albuterol 2.5 mg, 1 Dose, BID RT  sodium chloride flush, 5-40 mL, 2 times per day  melatonin, 3 mg, Nightly  furosemide, 40 mg, BID  insulin glargine, 30 Units, Nightly  insulin lispro, 0.08 Units/kg (Order-Specific), TID WC  insulin lispro, 0-4 Units, 4x Daily AC & HS      PRN Medications:  ipratropium 0.5 mg-albuterol 2.5 mg, 1 Dose, Q4H PRN  oxyCODONE-acetaminophen, 1 tablet, Q6H PRN  sodium chloride flush, 5-40 mL, PRN  sodium chloride, , PRN  potassium chloride, 40 mEq, PRN   Or  potassium alternative oral replacement, 40 mEq, PRN   Or  potassium chloride, 10 mEq, PRN  magnesium sulfate, 2,000 mg, PRN  ondansetron, 4 mg, Q8H PRN   Or  ondansetron, 4 mg, Q6H PRN  polyethylene glycol, 17 g, Daily PRN  acetaminophen, 650 mg, Q6H PRN   Or  acetaminophen, 650 mg, Q6H PRN  glucose, 4 tablet, PRN  dextrose bolus, 125 mL, PRN   Or  dextrose bolus, 250 mL, PRN  glucagon (rDNA), 1 mg, PRN  dextrose, , Continuous PRN          Data:  CBC:   Recent Labs     06/05/25  1525 06/06/25  0456   WBC 14.6* 14.2*   HGB 10.0* 10.1*   HCT 33.8* 33.1*   MCV 72.2* 70.6*    234     BMP:   Recent Labs     06/05/25  1525 06/05/25  1950/25  0456     --  140   K 3.6 4.0 3.4*   CL 96*  --  96*   CO2 28  --  28   BUN 13  --  11   CREATININE 0.4*  --  0.4*     LIVER PROFILE:   Recent Labs     06/05/25  1525 06/06/25  0456   AST 16 17   ALT 8* 6*   BILITOT <0.2 <0.2   ALKPHOS 71 76     PT/INR: No results for input(s): \"PROTIME\", \"INR\" in the last 72 hours.    CULTURES  Results       ** No results found for the last 336 hours. **          Results in Past 30 Days  Result Component Current Result Ref Range Previous Result Ref Range   Troponin, High Sensitivity 8 (6/5/2025) 0 - 14 ng/L 10 (6/5/2025) 0 - 14 ng/L        Results in Past 30 Days  Result Component Current Result Ref Range Previous Result Ref Range   NT Pro-BNP 3,031 (H) (6/5/2025) 0 -

## 2025-06-06 NOTE — ACP (ADVANCE CARE PLANNING)
Advance Care Planning     General Advance Care Planning (ACP) Conversation    Date of Conversation: 6/6/2025  Conducted with: Patient with Decision Making Capacity  Other persons present: None    Healthcare Decision Maker:   Primary Decision Maker: Saeed Magallanes - Brother/Sister - [phone]       Content/Action Overview:  Has ACP document(s) on file - reflects the patient's care preferences  Reviewed DNR/DNI and patient confirms current DNR status - completed forms on file (place new order if needed)        Length of Voluntary ACP Conversation in minutes:  <16 minutes (Non-Billable)    Bettye Lambert RN

## 2025-06-06 NOTE — FLOWSHEET NOTE
06/06/25 0845   Vital Signs   Temp 98.2 °F (36.8 °C)   Temp Source Axillary   Pulse (!) 107   Heart Rate Source Monitor   /68   MAP (Calculated) 88   BP Location Left lower arm   Patient Position Supine   Oxygen Therapy   SpO2 99 %     Pt. Morning assessment completed. Pt was sleeping but awoken during assessment. A+O x3. Vitals stable. Was given a warm blanket but still is complaining of being cold. Pt. Denied any further needs. Call light left within reach     RN agree with student LPN assessment

## 2025-06-07 VITALS
HEART RATE: 91 BPM | OXYGEN SATURATION: 99 % | HEIGHT: 67 IN | SYSTOLIC BLOOD PRESSURE: 101 MMHG | TEMPERATURE: 98.3 F | WEIGHT: 220 LBS | BODY MASS INDEX: 34.53 KG/M2 | RESPIRATION RATE: 17 BRPM | DIASTOLIC BLOOD PRESSURE: 45 MMHG

## 2025-06-07 PROBLEM — J18.9 PNEUMONIA DUE TO INFECTIOUS ORGANISM: Status: RESOLVED | Noted: 2023-09-05 | Resolved: 2025-06-07

## 2025-06-07 PROBLEM — I50.23 ACUTE ON CHRONIC SYSTOLIC CONGESTIVE HEART FAILURE (HCC): Status: ACTIVE | Noted: 2025-06-07

## 2025-06-07 LAB
ANION GAP SERPL CALCULATED.3IONS-SCNC: 15 MMOL/L (ref 3–16)
BUN SERPL-MCNC: 16 MG/DL (ref 7–20)
CALCIUM SERPL-MCNC: 8.1 MG/DL (ref 8.3–10.6)
CHLORIDE SERPL-SCNC: 95 MMOL/L (ref 99–110)
CO2 SERPL-SCNC: 28 MMOL/L (ref 21–32)
CREAT SERPL-MCNC: 0.6 MG/DL (ref 0.6–1.2)
GFR SERPLBLD CREATININE-BSD FMLA CKD-EPI: >90 ML/MIN/{1.73_M2}
GLUCOSE BLD-MCNC: 143 MG/DL (ref 70–99)
GLUCOSE BLD-MCNC: 171 MG/DL (ref 70–99)
GLUCOSE BLD-MCNC: 192 MG/DL (ref 70–99)
GLUCOSE BLD-MCNC: 228 MG/DL (ref 70–99)
GLUCOSE SERPL-MCNC: 150 MG/DL (ref 70–99)
MAGNESIUM SERPL-MCNC: 2.6 MG/DL (ref 1.8–2.4)
PERFORMED ON: ABNORMAL
POTASSIUM SERPL-SCNC: 3.3 MMOL/L (ref 3.5–5.1)
SODIUM SERPL-SCNC: 138 MMOL/L (ref 136–145)

## 2025-06-07 PROCEDURE — 6360000002 HC RX W HCPCS: Performed by: INTERNAL MEDICINE

## 2025-06-07 PROCEDURE — 83735 ASSAY OF MAGNESIUM: CPT

## 2025-06-07 PROCEDURE — 36415 COLL VENOUS BLD VENIPUNCTURE: CPT

## 2025-06-07 PROCEDURE — 2700000000 HC OXYGEN THERAPY PER DAY

## 2025-06-07 PROCEDURE — 94640 AIRWAY INHALATION TREATMENT: CPT

## 2025-06-07 PROCEDURE — 6370000000 HC RX 637 (ALT 250 FOR IP): Performed by: INTERNAL MEDICINE

## 2025-06-07 PROCEDURE — 2500000003 HC RX 250 WO HCPCS: Performed by: INTERNAL MEDICINE

## 2025-06-07 PROCEDURE — 99233 SBSQ HOSP IP/OBS HIGH 50: CPT | Performed by: INTERNAL MEDICINE

## 2025-06-07 PROCEDURE — 94761 N-INVAS EAR/PLS OXIMETRY MLT: CPT

## 2025-06-07 PROCEDURE — 80048 BASIC METABOLIC PNL TOTAL CA: CPT

## 2025-06-07 PROCEDURE — 99239 HOSP IP/OBS DSCHRG MGMT >30: CPT | Performed by: INTERNAL MEDICINE

## 2025-06-07 RX ORDER — DIGOXIN 0.25 MG/ML
250 INJECTION INTRAMUSCULAR; INTRAVENOUS EVERY 6 HOURS
Status: COMPLETED | OUTPATIENT
Start: 2025-06-07 | End: 2025-06-07

## 2025-06-07 RX ORDER — DIGOXIN 0.25 MG/ML
250 INJECTION INTRAMUSCULAR; INTRAVENOUS EVERY 6 HOURS
Status: DISCONTINUED | OUTPATIENT
Start: 2025-06-07 | End: 2025-06-07

## 2025-06-07 RX ADMIN — GABAPENTIN 100 MG: 100 CAPSULE ORAL at 14:48

## 2025-06-07 RX ADMIN — INSULIN LISPRO 1 UNITS: 100 INJECTION, SOLUTION INTRAVENOUS; SUBCUTANEOUS at 09:11

## 2025-06-07 RX ADMIN — PREDNISONE 10 MG: 10 TABLET ORAL at 09:10

## 2025-06-07 RX ADMIN — VENLAFAXINE HYDROCHLORIDE 75 MG: 75 CAPSULE, EXTENDED RELEASE ORAL at 09:11

## 2025-06-07 RX ADMIN — MAGNESIUM SULFATE HEPTAHYDRATE 2000 MG: 40 INJECTION, SOLUTION INTRAVENOUS at 11:58

## 2025-06-07 RX ADMIN — INSULIN LISPRO 7 UNITS: 100 INJECTION, SOLUTION INTRAVENOUS; SUBCUTANEOUS at 09:18

## 2025-06-07 RX ADMIN — IPRATROPIUM BROMIDE AND ALBUTEROL SULFATE 1 DOSE: .5; 2.5 SOLUTION RESPIRATORY (INHALATION) at 07:40

## 2025-06-07 RX ADMIN — INSULIN LISPRO 7 UNITS: 100 INJECTION, SOLUTION INTRAVENOUS; SUBCUTANEOUS at 11:54

## 2025-06-07 RX ADMIN — DIGOXIN 250 MCG: 0.25 INJECTION INTRAMUSCULAR; INTRAVENOUS at 11:53

## 2025-06-07 RX ADMIN — GABAPENTIN 100 MG: 100 CAPSULE ORAL at 09:10

## 2025-06-07 RX ADMIN — EMPAGLIFLOZIN 10 MG: 10 TABLET, FILM COATED ORAL at 09:11

## 2025-06-07 RX ADMIN — APIXABAN 5 MG: 5 TABLET, FILM COATED ORAL at 09:10

## 2025-06-07 RX ADMIN — Medication 2 PUFF: at 07:40

## 2025-06-07 RX ADMIN — POTASSIUM BICARBONATE 40 MEQ: 782 TABLET, EFFERVESCENT ORAL at 11:52

## 2025-06-07 RX ADMIN — LEVOFLOXACIN 500 MG: 500 TABLET, FILM COATED ORAL at 09:11

## 2025-06-07 RX ADMIN — INSULIN LISPRO 1 UNITS: 100 INJECTION, SOLUTION INTRAVENOUS; SUBCUTANEOUS at 11:52

## 2025-06-07 RX ADMIN — DIGOXIN 250 MCG: 0.25 INJECTION INTRAMUSCULAR; INTRAVENOUS at 17:03

## 2025-06-07 RX ADMIN — SODIUM CHLORIDE, PRESERVATIVE FREE 10 ML: 5 INJECTION INTRAVENOUS at 09:16

## 2025-06-07 RX ADMIN — PANTOPRAZOLE SODIUM 40 MG: 40 TABLET, DELAYED RELEASE ORAL at 05:19

## 2025-06-07 RX ADMIN — METOPROLOL TARTRATE 75 MG: 25 TABLET, FILM COATED ORAL at 09:09

## 2025-06-07 RX ADMIN — ACETAMINOPHEN 650 MG: 325 TABLET ORAL at 09:11

## 2025-06-07 RX ADMIN — SENNOSIDES 17.2 MG: 8.6 TABLET, FILM COATED ORAL at 09:11

## 2025-06-07 RX ADMIN — INSULIN LISPRO 7 UNITS: 100 INJECTION, SOLUTION INTRAVENOUS; SUBCUTANEOUS at 17:03

## 2025-06-07 NOTE — FLOWSHEET NOTE
06/07/25 0945   Vital Signs   BP (!) 96/55   MAP (Calculated) 69     Vitals completed this AM. Patient hypotensive. Nursing decision to hold IV lasix and cardizem. Metoprolol was still given, NO administration parameters listed for beta blocker. Given for HR in 110s-120s. Cardiology called and wanted BP rechecked since giving metoprolol. BP increased. Requested parameters for future administrations. No further needs at this time.     Jeanne Saucedo RN

## 2025-06-07 NOTE — PROGRESS NOTES
PM assessment completed. Scheduled medications given per MAR. VSS 2 liter NC, A/O to self and place, PRN pain medication given for knee pain. Patient does not appear in distress and denies any needs at this time. Call light in reach, will monitor, bed alarm on.

## 2025-06-07 NOTE — PROGRESS NOTES
Patient educated on discharge instructions as well as new medications use, dosage, administration and possible side effects.  Patient verified knowledge. IV removed without difficulty and dry dressing in place. Telemetry monitor removed and returned to [facility]. Pt left facility in stable condition to [facility] with all of their personal belongings.      Jeanne Saucedo RN

## 2025-06-07 NOTE — PLAN OF CARE
Problem: Chronic Conditions and Co-morbidities  Goal: Patient's chronic conditions and co-morbidity symptoms are monitored and maintained or improved  Outcome: Progressing     Problem: Discharge Planning  Goal: Discharge to home or other facility with appropriate resources  Outcome: Progressing     Problem: Safety - Adult  Goal: Free from fall injury  Outcome: Progressing     Problem: ABCDS Injury Assessment  Goal: Absence of physical injury  Outcome: Progressing     Problem: Skin/Tissue Integrity  Goal: Skin integrity remains intact  Description: 1.  Monitor for areas of redness and/or skin breakdown2.  Assess vascular access sites hourly3.  Every 4-6 hours minimum:  Change oxygen saturation probe site4.  Every 4-6 hours:  If on nasal continuous positive airway pressure, respiratory therapy assess nares and determine need for appliance change or resting period  Outcome: Progressing     Problem: Pain  Goal: Verbalizes/displays adequate comfort level or baseline comfort level  Outcome: Progressing

## 2025-06-07 NOTE — FLOWSHEET NOTE
06/07/25 1148   Vital Signs   Temp 98.3 °F (36.8 °C)   Temp Source Oral   Pulse 91   Heart Rate Source Monitor   Respirations 17   BP (!) 101/45   MAP (Calculated) 64   BP Location Left lower arm   BP Method Automatic   Patient Position Semi fowlers   Oxygen Therapy   SpO2 99 %   Pulse Oximeter Device Mode Continuous   Pulse Oximeter Device Location Finger   O2 Device Nasal cannula   O2 Flow Rate (L/min) 2 L/min     Vitals and reassessment completed. No significant changes. ALL patient needs met at this time.     Jeanne Saucedo RN

## 2025-06-07 NOTE — PROGRESS NOTES
06/06/25 2000   RT Protocol   History Pulmonary Disease 2   Respiratory pattern 2   Breath sounds 2   Cough 0   Indications for Bronchodilator Therapy Decreased or absent breath sounds   Bronchodilator Assessment Score 6     RT Inhaler-Nebulizer Bronchodilator Protocol Note    There is a bronchodilator order in the chart from a provider indicating to follow the RT Bronchodilator Protocol and there is an “Initiate RT Inhaler-Nebulizer Bronchodilator Protocol” order as well (see protocol at bottom of note).    CXR Findings:  XR CHEST PORTABLE  Result Date: 6/5/2025  1. Opacification in the right lung base, in keeping with underlying effusion, atelectasis and or pneumonia. 2. Cardiomegaly.       The findings from the last RT Protocol Assessment were as follows:   History Pulmonary Disease: Chronic pulmonary disease  Respiratory Pattern: Dyspnea on exertion or RR 21-25 bpm  Breath Sounds: Slightly diminished and/or crackles  Cough: Strong, spontaneous, non-productive  Indication for Bronchodilator Therapy: Decreased or absent breath sounds  Bronchodilator Assessment Score: 6    Aerosolized bronchodilator medication orders have been revised according to the RT Inhaler-Nebulizer Bronchodilator Protocol below.    Respiratory Therapist to perform RT Therapy Protocol Assessment initially then follow the protocol.  Repeat RT Therapy Protocol Assessment PRN for score 0-3 or on second treatment, BID, and PRN for scores above 3.    No Indications - adjust the frequency to every 6 hours PRN wheezing or bronchospasm, if no treatments needed after 48 hours then discontinue using Per Protocol order mode.     If indication present, adjust the RT bronchodilator orders based on the Bronchodilator Assessment Score as indicated below.  Use Inhaler orders unless patient has one or more of the following: on home nebulizer, not able to hold breath for 10 seconds, is not alert and oriented, cannot activate and use MDI correctly, or

## 2025-06-07 NOTE — DISCHARGE SUMMARY
Discharge Summary      }  Date:6/7/2025          Patient Name:Leslie Farris     YOB: 1950     Age:74 y.o.    Admit Date:6/5/2025     Admission Condition:good     Discharged Condition:good    Discharge Date: 06/07/25     Discharge Diagnoses     Active Problems:    Persistent atrial fibrillation (HCC)    Chronic obstructive pulmonary disease (HCC)  Resolved Problems:    Hypokalemia    Acute on chronic diastolic CHF (congestive heart failure) (HCC)    Hypotension    Pneumonia due to infectious organism      Hospital Stay   Narrative of Hospital Course:     74 y.o. female who presented to [facility] with shortness of breath.  PMHx significant for heart failure, A-fib, diabetes, dementia, seizure disorder, chronic respiratory failure.  The patient states that she has been having worsening shortness of breath over the past 2 days.  She says that this is getting progressively worse over time.  She typically does not use her oxygen at home although she is supposed to wear 5 L at home.  She says this is at rest.  She does have orthopnea associated with this.  She notes that she also has had some cough with clear sputum.  She states that she has had a fever at home of 102.  She has not taken anything for this.  Nothing makes it any better or worse.  She denies any other acute complaints at this time.     In the emergency department the patient had vitals notable for temperature of 98.2, heart rate 92, respiratory rate 28, blood pressure 103/84, [id number] on 2 L.  Labs were remarkable for a BNP of 3031.  WBC was elevated at 14.6.  Her initial VBG showed a pH of 7.60 with a PCO2 of 30.3.  CT chest showed pulmonary edema.  She was admitted to medicine for further management.    Acute on Chronic Heart Failure with Preserved EF  Acute on Chronic Hypoxic Respiratory Failure  Patient presents with worsening shortness of breath  States this has been going on for several days  She does have    atorvastatin 40 MG tablet  Commonly known as: LIPITOR  Take 1 tablet by mouth nightly     Basaglar KwikPen 100 UNIT/ML injection pen  Generic drug: insulin glargine     benzocaine 20 % Gel mucosal gel  Commonly known as: ORAJEL     bisacodyl 10 MG suppository  Commonly known as: DULCOLAX     budesonide-formoterol 160-4.5 MCG/ACT Aero  Commonly known as: Symbicort  Inhale 2 puffs into the lungs 2 times daily     clotrimazole 1 % cream  Commonly known as: LOTRIMIN     CULTURELLE PO     fluticasone 50 MCG/ACT nasal spray  Commonly known as: FLONASE  1 spray by Each Nostril route daily     FreeStyle Mamadou 2 Sensor Misc     furosemide 40 MG tablet  Commonly known as: Lasix  Take 1 tablet by mouth daily     gabapentin 100 MG capsule  Commonly known as: NEURONTIN     ipratropium 0.5 mg-albuterol 2.5 mg 0.5-2.5 (3) MG/3ML Soln nebulizer solution  Commonly known as: DUONEB  Inhale 3 mL into the lungs via nebulization 2-4 times/day.     lactulose 10 GM/15ML solution  Commonly known as: CHRONULAC     linaclotide 145 MCG capsule  Commonly known as: LINZESS  Take 1 capsule by mouth every morning (before breakfast)     LORazepam 1 MG tablet  Commonly known as: ATIVAN     melatonin 3 MG Tabs tablet     methocarbamol 500 MG tablet  Commonly known as: ROBAXIN     metoprolol tartrate 50 MG tablet  Commonly known as: LOPRESSOR  Take 1 tablet by mouth 2 times daily     ondansetron 4 MG tablet  Commonly known as: ZOFRAN     oxyCODONE-acetaminophen 7.5-325 MG per tablet  Commonly known as: PERCOCET     OXYGEN     pantoprazole 40 MG tablet  Commonly known as: PROTONIX  Take 1 tablet by mouth every morning (before breakfast)     polyethylene glycol 17 GM/SCOOP powder  Commonly known as: GLYCOLAX     predniSONE 10 MG tablet  Commonly known as: DELTASONE     senna 8.6 MG tablet  Commonly known as: SENOKOT     sodium phosphate     venlafaxine 75 MG extended release capsule  Commonly known as: EFFEXOR XR     Vitamin D3 1.25 MG ([id number] UT)

## 2025-06-07 NOTE — PROGRESS NOTES
Patient verbally aggressive with nursing staff, yelling \"you're a bitch\" and \"get away from me\" and yelling \"ow.\" Patient also states that she will call the police because she doesn't want to discharge.     Jeanne Saucedo RN

## 2025-06-07 NOTE — CARE COORDINATION
CASE MANAGEMENT DISCHARGE SUMMARY      Discharge to: [facility]    Precertification completed:     Hospital Exemption Notification (HENS) completed: NA - LTC    IMM given: 6/5/2025      Transportation:       Medical Transport explained to pt/family. Pt/family voice no agency preference.      Agency used: [facility]     time: 1630     Ambulance form completed: Yes    Confirmed discharge plan with:     Patient: yes     Family:  no         Facility/Agency, name: [facility]   Phone number for report to facility: [phone]     RN name: Jeanne RODGERS    Note: Discharging nurse to complete OLEG, reconcile AVS, and place final copy with patient's discharge packet. RN to ensure that written prescriptions for  Level II medications are sent with patient to the facility as per protocol.

## 2025-06-07 NOTE — DISCHARGE INSTR - COC
Continuity of Care Form    Patient Name: Leslie Farris   :  1950  MRN:  [id number]    Admit date:  2025  Discharge date:  2025      Code Status Order: DNR-CC   Advance Directives:     Admitting Physician:  Dimitry Fong DO  PCP: Kristin Pacheco MD    Discharging Nurse: Jeanne Saucedo RN    Discharging Hospital Unit/Room#: /0326-01  Discharging Unit Phone Number: [phone]    Emergency Contact:   Extended Emergency Contact Information  Primary Emergency Contact: Saeed Magallanes  Home Phone: [phone]  Mobile Phone: [phone]  Relation: Brother/Sister   needed? No  Secondary Emergency Contact: Sigrid Oro  Address: [place]  Home Phone: [phone]  Mobile Phone: [phone]  Relation: Other   needed? No    Past Surgical History:  Past Surgical History:   Procedure Laterality Date    BRONCHOSCOPY N/A 2024    EBUS NF W/ANES. (7:00) performed by Edenilson Alvarez MD at [facility] ENDOSCOPY    BRONCHOSCOPY  2024    BRONCHOSCOPY ALVEOLAR LAVAGE performed by Edenilson Alvarez MD at [facility] ENDOSCOPY    BRONCHOSCOPY  2024    BRONCHOSCOPY/TRANSBRONCHIAL NEEDLE BIOPSY ADDITIONAL LOBE performed by Edenilson Alvarez MD at [facility] ENDOSCOPY    BRONCHOSCOPY  2024    BRONCHOSCOPY/TRANSBRONCHIAL NEEDLE BIOPSY ADDITIONAL LOBE ROBOTIC performed by Edenilson Alvarez MD at [facility] ENDOSCOPY    IR MIDLINE CATH  10/10/2022    IR MIDLINE CATH 10/10/2022 [facility] PROCEDURES    IR MIDLINE CATH  2023    IR MIDLINE CATH 2023 [facility] SPECIAL PROCEDURES    KNEE SURGERY      TONSILLECTOMY      TUBAL LIGATION         Immunization History:   Immunization History   Administered Date(s) Administered    COVID-19, PFIZER Bivalent, DO NOT Dilute, (age 12y+), IM, 30 mcg/0.3 mL 2023    COVID-19, PFIZER GRAY top, DO NOT Dilute, (age 12 y+), IM, 30 mcg/0.3 mL 2022    COVID-19, PFIZER PURPLE top, DILUTE for use, (age 12 y+), 30mcg/0.3mL

## 2025-06-07 NOTE — PROGRESS NOTES
06/07/25 0749   RT Protocol   History Pulmonary Disease 2   Respiratory pattern 0   Breath sounds 2   Cough 0   Indications for Bronchodilator Therapy On home bronchodilators   Bronchodilator Assessment Score 4

## 2025-06-07 NOTE — FLOWSHEET NOTE
06/07/25 0701   Vital Signs   Temp 98.2 °F (36.8 °C)   Temp Source Oral   Pulse 84   Heart Rate Source Monitor   Respirations 16   /75   MAP (Calculated) 88   BP Location Left lower arm   BP Method Automatic   Patient Position Semi fowlers   Oxygen Therapy   SpO2 97 %   O2 Device Nasal cannula   O2 Flow Rate (L/min) 2 L/min     Vitals and assessment completed. No s/s of distress. Medications given per MAR. No further needs at this time.     Jeanne Saucedo RN

## 2025-06-07 NOTE — PROGRESS NOTES
[facility]   Progress Note  Cardiology      HPI: Seeing Ms. Farris today for f/u acute on chronic diastolic CHF and permanent afib. She c/o cramping in side of stomach. No other complaints. Tele afib 100's bpm.         Physical Examination:    Vitals:    06/07/25 0701   BP: 114/75   Pulse: 84   Resp: 16   Temp: 98.2 °F (36.8 °C)   SpO2: 97%          Constitutional and General Appearance: NAD   Respiratory:  Normal excursion and expansion without use of accessory muscles  Resp Auscultation: Normal breath sounds without dullness  Cardiovascular:  The apical impulses not displaced  Heart tones are crisp and normal; +irregularly irregular  Cervical veins are not engorged  The carotid upstroke is normal in amplitude and contour without delay or bruit  Normal S1S2, No S3, No Murmur  Peripheral pulses are symmetrical and full  There is no clubbing, cyanosis of the extremities.  No edema  Pedal Pulses: 2+ and equal   Abdomen:  No masses or tenderness  Liver/Spleen: No Abnormalities Noted  Neurological/Psychiatric:  Alert and oriented in all spheres  Moves all extremities well  No abnormalities of mood, affect, memory, mentation, or behavior are noted  Skin: warm and dry      Lab Results   Component Value Date    WBC 14.2 (H) 06/06/2025    HGB 10.1 (L) 06/06/2025    HCT 33.1 (L) 06/06/2025    MCV 70.6 (L) 06/06/2025     06/06/2025     Lab Results   Component Value Date    CREATININE 0.4 (L) 06/06/2025    BUN 11 06/06/2025     06/06/2025    K 3.4 (L) 06/06/2025    CL 96 (L) 06/06/2025    CO2 28 06/06/2025     Lab Results   Component Value Date    INR 1.06 12/16/2024    PROTIME 14.0 12/16/2024        Assessment: Leslie Farris is a 74 y.o. patient who presented to [facility] 6/5/25 with c/o CP.  PMH HTN, chronic diastolic HF, permanent afib on eliquis, anemia, COPD, dementia, uncontrolled DM type 2, anxiety, schizoaffective disorder. Prior testing: Niya nuc 4/18/2016 EF=48% and neg for ischemia. Echo  5/9/24 EF=50-55% (EF 55% in 4/23); Grade I DD normal LAP; mild MV thickening  Patient arrived by EMS from [facility] with c/o chest pain and worsening sob over several days. Note she only tells me c/o HA and stomach cramps. History likely unreliable. Admission Testing:  EKG noted Afib 100bpm; ST and T wave abnormality (HR decreased from 139 in 12/24). CXR noted opacification right lung base; mild CM. CT chest CHF findings IS markings, bilateral pleural effusion; coronary artery calcifications; LABS: , K 3.6, 3.4, Mg 1.63; BUN/Cr 13/0.4, Pro-BNP 3,031, ALT 8, AST 16, H/H 10.1/33.8 and Palomo 10 and last 8. CRP 41; Lasix IV BID was started. Weight 220# (noted d/c 213# prior).   Diagnosis acute on chronic diastolic CHF and permanent afib in older female with multiple medical problems.     Recs:  D/C IV lasix 40 BID given hypotension and no leg swelling.  Hold cardizem 120 qd given hypotension.  Continue metoprolol tartrate 75 BID with BP parameters. Note episodes of rapid afib which could cause CHF decompensation.  Give 2 doses IV digoxin 0.25 q 6 hrs to help HR and avoid low BP  Continue jardiance 10 qd for GDMT.   Replete K+ 3.4 and Mg+ 1.63 on 6/6.  Abx for possible PNA per IM team  Continue eliquis 5 BID, lipitor 20 qd  Cancel ECHO. Multiple studies same EF and will not change mgt     Note acute on chronic diastolic CHF increases her morbidity and mortality requiring med tx and adjustment.    Patient Active Problem List   Diagnosis    Seizure disorder, grand mal (HCC)    DM (diabetes mellitus) ([facility])    Lumbar facet arthropathy    Disc degeneration, lumbar    Class 1 obesity in adult    Hypokalemia    Atrial fibrillation with RVR ([facility])    Benign essential HTN    Chronic obstructive pulmonary disease ([facility])    Dyspnea and respiratory abnormalities    Acute on chronic diastolic CHF (congestive heart failure) ([facility])    Acute hypoxemic respiratory failure (HCC)    Persistent atrial fibrillation ([facility])    Chronic pain of both  knees    Bilateral primary osteoarthritis of knee    Encephalopathy    Sepsis secondary to UTI ([facility])    Altered mental status    FABIO (acute kidney injury)    Urinary tract infection with hematuria    Cervical herniated disc    Chronic bilateral low back pain with bilateral sciatica    Acute on chronic hypoxic respiratory failure ([facility])    Dementia with behavioral disturbance ([facility])    LORENZO (generalized anxiety disorder)    Hoarding disorder with poor insight    Hypertensive heart and kidney disease with chronic diastolic congestive heart failure and stage 2 chronic kidney disease ([facility])    Schizoaffective disorder, depressive type ([facility])    Providencia bacteremia    Permanent atrial fibrillation ([facility])    Bacteremia    Hyperkalemia    Septic shock ([facility])    Hypernatremia    ESBL (extended spectrum beta-lactamase) producing bacteria infection    Sepsis ([facility])    Diabetic ketoacidosis with coma associated with diabetes mellitus due to underlying condition ([facility])    Lactic acidosis    Constipation    Acute cystitis without hematuria    Uncontrolled diabetes mellitus with hypoglycemia ([facility])    Metabolic encephalopathy    Coronary artery disease involving native coronary artery of native heart without angina pectoris    Acute respiratory failure with hypoxia ([facility])    NSTEMI (non-ST elevated myocardial infarction) ([facility])    COVID-19    Pulmonary infiltrates    Elevated brain natriuretic peptide (BNP) level    Right lower lobe lung mass    Pleural effusion, bilateral    Uncontrolled type 2 diabetes mellitus with hyperglycemia ([facility])    Pneumonia of both lower lobes due to methicillin resistant Staphylococcus aureus (MRSA) ([facility])    Proteus infection    Anemia    Hypoglycemia    Hypothermia    Chest pain    Respiratory failure ([facility])    Acute encephalopathy    Thrombocytopenia    Pneumonia due to infectious organism    Acute on chronic respiratory failure with hypoxia and hypercapnia ([facility])    Cellulitis of right lower extremity    HCAP

## 2025-06-07 NOTE — PLAN OF CARE
Problem: Chronic Conditions and Co-morbidities  Goal: Patient's chronic conditions and co-morbidity symptoms are monitored and maintained or improved  6/7/2025 1022 by Jeanne Saucedo RN  Outcome: Progressing  Flowsheets (Taken 6/7/2025 0920)  Care Plan - Patient's Chronic Conditions and Co-Morbidity Symptoms are Monitored and Maintained or Improved: Monitor and assess patient's chronic conditions and comorbid symptoms for stability, deterioration, or improvement  6/6/2025 2216 by Marisa Rojas RN  Outcome: Progressing     Problem: Discharge Planning  Goal: Discharge to home or other facility with appropriate resources  6/7/2025 1022 by Jeanne Saucedo RN  Outcome: Progressing  Flowsheets (Taken 6/7/2025 0920)  Discharge to home or other facility with appropriate resources:   Identify barriers to discharge with patient and caregiver   Arrange for needed discharge resources and transportation as appropriate   Identify discharge learning needs (meds, wound care, etc)  6/6/2025 2216 by Marisa Rojas RN  Outcome: Progressing     Problem: Safety - Adult  Goal: Free from fall injury  6/7/2025 1022 by Jeanne Saucedo RN  Outcome: Progressing  6/6/2025 2216 by Marisa Rojas RN  Outcome: Progressing     Problem: ABCDS Injury Assessment  Goal: Absence of physical injury  6/7/2025 1022 by Jeanne Saucedo RN  Outcome: Progressing  6/6/2025 2216 by Marisa Rojas RN  Outcome: Progressing     Problem: Skin/Tissue Integrity  Goal: Skin integrity remains intact  Description: 1.  Monitor for areas of redness and/or skin breakdown2.  Assess vascular access sites hourly3.  Every 4-6 hours minimum:  Change oxygen saturation probe site4.  Every 4-6 hours:  If on nasal continuous positive airway pressure, respiratory therapy assess nares and determine need for appliance change or resting period  6/7/2025 1022 by Jeanne Saucedo RN  Outcome: Progressing  Flowsheets (Taken 6/7/2025 0920)  Skin Integrity Remains  Intact: Monitor for areas of redness and/or skin breakdown  6/6/2025 2216 by Marisa Rojas, RN  Outcome: Progressing     Problem: Pain  Goal: Verbalizes/displays adequate comfort level or baseline comfort level  6/7/2025 1022 by Jeanne Saucedo, RN  Outcome: Progressing  6/6/2025 2216 by Marisa Rojas, RN  Outcome: Progressing

## 2025-06-11 NOTE — PROGRESS NOTES
Physician Progress Note      PATIENT:               KALEIGH FRAUSTO  CSN #:                  [id number]  :                       1950  ADMIT DATE:       2025 2:00 PM  DISCH DATE:        2025 6:05 PM  RESPONDING  PROVIDER #:        Lay Ludwig MD          QUERY TEXT:    Acute respiratory failure is documented in the medical record   ***(date/provider). Please provide additional clinical indicators supportive   of your documentation. Or please document if the diagnosis of acute   respiratory failure has been ruled out after study.    The clinical indicators include:  Per H&P \"Pulmonary: CTA bilaterally, no wheezes, rales or rhonchi, equal chest   expansion\".  per progress note  \"On 2L O2 via NC. Lying flat. Lung exam limited 2/2 body   habitus and inability to sit up for exam due to sedation. No accessory muscle   use\".  Per progress note  \"COPD. requiring 2L O2, supposed to wear up to 5L,   however, does not typically wear it. follows with pulmonology. does not appear   to be in acute exacerbation\".  RR up to 28, SPO2 98% on 2L, supplemental oxygen up to 2L    Treatment: Supplemental oxygen, IV lasix, IV antibiotics, serial labs,   supportive care  Options provided:  -- Acute Respiratory Failure as evidenced by, Please document evidence.  -- Acute Respiratory Failure ruled out after study  -- Acute Respiratory Failure ruled out after study and Chronic Respiratory   Failure confirmed  -- Other - I will add my own diagnosis  -- Disagree - Not applicable / Not valid  -- Disagree - Clinically unable to determine / Unknown  -- Refer to Clinical Documentation Reviewer    PROVIDER RESPONSE TEXT:    Acute Respiratory Failure has been ruled out after study.    Query created by: Martine Miranda on 2025 10:39 AM      Electronically signed by:  Lay Ludwig MD 2025 9:41 AM

## 2025-06-28 ENCOUNTER — APPOINTMENT (OUTPATIENT)
Dept: CT IMAGING | Age: 75
DRG: 871 | End: 2025-06-28
Payer: COMMERCIAL

## 2025-06-28 ENCOUNTER — HOSPITAL ENCOUNTER (INPATIENT)
Age: 75
LOS: 1 days | DRG: 871 | End: 2025-06-28
Attending: STUDENT IN AN ORGANIZED HEALTH CARE EDUCATION/TRAINING PROGRAM | Admitting: INTERNAL MEDICINE
Payer: COMMERCIAL

## 2025-06-28 ENCOUNTER — APPOINTMENT (OUTPATIENT)
Dept: GENERAL RADIOLOGY | Age: 75
DRG: 871 | End: 2025-06-28
Payer: COMMERCIAL

## 2025-06-28 ASSESSMENT — PULMONARY FUNCTION TESTS
PIF_VALUE: 27
PIF_VALUE: 21

## 2025-06-28 NOTE — CONSULTS
[facility]                                 Pulmonary/Critical care  Consult /Progress Note :                                                                  CC :resp failure   Patient is being seen at the request of Dr Lovell for a consultation for acute resp     HISTORY OF PRESENT ILLNESS:   who presents to the ED for unresponsive person.  Patient was reportedly fine at 3 AM at the nursing facility, at baseline she does respond.  I saw her in the ER and she was intubated and central line was placed however she had no response,     Per the ED room patient was noted to be 64% on 3 L at the nursing facility.  She was started on pressors     Her chest x-ray shows whiteout right lung    Stat bronc and art line was placed in the and she require incremental dose of vasopressin 0.03 and Levophed      PAST MEDICAL HISTORY:  Past Medical History:   Diagnosis Date    Acute diastolic congestive heart failure (HCC) 9/14/2016    Acute diastolic heart failure ([facility])     Acute respiratory failure ([facility])     Adjustment disorder with mixed anxiety and depressed mood     Allergic rhinitis     Alzheimer's dementia ([facility])     Arachnoid cyst 5/23/2005    Atrial fibrillation ([facility])     CHF (congestive heart failure) ([facility])     Chronic back pain     Constipation     COPD (chronic obstructive pulmonary disease) ([facility])     Diabetes mellitus ([facility])     Diskitis 10/6/2011    Disseminated superficial actinic porokeratosis (DSAP)     Edema     ESBL (extended spectrum beta-lactamase) producing bacteria infection 04/17/2020    Urine Klebsiella    Hypertension     Hypo-osmolality and hyponatremia     Major depressive disorder     Morbid obesity ([facility])     Muscle weakness     Osteomyelitis of spine ([facility]) 10/6/2011    Overactive bladder     Schizoaffective disorder ([facility])     Seizures ([facility])     Urinary tract infection without hematuria     Xerosis cutis      PAST

## 2025-06-28 NOTE — ED NOTES
Pt arrived from [facility] unresponsive.  EMS states pt was 65% on room air.  Several attempts at obtaining Iv access completed without success.  24g finally obtained per Miya GARCIA RN and all labs sent.  First set blood cultures obtained per aseptic technique per Jose Daniel michelle

## 2025-06-28 NOTE — PROCEDURES
PROCEDURE NOTE  Date: 6/28/2025   Name: Leslie Farris  YOB: 1950    Procedures    BROCHOSCOPY PROCEDURE NOTE    DATE OF PROCEDURE: 6/28 / 2025    INDICATIONS & HISTORY:  Whiteout right lung    PREOPERATIVE DIAGNOSIS:    Whiteout right lung    POSTOPERATIVE DIAGNOSES:  Large mucous plug in the right main Bron      PROCEDURE PERFORMED:   1-Diagnotic, Fiberoptic Bronchoscopy  2-Bronchial alveolar lavage from **  3-               SURGEON:    Lacy Suh     ASSISTANT:   Bronchoscopy Nursing/Technical Team/OR Team as available i    SEDATION:     Regional Anesthesia,       ANESTHESIA:    Lidocaine 2% ,       ANESTHESIOLOGIST:  Per EPIC Note    SPECIMENS:  [x] Bronchial sample(s) for      Fungal smear & culture,   Acid-fast bacillus Smear and Culture,    Gram stain, C&S,    PCP               Cytology                 Description of Procedure:     The patient  identified Leslie Farris  and the procedure verified as Flexible Fiberoptic Bronchoscopy with BAL        After the patient was controlled with sedation bronchoscope was introduced through the ET tube without difficulty. The scope was then passed into the trachea.  Additional 2% lidocaine was used topically within the airway.  Careful inspection of the tracheal lumen was accomplished. The scope was sequentially passed into all bronchial airways.           Endobronchial findings:   Vocal cord no seen  Trachea:  Normal mucosa   Carmita  Normal mucosa     Right Main Stem Bronchus large mucous of blood  Right Upper Lobe Bronchi Normal mucosa with thick secretion  Right Middle Lobe Bronchi  Normal mucosa  Right Lower Lobe Bronchi (including the Superior segment)  Normal mucosa     Left Main Stem Bronchus Normal mucosa thick secretion leaking into  left upper and left  Left Upper Lobe Bronchus, Upper Division Normal mucosa with thick thick  Left Upper Lobe Bronchus, Lingula  Normal mucosa  Left Lower Lobe Bronchus (including the Superior

## 2025-06-28 NOTE — PROGRESS NOTES
Intubated and placed on vent at this time.        06/28/25 0746   NICU Vent Information   $Ventilation $Initial Day   Ventilation Started Yes   Ventilation Day(s) 1   Skin Assessment Clean, dry, & intact   Vent Type 980   Vent Mode AC/VC   Vt (Set, mL) 330 mL   Vt (Measured) 344 mL   Resp Rate (Set) 18 bpm   Rate Measured 18 br/min   Minute Volume (L/min) 6.37 Liters   Peak Flow 55 L/min   FiO2  100 %   Peak Inspiratory Pressure (cmH2O) 21 cmH2O   I:E Ratio 1:4.1   Sensitivity 3   PEEP/CPAP (cmH2O) 5   Mean Airway Pressure (cmH2O) 8.3 cmH20   Cough/Sputum   Sputum How Obtained None   Breath Sounds   Breath Sounds Bilateral Diminished   Right Upper Lobe Diminished   Right Middle Lobe Diminished   Right Lower Lobe Diminished   Left Upper Lobe Diminished   Left Lower Lobe Diminished   Additional Respiratory Assessments   Pulse (!) 121   Respirations 18   Position Semi-Carter's   Humidification Source HME   Vent Alarm Settings   High Pressure (cmH2O) 40 cmH2O   Low Minute Volume (lpm) 3 L/min   Low Exhaled Vt (ml) 300 mL   RR High (bpm) 40 br/min   Apnea (secs) 20 secs   NICU Ventilator Associated Pneumonia Bundle   Elevation of Head of Bed Yes   Non-Surgical Airway 06/28/25 Endotracheal tube   Placement Date/Time: 06/28/25 0724   Present on Admission/Arrival: No  Placed By: In ED;Licensed provider  Placement Verified By: Auscultation;Capnometry;Colorimetric ETCO2 device;Direct visualization  Mask Ventilation: Ventilated by mask (1)  Inserti...   Secured At 21 cm   Measured From Lips   Secured Location Center   Secured By Commercial tube valdez   Site Assessment Dry

## 2025-06-28 NOTE — ED NOTES
IV abx brought into room and ready to be administered as soon as iv access obtained.  Med list sent to pharmacy for review.  Ashwin fofana

## 2025-06-28 NOTE — ED NOTES
Dr. Bashir unable to place central line.  Vonda BUI now at bedside and will be taking over care. Ashwin fofana

## 2025-06-28 NOTE — H&P
Hospital Medicine History & Physical      PCP: Kristin Pacheco MD    Date of Admission: 6/28/2025    Date of Service: Pt seen/examined on 06/28/25 and Admitted to Inpatient with expected LOS greater than two midnights due to medical therapy.     Chief Complaint:  Unresponsive. EMS states pt was 65% on room air.     History Of Present Illness:      Leslie Farris is a 74 y.o. female with past medical history as below, including heart failure, A-fib, diabetes, dementia, seizure disorder, chronic respiratory failure, who presents with altered mental status.     Patient was reportedly fine at 3 AM. She was found unresponsive at 4:30 AM. Report was given at 5:30 AM, unclear why the delay in calling EMS. Patient was noted to be 64% on 3 L at the nursing facility. No reported new symptoms or problems from the night before.     Past Medical History:          Diagnosis Date    Acute diastolic congestive heart failure (HCC) 9/14/2016    Acute diastolic heart failure (HCC)     Acute respiratory failure (HCC)     Adjustment disorder with mixed anxiety and depressed mood     Allergic rhinitis     Alzheimer's dementia ([facility])     Arachnoid cyst 5/23/2005    Atrial fibrillation ([facility])     CHF (congestive heart failure) ([facility])     Chronic back pain     Constipation     COPD (chronic obstructive pulmonary disease) (HCC)     Diabetes mellitus ([facility])     Diskitis 10/6/2011    Disseminated superficial actinic porokeratosis (DSAP)     Edema     ESBL (extended spectrum beta-lactamase) producing bacteria infection 04/17/2020    Urine Klebsiella    Hypertension     Hypo-osmolality and hyponatremia     Major depressive disorder     Morbid obesity (HCC)     Muscle weakness     Osteomyelitis of spine (HCC) 10/6/2011    Overactive bladder     Schizoaffective disorder (HCC)     Seizures ([facility])     Urinary tract infection without hematuria     Xerosis cutis        Past Surgical History:          Procedure Laterality Date    BRONCHOSCOPY    Procedure Laterality Date    BRONCHOSCOPY N/A 12/17/2024    EBUS NF W/ANES. (7:00) performed by Edenilson Alvarez MD at [facility] ENDOSCOPY    BRONCHOSCOPY  12/17/2024    BRONCHOSCOPY ALVEOLAR LAVAGE performed by Edenilson Alvarez MD at [facility] ENDOSCOPY    BRONCHOSCOPY  12/17/2024    BRONCHOSCOPY/TRANSBRONCHIAL NEEDLE BIOPSY ADDITIONAL LOBE performed by Edenilson Alvaerz MD at [facility]    BRONCHOSCOPY  12/17/2024    BRONCHOSCOPY/TRANSBRONCHIAL NEEDLE BIOPSY ADDITIONAL LOBE ROBOTIC performed by Edenilson Alvarez MD at [facility] ENDOSCOPY    IR MIDLINE CATH  10/10/2022    IR MIDLINE CATH 10/10/2022 [facility]    IR MIDLINE CATH  4/11/2023    IR MIDLINE CATH 4/11/2023 [facility]    KNEE SURGERY      TONSILLECTOMY      TUBAL LIGATION         Medications Prior to Admission:      Prior to Admission medications    Medication Sig Start Date End Date Taking? Authorizing Provider   ferrous sulfate (FE TABS 325) 325 (65 Fe) MG EC tablet Take 1 tablet by mouth Every Monday, Wednesday, and Friday    Eliza Reid MD   LORazepam (ATIVAN) 1 MG tablet Take 1 tablet by mouth at bedtime. Max Daily Amount: 1 mg    Eliza Reid MD   nitroGLYCERIN (NITROSTAT) 0.4 MG SL tablet Place 1 tablet under the tongue as needed for Chest pain up to max of 3 total doses. If no relief after 1 dose, call 911.    Eliza Reid MD   acetaminophen (TYLENOL) 325 MG tablet Take 2 tablets by mouth every 8 hours as needed for Pain    Eliza Reid MD   methocarbamol (ROBAXIN) 500 MG tablet Take 2 tablets by mouth 3 times daily    Eliza Reid MD   Continuous Glucose Sensor (FREESTYLE JAMAL 2 SENSOR) [facility]  1/31/25   Eliza Reid MD   LORazepam (ATIVAN) 1 MG tablet Take 0.5 tablets by mouth 2 times daily as needed for Anxiety. 3/23/25   Eliza Reid MD   oxyCODONE-acetaminophen (PERCOCET) 7.5-325 MG per tablet 1 tablet Orally every 6 hrs    Eliza Reid MD   sodium

## 2025-06-28 NOTE — CONSULTS
[facility]   Pharmacy Pharmacokinetic Monitoring Service - Vancomycin     Leslie Farris is a 74 y.o. female starting on vancomycin therapy for sepsis x 7 days. Pharmacy consulted by Dr Lovell for monitoring and adjustment.    Target Concentration: Goal AUC/CLAYTON 400-600 mg*hr/L    Additional Antimicrobials: Meropenem    Pertinent Laboratory Values:   Wt Readings from Last 1 Encounters:   06/28/25 108 kg (238 lb)     Temp Readings from Last 1 Encounters:   06/28/25 97.9 °F (36.6 °C) (Bladder)     Estimated Creatinine Clearance: 76 mL/min (based on SCr of 0.8 mg/dL).  Recent Labs     06/28/25  0547   CREATININE 0.8   BUN 20   WBC 14.0*     Procalcitonin: 0.24    MRSA Nasal Swab: was ordered by provider, awaiting results.    Plan:  Dosing recommendations based on Bayesian software  Patient received vancomycin 2500 mg IV x one dose in the ED at 0854 today. Will start vancomycin 1500 mg IV every 24 hours.  Anticipated AUC of 514 and trough concentration of 13.9 at steady state  Renal labs as indicated   Vancomycin trough concentration ordered for 6/30 @ 0500   Pharmacy will continue to monitor patient and adjust therapy as indicated    Thank you for the consult.

## 2025-06-28 NOTE — ED NOTES
Pt 24 g piv in left wrist is not working for IV sedation drugs.  MD to place central line at this time prior to intubation.

## 2025-06-28 NOTE — PROGRESS NOTES
Called pt brother, Saeed, update given on pt condition. Requested he come to see pt, concerned regarding pt critical condition. Stated he will be there aprox 30 min

## 2025-06-28 NOTE — CONSULTS
Pharmacy to dose Meropenem for septic shock x 7 days per consult order from Dr Lovell:    SrCr 0.8  Est CrCl = 76 ml/min  Will initiate meropenem 1000 mg IV x one dose over 30 minutes followed by 1000 mg IV every 8 hours (extended infusion) per consult order from Dr Lovell.  Pharmacy will continue to monitor and adjust as necessary.

## 2025-06-28 NOTE — ED PROVIDER NOTES
Patient signed out to me at 0700 by Dr. Bashir.  Please refer to her note regarding presentation evaluation to this point.  In brief, this 74-year-old female is presenting from her LTAC after being found unresponsive.  She is currently on a nonrebreather and has been having great difficulty in obtaining IV access, therefore central line obtained by Dr. Bashir, unfortunately unsuccessfully.  At this point I assumed care.  Left tibial IO placed and patient given RSI medications and intubated due to hypoxia despite nonrebreather and inability to protect her airway.  Attempted left IJ central line, unfortunately successfully.  Reprepped and placed right femoral central line.  Initiated propofol, fentanyl for sedation.  Started norepinephrine due to hypotension.  She has been severely tachycardic with A-fib RVR, however improving with sedation currently.  Will add on rate control medications if this does not improve appropriately with adequate sedation.  IV fluid bolus and antibiotics ordered by Dr. Bashir, and I agree and this is administered.  Initial lab work does show a severe lactic acidosis, however no other significant metabolic derangements.  She does have a leukocytosis and a mild elevation in her bilirubin.    Chest x-ray showed appropriate placement of endotracheal tube.  Does show right lung whiteout.  Patient clinically distracted.  Improved with sedation and vasopressor support, however I was called to the room when we are unable to obtain cuff pressures.  Bedside ultrasound does show activity and pulsatile carotid and femoral pulses, though they do appear weak.  Due to this attempted arterial line placement, unfortunately was ultimately unsuccessful.  Discussed with critical care, Dr. Suh, who request we transfer the patient up to the ICU where he will likely place an axillary line and continue resuscitation as well as plan for bronchoscopy due to the patient's right lung whiteout on imaging.  He discussed      Hand hygiene: Hand hygiene performed prior to insertion      Sterile barrier technique: All elements of maximal sterile technique followed      Skin preparation:  Chlorhexidine    Skin preparation agent: Skin preparation agent completely dried prior to procedure    Sedation:     Sedation type:  Moderate sedation  Anesthesia:     Anesthesia method:  Local infiltration    Local anesthetic:  Lidocaine 1% w/o epi  Procedure details:     Location:  L internal jugular    Patient position:  Trendelenburg    Procedural supplies:  Triple lumen    Catheter size:  8 Fr    Ultrasound guidance: yes      Ultrasound guidance timing: real time      Sterile ultrasound techniques: Sterile gel and sterile probe covers were used      Number of attempts:  2    Successful placement: no    Post-procedure details:     Assessment:  No pneumothorax on x-ray  Central Line    Date/Time: 6/28/2025 8:55 AM    Performed by: Kaleb Foote DO  Authorized by: Kaleb Foote DO    Consent:     Consent obtained:  Emergent situation  Universal protocol:     Patient identity confirmed:  Arm band  Pre-procedure details:     Indication(s): central venous access and insufficient peripheral access      Hand hygiene: Hand hygiene performed prior to insertion      Sterile barrier technique: All elements of maximal sterile technique followed      Skin preparation:  Chlorhexidine    Skin preparation agent: Skin preparation agent completely dried prior to procedure    Sedation:     Sedation type:  Moderate sedation  Anesthesia:     Anesthesia method:  Local infiltration    Local anesthetic:  Lidocaine 1% w/o epi  Procedure details:     Location:  R femoral    Site selection rationale:  Inability to access IJ    Patient position:  Supine    Procedural supplies:  Triple lumen    Catheter size:  8 Fr    Ultrasound guidance: yes      Ultrasound guidance timing: real time      Sterile ultrasound techniques: Sterile gel and sterile probe covers were used

## 2025-06-28 NOTE — PROGRESS NOTES
TOD 1635, verified by Ahmet RODGERS and Dena RODGERS.      Called [facility]. Body released.  Spoke with Leticiagloria Foremanmons Case ID # [id number]    Updated pt TRU/brother Saeed.     Pulmonology and IM made aware.

## 2025-06-28 NOTE — PROCEDURES
PROCEDURE NOTE  Date: 6/28/2025   Name: Leslie Farris  YOB: 1950    Procedures                                                    [facility]                                                                                               PROCEDURE NOTE    PROCEDURE:  Arterial line placement.     INDICATIONS:  ,shock .Continuous hemodynamic monitoring of vital signs.      SURGEON:   PAULO EMMANUEL MD        ANESTHESIA:  Local infiltration of 1% lidocaine.     CONSCENT:   Informed written obtained.     PROCEDURAL TECHNIQUE:  Procedure was done using strict aseptic technique. Right radial site was cleaned with chloraprep and draped.  Radial artery was identified, then Lidocaine 1% was infiltrated locally.  Radial arterial line was inserted, a good blood flow was obtained, after which guidewire was inserted all the way with no resistance. Then the canula was inserted and needle with guidewire was withdrawn. Pulsatile bright red blood flow was observed. The canula was connected to BP monitoring apparatus and a good waveform was noted. Then the canula was secured with 2 stay sutures of 3-0 silk after Lidocaine infiltration, following which dressing was applied. The patient tolerated the procedure.     COMPLICATIONS: No immediate complication.       PAULO EMMANUEL MD

## 2025-06-28 NOTE — ED PROVIDER NOTES
[facility] EMERGENCY DEPARTMENT  EMERGENCY DEPARTMENT ENCOUNTER        Patient Name: Leslie Farris  MRN: [id number]  Birthdate 1950  Date of evaluation: 6/28/2025  Provider: Mitzi Bashir MD  PCP: Kristin Pacheco MD  Note Started: 5:37 AM EDT 6/28/25      CHIEF COMPLAINT  unresponsive         HISTORY & PHYSICAL     HISTORY OF PRESENT ILLNESS  History from : EMS and nursing facility    Limitations to history : Altered Mental Status    Leslie Farris is a 74 y.o. female  has a past medical history of Acute diastolic congestive heart failure (HCC) (9/14/2016), Acute diastolic heart failure (HCC), Acute respiratory failure (HCC), Adjustment disorder with mixed anxiety and depressed mood, Allergic rhinitis, Alzheimer's dementia ([facility]), Arachnoid cyst (5/23/2005), Atrial fibrillation ([facility]), CHF (congestive heart failure) ([facility]), Chronic back pain, Constipation, COPD (chronic obstructive pulmonary disease) ([facility]), Diabetes mellitus ([facility]), Diskitis (10/6/2011), Disseminated superficial actinic porokeratosis (DSAP), Edema, ESBL (extended spectrum beta-lactamase) producing bacteria infection (04/17/2020), Hypertension, Hypo-osmolality and hyponatremia, Major depressive disorder, Morbid obesity ([facility]), Muscle weakness, Osteomyelitis of spine ([facility]) (10/6/2011), Overactive bladder, Schizoaffective disorder ([facility]), Seizures ([facility]), Urinary tract infection without hematuria, and Xerosis cutis., who presents to the ED for unresponsive person.  Patient was reportedly fine at 3 AM, at baseline she does respond.  She was found unresponsive at 4:30 AM.  Report was given at 5:30 AM, unclear why the delay in calling EMS.  Patient was noted to be 64% on 3 L at the nursing facility.  Mentation has improved somewhat with oxygen, she now opens her eyes to voice rather than just noxious stimuli.  There was no report from nursing home the patient had been having any symptoms prior to overnight    Old records  Former     Current packs/day: 0.00     Average packs/day: 1 pack/day for 40.0 years (40.0 ttl pk-yrs)     Types: Cigarettes     Start date:      Quit date: 2004     Years since quittin.5     Passive exposure: Past    Smokeless tobacco: Never   Vaping Use    Vaping status: Never Used   Substance and Sexual Activity    Alcohol use: No     Alcohol/week: 0.0 standard drinks of alcohol    Drug use: No    Sexual activity: Not Currently   Other Topics Concern    Not on file   Social History Narrative    Not on file     Social Drivers of Health     Financial Resource Strain: Not on file   Food Insecurity: Patient Unable To Answer (2025)    Hunger Vital Sign     Worried About Running Out of Food in the Last Year: Patient unable to answer     Ran Out of Food in the Last Year: Patient unable to answer   Transportation Needs: Patient Unable To Answer (2025)    PRAPARE - Transportation     Lack of Transportation (Medical): Patient unable to answer     Lack of Transportation (Non-Medical): Patient unable to answer   Physical Activity: Not on file   Stress: Not on file   Social Connections: Not on file   Intimate Partner Violence: Not on file   Housing Stability: Patient Unable To Answer (2025)    Housing Stability Vital Sign     Unable to Pay for Housing in the Last Year: Patient unable to answer     Number of Times Moved in the Last Year: 0     Homeless in the Last Year: Patient unable to answer         Differential diagnosis includes but is not limited to: Hypoxemia, ischemic encephalopathy, hepatic encephalopathy, seizure or postictal state, hypoglycemia and hyperglycemia,  hypotension and hypoperfusion, electrolytes disturbances, Infectious processes such as pneumonia or urinary tract infection, Substance use or withdrawal, Medication adverse event or interaction, CVA, subdural hematoma, meningitis, encephalitis, thyroid disease, arrhythmia, MI or CHF, hyperthermia or hypothermia, Dehydration, sleep    levalbuterol (XOPENEX) nebulizer solution 1.25 mg (has no administration in time range)   sodium chloride 0.9 % bolus 1,000 mL (has no administration in time range)   propofol infusion (has no administration in time range)   ceFEPIme (MAXIPIME) 2,000 mg in sodium chloride 0.9 % 100 mL IVPB (addEASE) (has no administration in time range)   vancomycin (VANCOCIN) 2,500 mg in sodium chloride 0.9 % 500 mL IVPB (has no administration in time range)   propofol 1000 MG/100ML infusion (has no administration in time range)   methylPREDNISolone sodium succ (SOLU-MEDROL) 125 mg in sterile water 2 mL injection (125 mg IntraVENous Given 6/28/25 0521)   etomidate (AMIDATE) injection 20 mg (20 mg IntraVENous Given 6/28/25 0718)   rocuronium (ZEMURON) injection 70 mg (70 mg IntraVENous Given 6/28/25 0718)        REASSESSMENT:  On reassessment, patient is critically ill.  Patient arrived with hypoxic and hypercarbic respiratory failure.  Steroids and breathing treatment ordered.  Patient initially had improving mentation, opening her eyes to verbal stimuli, but this decompensated.  We had significant difficulty obtaining access.  On planning to intubate the patient, we did lose our IV access so given that at that time patient had appropriate oxygenation on nonrebreather, did attempt central venous catheter placement.  Significant difficulties obtaining access, ultimately unsuccessful and patient began to decompensate and was sat up with again improvement of saturation.  Labs concerning for sepsis of unknown source, significantly elevated lactate.  Fluids and antibiotics ordered.  Coagulation studies elevated and patient on Eliquis.  Hyperglycemia with anion gap but beta hydroxybutyrate within normal limits.  Troponin elevated.  Patient is in A-fib with RVR.  Patient's blood pressure was okay so given patient otherwise unstable, had not given rate control at the time of signout.  Transaminitis.      When I was unsuccessful gaining

## 2025-07-12 NOTE — PROGRESS NOTES
Physician Progress Note      PATIENT:               KALEIGH FRAUSTO  CSN #:                  [id number]  :                       1950  ADMIT DATE:       2025 2:00 PM  DISCH DATE:        2025 6:05 PM  RESPONDING  PROVIDER #:        Ale Wills MD          QUERY TEXT:    Sepsis was documented in the medical record PCP PNs 25-25.  The   diagnosis was not noted in subsequent documentation.  Please clarify the   status of this condition.    The clinical indicators include:  -74 year old female admitted for Acute on chronic diastolic CHF. COPD chronic   respiratory failure on daily prednisone, possible PNA  -on admit WBC 14.6, HR > 90, RR 18-28, procalcitonin 0.03  -per PCP PNs 25-25 \" Possible pneumonia. Sepsis criteria.-+WBC, +HR,   +RR. procal WNL\"  -CXR  25 \"Opacification in the right lung base, in keeping with underlying   effusion, atelectasis and or pneumonia\"  -CT \"Congestive heart failure.\"  -Treatment Rocephin and Zithromax IV x 1 dose, oral Levaquin x 2 doses, per   AVS no antibiotics continued on dc,no cultures sent  Options provided:  -- Sepsis 2/2 Pneumonia confirmed after study, Please provide evidence to   support sepsis  -- Sepsis ruled out after study  -- Other - I will add my own diagnosis  -- Disagree - Not applicable / Not valid  -- Disagree - Clinically unable to determine / Unknown  -- Refer to Clinical Documentation Reviewer    PROVIDER RESPONSE TEXT:    Sepsis ruled out after study.    Query created by: Vonnie Lorenzana on 2025 7:12 AM      Electronically signed by:  Ale Wills MD 2025 8:14 AM

## 2025-07-24 NOTE — DISCHARGE SUMMARY
Hospital Medicine Discharge Summary    Patient: Leslie Farris     Gender: female  : 1950   Age: 74 y.o.  MRN: [id number]    Admitting Physician: Dena Lovell DO  Discharge Physician: Dena Lovell DO    Code Status: Prior     Admit Date: 2025   Discharge Date: 2025      Discharge Diagnoses:    Active Hospital Problems    Diagnosis Date Noted    Septic shock (HCC) [A41.9, R65.21] 2022     Priority: Medium       Condition at Discharge:     Hospital Course:   Leslie Farris is a 74 y.o. female with past medical history as below, including heart failure, A-fib, diabetes, dementia, seizure disorder, chronic respiratory failure, who presents with altered mental status.      Patient was reportedly fine at 3 AM. She was found unresponsive at 4:30 AM. Report was given at 5:30 AM, unclear why the delay in calling EMS. Patient was noted to be 64% on 3 L at the nursing facility. No reported new symptoms or problems from the night before.      Pt seen at bedside, intubated and sedated    Patient was admitted to the ICU with life support as below however pt vitals remained unstable. Her condition was discussed with the family and it was felt that it would not be in her goals of care to continue aggressive treatment given her comorbidities. She was transitioned to comfort care, TOD 1635    Altered mental status secondary to septic shock, severe hypotension  CT head pending - no acute findings     Septic shock  Pneumonia of the right lung. Near complete opacification of the right lung on Xray.   Hx of MRSA, ESBL, MDRO  CT chest abdomen pelvis pending when pressures stable  Central line placement      Hypoxemic and hypercapnic respiratory failure  EMS states pt was 65% on room air.   Intubated on admission      Lactic acidosis 10.7 on arrival     Atrial fibrillation with RVR  Secondary hypercoagulable state  -On Eliquis 5 mg BID and lopressor 50 mg BID at facility     Diastolic CHF

## 2025-09-03 NOTE — PROGRESS NOTES
Physician Progress Note      PATIENT:               KALEIGH FRAUSTO  CSN #:                  [id number]  :                       1950  ADMIT DATE:       2025 5:31 AM  DISCH DATE:        2025 6:00 PM  RESPONDING  PROVIDER #:        Dena Lovell DO          QUERY TEXT:    A mental status change is documented in the medical record per ED provider.    Please specify the underlying cause:    The clinical indicators include:  74 yr old female with septic shock admitted for AMS  -\"at baseline she does respond.  She was found unresponsive at 4:30   AM....Mentation has improved somewhat with oxygen, she now opens her eyes to   voice rather than just noxious stimuli.\" (ED provider)  -\"Altered mental status secondary to septic shock, severe hypotension\"  Tx: 15 L O2 via NRM to intubation with mechanical ventilation, ABX, IVF's,   Levophed, and labs. with transition to comfort care with subsequent death   during hospitalization  Options provided:  -- Septic encephalopathy  -- Other - I will add my own diagnosis  -- Disagree - Not applicable / Not valid  -- Disagree - Clinically unable to determine / Unknown  -- Refer to Clinical Documentation Reviewer    PROVIDER RESPONSE TEXT:    This patient has septic encephalopathy.    Query created by: Charmaine Rojo on 2025 1:44 PM      QUERY TEXT:    Pneumonia is documented in the medical record. Please specify the type of   pneumonia.    The clinical indicators include:  74 yr old female with PMH of CHF and COPD admitted with septic shock from [facility].  -\"Pneumonia of the right lung. Near complete opacification of the right lung   on Xray. Hx of MRSA, ESBL MDRO\" (H/P)  Tx: sepsis IVF bolus, Cefepime, Merrem, pressors, O2 via NRM then intubated   with mechanical ventilation until patient  during hospital admission  Options provided:  -- This patient was treated for gram negative pneumonia.  -- Other - I will add my own diagnosis  -- Disagree - Not

## (undated) DEVICE — CONMED SCOPE SAVER BITE BLOCK, 20X27 MM: Brand: SCOPE SAVER

## (undated) DEVICE — SHEET,DRAPE,40X58,STERILE: Brand: MEDLINE

## (undated) DEVICE — BRUSH CYTO L150CM CHN L2MM BRIST DIA1.9MM PTFE S STL BULL

## (undated) DEVICE — SYRINGE MED 10ML SLIP TIP BLNT FILL AND LUERLOCK DISP

## (undated) DEVICE — SYRINGE MED 50ML LUERSLIP TIP

## (undated) DEVICE — SINGLE USE SUCTION VALVE MAJ-209: Brand: SINGLE USE SUCTION VALVE (STERILE)

## (undated) DEVICE — SINGLE USE BIOPSY VALVE MAJ-210: Brand: SINGLE USE BIOPSY VALVE (STERILE)

## (undated) DEVICE — ELECTRODE ECG MONITR FOAM TEAR DROP ADLT RED

## (undated) DEVICE — NEEDLE ASPIR 21GA L700MM US GUID TREAT DST END FOR EFFICIENT